# Patient Record
Sex: MALE
[De-identification: names, ages, dates, MRNs, and addresses within clinical notes are randomized per-mention and may not be internally consistent; named-entity substitution may affect disease eponyms.]

---

## 2023-01-15 ENCOUNTER — NURSE TRIAGE (OUTPATIENT)
Dept: OTHER | Facility: CLINIC | Age: 73
End: 2023-01-15

## 2023-01-15 NOTE — TELEPHONE ENCOUNTER
Location of patient: [place]    Subjective: Caller states \"he has had diarrhea for 12 hours\"     Current Symptoms: diarrhea (25 times) in 12 hours    Onset: 12 hours ago    Pain Severity: \"mild stomach pain\"    Temperature: denies     What has been tried: NA    Recommended disposition: Go to ED Now    Care advice provided, patient verbalizes understanding; denies any other questions or concerns; instructed to call back for any new or worsening symptoms. Patient/caller agrees to proceed to . Emergency Department    This triage is a result of a call to [address] [place] [address]. Please do not respond to the triage nurse through this encounter. Any subsequent communication should be directly with the patient.       Reason for Disposition   [1] Drinking very little AND [2] dehydration suspected (e.g., no urine > 12 hours, very dry mouth, very lightheaded)    Protocols used: Diarrhea-ADULT-

## 2023-08-20 PROBLEM — M25.552 LEFT HIP PAIN: Status: ACTIVE | Noted: 2023-08-20

## 2023-08-20 PROBLEM — M16.11 OSTEOARTHRITIS OF RIGHT HIP: Status: ACTIVE | Noted: 2023-08-20

## 2023-08-20 PROBLEM — M25.551 RIGHT HIP PAIN: Status: ACTIVE | Noted: 2023-08-20

## 2023-08-20 PROBLEM — G45.9 TRANSIENT ISCHEMIC ATTACK: Status: ACTIVE | Noted: 2023-08-20

## 2023-08-20 PROBLEM — I77.9 ARTERIOPATHY (CMS-HCC): Status: ACTIVE | Noted: 2023-08-20

## 2023-08-20 PROBLEM — I65.21 STENOSIS OF RIGHT CAROTID ARTERY: Status: ACTIVE | Noted: 2023-08-20

## 2023-08-20 PROBLEM — K40.90 RIGHT INGUINAL HERNIA: Status: ACTIVE | Noted: 2023-08-20

## 2023-08-20 PROBLEM — I25.10 CORONARY ARTERY DISEASE: Status: ACTIVE | Noted: 2023-08-20

## 2023-08-20 PROBLEM — I87.009 POST-PHLEBITIC SYNDROME: Status: ACTIVE | Noted: 2023-08-20

## 2023-08-20 PROBLEM — Z98.61 POSTSURGICAL PERCUTANEOUS TRANSLUMINAL CORONARY ANGIOPLASTY STATUS: Status: ACTIVE | Noted: 2023-08-20

## 2023-08-20 PROBLEM — R91.8 LUNG MASS: Status: ACTIVE | Noted: 2023-08-20

## 2023-08-20 PROBLEM — M16.12 OSTEOARTHRITIS OF LEFT HIP: Status: ACTIVE | Noted: 2023-08-20

## 2023-08-20 PROBLEM — I48.91 ATRIAL FIBRILLATION (MULTI): Status: ACTIVE | Noted: 2023-08-20

## 2023-08-20 PROBLEM — E78.5 HYPERLIPIDEMIA: Status: ACTIVE | Noted: 2023-08-20

## 2023-08-20 PROBLEM — Z95.1 POSTSURGICAL AORTOCORONARY BYPASS STATUS: Status: ACTIVE | Noted: 2023-08-20

## 2023-08-20 PROBLEM — Q83.3: Status: ACTIVE | Noted: 2023-08-20

## 2023-08-20 PROBLEM — H93.19 TINNITUS: Status: ACTIVE | Noted: 2023-08-20

## 2023-08-20 PROBLEM — M70.62 TROCHANTERIC BURSITIS OF LEFT HIP: Status: ACTIVE | Noted: 2023-08-20

## 2023-08-20 PROBLEM — I25.10 ASHD (ARTERIOSCLEROTIC HEART DISEASE): Status: ACTIVE | Noted: 2023-08-20

## 2023-08-20 PROBLEM — K80.20 CALCULUS OF GALLBLADDER WITHOUT CHOLECYSTITIS WITHOUT OBSTRUCTION: Status: ACTIVE | Noted: 2023-08-20

## 2023-08-20 PROBLEM — I83.813 VARICOSE VEINS OF BOTH LOWER EXTREMITIES WITH PAIN: Status: ACTIVE | Noted: 2023-08-20

## 2023-08-20 PROBLEM — N40.0 ENLARGED PROSTATE: Status: ACTIVE | Noted: 2023-08-20

## 2023-08-20 PROBLEM — I10 HYPERTENSION: Status: ACTIVE | Noted: 2023-08-20

## 2023-08-20 PROBLEM — M70.61 TROCHANTERIC BURSITIS OF RIGHT HIP: Status: ACTIVE | Noted: 2023-08-20

## 2023-08-20 PROBLEM — R22.41 MASS OF RIGHT LOWER LEG: Status: ACTIVE | Noted: 2023-08-20

## 2023-08-20 RX ORDER — LOPERAMIDE HCL 2 MG
TABLET ORAL
COMMUNITY
End: 2023-10-28 | Stop reason: HOSPADM

## 2023-08-20 RX ORDER — BROMPHENIRAMINE MALEATE, DEXTROMETHORPHAN HBR, PHENYLEPHRINE HCL, DIPHENHYDRAMINE HCL, PHENYLEPHRINE HCL 0.52G
KIT ORAL
COMMUNITY
End: 2023-10-24 | Stop reason: ALTCHOICE

## 2023-08-20 RX ORDER — LISINOPRIL 40 MG/1
TABLET ORAL
COMMUNITY
Start: 2022-07-25 | End: 2023-10-16

## 2023-08-20 RX ORDER — NITROGLYCERIN 0.4 MG/1
TABLET SUBLINGUAL
COMMUNITY
Start: 2018-05-29 | End: 2023-12-09 | Stop reason: HOSPADM

## 2023-08-20 RX ORDER — ATORVASTATIN CALCIUM 40 MG/1
TABLET, FILM COATED ORAL
COMMUNITY
End: 2023-10-16

## 2023-08-20 RX ORDER — AMLODIPINE BESYLATE 10 MG/1
TABLET ORAL
COMMUNITY
End: 2023-12-09 | Stop reason: HOSPADM

## 2023-08-20 RX ORDER — CARVEDILOL 12.5 MG/1
TABLET ORAL
COMMUNITY
End: 2024-01-28 | Stop reason: HOSPADM

## 2023-08-20 RX ORDER — FAMOTIDINE 20 MG/1
TABLET, FILM COATED ORAL
COMMUNITY
End: 2023-12-09 | Stop reason: HOSPADM

## 2023-08-20 RX ORDER — WARFARIN SODIUM 5 MG/1
TABLET ORAL
COMMUNITY
Start: 2014-10-23 | End: 2023-10-19

## 2023-08-20 RX ORDER — TURMERIC ROOT EXTRACT 500 MG
TABLET ORAL
COMMUNITY
End: 2023-10-24 | Stop reason: ALTCHOICE

## 2023-08-20 RX ORDER — ALLOPURINOL 100 MG/1
TABLET ORAL
COMMUNITY
End: 2023-11-06

## 2023-08-20 RX ORDER — EZETIMIBE 10 MG/1
1 TABLET ORAL DAILY
COMMUNITY

## 2023-08-20 RX ORDER — CHLORTHALIDONE 25 MG/1
TABLET ORAL
COMMUNITY
Start: 2022-07-25 | End: 2023-10-16

## 2023-10-03 ENCOUNTER — OFFICE VISIT (OUTPATIENT)
Dept: ORTHOPEDIC SURGERY | Facility: CLINIC | Age: 73
End: 2023-10-03
Payer: MEDICARE

## 2023-10-03 ENCOUNTER — HOSPITAL ENCOUNTER (OUTPATIENT)
Facility: HOSPITAL | Age: 73
Setting detail: SURGERY ADMIT
End: 2023-10-03
Attending: ORTHOPAEDIC SURGERY | Admitting: ORTHOPAEDIC SURGERY
Payer: MEDICARE

## 2023-10-03 VITALS — WEIGHT: 246 LBS | HEIGHT: 69 IN | BODY MASS INDEX: 36.43 KG/M2

## 2023-10-03 DIAGNOSIS — M16.11 PRIMARY OSTEOARTHRITIS OF RIGHT HIP: ICD-10-CM

## 2023-10-03 DIAGNOSIS — M25.551 RIGHT HIP PAIN: Primary | ICD-10-CM

## 2023-10-03 DIAGNOSIS — M25.351 HIP INSTABILITY, RIGHT: ICD-10-CM

## 2023-10-03 PROCEDURE — 99214 OFFICE O/P EST MOD 30 MIN: CPT | Performed by: ORTHOPAEDIC SURGERY

## 2023-10-03 PROCEDURE — 1036F TOBACCO NON-USER: CPT | Performed by: ORTHOPAEDIC SURGERY

## 2023-10-03 PROCEDURE — 1125F AMNT PAIN NOTED PAIN PRSNT: CPT | Performed by: ORTHOPAEDIC SURGERY

## 2023-10-03 PROCEDURE — 1160F RVW MEDS BY RX/DR IN RCRD: CPT | Performed by: ORTHOPAEDIC SURGERY

## 2023-10-03 PROCEDURE — 1159F MED LIST DOCD IN RCRD: CPT | Performed by: ORTHOPAEDIC SURGERY

## 2023-10-03 ASSESSMENT — PAIN - FUNCTIONAL ASSESSMENT: PAIN_FUNCTIONAL_ASSESSMENT: 0-10

## 2023-10-03 ASSESSMENT — PAIN DESCRIPTION - DESCRIPTORS: DESCRIPTORS: ACHING

## 2023-10-03 ASSESSMENT — PAIN SCALES - GENERAL: PAINLEVEL_OUTOF10: 6

## 2023-10-03 NOTE — PROGRESS NOTES
Subjective      Chief Complaint   Patient presents with    Right Hip - Pain        No surgery found     HPI  Presents today with right hip pain (8/10). The patient states that this right hip pain has been worsening and persistent for years. The patient denies trauma or injury to the right hip. The patient states that the right hip pain is worse with and aggravated by prolonged walking and standing. He feels persistent right hip instability and is concerned for further injury regarding this right hip instability. The patient states that this right hip pain impairs their ability to complete normal activities of daily living. The patient has tried tylenol and ibuprofen with no relief. He has tried cortisone injection to the right hip with no relief of the right hip pain. He has tried physical therapy with no relief of the right hip pain. He has tried intra-articular stem cell cell treatment to the right hip with no relief of the right hip pain 4 years ago. Patient is on warfarin for atrial fibrillation. The patient uses a cane while walking.     Review of Systems   All other systems reviewed and are negative.     Joint Replacement History   Primary joint affected: right hip  Duration: 10 years    Joint replacement related history:  Severe osteoarthritis of right hip    Failed nonsurgical treatments:  NSAIDS/COXIB: yes  Weight loss: yes  Physical therapy: yes  Intraarticular injection: Stem cell therapy 4 years ago    Radiology indications for replacement:   Subchondral cysts, subchondral sclerosis, joint space narrowing.    Walking support: cane    Pain history:   Pain at rest: moderate  Pain when bearing weight: severe  Pain with passive ROM: severe  Pain with ADL: severe  Abnormal findings on physical exam: severe  Patient cannot walk more than 1 block without stopping.   Patient has tried maximum medical therapy appropriate for patient.     Objective      There were no vitals taken for this visit.     Physical  Exam  Constitutional: Appears stated age. No apparent distress  Labored Breathing: No  Psychiatric: Normal mood and effect.   Neurological: alert and oriented x3  Skin: intact  HEENT: No bruising, otorrhea, rhinorrhea.  MUSCULOSKELETAL: Neck: No tenderness. No pain or limitation with range of motion. Back: No tenderness. Straight leg test negative bilaterally. right hip: There is no tenderness at the greater trochanter. There is pain with gentle ROM in flexion and extension at the hip. There is pain with external rotation and abduction. right lower extremity is in good position. Nontender at the calf. Neurovascular is intact. The patient is seen walking today antalgic gait.    AP and lateral x-rays of the right hip done on 8-4-2023 show severe osteoarthritis of the right hip with complete loss of joint surface cartilage, sclerosis and spurring.    Mukesh was seen today for pain.  Diagnoses and all orders for this visit:  Right hip pain (Primary)  Primary osteoarthritis of right hip  Hip instability, right  Options are discussed with the patient in the presence of his wife in detail. The patient is instructed regarding activity modification and fall precautions and risk for further injury with falling or trauma and use of a cane for balance and support, gentle range of motion and stretching exercises, and the appropriate use of Tylenol as needed for pain with its potential adverse reactions and side effects. The patient understands. The patient states that despite all treatment listed above including physical therapy, gentle strengthening and ROM exercises, and cortisone injections to the right hip that the right hip pain is disabling and impairs the patient´s ability to complete normal activities of daily living including the patient´s work at her job. The patient is concerned regarding risk for further injury from this persistent right hip instability, and walks with a Trendelenburg gait. On Physical examination  there is pain with and marked limitation of range of motion, especially internal rotation, abduction, and adduction. X- rays show severe osteoarthritis of the right hip with complete loss of joint surface cartilage, bone on bone, sclerosis and degenerative cysts. Right total hip replacement with indications, alternatives, potential risks including but not limited to risk of infection, blood clot, blood loss, dislocation, nerve or blood vessel damage, stroke, or death, benefits, unforeseen risks, the rehab involved and the fact that no guarantee can be made were all discussed with the patient in the presence of his wife and detail. The patient understands, accepts and because the right hip pain is disabling and impairs the ability to do the activities of daily living that are important to the patient and because of the persistent right hip instability the patient wishes to proceed with a right hip replacement.  His wife and I agree. We will be setting this up for a time that is convenient to the patient and as the schedule allows.  Please note that this report has been produced using speech recognition software.  It may contain errors related to grammar, punctuation or spelling.  Electronically signed, but not reviewed.    Steve Hull MD

## 2023-10-03 NOTE — LETTER
October 3, 2023     Lorenza Garcia MD  [id number] [facility]  [facility] [address]    Patient: Mukesh Garg   YOB: 1950   Date of Visit: 10/3/2023       Dear Dr. Lorenza Garcia MD:    Thank you for referring Mukesh Garg to me for evaluation. Below are my notes for this consultation.  If you have questions, please do not hesitate to call me. I look forward to following your patient along with you.       Sincerely,     Steve Hull MD      CC: No Recipients  ______________________________________________________________________________________    Subjective     Chief Complaint   Patient presents with   • Right Hip - Pain        No surgery found     HPI  Presents today with right hip pain (8/10). The patient states that this right hip pain has been worsening and persistent for years. The patient denies trauma or injury to the right hip. The patient states that the right hip pain is worse with and aggravated by prolonged walking and standing. He feels persistent right hip instability and is concerned for further injury regarding this right hip instability. The patient states that this right hip pain impairs their ability to complete normal activities of daily living. The patient has tried tylenol and ibuprofen with no relief. He has tried cortisone injection to the right hip with no relief of the right hip pain. He has tried physical therapy with no relief of the right hip pain. He has tried intra-articular stem cell cell treatment to the right hip with no relief of the right hip pain 4 years ago. Patient is on warfarin for atrial fibrillation. The patient uses a cane while walking.     Review of Systems   All other systems reviewed and are negative.     Joint Replacement History   Primary joint affected: right hip  Duration: 10 years    Joint replacement related history:  Severe osteoarthritis of right hip    Failed nonsurgical treatments:  NSAIDS/COXIB:  yes  Weight loss: yes  Physical therapy: yes  Intraarticular injection: Stem cell therapy 4 years ago    Radiology indications for replacement:   Subchondral cysts, subchondral sclerosis, joint space narrowing.    Walking support: cane    Pain history:   Pain at rest: moderate  Pain when bearing weight: severe  Pain with passive ROM: severe  Pain with ADL: severe  Abnormal findings on physical exam: severe  Patient cannot walk more than 1 block without stopping.   Patient has tried maximum medical therapy appropriate for patient.     Objective     There were no vitals taken for this visit.     Physical Exam  Constitutional: Appears stated age. No apparent distress  Labored Breathing: No  Psychiatric: Normal mood and effect.   Neurological: alert and oriented x3  Skin: intact  HEENT: No bruising, otorrhea, rhinorrhea.  MUSCULOSKELETAL: Neck: No tenderness. No pain or limitation with range of motion. Back: No tenderness. Straight leg test negative bilaterally. right hip: There is no tenderness at the greater trochanter. There is pain with gentle ROM in flexion and extension at the hip. There is pain with external rotation and abduction. right lower extremity is in good position. Nontender at the calf. Neurovascular is intact. The patient is seen walking today antalgic gait.    AP and lateral x-rays of the right hip done on 8-4-2023 show severe osteoarthritis of the right hip with complete loss of joint surface cartilage, sclerosis and spurring.    Mukesh was seen today for pain.  Diagnoses and all orders for this visit:  Right hip pain (Primary)  Primary osteoarthritis of right hip  Hip instability, right  Options are discussed with the patient in the presence of his wife in detail. The patient is instructed regarding activity modification and fall precautions and risk for further injury with falling or trauma and use of a cane for balance and support, gentle range of motion and stretching exercises, and the  appropriate use of Tylenol as needed for pain with its potential adverse reactions and side effects. The patient understands. The patient states that despite all treatment listed above including physical therapy, gentle strengthening and ROM exercises, and cortisone injections to the right hip that the right hip pain is disabling and impairs the patient´s ability to complete normal activities of daily living including the patient´s work at her job. The patient is concerned regarding risk for further injury from this persistent right hip instability, and walks with a Trendelenburg gait. On Physical examination there is pain with and marked limitation of range of motion, especially internal rotation, abduction, and adduction. X- rays show severe osteoarthritis of the right hip with complete loss of joint surface cartilage, bone on bone, sclerosis and degenerative cysts. Right total hip replacement with indications, alternatives, potential risks including but not limited to risk of infection, blood clot, blood loss, dislocation, nerve or blood vessel damage, stroke, or death, benefits, unforeseen risks, the rehab involved and the fact that no guarantee can be made were all discussed with the patient in the presence of his wife and detail. The patient understands, accepts and because the right hip pain is disabling and impairs the ability to do the activities of daily living that are important to the patient and because of the persistent right hip instability the patient wishes to proceed with a right hip replacement.  His wife and I agree. We will be setting this up for a time that is convenient to the patient and as the schedule allows.  Please note that this report has been produced using speech recognition software.  It may contain errors related to grammar, punctuation or spelling.  Electronically signed, but not reviewed.    Steve Hull MD

## 2023-10-05 ENCOUNTER — APPOINTMENT (OUTPATIENT)
Dept: ORTHOPEDIC SURGERY | Facility: CLINIC | Age: 73
End: 2023-10-05
Payer: MEDICARE

## 2023-10-15 DIAGNOSIS — I10 PRIMARY HYPERTENSION: Primary | ICD-10-CM

## 2023-10-16 RX ORDER — LISINOPRIL 40 MG/1
40 TABLET ORAL DAILY
Qty: 90 TABLET | Refills: 3 | Status: SHIPPED | OUTPATIENT
Start: 2023-10-16 | End: 2023-12-09 | Stop reason: HOSPADM

## 2023-10-19 DIAGNOSIS — Z79.01 ANTICOAGULATED: Primary | ICD-10-CM

## 2023-10-19 RX ORDER — WARFARIN SODIUM 5 MG/1
5 TABLET ORAL
Qty: 90 TABLET | Refills: 1 | Status: ON HOLD | OUTPATIENT
Start: 2023-10-19 | End: 2023-10-27 | Stop reason: ALTCHOICE

## 2023-10-23 NOTE — PROGRESS NOTES
"This is a 73 year old male for EVAL of 2 weeks Hx URI Sxs with CHEST CONGESTION and other URI sxs and TESTED NEG for COVID at HOME x 2; and presents ILL at our office with PROFOUND DIARRHEA and has COMPLEX PmHx including TIA, ASHD A FIBRILLATION ASHD HTN:    Started with URI issues about 3 weeks ago.  POS FOR SOB and TIGHTNESS and has Hx as above and PRESENTS with RAPID HEART and profound weakness    TACHY at above 140 and too weak to get onto table for EKG study  [facility] is called and due to ANTICOAGULATED for A FIB he is NOT PLACED ON ASA but is given OXYGEN at 2 L     Problem List:    Hepatitis C Screening  Zoster Vaccines (2 of 3)  PROBLEM LIST  Arteriopathy (CMS/HCC)  Atrial fibrillation (CMS/HCC)  Calculus of gallbladder without cholecystitis without obstruction  ASHD (arteriosclerotic heart disease)  Coronary artery disease  Enlarged prostate  Hyperlipidemia  Hypertension  Lung mass  Mass of right lower leg  Osteoarthritis of left hip  Osteoarthritis of right hip  Polythelia  Post-phlebitic syndrome  Postsurgical aortocoronary bypass status  Postsurgical percutaneous transluminal coronary angioplasty status  Left hip pain  Right hip pain  Right inguinal hernia  Stenosis of right carotid artery  Tinnitus  Transient ischemic attack  Trochanteric bursitis of left hip  Trochanteric bursitis of right hip  Varicose veins of both lower extremities with pain  Hip instability, right      ROS is NEG for HEADACHE, NAUSEA, VOMITING,and BLEEDING and as further REVIEWED BELOW.      Subjective   Mukesh Garg is a 73 y.o. male who presents for Flu Symptoms (X3 weeks).    HPI:        Review of systems is essentially negative for all systems except for any identified issues in HPI above.    Objective     /90   Pulse (!) 142   Temp 36.4 °C (97.6 °F)   Ht 1.753 m (5' 9\")   Wt 63.5 kg (140 lb)   SpO2 94%   BMI 20.67 kg/m²      Physical Examination:       GENERAL           General Appearance: APPEARS in MUCH DISTRESS and " WEAKENED and has Hx ASHD  CABG and A FIB and has RAPID HR  TOO WEAK to get onto EXAM TABLE       HEENT           NECK supple        EYES           Extraocular Movements: normal, bilateral eyes JESSENIA, no conjunctival injection.        HEART           Rate and Rhythm regular rate and rhythm. Heart sounds: normal S1S2, no S3 or S4. Murmurs: none.        CHEST           Breath sounds: Clear to IPPA, RR<16 no use of accessory muscles.        ABDOMEN           General: Neg for LKKS or masses, no scleral icterus or jaundice.        MUSCULOSKELETAL           Joints Demonstration: Neg for erythema, swelling or joint deformities. gross abnormalities no gross abnormalities.        EXTREMITIES           Lower Extremities: Neg for cyanosis, clubbing or edema.       Assessment/Plan   Problem List Items Addressed This Visit    None  Visit Diagnoses       Chest discomfort    -  Primary    Upper respiratory tract infection, unspecified type        Chest congestion        Health counseling        Immunization counseling        Tachycardia        Chest pain due to myocardial ischemia, unspecified ischemic chest pain type        SOB (shortness of breath)              Patient is stablized in our office and unable to get onto EXAM table for EKG and with Hx of ASHD, CABG in 2013 and CHEST PAIN, SOB and DEHYDRATION, DIARRHEA and TACHYCARDIA he is rapidly transferred by [facility] to [facility] ER where the staff there has been fully appraised of his concerning condition.      FOLLOW UP:  PRN and as specified above         Lorenza Hager M.D.

## 2023-10-24 ENCOUNTER — APPOINTMENT (OUTPATIENT)
Dept: RADIOLOGY | Facility: HOSPITAL | Age: 73
DRG: 178 | End: 2023-10-24
Payer: COMMERCIAL

## 2023-10-24 ENCOUNTER — OFFICE VISIT (OUTPATIENT)
Dept: PRIMARY CARE | Facility: CLINIC | Age: 73
End: 2023-10-24
Payer: MEDICARE

## 2023-10-24 ENCOUNTER — HOSPITAL ENCOUNTER (INPATIENT)
Facility: HOSPITAL | Age: 73
LOS: 4 days | Discharge: HOME | DRG: 178 | End: 2023-10-28
Attending: EMERGENCY MEDICINE | Admitting: STUDENT IN AN ORGANIZED HEALTH CARE EDUCATION/TRAINING PROGRAM
Payer: COMMERCIAL

## 2023-10-24 VITALS
DIASTOLIC BLOOD PRESSURE: 90 MMHG | BODY MASS INDEX: 20.73 KG/M2 | SYSTOLIC BLOOD PRESSURE: 140 MMHG | HEIGHT: 69 IN | TEMPERATURE: 97.6 F | HEART RATE: 142 BPM | WEIGHT: 140 LBS | OXYGEN SATURATION: 94 %

## 2023-10-24 DIAGNOSIS — I48.91 ATRIAL FIBRILLATION, UNSPECIFIED TYPE (MULTI): ICD-10-CM

## 2023-10-24 DIAGNOSIS — Z71.9 HEALTH COUNSELING: ICD-10-CM

## 2023-10-24 DIAGNOSIS — Z71.85 IMMUNIZATION COUNSELING: ICD-10-CM

## 2023-10-24 DIAGNOSIS — R07.9 CHEST PAIN, UNSPECIFIED TYPE: ICD-10-CM

## 2023-10-24 DIAGNOSIS — R07.89 CHEST DISCOMFORT: Primary | ICD-10-CM

## 2023-10-24 DIAGNOSIS — I82.401 ACUTE DEEP VEIN THROMBOSIS (DVT) OF RIGHT LOWER EXTREMITY, UNSPECIFIED VEIN (MULTI): ICD-10-CM

## 2023-10-24 DIAGNOSIS — R00.0 TACHYCARDIA: ICD-10-CM

## 2023-10-24 DIAGNOSIS — I25.10 CORONARY ARTERY DISEASE INVOLVING NATIVE CORONARY ARTERY OF NATIVE HEART WITHOUT ANGINA PECTORIS: ICD-10-CM

## 2023-10-24 DIAGNOSIS — R09.89 CHEST CONGESTION: ICD-10-CM

## 2023-10-24 DIAGNOSIS — I25.9 CHEST PAIN DUE TO MYOCARDIAL ISCHEMIA, UNSPECIFIED ISCHEMIC CHEST PAIN TYPE: ICD-10-CM

## 2023-10-24 DIAGNOSIS — U07.1 COVID: ICD-10-CM

## 2023-10-24 DIAGNOSIS — J06.9 UPPER RESPIRATORY TRACT INFECTION, UNSPECIFIED TYPE: ICD-10-CM

## 2023-10-24 DIAGNOSIS — I82.4Z1 ACUTE DEEP VEIN THROMBOSIS (DVT) OF DISTAL VEIN OF RIGHT LOWER EXTREMITY (MULTI): ICD-10-CM

## 2023-10-24 DIAGNOSIS — I48.91 ATRIAL FIBRILLATION WITH RVR (MULTI): Primary | ICD-10-CM

## 2023-10-24 DIAGNOSIS — R06.02 SOB (SHORTNESS OF BREATH): ICD-10-CM

## 2023-10-24 LAB
ALBUMIN SERPL-MCNC: 3.5 G/DL (ref 3.5–5)
ALP BLD-CCNC: 61 U/L (ref 35–125)
ALT SERPL-CCNC: 22 U/L (ref 5–40)
ANION GAP SERPL CALC-SCNC: 17 MMOL/L
APPEARANCE UR: CLEAR
AST SERPL-CCNC: 30 U/L (ref 5–40)
BASOPHILS # BLD AUTO: 0.05 X10*3/UL (ref 0–0.1)
BASOPHILS NFR BLD AUTO: 0.3 %
BILIRUB SERPL-MCNC: 2.2 MG/DL (ref 0.1–1.2)
BILIRUB UR STRIP.AUTO-MCNC: ABNORMAL MG/DL
BUN SERPL-MCNC: 23 MG/DL (ref 8–25)
CALCIUM SERPL-MCNC: 9.6 MG/DL (ref 8.5–10.4)
CHLORIDE SERPL-SCNC: 100 MMOL/L (ref 97–107)
CO2 SERPL-SCNC: 22 MMOL/L (ref 24–31)
COLOR UR: YELLOW
CREAT SERPL-MCNC: 1.5 MG/DL (ref 0.4–1.6)
EOSINOPHIL # BLD AUTO: 0 X10*3/UL (ref 0–0.4)
EOSINOPHIL NFR BLD AUTO: 0 %
ERYTHROCYTE [DISTWIDTH] IN BLOOD BY AUTOMATED COUNT: 13.8 % (ref 11.5–14.5)
ERYTHROCYTE [DISTWIDTH] IN BLOOD BY AUTOMATED COUNT: 13.9 % (ref 11.5–14.5)
GFR SERPL CREATININE-BSD FRML MDRD: 48 ML/MIN/1.73M*2
GLUCOSE SERPL-MCNC: 153 MG/DL (ref 65–99)
GLUCOSE UR STRIP.AUTO-MCNC: NORMAL MG/DL
HCT VFR BLD AUTO: 41.8 % (ref 41–52)
HCT VFR BLD AUTO: 49.1 % (ref 41–52)
HGB BLD-MCNC: 13.9 G/DL (ref 13.5–17.5)
HGB BLD-MCNC: 16.5 G/DL (ref 13.5–17.5)
HOLD SPECIMEN: NORMAL
HYALINE CASTS #/AREA URNS AUTO: ABNORMAL /LPF
IMM GRANULOCYTES # BLD AUTO: 0.08 X10*3/UL (ref 0–0.5)
IMM GRANULOCYTES NFR BLD AUTO: 0.5 % (ref 0–0.9)
INR PPP: 1.3 (ref 0.9–1.2)
KETONES UR STRIP.AUTO-MCNC: ABNORMAL MG/DL
LEUKOCYTE ESTERASE UR QL STRIP.AUTO: NEGATIVE
LYMPHOCYTES # BLD AUTO: 1.32 X10*3/UL (ref 0.8–3)
LYMPHOCYTES NFR BLD AUTO: 8.9 %
MAGNESIUM SERPL-MCNC: 1.8 MG/DL (ref 1.6–3.1)
MCH RBC QN AUTO: 30.8 PG (ref 26–34)
MCH RBC QN AUTO: 30.8 PG (ref 26–34)
MCHC RBC AUTO-ENTMCNC: 33.3 G/DL (ref 32–36)
MCHC RBC AUTO-ENTMCNC: 33.6 G/DL (ref 32–36)
MCV RBC AUTO: 92 FL (ref 80–100)
MCV RBC AUTO: 93 FL (ref 80–100)
MONOCYTES # BLD AUTO: 1.37 X10*3/UL (ref 0.05–0.8)
MONOCYTES NFR BLD AUTO: 9.2 %
MUCOUS THREADS #/AREA URNS AUTO: ABNORMAL /LPF
NEUTROPHILS # BLD AUTO: 12.09 X10*3/UL (ref 1.6–5.5)
NEUTROPHILS NFR BLD AUTO: 81.1 %
NITRITE UR QL STRIP.AUTO: NEGATIVE
NRBC BLD-RTO: 0 /100 WBCS (ref 0–0)
NRBC BLD-RTO: 0 /100 WBCS (ref 0–0)
PH UR STRIP.AUTO: 5 [PH]
PLATELET # BLD AUTO: 119 X10*3/UL (ref 150–450)
PLATELET # BLD AUTO: 140 X10*3/UL (ref 150–450)
PMV BLD AUTO: 10.2 FL (ref 7.5–11.5)
PMV BLD AUTO: 10.2 FL (ref 7.5–11.5)
POTASSIUM SERPL-SCNC: 3.9 MMOL/L (ref 3.4–5.1)
PROT SERPL-MCNC: 6.9 G/DL (ref 5.9–7.9)
PROT UR STRIP.AUTO-MCNC: ABNORMAL MG/DL
PROTHROMBIN TIME: 13.2 SECONDS (ref 9.3–12.7)
RBC # BLD AUTO: 4.52 X10*6/UL (ref 4.5–5.9)
RBC # BLD AUTO: 5.35 X10*6/UL (ref 4.5–5.9)
RBC # UR STRIP.AUTO: ABNORMAL /UL
RBC #/AREA URNS AUTO: ABNORMAL /HPF
SARS-COV-2 RNA RESP QL NAA+PROBE: DETECTED
SODIUM SERPL-SCNC: 139 MMOL/L (ref 133–145)
SP GR UR STRIP.AUTO: 1.02
TROPONIN T SERPL-MCNC: 104 NG/L
TROPONIN T SERPL-MCNC: 79 NG/L
TROPONIN T SERPL-MCNC: 89 NG/L
TSH SERPL DL<=0.05 MIU/L-ACNC: 0.99 MIU/L (ref 0.27–4.2)
UROBILINOGEN UR STRIP.AUTO-MCNC: NORMAL MG/DL
WBC # BLD AUTO: 14.9 X10*3/UL (ref 4.4–11.3)
WBC # BLD AUTO: 15.4 X10*3/UL (ref 4.4–11.3)
WBC #/AREA URNS AUTO: ABNORMAL /HPF

## 2023-10-24 PROCEDURE — 96374 THER/PROPH/DIAG INJ IV PUSH: CPT

## 2023-10-24 PROCEDURE — 84484 ASSAY OF TROPONIN QUANT: CPT | Performed by: PHYSICIAN ASSISTANT

## 2023-10-24 PROCEDURE — 3080F DIAST BP >= 90 MM HG: CPT | Performed by: FAMILY MEDICINE

## 2023-10-24 PROCEDURE — 2060000001 HC INTERMEDIATE ICU ROOM DAILY

## 2023-10-24 PROCEDURE — 2500000001 HC RX 250 WO HCPCS SELF ADMINISTERED DRUGS (ALT 637 FOR MEDICARE OP): Performed by: EMERGENCY MEDICINE

## 2023-10-24 PROCEDURE — 85027 COMPLETE CBC AUTOMATED: CPT | Mod: 59 | Performed by: PHYSICIAN ASSISTANT

## 2023-10-24 PROCEDURE — 2500000001 HC RX 250 WO HCPCS SELF ADMINISTERED DRUGS (ALT 637 FOR MEDICARE OP)

## 2023-10-24 PROCEDURE — 1036F TOBACCO NON-USER: CPT | Performed by: FAMILY MEDICINE

## 2023-10-24 PROCEDURE — 87635 SARS-COV-2 COVID-19 AMP PRB: CPT | Performed by: PHYSICIAN ASSISTANT

## 2023-10-24 PROCEDURE — 99285 EMERGENCY DEPT VISIT HI MDM: CPT | Mod: 25 | Performed by: EMERGENCY MEDICINE

## 2023-10-24 PROCEDURE — 1160F RVW MEDS BY RX/DR IN RCRD: CPT | Performed by: FAMILY MEDICINE

## 2023-10-24 PROCEDURE — 85730 THROMBOPLASTIN TIME PARTIAL: CPT | Performed by: PHYSICIAN ASSISTANT

## 2023-10-24 PROCEDURE — 2500000002 HC RX 250 W HCPCS SELF ADMINISTERED DRUGS (ALT 637 FOR MEDICARE OP, ALT 636 FOR OP/ED)

## 2023-10-24 PROCEDURE — 83735 ASSAY OF MAGNESIUM: CPT | Performed by: PHYSICIAN ASSISTANT

## 2023-10-24 PROCEDURE — 81001 URINALYSIS AUTO W/SCOPE: CPT | Performed by: PHYSICIAN ASSISTANT

## 2023-10-24 PROCEDURE — 3E0DX3Z INTRODUCTION OF ANTI-INFLAMMATORY INTO MOUTH AND PHARYNX, EXTERNAL APPROACH: ICD-10-PCS | Performed by: STUDENT IN AN ORGANIZED HEALTH CARE EDUCATION/TRAINING PROGRAM

## 2023-10-24 PROCEDURE — 93971 EXTREMITY STUDY: CPT

## 2023-10-24 PROCEDURE — 3077F SYST BP >= 140 MM HG: CPT | Performed by: FAMILY MEDICINE

## 2023-10-24 PROCEDURE — 99214 OFFICE O/P EST MOD 30 MIN: CPT | Performed by: FAMILY MEDICINE

## 2023-10-24 PROCEDURE — 1159F MED LIST DOCD IN RCRD: CPT | Performed by: FAMILY MEDICINE

## 2023-10-24 PROCEDURE — 36415 COLL VENOUS BLD VENIPUNCTURE: CPT | Performed by: PHYSICIAN ASSISTANT

## 2023-10-24 PROCEDURE — 80053 COMPREHEN METABOLIC PANEL: CPT | Performed by: PHYSICIAN ASSISTANT

## 2023-10-24 PROCEDURE — 2500000004 HC RX 250 GENERAL PHARMACY W/ HCPCS (ALT 636 FOR OP/ED): Performed by: INTERNAL MEDICINE

## 2023-10-24 PROCEDURE — 85025 COMPLETE CBC W/AUTO DIFF WBC: CPT | Performed by: PHYSICIAN ASSISTANT

## 2023-10-24 PROCEDURE — 2500000004 HC RX 250 GENERAL PHARMACY W/ HCPCS (ALT 636 FOR OP/ED): Performed by: PHYSICIAN ASSISTANT

## 2023-10-24 PROCEDURE — 71045 X-RAY EXAM CHEST 1 VIEW: CPT | Mod: FY

## 2023-10-24 PROCEDURE — 2500000005 HC RX 250 GENERAL PHARMACY W/O HCPCS: Performed by: PHYSICIAN ASSISTANT

## 2023-10-24 PROCEDURE — 85610 PROTHROMBIN TIME: CPT | Performed by: PHYSICIAN ASSISTANT

## 2023-10-24 PROCEDURE — 1126F AMNT PAIN NOTED NONE PRSNT: CPT | Performed by: FAMILY MEDICINE

## 2023-10-24 PROCEDURE — 84443 ASSAY THYROID STIM HORMONE: CPT

## 2023-10-24 PROCEDURE — 93010 ELECTROCARDIOGRAM REPORT: CPT | Performed by: INTERNAL MEDICINE

## 2023-10-24 RX ORDER — AMLODIPINE BESYLATE 10 MG/1
10 TABLET ORAL DAILY
Status: DISCONTINUED | OUTPATIENT
Start: 2023-10-24 | End: 2023-10-28 | Stop reason: HOSPADM

## 2023-10-24 RX ORDER — LISINOPRIL 40 MG/1
40 TABLET ORAL DAILY
Status: DISCONTINUED | OUTPATIENT
Start: 2023-10-24 | End: 2023-10-28 | Stop reason: HOSPADM

## 2023-10-24 RX ORDER — HEPARIN SODIUM 5000 [USP'U]/ML
5000 INJECTION, SOLUTION INTRAVENOUS; SUBCUTANEOUS ONCE
Status: DISCONTINUED | OUTPATIENT
Start: 2023-10-24 | End: 2023-10-24

## 2023-10-24 RX ORDER — HEPARIN SODIUM 10000 [USP'U]/100ML
0-4500 INJECTION, SOLUTION INTRAVENOUS CONTINUOUS
Status: DISPENSED | OUTPATIENT
Start: 2023-10-24 | End: 2023-10-25

## 2023-10-24 RX ORDER — ATORVASTATIN CALCIUM 40 MG/1
40 TABLET, FILM COATED ORAL DAILY
Status: DISCONTINUED | OUTPATIENT
Start: 2023-10-24 | End: 2023-10-28 | Stop reason: HOSPADM

## 2023-10-24 RX ORDER — DILTIAZEM HYDROCHLORIDE 5 MG/ML
20 INJECTION INTRAVENOUS ONCE
Status: COMPLETED | OUTPATIENT
Start: 2023-10-24 | End: 2023-10-24

## 2023-10-24 RX ORDER — DILTIAZEM HCL/D5W 125 MG/125
5-15 PLASTIC BAG, INJECTION (ML) INTRAVENOUS CONTINUOUS
Status: DISPENSED | OUTPATIENT
Start: 2023-10-24 | End: 2023-10-25

## 2023-10-24 RX ORDER — HEPARIN SODIUM 5000 [USP'U]/ML
3000-6000 INJECTION, SOLUTION INTRAVENOUS; SUBCUTANEOUS AS NEEDED
Status: DISCONTINUED | OUTPATIENT
Start: 2023-10-24 | End: 2023-10-27

## 2023-10-24 RX ORDER — HYDROCHLOROTHIAZIDE 25 MG/1
25 TABLET ORAL DAILY
Status: DISCONTINUED | OUTPATIENT
Start: 2023-10-24 | End: 2023-10-28 | Stop reason: HOSPADM

## 2023-10-24 RX ORDER — DICYCLOMINE HYDROCHLORIDE 20 MG/1
TABLET ORAL
COMMUNITY
End: 2023-10-28 | Stop reason: HOSPADM

## 2023-10-24 RX ORDER — ASPIRIN 325 MG
325 TABLET ORAL ONCE
Status: COMPLETED | OUTPATIENT
Start: 2023-10-24 | End: 2023-10-24

## 2023-10-24 RX ORDER — FAMOTIDINE 20 MG/1
20 TABLET, FILM COATED ORAL DAILY
Status: DISCONTINUED | OUTPATIENT
Start: 2023-10-24 | End: 2023-10-28 | Stop reason: HOSPADM

## 2023-10-24 RX ORDER — EZETIMIBE 10 MG/1
10 TABLET ORAL DAILY
Status: DISCONTINUED | OUTPATIENT
Start: 2023-10-24 | End: 2023-10-28 | Stop reason: HOSPADM

## 2023-10-24 RX ORDER — CARVEDILOL 12.5 MG/1
12.5 TABLET ORAL
Status: DISCONTINUED | OUTPATIENT
Start: 2023-10-24 | End: 2023-10-28 | Stop reason: HOSPADM

## 2023-10-24 RX ORDER — HYDROCHLOROTHIAZIDE 25 MG/1
25 TABLET ORAL DAILY
COMMUNITY
End: 2023-11-15 | Stop reason: ALTCHOICE

## 2023-10-24 RX ORDER — HEPARIN SODIUM 5000 [USP'U]/ML
80 INJECTION, SOLUTION INTRAVENOUS; SUBCUTANEOUS ONCE
Status: COMPLETED | OUTPATIENT
Start: 2023-10-24 | End: 2023-10-24

## 2023-10-24 RX ORDER — DEXAMETHASONE 6 MG/1
6 TABLET ORAL NIGHTLY
Status: DISCONTINUED | OUTPATIENT
Start: 2023-10-24 | End: 2023-10-28 | Stop reason: HOSPADM

## 2023-10-24 RX ORDER — PANTOPRAZOLE SODIUM 40 MG/1
40 TABLET, DELAYED RELEASE ORAL
Status: DISCONTINUED | OUTPATIENT
Start: 2023-10-25 | End: 2023-10-28 | Stop reason: HOSPADM

## 2023-10-24 RX ADMIN — CARVEDILOL 12.5 MG: 12.5 TABLET, FILM COATED ORAL at 16:40

## 2023-10-24 RX ADMIN — DILTIAZEM HYDROCHLORIDE 20 MG: 5 INJECTION, SOLUTION INTRAVENOUS at 12:28

## 2023-10-24 RX ADMIN — Medication 5 MG/HR: at 20:19

## 2023-10-24 RX ADMIN — SODIUM CHLORIDE 1000 ML: 900 INJECTION, SOLUTION INTRAVENOUS at 12:32

## 2023-10-24 RX ADMIN — DEXAMETHASONE 6 MG: 6 TABLET ORAL at 21:32

## 2023-10-24 RX ADMIN — HEPARIN SODIUM 2000 UNITS/HR: 10000 INJECTION, SOLUTION INTRAVENOUS at 16:14

## 2023-10-24 RX ADMIN — AMLODIPINE BESYLATE 10 MG: 10 TABLET ORAL at 16:40

## 2023-10-24 RX ADMIN — FAMOTIDINE 20 MG: 20 TABLET ORAL at 18:52

## 2023-10-24 RX ADMIN — Medication 5 MG/HR: at 12:56

## 2023-10-24 RX ADMIN — ASPIRIN 325 MG: 325 TABLET ORAL at 16:40

## 2023-10-24 RX ADMIN — HEPARIN SODIUM 8750 UNITS: 5000 INJECTION, SOLUTION INTRAVENOUS; SUBCUTANEOUS at 16:10

## 2023-10-24 RX ADMIN — ATORVASTATIN CALCIUM 40 MG: 40 TABLET, FILM COATED ORAL at 16:40

## 2023-10-24 RX ADMIN — EZETIMIBE 10 MG: 10 TABLET ORAL at 21:32

## 2023-10-24 RX ADMIN — LISINOPRIL 40 MG: 20 TABLET ORAL at 18:52

## 2023-10-24 RX ADMIN — HYDROCHLOROTHIAZIDE 25 MG: 25 TABLET ORAL at 18:52

## 2023-10-24 ASSESSMENT — COLUMBIA-SUICIDE SEVERITY RATING SCALE - C-SSRS
6. HAVE YOU EVER DONE ANYTHING, STARTED TO DO ANYTHING, OR PREPARED TO DO ANYTHING TO END YOUR LIFE?: NO
1. IN THE PAST MONTH, HAVE YOU WISHED YOU WERE DEAD OR WISHED YOU COULD GO TO SLEEP AND NOT WAKE UP?: NO
2. HAVE YOU ACTUALLY HAD ANY THOUGHTS OF KILLING YOURSELF?: NO

## 2023-10-24 ASSESSMENT — ENCOUNTER SYMPTOMS
SHORTNESS OF BREATH: 1
EYES NEGATIVE: 1
NEUROLOGICAL NEGATIVE: 1
MUSCULOSKELETAL NEGATIVE: 1
PSYCHIATRIC NEGATIVE: 1
GASTROINTESTINAL NEGATIVE: 1
ALLERGIC/IMMUNOLOGIC NEGATIVE: 1
FATIGUE: 1
HEMATOLOGIC/LYMPHATIC NEGATIVE: 1
SINUS PRESSURE: 1
ACTIVITY CHANGE: 1
ENDOCRINE NEGATIVE: 1

## 2023-10-24 ASSESSMENT — LIFESTYLE VARIABLES
HAVE YOU EVER FELT YOU SHOULD CUT DOWN ON YOUR DRINKING: NO
REASON UNABLE TO ASSESS: NO
EVER FELT BAD OR GUILTY ABOUT YOUR DRINKING: NO
EVER HAD A DRINK FIRST THING IN THE MORNING TO STEADY YOUR NERVES TO GET RID OF A HANGOVER: NO
HAVE PEOPLE ANNOYED YOU BY CRITICIZING YOUR DRINKING: NO

## 2023-10-24 ASSESSMENT — PAIN SCALES - GENERAL
PAINLEVEL_OUTOF10: 0 - NO PAIN
PAINLEVEL_OUTOF10: 0 - NO PAIN
PAINLEVEL_OUTOF10: 4
PAINLEVEL: 0-NO PAIN

## 2023-10-24 ASSESSMENT — PATIENT HEALTH QUESTIONNAIRE - PHQ9
SUM OF ALL RESPONSES TO PHQ9 QUESTIONS 1 AND 2: 2
2. FEELING DOWN, DEPRESSED OR HOPELESS: SEVERAL DAYS
10. IF YOU CHECKED OFF ANY PROBLEMS, HOW DIFFICULT HAVE THESE PROBLEMS MADE IT FOR YOU TO DO YOUR WORK, TAKE CARE OF THINGS AT HOME, OR GET ALONG WITH OTHER PEOPLE: SOMEWHAT DIFFICULT
1. LITTLE INTEREST OR PLEASURE IN DOING THINGS: SEVERAL DAYS

## 2023-10-24 ASSESSMENT — PAIN - FUNCTIONAL ASSESSMENT: PAIN_FUNCTIONAL_ASSESSMENT: 0-10

## 2023-10-24 ASSESSMENT — PAIN DESCRIPTION - PROGRESSION: CLINICAL_PROGRESSION: NOT CHANGED

## 2023-10-24 ASSESSMENT — PAIN DESCRIPTION - DESCRIPTORS: DESCRIPTORS: TIGHTNESS

## 2023-10-24 ASSESSMENT — PAIN DESCRIPTION - LOCATION: LOCATION: CHEST

## 2023-10-24 NOTE — CONSULTS
Inpatient consult to Infectious Diseases  Consult performed by: Koffi Ji MD  Consult ordered by: MISTY Soni-CNP          Primary MD: Lorenza Garcia MD    Reason For Consult  Coronavirus infection    History Of Present Illness  Mukesh Garg is a 73 y.o. male presenting with shortness of breath and generalized weakness.  He was in usual state of health until about 2 to 3 weeks ago when he had nonproductive cough and sinus pressure.  This occurred about a time when he received his influenza and coronavirus vaccine.  He subsequently developed fatigue and intermittent diarrhea.  He went to his primary care physician's office and was found to have increased heart rate and was sent to the hospital for further evaluation and management.  Work-up was remarkable for atrial fibrillation with ventricular response.  Lower extremity ultrasound was remarkable for right lower extremity DVT.  He is hypoxic and on 2 L of oxygen  His wife was present during his evaluation.  She is relatively asymptomatic.  He denies any fever or chills.  He has mild nonproductive cough     Past Medical History  He has a past medical history of Atrial fibrillation (CMS/HCC), CAD (coronary artery disease), Gout, Heart disease, Hyperlipidemia, Hypertension, MI (myocardial infarction) (CMS/HCC), and Sleep apnea.    Surgical History  He has a past surgical history that includes MR angio head wo IV contrast (03/21/2017); MR angio head wo IV contrast (10/30/2020); and Coronary artery bypass graft.     Social History     Occupational History    Not on file   Tobacco Use    Smoking status: Never     Passive exposure: Never    Smokeless tobacco: Never   Vaping Use    Vaping Use: Never used   Substance and Sexual Activity    Alcohol use: Not Currently     Alcohol/week: 2.0 standard drinks of alcohol     Types: 2 Cans of beer per week    Drug use: Never    Sexual activity: Defer     Travel History   Travel since 09/24/23    No  documented travel since 09/24/23            Family History  Family History   Problem Relation Name Age of Onset    Hypertension Mother      Stroke Mother      Heart attack Father          Cause of death    Heart disease Father      Diabetes Other Grandmother      Allergies  Pentazocine lactate     Immunization History   Administered Date(s) Administered    DT (pediatric) 06/20/2006    Flu vaccine, quadrivalent, high-dose, preservative free, age 65y+ (FLUZONE) 09/29/2020, 10/01/2022    Influenza, High Dose Seasonal, Preservative Free 10/30/2015, 10/04/2017, 12/10/2018    Influenza, Seasonal, Quadrivalent, Adjuvanted 09/15/2021    Influenza, seasonal, injectable 11/03/2012, 10/04/2013, 12/11/2014    Influenza, trivalent, adjuvanted 11/06/2019    Moderna SARS-CoV-2 Vaccination 02/02/2021, 03/02/2021, 11/12/2021, 04/04/2022    Pfizer COVID-19 vaccine, bivalent, age 12 years and older (30 mcg/0.3 mL) 10/01/2022    Pneumococcal conjugate vaccine, 13-valent (PREVNAR 13) 02/11/2016    Pneumococcal polysaccharide vaccine, 23-valent, age 2 years and older (PNEUMOVAX 23) 01/23/2013, 12/11/2014, 02/11/2016    Tdap vaccine, age 7 year and older (BOOSTRIX) 01/18/2018    Zoster, live 05/27/2009     Medications  Home medications:  (Not in a hospital admission)    Current medications:  Scheduled medications  amLODIPine, 10 mg, oral, Daily  atorvastatin, 40 mg, oral, Daily  carvedilol, 12.5 mg, oral, BID with meals  ezetimibe, 10 mg, oral, Daily  famotidine, 20 mg, oral, Daily  hydroCHLOROthiazide, 25 mg, oral, Daily  lisinopril, 40 mg, oral, Daily  [START ON 10/25/2023] pantoprazole, 40 mg, oral, Daily before breakfast      Continuous medications  dilTIAZem, 5-15 mg/hr, Last Rate: 15 mg/hr (10/24/23 9887)  heparin, 0-4,500 Units/hr, Last Rate: 2,000 Units/hr (10/24/23 1614)      PRN medications  PRN medications: heparin (porcine)    Review of Systems   All other systems reviewed and are negative.       Objective  Range of Vitals  "(last 24 hours)  Heart Rate:  [103-168]   Temp:  [36.4 °C (97.6 °F)-37.3 °C (99.1 °F)]   Resp:  [18-24]   BP: (140-159)/()   Height:  [175.3 cm (5' 9\")]   Weight:  [63.5 kg (140 lb)-110 kg (243 lb 6.2 oz)]   SpO2:  [92 %-99 %]   Daily Weight  10/24/23 : 110 kg (243 lb 6.2 oz)    Body mass index is 35.94 kg/m².     Physical Exam  Constitutional:       Appearance: Normal appearance.   HENT:      Head: Normocephalic and atraumatic.      Nose: Nose normal.   Eyes:      Extraocular Movements: Extraocular movements intact.      Conjunctiva/sclera: Conjunctivae normal.   Cardiovascular:      Rate and Rhythm: Rhythm irregular.      Heart sounds: Normal heart sounds.   Pulmonary:      Effort: Pulmonary effort is normal.      Breath sounds: Examination of the right-upper field reveals decreased breath sounds. Examination of the left-upper field reveals decreased breath sounds. Examination of the right-middle field reveals decreased breath sounds. Examination of the left-middle field reveals decreased breath sounds. Examination of the right-lower field reveals decreased breath sounds. Examination of the left-lower field reveals decreased breath sounds. Decreased breath sounds present.   Abdominal:      General: Bowel sounds are normal.      Palpations: Abdomen is soft.   Musculoskeletal:         General: Normal range of motion.      Cervical back: Normal range of motion and neck supple.   Skin:     General: Skin is dry.      Findings: No erythema.   Neurological:      Mental Status: He is alert and oriented to person, place, and time. Mental status is at baseline.   Psychiatric:         Mood and Affect: Mood normal.         Behavior: Behavior normal.          Relevant Results  Outside Hospital Results    Labs  Results from last 72 hours   Lab Units 10/24/23  1227   WBC AUTO x10*3/uL 14.9*   HEMOGLOBIN g/dL 16.5   HEMATOCRIT % 49.1   PLATELETS AUTO x10*3/uL 140*   NEUTROS PCT AUTO % 81.1   LYMPHS PCT AUTO % 8.9   MONOS " "PCT AUTO % 9.2   EOS PCT AUTO % 0.0     Results from last 72 hours   Lab Units 10/24/23  1227   SODIUM mmol/L 139   POTASSIUM mmol/L 3.9   CHLORIDE mmol/L 100   CO2 mmol/L 22*   BUN mg/dL 23   CREATININE mg/dL 1.50   GLUCOSE mg/dL 153*   CALCIUM mg/dL 9.6   ANION GAP mmol/L 17   EGFR mL/min/1.73m*2 48*     Results from last 72 hours   Lab Units 10/24/23  1227   ALK PHOS U/L 61   BILIRUBIN TOTAL mg/dL 2.2*   PROTEIN TOTAL g/dL 6.9   ALT U/L 22   AST U/L 30   ALBUMIN g/dL 3.5     Estimated Creatinine Clearance: 52 mL/min (by C-G formula based on SCr of 1.5 mg/dL).  No results found for: \"CRP\", \"SEDRATE\"  No results found for: \"HIV1X2\", \"HIVCONF\", \"CYTRPG8VN\"  No results found for: \"HEPCABINIT\", \"HEPCAB\", \"HCVPCRQUANT\"  Microbiology  Reviewed   Imaging  Lower extremity venous duplex right    Result Date: 10/24/2023  Interpreted By:  Roland Dubon, STUDY: [facility] US LOWER EXTREMITY VENOUS DUPLEX RIGHT  10/24/2023 2:13 pm   INDICATION: 74 y/o   M with  Signs/Symptoms:leg swelling. LMP:  Unknown.   COMPARISON: None.   ACCESSION NUMBER(S): [id number]   ORDERING CLINICIAN: EMILIE HADDAD   TECHNIQUE: Routine ultrasound of the  right lower extremity was performed with duplex Doppler (color and spectral) evaluation.   Static images were obtained for remote interpretation.   FINDINGS: THIGH VEINS: The right femoral and right popliteal veins demonstrate heterogeneously hypoechoic intraluminal filling defects. Both veins fill to compress. Color Doppler  demonstrates interruption of blood flow. The remaining lower extremity deep veins appear normal.         DVTs involving the right femoral and popliteal veins as above.   MACRO: Case discussed with KYRIE Collins by Dr. Dubon at 2:40 p.m. on 10/24/2022.   Signed by: Roland Dubon 10/24/2023 2:41 PM Dictation workstation:   [id number]    XR chest 1 view    Result Date: 10/24/2023  Interpreted By:  Roland Dubon, STUDY: XR CHEST 1 VIEW;  10/24/2023 12:47 pm   INDICATION: Signs/Symptoms:afib. "   COMPARISON: None.   ACCESSION NUMBER(S): [id number]   ORDERING CLINICIAN: EMILIE HADDAD   FINDINGS:         CARDIOMEDIASTINAL SILHOUETTE: Heart size is enlarged. Sternotomy wires. Postsurgical changes along the left mediastinum.   LUNGS: No focal airspace consolidations or effusions.   ABDOMEN: No remarkable upper abdominal findings.   BONES: No acute osseous changes.       No focal airspace consolidations or effusions. Cardiomegaly.     MACRO: None   Signed by: Roland Dubon 10/24/2023 12:52 PM Dictation workstation:   [id number]     Assessment/Plan   Acute hypoxic respiratory failure   Coronavirus infection-etiology of above  Atrial fibrillation with rapid ventricular response  Right lower extremity DVT    Dexamethasone  No remdesivir-symptoms have been about 3 weeks  Supplemental oxygen as needed  Symptomatic treatment for cough  Stool work-up if diarrhea persists  Droplet plus precautions  Supportive care  Monitor temperature and WBC        Koffi Ji MD

## 2023-10-24 NOTE — ED TRIAGE NOTES
Patient comes via EMS for chest tightness from Dr. Perea office. HR noted to be between . Patient has a history of A. Fib last occurrence with A.Fib was 2013. Patient also reports that he has a cough that has been persistent for about 3 weeks. Patient reports that he has not felt well since Saturday and has not had any medications since then.

## 2023-10-24 NOTE — ED PROVIDER NOTES
HPI   Chief Complaint   Patient presents with    Chest Pain       HPI  Patient 73-year-old male here for dyspnea, and generalized weakness, worsening over the last couple days, patient says that he feels some palpitations, has some right lower leg swelling says that he has had a before but in particular its been a little more noticeable lately.  On Coumadin.  History of A-fib in the past.  Has not routinely had issues with rate control                  Iva Coma Scale Score: 15                  Patient History   Past Medical History:   Diagnosis Date    MI (myocardial infarction) (CMS/[facility])      Past Surgical History:   Procedure Laterality Date    CORONARY ARTERY BYPASS GRAFT      MR HEAD ANGIO WO IV CONTRAST  03/21/2017    MR HEAD ANGIO WO IV CONTRAST [facility]    MR HEAD ANGIO WO IV CONTRAST  10/30/2020    MR HEAD ANGIO WO IV CONTRAST [facility]     Family History   Problem Relation Name Age of Onset    Hypertension Mother      Stroke Mother      Heart attack Father          Cause of death    Heart disease Father      Diabetes Other Grandmother      Social History     Tobacco Use    Smoking status: Never     Passive exposure: Never    Smokeless tobacco: Never   Vaping Use    Vaping Use: Never used   Substance Use Topics    Alcohol use: Yes     Alcohol/week: 2.0 standard drinks of alcohol     Types: 2 Cans of beer per week    Drug use: Never       Physical Exam   ED Triage Vitals   Temp Pulse Resp BP   -- -- -- --      SpO2 Temp src Heart Rate Source Patient Position   -- -- -- --      BP Location FiO2 (%)     -- --       Physical Exam  PHYSICAL EXAMINATION    GENERAL APPEARANCE: Awake and alert.     VITAL SIGNS: As per the nurses' triage record.     HEENT: Normocephalic, atraumatic. Extraocular muscles are intact. Pupils equal round and reactive to light. Conjunctiva are pink. Negative scleral icterus. Mucous membranes are moist. Tongue in the midline. Pharynx was without erythema or  exudates, uvula midline    NECK: Soft Nontender and supple, full gross ROM, no meningeal signs.    CHEST: Nontender to palpation. Clear to auscultation bilaterally. No rales, rhonchi, or wheezing.     HEART: S1, S2. Regular rate and rhythm. No murmurs, gallops or rubs.  Strong and equal pulses in the extremities.     ABDOMEN: Soft, nontender, nondistended, positive bowel sounds, no palpable masses.    MUSCULOSKELETAL:      NEUROLOGICAL: Awake, alert and oriented x 3. Power intact in the upper and lower extremities. Sensation is intact to light touch in the upper and lower extremities.     IMMUNOLOGICAL: No lymphatic streaking noted     DERM: No petechiae, rashes, or ecchymoses.  Right lower extremity mildly swollen compared to the contralateral side.  Distal sensation motor function and pulses intact  ED Course & MDM   ED Course as of 10/24/23 1447   Tue Oct 24, 2023   1432 Spoke to hospitalist Dr. Frederick graciously accepted the patient under her service stepdown unit [AP]   1447 Due to some IT challenges the pharmacist assisted with ordering heparin high-dose protocol.  I have placed a second call to the hospitalist to discuss DVT [AP]      ED Course User Index  [AP] Daljit Nelson PA-C         Diagnoses as of 10/24/23 1447   Atrial fibrillation with RVR (CMS/HCC)   Chest pain, unspecified type   COVID   Acute deep vein thrombosis (DVT) of right lower extremity, unspecified vein (CMS/HCC)       Medical Decision Making  Parts of this chart have been completed using voice recognition software. Please excuse any errors of transcription.  My thought process and reason for plan has been formulated from the time that I saw the patient until the time of disposition and is not specific to one specific moment during their visit and furthermore my MDM encompasses this entire chart and not only this text box.      HPI: Detailed above.    Exam: A medically appropriate exam performed, outlined above, given the known  history and presentation.    History obtained from: The patient    EKG: Obtained, read by attending physician reviewed by me, per my interpretation rapid A-fib with RVR    Social Determinants of Health considered during this visit: Lives at home    Medications given during visit:  Medications   dilTIAZem (Cardizem) 125 mg in dextrose 5% 125 mL (1 mg/mL) infusion (premix) (15 mg/hr intravenous Rate/Dose Change 10/24/23 1427)   sodium chloride 0.9 % bolus 1,000 mL (1,000 mL intravenous New Bag 10/24/23 1232)   dilTIAZem (Cardizem) injection 20 mg (20 mg intravenous Given 10/24/23 1228)        Diagnostic/tests  Labs Reviewed   CBC WITH AUTO DIFFERENTIAL - Abnormal       Result Value    WBC 14.9 (*)     nRBC 0.0      RBC 5.35      Hemoglobin 16.5      Hematocrit 49.1      MCV 92      MCH 30.8      MCHC 33.6      RDW 13.8      Platelets 140 (*)     MPV 10.2      Neutrophils % 81.1      Immature Granulocytes %, Automated 0.5      Lymphocytes % 8.9      Monocytes % 9.2      Eosinophils % 0.0      Basophils % 0.3      Neutrophils Absolute 12.09 (*)     Immature Granulocytes Absolute, Automated 0.08      Lymphocytes Absolute 1.32      Monocytes Absolute 1.37 (*)     Eosinophils Absolute 0.00      Basophils Absolute 0.05     COMPREHENSIVE METABOLIC PANEL - Abnormal    Glucose 153 (*)     Sodium 139      Potassium 3.9      Chloride 100      Bicarbonate 22 (*)     Urea Nitrogen 23      Creatinine 1.50      eGFR 48 (*)     Calcium 9.6      Albumin 3.5      Alkaline Phosphatase 61      Total Protein 6.9      AST 30      Bilirubin, Total 2.2 (*)     ALT 22      Anion Gap 17     SERIAL TROPONIN, INITIAL ([facility]) - Abnormal    Troponin T, High Sensitivity 89 (*)    SARS-COV-2 PCR, SYMPTOMATIC - Abnormal    Coronavirus 2019, PCR Detected (*)     Narrative:     This assay has received FDA Emergency Use Authorization (EUA) and is only authorized for the duration of time that circumstances exist to justify the authorization of the  emergency use of in vitro diagnostic tests for the detection of SARS-CoV-2 virus and/or diagnosis of COVID-19 infection under section 564(b)(1) of the Act, 21 U.S.C. 360bbb-3(b)(1). This assay is an in vitro diagnostic nucleic acid amplification test for the qualitative detection of SARS-CoV-2 from nasopharyngeal specimens and has been validated for use at [facility]. Negative results do not preclude COVID-19 infections and should not be used as the sole basis for diagnosis, treatment, or other management decisions.     PROTIME-INR - Abnormal    Protime 13.2 (*)     INR 1.3 (*)     Narrative:     INR Therapeutic Range: 2.0-3.5   MAGNESIUM - Normal    Magnesium 1.80     TROPONIN T SERIES, HIGH SENSITIVITY (0, 2 HR, 6 HR)    Narrative:     The following orders were created for panel order Troponin T Series, High Sensitivity (0, 2HR, 6HR).  Procedure                               Abnormality         Status                     ---------                               -----------         ------                     Serial Troponin, Initial...[[id number]]  Abnormal            Final result               Serial Troponin, 2 Hour ...[[id number]]                                                   Please view results for these tests on the individual orders.   URINALYSIS WITH REFLEX MICROSCOPIC AND CULTURE    Narrative:     The following orders were created for panel order Urinalysis with Reflex Microscopic and Culture.  Procedure                               Abnormality         Status                     ---------                               -----------         ------                     Urinalysis with Reflex M...[[id number]]                                                 Extra Urine Gray Tube[[id number]]                                                         Please view results for these tests on the individual orders.   URINALYSIS WITH REFLEX MICROSCOPIC AND CULTURE   EXTRA URINE GRAY TUBE   SERIAL  TROPONIN,  2 HOUR ([facility])      Lower extremity venous duplex right         XR chest 1 view   Final Result   No focal airspace consolidations or effusions. Cardiomegaly.             MACRO:   None        Signed by: Roland Dubon 10/24/2023 12:52 PM   Dictation workstation:   [id number]           Considerations/further MDM:  I spoke with Dr. Rawls. We thoroughly discussed the history, physical exam, laboratory and imaging studies, as well as, emergency department course. Based upon that discussion, we've decided to admit for further observation and evaluation of their symptoms.  As I have deemed necessary from their history, physical, and studies, I have considered and evaluated for the following diagnoses: Acute coronary syndrome including MI, intracranial hemorrhage, malignant dysrhythmia, hypertension, considered pericardial tamponade, pneumothorax, hemothorax, blunt force injury or trauma, considered pulmonary embolism sepsis I also considered stroke pneumonia or respiratory distress or respiratory compromise, pericardial effusion and dissection     Upon my evaluation, this patient had a high probability of imminent or life-threatening deterioration which required my direct attention, intervention, and personal management  I personally saw the patient and independently provided 34 minutes of non-concurrent critical care time were provided which excludes all other billable procedures.  This was for management of critical vital sign abnormalities with diagnostic abnormalities and an EKG showing atrial fibrillation with rapid ventricular response requiring continuous telemetry monitoring, multiple bedside evaluations, initiation and management of bolus and drip of Cardizem, consultation to the hospitalist, arrangements for hospitalization to the stepdown unit for management of abnormal vital signs with rapid A-fib all in the setting of active COVID infection.  Time also includes initiation of high-dose heparin protocol for  multiple DVTs in the right lower extremity.        Procedure  Procedures     Daljit Nelson PA-C  10/24/23 1436       Daljit Nelson PA-C  10/24/23 1440

## 2023-10-24 NOTE — H&P
History Of Present Illness  Mukesh Garg is a 73 y.o. male.  He is being evaluated at [facility] for chief complaint of A-fib with RVR, DVT right lower extremity, COVID-19 positive. Patient is accompanied to the emergency room with his wife.    Patient states that over the last 3 weeks, he has been experiencing nonproductive cough and sinus pressure following receiving his flu and COVID shot.  He states that during this time, he also has been experiencing diarrhea, fatigue and generalized weakness.  Additionally, he reports that he has lost 12 to 14 pounds during this time.    This morning, he presented to his PCP office for further evaluation.  In the office, he was found to have a rapid heart rate and was too weak to submit to an EKG.  EMS was called, and the patient was transported to [facility] for further evaluation and treatment.    Ultrasound bilateral lower extremities demonstrated right lower extremity DVT in right popliteal and right femoral veins.    Elevated troponins, second and third set pending.  Chest x-ray demonstrates cardiomegaly.  Initial EKG in the emergency room demonstrates A-fib with rapid heart rate.    Presently, patient denies chest pain or pain otherwise.  He denies shortness of breath.  No acute distress.     Past Medical History  Past Medical History:   Diagnosis Date    Atrial fibrillation (CMS/Formerly [facility])     CAD (coronary artery disease)     Gout     Heart disease     Hyperlipidemia     Hypertension     MI (myocardial infarction) (CMS/Formerly [facility])     Sleep apnea        Surgical History  Past Surgical History:   Procedure Laterality Date    CORONARY ARTERY BYPASS GRAFT      MR HEAD ANGIO WO IV CONTRAST  03/21/2017    MR HEAD ANGIO WO IV CONTRAST [facility]    MR HEAD ANGIO WO IV CONTRAST  10/30/2020    MR HEAD ANGIO WO IV CONTRAST [facility]        Social History  He reports that he has never smoked. He has never been exposed to tobacco smoke.  He has never used smokeless tobacco. He reports that he does not currently use alcohol after a past usage of about 2.0 standard drinks of alcohol per week. He reports that he does not use drugs.    Family History  Family History   Problem Relation Name Age of Onset    Hypertension Mother      Stroke Mother      Heart attack Father          Cause of death    Heart disease Father      Diabetes Other Grandmother         Allergies  Pentazocine lactate    Review of Systems   Constitutional:  Positive for activity change and fatigue.   HENT:  Positive for sinus pressure.    Eyes: Negative.    Respiratory:  Positive for shortness of breath.    Cardiovascular:  Positive for leg swelling.   Gastrointestinal: Negative.    Endocrine: Negative.    Genitourinary: Negative.    Musculoskeletal: Negative.    Skin: Negative.    Allergic/Immunologic: Negative.    Neurological: Negative.    Hematological: Negative.    Psychiatric/Behavioral: Negative.     All other systems reviewed and are negative.       Physical Exam  Vitals reviewed.   Constitutional:       Appearance: Normal appearance. He is normal weight.   HENT:      Head: Normocephalic and atraumatic.      Nose: Nose normal.      Mouth/Throat:      Mouth: Mucous membranes are moist.   Eyes:      Extraocular Movements: Extraocular movements intact.      Pupils: Pupils are equal, round, and reactive to light.   Cardiovascular:      Rate and Rhythm: Tachycardia present. Rhythm irregular.      Pulses: Normal pulses.      Heart sounds: Normal heart sounds.   Pulmonary:      Effort: Pulmonary effort is normal.      Breath sounds: Normal breath sounds.   Abdominal:      General: Abdomen is flat. Bowel sounds are normal.      Palpations: Abdomen is soft.   Musculoskeletal:         General: Normal range of motion.      Cervical back: Normal range of motion.      Right lower leg: Edema present.   Skin:     General: Skin is warm and dry.      Capillary Refill: Capillary refill takes less  "than 2 seconds.   Neurological:      General: No focal deficit present.      Mental Status: He is alert and oriented to person, place, and time. Mental status is at baseline.   Psychiatric:         Mood and Affect: Mood normal.         Thought Content: Thought content normal.          Last Recorded Vitals  Blood pressure (!) 149/98, pulse (!) 124, temperature 37.3 °C (99.1 °F), resp. rate 24, height 1.753 m (5' 9\"), weight 110 kg (243 lb 6.2 oz), SpO2 95 %.    Relevant Results        Results for orders placed or performed during the hospital encounter of 10/24/23 (from the past 24 hour(s))   CBC and Auto Differential   Result Value Ref Range    WBC 14.9 (H) 4.4 - 11.3 x10*3/uL    nRBC 0.0 0.0 - 0.0 /100 WBCs    RBC 5.35 4.50 - 5.90 x10*6/uL    Hemoglobin 16.5 13.5 - 17.5 g/dL    Hematocrit 49.1 41.0 - 52.0 %    MCV 92 80 - 100 fL    MCH 30.8 26.0 - 34.0 pg    MCHC 33.6 32.0 - 36.0 g/dL    RDW 13.8 11.5 - 14.5 %    Platelets 140 (L) 150 - 450 x10*3/uL    MPV 10.2 7.5 - 11.5 fL    Neutrophils % 81.1 40.0 - 80.0 %    Immature Granulocytes %, Automated 0.5 0.0 - 0.9 %    Lymphocytes % 8.9 13.0 - 44.0 %    Monocytes % 9.2 2.0 - 10.0 %    Eosinophils % 0.0 0.0 - 6.0 %    Basophils % 0.3 0.0 - 2.0 %    Neutrophils Absolute 12.09 (H) 1.60 - 5.50 x10*3/uL    Immature Granulocytes Absolute, Automated 0.08 0.00 - 0.50 x10*3/uL    Lymphocytes Absolute 1.32 0.80 - 3.00 x10*3/uL    Monocytes Absolute 1.37 (H) 0.05 - 0.80 x10*3/uL    Eosinophils Absolute 0.00 0.00 - 0.40 x10*3/uL    Basophils Absolute 0.05 0.00 - 0.10 x10*3/uL   Comprehensive Metabolic Panel   Result Value Ref Range    Glucose 153 (H) 65 - 99 mg/dL    Sodium 139 133 - 145 mmol/L    Potassium 3.9 3.4 - 5.1 mmol/L    Chloride 100 97 - 107 mmol/L    Bicarbonate 22 (L) 24 - 31 mmol/L    Urea Nitrogen 23 8 - 25 mg/dL    Creatinine 1.50 0.40 - 1.60 mg/dL    eGFR 48 (L) >60 mL/min/1.73m*2    Calcium 9.6 8.5 - 10.4 mg/dL    Albumin 3.5 3.5 - 5.0 g/dL    Alkaline " Phosphatase 61 35 - 125 U/L    Total Protein 6.9 5.9 - 7.9 g/dL    AST 30 5 - 40 U/L    Bilirubin, Total 2.2 (H) 0.1 - 1.2 mg/dL    ALT 22 5 - 40 U/L    Anion Gap 17 <=19 mmol/L   Magnesium   Result Value Ref Range    Magnesium 1.80 1.60 - 3.10 mg/dL   Serial Troponin, Initial ([facility])   Result Value Ref Range    Troponin T, High Sensitivity 89 (H) <=15 ng/L   Protime-INR   Result Value Ref Range    Protime 13.2 (H) 9.3 - 12.7 seconds    INR 1.3 (H) 0.9 - 1.2   SARS-CoV-2 RT PCR   Result Value Ref Range    Coronavirus 2019, PCR Detected (A) Not Detected   Serial Troponin, 2 Hour ([facility])   Result Value Ref Range    Troponin T, High Sensitivity 79 (H) <=15 ng/L   Urinalysis with Reflex Microscopic and Culture   Result Value Ref Range    Color, Urine Yellow Light-Yellow, Yellow, Dark-Yellow    Appearance, Urine Clear Clear    Specific Gravity, Urine 1.025 1.005 - 1.035    pH, Urine 5.0 5.0, 5.5, 6.0, 6.5, 7.0, 7.5, 8.0    Protein, Urine 50 (1+) (A) NEGATIVE, 10 (TRACE), 20 (TRACE) mg/dL    Glucose, Urine Normal Normal mg/dL    Blood, Urine 0.1 (1+) (A) NEGATIVE    Ketones, Urine 40 (2+) (A) NEGATIVE mg/dL    Bilirubin, Urine 0.5 (1+) (A) NEGATIVE    Urobilinogen, Urine Normal Normal mg/dL    Nitrite, Urine NEGATIVE NEGATIVE    Leukocyte Esterase, Urine NEGATIVE NEGATIVE   Urinalysis Microscopic   Result Value Ref Range    WBC, Urine 1-5 1-5, NONE /HPF    RBC, Urine 3-5 NONE, 1-2, 3-5 /HPF    Mucus, Urine 4+ Reference range not established. /LPF    Hyaline Casts, Urine OCCASIONAL (A) NONE /LPF    Lower extremity venous duplex right    Result Date: 10/24/2023  Interpreted By:  Roland Dubon, STUDY: [facility] LOWER EXTREMITY VENOUS DUPLEX RIGHT  10/24/2023 2:13 pm   INDICATION: 74 y/o   M with  Signs/Symptoms:leg swelling. LMP:  Unknown.   COMPARISON: None.   ACCESSION NUMBER(S): [id number]   ORDERING CLINICIAN: EMILIE HADDAD   TECHNIQUE: Routine ultrasound of the  right lower extremity was performed with duplex Doppler  (color and spectral) evaluation.   Static images were obtained for remote interpretation.   FINDINGS: THIGH VEINS: The right femoral and right popliteal veins demonstrate heterogeneously hypoechoic intraluminal filling defects. Both veins fill to compress. Color Doppler  demonstrates interruption of blood flow. The remaining lower extremity deep veins appear normal.         DVTs involving the right femoral and popliteal veins as above.   MACRO: Case discussed with KYRIE Collins by Dr. Dubon at 2:40 p.m. on 10/24/2022.   Signed by: Roland Dubon 10/24/2023 2:41 PM Dictation workstation:   [id number]    XR chest 1 view    Result Date: 10/24/2023  Interpreted By:  Roland Dubon, STUDY: XR CHEST 1 VIEW;  10/24/2023 12:47 pm   INDICATION: Signs/Symptoms:afib.   COMPARISON: None.   ACCESSION NUMBER(S): [id number]   ORDERING CLINICIAN: EMILIE HADDAD   FINDINGS:         CARDIOMEDIASTINAL SILHOUETTE: Heart size is enlarged. Sternotomy wires. Postsurgical changes along the left mediastinum.   LUNGS: No focal airspace consolidations or effusions.   ABDOMEN: No remarkable upper abdominal findings.   BONES: No acute osseous changes.       No focal airspace consolidations or effusions. Cardiomegaly.     MACRO: None   Signed by: Roland Dubon 10/24/2023 12:52 PM Dictation workstation:   [id number]       Assessment/Plan   Principal Problem:    Atrial fibrillation with RVR (CMS/HCC)  Active Problems:    Deep vein thrombosis (DVT) of distal vein of right lower extremity (CMS/HCC)    COVID      Atrial fibrillation with RVR  Patient states that during a period of him feeling ill, he had skipped his home Coumadin dose patient has been started on Cardizem drip with improvement of heart rate  Admit patient to stepdown unit.  Telemetry  Consult cardiology.  Patient sees Dr. Wayne outpatient  Monitor vitals  Continue home beta-blocker  Obtain TSH    DVT  Involving right popliteal, femoral vein  Heparin drip initiated    COVID-19  positive  Denies shortness of breath, is on 2 L of supplemental oxygen currently wean oxygen as tolerated  We will consult infectious diseases for further input  Isolation precautions    Hypertension  Continue home antihypertensives    CAD/Hyperlipidemia  Continue home Lipitor, etia, aspirin    Gout  Hold home allopurinol for now    Weakness  PT, OT, fall precautions.    DVT/GI prophylaxis  On heparin drip, PPI           I spent 70 minutes in the professional and overall care of this patient.      Monika Bernard, APRN-CNP

## 2023-10-24 NOTE — PATIENT INSTRUCTIONS
Patient is stablized in our office and unable to get onto EXAM table for EKG and with Hx of ASHD, CABG in 2013 and CHEST PAIN, SOB and DEHYDRATION, DIARRHEA and TACHYCARDIA he is rapidly transferred by [facility] to [facility] ER where the staff there has been fully appraised of his concerning condition.

## 2023-10-25 ENCOUNTER — APPOINTMENT (OUTPATIENT)
Dept: CARDIOLOGY | Facility: HOSPITAL | Age: 73
DRG: 178 | End: 2023-10-25
Payer: COMMERCIAL

## 2023-10-25 LAB
ANION GAP SERPL CALC-SCNC: 10 MMOL/L
APTT PPP: 73.1 SECONDS (ref 22–32.5)
APTT PPP: 91 SECONDS (ref 22–32.5)
ATRIAL RATE: 102 BPM
BUN SERPL-MCNC: 20 MG/DL (ref 8–25)
CALCIUM SERPL-MCNC: 8.5 MG/DL (ref 8.5–10.4)
CHLORIDE SERPL-SCNC: 102 MMOL/L (ref 97–107)
CO2 SERPL-SCNC: 25 MMOL/L (ref 24–31)
CREAT SERPL-MCNC: 1.2 MG/DL (ref 0.4–1.6)
ERYTHROCYTE [DISTWIDTH] IN BLOOD BY AUTOMATED COUNT: 13.6 % (ref 11.5–14.5)
GFR SERPL CREATININE-BSD FRML MDRD: 63 ML/MIN/1.73M*2
GLUCOSE SERPL-MCNC: 205 MG/DL (ref 65–99)
HCT VFR BLD AUTO: 44.3 % (ref 41–52)
HGB BLD-MCNC: 14.6 G/DL (ref 13.5–17.5)
INR PPP: 1.3 (ref 0.9–1.2)
MCH RBC QN AUTO: 30.2 PG (ref 26–34)
MCHC RBC AUTO-ENTMCNC: 33 G/DL (ref 32–36)
MCV RBC AUTO: 92 FL (ref 80–100)
NRBC BLD-RTO: 0 /100 WBCS (ref 0–0)
PLATELET # BLD AUTO: 143 X10*3/UL (ref 150–450)
PMV BLD AUTO: 10.8 FL (ref 7.5–11.5)
POTASSIUM SERPL-SCNC: 3.7 MMOL/L (ref 3.4–5.1)
PROTHROMBIN TIME: 13.8 SECONDS (ref 9.3–12.7)
Q ONSET: 224 MS
QRS COUNT: 26 BEATS
QRS DURATION: 74 MS
QT INTERVAL: 292 MS
QTC CALCULATION(BAZETT): 473 MS
QTC FREDERICIA: 403 MS
R AXIS: 80 DEGREES
RBC # BLD AUTO: 4.83 X10*6/UL (ref 4.5–5.9)
SODIUM SERPL-SCNC: 137 MMOL/L (ref 133–145)
T AXIS: 224 DEGREES
T OFFSET: 370 MS
VENTRICULAR RATE: 158 BPM
WBC # BLD AUTO: 15.8 X10*3/UL (ref 4.4–11.3)

## 2023-10-25 PROCEDURE — 2500000004 HC RX 250 GENERAL PHARMACY W/ HCPCS (ALT 636 FOR OP/ED): Performed by: PHYSICIAN ASSISTANT

## 2023-10-25 PROCEDURE — 99222 1ST HOSP IP/OBS MODERATE 55: CPT | Performed by: NURSE PRACTITIONER

## 2023-10-25 PROCEDURE — 2500000004 HC RX 250 GENERAL PHARMACY W/ HCPCS (ALT 636 FOR OP/ED): Performed by: NURSE PRACTITIONER

## 2023-10-25 PROCEDURE — 2500000001 HC RX 250 WO HCPCS SELF ADMINISTERED DRUGS (ALT 637 FOR MEDICARE OP): Performed by: INTERNAL MEDICINE

## 2023-10-25 PROCEDURE — 2500000004 HC RX 250 GENERAL PHARMACY W/ HCPCS (ALT 636 FOR OP/ED): Performed by: INTERNAL MEDICINE

## 2023-10-25 PROCEDURE — 80048 BASIC METABOLIC PNL TOTAL CA: CPT

## 2023-10-25 PROCEDURE — 2500000004 HC RX 250 GENERAL PHARMACY W/ HCPCS (ALT 636 FOR OP/ED)

## 2023-10-25 PROCEDURE — 36415 COLL VENOUS BLD VENIPUNCTURE: CPT

## 2023-10-25 PROCEDURE — 2550000001 HC RX 255 CONTRASTS: Mod: JZ | Performed by: NURSE PRACTITIONER

## 2023-10-25 PROCEDURE — 93005 ELECTROCARDIOGRAM TRACING: CPT

## 2023-10-25 PROCEDURE — 85027 COMPLETE CBC AUTOMATED: CPT

## 2023-10-25 PROCEDURE — 2500000001 HC RX 250 WO HCPCS SELF ADMINISTERED DRUGS (ALT 637 FOR MEDICARE OP)

## 2023-10-25 PROCEDURE — 2060000001 HC INTERMEDIATE ICU ROOM DAILY

## 2023-10-25 PROCEDURE — 85610 PROTHROMBIN TIME: CPT

## 2023-10-25 PROCEDURE — 85730 THROMBOPLASTIN TIME PARTIAL: CPT | Performed by: EMERGENCY MEDICINE

## 2023-10-25 PROCEDURE — 93306 TTE W/DOPPLER COMPLETE: CPT | Performed by: INTERNAL MEDICINE

## 2023-10-25 PROCEDURE — 93306 TTE W/DOPPLER COMPLETE: CPT

## 2023-10-25 RX ORDER — WARFARIN SODIUM 5 MG/1
5 TABLET ORAL DAILY
Status: DISCONTINUED | OUTPATIENT
Start: 2023-10-25 | End: 2023-10-27

## 2023-10-25 RX ORDER — HYDROCODONE BITARTRATE AND ACETAMINOPHEN 5; 325 MG/1; MG/1
1 TABLET ORAL EVERY 6 HOURS PRN
Status: DISPENSED | OUTPATIENT
Start: 2023-10-25 | End: 2023-10-27

## 2023-10-25 RX ORDER — DOCUSATE SODIUM 100 MG/1
100 CAPSULE, LIQUID FILLED ORAL DAILY
Status: DISCONTINUED | OUTPATIENT
Start: 2023-10-26 | End: 2023-10-28 | Stop reason: HOSPADM

## 2023-10-25 RX ORDER — HEPARIN SODIUM 10000 [USP'U]/100ML
0-4500 INJECTION, SOLUTION INTRAVENOUS CONTINUOUS
Status: DISCONTINUED | OUTPATIENT
Start: 2023-10-25 | End: 2023-10-26

## 2023-10-25 RX ADMIN — FAMOTIDINE 20 MG: 20 TABLET ORAL at 08:31

## 2023-10-25 RX ADMIN — CARVEDILOL 12.5 MG: 12.5 TABLET, FILM COATED ORAL at 21:44

## 2023-10-25 RX ADMIN — PANTOPRAZOLE SODIUM 40 MG: 40 TABLET, DELAYED RELEASE ORAL at 06:15

## 2023-10-25 RX ADMIN — WARFARIN SODIUM 5 MG: 5 TABLET ORAL at 21:44

## 2023-10-25 RX ADMIN — HYDROCHLOROTHIAZIDE 25 MG: 25 TABLET ORAL at 08:30

## 2023-10-25 RX ADMIN — LISINOPRIL 40 MG: 20 TABLET ORAL at 08:31

## 2023-10-25 RX ADMIN — HEPARIN SODIUM 1800 UNITS/HR: 10000 INJECTION, SOLUTION INTRAVENOUS at 06:13

## 2023-10-25 RX ADMIN — HEPARIN SODIUM 1600 UNITS/HR: 10000 INJECTION, SOLUTION INTRAVENOUS at 21:45

## 2023-10-25 RX ADMIN — SULFUR HEXAFLUORIDE 2 ML: 60.7; .19; .19 INJECTION, POWDER, LYOPHILIZED, FOR SUSPENSION INTRAVENOUS; INTRAVESICAL at 10:00

## 2023-10-25 RX ADMIN — AMLODIPINE BESYLATE 10 MG: 10 TABLET ORAL at 08:30

## 2023-10-25 RX ADMIN — CARVEDILOL 12.5 MG: 12.5 TABLET, FILM COATED ORAL at 08:31

## 2023-10-25 RX ADMIN — ATORVASTATIN CALCIUM 40 MG: 40 TABLET, FILM COATED ORAL at 08:30

## 2023-10-25 RX ADMIN — DEXAMETHASONE 6 MG: 6 TABLET ORAL at 21:44

## 2023-10-25 RX ADMIN — HYDROCODONE BITARTRATE AND ACETAMINOPHEN 1 TABLET: 5; 325 TABLET ORAL at 21:59

## 2023-10-25 SDOH — SOCIAL STABILITY: SOCIAL INSECURITY: DOES ANYONE TRY TO KEEP YOU FROM HAVING/CONTACTING OTHER FRIENDS OR DOING THINGS OUTSIDE YOUR HOME?: NO

## 2023-10-25 SDOH — SOCIAL STABILITY: SOCIAL INSECURITY: ABUSE: ADULT

## 2023-10-25 SDOH — SOCIAL STABILITY: SOCIAL INSECURITY: ARE THERE ANY APPARENT SIGNS OF INJURIES/BEHAVIORS THAT COULD BE RELATED TO ABUSE/NEGLECT?: NO

## 2023-10-25 SDOH — SOCIAL STABILITY: SOCIAL INSECURITY: HAS ANYONE EVER THREATENED TO HURT YOUR FAMILY OR YOUR PETS?: NO

## 2023-10-25 SDOH — SOCIAL STABILITY: SOCIAL INSECURITY: DO YOU FEEL ANYONE HAS EXPLOITED OR TAKEN ADVANTAGE OF YOU FINANCIALLY OR OF YOUR PERSONAL PROPERTY?: NO

## 2023-10-25 SDOH — SOCIAL STABILITY: SOCIAL INSECURITY: ARE YOU OR HAVE YOU BEEN THREATENED OR ABUSED PHYSICALLY, EMOTIONALLY, OR SEXUALLY BY ANYONE?: NO

## 2023-10-25 SDOH — SOCIAL STABILITY: SOCIAL INSECURITY: DO YOU FEEL UNSAFE GOING BACK TO THE PLACE WHERE YOU ARE LIVING?: NO

## 2023-10-25 SDOH — SOCIAL STABILITY: SOCIAL INSECURITY: HAVE YOU HAD THOUGHTS OF HARMING ANYONE ELSE?: NO

## 2023-10-25 ASSESSMENT — LIFESTYLE VARIABLES
HOW OFTEN DURING THE LAST YEAR HAVE YOU BEEN UNABLE TO REMEMBER WHAT HAPPENED THE NIGHT BEFORE BECAUSE YOU HAD BEEN DRINKING: NEVER
HOW OFTEN DURING THE LAST YEAR HAVE YOU FAILED TO DO WHAT WAS NORMALLY EXPECTED FROM YOU BECAUSE OF DRINKING: NEVER
HOW OFTEN DO YOU HAVE 6 OR MORE DRINKS ON ONE OCCASION: DAILY OR ALMOST DAILY
HOW OFTEN DO YOU HAVE A DRINK CONTAINING ALCOHOL: 2-3 TIMES A WEEK
HAVE YOU OR SOMEONE ELSE BEEN INJURED AS A RESULT OF YOUR DRINKING: NO
HOW OFTEN DURING THE LAST YEAR HAVE YOU FOUND THAT YOU WERE NOT ABLE TO STOP DRINKING ONCE YOU HAD STARTED: NEVER
HAS A RELATIVE, FRIEND, DOCTOR, OR ANOTHER HEALTH PROFESSIONAL EXPRESSED CONCERN ABOUT YOUR DRINKING OR SUGGESTED YOU CUT DOWN: NO
HOW MANY STANDARD DRINKS CONTAINING ALCOHOL DO YOU HAVE ON A TYPICAL DAY: 1 OR 2
AUDIT-C TOTAL SCORE: 7
HOW OFTEN DURING THE LAST YEAR HAVE YOU NEEDED AN ALCOHOLIC DRINK FIRST THING IN THE MORNING TO GET YOURSELF GOING AFTER A NIGHT OF HEAVY DRINKING: NEVER
HOW OFTEN DURING THE LAST YEAR HAVE YOU HAD A FEELING OF GUILT OR REMORSE AFTER DRINKING: NEVER
AUDIT TOTAL SCORE: 7
AUDIT-C TOTAL SCORE: 7
AUDIT TOTAL SCORE: 0
SKIP TO QUESTIONS 9-10: 0

## 2023-10-25 ASSESSMENT — ACTIVITIES OF DAILY LIVING (ADL)
DRESSING YOURSELF: INDEPENDENT
ADEQUATE_TO_COMPLETE_ADL: YES
LACK_OF_TRANSPORTATION: NO
BATHING: INDEPENDENT
JUDGMENT_ADEQUATE_SAFELY_COMPLETE_DAILY_ACTIVITIES: YES
WALKS IN HOME: INDEPENDENT
GROOMING: INDEPENDENT
FEEDING YOURSELF: INDEPENDENT
PATIENT'S MEMORY ADEQUATE TO SAFELY COMPLETE DAILY ACTIVITIES?: YES
HEARING - RIGHT EAR: FUNCTIONAL
TOILETING: INDEPENDENT
HEARING - LEFT EAR: FUNCTIONAL

## 2023-10-25 ASSESSMENT — COGNITIVE AND FUNCTIONAL STATUS - GENERAL
WALKING IN HOSPITAL ROOM: A LITTLE
MOBILITY SCORE: 22
CLIMB 3 TO 5 STEPS WITH RAILING: A LITTLE
PATIENT BASELINE BEDBOUND: NO
DAILY ACTIVITIY SCORE: 24

## 2023-10-25 ASSESSMENT — PATIENT HEALTH QUESTIONNAIRE - PHQ9
2. FEELING DOWN, DEPRESSED OR HOPELESS: NOT AT ALL
1. LITTLE INTEREST OR PLEASURE IN DOING THINGS: NOT AT ALL
SUM OF ALL RESPONSES TO PHQ9 QUESTIONS 1 & 2: 0

## 2023-10-25 ASSESSMENT — PAIN - FUNCTIONAL ASSESSMENT
PAIN_FUNCTIONAL_ASSESSMENT: 0-10
PAIN_FUNCTIONAL_ASSESSMENT: 0-10

## 2023-10-25 ASSESSMENT — PAIN SCALES - GENERAL
PAINLEVEL_OUTOF10: 0 - NO PAIN
PAINLEVEL_OUTOF10: 0 - NO PAIN
PAINLEVEL_OUTOF10: 8
PAINLEVEL_OUTOF10: 3
PAINLEVEL_OUTOF10: 0 - NO PAIN
PAINLEVEL_OUTOF10: 0 - NO PAIN

## 2023-10-25 ASSESSMENT — PAIN DESCRIPTION - DESCRIPTORS: DESCRIPTORS: ACHING

## 2023-10-25 NOTE — PROGRESS NOTES
Physical Therapy                 Therapy Communication Note    Patient Name: Mukesh Garg  MRN: [id number]  Today's Date: 10/25/2023     Discipline: Physical Therapy    Missed Visit Reason: Missed Visit Reason: Patient refused (pt eating lunch at this time, pt is requesting therapy returns tomorrow as he is very unsteady on his feet.)    Missed Time: Attempt    Comment:

## 2023-10-25 NOTE — PROGRESS NOTES
Mukesh Garg is a 73 y.o. male on day 1 of admission presenting with Atrial fibrillation with RVR (CMS/HCC).      Subjective   Patient seen and examined.  Wife in room.  Patient states he feels better today.  Denies chest pain, shortness of breath.  Denies pain otherwise.  No acute distress.       Objective     Last Recorded Vitals  /84 (BP Location: Left arm, Patient Position: Lying)   Pulse 78   Temp 36.7 °C (98 °F)   Resp 24   Wt 110 kg (243 lb 6.2 oz)   SpO2 95%   Intake/Output last 3 Shifts:    Intake/Output Summary (Last 24 hours) at 10/25/2023 1316  Last data filed at 10/25/2023 0700  Gross per 24 hour   Intake --   Output 400 ml   Net -400 ml       Admission Weight  Weight: 110 kg (243 lb 6.2 oz) (10/24/23 1238)    Daily Weight  10/24/23 : 110 kg (243 lb 6.2 oz)    Image Results  Lower extremity venous duplex right  Narrative: Interpreted By:  Roland Dubon,   STUDY:  [facility] US LOWER EXTREMITY VENOUS DUPLEX RIGHT  10/24/2023 2:13 pm      INDICATION:  74 y/o   M with  Signs/Symptoms:leg swelling. LMP:  Unknown.      COMPARISON:  None.      ACCESSION NUMBER(S):  [id number]      ORDERING CLINICIAN:  EMILIE HADDAD      TECHNIQUE:  Routine ultrasound of the  right lower extremity was performed with  duplex Doppler (color and spectral) evaluation.   Static images were  obtained for remote interpretation.      FINDINGS:  THIGH VEINS: The right femoral and right popliteal veins demonstrate  heterogeneously hypoechoic intraluminal filling defects. Both veins  fill to compress. Color Doppler  demonstrates interruption of blood  flow. The remaining lower extremity deep veins appear normal.          Impression: DVTs involving the right femoral and popliteal veins as above.      MACRO:  Case discussed with KYRIE Collins by Dr. Dubon at 2:40 p.m. on  10/24/2022.      Signed by: Roland Dubon 10/24/2023 2:41 PM  Dictation workstation:   [id number]  XR chest 1 view  Narrative: Interpreted By:  Roland Dubon,    STUDY:  XR CHEST 1 VIEW;  10/24/2023 12:47 pm      INDICATION:  Signs/Symptoms:afib.      COMPARISON:  None.      ACCESSION NUMBER(S):  [id number]      ORDERING CLINICIAN:  EMILIE HADDAD      FINDINGS:                  CARDIOMEDIASTINAL SILHOUETTE:  Heart size is enlarged. Sternotomy wires. Postsurgical changes along  the left mediastinum.      LUNGS:  No focal airspace consolidations or effusions.      ABDOMEN:  No remarkable upper abdominal findings.      BONES:  No acute osseous changes.      Impression: No focal airspace consolidations or effusions. Cardiomegaly.          MACRO:  None      Signed by: Roland Dubon 10/24/2023 12:52 PM  Dictation workstation:   [id number]      Physical Exam  Vitals reviewed.   Constitutional:       Appearance: Normal appearance. He is obese.   HENT:      Head: Normocephalic and atraumatic.      Nose: Nose normal.      Mouth/Throat:      Mouth: Mucous membranes are moist.   Eyes:      Extraocular Movements: Extraocular movements intact.      Pupils: Pupils are equal, round, and reactive to light.   Cardiovascular:      Rate and Rhythm: Normal rate.      Pulses: Normal pulses.   Pulmonary:      Effort: Pulmonary effort is normal.      Comments: Is on 2 L of oxygen via nasal cannula, for comfort.  Abdominal:      General: Abdomen is flat.      Palpations: Abdomen is soft.   Musculoskeletal:         General: Normal range of motion.      Cervical back: Normal range of motion.   Skin:     General: Skin is warm and dry.      Capillary Refill: Capillary refill takes less than 2 seconds.   Neurological:      General: No focal deficit present.      Mental Status: He is alert and oriented to person, place, and time. Mental status is at baseline.   Psychiatric:         Mood and Affect: Mood normal.         Behavior: Behavior normal.         Thought Content: Thought content normal.         Relevant Results               Assessment/Plan   This patient currently has cardiac telemetry  ordered; if you would like to modify or discontinue the telemetry order, click here to go to the orders activity to modify/discontinue the order.              Principal Problem:    Atrial fibrillation with RVR (CMS/HCC)  Active Problems:    Deep vein thrombosis (DVT) of distal vein of right lower extremity (CMS/HCC)    COVID    Atrial fibrillation with RVR  Patient states that during a period of him feeling ill, he had skipped his home Coumadin dose patient has been started on Cardizem drip with improvement of heart rate  Admit patient to stepdown unit.  Telemetry  Consult cardiology.  Patient sees Dr. Wayne outpatient  Monitor vitals  Continue home beta-blocker  Obtain TSH     DVT  Involving right popliteal, femoral vein  Heparin drip initiated     COVID-19 positive  Denies shortness of breath, is on 2 L of supplemental oxygen currently wean oxygen as tolerated  We will consult infectious diseases for further input  Isolation precautions     Hypertension  Continue home antihypertensives     CAD/Hyperlipidemia  Continue home Lipitor, etia, aspirin     Gout  Hold home allopurinol for now     Weakness  PT, OT, fall precautions.     DVT/GI prophylaxis  On heparin drip, PPI                 Monika Bernard, APRN-CNP

## 2023-10-25 NOTE — PROGRESS NOTES
Mukesh Garg is a 73 y.o. male on day 1 of admission presenting with Atrial fibrillation with RVR (CMS/HCC).    Subjective   Remains afebrile  Reports occasional cough. Reports inability to take a deep breath but states his breathing has greatly improved since yesterday.  Denies nausea, vomiting, abdominal pain  No diarrhea reported yet this morning  Wife at the bedside    Objective   Range of Vitals (last 24 hours)  Heart Rate:  []   Temp:  [36.6 °C (97.9 °F)-36.7 °C (98 °F)]   Resp:  [16-24]   BP: ()/(62-99)   SpO2:  [92 %-99 %]   Daily Weight  10/24/23 : 110 kg (243 lb 6.2 oz)    Body mass index is 35.94 kg/m².    Physical Exam  General: No acute distress, cooperative   Psych: Awake, alert & oriented x3  Head: Anicteric with no conjunctival injection  ENT: Midline nasal septum with no mucosal lesions  Neck: Supple with no obvious masses  Chest: Diminished but clear to auscultation bilaterally with no rales or rhonchi  Cardiovascular: Regular rate and irregular rhythm with no rubs murmurs or gallops  Abdomen: Soft, nontender, bowel sounds normoactive  Extremities:  No cyanosis or clubbing. Right leg edema  Skin:  No rashes, nodules  Musculoskeletal: No joint swelling or tenderness      Antibiotics  sodium chloride 0.9 % bolus 1,000 mL  dilTIAZem (Cardizem) injection 20 mg  dilTIAZem (Cardizem) 125 mg in dextrose 5% 125 mL (1 mg/mL) infusion (premix)  heparin (porcine) injection 5,000 Units  heparin (porcine) injection 8,750 Units  heparin 25,000 Units in dextrose 5% 250 mL (100 Units/mL) infusion (premix)  heparin (porcine) injection 3,000-6,000 Units  aspirin tablet 325 mg  amLODIPine (Norvasc) tablet 10 mg  atorvastatin (Lipitor) tablet 40 mg  carvedilol (Coreg) tablet 12.5 mg  ezetimibe (Zetia) tablet 10 mg  famotidine (Pepcid) tablet 20 mg  hydroCHLOROthiazide (HYDRODiuril) tablet 25 mg  lisinopril tablet 40 mg  pantoprazole (ProtoNix) EC tablet 40 mg  dexAMETHasone (Decadron) tablet 6 mg  warfarin  "(Coumadin) tablet 5 mg  sulfur hexafluoride microsphr (Lumason) injection 24.28 mg      Relevant Results  Labs  Results from last 72 hours   Lab Units 10/25/23  0616 10/24/23  2338 10/24/23  1227   WBC AUTO x10*3/uL 15.8* 15.4* 14.9*   HEMOGLOBIN g/dL 14.6 13.9 16.5   HEMATOCRIT % 44.3 41.8 49.1   PLATELETS AUTO x10*3/uL 143* 119* 140*   NEUTROS PCT AUTO %  --   --  81.1   LYMPHS PCT AUTO %  --   --  8.9   MONOS PCT AUTO %  --   --  9.2   EOS PCT AUTO %  --   --  0.0     Results from last 72 hours   Lab Units 10/25/23  0616 10/24/23  1227   SODIUM mmol/L 137 139   POTASSIUM mmol/L 3.7 3.9   CHLORIDE mmol/L 102 100   CO2 mmol/L 25 22*   BUN mg/dL 20 23   CREATININE mg/dL 1.20 1.50   GLUCOSE mg/dL 205* 153*   CALCIUM mg/dL 8.5 9.6   ANION GAP mmol/L 10 17   EGFR mL/min/1.73m*2 63 48*     Results from last 72 hours   Lab Units 10/24/23  1227   ALK PHOS U/L 61   BILIRUBIN TOTAL mg/dL 2.2*   PROTEIN TOTAL g/dL 6.9   ALT U/L 22   AST U/L 30   ALBUMIN g/dL 3.5     Estimated Creatinine Clearance: 65 mL/min (by C-G formula based on SCr of 1.2 mg/dL).  No results found for: \"CRP\"  Microbiology  No results found for the last 14 days.    Imaging  Lower extremity venous duplex right    Result Date: 10/24/2023  Interpreted By:  Roland Dubon, STUDY: [facility] US LOWER EXTREMITY VENOUS DUPLEX RIGHT  10/24/2023 2:13 pm   INDICATION: 74 y/o   M with  Signs/Symptoms:leg swelling. LMP:  Unknown.   COMPARISON: None.   ACCESSION NUMBER(S): [id number]   ORDERING CLINICIAN: EMILIE HADDAD   TECHNIQUE: Routine ultrasound of the  right lower extremity was performed with duplex Doppler (color and spectral) evaluation.   Static images were obtained for remote interpretation.   FINDINGS: THIGH VEINS: The right femoral and right popliteal veins demonstrate heterogeneously hypoechoic intraluminal filling defects. Both veins fill to compress. Color Doppler  demonstrates interruption of blood flow. The remaining lower extremity deep veins appear " normal.         DVTs involving the right femoral and popliteal veins as above.   MACRO: Case discussed with KYRIE Collins by Dr. Dubon at 2:40 p.m. on 10/24/2022.   Signed by: Roland Dubon 10/24/2023 2:41 PM Dictation workstation:   [id number]    XR chest 1 view    Result Date: 10/24/2023  Interpreted By:  Roland Dubon, STUDY: XR CHEST 1 VIEW;  10/24/2023 12:47 pm   INDICATION: Signs/Symptoms:afib.   COMPARISON: None.   ACCESSION NUMBER(S): [id number]   ORDERING CLINICIAN: EMILIE HADDAD   FINDINGS:         CARDIOMEDIASTINAL SILHOUETTE: Heart size is enlarged. Sternotomy wires. Postsurgical changes along the left mediastinum.   LUNGS: No focal airspace consolidations or effusions.   ABDOMEN: No remarkable upper abdominal findings.   BONES: No acute osseous changes.       No focal airspace consolidations or effusions. Cardiomegaly.     MACRO: None   Signed by: Roland Dubon 10/24/2023 12:52 PM Dictation workstation:   [id number]     Assessment/Plan   Acute hypoxic respiratory failure - on low flow oxygen  Coronavirus infection-etiology of above  Atrial fibrillation with rapid ventricular response  Right lower extremity DVT     Dexamethasone-total of 10 days  No remdesivir-symptoms have been about 3 weeks  Supplemental oxygen as needed  Symptomatic treatment for cough  Stool work-up if diarrhea persists  Droplet plus precautions  Supportive care  Monitor temperature and WBC      Adrienne Hensley, APRN-CNP

## 2023-10-25 NOTE — ED NOTES
Bed assignment received but unable to send patient up due to staffing issue.     Aliza Santos, RN  10/25/23 0600

## 2023-10-25 NOTE — ED NOTES
Telephone call back from Dr. Riggs; keep platelets above 100.      Aliza Santos RN  10/25/23 0042

## 2023-10-25 NOTE — ED NOTES
Page to hospitalist re: Patient platelets dropped from 140 to 119.     Aliza Santos RN  10/25/23 0034

## 2023-10-25 NOTE — PROGRESS NOTES
Occupational Therapy                 Therapy Communication Note    Patient Name: Mukesh Garg  MRN: [id number]  Today's Date: 10/25/2023     Discipline: Occupational Therapy    Missed Visit Reason: Missed Visit Reason: Patient refused (pt politely declined therapy this date reporting he would like to eat his lunch, requesting therapy return tomorrow)    Missed Time: Attempt

## 2023-10-25 NOTE — CONSULTS
Inpatient consult to Cardiology  Consult performed by: BEN Garcia  Consult ordered by: BEN Soni  Reason for consult: persistent atrial fibrillation. DVT        History Of Present Illness:    Mukesh Garg is a 73 y.o. male presenting with shortness of breath and flulike symptoms.  Current with Dr. Wayne.  Past medical history of coronary disease with coronary bypass grafting as well as Maze procedure in 2013, since then has been in a persistent atrial fibrillation.  Reports approximately 3 to 4 weeks ago having flulike symptoms with nasal congestion and some diarrhea.  He states overall has been improving, however he did have a significant episode of diarrhea prior to admission and some shortness of breath which was accompanied by some chest discomfort prompting his hospitalization.  In the emergency department EKG a rapid atrial fibrillation without acute ST elevation or T wave inversion with PVCs.  Creatinine 1.2 potassium 3.9.  Sodium 139.  Glucose 205.  Hemoglobin 15.8.  High-sensitivity troponin values of 89, 79, and 104.  INR noted at 1.3.  Patient states he has not been taking his Coumadin over the past 2 days relative to his illness.  Noted to be positive for DVT in the right lower extremity.  No significant findings on chest x-ray.  Patient was started on a heparin drip and admitted on telemetry for further testing and treatment.     Last Recorded Vitals:  Vitals:    10/24/23 2015 10/24/23 2200 10/25/23 0300 10/25/23 0624   BP: 101/67 101/69 99/71 128/81   BP Location:  Left arm Left arm Left arm   Patient Position: Lying Lying Lying Lying   Pulse: 69 74 74 73   Resp: 16 16 16 18   Temp:    36.6 °C (97.9 °F)   SpO2: 92% 97% 94% 97%   Weight:       Height:           Last Labs:  CBC - 10/25/2023:  6:16 AM  15.8 14.6 143    44.3      CMP - 10/24/2023: 12:27 PM  9.6 6.9 30 --- 2.2   _ 3.5 22 61      PTT - 10/25/2023:  6:16 AM  1.3   13.8 73.1     Hemoglobin A1C   Date/Time  Value Ref Range Status   07/15/2023 11:54 AM 6.7 (H) 4.0 - 6.0 % Final     Comment:     Hemoglobin A1C levels are related to mean blood glucose during the   preceding 2-3 months. The relationship table below may be used as a   general guide. Each 1% increase in HGB A1C is a reflection of an   increase in mean glucose of approximately 30 mg/dl.   Reference: Diabetes Care, volume 29, supplement 1 Jan. 2006                        HGB A1C ................. Approx. Mean Glucose   _______________________________________________   6%   ...............................  120 mg/dl   7%   ...............................  150 mg/dl   8%   ...............................  180 mg/dl   9%   ...............................  210 mg/dl   10%  ...............................  240 mg/dl  Performed at [address] [address]     10/31/2020 05:14 AM 5.6 4.0 - 6.0 % Final     Comment:     Hemoglobin A1C levels are related to mean blood glucose during the   preceding 2-3 months. The relationship table below may be used as a   general guide. Each 1% increase in HGB A1C is a reflection of an   increase in mean glucose of approximately 30 mg/dl.   Reference: Diabetes Care, volume 29, supplement 1 Jan. 2006                        HGB A1C ................. Approx. Mean Glucose   _______________________________________________   6%   ...............................  120 mg/dl   7%   ...............................  150 mg/dl   8%   ...............................  180 mg/dl   9%   ...............................  210 mg/dl   10%  ...............................  240 mg/dl  Performed at [address] [address]       LDL Calculated   Date/Time Value Ref Range Status   07/15/2023 11:54 AM 62 (L) 65 - 130 MG/DL Final   12/12/2022 12:08 PM 65 65 - 130 MG/DL Final   04/29/2022 03:35 PM 99 65 - 130 MG/DL Final     Results for orders placed or performed during the hospital encounter of 10/24/23 (from the  past 24 hour(s))   CBC and Auto Differential   Result Value Ref Range    WBC 14.9 (H) 4.4 - 11.3 x10*3/uL    nRBC 0.0 0.0 - 0.0 /100 WBCs    RBC 5.35 4.50 - 5.90 x10*6/uL    Hemoglobin 16.5 13.5 - 17.5 g/dL    Hematocrit 49.1 41.0 - 52.0 %    MCV 92 80 - 100 fL    MCH 30.8 26.0 - 34.0 pg    MCHC 33.6 32.0 - 36.0 g/dL    RDW 13.8 11.5 - 14.5 %    Platelets 140 (L) 150 - 450 x10*3/uL    MPV 10.2 7.5 - 11.5 fL    Neutrophils % 81.1 40.0 - 80.0 %    Immature Granulocytes %, Automated 0.5 0.0 - 0.9 %    Lymphocytes % 8.9 13.0 - 44.0 %    Monocytes % 9.2 2.0 - 10.0 %    Eosinophils % 0.0 0.0 - 6.0 %    Basophils % 0.3 0.0 - 2.0 %    Neutrophils Absolute 12.09 (H) 1.60 - 5.50 x10*3/uL    Immature Granulocytes Absolute, Automated 0.08 0.00 - 0.50 x10*3/uL    Lymphocytes Absolute 1.32 0.80 - 3.00 x10*3/uL    Monocytes Absolute 1.37 (H) 0.05 - 0.80 x10*3/uL    Eosinophils Absolute 0.00 0.00 - 0.40 x10*3/uL    Basophils Absolute 0.05 0.00 - 0.10 x10*3/uL   Comprehensive Metabolic Panel   Result Value Ref Range    Glucose 153 (H) 65 - 99 mg/dL    Sodium 139 133 - 145 mmol/L    Potassium 3.9 3.4 - 5.1 mmol/L    Chloride 100 97 - 107 mmol/L    Bicarbonate 22 (L) 24 - 31 mmol/L    Urea Nitrogen 23 8 - 25 mg/dL    Creatinine 1.50 0.40 - 1.60 mg/dL    eGFR 48 (L) >60 mL/min/1.73m*2    Calcium 9.6 8.5 - 10.4 mg/dL    Albumin 3.5 3.5 - 5.0 g/dL    Alkaline Phosphatase 61 35 - 125 U/L    Total Protein 6.9 5.9 - 7.9 g/dL    AST 30 5 - 40 U/L    Bilirubin, Total 2.2 (H) 0.1 - 1.2 mg/dL    ALT 22 5 - 40 U/L    Anion Gap 17 <=19 mmol/L   Magnesium   Result Value Ref Range    Magnesium 1.80 1.60 - 3.10 mg/dL   Serial Troponin, Initial ([facility])   Result Value Ref Range    Troponin T, High Sensitivity 89 (H) <=15 ng/L   TSH   Result Value Ref Range    Thyroid Stimulating Hormone 0.99 0.27 - 4.20 mIU/L   Protime-INR   Result Value Ref Range    Protime 13.2 (H) 9.3 - 12.7 seconds    INR 1.3 (H) 0.9 - 1.2   SARS-CoV-2 RT PCR   Result Value Ref  Range    Coronavirus 2019, PCR Detected (A) Not Detected   Serial Troponin, 2 Hour ([facility])   Result Value Ref Range    Troponin T, High Sensitivity 79 (H) <=15 ng/L   Urinalysis with Reflex Microscopic and Culture   Result Value Ref Range    Color, Urine Yellow Light-Yellow, Yellow, Dark-Yellow    Appearance, Urine Clear Clear    Specific Gravity, Urine 1.025 1.005 - 1.035    pH, Urine 5.0 5.0, 5.5, 6.0, 6.5, 7.0, 7.5, 8.0    Protein, Urine 50 (1+) (A) NEGATIVE, 10 (TRACE), 20 (TRACE) mg/dL    Glucose, Urine Normal Normal mg/dL    Blood, Urine 0.1 (1+) (A) NEGATIVE    Ketones, Urine 40 (2+) (A) NEGATIVE mg/dL    Bilirubin, Urine 0.5 (1+) (A) NEGATIVE    Urobilinogen, Urine Normal Normal mg/dL    Nitrite, Urine NEGATIVE NEGATIVE    Leukocyte Esterase, Urine NEGATIVE NEGATIVE   Extra Urine Gray Tube   Result Value Ref Range    Extra Tube Hold for add-ons.    Urinalysis Microscopic   Result Value Ref Range    WBC, Urine 1-5 1-5, NONE /HPF    RBC, Urine 3-5 NONE, 1-2, 3-5 /HPF    Mucus, Urine 4+ Reference range not established. /LPF    Hyaline Casts, Urine OCCASIONAL (A) NONE /LPF   Serial Troponin, 6 Hour ([facility])   Result Value Ref Range    Troponin T, High Sensitivity 104 (H) <=15 ng/L   aPTT   Result Value Ref Range    aPTT 91.0 (H) 22.0 - 32.5 seconds   CBC   Result Value Ref Range    WBC 15.4 (H) 4.4 - 11.3 x10*3/uL    nRBC 0.0 0.0 - 0.0 /100 WBCs    RBC 4.52 4.50 - 5.90 x10*6/uL    Hemoglobin 13.9 13.5 - 17.5 g/dL    Hematocrit 41.8 41.0 - 52.0 %    MCV 93 80 - 100 fL    MCH 30.8 26.0 - 34.0 pg    MCHC 33.3 32.0 - 36.0 g/dL    RDW 13.9 11.5 - 14.5 %    Platelets 119 (L) 150 - 450 x10*3/uL    MPV 10.2 7.5 - 11.5 fL   CBC   Result Value Ref Range    WBC 15.8 (H) 4.4 - 11.3 x10*3/uL    nRBC 0.0 0.0 - 0.0 /100 WBCs    RBC 4.83 4.50 - 5.90 x10*6/uL    Hemoglobin 14.6 13.5 - 17.5 g/dL    Hematocrit 44.3 41.0 - 52.0 %    MCV 92 80 - 100 fL    MCH 30.2 26.0 - 34.0 pg    MCHC 33.0 32.0 - 36.0 g/dL    RDW 13.6 11.5 - 14.5 %     Platelets 143 (L) 150 - 450 x10*3/uL    MPV 10.8 7.5 - 11.5 fL   Basic metabolic panel   Result Value Ref Range    Glucose 205 (H) 65 - 99 mg/dL    Sodium 137 133 - 145 mmol/L    Potassium 3.7 3.4 - 5.1 mmol/L    Chloride 102 97 - 107 mmol/L    Bicarbonate 25 24 - 31 mmol/L    Urea Nitrogen 20 8 - 25 mg/dL    Creatinine 1.20 0.40 - 1.60 mg/dL    eGFR 63 >60 mL/min/1.73m*2    Calcium 8.5 8.5 - 10.4 mg/dL    Anion Gap 10 <=19 mmol/L   Protime-INR   Result Value Ref Range    Protime 13.8 (H) 9.3 - 12.7 seconds    INR 1.3 (H) 0.9 - 1.2   APTT   Result Value Ref Range    aPTT 73.1 (H) 22.0 - 32.5 seconds       Last I/O:  No intake/output data recorded.    Past Cardiology Tests (Last 3 Years):  None noted    Past Medical History:  He has a past medical history of Atrial fibrillation (CMS/[facility]), CAD (coronary artery disease), Gout, Heart disease, Hyperlipidemia, Hypertension, MI (myocardial infarction) (CMS/[facility]), and Sleep apnea. Longstanding persistent atrial fibrillation, sleep apnea, TIAs, chronic bilateral hip pain         Past Surgical History:  He has a past surgical history that includes MR angio head wo IV contrast (03/21/2017); MR angio head wo IV contrast (10/30/2020); and Coronary artery bypass graft.      Social History:  He reports that he has never smoked. He has never been exposed to tobacco smoke. He has never used smokeless tobacco. He reports that he does not currently use alcohol after a past usage of about 2.0 standard drinks of alcohol per week. He reports that he does not use drugs.    Family History:  Family History   Problem Relation Name Age of Onset    Hypertension Mother      Stroke Mother      Heart attack Father          Cause of death    Heart disease Father      Diabetes Other Grandmother         Allergies:  Pentazocine lactate    Inpatient Medications:  Scheduled medications   Medication Dose Route Frequency    amLODIPine  10 mg oral Daily    atorvastatin  40 mg oral Daily    carvedilol   12.5 mg oral BID with meals    dexAMETHasone  6 mg oral Nightly    ezetimibe  10 mg oral Daily    famotidine  20 mg oral Daily    hydroCHLOROthiazide  25 mg oral Daily    lisinopril  40 mg oral Daily    pantoprazole  40 mg oral Daily before breakfast     PRN medications   Medication    heparin (porcine)     Continuous Medications   Medication Dose Last Rate    dilTIAZem  5-15 mg/hr 5 mg/hr (10/24/23 2019)    heparin  0-4,500 Units/hr 1,800 Units/hr (10/25/23 0613)     Outpatient Medications:  Current Outpatient Medications   Medication Instructions    allopurinol (Zyloprim) 100 mg tablet 1 tablet Orally Once a day for 90 days    amLODIPine (Norvasc) 10 mg tablet TAKE 1 TABLET BY MOUTH EVERY DAY for 90    atorvastatin (LIPITOR) 40 mg, oral, Daily    carvedilol (Coreg) 12.5 mg tablet TAKE 1 TABLET BY MOUTH TWICE A DAY WITH FOOD for 90    chlorthalidone (Hygroton) 25 mg tablet TAKE 1 TABLET IN THE MORNING WITH FOOD BY MOUTH ONCE A DAY 90 DAY(S)    dicyclomine (Bentyl) 20 mg tablet oral, 4 times daily before meals and nightly    ezetimibe (Zetia) 10 mg tablet 1 tablet, oral, Daily    famotidine (Pepcid) 20 mg tablet 1 tablet at bedtime as needed Orally Once a day    hydroCHLOROthiazide (HYDRODIURIL) 25 mg, oral, Daily    lisinopril 40 mg, oral, Daily    loperamide (Imodium A-D) 2 mg tablet 1 tablet as needed Orally Once a day    nitroglycerin (Nitrostat) 0.4 mg SL tablet as directed Sublingual as directed    warfarin (COUMADIN) 5 mg, oral       Physical Exam:  General: alert, x3, very pleasant  HEENT: normal cephalic, atraumatic, no scleral icterus,   Neck: No JVD, bruit or thrill, masses or tenderness   Heart: S1/S2, Rate 80, Rhythm irregular, no s3 or s4, no murmur, thrill, or heaves at PMI.   Lungs: Significant diminished in bases, equal expansion and excursion, no wheezes, crackles, rhales or rhonci. Oxygen via nasal cannula.  No conversational dyspnea appreciated.  No tachypnea.  No pain with deep  aspiration  Genitourinary: deferred   Extremities: No significant upper or lower extremity edema appreciated.  Patient does state tenderness to right lower extremity       Assessment/Plan     Viral syndrome  Shortness of breath/chest discomfort  Rapid atrial fibrillation/longstanding persistent atrial fibrillation  Subtherapeutic INR  Right lower extremity DVT  Coronary artery disease  History of coronary bypass grafting  History of Maze procedure  Obese    Overall impression:    10/25: As described above, multiple weeks of viral-like syndrome with diarrhea, culminating with worsening shortness of breath and chest discomfort.  Has a known coronary history with CABG remotely in 2013.  Has longstanding persistent atrial fibrillation.  Tested positive for coronavirus infection.  EKG nonischemic but in a rapid atrial fibrillation.  After initial treatment heart rate has improved to a rate controlled atrial fibrillation in the 80s.  INR subtherapeutic at 1.3 secondary to patient not taking his medications over the past few days.  He is noted to have a DVT in the right lower extremity.  Do not believe this is a failure therapy at this time.  Has been initiated on heparin drip.  Agree with this.  Continue heparin drip and have restarted warfare and.  We will discontinue heparin drip when warfare and achieves a INR of 2.0.  Does have elevated troponin which I believe is relative to his acute infection as well as his rapid heart rates, and coronary history.  At this point I do not suspect an acute coronary syndrome.  We will check echocardiogram.  He is euvolemic on examination.  Breathing comfortably now on nasal cannula oxygen.  We will follow with you       Code Status:  Full code    I spent 60 minutes in the professional and overall care of this patient.        Vipul Solis, APRN-CNP

## 2023-10-26 LAB
ANION GAP SERPL CALC-SCNC: 11 MMOL/L
APTT PPP: 42.4 SECONDS (ref 22–32.5)
APTT PPP: 92.1 SECONDS (ref 22–32.5)
BUN SERPL-MCNC: 33 MG/DL (ref 8–25)
CALCIUM SERPL-MCNC: 9.1 MG/DL (ref 8.5–10.4)
CHLORIDE SERPL-SCNC: 101 MMOL/L (ref 97–107)
CO2 SERPL-SCNC: 24 MMOL/L (ref 24–31)
CREAT SERPL-MCNC: 1.5 MG/DL (ref 0.4–1.6)
ERYTHROCYTE [DISTWIDTH] IN BLOOD BY AUTOMATED COUNT: 13.4 % (ref 11.5–14.5)
ERYTHROCYTE [DISTWIDTH] IN BLOOD BY AUTOMATED COUNT: 13.6 % (ref 11.5–14.5)
GFR SERPL CREATININE-BSD FRML MDRD: 48 ML/MIN/1.73M*2
GLUCOSE SERPL-MCNC: 223 MG/DL (ref 65–99)
HCT VFR BLD AUTO: 43.2 % (ref 41–52)
HCT VFR BLD AUTO: 44.1 % (ref 41–52)
HGB BLD-MCNC: 14.4 G/DL (ref 13.5–17.5)
HGB BLD-MCNC: 15.1 G/DL (ref 13.5–17.5)
INR PPP: 1.2 (ref 0.9–1.2)
MCH RBC QN AUTO: 30 PG (ref 26–34)
MCH RBC QN AUTO: 30.6 PG (ref 26–34)
MCHC RBC AUTO-ENTMCNC: 33.3 G/DL (ref 32–36)
MCHC RBC AUTO-ENTMCNC: 34.2 G/DL (ref 32–36)
MCV RBC AUTO: 89 FL (ref 80–100)
MCV RBC AUTO: 90 FL (ref 80–100)
NRBC BLD-RTO: 0 /100 WBCS (ref 0–0)
NRBC BLD-RTO: 0 /100 WBCS (ref 0–0)
PLATELET # BLD AUTO: 163 X10*3/UL (ref 150–450)
PLATELET # BLD AUTO: 191 X10*3/UL (ref 150–450)
PMV BLD AUTO: 10.7 FL (ref 7.5–11.5)
PMV BLD AUTO: 11 FL (ref 7.5–11.5)
POTASSIUM SERPL-SCNC: 4 MMOL/L (ref 3.4–5.1)
PROTHROMBIN TIME: 13 SECONDS (ref 9.3–12.7)
RBC # BLD AUTO: 4.8 X10*6/UL (ref 4.5–5.9)
RBC # BLD AUTO: 4.94 X10*6/UL (ref 4.5–5.9)
SODIUM SERPL-SCNC: 136 MMOL/L (ref 133–145)
WBC # BLD AUTO: 16.7 X10*3/UL (ref 4.4–11.3)
WBC # BLD AUTO: 18.8 X10*3/UL (ref 4.4–11.3)

## 2023-10-26 PROCEDURE — 36415 COLL VENOUS BLD VENIPUNCTURE: CPT

## 2023-10-26 PROCEDURE — 97165 OT EVAL LOW COMPLEX 30 MIN: CPT | Mod: GO

## 2023-10-26 PROCEDURE — 2500000001 HC RX 250 WO HCPCS SELF ADMINISTERED DRUGS (ALT 637 FOR MEDICARE OP)

## 2023-10-26 PROCEDURE — 2500000004 HC RX 250 GENERAL PHARMACY W/ HCPCS (ALT 636 FOR OP/ED): Performed by: INTERNAL MEDICINE

## 2023-10-26 PROCEDURE — 2500000001 HC RX 250 WO HCPCS SELF ADMINISTERED DRUGS (ALT 637 FOR MEDICARE OP): Performed by: INTERNAL MEDICINE

## 2023-10-26 PROCEDURE — 97530 THERAPEUTIC ACTIVITIES: CPT | Mod: GO

## 2023-10-26 PROCEDURE — 85027 COMPLETE CBC AUTOMATED: CPT

## 2023-10-26 PROCEDURE — 36415 COLL VENOUS BLD VENIPUNCTURE: CPT | Performed by: PHYSICIAN ASSISTANT

## 2023-10-26 PROCEDURE — 85610 PROTHROMBIN TIME: CPT

## 2023-10-26 PROCEDURE — 2060000001 HC INTERMEDIATE ICU ROOM DAILY

## 2023-10-26 PROCEDURE — 80048 BASIC METABOLIC PNL TOTAL CA: CPT

## 2023-10-26 PROCEDURE — 85730 THROMBOPLASTIN TIME PARTIAL: CPT | Performed by: STUDENT IN AN ORGANIZED HEALTH CARE EDUCATION/TRAINING PROGRAM

## 2023-10-26 PROCEDURE — 99231 SBSQ HOSP IP/OBS SF/LOW 25: CPT | Performed by: INTERNAL MEDICINE

## 2023-10-26 PROCEDURE — 85027 COMPLETE CBC AUTOMATED: CPT | Performed by: PHYSICIAN ASSISTANT

## 2023-10-26 PROCEDURE — 97110 THERAPEUTIC EXERCISES: CPT | Mod: GP

## 2023-10-26 PROCEDURE — 97161 PT EVAL LOW COMPLEX 20 MIN: CPT | Mod: GP

## 2023-10-26 PROCEDURE — 2500000004 HC RX 250 GENERAL PHARMACY W/ HCPCS (ALT 636 FOR OP/ED): Performed by: NURSE PRACTITIONER

## 2023-10-26 PROCEDURE — 2500000002 HC RX 250 W HCPCS SELF ADMINISTERED DRUGS (ALT 637 FOR MEDICARE OP, ALT 636 FOR OP/ED)

## 2023-10-26 RX ORDER — HEPARIN SODIUM 10000 [USP'U]/100ML
0-4500 INJECTION, SOLUTION INTRAVENOUS CONTINUOUS
Status: DISCONTINUED | OUTPATIENT
Start: 2023-10-26 | End: 2023-10-27

## 2023-10-26 RX ORDER — METHOCARBAMOL 500 MG/1
500 TABLET, FILM COATED ORAL EVERY 8 HOURS PRN
Status: DISCONTINUED | OUTPATIENT
Start: 2023-10-26 | End: 2023-10-27

## 2023-10-26 RX ADMIN — HEPARIN SODIUM 1800 UNITS/HR: 10000 INJECTION, SOLUTION INTRAVENOUS at 23:15

## 2023-10-26 RX ADMIN — CARVEDILOL 12.5 MG: 12.5 TABLET, FILM COATED ORAL at 08:57

## 2023-10-26 RX ADMIN — METHOCARBAMOL 500 MG: 500 TABLET ORAL at 21:46

## 2023-10-26 RX ADMIN — ATORVASTATIN CALCIUM 40 MG: 40 TABLET, FILM COATED ORAL at 08:57

## 2023-10-26 RX ADMIN — CARVEDILOL 12.5 MG: 12.5 TABLET, FILM COATED ORAL at 16:59

## 2023-10-26 RX ADMIN — DEXAMETHASONE 6 MG: 6 TABLET ORAL at 21:34

## 2023-10-26 RX ADMIN — HYDROCODONE BITARTRATE AND ACETAMINOPHEN 1 TABLET: 5; 325 TABLET ORAL at 02:01

## 2023-10-26 RX ADMIN — WARFARIN SODIUM 5 MG: 5 TABLET ORAL at 17:00

## 2023-10-26 RX ADMIN — FAMOTIDINE 20 MG: 20 TABLET ORAL at 08:57

## 2023-10-26 RX ADMIN — EZETIMIBE 10 MG: 10 TABLET ORAL at 08:57

## 2023-10-26 RX ADMIN — METHOCARBAMOL 500 MG: 500 TABLET ORAL at 15:23

## 2023-10-26 RX ADMIN — HYDROCODONE BITARTRATE AND ACETAMINOPHEN 1 TABLET: 5; 325 TABLET ORAL at 08:55

## 2023-10-26 SDOH — SOCIAL STABILITY: SOCIAL INSECURITY: WITHIN THE LAST YEAR, HAVE YOU BEEN AFRAID OF YOUR PARTNER OR EX-PARTNER?: NO

## 2023-10-26 SDOH — ECONOMIC STABILITY: FOOD INSECURITY: WITHIN THE PAST 12 MONTHS, YOU WORRIED THAT YOUR FOOD WOULD RUN OUT BEFORE YOU GOT MONEY TO BUY MORE.: NEVER TRUE

## 2023-10-26 SDOH — HEALTH STABILITY: MENTAL HEALTH
STRESS IS WHEN SOMEONE FEELS TENSE, NERVOUS, ANXIOUS, OR CAN'T SLEEP AT NIGHT BECAUSE THEIR MIND IS TROUBLED. HOW STRESSED ARE YOU?: NOT AT ALL

## 2023-10-26 SDOH — SOCIAL STABILITY: SOCIAL INSECURITY
WITHIN THE LAST YEAR, HAVE TO BEEN RAPED OR FORCED TO HAVE ANY KIND OF SEXUAL ACTIVITY BY YOUR PARTNER OR EX-PARTNER?: NO

## 2023-10-26 SDOH — HEALTH STABILITY: MENTAL HEALTH: HOW MANY STANDARD DRINKS CONTAINING ALCOHOL DO YOU HAVE ON A TYPICAL DAY?: 1 OR 2

## 2023-10-26 SDOH — HEALTH STABILITY: MENTAL HEALTH: HOW OFTEN DO YOU HAVE A DRINK CONTAINING ALCOHOL?: MONTHLY OR LESS

## 2023-10-26 SDOH — SOCIAL STABILITY: SOCIAL NETWORK: HOW OFTEN DO YOU GET TOGETHER WITH FRIENDS OR RELATIVES?: MORE THAN THREE TIMES A WEEK

## 2023-10-26 SDOH — SOCIAL STABILITY: SOCIAL NETWORK: ARE YOU MARRIED, WIDOWED, DIVORCED, SEPARATED, NEVER MARRIED, OR LIVING WITH A PARTNER?: MARRIED

## 2023-10-26 SDOH — ECONOMIC STABILITY: FOOD INSECURITY: WITHIN THE PAST 12 MONTHS, THE FOOD YOU BOUGHT JUST DIDN'T LAST AND YOU DIDN'T HAVE MONEY TO GET MORE.: NEVER TRUE

## 2023-10-26 SDOH — HEALTH STABILITY: MENTAL HEALTH: HOW OFTEN DO YOU HAVE 6 OR MORE DRINKS ON ONE OCCASION?: LESS THAN MONTHLY

## 2023-10-26 SDOH — SOCIAL STABILITY: SOCIAL INSECURITY
WITHIN THE LAST YEAR, HAVE YOU BEEN KICKED, HIT, SLAPPED, OR OTHERWISE PHYSICALLY HURT BY YOUR PARTNER OR EX-PARTNER?: NO

## 2023-10-26 SDOH — SOCIAL STABILITY: SOCIAL INSECURITY: WITHIN THE LAST YEAR, HAVE YOU BEEN HUMILIATED OR EMOTIONALLY ABUSED IN OTHER WAYS BY YOUR PARTNER OR EX-PARTNER?: NO

## 2023-10-26 SDOH — ECONOMIC STABILITY: INCOME INSECURITY: IN THE PAST 12 MONTHS, HAS THE ELECTRIC, GAS, OIL, OR WATER COMPANY THREATENED TO SHUT OFF SERVICE IN YOUR HOME?: NO

## 2023-10-26 SDOH — SOCIAL STABILITY: SOCIAL NETWORK
IN A TYPICAL WEEK, HOW MANY TIMES DO YOU TALK ON THE PHONE WITH FAMILY, FRIENDS, OR NEIGHBORS?: MORE THAN THREE TIMES A WEEK

## 2023-10-26 ASSESSMENT — PAIN - FUNCTIONAL ASSESSMENT
PAIN_FUNCTIONAL_ASSESSMENT: 0-10

## 2023-10-26 ASSESSMENT — LIFESTYLE VARIABLES
AUDIT-C TOTAL SCORE: 2
SKIP TO QUESTIONS 9-10: 0

## 2023-10-26 ASSESSMENT — PAIN SCALES - GENERAL
PAINLEVEL_OUTOF10: 0 - NO PAIN
PAINLEVEL_OUTOF10: 2
PAINLEVEL_OUTOF10: 0 - NO PAIN
PAINLEVEL_OUTOF10: 8

## 2023-10-26 ASSESSMENT — ACTIVITIES OF DAILY LIVING (ADL)
BATHING_ASSISTANCE: MINIMAL
ADL_ASSISTANCE: INDEPENDENT

## 2023-10-26 ASSESSMENT — COGNITIVE AND FUNCTIONAL STATUS - GENERAL
WALKING IN HOSPITAL ROOM: A LITTLE
STANDING UP FROM CHAIR USING ARMS: A LITTLE
MOVING TO AND FROM BED TO CHAIR: A LITTLE
DAILY ACTIVITIY SCORE: 23
DRESSING REGULAR LOWER BODY CLOTHING: A LITTLE
WALKING IN HOSPITAL ROOM: A LITTLE
DAILY ACTIVITIY SCORE: 19
DRESSING REGULAR LOWER BODY CLOTHING: A LITTLE
CLIMB 3 TO 5 STEPS WITH RAILING: A LITTLE
HELP NEEDED FOR BATHING: A LITTLE
TOILETING: A LITTLE
MOBILITY SCORE: 20
DRESSING REGULAR UPPER BODY CLOTHING: A LITTLE
STANDING UP FROM CHAIR USING ARMS: A LITTLE
CLIMB 3 TO 5 STEPS WITH RAILING: A LITTLE
MOVING TO AND FROM BED TO CHAIR: A LITTLE
PERSONAL GROOMING: A LITTLE
MOBILITY SCORE: 20

## 2023-10-26 ASSESSMENT — PAIN DESCRIPTION - DESCRIPTORS: DESCRIPTORS: ACHING

## 2023-10-26 NOTE — PROGRESS NOTES
"Mukesh Garg is a 73 y.o. male on day 2 of admission presenting with Atrial fibrillation with RVR (CMS/HCC).    Subjective        Objective     Physical Exam   Gen: NAD   Neck: no JVD, carotid upstroke is brisk and without delay   Heart: rrr, s1s2+ no mrg   Lungs: CTA   Ext: warm no edema    Last Recorded Vitals  Blood pressure 120/84, pulse 88, temperature 35.7 °C (96.3 °F), temperature source Oral, resp. rate 20, height 1.753 m (5' 9\"), weight 110 kg (243 lb 6.2 oz), SpO2 98 %.  Intake/Output last 3 Shifts:  I/O last 3 completed shifts:  In: - (0 mL/kg)   Out: 550 (5 mL/kg) [Urine:550 (0.1 mL/kg/hr)]  Weight: 110.4 kg     Relevant Results           This patient currently has cardiac telemetry ordered; if you would like to modify or discontinue the telemetry order, click here to go to the orders activity to modify/discontinue the order.                 Assessment/Plan   Principal Problem:    Atrial fibrillation with RVR (CMS/HCC)  Active Problems:    Deep vein thrombosis (DVT) of distal vein of right lower extremity (CMS/HCC)    COVID    His echocardiogram shows normal left ventricular size and function and trivial valvular disease.  He remains in atrial fibrillation but is rate controlled.  He requires anticoagulation for his atrial fibrillation/stroke risk reduction as well as his acute DVT.  Would prefer that he achieve therapeutic levels prior to discharge.  Discussed novel anticoagulants however he prefers Coumadin.  I suppose an option would be an outpatient Lovenox to Coumadin bridge if the desire is to discharge him today.  Cardiac status is rather stable.  We will sign off.  He should follow-up with Dr. Wayne in 2-3 weeks.       I spent 25 minutes in the professional and overall care of this patient.      Juan Hernandez, DO      "

## 2023-10-26 NOTE — PROGRESS NOTES
Physical Therapy    Physical Therapy Evaluation & Treatment    Patient Name: Mukesh Garg  MRN: [id number]  Today's Date: 10/26/2023   Time Calculation  Start Time: 0900  Stop Time: 0930  Time Calculation (min): 30 min    Assessment/Plan   PT Assessment  PT Assessment Results: Decreased strength, Decreased endurance, Impaired balance, Decreased mobility, Decreased coordination, Impaired hearing, Impaired vision  Rehab Prognosis: Good  Evaluation/Treatment Tolerance: Patient tolerated treatment well  Medical Staff Made Aware: Yes  Strengths: Ability to acquire knowledge, Attitude of self, Coping skills, Living arrangement secure, Support of extended family/friends  End of Session Communication: Bedside nurse  Assessment Comment:  (pt demonstrated need for min assist of 1 to distant supervision of 1 for the above mobility; pt is functioning below baseline however pt should progress well enough for safe d/c home with intermittant assist from spouse and low intensity rehab.)  End of Session Patient Position: Up in chair  IP OR SWING BED PT PLAN  Inpatient or Swing Bed: Inpatient  PT Plan  Treatment/Interventions: Bed mobility, Transfer training, Gait training, Stair training, Balance training, Strengthening, Endurance training, Therapeutic exercise, Therapeutic activity, Home exercise program  PT Plan: Skilled PT  PT Frequency: 4 times per week  PT Discharge Recommendations: Low intensity level of continued care  PT Recommended Transfer Status: Assist x1      Subjective     General Visit Information:  General  Reason for Referral: iimpaired mobility  Family/Caregiver Present: No  Prior to Session Communication: Bedside nurse  Patient Position Received: Bed, 3 rail up  General Comment:  (pt long sitting in bed on 2 liters o2; pt agreeable to therapy;)  Home Living:  Home Living  Type of Home: House  Lives With: Spouse  Home Adaptive Equipment: Crutches, Walker rolling or standard, Other (Comment) ([facility] [place])  Home Layout: Two  level  Home Access: Stairs to enter with rails  Entrance Stairs-Rails: Right  Entrance Stairs-Number of Steps:  (2)  Bathroom Shower/Tub: Tub/shower unit, Walk-in shower  Bathroom Toilet: Standard  Home Living Comments:  (bedroom, tub shower  up 9 steps with 1 railing; walk-in shower down 4 steps with 1 railing)  Prior Level of Function:  Prior Function Per Pt/Caregiver Report  Level of [place]: Independent with ADLs and functional transfers  Vocational: Part time employment (teacher)  Prior Function Comments:  (+ drives; shares chores with spouse; Independent with own meds)  Precautions:  Precautions  Hearing/Visual Limitations:  (reading glasses; + mild [place])  Medical Precautions: Fall precautions, Other (comment) (+ COVID; + r/o C-Diff)  Precautions Comment:  (+ acute right LE DVT---on heparin drip with PTT 91.0)  Vital Signs:  Vital Signs  Heart Rate: 88 (HR up to 101 bpm during session)  SpO2: 98 % (O2 sat 94% on room air after amb trial)  Patient Position: Sitting    Objective   Pain:  Pain Assessment  Pain Assessment: 0-10  Pain Score: 0 - No pain  Cognition:  Cognition  Overall Cognitive Status: Within Functional Limits    General Assessments:                Activity Tolerance  Endurance: Decreased tolerance for upright activites    Sensation  Light Touch: No apparent deficits    Coordination  Movements are Fluid and Coordinated:  (decreased bilateral LE's due to weakness)    Postural Control  Posture Comment:  (mild forward head, protracted shldrs)    Static Sitting Balance  Static Sitting-Balance Support: Feet supported  Static Sitting-Level of Assistance: Distant supervision  Static Sitting-Comment/Number of Minutes:  (good balance)  Dynamic Sitting Balance  Dynamic Sitting-Balance Support: Feet supported  Dynamic Sitting-Balance: Reaching for objects  Dynamic Sitting-Comments:  (distant supervision)    Static Standing Balance  Static Standing-Balance Support: No upper extremity supported  Static  Standing-Level of Assistance: Minimum assistance (to steady)  Static Standing-Comment/Number of Minutes:  (3-4 min)  Dynamic Standing Balance  Dynamic Standing-Balance Support: No upper extremity supported  Dynamic Standing-Comments:  (fair  to fair + balance)  Functional Assessments:  Bed Mobility  Bed Mobility: Yes  Bed Mobility 1  Bed Mobility 1: Supine to sitting  Level of Assistance 1: Distant supervision  Bed Mobility Comments 1:  (head of bed elevated 35 degrees during supine to sit as above)    Transfers  Transfer: Yes  Transfer 1  Transfer From 1: Sit to  Transfer to 1: Stand  Technique 1: Sit to stand  Transfer Level of Assistance 1: Close supervision  Trials/Comments 1:  (verbal cues for proper bilateral hand placement)  Transfers 2  Transfer From 2: Stand to  Transfer to 2: Sit  Technique 2: Stand to sit  Transfer Level of Assistance 2: Close supervision  Trials/Comments 2:  (verbal cues for proper bilateral hand placement)    Ambulation/Gait Training  Ambulation/Gait Training Performed: Yes  Ambulation/Gait Training 1  Surface 1: Level tile  Device 1: No device  Assistance 1: Minimum assistance (to steady)  Comments/Distance (ft) 1:  (pt amb without device, min assist of 1 to steady, 15 ft x 4 with + turns)  Extremity/Trunk Assessments:        Treatments:  Therapeutic Exercise  Therapeutic Exercise Performed: Yes (pt performed seated bilateral LE AP, heel raises, LAQ's, marching, hip abd/add x 10 reps each)    Therapeutic Activity  Therapeutic Activity Performed: Yes (see transfers, amb without device)    Bed Mobility  Bed Mobility: Yes  Bed Mobility 1  Bed Mobility 1: Supine to sitting  Level of Assistance 1: Distant supervision  Bed Mobility Comments 1:  (head of bed elevated 35 degrees during supine to sit as above)    Ambulation/Gait Training  Ambulation/Gait Training Performed: Yes  Ambulation/Gait Training 1  Surface 1: Level tile  Device 1: No device  Assistance 1: Minimum assistance (to  steady)  Comments/Distance (ft) 1:  (pt amb without device, min assist of 1 to steady, 15 ft x 4 with + turns)  Transfers  Transfer: Yes  Transfer 1  Transfer From 1: Sit to  Transfer to 1: Stand  Technique 1: Sit to stand  Transfer Level of Assistance 1: Close supervision  Trials/Comments 1:  (verbal cues for proper bilateral hand placement)  Transfers 2  Transfer From 2: Stand to  Transfer to 2: Sit  Technique 2: Stand to sit  Transfer Level of Assistance 2: Close supervision  Trials/Comments 2:  (verbal cues for proper bilateral hand placement)  Outcome Measures:  [facility] Basic Mobility  Turning from your back to your side while in a flat bed without using bedrails: None  Moving from lying on your back to sitting on the side of a flat bed without using bedrails: None  Moving to and from bed to chair (including a wheelchair): A little  Standing up from a chair using your arms (e.g. wheelchair or bedside chair): A little  To walk in hospital room: A little  Climbing 3-5 steps with railing: A little  Basic Mobility - Total Score: 20    Encounter Problems       Encounter Problems (Active)       Balance       STG - Maintains dynamic standing balance without upper extremity support (Progressing)       Start:  10/26/23    Expected End:  11/09/23       INTERVENTIONS:  1. Practice standing with minimal support.  2. Educate patient about standing tolerance.  3. Educate patient about independence with gait, transfers, and ADL's.  4. Educate patient about use of assistive device.  5. Educate patient about self-directed care.            Mobility       STG - Patient will ambulate 150 independently on level surfaces with + turns (Progressing)       Start:  10/26/23    Expected End:  11/09/23            pt will ascend/descend 9 steps reciprocally with 1 handrail mod independent (Progressing)       Start:  10/26/23    Expected End:  11/09/23               Transfers       STG - Patient to transfer to and from sit to supine  independently (Progressing)       Start:  10/26/23    Expected End:  11/09/23            STG - Patient will transfer sit to and from stand independent (Progressing)       Start:  10/26/23    Expected End:  11/09/23                   Education Documentation  Mobility Training, taught by Theresa Swartz PT at 10/26/2023  9:52 AM.  Learner: Patient  Readiness: Acceptance  Method: Explanation  Response: Verbalizes Understanding    Education Comments  No comments found.

## 2023-10-26 NOTE — PROGRESS NOTES
Estephania Garg is a 73 y.o. male on day 2 of admission presenting with Atrial fibrillation with RVR (CMS/HCC).      Subjective   Patient seen and examined.  Awake alert and oriented.  Is observed sitting up in chair eating breakfast.  Denies acute events overnight.  Patient states he feels better today.  Complains of cramping in his lower extremities.  Denies chest pain, denies shortness of breath.  Denies pain otherwise.  No acute distress.       Objective     Last Recorded Vitals  /84   Pulse 88 Comment: HR up to 101 bpm during session  Temp 35.7 °C (96.3 °F) (Oral)   Resp 20   Wt 110 kg (243 lb 6.2 oz)   SpO2 98% Comment: O2 sat 94% on room air after amb trial  Intake/Output last 3 Shifts:    Intake/Output Summary (Last 24 hours) at 10/26/2023 1128  Last data filed at 10/26/2023 0047  Gross per 24 hour   Intake --   Output 150 ml   Net -150 ml         Admission Weight  Weight: 110 kg (243 lb 6.2 oz) (10/24/23 1238)    Daily Weight  10/24/23 : 110 kg (243 lb 6.2 oz)    Image Results  ECG 12 lead  Atrial fibrillation with rapid ventricular response with premature ventricular or aberrantly conducted complexes  Low voltage QRS  Septal infarct , age undetermined  ST & T wave abnormality, consider inferolateral ischemia  Abnormal ECG  No previous ECGs available  Confirmed by Juan Hernandez ([id number]) on 10/25/2023 2:33:24 PM  Transthoracic Echo (TTE) Complete             [place] [id number]             Phone [phone]    TRANSTHORACIC ECHOCARDIOGRAM REPORT       Patient Name:      ESTEPHANIA GARG         Reading Physician:    [id number] Huey Cote MD  Study Date:        10/25/2023           Ordering Provider:    [id number] REBECCA JEROME  MRN/PID:           [id number]             Fellow:  Accession#:        [id number]         Nurse:  Date of  Birth/Age: 1950 / 73 years Sonographer:          Betsy Goncalves RDCS  Gender:            M                    Additional Staff:  Height:            175.26 cm            Admit Date:  Weight:            110.22 kg            Admission Status:     Inpatient -                                                                Routine  BSA:               2.24 m2              Department Location:  [facility]  Blood Pressure: 120 /66 mmHg    Study Type:    TRANSTHORACIC ECHO (TTE) COMPLETE  Diagnosis/ICD: Atherosclerotic heart disease of native coronary artery without                 angina pectoris-I25.10  Indication:    cad    Patient History:  CABG:              CABG x 3.  BMI:               Obese >30  Pertinent History: A-Fib, CAD, HTN and TIA. covid iso, lung mass, oa hips/pain.    Study Detail: The following Echo studies were performed: 2D, M-Mode, Doppler and                color flow. Technically challenging study due to body habitus and                prominent lung artifact. Lumason used as a contrast agent for                endocardial border definition. Total contrast used for this                procedure was 4 mL via IV push.       PHYSICIAN INTERPRETATION:  Left Ventricle: Left ventricular systolic function is normal, with an estimated ejection fraction of 65-70%. There are no regional wall motion abnormalities. The left ventricular cavity size is normal. Spectral Doppler shows a normal pattern of left ventricular diastolic filling.  Left Atrium: The left atrium is mild to moderately dilated.  Right Ventricle: The right ventricle is normal in size. There is normal right ventricular global systolic function.  Right Atrium: The right atrium is normal in size.  Aortic Valve: The aortic valve appears structurally normal. There is trivial aortic valve regurgitation. The peak instantaneous gradient of the aortic valve is 4.8 mmHg.  Mitral Valve: The mitral valve is normal in structure. There is trace to mild  mitral valve regurgitation.  Tricuspid Valve: The tricuspid valve is structurally normal. There is trace to mild tricuspid regurgitation.  Pulmonic Valve: The pulmonic valve is structurally normal. There is no indication of pulmonic valve regurgitation.  Pericardium: There is no pericardial effusion noted.  Aorta: The aortic root is normal.       CONCLUSIONS:   1. Left ventricular systolic function is normal with a 65-70% estimated ejection fraction.   2. The left atrium is mild to moderately dilated.   3. Trace to mild mitral valve regurgitation.   4. Trace to mild tricuspid regurgitation.    QUANTITATIVE DATA SUMMARY:  2D MEASUREMENTS:                           Normal Ranges:  LAs:           4.50 cm   (2.7-4.0cm)  IVSd:          0.99 cm   (0.6-1.1cm)  LVPWd:         0.93 cm   (0.6-1.1cm)  LVIDd:         4.60 cm   (3.9-5.9cm)  LV Mass Index: 67.0 g/m2    LA VOLUME:                              Normal Ranges:  LA Volume Index: 43.1 ml/m2    RA VOLUME BY A/L METHOD:                        Normal Ranges:  RA Area A4C: 18.4 cm2    LV SYSTOLIC FUNCTION BY 2D PLANIMETRY (MOD):                      Normal Ranges:  EF-A2C View: 53.5 %    LV DIASTOLIC FUNCTION:                      Normal Ranges:  MV Peak E: 0.56 m/s (0.7-1.2 m/s)  MV Peak A: 0.41 m/s (0.42-0.7 m/s)  E/A Ratio: 1.37     (1.0-2.2)    MITRAL VALVE:                  Normal Ranges:  MV DT: 213 msec (150-240msec)    AORTIC VALVE:                          Normal Ranges:  AoV Vmax:      1.10 m/s (<=1.7m/s)  AoV Peak P.8 mmHg (<20mmHg)  LVOT Max Mark:  0.51 m/s (<=1.1m/s)  LVOT Diameter: 2.00 cm  (1.8-2.4cm)  AoV Area,Vmax: 1.47 cm2 (2.5-4.5cm2)       RIGHT VENTRICLE:  RV Basal 2.76 cm  RV Mid   2.56 cm  RV Major 6.8 cm    TRICUSPID VALVE/RVSP:                              Normal Ranges:  Peak TR Velocity: 2.59 m/s  RV Syst Pressure: 29.8 mmHg (< 30mmHg)       [id number] Huey Cote MD  Electronically signed on 10/25/2023 at 1:20:00 PM       ** Final  **      Physical Exam  Vitals reviewed.   Constitutional:       Appearance: Normal appearance. He is obese.   HENT:      Head: Normocephalic and atraumatic.      Nose: Nose normal.      Mouth/Throat:      Mouth: Mucous membranes are moist.   Eyes:      Extraocular Movements: Extraocular movements intact.      Pupils: Pupils are equal, round, and reactive to light.   Cardiovascular:      Rate and Rhythm: Normal rate.      Pulses: Normal pulses.   Pulmonary:      Effort: Pulmonary effort is normal.      Breath sounds: Normal breath sounds.   Abdominal:      General: Abdomen is flat.      Palpations: Abdomen is soft.   Musculoskeletal:         General: Normal range of motion.      Cervical back: Normal range of motion.   Skin:     General: Skin is warm and dry.      Capillary Refill: Capillary refill takes less than 2 seconds.   Neurological:      General: No focal deficit present.      Mental Status: He is alert and oriented to person, place, and time. Mental status is at baseline.   Psychiatric:         Mood and Affect: Mood normal.         Behavior: Behavior normal.         Thought Content: Thought content normal.         Relevant Results               Assessment/Plan   This patient currently has cardiac telemetry ordered; if you would like to modify or discontinue the telemetry order, click here to go to the orders activity to modify/discontinue the order.              Principal Problem:    Atrial fibrillation with RVR (CMS/HCC)  Active Problems:    Deep vein thrombosis (DVT) of distal vein of right lower extremity (CMS/HCC)    COVID    Atrial fibrillation with RVR-improving  Patient states that during a period of him feeling ill, he had skipped his home Coumadin dose Cardizem drip discontinued per cardiology  Telemetry  Consult cardiology.  Patient sees Dr. Wayne outpatient  Monitor vitals  Continue home beta-blocker     DVT  Involving right popliteal, femoral vein  Heparin drip     COVID-19 positive  Denies  shortness of breath, currently breathing comfortably on room air  Appreciate recommendations from infectious diseases  Isolation precautions    Elevated creatinine  From 1.2 yesterday to 1.5 today   Hold home hydrochlorothiazide, hold home lisinopril   Renally dose meds, avoid nephrotoxic medications  Monitor renal function    Hypertension  With some soft BPs, hold home hydrochlorothiazide and lisinopril     CAD/Hyperlipidemia  Continue home Lipitor, etia, aspirin     Gout  Hold home allopurinol for now     Weakness  PT, OT, fall precautions.     DVT/GI prophylaxis  On heparin drip, PPI                 Monika Bernard, APRN-CNP

## 2023-10-26 NOTE — PROGRESS NOTES
Mukesh Garg is a 73 y.o. male on day 2 of admission presenting with Atrial fibrillation with RVR (CMS/HCC).    Subjective   Interval History:   Afebrile, no chills  No cough  No chest pain  No nausea vomiting or diarrhea    Review of Systems   All other systems reviewed and are negative.      Objective   Range of Vitals (last 24 hours)  Heart Rate:  [72-94]   Temp:  [35.7 °C (96.3 °F)-36.6 °C (97.9 °F)]   Resp:  [18-24]   BP: ()/(40-84)   SpO2:  [95 %-98 %]   Daily Weight  10/24/23 : 110 kg (243 lb 6.2 oz)    Body mass index is 35.94 kg/m².    Physical Exam  General: No acute distress, cooperative   Psych: Awake, alert & oriented x3  Head: Anicteric with no conjunctival injection  ENT: Midline nasal septum with no mucosal lesions  Neck: Supple with no obvious masses  Chest: Diminished but clear to auscultation bilaterally with no rales or rhonchi  Cardiovascular: Regular rate and irregular rhythm with no rubs murmurs or gallops  Abdomen: Soft, nontender, bowel sounds normoactive  Extremities:  No cyanosis or clubbing. Right leg edema  Skin:  No rashes, nodules  Musculoskeletal: No joint swelling or tenderness    Antibiotics  sodium chloride 0.9 % bolus 1,000 mL  dilTIAZem (Cardizem) injection 20 mg  dilTIAZem (Cardizem) 125 mg in dextrose 5% 125 mL (1 mg/mL) infusion (premix)  heparin (porcine) injection 5,000 Units  heparin (porcine) injection 8,750 Units  heparin 25,000 Units in dextrose 5% 250 mL (100 Units/mL) infusion (premix)  heparin (porcine) injection 3,000-6,000 Units  aspirin tablet 325 mg  amLODIPine (Norvasc) tablet 10 mg  atorvastatin (Lipitor) tablet 40 mg  carvedilol (Coreg) tablet 12.5 mg  ezetimibe (Zetia) tablet 10 mg  famotidine (Pepcid) tablet 20 mg  hydroCHLOROthiazide (HYDRODiuril) tablet 25 mg  lisinopril tablet 40 mg  pantoprazole (ProtoNix) EC tablet 40 mg  dexAMETHasone (Decadron) tablet 6 mg  warfarin (Coumadin) tablet 5 mg  sulfur hexafluoride microsphr (Lumason) injection 24.28  "mg  heparin 25,000 Units in dextrose 5% 250 mL (100 Units/mL) infusion (premix)  HYDROcodone-acetaminophen (Norco) 5-325 mg per tablet 1 tablet  docusate sodium (Colace) capsule 100 mg      Relevant Results  Labs  Results from last 72 hours   Lab Units 10/26/23  0647 10/25/23  0616 10/24/23  2338 10/24/23  1227   WBC AUTO x10*3/uL 16.7* 15.8* 15.4* 14.9*   HEMOGLOBIN g/dL 14.4 14.6 13.9 16.5   HEMATOCRIT % 43.2 44.3 41.8 49.1   PLATELETS AUTO x10*3/uL 163 143* 119* 140*   NEUTROS PCT AUTO %  --   --   --  81.1   LYMPHS PCT AUTO %  --   --   --  8.9   MONOS PCT AUTO %  --   --   --  9.2   EOS PCT AUTO %  --   --   --  0.0     Results from last 72 hours   Lab Units 10/26/23  0647 10/25/23  0616 10/24/23  1227   SODIUM mmol/L 136 137 139   POTASSIUM mmol/L 4.0 3.7 3.9   CHLORIDE mmol/L 101 102 100   CO2 mmol/L 24 25 22*   BUN mg/dL 33* 20 23   CREATININE mg/dL 1.50 1.20 1.50   GLUCOSE mg/dL 223* 205* 153*   CALCIUM mg/dL 9.1 8.5 9.6   ANION GAP mmol/L 11 10 17   EGFR mL/min/1.73m*2 48* 63 48*     Results from last 72 hours   Lab Units 10/24/23  1227   ALK PHOS U/L 61   BILIRUBIN TOTAL mg/dL 2.2*   PROTEIN TOTAL g/dL 6.9   ALT U/L 22   AST U/L 30   ALBUMIN g/dL 3.5     Estimated Creatinine Clearance: 52 mL/min (by C-G formula based on SCr of 1.5 mg/dL).  No results found for: \"CRP\"  Microbiology  Reviewed  Imaging  Reviewed  Assessment/Plan     Acute hypoxic respiratory failure -resolved  coronavirus infection-etiology of above  Atrial fibrillation with rapid ventricular response-possibly related to coronavirus infection  Right lower extremity DVT-possibly related to coronavirus infection     Dexamethasone-total of 10 days  No remdesivir-symptoms have been about 3 weeks  Anticoagulation  Supplemental oxygen as needed  Symptomatic treatment for cough  Stool work-up if diarrhea persists  Droplet plus precautions  Supportive care  Monitor temperature and WBC  Discharge planning    Koffi iJ MD  "

## 2023-10-26 NOTE — CARE PLAN
Problem: Balance  Goal: STG - Maintains dynamic standing balance without upper extremity support  Description: INTERVENTIONS:  1. Practice standing with minimal support.  2. Educate patient about standing tolerance.  3. Educate patient about independence with gait, transfers, and ADL's.  4. Educate patient about use of assistive device.  5. Educate patient about self-directed care.  Outcome: Progressing     Problem: Mobility  Goal: STG - Patient will ambulate 150 independently on level surfaces with + turns  Outcome: Progressing  Goal: pt will ascend/descend 9 steps reciprocally with 1 handrail mod independent  Outcome: Progressing     Problem: Transfers  Goal: STG - Patient to transfer to and from sit to supine independently  Outcome: Progressing  Goal: STG - Patient will transfer sit to and from stand independent  Outcome: Progressing

## 2023-10-26 NOTE — PROGRESS NOTES
Occupational Therapy    Evaluation/Treatment    Patient Name: Mukesh Garg  MRN: [id number]  : 1950  Today's Date: 10/26/23  Time Calculation  Start Time: 815  Stop Time: 835  Time Calculation (min): 20 min       Assessment:  OT Assessment: Pt is +COVID and presents with generalized weakness, impaired activity tolerance, and intermittent muscle cramps in his BLE's affecting functional mobility.  Prognosis: Good  Evaluation/Treatment Tolerance: Patient tolerated treatment well  End of Session Communication: Bedside nurse  End of Session Patient Position: Up in chair  OT Assessment Results: Decreased ADL status, Decreased endurance, Decreased functional mobility, Decreased IADLs  Prognosis: Good  Evaluation/Treatment Tolerance: Patient tolerated treatment well    Plan:  Treatment Interventions: ADL retraining, Endurance training, Functional transfer training, Patient/family training, Neuromuscular reeducation  OT Frequency: 3 times per week  OT Discharge Recommendations: Low intensity level of continued care  OT - OK to Discharge: Yes  Treatment Interventions: ADL retraining, Endurance training, Functional transfer training, Patient/family training, Neuromuscular reeducation    Subjective   Current Problem:  1. Atrial fibrillation with RVR (CMS/HCC)        2. Chest pain, unspecified type        3. COVID        4. Acute deep vein thrombosis (DVT) of right lower extremity, unspecified vein (CMS/HCC)        5. Coronary artery disease involving native coronary artery of native heart without angina pectoris  Transthoracic Echo (TTE) Complete    Transthoracic Echo (TTE) Complete        General:   OT Received On: 10/26/23  General  Reason for Referral: A-fib with RVR, weakness, impaired ADL's  Referred By: Monika Bernard, APRN-CNP  Past Medical History Relevant to Rehab: a-fib, OA bilat hips, CAD, HTN, lung mass, R inguinal hernia  Family/Caregiver Present: No  Prior to Session Communication: Bedside nurse  Patient  Position Received: Bed, 3 rail up  General Comment: Cleared by nurse prior to session and pt agreeable to OT mariam. Pt reporting he was looking forward to therapy.    Precautions:  Medical Precautions: Fall precautions, Infection precautions, Other (comment) (+COVID, r/o c-diff)       Pain:  Pain Assessment  Pain Assessment: 0-10  Pain Score: 0 - No pain    Objective     Cognition:  Overall Cognitive Status: Within Functional Limits  Orientation Level: Oriented X4    Home Living:  Type of Home: House  Lives With: Spouse  Home Adaptive Equipment: Crutches, Walker rolling or standard, Other (Comment) (BSC)  Home Layout: Two level  Home Access: Stairs to enter with rails  Entrance Stairs-Rails: Right  Entrance Stairs-Number of Steps: 2  Bathroom Shower/Tub: Tub/shower unit, Walk-in shower  Bathroom Toilet: Standard  Home Living Comments: bedroom, tub shower  up 9 steps with 1 railing; walk-in shower down 4 steps with 1 railing    Prior Function:  Level of [place]: Independent with ADLs and functional transfers, Independent with homemaking with ambulation  ADL Assistance: Independent  Homemaking Assistance: Independent  Ambulatory Assistance: Independent  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[employer])  Hand Dominance: Right  Prior Function Comments: + drives; shares chores with spouse; Independent with own meds       ADL:  Eating Assistance: Independent  Grooming Assistance: Stand by  Grooming Deficit: Setup, Supervision/safety  Bathing Assistance: Minimal  UE Dressing Assistance: Stand by  UE Dressing Deficit: Setup  LE Dressing Assistance: Minimal  Toileting Assistance with Device: Minimal    Activity Tolerance:  Activity Tolerance Comments: impaired due to generalized weakness and inactivity       Bed Mobility/Transfers: Bed Mobility  Bed Mobility: Yes  Bed Mobility 1  Bed Mobility 1: Supine to sitting  Level of Assistance 1: Close supervision  Bed Mobility Comments 1: Supervision for  safety.    Transfers  Transfer: Yes  Transfer 1  Transfer From 1: Bed to  Transfer to 1: Stand  Technique 1: Sit to stand  Transfer Level of Assistance 1: Close supervision  Trials/Comments 1: verbal cues as needed for hand placement, etc.  Transfers 2  Transfer From 2: Stand to  Transfer to 2: Chair with arms  Technique 2: Stand to sit  Transfer Level of Assistance 2: Close supervision  Trials/Comments 2: same as above      Therapy/Activity: Therapeutic Activity  Therapeutic Activity Performed: Yes  Therapeutic Activity 1: Pt tolerated functional mobility for a short household distance in the room using no device with CGA to close S for safety with balance.    Sensation:  Sensation Comment: BUE's WFL    Strength:  Strength Comments: BUE's AROM WFL, MMT 4/5    Coordination:  Coordination Comment: BUE's WFL     Hand Function:  Hand Function  Gross Grasp: Functional  Coordination: Functional      Outcome Measures: [facility] Daily Activity  Putting on and taking off regular lower body clothing: A little  Bathing (including washing, rinsing, drying): A little  Putting on and taking off regular upper body clothing: A little  Toileting, which includes using toilet, bedpan or urinal: A little  Taking care of personal grooming such as brushing teeth: A little  Eating Meals: None  Daily Activity - Total Score: 19        Education Documentation  ADL Training, taught by Kapil Jules Jr., OT at 10/26/2023 11:34 AM.  Learner: Patient  Readiness: Eager  Method: Explanation, Demonstration  Response: Verbalizes Understanding, Demonstrated Understanding    Education Comments  No comments found.         Goals:  Problem: OT Goals  Goal: Pt will complete all functional transfers and mobility with independence.  Outcome: Progressing  Goal: Pt will complete all toileting tasks with independence.  Outcome: Progressing  Goal: Pt will complete all LB dressing with independence.  Outcome: Progressing  Goal: Pt will complete all grooming tasks  with independence in standing.  Outcome: Progressing  Goal: Pt will tolerate functional mobility for a household distance using no device independently.  Outcome: Progressing

## 2023-10-27 ENCOUNTER — PHARMACY VISIT (OUTPATIENT)
Dept: PHARMACY | Facility: CLINIC | Age: 73
End: 2023-10-27
Payer: COMMERCIAL

## 2023-10-27 LAB
ANION GAP SERPL CALC-SCNC: 13 MMOL/L
APTT PPP: 107.3 SECONDS (ref 22–32.5)
APTT PPP: 95.8 SECONDS (ref 22–32.5)
BUN SERPL-MCNC: 33 MG/DL (ref 8–25)
CALCIUM SERPL-MCNC: 9.2 MG/DL (ref 8.5–10.4)
CHLORIDE SERPL-SCNC: 101 MMOL/L (ref 97–107)
CO2 SERPL-SCNC: 27 MMOL/L (ref 24–31)
CREAT SERPL-MCNC: 1.2 MG/DL (ref 0.4–1.6)
ERYTHROCYTE [DISTWIDTH] IN BLOOD BY AUTOMATED COUNT: 13.5 % (ref 11.5–14.5)
GFR SERPL CREATININE-BSD FRML MDRD: 63 ML/MIN/1.73M*2
GLUCOSE SERPL-MCNC: 181 MG/DL (ref 65–99)
HCT VFR BLD AUTO: 43 % (ref 41–52)
HGB BLD-MCNC: 13.8 G/DL (ref 13.5–17.5)
INR PPP: 1.6 (ref 0.9–1.2)
MCH RBC QN AUTO: 29.9 PG (ref 26–34)
MCHC RBC AUTO-ENTMCNC: 32.1 G/DL (ref 32–36)
MCV RBC AUTO: 93 FL (ref 80–100)
NRBC BLD-RTO: 0 /100 WBCS (ref 0–0)
PLATELET # BLD AUTO: 187 X10*3/UL (ref 150–450)
PMV BLD AUTO: 10.9 FL (ref 7.5–11.5)
POTASSIUM SERPL-SCNC: 4.2 MMOL/L (ref 3.4–5.1)
PROTHROMBIN TIME: 16.2 SECONDS (ref 9.3–12.7)
RBC # BLD AUTO: 4.62 X10*6/UL (ref 4.5–5.9)
SODIUM SERPL-SCNC: 141 MMOL/L (ref 133–145)
WBC # BLD AUTO: 16 X10*3/UL (ref 4.4–11.3)

## 2023-10-27 PROCEDURE — 85730 THROMBOPLASTIN TIME PARTIAL: CPT | Performed by: STUDENT IN AN ORGANIZED HEALTH CARE EDUCATION/TRAINING PROGRAM

## 2023-10-27 PROCEDURE — 36415 COLL VENOUS BLD VENIPUNCTURE: CPT | Performed by: STUDENT IN AN ORGANIZED HEALTH CARE EDUCATION/TRAINING PROGRAM

## 2023-10-27 PROCEDURE — 2060000001 HC INTERMEDIATE ICU ROOM DAILY

## 2023-10-27 PROCEDURE — 2500000004 HC RX 250 GENERAL PHARMACY W/ HCPCS (ALT 636 FOR OP/ED)

## 2023-10-27 PROCEDURE — 80048 BASIC METABOLIC PNL TOTAL CA: CPT

## 2023-10-27 PROCEDURE — 85027 COMPLETE CBC AUTOMATED: CPT

## 2023-10-27 PROCEDURE — 2500000004 HC RX 250 GENERAL PHARMACY W/ HCPCS (ALT 636 FOR OP/ED): Performed by: INTERNAL MEDICINE

## 2023-10-27 PROCEDURE — 2500000002 HC RX 250 W HCPCS SELF ADMINISTERED DRUGS (ALT 637 FOR MEDICARE OP, ALT 636 FOR OP/ED)

## 2023-10-27 PROCEDURE — 2500000001 HC RX 250 WO HCPCS SELF ADMINISTERED DRUGS (ALT 637 FOR MEDICARE OP)

## 2023-10-27 PROCEDURE — RXMED WILLOW AMBULATORY MEDICATION CHARGE

## 2023-10-27 PROCEDURE — 85610 PROTHROMBIN TIME: CPT

## 2023-10-27 RX ORDER — DEXAMETHASONE 6 MG/1
6 TABLET ORAL NIGHTLY
Qty: 6 TABLET | Refills: 0 | Status: SHIPPED | OUTPATIENT
Start: 2023-10-27 | End: 2023-11-27 | Stop reason: HOSPADM

## 2023-10-27 RX ADMIN — EZETIMIBE 10 MG: 10 TABLET ORAL at 09:59

## 2023-10-27 RX ADMIN — APIXABAN 5 MG: 5 TABLET, FILM COATED ORAL at 20:42

## 2023-10-27 RX ADMIN — CARVEDILOL 12.5 MG: 12.5 TABLET, FILM COATED ORAL at 09:59

## 2023-10-27 RX ADMIN — AMLODIPINE BESYLATE 10 MG: 10 TABLET ORAL at 10:00

## 2023-10-27 RX ADMIN — PANTOPRAZOLE SODIUM 40 MG: 40 TABLET, DELAYED RELEASE ORAL at 07:00

## 2023-10-27 RX ADMIN — CARVEDILOL 12.5 MG: 12.5 TABLET, FILM COATED ORAL at 18:08

## 2023-10-27 RX ADMIN — DEXAMETHASONE 6 MG: 6 TABLET ORAL at 20:41

## 2023-10-27 RX ADMIN — FAMOTIDINE 20 MG: 20 TABLET ORAL at 10:00

## 2023-10-27 RX ADMIN — ATORVASTATIN CALCIUM 40 MG: 40 TABLET, FILM COATED ORAL at 10:00

## 2023-10-27 ASSESSMENT — COGNITIVE AND FUNCTIONAL STATUS - GENERAL
MOBILITY SCORE: 23
DRESSING REGULAR LOWER BODY CLOTHING: A LITTLE
DAILY ACTIVITIY SCORE: 23
DRESSING REGULAR LOWER BODY CLOTHING: A LITTLE
MOVING TO AND FROM BED TO CHAIR: A LITTLE
DAILY ACTIVITIY SCORE: 23
MOBILITY SCORE: 20
CLIMB 3 TO 5 STEPS WITH RAILING: A LITTLE
WALKING IN HOSPITAL ROOM: A LITTLE
STANDING UP FROM CHAIR USING ARMS: A LITTLE
CLIMB 3 TO 5 STEPS WITH RAILING: A LITTLE

## 2023-10-27 ASSESSMENT — PAIN DESCRIPTION - DESCRIPTORS: DESCRIPTORS: CRAMPING

## 2023-10-27 ASSESSMENT — PAIN SCALES - GENERAL: PAINLEVEL_OUTOF10: 3

## 2023-10-27 NOTE — PROGRESS NOTES
Mukesh Garg is a 73 y.o. male on day 3 of admission presenting with Atrial fibrillation with RVR (CMS/HCC).    Subjective   Interval History:   Afebrile, no chills  Denies SOB or chest pain  Reports a slight cough-overall improved  Denies nausea, vomiting, diarrhea        Objective   Range of Vitals (last 24 hours)  Heart Rate:  [80-88]   Temp:  [35.6 °C (96.1 °F)-36.6 °C (97.9 °F)]   Resp:  [16-23]   BP: ()/(70-94)   SpO2:  [92 %-95 %]   Daily Weight  10/24/23 : 110 kg (243 lb 6.2 oz)    Body mass index is 35.94 kg/m².    Physical Exam  General: No acute distress, cooperative   Psych: Awake, alert & oriented x3  Head: Anicteric with no conjunctival injection  ENT: Midline nasal septum with no mucosal lesions  Neck: Supple with no obvious masses  Chest: Diminished but clear to auscultation bilaterally with no rales or rhonchi  Cardiovascular: Regular rate and irregular rhythm with no rubs murmurs or gallops  Abdomen: Soft, nontender, bowel sounds normoactive  Extremities:  No cyanosis or clubbing. Right leg edema  Skin:  No rashes, nodules  Musculoskeletal: No joint swelling or tenderness    Antibiotics  sodium chloride 0.9 % bolus 1,000 mL  dilTIAZem (Cardizem) injection 20 mg  dilTIAZem (Cardizem) 125 mg in dextrose 5% 125 mL (1 mg/mL) infusion (premix)  heparin (porcine) injection 5,000 Units  heparin (porcine) injection 8,750 Units  heparin 25,000 Units in dextrose 5% 250 mL (100 Units/mL) infusion (premix)  heparin (porcine) injection 3,000-6,000 Units  aspirin tablet 325 mg  amLODIPine (Norvasc) tablet 10 mg  atorvastatin (Lipitor) tablet 40 mg  carvedilol (Coreg) tablet 12.5 mg  ezetimibe (Zetia) tablet 10 mg  famotidine (Pepcid) tablet 20 mg  hydroCHLOROthiazide (HYDRODiuril) tablet 25 mg  lisinopril tablet 40 mg  pantoprazole (ProtoNix) EC tablet 40 mg  dexAMETHasone (Decadron) tablet 6 mg  warfarin (Coumadin) tablet 5 mg  sulfur hexafluoride microsphr (Lumason) injection 24.28 mg  heparin 25,000  "Units in dextrose 5% 250 mL (100 Units/mL) infusion (premix)  HYDROcodone-acetaminophen (Norco) 5-325 mg per tablet 1 tablet  docusate sodium (Colace) capsule 100 mg      Relevant Results  Labs  Results from last 72 hours   Lab Units 10/27/23  0435 10/26/23  1453 10/26/23  0647 10/24/23  2338 10/24/23  1227   WBC AUTO x10*3/uL 16.0* 18.8* 16.7*   < > 14.9*   HEMOGLOBIN g/dL 13.8 15.1 14.4   < > 16.5   HEMATOCRIT % 43.0 44.1 43.2   < > 49.1   PLATELETS AUTO x10*3/uL 187 191 163   < > 140*   NEUTROS PCT AUTO %  --   --   --   --  81.1   LYMPHS PCT AUTO %  --   --   --   --  8.9   MONOS PCT AUTO %  --   --   --   --  9.2   EOS PCT AUTO %  --   --   --   --  0.0    < > = values in this interval not displayed.       Results from last 72 hours   Lab Units 10/27/23  0435 10/26/23  0647 10/25/23  0616   SODIUM mmol/L 141 136 137   POTASSIUM mmol/L 4.2 4.0 3.7   CHLORIDE mmol/L 101 101 102   CO2 mmol/L 27 24 25   BUN mg/dL 33* 33* 20   CREATININE mg/dL 1.20 1.50 1.20   GLUCOSE mg/dL 181* 223* 205*   CALCIUM mg/dL 9.2 9.1 8.5   ANION GAP mmol/L 13 11 10   EGFR mL/min/1.73m*2 63 48* 63       Results from last 72 hours   Lab Units 10/24/23  1227   ALK PHOS U/L 61   BILIRUBIN TOTAL mg/dL 2.2*   PROTEIN TOTAL g/dL 6.9   ALT U/L 22   AST U/L 30   ALBUMIN g/dL 3.5       Estimated Creatinine Clearance: 65 mL/min (by C-G formula based on SCr of 1.2 mg/dL).  No results found for: \"CRP\"  Microbiology  Reviewed  Imaging  Reviewed  Assessment/Plan     Acute hypoxic respiratory failure -resolved  coronavirus infection-etiology of above  Atrial fibrillation with rapid ventricular response-possibly related to coronavirus infection  Right lower extremity DVT-possibly related to coronavirus infection     Dexamethasone-total of 10 days  No remdesivir-symptoms have been about 3 weeks  Anticoagulation  Supplemental oxygen as needed  Symptomatic treatment for cough  Droplet plus precautions  Supportive care  Monitor temperature and WBC  Discharge " planning-ok to discharge from ID standpoint, plan is dexamethasone PO for a total of 10 days    Adrienne HYATT Staff, APRN-CNP

## 2023-10-27 NOTE — CARE PLAN
The patient's goals for the shift include      The clinical goals for the shift include safety    Over the shift, the patient did not make progress toward the following goals. Barriers to progression include none. Recommendations to address these barriers include n/a.

## 2023-10-27 NOTE — NURSING NOTE
Assumed care of patient from dayshift nurse. Patient lying in bed at this time with no complaints of pain and or discomfort. Heprain infusing through right peripheral AC; left peripheral AC flushed--no blood return. Patient's wife at bedside. Patient stated that from his right ankle down has felt numb/asleep since he was given a muscle relaxer earlier in the day. Pulses were palpated in RLE, +3; +5 strength in RLE extremity. I told the patient I would pass this finding along to the dayshift nurse so it can be discussed with the day team tomorrow. Bed in lowest position and locked and call bell and personal belongings within reach. No further interventions at this time.

## 2023-10-27 NOTE — PROGRESS NOTES
Pt continues on heparin this morning. Anticipated to be ready for dc on 10/28, will return home.

## 2023-10-27 NOTE — NURSING NOTE
Patient asleep at this time, no signs of pain and or discomfort observed. No need for interventions at this time.

## 2023-10-28 VITALS
OXYGEN SATURATION: 95 % | HEIGHT: 69 IN | TEMPERATURE: 96.1 F | HEART RATE: 83 BPM | SYSTOLIC BLOOD PRESSURE: 139 MMHG | BODY MASS INDEX: 36.83 KG/M2 | RESPIRATION RATE: 19 BRPM | WEIGHT: 248.68 LBS | DIASTOLIC BLOOD PRESSURE: 102 MMHG

## 2023-10-28 PROBLEM — I82.4Z1: Status: RESOLVED | Noted: 2023-10-24 | Resolved: 2023-10-28

## 2023-10-28 LAB
ANION GAP SERPL CALC-SCNC: 14 MMOL/L
BUN SERPL-MCNC: 33 MG/DL (ref 8–25)
CALCIUM SERPL-MCNC: 9.1 MG/DL (ref 8.5–10.4)
CHLORIDE SERPL-SCNC: 101 MMOL/L (ref 97–107)
CO2 SERPL-SCNC: 24 MMOL/L (ref 24–31)
CREAT SERPL-MCNC: 1.3 MG/DL (ref 0.4–1.6)
ERYTHROCYTE [DISTWIDTH] IN BLOOD BY AUTOMATED COUNT: 13.4 % (ref 11.5–14.5)
GFR SERPL CREATININE-BSD FRML MDRD: 57 ML/MIN/1.73M*2
GLUCOSE SERPL-MCNC: 155 MG/DL (ref 65–99)
HCT VFR BLD AUTO: 44.8 % (ref 41–52)
HGB BLD-MCNC: 14.8 G/DL (ref 13.5–17.5)
INR PPP: 1.8 (ref 0.9–1.2)
MCH RBC QN AUTO: 30.3 PG (ref 26–34)
MCHC RBC AUTO-ENTMCNC: 33 G/DL (ref 32–36)
MCV RBC AUTO: 92 FL (ref 80–100)
NRBC BLD-RTO: 0 /100 WBCS (ref 0–0)
PLATELET # BLD AUTO: 199 X10*3/UL (ref 150–450)
PMV BLD AUTO: 11 FL (ref 7.5–11.5)
POTASSIUM SERPL-SCNC: 3.7 MMOL/L (ref 3.4–5.1)
PROTHROMBIN TIME: 18 SECONDS (ref 9.3–12.7)
RBC # BLD AUTO: 4.88 X10*6/UL (ref 4.5–5.9)
SODIUM SERPL-SCNC: 139 MMOL/L (ref 133–145)
WBC # BLD AUTO: 14.5 X10*3/UL (ref 4.4–11.3)

## 2023-10-28 PROCEDURE — 2500000001 HC RX 250 WO HCPCS SELF ADMINISTERED DRUGS (ALT 637 FOR MEDICARE OP)

## 2023-10-28 PROCEDURE — 85610 PROTHROMBIN TIME: CPT

## 2023-10-28 PROCEDURE — 36415 COLL VENOUS BLD VENIPUNCTURE: CPT

## 2023-10-28 PROCEDURE — 85027 COMPLETE CBC AUTOMATED: CPT

## 2023-10-28 PROCEDURE — 2500000002 HC RX 250 W HCPCS SELF ADMINISTERED DRUGS (ALT 637 FOR MEDICARE OP, ALT 636 FOR OP/ED)

## 2023-10-28 PROCEDURE — 80048 BASIC METABOLIC PNL TOTAL CA: CPT

## 2023-10-28 PROCEDURE — 2500000004 HC RX 250 GENERAL PHARMACY W/ HCPCS (ALT 636 FOR OP/ED)

## 2023-10-28 PROCEDURE — 2500000001 HC RX 250 WO HCPCS SELF ADMINISTERED DRUGS (ALT 637 FOR MEDICARE OP): Performed by: INTERNAL MEDICINE

## 2023-10-28 RX ADMIN — ATORVASTATIN CALCIUM 40 MG: 40 TABLET, FILM COATED ORAL at 08:07

## 2023-10-28 RX ADMIN — PANTOPRAZOLE SODIUM 40 MG: 40 TABLET, DELAYED RELEASE ORAL at 06:31

## 2023-10-28 RX ADMIN — EZETIMIBE 10 MG: 10 TABLET ORAL at 08:08

## 2023-10-28 RX ADMIN — APIXABAN 5 MG: 5 TABLET, FILM COATED ORAL at 08:07

## 2023-10-28 RX ADMIN — DOCUSATE SODIUM 100 MG: 100 CAPSULE, LIQUID FILLED ORAL at 08:07

## 2023-10-28 RX ADMIN — FAMOTIDINE 20 MG: 20 TABLET ORAL at 08:08

## 2023-10-28 RX ADMIN — AMLODIPINE BESYLATE 10 MG: 10 TABLET ORAL at 08:07

## 2023-10-28 RX ADMIN — CARVEDILOL 12.5 MG: 12.5 TABLET, FILM COATED ORAL at 08:07

## 2023-10-28 ASSESSMENT — PAIN - FUNCTIONAL ASSESSMENT: PAIN_FUNCTIONAL_ASSESSMENT: 0-10

## 2023-10-28 ASSESSMENT — PAIN SCALES - GENERAL: PAINLEVEL_OUTOF10: 0 - NO PAIN

## 2023-10-28 NOTE — PROGRESS NOTES
Patient medically cleared for discharge, per previous care coordinator plan is to return home no needs.

## 2023-10-28 NOTE — CARE PLAN
The patient's goals for the shift include      The clinical goals for the shift include safety    Over the shift, the patient did not make progress toward the following goals. Barriers to progression include numbness to RLE. Recommendations to address these barriers include assist with ambulation when needed; provide education on DVT prophylaxis for when patient leaves the hospital.

## 2023-10-28 NOTE — DISCHARGE SUMMARY
Discharge Diagnosis  Atrial fibrillation with RVR (CMS/[facility])    Issues Requiring Follow-Up  COVID-19, DVT right lower extremity, A-fib    Discharge Meds     Your medication list        START taking these medications        Instructions Last Dose Given Next Dose Due   dexAMETHasone 6 mg tablet  Commonly known as: Decadron  Notes to patient: Discontinue medication on 11/2/2023      Take 1 tablet (6 mg) by mouth once daily at bedtime for 6 days.       Eliquis 5 mg tablet  Generic drug: apixaban      Take 1 tablet (5 mg) by mouth 2 times a day.              CONTINUE taking these medications        Instructions Last Dose Given Next Dose Due   allopurinol 100 mg tablet  Commonly known as: Zyloprim           amLODIPine 10 mg tablet  Commonly known as: Norvasc           atorvastatin 40 mg tablet  Commonly known as: Lipitor      TAKE 1 TABLET BY MOUTH EVERY DAY FOR 90 DAYS       carvedilol 12.5 mg tablet  Commonly known as: Coreg           ezetimibe 10 mg tablet  Commonly known as: Zetia           famotidine 20 mg tablet  Commonly known as: Pepcid           hydroCHLOROthiazide 25 mg tablet  Commonly known as: HYDRODiuril           lisinopril 40 mg tablet  Notes to patient: Should be stopped around January 28, 2024      TAKE 1 TABLET BY MOUTH EVERY DAY FOR 90 DAYS       Nitrostat 0.4 mg SL tablet  Generic drug: nitroglycerin                  STOP taking these medications      chlorthalidone 25 mg tablet  Commonly known as: Hygroton        dicyclomine 20 mg tablet  Commonly known as: Bentyl        Imodium A-D 2 mg tablet  Generic drug: loperamide                  Where to Get Your Medications        These medications were sent to [facility]  [id number] [address] [id number]      Hours: 9 AM to 6 PM Mon-Fri, 9 AM to 1 PM Sat Phone: [phone]   dexAMETHasone 6 mg tablet  Eliquis 5 mg tablet         Test Results Pending At Discharge  Pending Labs       No current pending labs.            [facility]  Course  73-year-old male, admitted 10/24/2023 for atrial fibrillation with RVR.  Discovered to have DVT of right lower extremity involving popliteal, femoral vein.  Also COVID positive on admission.  Consults with cardiology, infectious diseases.  Placed on Cardizem drip which was discontinued.  Is now atrial fibrillation with controlled rate.  Patient placed on heparin drip upon admission for DVT.  Has not been discontinued.  Patient will go home on Eliquis instead of Coumadin.  Per infectious diseases, patient will go home on oral dexamethasone.  Patient advised to follow-up with PCP, cardiology within 1 week.  Also advised to follow-up with vascular specialist for chronic venous stasis skin changes bilateral lower extremities.    Pertinent Physical Exam At Time of Discharge  Physical Exam  Vitals reviewed.   Constitutional:       Appearance: Normal appearance.   HENT:      Head: Normocephalic and atraumatic.      Nose: Nose normal.      Mouth/Throat:      Mouth: Mucous membranes are moist.   Eyes:      Extraocular Movements: Extraocular movements intact.      Pupils: Pupils are equal, round, and reactive to light.   Cardiovascular:      Rate and Rhythm: Normal rate.      Pulses: Normal pulses.      Heart sounds: Normal heart sounds.   Pulmonary:      Effort: Pulmonary effort is normal.      Breath sounds: Normal breath sounds.   Abdominal:      General: Bowel sounds are normal.      Palpations: Abdomen is soft.   Musculoskeletal:         General: Normal range of motion.      Cervical back: Normal range of motion and neck supple.   Skin:     General: Skin is warm and dry.      Capillary Refill: Capillary refill takes less than 2 seconds.      Comments: Chronic venous stasis changes bilateral lower extremities.  Right greater than left.   Neurological:      General: No focal deficit present.      Mental Status: He is alert and oriented to person, place, and time.   Psychiatric:         Mood and Affect: Mood normal.          Behavior: Behavior normal.         Thought Content: Thought content normal.         Outpatient Follow-Up  Future Appointments   Date Time Provider Department Center   1/15/2024  2:30 PM Steven Wyane DO [id number]vin-CNP

## 2023-10-28 NOTE — NURSING NOTE
Assumed care of patient from dayshift nurse. Patient resting comfortably in bed at this time with no complaints of pain and or discomfort. Heparin drip discontinued earlier in the day; patient was switched from coumadin to eliquis. Patient stated that his right ankle does not feel as asleep as it did yesterday. Educated patient on DVT prophylaxis after discharge from hospital. Patient receptive of education. Bed in lowest position and locked and call bell and personal belongings within reach. No further interventions at this time.

## 2023-10-29 ENCOUNTER — TELEPHONE (OUTPATIENT)
Dept: PRIMARY CARE | Facility: CLINIC | Age: 73
End: 2023-10-29
Payer: COMMERCIAL

## 2023-11-02 NOTE — PROGRESS NOTES
This is a 73 year old male TRANSFERRED to [facility] ER from my office recently and ADMITTED with COVID and A FIB for HOSP FU visit and DVT RIGHT LEG:    ON ELIQUIS   DOING WELL    ATTENDS Dr Wayne and already seen.    CURRENTLY NO CP or PRESSURE and no issues.    LEFT FOOT and CALF neuropathy POST COVID and clots in RIGHT LOWER EXTREMITY.            Hospital Course  73-year-old male, admitted 10/24/2023 for atrial fibrillation with RVR.  Discovered to have DVT of right lower extremity involving popliteal, femoral vein.  Also COVID positive on admission.  Consults with cardiology, infectious diseases.  Placed on Cardizem drip which was discontinued.  Is now atrial fibrillation with controlled rate.  Patient placed on heparin drip upon admission for DVT.  Has not been discontinued.  Patient will go home on Eliquis instead of Coumadin.  Per infectious diseases, patient will go home on oral dexamethasone.  Patient advised to follow-up with PCP, cardiology within 1 week.  Also advised to follow-up with vascular specialist for chronic venous stasis skin changes bilateral lower extremities.       ROS is NEG for HEADACHE, NAUSEA, VOMITING, DIARRHEA, CHEST PAIN, SOB, and BLEEDING and as further REVIEWED BELOW.      Subjective   Mukesh Garg is a 73 y.o. male who presents for No chief complaint on file..    HPI:        Review of systems is essentially negative for all systems except for any identified issues in HPI above.    Objective     There were no vitals taken for this visit.     Physical Examination:       GENERAL           General Appearance: well-appearing, well-developed, well-hydrated, well-nourished, no acute distress.        HEENT           NECK supple, no masses or thyromegaly, no carotid bruit.        EYES           Extraocular Movements: normal, bilateral eyes JESSENIA, no conjunctival injection.        HEART           Rate and Rhythm regular rate and rhythm. Heart sounds: normal S1S2, no S3 or S4. Murmurs: none.         CHEST           Breath sounds: Clear to IPPA, RR<16 no use of accessory muscles.        ABDOMEN           General: Neg for LKKS or masses, no scleral icterus or jaundice.        MUSCULOSKELETAL           Joints Demonstration: Neg for erythema, swelling or joint deformities. gross abnormalities no gross abnormalities.        EXTREMITIES           Lower Extremities: Neg for cyanosis, clubbing or edema.       Assessment/Plan   Problem List Items Addressed This Visit    None      FOLLOW UP:  PRN and as specified above         Lorenza Hager M.D.

## 2023-11-04 DIAGNOSIS — Z76.0 MEDICATION REFILL: ICD-10-CM

## 2023-11-06 ENCOUNTER — TELEPHONE (OUTPATIENT)
Dept: CARDIOLOGY | Facility: CLINIC | Age: 73
End: 2023-11-06
Payer: COMMERCIAL

## 2023-11-06 RX ORDER — ALLOPURINOL 100 MG/1
100 TABLET ORAL DAILY
Qty: 90 TABLET | Refills: 0 | Status: SHIPPED | OUTPATIENT
Start: 2023-11-06 | End: 2024-02-08 | Stop reason: WASHOUT

## 2023-11-10 ENCOUNTER — OFFICE VISIT (OUTPATIENT)
Dept: PRIMARY CARE | Facility: CLINIC | Age: 73
End: 2023-11-10
Payer: MEDICARE

## 2023-11-10 ENCOUNTER — OFFICE VISIT (OUTPATIENT)
Dept: CARDIOLOGY | Facility: CLINIC | Age: 73
End: 2023-11-10
Payer: MEDICARE

## 2023-11-10 VITALS
WEIGHT: 252 LBS | TEMPERATURE: 96.8 F | OXYGEN SATURATION: 98 % | SYSTOLIC BLOOD PRESSURE: 120 MMHG | HEIGHT: 69 IN | HEART RATE: 95 BPM | DIASTOLIC BLOOD PRESSURE: 70 MMHG | BODY MASS INDEX: 37.33 KG/M2

## 2023-11-10 VITALS
BODY MASS INDEX: 36.92 KG/M2 | OXYGEN SATURATION: 98 % | DIASTOLIC BLOOD PRESSURE: 80 MMHG | SYSTOLIC BLOOD PRESSURE: 120 MMHG | WEIGHT: 250 LBS | HEART RATE: 92 BPM

## 2023-11-10 DIAGNOSIS — G58.8 OTHER MONONEUROPATHY: ICD-10-CM

## 2023-11-10 DIAGNOSIS — I48.11 LONGSTANDING PERSISTENT ATRIAL FIBRILLATION (MULTI): ICD-10-CM

## 2023-11-10 DIAGNOSIS — I48.91 ATRIAL FIBRILLATION, UNSPECIFIED TYPE (MULTI): ICD-10-CM

## 2023-11-10 DIAGNOSIS — U07.1 COVID: Primary | ICD-10-CM

## 2023-11-10 DIAGNOSIS — E78.2 MIXED HYPERLIPIDEMIA: ICD-10-CM

## 2023-11-10 DIAGNOSIS — I82.419 DEEP VENOUS THROMBOSIS OF PROFUNDA FEMORIS VEIN, UNSPECIFIED LATERALITY (MULTI): ICD-10-CM

## 2023-11-10 DIAGNOSIS — I25.10 ASHD (ARTERIOSCLEROTIC HEART DISEASE): Primary | ICD-10-CM

## 2023-11-10 DIAGNOSIS — I10 PRIMARY HYPERTENSION: ICD-10-CM

## 2023-11-10 DIAGNOSIS — Z09 HOSPITAL DISCHARGE FOLLOW-UP: ICD-10-CM

## 2023-11-10 DIAGNOSIS — I87.8 CHRONIC VENOUS STASIS: ICD-10-CM

## 2023-11-10 PROCEDURE — 1111F DSCHRG MED/CURRENT MED MERGE: CPT | Performed by: FAMILY MEDICINE

## 2023-11-10 PROCEDURE — 1111F DSCHRG MED/CURRENT MED MERGE: CPT | Performed by: INTERNAL MEDICINE

## 2023-11-10 PROCEDURE — 99214 OFFICE O/P EST MOD 30 MIN: CPT | Performed by: FAMILY MEDICINE

## 2023-11-10 PROCEDURE — 1125F AMNT PAIN NOTED PAIN PRSNT: CPT | Performed by: INTERNAL MEDICINE

## 2023-11-10 PROCEDURE — 3074F SYST BP LT 130 MM HG: CPT | Performed by: FAMILY MEDICINE

## 2023-11-10 PROCEDURE — 1125F AMNT PAIN NOTED PAIN PRSNT: CPT | Performed by: FAMILY MEDICINE

## 2023-11-10 PROCEDURE — 1159F MED LIST DOCD IN RCRD: CPT | Performed by: FAMILY MEDICINE

## 2023-11-10 PROCEDURE — 1160F RVW MEDS BY RX/DR IN RCRD: CPT | Performed by: FAMILY MEDICINE

## 2023-11-10 PROCEDURE — 1160F RVW MEDS BY RX/DR IN RCRD: CPT | Performed by: INTERNAL MEDICINE

## 2023-11-10 PROCEDURE — 3074F SYST BP LT 130 MM HG: CPT | Performed by: INTERNAL MEDICINE

## 2023-11-10 PROCEDURE — 99214 OFFICE O/P EST MOD 30 MIN: CPT | Performed by: INTERNAL MEDICINE

## 2023-11-10 PROCEDURE — 3078F DIAST BP <80 MM HG: CPT | Performed by: FAMILY MEDICINE

## 2023-11-10 PROCEDURE — 1036F TOBACCO NON-USER: CPT | Performed by: INTERNAL MEDICINE

## 2023-11-10 PROCEDURE — 1036F TOBACCO NON-USER: CPT | Performed by: FAMILY MEDICINE

## 2023-11-10 PROCEDURE — 1159F MED LIST DOCD IN RCRD: CPT | Performed by: INTERNAL MEDICINE

## 2023-11-10 PROCEDURE — 3079F DIAST BP 80-89 MM HG: CPT | Performed by: INTERNAL MEDICINE

## 2023-11-10 ASSESSMENT — ENCOUNTER SYMPTOMS
CARDIOVASCULAR NEGATIVE: 1
OCCASIONAL FEELINGS OF UNSTEADINESS: 1
LOSS OF SENSATION IN FEET: 0
EYES NEGATIVE: 1
RESPIRATORY NEGATIVE: 1
CONSTITUTIONAL NEGATIVE: 1
GASTROINTESTINAL NEGATIVE: 1
MUSCULOSKELETAL NEGATIVE: 1
DEPRESSION: 0
PARESTHESIAS: 1

## 2023-11-10 ASSESSMENT — PAIN SCALES - GENERAL
PAINLEVEL: 3
PAINLEVEL: 5

## 2023-11-10 ASSESSMENT — PATIENT HEALTH QUESTIONNAIRE - PHQ9
2. FEELING DOWN, DEPRESSED OR HOPELESS: NOT AT ALL
1. LITTLE INTEREST OR PLEASURE IN DOING THINGS: NOT AT ALL
1. LITTLE INTEREST OR PLEASURE IN DOING THINGS: NOT AT ALL
SUM OF ALL RESPONSES TO PHQ9 QUESTIONS 1 & 2: 0
SUM OF ALL RESPONSES TO PHQ9 QUESTIONS 1 AND 2: 0
2. FEELING DOWN, DEPRESSED OR HOPELESS: NOT AT ALL

## 2023-11-10 NOTE — PROGRESS NOTES
Subjective   Mukesh Garg is a 73 y.o. male.    Chief Complaint:  Hospital Follow-up    HPI  Patient states about 3 weeks ago he had cough and some mild shortness of breath.  He was seen by Dr. Hager and sent to the emergency room where he was diagnosed with atrial fibrillation as well as blood clots in his leg and he was also COVID-19 positive.  Respiratory symptoms have improved significantly but he is developed neuropathic pain in the left foot.  Review of Systems   Constitutional: Negative.   Eyes: Negative.    Cardiovascular: Negative.    Respiratory: Negative.     Musculoskeletal: Negative.    Gastrointestinal: Negative.    Genitourinary: Negative.    Neurological:  Positive for paresthesias.       Objective   Constitutional:       Appearance: Not in distress.   Eyes:      Conjunctiva/sclera: Conjunctivae normal.   Neck:      Vascular: JVD normal.   Pulmonary:      Breath sounds: Normal breath sounds. No wheezing. No rhonchi. No rales.   Cardiovascular:      Normal rate. Regular rhythm.      Murmurs: There is no murmur.      No gallop.  No click. No rub.   Abdominal:      Palpations: Abdomen is soft.   Neurological:      General: No focal deficit present.      Mental Status: Alert.       Lab Review:       Assessment/Plan   The primary encounter diagnosis was ASHD (arteriosclerotic heart disease). Diagnoses of Longstanding persistent atrial fibrillation (CMS/HCC), Primary hypertension, and Mixed hyperlipidemia were also pertinent to this visit.    Hyperlipidemia  Last LDL 62, very good, recheck lipid panel on return    Hypertension  Blood pressure well controlled on the combination of lisinopril hydrochlorothiazide and amlodipine.    ASHD (arteriosclerotic heart disease)  Clinically stable with no chest pain or anginal type symptoms.  Remains physically active although somewhat impaired with neuropathy in left foot.    Atrial fibrillation (CMS/HCC)  Heart rate well controlled with the carvedilol therapy.   Patient now on Eliquis for stroke prevention.

## 2023-11-10 NOTE — ASSESSMENT & PLAN NOTE
Heart rate well controlled with the carvedilol therapy.  Patient now on Eliquis for stroke prevention.

## 2023-11-10 NOTE — ASSESSMENT & PLAN NOTE
Blood pressure well controlled on the combination of lisinopril hydrochlorothiazide and amlodipine.

## 2023-11-10 NOTE — ASSESSMENT & PLAN NOTE
Clinically stable with no chest pain or anginal type symptoms.  Remains physically active although somewhat impaired with neuropathy in left foot.

## 2023-11-13 ENCOUNTER — HOSPITAL ENCOUNTER (OUTPATIENT)
Dept: CARDIOLOGY | Facility: HOSPITAL | Age: 73
Discharge: HOME | End: 2023-11-13
Payer: MEDICARE

## 2023-11-13 ENCOUNTER — TELEPHONE (OUTPATIENT)
Dept: PRIMARY CARE | Facility: CLINIC | Age: 73
End: 2023-11-13
Payer: COMMERCIAL

## 2023-11-13 LAB
ATRIAL RATE: 102 BPM
Q ONSET: 224 MS
QRS COUNT: 26 BEATS
QRS DURATION: 74 MS
QT INTERVAL: 292 MS
QTC CALCULATION(BAZETT): 473 MS
QTC FREDERICIA: 403 MS
R AXIS: 80 DEGREES
T AXIS: 224 DEGREES
T OFFSET: 370 MS
VENTRICULAR RATE: 158 BPM

## 2023-11-13 PROCEDURE — 93005 ELECTROCARDIOGRAM TRACING: CPT

## 2023-11-13 NOTE — TELEPHONE ENCOUNTER
"Patient wife left message that the patient \"is not doing good\" she states he has been seen by the foot and ankle specialist that was referred to by dr harp, but they sent a cream in for him to help with the nerve pain but it has not come in yet. States it had to be compounded so it is taking longer than expected. Pt wife states that the pt can not walk or put weight on his foot to get around. She is requesting that something be sent into the pharmacy for him- oral medication- if possible to assist.  "

## 2023-11-14 ENCOUNTER — APPOINTMENT (OUTPATIENT)
Dept: RADIOLOGY | Facility: HOSPITAL | Age: 73
End: 2023-11-14
Payer: MEDICARE

## 2023-11-14 ENCOUNTER — OFFICE VISIT (OUTPATIENT)
Dept: PRIMARY CARE | Facility: CLINIC | Age: 73
End: 2023-11-14
Payer: MEDICARE

## 2023-11-14 ENCOUNTER — HOSPITAL ENCOUNTER (EMERGENCY)
Facility: HOSPITAL | Age: 73
Discharge: OTHER INSTITUTION | End: 2023-11-15
Attending: EMERGENCY MEDICINE
Payer: MEDICARE

## 2023-11-14 VITALS
OXYGEN SATURATION: 99 % | WEIGHT: 240 LBS | RESPIRATION RATE: 20 BRPM | SYSTOLIC BLOOD PRESSURE: 124 MMHG | TEMPERATURE: 97.2 F | BODY MASS INDEX: 34.36 KG/M2 | DIASTOLIC BLOOD PRESSURE: 76 MMHG | HEART RATE: 88 BPM | HEIGHT: 70 IN

## 2023-11-14 VITALS
TEMPERATURE: 97.7 F | SYSTOLIC BLOOD PRESSURE: 120 MMHG | WEIGHT: 252 LBS | DIASTOLIC BLOOD PRESSURE: 70 MMHG | HEART RATE: 79 BPM | HEIGHT: 69 IN | BODY MASS INDEX: 37.33 KG/M2 | OXYGEN SATURATION: 97 %

## 2023-11-14 DIAGNOSIS — I70.90 ARTERIAL OCCLUSION: Primary | ICD-10-CM

## 2023-11-14 DIAGNOSIS — L03.116 CELLULITIS OF LEFT LOWER EXTREMITY: ICD-10-CM

## 2023-11-14 DIAGNOSIS — Z86.16 HISTORY OF 2019 NOVEL CORONAVIRUS DISEASE (COVID-19): ICD-10-CM

## 2023-11-14 DIAGNOSIS — G58.8 OTHER MONONEUROPATHY: ICD-10-CM

## 2023-11-14 DIAGNOSIS — I87.8 CHRONIC VENOUS STASIS: ICD-10-CM

## 2023-11-14 DIAGNOSIS — M79.2 NEUROPATHIC PAIN: ICD-10-CM

## 2023-11-14 DIAGNOSIS — M79.672 LEFT FOOT PAIN: Primary | ICD-10-CM

## 2023-11-14 LAB
ALBUMIN SERPL-MCNC: 3.5 G/DL (ref 3.5–5)
ALP BLD-CCNC: 57 U/L (ref 35–125)
ALT SERPL-CCNC: 30 U/L (ref 5–40)
ANION GAP SERPL CALC-SCNC: 11 MMOL/L
AST SERPL-CCNC: 16 U/L (ref 5–40)
BILIRUB DIRECT SERPL-MCNC: 0.4 MG/DL (ref 0–0.2)
BILIRUB SERPL-MCNC: 1.7 MG/DL (ref 0.1–1.2)
BUN SERPL-MCNC: 24 MG/DL (ref 8–25)
CALCIUM SERPL-MCNC: 9.1 MG/DL (ref 8.5–10.4)
CHLORIDE SERPL-SCNC: 103 MMOL/L (ref 97–107)
CO2 SERPL-SCNC: 27 MMOL/L (ref 24–31)
CREAT SERPL-MCNC: 1.3 MG/DL (ref 0.4–1.6)
ERYTHROCYTE [DISTWIDTH] IN BLOOD BY AUTOMATED COUNT: 14.7 % (ref 11.5–14.5)
ERYTHROCYTE [DISTWIDTH] IN BLOOD BY AUTOMATED COUNT: 14.8 % (ref 11.5–14.5)
GFR SERPL CREATININE-BSD FRML MDRD: 57 ML/MIN/1.73M*2
GLUCOSE SERPL-MCNC: 175 MG/DL (ref 65–99)
HCT VFR BLD AUTO: 43.6 % (ref 41–52)
HCT VFR BLD AUTO: 46.9 % (ref 41–52)
HGB BLD-MCNC: 14 G/DL (ref 13.5–17.5)
HGB BLD-MCNC: 14.9 G/DL (ref 13.5–17.5)
INR PPP: 1.1 (ref 0.9–1.2)
LACTATE BLDV-SCNC: 2 MMOL/L (ref 0.4–2)
MCH RBC QN AUTO: 30.2 PG (ref 26–34)
MCH RBC QN AUTO: 30.4 PG (ref 26–34)
MCHC RBC AUTO-ENTMCNC: 31.8 G/DL (ref 32–36)
MCHC RBC AUTO-ENTMCNC: 32.1 G/DL (ref 32–36)
MCV RBC AUTO: 95 FL (ref 80–100)
MCV RBC AUTO: 95 FL (ref 80–100)
NRBC BLD-RTO: 0 /100 WBCS (ref 0–0)
NRBC BLD-RTO: 0 /100 WBCS (ref 0–0)
PLATELET # BLD AUTO: 104 X10*3/UL (ref 150–450)
PLATELET # BLD AUTO: 111 X10*3/UL (ref 150–450)
POTASSIUM SERPL-SCNC: 4.3 MMOL/L (ref 3.4–5.1)
PROT SERPL-MCNC: 6.4 G/DL (ref 5.9–7.9)
PROTHROMBIN TIME: 11.3 SECONDS (ref 9.3–12.7)
RBC # BLD AUTO: 4.61 X10*6/UL (ref 4.5–5.9)
RBC # BLD AUTO: 4.93 X10*6/UL (ref 4.5–5.9)
SODIUM SERPL-SCNC: 141 MMOL/L (ref 133–145)
WBC # BLD AUTO: 8.6 X10*3/UL (ref 4.4–11.3)
WBC # BLD AUTO: 8.9 X10*3/UL (ref 4.4–11.3)

## 2023-11-14 PROCEDURE — 83605 ASSAY OF LACTIC ACID: CPT | Performed by: EMERGENCY MEDICINE

## 2023-11-14 PROCEDURE — 85027 COMPLETE CBC AUTOMATED: CPT | Performed by: EMERGENCY MEDICINE

## 2023-11-14 PROCEDURE — 1159F MED LIST DOCD IN RCRD: CPT | Performed by: FAMILY MEDICINE

## 2023-11-14 PROCEDURE — 1160F RVW MEDS BY RX/DR IN RCRD: CPT | Performed by: FAMILY MEDICINE

## 2023-11-14 PROCEDURE — 85730 THROMBOPLASTIN TIME PARTIAL: CPT | Performed by: NURSE PRACTITIONER

## 2023-11-14 PROCEDURE — 99214 OFFICE O/P EST MOD 30 MIN: CPT | Performed by: FAMILY MEDICINE

## 2023-11-14 PROCEDURE — 2550000001 HC RX 255 CONTRASTS: Performed by: NURSE PRACTITIONER

## 2023-11-14 PROCEDURE — 73706 CT ANGIO LWR EXTR W/O&W/DYE: CPT | Mod: LT

## 2023-11-14 PROCEDURE — 85610 PROTHROMBIN TIME: CPT | Performed by: EMERGENCY MEDICINE

## 2023-11-14 PROCEDURE — 82248 BILIRUBIN DIRECT: CPT | Performed by: EMERGENCY MEDICINE

## 2023-11-14 PROCEDURE — 36415 COLL VENOUS BLD VENIPUNCTURE: CPT | Performed by: EMERGENCY MEDICINE

## 2023-11-14 PROCEDURE — 36415 COLL VENOUS BLD VENIPUNCTURE: CPT | Performed by: NURSE PRACTITIONER

## 2023-11-14 PROCEDURE — 3074F SYST BP LT 130 MM HG: CPT | Performed by: FAMILY MEDICINE

## 2023-11-14 PROCEDURE — 3078F DIAST BP <80 MM HG: CPT | Performed by: FAMILY MEDICINE

## 2023-11-14 PROCEDURE — 96376 TX/PRO/DX INJ SAME DRUG ADON: CPT

## 2023-11-14 PROCEDURE — 2500000004 HC RX 250 GENERAL PHARMACY W/ HCPCS (ALT 636 FOR OP/ED): Performed by: EMERGENCY MEDICINE

## 2023-11-14 PROCEDURE — 93971 EXTREMITY STUDY: CPT

## 2023-11-14 PROCEDURE — 96374 THER/PROPH/DIAG INJ IV PUSH: CPT

## 2023-11-14 PROCEDURE — 80053 COMPREHEN METABOLIC PANEL: CPT | Performed by: EMERGENCY MEDICINE

## 2023-11-14 PROCEDURE — 2500000004 HC RX 250 GENERAL PHARMACY W/ HCPCS (ALT 636 FOR OP/ED): Performed by: NURSE PRACTITIONER

## 2023-11-14 PROCEDURE — 1036F TOBACCO NON-USER: CPT | Performed by: FAMILY MEDICINE

## 2023-11-14 PROCEDURE — 73630 X-RAY EXAM OF FOOT: CPT | Mod: LT

## 2023-11-14 PROCEDURE — 1111F DSCHRG MED/CURRENT MED MERGE: CPT | Performed by: FAMILY MEDICINE

## 2023-11-14 PROCEDURE — 96375 TX/PRO/DX INJ NEW DRUG ADDON: CPT

## 2023-11-14 PROCEDURE — 99285 EMERGENCY DEPT VISIT HI MDM: CPT | Mod: 25 | Performed by: EMERGENCY MEDICINE

## 2023-11-14 PROCEDURE — 1125F AMNT PAIN NOTED PAIN PRSNT: CPT | Performed by: FAMILY MEDICINE

## 2023-11-14 PROCEDURE — 85027 COMPLETE CBC AUTOMATED: CPT | Mod: 91 | Performed by: NURSE PRACTITIONER

## 2023-11-14 RX ORDER — HEPARIN SODIUM 5000 [USP'U]/ML
3000-6000 INJECTION, SOLUTION INTRAVENOUS; SUBCUTANEOUS AS NEEDED
Status: DISCONTINUED | OUTPATIENT
Start: 2023-11-14 | End: 2023-11-15 | Stop reason: HOSPADM

## 2023-11-14 RX ORDER — HEPARIN SODIUM 5000 [USP'U]/ML
80 INJECTION, SOLUTION INTRAVENOUS; SUBCUTANEOUS ONCE
Status: COMPLETED | OUTPATIENT
Start: 2023-11-14 | End: 2023-11-14

## 2023-11-14 RX ORDER — HEPARIN SODIUM 10000 [USP'U]/100ML
0-4500 INJECTION, SOLUTION INTRAVENOUS CONTINUOUS
Status: DISCONTINUED | OUTPATIENT
Start: 2023-11-14 | End: 2023-11-15 | Stop reason: HOSPADM

## 2023-11-14 RX ORDER — ONDANSETRON HYDROCHLORIDE 2 MG/ML
4 INJECTION, SOLUTION INTRAVENOUS EVERY 6 HOURS PRN
Status: DISCONTINUED | OUTPATIENT
Start: 2023-11-14 | End: 2023-11-15 | Stop reason: HOSPADM

## 2023-11-14 RX ADMIN — HEPARIN SODIUM 8750 UNITS: 5000 INJECTION, SOLUTION INTRAVENOUS; SUBCUTANEOUS at 22:43

## 2023-11-14 RX ADMIN — IOHEXOL 75 ML: 350 INJECTION, SOLUTION INTRAVENOUS at 18:35

## 2023-11-14 RX ADMIN — HYDROMORPHONE HYDROCHLORIDE 0.5 MG: 1 INJECTION, SOLUTION INTRAMUSCULAR; INTRAVENOUS; SUBCUTANEOUS at 22:38

## 2023-11-14 RX ADMIN — HEPARIN SODIUM 2000 UNITS/HR: 10000 INJECTION, SOLUTION INTRAVENOUS at 23:30

## 2023-11-14 RX ADMIN — ONDANSETRON 4 MG: 2 INJECTION INTRAMUSCULAR; INTRAVENOUS at 16:13

## 2023-11-14 RX ADMIN — ONDANSETRON 4 MG: 2 INJECTION INTRAMUSCULAR; INTRAVENOUS at 22:38

## 2023-11-14 RX ADMIN — HYDROMORPHONE HYDROCHLORIDE 0.5 MG: 1 INJECTION, SOLUTION INTRAMUSCULAR; INTRAVENOUS; SUBCUTANEOUS at 16:14

## 2023-11-14 ASSESSMENT — PATIENT HEALTH QUESTIONNAIRE - PHQ9
2. FEELING DOWN, DEPRESSED OR HOPELESS: NOT AT ALL
SUM OF ALL RESPONSES TO PHQ9 QUESTIONS 1 AND 2: 0
1. LITTLE INTEREST OR PLEASURE IN DOING THINGS: NOT AT ALL

## 2023-11-14 ASSESSMENT — PAIN DESCRIPTION - PAIN TYPE: TYPE: CHRONIC PAIN

## 2023-11-14 ASSESSMENT — PAIN SCALES - GENERAL
PAINLEVEL: 5
PAINLEVEL_OUTOF10: 9
PAINLEVEL_OUTOF10: 10 - WORST POSSIBLE PAIN
PAINLEVEL_OUTOF10: 10 - WORST POSSIBLE PAIN

## 2023-11-14 ASSESSMENT — LIFESTYLE VARIABLES
REASON UNABLE TO ASSESS: NO
EVER HAD A DRINK FIRST THING IN THE MORNING TO STEADY YOUR NERVES TO GET RID OF A HANGOVER: NO
HAVE PEOPLE ANNOYED YOU BY CRITICIZING YOUR DRINKING: NO
EVER FELT BAD OR GUILTY ABOUT YOUR DRINKING: NO
HAVE YOU EVER FELT YOU SHOULD CUT DOWN ON YOUR DRINKING: NO

## 2023-11-14 ASSESSMENT — PAIN DESCRIPTION - ORIENTATION
ORIENTATION: RIGHT;LEFT
ORIENTATION: LEFT

## 2023-11-14 ASSESSMENT — PAIN - FUNCTIONAL ASSESSMENT
PAIN_FUNCTIONAL_ASSESSMENT: 0-10
PAIN_FUNCTIONAL_ASSESSMENT: 0-10

## 2023-11-14 ASSESSMENT — PAIN DESCRIPTION - FREQUENCY: FREQUENCY: CONSTANT/CONTINUOUS

## 2023-11-14 ASSESSMENT — PAIN DESCRIPTION - LOCATION
LOCATION: FOOT
LOCATION: LEG

## 2023-11-14 ASSESSMENT — COLUMBIA-SUICIDE SEVERITY RATING SCALE - C-SSRS
1. IN THE PAST MONTH, HAVE YOU WISHED YOU WERE DEAD OR WISHED YOU COULD GO TO SLEEP AND NOT WAKE UP?: NO
2. HAVE YOU ACTUALLY HAD ANY THOUGHTS OF KILLING YOURSELF?: NO
6. HAVE YOU EVER DONE ANYTHING, STARTED TO DO ANYTHING, OR PREPARED TO DO ANYTHING TO END YOUR LIFE?: NO

## 2023-11-14 ASSESSMENT — PAIN DESCRIPTION - DESCRIPTORS: DESCRIPTORS: BURNING;SHARP

## 2023-11-14 NOTE — PATIENT INSTRUCTIONS
Due to RECENT COVID ILLNESS and ATRIAL FIBRILLATION and TWO DVTs in RLE and NEW ONSET PAIN in LEFT FOOT and absence of pulses in left foot; we are sending you back to [facility] ER for full evaluation and work up with concern for NEW or worsening ARTERIAL CLOTTING at this time as well as CELLULITIS/INFECTION in the soft tissue of the foot and LEFT LEG.    I have spoken to both a CHARGE NURSE at [facility] and communicated my sincere concern for your limb safety and also directly to Dr. Mateo Vega a VASCULAR SURGEON who agrees to either see you himself or send an associate.  I am also concerned for possible infection and sincerely hope you get admitted and fully evaluated for vascular and infectious issues affecting the contralateral limb from the recent venous clots. The COVID INFECTION in late October does contribute to VASCULAR ISSUES and I am very concerned about your health.    Please proceed directly to [facility] ER by family vehicle.

## 2023-11-14 NOTE — PROGRESS NOTES
"This very pleasant 73 year old male had DVT x 2 LOWER EXTREMITY and RECENT ADMISSION to [facility] sent by squad from my office for COVID and A FIB and was admitted to HOSP and had FU but is having ONGOING pain in limb. But CONTRALATERAL SIDE to the KNOWN TWO CLOTS in RIGHT LEG pain is now in LEFT LEG/ FOOT SPECIFICALLY above ankle     Does have a Hx GOUT but feels confident this is not gout.  States this is NEUROPATHY, however I am concerned ari after CLINICAL EVAL below; for ARTERIAL OCCLUSION.              ROS is NEG for HEADACHE, NAUSEA, VOMITING, DIARRHEA, CHEST PAIN, SOB, and BLEEDING and as further REVIEWED BELOW.      Subjective   Mukesh Garg is a 73 y.o. male who presents for Pain (L foot pain x 3weeks).    HPI:        Review of systems is essentially negative for all systems except for any identified issues in HPI above.    Objective     /70   Pulse 79   Temp 36.5 °C (97.7 °F)   Ht 1.753 m (5' 9\")   Wt 114 kg (252 lb)   SpO2 97%   BMI 37.21 kg/m²      Physical Examination:       GENERAL           General Appearance: well-appearing, well-developed, well-hydrated, well-nourished, no acute distress.        HEENT           NECK supple, no masses or thyromegaly, no carotid bruit.        EYES           Extraocular Movements: normal, bilateral eyes JESSENIA, no conjunctival injection.        HEART           Rate and Rhythm regular rate and rhythm. Heart sounds: normal S1S2, no S3 or S4. Murmurs: none.        CHEST           Breath sounds: Clear to IPPA, RR<16 no use of accessory muscles.        ABDOMEN           General: Neg for LKKS or masses, no scleral icterus or jaundice.        MUSCULOSKELETAL           Joints Demonstration: Neg for erythema, swelling or joint deformities. gross abnormalities no gross abnormalities.        EXTREMITIES           Lower Extremities:  POS FOR POOR PULSES on LEFT and FOOT COLD concern for ARTERIAL OCCLUSION and also POSSIBLE INFECTION (bacterial and fungal) due to ERYTHEMA  on " LEFT side (known CLOT in venous system but NEW CONCERN for ARTERIAL OCCULSION)  RIGHT FOOT is warm and perfused but has FUNGAL appearance.  k  Assessment/Plan   Problem List Items Addressed This Visit    None  Visit Diagnoses       Arterial occlusion    -  Primary    Other mononeuropathy        Chronic venous stasis        History of 2019 novel coronavirus disease (COVID-19)        Neuropathic pain        Cellulitis of left lower extremity                FOLLOW UP:  PRN and as specified above         Lorenza Hager M.D.

## 2023-11-14 NOTE — ED PROVIDER NOTES
Briefly this 73-year-old male patient with a past medical history of cardiac stenting, CAD, prior A-fib on warfarin, recently diagnosed with 2 DVTs in the right lower extremity and switch to Eliquis, neuropathy in the past, gout, neuropathic pain, sent in from primary care office after patient reports the last 2 weeks has had increased pain now in the left foot, new compared to the pain in the right foot in the past and describes it as a burning and high sensitivity sensation exquisitely tender to palpation, making it difficult to walk    Did not take any pain medication for relief aside from a arthritic cream that he only started using today does not recall the name.    On exam was able to palpate DP PT pulses although patient is exquisitely tender in the medial malleolus as well as the foot which shows erythema but no warmth, cool to touch, with capillary refill less than 2 seconds    Vital signs otherwise stable    Decision made to obtain a repeat DVT study on left lower extremity as patient failed warfarin in the past, along with a CT of the lower extremity angio to check for any sign of occlusion, and x-ray of the foot to check for gangrene as well as lactic acid CBC chemistry INR hepatic function panel with pain medication ordered    Rest of care per ED team Dr. Barragan    ED Course as of 11/15/23 1452   Tue Nov 14, 2023   2304 Lab work reviewed.  Patient has an INR of 1.1 and platelets of 111 with a GFR of 57.  He has emboli to the lower extremity as well as PEs with heart strain.  As he has had breakthrough thrombus while on Eliquis, anticoagulation will be changed to heparin.  Although the platelets are low, the risks of clotting outweigh the risks of bleeding.  CT scan initial report is done by V rad and is on the chart. [EG]      ED Course User Index  [EG] Frances Hammonds MD         Diagnoses as of 11/15/23 1452   Left foot pain          Darryl Posey MD  11/14/23 5534       Darryl Posey  MD  11/15/23 1459

## 2023-11-14 NOTE — ED PROVIDER NOTES
HPI   Chief Complaint   Patient presents with    Lower Extremity Issue       HPI  See my MDM                  Iva Coma Scale Score: 15                  Patient History   Past Medical History:   Diagnosis Date    Atrial fibrillation (CMS/HCC)     CAD (coronary artery disease)     Gout     Heart disease     Hyperlipidemia     Hypertension     MI (myocardial infarction) (CMS/HCC)     Sleep apnea      Past Surgical History:   Procedure Laterality Date    CORONARY ARTERY BYPASS GRAFT      MR HEAD ANGIO WO IV CONTRAST  03/21/2017    MR HEAD ANGIO WO IV CONTRAST [facility]    MR HEAD ANGIO WO IV CONTRAST  10/30/2020    MR HEAD ANGIO WO IV CONTRAST [facility]     Family History   Problem Relation Name Age of Onset    Hypertension Mother      Stroke Mother      Heart attack Father          Cause of death    Heart disease Father      Diabetes Other Grandmother      Social History     Tobacco Use    Smoking status: Never     Passive exposure: Never    Smokeless tobacco: Never   Vaping Use    Vaping Use: Never used   Substance Use Topics    Alcohol use: Not Currently     Alcohol/week: 2.0 standard drinks of alcohol     Types: 2 Cans of beer per week    Drug use: Never       Physical Exam   ED Triage Vitals [11/14/23 1535]   Temp Heart Rate Resp BP   36.2 °C (97.2 °F) 88 20 124/76      SpO2 Temp src Heart Rate Source Patient Position   99 % -- Monitor Sitting      BP Location FiO2 (%)     Left arm --       Physical Exam  CONSTITUTIONAL: Vital signs reviewed as charted, well-developed and in no distress  Eyes: Extraocular muscles are intact. Pupils equal round and reactive to light. Conjunctiva are pink.    ENT: Mucous membranes are moist. Tongue in the midline. Pharynx was without erythema or exudates, uvula midline  LUNGS: Breath sounds equal and clear to auscultation. Good air exchange, no wheezes rales or retractions, pulse oximetry is charted.  HEART: Regular rate and rhythm without murmur thrill or  rub, strong tones, auscultation is normal.  ABDOMEN: Soft and nontender without guarding rebound rigidity or mass. Bowel sounds are present and normal in all quadrants. There is no palpable masses or aneurysms identified. No hepatosplenomegaly, normal abdominal exam.  Neuro: The patient is awake, alert and oriented ×3. Moving all 4 extremities and answering questions appropriately.   MUSCULOSKELETAL: Examination of the lower extremities does show a palpable dorsal pedis posterior tibial pulses bilaterally, patient does have extreme tenderness of the right foot there is some mild erythema.  There is no warmth to the touch.  Cap refill is less than 2 seconds in all 10 digits.  PSYCH: Awake alert oriented, normal mood and affect.  Skin:  Dry, normal color, warm to the touch, no rash present.      ED Course & MDM   ED Course as of 11/14/23 2337   Tue Nov 14, 2023   2304 Lab work reviewed.  Patient has an INR of 1.1 and platelets of 111 with a GFR of 57.  He has emboli to the lower extremity as well as PEs with heart strain.  As he has had breakthrough thrombus while on Eliquis, anticoagulation will be changed to heparin.  Although the platelets are low, the risks of clotting outweigh the risks of bleeding.  CT scan initial report is done by V rad and is on the chart. [EG]      ED Course User Index  [EG] Frances Hammonds MD         Diagnoses as of 11/14/23 2337   Left foot pain       Medical Decision Making  History obtained from: patient    Vital signs, nursing notes, current medications, past medical history, Surgical history, allergies, social history, family History were reviewed.         HPI:  Patient is a 73-year-old gentleman presenting to the ED today for evaluation of right and left lower extremity pain.   He has known history of CAD, A-fib on warfarin.  He was recently diagnosed with 2 DVTs in the right lower extremity and switched over to Eliquis.  He does have known history of neuropathy, gout.  He  was sent here by his PCP as he is having worsening pain over the last 2 weeks in the left foot.  States it is a burning and is highly sensitive.  States it hurts to even have a sock placed on him.  States that is painful and has a hard time ambulating.  No fever chills or night sweats.  No nausea vomiting diarrhea.  Denies dizziness, chest pain, shortness of breath, abdominal pain or extremity edema.       10 point ROS was reviewed and negative except Noted above in HPI.  DDX: as listed above    CT angio of the left lower extremity with and without IV contrast interpreted by the radiologist shows:  Impression:    Atherosclerotic disease of the left common iliac, femoral and  popliteal arteries. There is an intraluminal filling defect involving  the popliteal artery, which eventually causes popliteal artery  occlusion. There are no significant collateral vessels identified.  There is however reconstitution of flow to the tibialis posterior,  anterior and peroneal arteries, which demonstrate enhancement. There  is a however very tenuous flow to the tibialis anterior and peroneal  arteries at the level of the ankle, extending distally.      Visualized soft tissues of the chest, abdomen and pelvis demonstrate  a left renal cyst.      There is soft tissue thickening around the left lower extremity,  beginning at the level of the distal tibial metaphysis, with  extension along the dorsum of the foot.      MACRO:  Occlusion of the left popliteal artery. There is reconstitution of  flow to the left tibialis anterior, tibialis posterior and peroneal  vessels. There is however very tenuous flow to the left tibialis  anterior and peroneal arteries at the level of the left ankle,  extending distally.              X-ray of the left foot interpreted by the radiologist shows:  Impression:    Diffuse soft tissue swelling, especially involving the dorsum of the  forefoot and degenerative changes probably related to early  Charcot's  arthropathy. No radiographic evidence of acute osteomyelitis;  correlate clinically and follow-up as needed.              Ultrasound of the left lower extremity interpreted by the radiologist shows:  Impression:    Negative study.  No deep venous thrombosis of the left lower  extremity.              Medications administered during this visit (name and route): ###  EKG interpretted by my attending physician    MDM Summary/considerations:  Given the patient's CT scan findings we did speak with vascular surgery of recommended ER to ER transfer, recommended getting the PERC team involved.  My attending physician did speak with vascular surgery, ED physician at [facility] for transfer and the PERT team.  Patient was transferred for further evaluation and treatment.  Was started on high level heparin protocol.        I saw this patient in conjunction with Dr. Christian, please see her supervision note.    After reviewing patient's comorbidities, severity of history of presenting illness, labs and imaging if obtained in conjunction with physical exam and course in emergency department, deemed to have potential for deterioration/progression of symptoms that could lead to multiple morbidities or mortality, decision made that patient requires further observation/evaluation/treatment and patient admitted to appropriate service, patient/family understand and agree with plan.    Critical Care:  CRITICAL CARE NOTE  Due to the patient's symptoms on presentation. Need for frequent reassessment. Potential for deterioration. All results/imaging obtained reviewed and interpreted, results trended/compared with previous levels if available to evaluate for abnormality contributing to today's presentation of lower extremity arterial occlusion.  Critical care time total at least 44 minutes of non concurrent critical care time provided by myself. This did not include any separate billable procedures.    Prescriptions provided  include: None    This chart was completed using voice recognition transcription software. Please excuse any errors of transcription including grammatical, punctuation, syntax and spelling errors.  Please contact me with any questions regarding this chart.    Procedure  Procedures     MISTY Contreras-CNP  11/14/23 3919

## 2023-11-14 NOTE — ED TRIAGE NOTES
3 weeks ago in ER for covid, recurring afib, clotting in calf of right leg - left foot  pain today per family medicine - possible infection and clot.  On warfarin previously, switched to eliquis.  Extreme pain, warm and swelling noted on left foot. AOx4

## 2023-11-15 ENCOUNTER — ANESTHESIA EVENT (OUTPATIENT)
Dept: OPERATING ROOM | Facility: HOSPITAL | Age: 73
DRG: 252 | End: 2023-11-15
Payer: COMMERCIAL

## 2023-11-15 ENCOUNTER — ANESTHESIA (OUTPATIENT)
Dept: OPERATING ROOM | Facility: HOSPITAL | Age: 73
DRG: 252 | End: 2023-11-15
Payer: COMMERCIAL

## 2023-11-15 ENCOUNTER — APPOINTMENT (OUTPATIENT)
Dept: CARDIOLOGY | Facility: HOSPITAL | Age: 73
DRG: 252 | End: 2023-11-15
Payer: COMMERCIAL

## 2023-11-15 ENCOUNTER — HOSPITAL ENCOUNTER (INPATIENT)
Facility: HOSPITAL | Age: 73
LOS: 12 days | Discharge: NURSING, REHAB OR LONG-TERM CARE | DRG: 252 | End: 2023-11-27
Attending: STUDENT IN AN ORGANIZED HEALTH CARE EDUCATION/TRAINING PROGRAM | Admitting: STUDENT IN AN ORGANIZED HEALTH CARE EDUCATION/TRAINING PROGRAM
Payer: COMMERCIAL

## 2023-11-15 ENCOUNTER — TELEPHONE (OUTPATIENT)
Dept: PRIMARY CARE | Facility: CLINIC | Age: 73
End: 2023-11-15
Payer: COMMERCIAL

## 2023-11-15 ENCOUNTER — APPOINTMENT (OUTPATIENT)
Dept: RADIOLOGY | Facility: HOSPITAL | Age: 73
DRG: 252 | End: 2023-11-15
Payer: COMMERCIAL

## 2023-11-15 DIAGNOSIS — Q21.9: ICD-10-CM

## 2023-11-15 DIAGNOSIS — I99.8 ACUTE LOWER LIMB ISCHEMIA: ICD-10-CM

## 2023-11-15 DIAGNOSIS — I65.21 STENOSIS OF RIGHT CAROTID ARTERY: ICD-10-CM

## 2023-11-15 DIAGNOSIS — I73.9 PERIPHERAL VASCULAR DISEASE, UNSPECIFIED (CMS-HCC): ICD-10-CM

## 2023-11-15 DIAGNOSIS — E78.2 MIXED HYPERLIPIDEMIA: ICD-10-CM

## 2023-11-15 DIAGNOSIS — I70.90 ARTERIAL OCCLUSION: Primary | ICD-10-CM

## 2023-11-15 DIAGNOSIS — M10.9 ACUTE GOUT INVOLVING TOE OF RIGHT FOOT, UNSPECIFIED CAUSE: ICD-10-CM

## 2023-11-15 DIAGNOSIS — Z01.818 ENCOUNTER FOR OTHER PREPROCEDURAL EXAMINATION: ICD-10-CM

## 2023-11-15 DIAGNOSIS — I26.94 MULTIPLE SUBSEGMENTAL PULMONARY EMBOLI WITHOUT ACUTE COR PULMONALE (MULTI): Primary | ICD-10-CM

## 2023-11-15 DIAGNOSIS — I26.99 OTHER PULMONARY EMBOLISM WITHOUT ACUTE COR PULMONALE (MULTI): ICD-10-CM

## 2023-11-15 DIAGNOSIS — I48.91 ATRIAL FIBRILLATION WITH RVR (MULTI): ICD-10-CM

## 2023-11-15 DIAGNOSIS — R09.89 OTHER SPECIFIED SYMPTOMS AND SIGNS INVOLVING THE CIRCULATORY AND RESPIRATORY SYSTEMS: ICD-10-CM

## 2023-11-15 LAB
AORTIC VALVE PEAK VELOCITY: 1.36
APTT PPP: >139 SECONDS (ref 22–32.5)
AV PEAK GRADIENT: 7.4
AVA (PEAK VEL): 1.74
BNP SERPL-MCNC: 88 PG/ML (ref 0–99)
CARDIAC TROPONIN I PNL SERPL HS: 21 NG/L (ref 0–53)
EJECTION FRACTION APICAL 4 CHAMBER: 69.6
EJECTION FRACTION: 67
GLUCOSE BLD MANUAL STRIP-MCNC: 106 MG/DL (ref 74–99)
HOLD SPECIMEN: NORMAL
LEFT ATRIUM VOLUME AREA LENGTH INDEX BSA: 33.8
LEFT VENTRICLE INTERNAL DIMENSION DIASTOLE: 4.9 (ref 3.5–6)
LEFT VENTRICULAR OUTFLOW TRACT DIAMETER: 1.9
RIGHT VENTRICLE FREE WALL PEAK S': 6.45
RIGHT VENTRICLE PEAK SYSTOLIC PRESSURE: 36.1
TRICUSPID ANNULAR PLANE SYSTOLIC EXCURSION: 0.6
UFH PPP CHRO-ACNC: 0.6 IU/ML
UFH PPP CHRO-ACNC: 0.6 IU/ML
UFH PPP CHRO-ACNC: 0.8 IU/ML
UFH PPP CHRO-ACNC: 0.9 IU/ML
UFH PPP CHRO-ACNC: 1.5 IU/ML

## 2023-11-15 PROCEDURE — 85520 HEPARIN ASSAY: CPT | Mod: MUE | Performed by: STUDENT IN AN ORGANIZED HEALTH CARE EDUCATION/TRAINING PROGRAM

## 2023-11-15 PROCEDURE — 2550000001 HC RX 255 CONTRASTS: Performed by: STUDENT IN AN ORGANIZED HEALTH CARE EDUCATION/TRAINING PROGRAM

## 2023-11-15 PROCEDURE — 87522 HEPATITIS C REVRS TRNSCRPJ: CPT

## 2023-11-15 PROCEDURE — 84484 ASSAY OF TROPONIN QUANT: CPT | Performed by: STUDENT IN AN ORGANIZED HEALTH CARE EDUCATION/TRAINING PROGRAM

## 2023-11-15 PROCEDURE — 96374 THER/PROPH/DIAG INJ IV PUSH: CPT | Mod: 59 | Performed by: STUDENT IN AN ORGANIZED HEALTH CARE EDUCATION/TRAINING PROGRAM

## 2023-11-15 PROCEDURE — 36415 COLL VENOUS BLD VENIPUNCTURE: CPT | Performed by: STUDENT IN AN ORGANIZED HEALTH CARE EDUCATION/TRAINING PROGRAM

## 2023-11-15 PROCEDURE — 71275 CT ANGIOGRAPHY CHEST: CPT | Performed by: RADIOLOGY

## 2023-11-15 PROCEDURE — 2500000004 HC RX 250 GENERAL PHARMACY W/ HCPCS (ALT 636 FOR OP/ED)

## 2023-11-15 PROCEDURE — 93308 TTE F-UP OR LMTD: CPT

## 2023-11-15 PROCEDURE — 86147 CARDIOLIPIN ANTIBODY EA IG: CPT

## 2023-11-15 PROCEDURE — 87517 HEPATITIS B DNA QUANT: CPT

## 2023-11-15 PROCEDURE — 96376 TX/PRO/DX INJ SAME DRUG ADON: CPT | Mod: 59 | Performed by: STUDENT IN AN ORGANIZED HEALTH CARE EDUCATION/TRAINING PROGRAM

## 2023-11-15 PROCEDURE — 99223 1ST HOSP IP/OBS HIGH 75: CPT

## 2023-11-15 PROCEDURE — 99285 EMERGENCY DEPT VISIT HI MDM: CPT | Performed by: STUDENT IN AN ORGANIZED HEALTH CARE EDUCATION/TRAINING PROGRAM

## 2023-11-15 PROCEDURE — 99222 1ST HOSP IP/OBS MODERATE 55: CPT | Performed by: INTERNAL MEDICINE

## 2023-11-15 PROCEDURE — 85520 HEPARIN ASSAY: CPT | Performed by: STUDENT IN AN ORGANIZED HEALTH CARE EDUCATION/TRAINING PROGRAM

## 2023-11-15 PROCEDURE — 86146 BETA-2 GLYCOPROTEIN ANTIBODY: CPT

## 2023-11-15 PROCEDURE — 2500000004 HC RX 250 GENERAL PHARMACY W/ HCPCS (ALT 636 FOR OP/ED): Performed by: STUDENT IN AN ORGANIZED HEALTH CARE EDUCATION/TRAINING PROGRAM

## 2023-11-15 PROCEDURE — 93306 TTE W/DOPPLER COMPLETE: CPT | Performed by: INTERNAL MEDICINE

## 2023-11-15 PROCEDURE — 93308 TTE F-UP OR LMTD: CPT | Performed by: STUDENT IN AN ORGANIZED HEALTH CARE EDUCATION/TRAINING PROGRAM

## 2023-11-15 PROCEDURE — 71275 CT ANGIOGRAPHY CHEST: CPT

## 2023-11-15 PROCEDURE — 99285 EMERGENCY DEPT VISIT HI MDM: CPT | Mod: 25 | Performed by: STUDENT IN AN ORGANIZED HEALTH CARE EDUCATION/TRAINING PROGRAM

## 2023-11-15 PROCEDURE — 83880 ASSAY OF NATRIURETIC PEPTIDE: CPT | Performed by: STUDENT IN AN ORGANIZED HEALTH CARE EDUCATION/TRAINING PROGRAM

## 2023-11-15 PROCEDURE — 2500000001 HC RX 250 WO HCPCS SELF ADMINISTERED DRUGS (ALT 637 FOR MEDICARE OP)

## 2023-11-15 PROCEDURE — 1100000001 HC PRIVATE ROOM DAILY

## 2023-11-15 PROCEDURE — 82947 ASSAY GLUCOSE BLOOD QUANT: CPT

## 2023-11-15 PROCEDURE — 2500000002 HC RX 250 W HCPCS SELF ADMINISTERED DRUGS (ALT 637 FOR MEDICARE OP, ALT 636 FOR OP/ED)

## 2023-11-15 PROCEDURE — 93306 TTE W/DOPPLER COMPLETE: CPT

## 2023-11-15 RX ORDER — HEPARIN SODIUM 10000 [USP'U]/100ML
0-4500 INJECTION, SOLUTION INTRAVENOUS CONTINUOUS
Status: DISCONTINUED | OUTPATIENT
Start: 2023-11-15 | End: 2023-11-15

## 2023-11-15 RX ORDER — HEPARIN SODIUM 5000 [USP'U]/ML
80 INJECTION, SOLUTION INTRAVENOUS; SUBCUTANEOUS ONCE
Status: DISCONTINUED | OUTPATIENT
Start: 2023-11-15 | End: 2023-11-19

## 2023-11-15 RX ORDER — ATORVASTATIN CALCIUM 40 MG/1
40 TABLET, FILM COATED ORAL NIGHTLY
Status: DISCONTINUED | OUTPATIENT
Start: 2023-11-15 | End: 2023-11-27 | Stop reason: HOSPADM

## 2023-11-15 RX ORDER — ALLOPURINOL 100 MG/1
100 TABLET ORAL DAILY
Status: DISCONTINUED | OUTPATIENT
Start: 2023-11-15 | End: 2023-11-27 | Stop reason: HOSPADM

## 2023-11-15 RX ORDER — HYDROMORPHONE HYDROCHLORIDE 1 MG/ML
0.2 INJECTION, SOLUTION INTRAMUSCULAR; INTRAVENOUS; SUBCUTANEOUS EVERY 4 HOURS PRN
Status: DISCONTINUED | OUTPATIENT
Start: 2023-11-15 | End: 2023-11-19

## 2023-11-15 RX ORDER — DICYCLOMINE HYDROCHLORIDE 20 MG/1
1 TABLET ORAL DAILY
COMMUNITY

## 2023-11-15 RX ORDER — ONDANSETRON HYDROCHLORIDE 2 MG/ML
4 INJECTION, SOLUTION INTRAVENOUS ONCE
Status: COMPLETED | OUTPATIENT
Start: 2023-11-15 | End: 2023-11-15

## 2023-11-15 RX ORDER — HEPARIN SODIUM 5000 [USP'U]/ML
3000-6000 INJECTION, SOLUTION INTRAVENOUS; SUBCUTANEOUS EVERY 4 HOURS PRN
Status: DISCONTINUED | OUTPATIENT
Start: 2023-11-15 | End: 2023-11-15

## 2023-11-15 RX ORDER — EZETIMIBE 10 MG/1
10 TABLET ORAL DAILY
Status: DISCONTINUED | OUTPATIENT
Start: 2023-11-15 | End: 2023-11-27 | Stop reason: HOSPADM

## 2023-11-15 RX ORDER — HEPARIN SODIUM 5000 [USP'U]/ML
2000-4000 INJECTION, SOLUTION INTRAVENOUS; SUBCUTANEOUS EVERY 4 HOURS PRN
Status: DISCONTINUED | OUTPATIENT
Start: 2023-11-15 | End: 2023-11-15

## 2023-11-15 RX ORDER — ACETAMINOPHEN 325 MG/1
650 TABLET ORAL EVERY 6 HOURS PRN
Status: DISCONTINUED | OUTPATIENT
Start: 2023-11-15 | End: 2023-11-27 | Stop reason: HOSPADM

## 2023-11-15 RX ORDER — DICYCLOMINE HYDROCHLORIDE 10 MG/1
20 CAPSULE ORAL DAILY
Status: DISCONTINUED | OUTPATIENT
Start: 2023-11-15 | End: 2023-11-27 | Stop reason: HOSPADM

## 2023-11-15 RX ORDER — HYDROMORPHONE HYDROCHLORIDE 1 MG/ML
0.5 INJECTION, SOLUTION INTRAMUSCULAR; INTRAVENOUS; SUBCUTANEOUS ONCE
Status: COMPLETED | OUTPATIENT
Start: 2023-11-15 | End: 2023-11-15

## 2023-11-15 RX ORDER — DOCUSATE SODIUM 100 MG/1
100 CAPSULE, LIQUID FILLED ORAL 2 TIMES DAILY
Status: DISCONTINUED | OUTPATIENT
Start: 2023-11-15 | End: 2023-11-20

## 2023-11-15 RX ORDER — AMLODIPINE BESYLATE 10 MG/1
10 TABLET ORAL DAILY
Status: DISCONTINUED | OUTPATIENT
Start: 2023-11-15 | End: 2023-11-27 | Stop reason: HOSPADM

## 2023-11-15 RX ORDER — HEPARIN SODIUM 10000 [USP'U]/100ML
0-4500 INJECTION, SOLUTION INTRAVENOUS CONTINUOUS
Status: DISCONTINUED | OUTPATIENT
Start: 2023-11-15 | End: 2023-11-22

## 2023-11-15 RX ORDER — ONDANSETRON HYDROCHLORIDE 2 MG/ML
INJECTION, SOLUTION INTRAVENOUS
Status: COMPLETED
Start: 2023-11-15 | End: 2023-11-15

## 2023-11-15 RX ORDER — CHLORTHALIDONE 25 MG/1
25 TABLET ORAL DAILY
COMMUNITY
End: 2023-12-09 | Stop reason: HOSPADM

## 2023-11-15 RX ORDER — HEPARIN SODIUM 5000 [USP'U]/ML
3000-6000 INJECTION, SOLUTION INTRAVENOUS; SUBCUTANEOUS EVERY 4 HOURS PRN
Status: DISCONTINUED | OUTPATIENT
Start: 2023-11-15 | End: 2023-11-23

## 2023-11-15 RX ORDER — CARVEDILOL 12.5 MG/1
12.5 TABLET ORAL
Status: DISCONTINUED | OUTPATIENT
Start: 2023-11-15 | End: 2023-11-27 | Stop reason: HOSPADM

## 2023-11-15 RX ORDER — HEPARIN SODIUM 5000 [USP'U]/ML
80 INJECTION, SOLUTION INTRAVENOUS; SUBCUTANEOUS ONCE
Status: DISCONTINUED | OUTPATIENT
Start: 2023-11-15 | End: 2023-11-15

## 2023-11-15 RX ORDER — HEPARIN SODIUM 5000 [USP'U]/ML
5000-10000 INJECTION, SOLUTION INTRAVENOUS; SUBCUTANEOUS EVERY 4 HOURS PRN
Status: DISCONTINUED | OUTPATIENT
Start: 2023-11-15 | End: 2023-11-15

## 2023-11-15 RX ORDER — FAMOTIDINE 20 MG/1
20 TABLET, FILM COATED ORAL NIGHTLY
Status: DISCONTINUED | OUTPATIENT
Start: 2023-11-15 | End: 2023-11-23

## 2023-11-15 RX ADMIN — DOCUSATE SODIUM 100 MG: 100 CAPSULE, LIQUID FILLED ORAL at 16:10

## 2023-11-15 RX ADMIN — PERFLUTREN 4 ML OF DILUTION: 6.52 INJECTION, SUSPENSION INTRAVENOUS at 14:20

## 2023-11-15 RX ADMIN — HYDROMORPHONE HYDROCHLORIDE 0.5 MG: 1 INJECTION, SOLUTION INTRAMUSCULAR; INTRAVENOUS; SUBCUTANEOUS at 08:09

## 2023-11-15 RX ADMIN — ALLOPURINOL 100 MG: 100 TABLET ORAL at 16:08

## 2023-11-15 RX ADMIN — ONDANSETRON 4 MG: 2 INJECTION INTRAMUSCULAR; INTRAVENOUS at 08:09

## 2023-11-15 RX ADMIN — ONDANSETRON HYDROCHLORIDE 4 MG: 2 INJECTION, SOLUTION INTRAVENOUS at 08:09

## 2023-11-15 RX ADMIN — HEPARIN SODIUM 2000 UNITS/HR: 10000 INJECTION, SOLUTION INTRAVENOUS at 07:10

## 2023-11-15 RX ADMIN — HEPARIN SODIUM 1700 UNITS/HR: 10000 INJECTION, SOLUTION INTRAVENOUS at 11:30

## 2023-11-15 RX ADMIN — IOHEXOL 90 ML: 350 INJECTION, SOLUTION INTRAVENOUS at 02:34

## 2023-11-15 RX ADMIN — HEPARIN SODIUM 2000 UNITS/HR: 10000 INJECTION, SOLUTION INTRAVENOUS at 01:45

## 2023-11-15 RX ADMIN — EZETIMIBE 10 MG: 10 TABLET ORAL at 16:09

## 2023-11-15 RX ADMIN — CARVEDILOL 12.5 MG: 12.5 TABLET, FILM COATED ORAL at 16:10

## 2023-11-15 RX ADMIN — DICYCLOMINE HYDROCHLORIDE 20 MG: 10 CAPSULE ORAL at 16:08

## 2023-11-15 RX ADMIN — AMLODIPINE BESYLATE 10 MG: 10 TABLET ORAL at 16:08

## 2023-11-15 SDOH — SOCIAL STABILITY: SOCIAL INSECURITY: DOES ANYONE TRY TO KEEP YOU FROM HAVING/CONTACTING OTHER FRIENDS OR DOING THINGS OUTSIDE YOUR HOME?: NO

## 2023-11-15 SDOH — SOCIAL STABILITY: SOCIAL INSECURITY: ARE YOU OR HAVE YOU BEEN THREATENED OR ABUSED PHYSICALLY, EMOTIONALLY, OR SEXUALLY BY ANYONE?: NO

## 2023-11-15 SDOH — SOCIAL STABILITY: SOCIAL INSECURITY: HAS ANYONE EVER THREATENED TO HURT YOUR FAMILY OR YOUR PETS?: NO

## 2023-11-15 SDOH — SOCIAL STABILITY: SOCIAL INSECURITY: DO YOU FEEL UNSAFE GOING BACK TO THE PLACE WHERE YOU ARE LIVING?: NO

## 2023-11-15 SDOH — SOCIAL STABILITY: SOCIAL INSECURITY: HAVE YOU HAD THOUGHTS OF HARMING ANYONE ELSE?: NO

## 2023-11-15 SDOH — SOCIAL STABILITY: SOCIAL INSECURITY: ARE THERE ANY APPARENT SIGNS OF INJURIES/BEHAVIORS THAT COULD BE RELATED TO ABUSE/NEGLECT?: NO

## 2023-11-15 SDOH — SOCIAL STABILITY: SOCIAL INSECURITY: DO YOU FEEL ANYONE HAS EXPLOITED OR TAKEN ADVANTAGE OF YOU FINANCIALLY OR OF YOUR PERSONAL PROPERTY?: NO

## 2023-11-15 SDOH — SOCIAL STABILITY: SOCIAL INSECURITY: WERE YOU ABLE TO COMPLETE ALL THE BEHAVIORAL HEALTH SCREENINGS?: YES

## 2023-11-15 SDOH — SOCIAL STABILITY: SOCIAL INSECURITY: ABUSE: ADULT

## 2023-11-15 ASSESSMENT — PATIENT HEALTH QUESTIONNAIRE - PHQ9
SUM OF ALL RESPONSES TO PHQ9 QUESTIONS 1 & 2: 0
1. LITTLE INTEREST OR PLEASURE IN DOING THINGS: NOT AT ALL
2. FEELING DOWN, DEPRESSED OR HOPELESS: NOT AT ALL

## 2023-11-15 ASSESSMENT — ENCOUNTER SYMPTOMS
COUGH: 0
RESPIRATORY NEGATIVE: 1
COLOR CHANGE: 1
LIGHT-HEADEDNESS: 0
FREQUENCY: 0
CONSTIPATION: 0
PSYCHIATRIC NEGATIVE: 1
BLOOD IN STOOL: 0
ALLERGIC/IMMUNOLOGIC NEGATIVE: 1
MYALGIAS: 1
JOINT SWELLING: 1
DIZZINESS: 0
ABDOMINAL PAIN: 0
WEAKNESS: 0
HEMATOLOGIC/LYMPHATIC NEGATIVE: 1
GASTROINTESTINAL NEGATIVE: 1
ENDOCRINE NEGATIVE: 1
HEADACHES: 0
NAUSEA: 0
CHILLS: 0
PALPITATIONS: 0
MYALGIAS: 0
VOMITING: 0
CHEST TIGHTNESS: 0
TROUBLE SWALLOWING: 0
WHEEZING: 0
ABDOMINAL DISTENTION: 0
DYSURIA: 0
NUMBNESS: 1
EYES NEGATIVE: 1
ACTIVITY CHANGE: 1
HEMATURIA: 0
FEVER: 0
SORE THROAT: 0
DIARRHEA: 0
SHORTNESS OF BREATH: 0
APPETITE CHANGE: 0
FATIGUE: 0
JOINT SWELLING: 0

## 2023-11-15 ASSESSMENT — ACTIVITIES OF DAILY LIVING (ADL)
HEARING - LEFT EAR: FUNCTIONAL
HEARING - RIGHT EAR: FUNCTIONAL
FEEDING YOURSELF: INDEPENDENT
GROOMING: NEEDS ASSISTANCE
WALKS IN HOME: NEEDS ASSISTANCE
TOILETING: NEEDS ASSISTANCE
LACK_OF_TRANSPORTATION: NO
ASSISTIVE_DEVICE: WALKER
JUDGMENT_ADEQUATE_SAFELY_COMPLETE_DAILY_ACTIVITIES: YES
DRESSING YOURSELF: NEEDS ASSISTANCE
ADEQUATE_TO_COMPLETE_ADL: YES
BATHING: NEEDS ASSISTANCE
PATIENT'S MEMORY ADEQUATE TO SAFELY COMPLETE DAILY ACTIVITIES?: YES

## 2023-11-15 ASSESSMENT — COGNITIVE AND FUNCTIONAL STATUS - GENERAL
WALKING IN HOSPITAL ROOM: A LITTLE
DRESSING REGULAR UPPER BODY CLOTHING: A LITTLE
TOILETING: A LITTLE
HELP NEEDED FOR BATHING: A LITTLE
DAILY ACTIVITIY SCORE: 20
MOVING TO AND FROM BED TO CHAIR: A LITTLE
CLIMB 3 TO 5 STEPS WITH RAILING: A LITTLE
MOVING FROM LYING ON BACK TO SITTING ON SIDE OF FLAT BED WITH BEDRAILS: A LITTLE
TURNING FROM BACK TO SIDE WHILE IN FLAT BAD: A LITTLE
MOBILITY SCORE: 18
STANDING UP FROM CHAIR USING ARMS: A LITTLE
DRESSING REGULAR LOWER BODY CLOTHING: A LITTLE

## 2023-11-15 ASSESSMENT — LIFESTYLE VARIABLES
AUDIT-C TOTAL SCORE: 1
PRESCIPTION_ABUSE_PAST_12_MONTHS: NO
SUBSTANCE_ABUSE_PAST_12_MONTHS: NO
HOW MANY STANDARD DRINKS CONTAINING ALCOHOL DO YOU HAVE ON A TYPICAL DAY: 1 OR 2
REASON UNABLE TO ASSESS: NO
EVER FELT BAD OR GUILTY ABOUT YOUR DRINKING: NO
HOW OFTEN DO YOU HAVE A DRINK CONTAINING ALCOHOL: MONTHLY OR LESS
SKIP TO QUESTIONS 9-10: 1
AUDIT-C TOTAL SCORE: 1
HAVE PEOPLE ANNOYED YOU BY CRITICIZING YOUR DRINKING: NO
HOW OFTEN DO YOU HAVE 6 OR MORE DRINKS ON ONE OCCASION: NEVER
HAVE YOU EVER FELT YOU SHOULD CUT DOWN ON YOUR DRINKING: NO
EVER HAD A DRINK FIRST THING IN THE MORNING TO STEADY YOUR NERVES TO GET RID OF A HANGOVER: NO

## 2023-11-15 ASSESSMENT — PAIN SCALES - GENERAL
PAINLEVEL_OUTOF10: 0 - NO PAIN
PAINLEVEL_OUTOF10: 8
PAINLEVEL_OUTOF10: 7

## 2023-11-15 ASSESSMENT — PAIN - FUNCTIONAL ASSESSMENT
PAIN_FUNCTIONAL_ASSESSMENT: 0-10
PAIN_FUNCTIONAL_ASSESSMENT: 0-10

## 2023-11-15 ASSESSMENT — PAIN DESCRIPTION - PROGRESSION: CLINICAL_PROGRESSION: NOT CHANGED

## 2023-11-15 NOTE — ED NOTES
Report called to accepting [facility] nurse     Kyra Cintron, TRACIN  11/14/23 8194

## 2023-11-15 NOTE — CONSULTS
Inpatient consult to Vascular Medicine  Consult performed by: Melissa Foster MD  Consult ordered by: Patricia Borges MD  Reason for consult: recent DVT, newly dx PE, acute limb ischemia        History of Present Illness:    Mukesh Garg is a 72 y/o man with remote DVT and more recent admission with atrial fibrillation and right leg DVT in the setting of COVID infection who is readmitted with progressive left-sided leg symptoms with acute/subacute limb ischemia as well as new diagnosis of PE. Interviewed at bedside with son and daughter present; they help provide some supporting info.    He has a history of atrial fibrillation years ago and had maze procedure when he had a CABG. He was maintained long-term on warfarin. Admitted 10/24/2023 at [facility] with shortness of breath and weakness, found to have COVID infection and atrial fibrillation with rapid rate. INR at admission was subtherapeutic at 1.3. He was started on heparin and then at hospital discharge transitioned to apixaban, which he reports taking faithfully.    Since hospital discharge he's had progressive left leg symptoms but no new chest symptoms--no chest pain, no progressive dyspnea. Also denies claudication prior to his October hospitalization.    He had a right leg DVT about two decades ago and was treated for a period of some months (he doesn't recall total duration). There's no family history of VTE.    Past Medical History:   Diagnosis Date    Atrial fibrillation (CMS/[facility])     CAD (coronary artery disease)     Gout     Heart disease     Hyperlipidemia     Hypertension     MI (myocardial infarction) (CMS/[facility])     Sleep apnea      Past Surgical History:   Procedure Laterality Date    CORONARY ARTERY BYPASS GRAFT      CT LOWER EXTREMITY LEFT ANGIOGRAM W AND/OR WO IV CONTRAST Left 11/14/2023    CT LOWER EXTREMITY LEFT ANGIOGRAM W AND/OR WO IV CONTRAST 11/14/2023 [facility] CT    MR HEAD ANGIO WO IV CONTRAST  03/21/2017    MR HEAD ANGIO WO IV CONTRAST  [facility]    MR HEAD ANGIO WO IV CONTRAST  10/30/2020    MR HEAD ANGIO WO IV CONTRAST [facility]     Social History:  He reports that he has never smoked. He has never been exposed to tobacco smoke. He has never used smokeless tobacco. He reports that he does not currently use alcohol after a past usage of about 2.0 standard drinks of alcohol per week. He reports that he does not use drugs.    Family History:  Family History   Problem Relation Name Age of Onset    Hypertension Mother      Stroke Mother      Heart attack Father          Cause of death    Heart disease Father      Diabetes Other Grandmother         Allergies:  Talwin compound and Pentazocine lactate    Inpatient Medications:  Scheduled medications   Medication Dose Route Frequency    allopurinol  100 mg oral Daily    amLODIPine  10 mg oral Daily    atorvastatin  40 mg oral Nightly    carvedilol  12.5 mg oral BID with meals    dicyclomine  20 mg oral Daily    docusate sodium  100 mg oral BID    ezetimibe  10 mg oral Daily    famotidine  20 mg oral Nightly    heparin  80 Units/kg intravenous Once    heparin  80 Units/kg intravenous Once    heparin  80 Units/kg intravenous Once    perflutren protein A microsphere  0.5 mL intravenous Once in imaging    sulfur hexafluoride microsphr  2 mL intravenous Once in imaging     PRN medications   Medication    heparin     Continuous Medications   Medication Dose Last Rate    heparin  0-4,500 Units/hr 1,700 Units/hr (11/15/23 1130)        Last Recorded Vitals:  Vitals:    11/15/23 1230 11/15/23 1300 11/15/23 1400 11/15/23 1500   BP: 92/64 115/76 115/64    Pulse: 74 80 86 83   Resp: 15 17 12 (!) 0   Temp:       TempSrc:       SpO2: 97% 97% 97% 96%   Physical Exam:  General: well-developed, well-nourished, no distress  Head: normocephalic, atraumatic  Eyes: PERRL, anicteric sclerae, no conjunctival injection  ENT: normal oropharynx  Neck: supple, no carotid bruits, no JVD  Lungs: normal  respiratory effort, clear to auscultation bilaterally  Heart: regular, normal S1 and S2, no murmurs, rubs or gallops  Abdomen: normal active bowel sounds, soft, non-distended, non-tender  Extremities: no cyanosis or clubbing  Vascular: bilateral leg swelling; erythema of feet with scale/xerosis; erythema right gaiter; unable to palpate left foot pulses; left foot is tender  Musculoskeletal: no deformities  Neurological: alert and oriented, no gross neurological deficits  Psychological: normal mood and affect       Last Labs:  CBC - 11/14/2023: 11:18 PM  8.6 14.0 104    43.6      CMP - 11/14/2023:  3:46 PM  9.1 6.4 16 --- 1.7   _ 3.5 30 57      PTT - 11/14/2023: 11:18 PM  1.1   11.3 >139.0     Troponin I, High Sensitivity   Date/Time Value Ref Range Status   11/15/2023 02:30 AM 21 0 - 53 ng/L Final     BNP   Date/Time Value Ref Range Status   11/15/2023 02:30 AM 88 0 - 99 pg/mL Final     Hemoglobin A1C   Date/Time Value Ref Range Status   07/15/2023 11:54 AM 6.7 (H) 4.0 - 6.0 % Final     LDL Calculated   Date/Time Value Ref Range Status   07/15/2023 11:54 AM 62 (L) 65 - 130 MG/DL Final   12/12/2022 12:08 PM 65 65 - 130 MG/DL Final   04/29/2022 03:35 PM 99 65 - 130 MG/DL Final        CTA chest 11/15/2023  IMPRESSION:  1. Bilateral pulmonary emboli with moderate clot burden in the distal  right main pulmonary artery extending into the right middle and right  lower lobes. Small clot burden in the left lower lobe pulmonary  arteries.  2. Findings suggestive of right heart strain, correlate with  echocardiogram.  3. Additional findings and discussion as above.    CTA lower extremity 11/14/2023  Occlusion of the left popliteal artery. There is reconstitution of  flow to the left tibialis anterior, tibialis posterior and peroneal  vessels. There is however very tenuous flow to the left tibialis  anterior and peroneal arteries at the level of the left ankle,  extending distally.        Assessment/Plan   72 y/o man with prior  remote DVT, recent DVT in the setting of COVID and subtherapeutic INR, atrial fibrillation now admitted with acute/subacute limb ischemia as well as new diagnosis of PE.     He has had no new symptoms that give us a sense for the timing of development of PE. He did not have contrast-enhanced chest CT at earlier hospitalization. It's quite possible the PE is not new.    But he has developed limb ischemia while on anticoagulation, concerning for possible hypercoagulable state--but also he has CAD so underling PAD with plaque rupture is also not out of the realm of possibility.    It's possible that all of his thrombosis issues are related to recent COVID infection.     Agree with checking antiphospholipid antibodies and echo with bubble (done but not reported yet). Would continue heparin until surgery/endovascular procedure. Vascular medicine can assist with further anticoagulation decision-making post-op.    Melissa Foster MD

## 2023-11-15 NOTE — SIGNIFICANT EVENT
PULMONARY EMBOLISM RESPONSE TEAM (PERT) NOTE    This note represents a summary of a virtual evaluation by the pulmonary embolism response team requested by Dr. Sahu.  Suggestions and impression are summarized from the initial virtual encounter and discussion with the referring medical team. These suggestions supplement but should not be a substitute for bedside evaluation and management by the attending of record.     PERT Members on the Call:  Critical Care Attending: Dr. Jeanne BRUNER Interventional Cardiology Attending: Dr. Massey  Vascular Medicine Attending: Dr. Foster  Critical Care Fellow: Dr. Alcantara    Brief Clinical Summary:  Mukesh Garg is a 73 y.o. male presenting with PE.    Vital Signs  /64   Pulse 79   Temp 36.7 °C (98.1 °F) (Oral)   Resp 16   SpO2 94%      Laboratory  Lactate - 2.0      PESI Score Maikel BARBOSA Et al. Arch Intern Med. 2010;[id number].           PESI Score:  85, consistent with JLPESIRISK: Class II, or Low risk (1.7-3.5% 30-day mortality risk) PE.    CT Scan reviewed:  Clot burden moderate, present in distal R main PA  RV/LV ratio ~1 by CT scan    Contraindications to anticoagulation: none  Contraindications to thrombolytics: none    Initial Impressions:  ERS/ESC Pulmonary Embolism Risk Category: JLPECLASS: Intermediate-low risk.    Initial Suggestions/Plans:  Admit to telemetry unit at [facility].  Initial therapy suggested: heparin.  Additional testing recommended: TTE  Consults suggested: vascular medicine.    To re conference the PERT team please call the  Transfer Center at [phone].

## 2023-11-15 NOTE — PROGRESS NOTES
74yo M on Eliquis with L popliteal artery occlusion. Also with recurrent PEs. HDS. On Heparin. Accepted by Vascular.

## 2023-11-15 NOTE — H&P (VIEW-ONLY)
Reason For Consult  L popliteal artery occlusion     History Of Present Illness  Mukesh Garg is a 73 y.o. 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     Patient was previously hospitalized 10/24/23 for afib with RVR, found to have RLE DVT in popliteal and femoral vein, and COVID positive; sent home on eliquis (previously on warfarin). Two weeks ago, the pain started in his left foot and has progressively gotten worse. He originally thought it was from a foot injury years ago. The pain is worst in the morning and has affected his walking. Yesterday, he went to his PCP to evaluate the pain for which he was sent to the ED for further evaluation. At [facility], [place], operated by [facility], he underwent a CTA of the LLE which showed occlusion of the left popliteal artery with reconstitution of flow to the left tibialis anterior, tibialis posterior and peroneal vessels. Through telerads, it was made it known to our team that there was also a PE causing right heart strain.     At bedside, patient does endorse foot pain and swelling. No SOB, fevers/chills.        Past Medical History  He has a past medical history of Atrial fibrillation (CMS/[facility]), CAD (coronary artery disease), Gout, Heart disease, Hyperlipidemia, Hypertension, MI (myocardial infarction) (CMS/HCC), and Sleep apnea.    Surgical History  He has a past surgical history that includes MR angio head wo IV contrast (03/21/2017); MR angio head wo IV contrast (10/30/2020); Coronary artery bypass graft; and CT angio lower extremity left w and or wo IV contrast (Left, 11/14/2023).     Social History  He reports that he has never smoked. He has never been exposed to tobacco smoke. He has never used smokeless tobacco. He reports that he does not currently use alcohol after a past usage of about 2.0 standard drinks of alcohol per week. He reports that he does not use drugs.    Family History  Family History   Problem  Relation Name Age of Onset    Hypertension Mother      Stroke Mother      Heart attack Father          Cause of death    Heart disease Father      Diabetes Other Grandmother         Allergies  Talwin compound and Pentazocine lactate    Review of Systems  Review of Systems   Constitutional:  Negative for appetite change, chills, fatigue and fever.   HENT:  Negative for congestion, hearing loss, sore throat and trouble swallowing.    Respiratory:  Negative for cough, chest tightness, shortness of breath and wheezing.    Cardiovascular:  Negative for chest pain and palpitations.   Gastrointestinal:  Negative for abdominal distention, abdominal pain, blood in stool, constipation, diarrhea, nausea and vomiting.   Genitourinary:  Negative for dysuria, frequency, hematuria and urgency.   Musculoskeletal:  Negative for joint swelling and myalgias.        Left foot swelling and pain   Neurological:  Negative for dizziness, syncope, weakness, light-headedness and headaches.         Physical Exam  Physical Exam  Constitutional:       General: He is not in acute distress.     Appearance: Normal appearance. He is not ill-appearing, toxic-appearing or diaphoretic.   HENT:      Head: Normocephalic and atraumatic.   Eyes:      General: No scleral icterus.     Pupils: Pupils are equal, round, and reactive to light.   Cardiovascular:      Rate and Rhythm: Normal rate and regular rhythm.   Pulmonary:      Effort: Pulmonary effort is normal. No respiratory distress.      Breath sounds: Normal breath sounds. No wheezing.   Abdominal:      General: There is no distension.      Palpations: Abdomen is soft.      Tenderness: There is no abdominal tenderness. There is no guarding.   Musculoskeletal:         General: Swelling and tenderness present. Normal range of motion.      Comments: Right calf erythema (thought to be from a fungal infection for which patient is using cream)  Bilateral rash on foot with overgrown toenails  Exquisite  tenderness of left foot up to ankle with swelling  Palpable right DP/PT  Multiphasic left DP/PT   Skin:     General: Skin is warm and dry.      Coloration: Skin is not jaundiced.      Findings: Rash present.   Neurological:      Mental Status: He is alert and oriented to person, place, and time. Mental status is at baseline.   Psychiatric:         Mood and Affect: Mood normal.         Behavior: Behavior normal.         Judgment: Judgment normal.        Last Recorded Vitals  Blood pressure 120/66, pulse 81, temperature 36.7 °C (98.1 °F), temperature source Oral, resp. rate 20, SpO2 98 %.    Relevant Results  Results for orders placed or performed during the hospital encounter of 11/14/23 (from the past 96 hour(s))   Basic Metabolic Panel   Result Value Ref Range    Glucose 175 (H) 65 - 99 mg/dL    Sodium 141 133 - 145 mmol/L    Potassium 4.3 3.4 - 5.1 mmol/L    Chloride 103 97 - 107 mmol/L    Bicarbonate 27 24 - 31 mmol/L    Urea Nitrogen 24 8 - 25 mg/dL    Creatinine 1.30 0.40 - 1.60 mg/dL    eGFR 57 (L) >60 mL/min/1.73m*2    Calcium 9.1 8.5 - 10.4 mg/dL    Anion Gap 11 <=19 mmol/L   CBC   Result Value Ref Range    WBC 8.9 4.4 - 11.3 x10*3/uL    nRBC 0.0 0.0 - 0.0 /100 WBCs    RBC 4.93 4.50 - 5.90 x10*6/uL    Hemoglobin 14.9 13.5 - 17.5 g/dL    Hematocrit 46.9 41.0 - 52.0 %    MCV 95 80 - 100 fL    MCH 30.2 26.0 - 34.0 pg    MCHC 31.8 (L) 32.0 - 36.0 g/dL    RDW 14.8 (H) 11.5 - 14.5 %    Platelets 111 (L) 150 - 450 x10*3/uL   Blood Gas Lactic Acid, Venous   Result Value Ref Range    POCT Lactate, Venous 2.0 0.4 - 2.0 mmol/L   Hepatic Function Panel   Result Value Ref Range    AST 16 5 - 40 U/L    ALT 30 5 - 40 U/L    Alkaline Phosphatase 57 35 - 125 U/L    Bilirubin, Total 1.7 (H) 0.1 - 1.2 mg/dL    Bilirubin, Direct 0.4 (H) 0.0 - 0.2 mg/dL    Total Protein 6.4 5.9 - 7.9 g/dL    Albumin 3.5 3.5 - 5.0 g/dL   Protime-INR   Result Value Ref Range    Protime 11.3 9.3 - 12.7 seconds    INR 1.1 0.9 - 1.2   aPTT   Result  Value Ref Range    aPTT >139.0 () 22.0 - 32.5 seconds   CBC   Result Value Ref Range    WBC 8.6 4.4 - 11.3 x10*3/uL    nRBC 0.0 0.0 - 0.0 /100 WBCs    RBC 4.61 4.50 - 5.90 x10*6/uL    Hemoglobin 14.0 13.5 - 17.5 g/dL    Hematocrit 43.6 41.0 - 52.0 %    MCV 95 80 - 100 fL    MCH 30.4 26.0 - 34.0 pg    MCHC 32.1 32.0 - 36.0 g/dL    RDW 14.7 (H) 11.5 - 14.5 %    Platelets 104 (L) 150 - 450 x10*3/uL      CTA LLE: Occlusion of the left popliteal artery. There is reconstitution of  flow to the left tibialis anterior, tibialis posterior and peroneal  vessels. There is however very tenuous flow to the left tibialis  anterior and peroneal arteries at the level of the left ankle,  extending distally.     Assessment/Plan   Mukesh Garg is a 73 y.o. 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 CTA showed occlusion of the left popliteal artery with reconstitution of flow to the left tibialis anterior, tibialis posterior and peroneal vessels. PE causing right heart strain through telerads.    - continue high intensity heparin gtt  - obtain STAT CT PE to further characterize PE; CT PE showing bilateral PE causing right heart strain. Recommend engaging PERT    Patient discussed with fellow Dr. Tra Loyd MD

## 2023-11-15 NOTE — ED PROVIDER NOTES
HPI   No chief complaint on file.      Patient is 73-year-old male with past medical history of recurrent DVTs (on eliquis at home), HTN, CABG x3 with maze process in 2013, peripheral neuropathy, gout, neuropathic pain presenting as a transfer from [facility] for concern for left popliteal artery occlusion.  Patient initially reported with pain and swelling in the left leg for the last 2 weeks. Patient hospitalized 3 weeks ago for COVID and DVT of the right lower extremity and has been on apixaban since then.  Patient previously on warfarin for A-fib anticoagulation but was transitioned due to breakthrough DVT.  Patient does not endorse obvious provocation of symptoms and endorses gradually worsening pain over the last 2 weeks.  Imaging at [facility] concerning for left popliteal artery occlusion with distal reconstitution.  Report additionally for concern for PEs but no imaging available.                          Iva Coma Scale Score: 15                  Patient History   Past Medical History:   Diagnosis Date    Atrial fibrillation (CMS/[facility])     CAD (coronary artery disease)     Gout     Heart disease     Hyperlipidemia     Hypertension     MI (myocardial infarction) (CMS/[facility])     Sleep apnea      Past Surgical History:   Procedure Laterality Date    CORONARY ARTERY BYPASS GRAFT      CT LOWER EXTREMITY LEFT ANGIOGRAM W AND/OR WO IV CONTRAST Left 11/14/2023    CT LOWER EXTREMITY LEFT ANGIOGRAM W AND/OR WO IV CONTRAST 11/14/2023 [facility]    MR HEAD ANGIO WO IV CONTRAST  03/21/2017    MR HEAD ANGIO WO IV CONTRAST [facility]    MR HEAD ANGIO WO IV CONTRAST  10/30/2020    MR HEAD ANGIO WO IV CONTRAST [facility]     Family History   Problem Relation Name Age of Onset    Hypertension Mother      Stroke Mother      Heart attack Father          Cause of death    Heart disease Father      Diabetes Other Grandmother      Social History     Tobacco Use    Smoking status: Never     Passive exposure: Never     Smokeless tobacco: Never   Vaping Use    Vaping Use: Never used   Substance Use Topics    Alcohol use: Not Currently     Alcohol/week: 2.0 standard drinks of alcohol     Types: 2 Cans of beer per week    Drug use: Never       Physical Exam   ED Triage Vitals [11/15/23 0136]   Temp Heart Rate Resp BP   36.7 °C (98.1 °F) 81 20 120/66      SpO2 Temp Source Heart Rate Source Patient Position   98 % Oral -- --      BP Location FiO2 (%)     -- --       Physical Exam  Vitals and nursing note reviewed.   Constitutional:       General: He is not in acute distress.     Appearance: He is well-developed.   HENT:      Head: Normocephalic and atraumatic.   Eyes:      Conjunctiva/sclera: Conjunctivae normal.   Cardiovascular:      Heart sounds: No murmur heard.     Comments: Irregular rhythm, rate ~80bpms. 2+ palpable PT and DP pulses bilaterally.  2+ femoral and radial pulses bilaterally.  Pulmonary:      Effort: Pulmonary effort is normal. No respiratory distress.      Breath sounds: Normal breath sounds.   Abdominal:      Palpations: Abdomen is soft.      Tenderness: There is no abdominal tenderness.   Musculoskeletal:         General: No swelling.      Cervical back: Neck supple.   Skin:     Comments: Area of erythema over right anterior shin spreading from just proximal to ankle to just distal to the knee.  Pitting edema to bilateral lower extremities.  Rash over bilateral feet.  Onychomycosis   Neurological:      Mental Status: He is alert.   Psychiatric:         Mood and Affect: Mood normal.         ED Course & MDM   Diagnoses as of 11/15/23 0727   Multiple subsegmental pulmonary emboli without acute cor pulmonale (CMS/HCC)       Medical Decision Making  Patient is 73-year-old male with past medical history of recurrent DVTs (on eliquis at home), HTN, CABG x3 with maze process in 2013, peripheral neuropathy, gout, neuropathic pain presenting as a transfer from [facility] for concern for left popliteal artery occlusion and  pulmonary emboli.  Images or reports of pulmonary emboli not available and CT PE scan performed here which showed moderate clot burden on the right and small clot burden in the left pulmonary vasculature.  Patient otherwise satting well on room air, not tachycardic, without chest pain and with stable blood pressure.  Some reported concern for right heart strain on prior CT scan but patient with only with mild RV dilation,, no D sign on POC US here.  Patient did have moderately reduced TAPSE measured at 9 mm concerning for some element of right heart strain.  Vascular surgery team strongly recommended calling PERT although no intervention necessary other than heparin in impression of ED provider.  PERT team was called for recommendations and recommended continuing heparin but no other acute interventions.  Vascular surgery recommended admission to medicine for further management of pulmonary emboli prior to discussion of management of popliteal arterial occlusion.  Case was discussed with admitting team and patient admitted to medicine.    Patient seen and discussed with Dr. Shante Sahu MD, PhD  Emergency Medicine PGY2          Procedure    Performed by: Leonid Sahu MD  Authorized by: Francisco Frey MD  Cardiac Indications: Assessment of right heart strain in patient with PE                Procedure: Cardiac Ultrasound    Findings:   Views: parasternal long, parasternal short, apical four and subxiphoid  The pericardial space was visualized and was NEGATIVE for a significant pericardial effusion.  Activity: Ventricular contractions were visualized.  LV: LV systolic function was NORMAL.  RV: RV size was NORMAL.    Impression:  Cardiac: The focused cardiac ultrasound exam had ABNORMAL findings as specified.    Comments: No D sign.  RV mildly dilated but still smaller in diameter than LV.  Some ventricular septal bounce in parasternal short and apical four-chamber.  TAPSE measured at 9 mm.       Leonid CHING  MD [place]  Resident  11/15/23 [id number]

## 2023-11-15 NOTE — H&P
History Of Present Illness    Mukesh Garg is a 73 y.o. male with past medical history of atrial fibrillation, HTN, JENNY (on CPAP at home),  gout, CABG x3 (2013), recurrent DVTs (previously on warfarin, currently on Apixaban since 10/24/2023 and compliant) and peripheral neuropathy. He was transferred from [facility] for further management because of LLE popliteal artery occlusion and bilateral PE.     Since October 19th the patient started to feel cramps on his left leg that worsening especially in the morning when his foot touch the ground and worsened with walking, and relieved with rest and when applying voltaren cream. He describe the pain as burning sensation extending from his left foot to the middle of his left leg. He rate his pain as 10/10 when it exacerbated and 5-7/10 when relived. He Denies change in vision/hearing, focal neuro deficit, chest pain, SOB, or loss of sensation in LLE.     Of note, Mr. Garg was hospitalized on 10/24/23 for Afib with RVR and at that time, he was found to have a new RLE DVT and was COVID-19 positive. He was previously on warfarin but switched to Eliquis upon discharge.     Brief ED Course 11/15/2023:  Vitals: 120/66 HR 81 Temp 36.7 RR 20 Spo2 98% on RA   CBC: 8.6>14/43.6<104  CTPE: Bilateral pulmonary emboli with moderate clot burden in the distal   right main pulmonary artery extending into the right middle and right   lower lobes. Small clot burden in the left lower lobe pulmonary arteries.   CTA of the LLE:  showed occlusion of the left popliteal artery with trace flow to the left tibialis. In addition, CTA for PE showed bilateral pulmonary emboli with right heart strain.    Interventions: Heparin gtt (HI)        Vascular surgery inial plan to take him to the OR  on Friday for thrombectomy or possible bypasses for the LLE popliteal artery occlusion.     Home Medications:  Apixaban 5mg BID  Allopurinol 100mg tab  Amlodipine 10mg daily  Atorvastatin 40 mg daily  Carvedilol  12.5 mg BID  Chlorthalidone 25mg daily  Dicyclomine 20mg daily  Zetia 10mg daily  Famotidine 20mg daily  Lisinopril 40mg daily  Nitroglycerin 0.4mg SL PRN      Past Medical History  Past Medical History:   Diagnosis Date    Atrial fibrillation (CMS/HCC)     CAD (coronary artery disease)     Gout     Heart disease     Hyperlipidemia     Hypertension     MI (myocardial infarction) (CMS/HCC)     Sleep apnea        Surgical History  Past Surgical History:   Procedure Laterality Date    CORONARY ARTERY BYPASS GRAFT      CT LOWER EXTREMITY LEFT ANGIOGRAM W AND/OR WO IV CONTRAST Left 11/14/2023    CT LOWER EXTREMITY LEFT ANGIOGRAM W AND/OR WO IV CONTRAST 11/14/2023 [facility]    MR HEAD ANGIO WO IV CONTRAST  03/21/2017    MR HEAD ANGIO WO IV CONTRAST [facility]    MR HEAD ANGIO WO IV CONTRAST  10/30/2020    MR HEAD ANGIO WO IV CONTRAST [facility]        Social History  He reports that he has never smoked. He has never been exposed to tobacco smoke. He has never used smokeless tobacco. He reports that he does not currently use alcohol after a past usage of about 2.0 standard drinks of alcohol per week. He reports that he does not use drugs.    Family History  Family History   Problem Relation Name Age of Onset    Hypertension Mother      Stroke Mother      Heart attack Father          Cause of death    Heart disease Father      Diabetes Other Grandmother         Allergies  Talwin compound and Pentazocine lactate    Review of Systems   Constitutional:  Positive for activity change.   HENT: Negative.     Eyes: Negative.    Respiratory: Negative.     Cardiovascular:  Positive for leg swelling.   Gastrointestinal: Negative.    Endocrine: Negative.    Musculoskeletal:  Positive for joint swelling and myalgias.   Skin:  Positive for color change, pallor and rash.   Allergic/Immunologic: Negative.    Neurological:  Positive for numbness.   Hematological: Negative.    Psychiatric/Behavioral: Negative.           Physical Exam  Constitutional:       Appearance: Normal appearance. He is obese.   HENT:      Head: Normocephalic.      Mouth/Throat:      Mouth: Mucous membranes are moist.   Eyes:      Extraocular Movements: Extraocular movements intact.      Pupils: Pupils are equal, round, and reactive to light.   Cardiovascular:      Rate and Rhythm: Normal rate. Rhythm irregular.   Pulmonary:      Effort: Pulmonary effort is normal.      Breath sounds: Normal breath sounds.   Abdominal:      Palpations: Abdomen is soft.   Musculoskeletal:         General: Swelling and tenderness present.      Right lower leg: Edema present.      Left lower leg: Edema present.   Skin:     General: Skin is warm and dry.      Capillary Refill: Capillary refill takes less than 2 seconds.   Neurological:      General: No focal deficit present.      Mental Status: He is alert and oriented to person, place, and time.   Psychiatric:         Mood and Affect: Mood normal.         Behavior: Behavior normal.          Last Recorded Vitals  Blood pressure 103/61, pulse 80, temperature 36.8 °C (98.2 °F), temperature source Temporal, resp. rate 19, SpO2 96 %.    Relevant Results    CT angio chest for pulmonary embolism    Result Date: 11/15/2023  Interpreted By:  Wesley Avilez, STUDY: CT ANGIO CHEST FOR PULMONARY EMBOLISM;  11/15/2023 2:45 am   INDICATION: Signs/Symptoms:PE with RH strain from OSH.   COMPARISON: CT chest 05/25/2023   ACCESSION NUMBER(S): [id number]   ORDERING CLINICIAN: EMILI AMATO   TECHNIQUE: Contiguous axial images of the chest were obtained after the intravenous administration of 90 mL Omnipaque 350 contrast using angiographic PE protocol. Coronal and sagittal reformatted images were reconstructed from the axial data. MIP images were created on an independent workstation and reviewed.   FINDINGS: PULMONARY ARTERIES: Adequate opacification to the level of the subsegmental arteries. Filling defect within the distal main pulmonary  artery extending into the right middle lobar artery interlobar artery and lower lobar arteries. Additional nonocclusive filling defects are noted within the segmental and subsegmental arteries of the left lower lobe. The main pulmonary artery is enlarged at 3.2 cm. Left ventricular right ventricular ratio of approximately 1 and straightening of the interventricular septum. Reflux of contrast into the hepatic venous circuit. These findings suggest right heart strain.   HEART: Mild 4 chamber cardiomegaly. Moderate triple-vessel coronary vascular calcifications. Postsurgical changes suggestive of coronary artery bypass grafting. Please note this examination is not optimized for the assessment of the coronary arteries or bypass grafts. No significant pericardial effusion.   VESSELS: Normal caliber aorta without dissection. Mild calcifications of the aortic arch and proximal great vessels.   MEDIASTINUM AND LYMPH NODES: Visualized thyroid is within normal limits. No enlarged intrathoracic or axillary lymph nodes by imaging criteria. No pneumomediastinum. The esophagus is within normal limits. Small hiatal hernia.   LUNG, AIRWAYS, AND PLEURA: Trachea and proximal mainstem bronchi are patent. Subsegmental atelectatic changes in the bilateral lung bases. No pleural effusion or pneumothorax.   OSSEOUS STRUCTURES: No acute osseous abnormality. Multilevel degenerative changes of the thoracic spine. Median sternotomy changes.   CHEST WALL SOFT TISSUES: No discernible abnormality.   UPPER ABDOMEN/OTHER: Cholelithiasis. Left upper pole renal cyst.       1. Bilateral pulmonary emboli with moderate clot burden in the distal right main pulmonary artery extending into the right middle and right lower lobes. Small clot burden in the left lower lobe pulmonary arteries. 2. Findings suggestive of right heart strain, correlate with echocardiogram. 3. Additional findings and discussion as above.   MACRO: Wesley Avilez discussed the  significance and urgency of this critical finding by telephone with Dr. Leonid Sahu  on 11/15/2023 at 2:58 am. (**-RCF-**) Findings:  See findings.   Signed by: Wesley Raghav 11/15/2023 3:02 AM Dictation workstation:   [id number]    Point of Care Ultrasound    Result Date: 11/15/2023  Leonid Sahu MD     11/15/2023  7:30 AM Performed by: Leonid Sahu MD Authorized by: Francisco Frey MD  Cardiac Indications: Assessment of right heart strain in patient with PE Procedure: Cardiac Ultrasound Findings:  Views: parasternal long, parasternal short, apical four and subxiphoid The pericardial space was visualized and was NEGATIVE for a significant pericardial effusion. Activity: Ventricular contractions were visualized. LV: LV systolic function was NORMAL. RV: RV size was NORMAL. Impression: Cardiac: The focused cardiac ultrasound exam had ABNORMAL findings as specified. Comments: No D sign.  RV mildly dilated but still smaller in diameter than LV.  Some ventricular septal bounce in parasternal short and apical four-chamber.  TAPSE measured at 9 mm.    CT angio lower extremity left w and or wo IV contrast    Result Date: 11/14/2023  Interpreted By:  Roland Dubon, STUDY: CT ANGIO LOWER EXTREMITY LEFT W AND OR WO IV CONTRAST;  11/14/2023 6:35 pm   INDICATION: Signs/Symptoms:pain, concern for arterial occlusion.   COMPARISON: None.   ACCESSION NUMBER(S): [id number]   ORDERING CLINICIAN: GENNA ARCE   TECHNIQUE: Multiple contiguous axial images from the left lower chest through the left lower extremity were obtained after the administration of contrast material in the arterial phase. Coronal and sagittal reformats were submitted for review.       Atherosclerotic disease of the left common iliac, femoral and popliteal arteries. There is an intraluminal filling defect involving the popliteal artery, which eventually causes popliteal artery occlusion. There are no significant collateral vessels identified. There is however  reconstitution of flow to the tibialis posterior, anterior and peroneal arteries, which demonstrate enhancement. There is a however very tenuous flow to the tibialis anterior and peroneal arteries at the level of the ankle, extending distally.   Visualized soft tissues of the chest, abdomen and pelvis demonstrate a left renal cyst.   There is soft tissue thickening around the left lower extremity, beginning at the level of the distal tibial metaphysis, with extension along the dorsum of the foot.   MACRO: Occlusion of the left popliteal artery. There is reconstitution of flow to the left tibialis anterior, tibialis posterior and peroneal vessels. There is however very tenuous flow to the left tibialis anterior and peroneal arteries at the level of the left ankle, extending distally.   Signed by: Roland Dubon 11/14/2023 10:56 PM Dictation workstation:   [id number]    XR foot left 3+ views    Result Date: 11/14/2023  Interpreted By:  Ghassan Campbell, STUDY: XR FOOT LEFT 3+ VIEWS; ;  11/14/2023 4:30 pm   INDICATION: Signs/Symptoms:pain.   COMPARISON: June 2014   ACCESSION NUMBER(S): [id number]   ORDERING CLINICIAN: GENNA AREC   FINDINGS:   There is no fracture or subluxation. Degenerative changes tarsal and tarsometatarsal joints are present, with joint space narrowing, mild sclerotic changes and small osteophytes. The pattern is suggestive of for possible neuropathic arthropathy. No focal lytic lesion or abnormal periosteal reaction is seen to suggest acute osteomyelitis. The alignment is anatomic. Diffuse soft tissue swelling is noted, especially involving the dorsum of the forefoot and ankle. Vascular calcifications are also noted.       Diffuse soft tissue swelling, especially involving the dorsum of the forefoot and degenerative changes probably related to early Charcot's arthropathy. No radiographic evidence of acute osteomyelitis; correlate clinically and follow-up as needed.   Signed by: Ghassan Campbell  11/14/2023 5:05 PM Dictation workstation:   [id number]    Vascular US lower extremity venous duplex left    Result Date: 11/14/2023  Interpreted By:  Roland Dubon, STUDY: [facility] US LOWER EXTREMITY VENOUS DUPLEX LEFT  11/14/2023 4:19 pm   INDICATION: 74 y/o   M with  Signs/Symptoms:pain. LMP:  Unknown.   COMPARISON: None.   ACCESSION NUMBER(S): [id number]   ORDERING CLINICIAN: GENNA ARCE   TECHNIQUE: Routine ultrasound of the  left lower extremity was performed with duplex Doppler (color and spectral) evaluation.   Static images were obtained for remote interpretation.   FINDINGS: THIGH VEINS:  The common femoral, femoral, popliteal, proximal medial saphenous, and deep femoral veins are patent and free of thrombus. The veins are normally compressible.  They demonstrate normal phasic flow and augmentation response.   CALF VEINS:  The paired peroneal and posterior tibial calf veins are patent.       Negative study.  No deep venous thrombosis of the left lower extremity.   MACRO: None   Signed by: Roland Dubon 11/14/2023 4:24 PM Dictation workstation:   [id number]     TTE 11/15/2023    CONCLUSIONS:   1. Left ventricular systolic function is normal with a 65-70% estimated ejection fraction.   2. The patient is in atrial fibrillation which may influence the estimate of left ventricular function and transvalvular flows.   3. The RV appears upper limits of normal size vs mildly dilated with moderate to severely reduced fx with a McConnels sign consistent iwth pulmonary embolus.   4. Color Doppler flow in the region of the atrial septum suggestive of possible small left to right shunt. Agitated saline contrast study was technically difficult but may have demonstrated some bubbles within the LV ( unclear) and exact timing of their appearance is unclear.   5. Mildly elevated RVSP.   6. Mild aortic valve regurgitation.   7. Findings discussed with the ER team at the time of reporting.   8. Compared with the images from the prior  TTE done 10/25/2023 there was already RV systolic dysfunction with possible McConnels sign at that time.    Results for orders placed or performed during the hospital encounter of 11/15/23 (from the past 24 hour(s))   Troponin I, High Sensitivity   Result Value Ref Range    Troponin I, High Sensitivity 21 0 - 53 ng/L   B-type natriuretic peptide   Result Value Ref Range    BNP 88 0 - 99 pg/mL   Heparin Assay, UFH   Result Value Ref Range    Heparin Unfractionated 1.5 (HH) See Comment Below for Therapeutic Ranges IU/mL   POCT GLUCOSE   Result Value Ref Range    POCT Glucose 106 (H) 74 - 99 mg/dL   Heparin Assay, UFH   Result Value Ref Range    Heparin Unfractionated 0.9 See Comment Below for Therapeutic Ranges IU/mL   Heparin Assay, UFH   Result Value Ref Range    Heparin Unfractionated 0.6 See Comment Below for Therapeutic Ranges IU/mL          Assessment/Plan   Principal Problem:    Acute lower limb ischemia  Active Problems:    Multiple subsegmental pulmonary emboli without acute cor pulmonale (CMS/HCC)    Mukesh Garg is a 73 y.o. male with past medical history of HTN, gout, CABG x3, recurrent DVTs (on Apixaban at home and compliant) and peripheral neuropathy. He was transferred from [facility] for further managment because of LLE popliteal artery occlusion and bilateral PE. For the past three weeks he has been complaining of LLE pain that is worsening especially in the morning when his foot touch the ground and worsened with walking, and relieved with rest and when applying voltaren cream. He describe the pain as burning sensation extending from his left foot to the middle of his left leg. He rate his pain as 10/10 when it exacerbated and 5-7/10 when relived. His symptoms are consistent with severe PAD as evidenced by his claudication symptoms. Additionally, the patient was found to have b/l pulmonary emboli despite being compliant with Apixaban, therefore this is apixaban failure. Furthermore, CTA for LLE on  11/14  demonstrates intraluminal filling defect involving the popliteal artery, which eventually causes popliteal artery Occlusion.  Vascular surgery consulted, will take patient to the OR for possible thrombectomy or bypass surgery on Friday.       ## PE   ##Hx of DVT  -Hx of DVT on 10/24/2023 and also 20 years ago in the right leg after long flight  - Bilateral pulmonary emboli with moderate clot burden in the distal   right main pulmonary artery extending into the right middle and right   lower lobes. Small clot burden in the left lower lobe pulmonary with a suggestive right ventricular stain.   -Has been taking Apixaban since 10/24/2023  -Vitally stable on admission without increased O2 requirement however he desated today while sleeping only to 87% and was placed on 2 L NS (patient is known case of JENNY and on CPAP at night)  -Currently no clear etiology for hypercoaguable status    -RESULT OF TTE:   CONCLUSIONS:   1. Left ventricular systolic function is normal with a 65-70% estimated ejection fraction.   2. The patient is in atrial fibrillation which may influence the estimate of left ventricular function and transvalvular flows.   3. The RV appears upper limits of normal size vs mildly dilated with moderate to severely reduced fx with a McConnels sign consistent iwth pulmonary embolus.   4. Color Doppler flow in the region of the atrial septum suggestive of possible small left to right shunt. Agitated saline contrast study was technically difficult but may have demonstrated some bubbles within the LV ( unclear) and exact timing of their appearance is unclear.   5. Mildly elevated RVSP.   6. Mild aortic valve regurgitation.   7. Findings discussed with the ER team at the time of reporting.   8. Compared with the images from the prior TTE done 10/25/2023 there was already RV systolic dysfunction with possible McConnels sign at that time.    Plan:  - High intensity Heparin gtt   - Patient to STOP Eliquis 2/2  medication failure  -Vascular Medicine consulted, appreciate recs  -Vascular surgery consulted, and recommended to continue the heparin gtt and cardiology consult giving his right sided heart strain  -F/u B2 glycoprotein ab and cardiolipin ab labs  -Consider possible malignancy workup        #Occlusion of L-popliteal artery  ##CAD  #PAD  -Hx of CABG in 2013 Coronary artery bypass grafting x 3 , partial left sided MAZE,     (LIMA to a large Diag, a free ALEX y'd off the LIMA to the LAD , SVG-PDA)  - Hx of PCI 2006 (emergent for STEMI): DESx1 to prox diag, DESx2 to mid and distal LAD   Plan:  -F/u additional vasc surg and vasc med recs  -Plan for OR on Friday as per vascular surgery  -C/w heparin gtt (HI)           #Atrial fibrillation  ##Hypertension  #DLD  -Continue home Carvedilol 12.5 BID   -Continue home Amlodipine 10 mg   -Continue home chlorthalidone 25 mg    -Hold home Lisinopril 40 mg   - continue home medication atorvastatin 40 mg   - continue home medication Ezetimibe 10 mg                  ##Loose teethe  ## Minimal bleeding   - OMFS consulted for probable teeth extractions as the patient has a loose teeth  - Do not stop heparin gtt if tooth extraction required (as per vasc medicine)           ##Gout  Continue home allopurinol 100 mg           F: Replete PRN  E: Replete PRN  N:  cardiac diet (NPO at midnight)   DVT Ppx: On Heparin drip  GI Ppx: None  Access/Lines: PIV  Abx: none           Code status: Full Code  Emergency contact: Sweetie Garg (Spouse) # [phone]            Varinder Boogie MD

## 2023-11-15 NOTE — PROGRESS NOTES
Pharmacy Medication History Review    Mukesh Garg is a 73 y.o. male admitted for Acute lower limb ischemia. Pharmacy reviewed the patient's piuli-ok-uchgxehde medications and allergies for accuracy.    The list below reflects the updated PTA list. Comments regarding how patient may be taking medications differently can be found in the Admit Orders Activity  Prior to Admission Medications   Prescriptions Last Dose Informant Patient Reported? Taking?   allopurinol (Zyloprim) 100 mg tablet 2023 Self No No   Sig: TAKE 1 TABLET BY MOUTH EVERY DAY   amLODIPine (Norvasc) 10 mg tablet 2023 Self Yes No   Sig: TAKE 1 TABLET BY MOUTH EVERY DAY for 90   apixaban (Eliquis) 5 mg tablet  Self No No   Sig: Take 1 tablet (5 mg) by mouth 2 times a day.   atorvastatin (Lipitor) 40 mg tablet 2023 Self No No   Sig: TAKE 1 TABLET BY MOUTH EVERY DAY FOR 90 DAYS   carvedilol (Coreg) 12.5 mg tablet 2023 Self Yes No   Sig: TAKE 1 TABLET BY MOUTH TWICE A DAY WITH FOOD for 90   chlorthalidone (Hygroton) 25 mg tablet 2023 Self Yes No   Sig: Take 1 tablet (25 mg) by mouth once daily.   dexAMETHasone (Decadron) 6 mg tablet   No No   Sig: Take 1 tablet (6 mg) by mouth once daily at bedtime for 6 days.   dicyclomine (Bentyl) 20 mg tablet 2023 Self Yes No   Sig: Take 1 tablet (20 mg) by mouth once daily.   ezetimibe (Zetia) 10 mg tablet 2023 Self Yes No   Sig: Take 1 tablet (10 mg) by mouth once daily.   famotidine (Pepcid) 20 mg tablet 2023 Self Yes No   Si tablet at bedtime as needed Orally Once a day   lisinopril 40 mg tablet 2023 Self No No   Sig: TAKE 1 TABLET BY MOUTH EVERY DAY FOR 90 DAYS   nitroglycerin (Nitrostat) 0.4 mg SL tablet  Self Yes No   Sig: as directed Sublingual as directed      Facility-Administered Medications: None        The list below reflects the updated allergy list. Please review each documented allergy for additional clarification and justification.  Allergies   Reviewed by Francisco Frey MD on 11/15/2023        Severity Reactions Comments    Talwin Compound Medium Dizziness     Pentazocine Lactate Not Specified Itching Burning             Patient accepts M2B at discharge. Pharmacy has been updated to [facility] pharmacy.    Sources used to complete the med history include out patient fill history, OARRS, and patient interview, patient was a good historian, was able to provide the most updated medication list.  Patient will no longer be taking Eliquis due to therapy failure, and currently having a blood clot. Patient used to be taking warfarin before being changed to Eliquis and was doing just fine.    Below are additional concerns with the patient's PTA list.  MICHAEL Burns, PharmD  Transitions of Care Pharmacist  [facility]  Please reach out via Secure Chat for questions, or if no response call [employer] or [facility]

## 2023-11-15 NOTE — CONSULTS
Reason For Consult  L popliteal artery occlusion     History Of Present Illness  Mukesh Garg is a 73 y.o. 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     Patient was previously hospitalized 10/24/23 for afib with RVR, found to have RLE DVT in popliteal and femoral vein, and COVID positive; sent home on eliquis (previously on warfarin). Two weeks ago, the pain started in his left foot and has progressively gotten worse. He originally thought it was from a foot injury years ago. The pain is worst in the morning and has affected his walking. Yesterday, he went to his PCP to evaluate the pain for which he was sent to the ED for further evaluation. At [facility], he underwent a CTA of the LLE which showed occlusion of the left popliteal artery with reconstitution of flow to the left tibialis anterior, tibialis posterior and peroneal vessels. Through telerads, it was made it known to our team that there was also a PE causing right heart strain.     At bedside, patient does endorse foot pain and swelling. No SOB, fevers/chills.        Past Medical History  He has a past medical history of Atrial fibrillation (CMS/[facility]), CAD (coronary artery disease), Gout, Heart disease, Hyperlipidemia, Hypertension, MI (myocardial infarction) (CMS/HCC), and Sleep apnea.    Surgical History  He has a past surgical history that includes MR angio head wo IV contrast (03/21/2017); MR angio head wo IV contrast (10/30/2020); Coronary artery bypass graft; and CT angio lower extremity left w and or wo IV contrast (Left, 11/14/2023).     Social History  He reports that he has never smoked. He has never been exposed to tobacco smoke. He has never used smokeless tobacco. He reports that he does not currently use alcohol after a past usage of about 2.0 standard drinks of alcohol per week. He reports that he does not use drugs.    Family History  Family History   Problem  Relation Name Age of Onset    Hypertension Mother      Stroke Mother      Heart attack Father          Cause of death    Heart disease Father      Diabetes Other Grandmother         Allergies  Talwin compound and Pentazocine lactate    Review of Systems  Review of Systems   Constitutional:  Negative for appetite change, chills, fatigue and fever.   HENT:  Negative for congestion, hearing loss, sore throat and trouble swallowing.    Respiratory:  Negative for cough, chest tightness, shortness of breath and wheezing.    Cardiovascular:  Negative for chest pain and palpitations.   Gastrointestinal:  Negative for abdominal distention, abdominal pain, blood in stool, constipation, diarrhea, nausea and vomiting.   Genitourinary:  Negative for dysuria, frequency, hematuria and urgency.   Musculoskeletal:  Negative for joint swelling and myalgias.        Left foot swelling and pain   Neurological:  Negative for dizziness, syncope, weakness, light-headedness and headaches.         Physical Exam  Physical Exam  Constitutional:       General: He is not in acute distress.     Appearance: Normal appearance. He is not ill-appearing, toxic-appearing or diaphoretic.   HENT:      Head: Normocephalic and atraumatic.   Eyes:      General: No scleral icterus.     Pupils: Pupils are equal, round, and reactive to light.   Cardiovascular:      Rate and Rhythm: Normal rate and regular rhythm.   Pulmonary:      Effort: Pulmonary effort is normal. No respiratory distress.      Breath sounds: Normal breath sounds. No wheezing.   Abdominal:      General: There is no distension.      Palpations: Abdomen is soft.      Tenderness: There is no abdominal tenderness. There is no guarding.   Musculoskeletal:         General: Swelling and tenderness present. Normal range of motion.      Comments: Right calf erythema (thought to be from a fungal infection for which patient is using cream)  Bilateral rash on foot with overgrown toenails  Exquisite  tenderness of left foot up to ankle with swelling  Palpable right DP/PT  Multiphasic left DP/PT   Skin:     General: Skin is warm and dry.      Coloration: Skin is not jaundiced.      Findings: Rash present.   Neurological:      Mental Status: He is alert and oriented to person, place, and time. Mental status is at baseline.   Psychiatric:         Mood and Affect: Mood normal.         Behavior: Behavior normal.         Judgment: Judgment normal.        Last Recorded Vitals  Blood pressure 120/66, pulse 81, temperature 36.7 °C (98.1 °F), temperature source Oral, resp. rate 20, SpO2 98 %.    Relevant Results  Results for orders placed or performed during the hospital encounter of 11/14/23 (from the past 96 hour(s))   Basic Metabolic Panel   Result Value Ref Range    Glucose 175 (H) 65 - 99 mg/dL    Sodium 141 133 - 145 mmol/L    Potassium 4.3 3.4 - 5.1 mmol/L    Chloride 103 97 - 107 mmol/L    Bicarbonate 27 24 - 31 mmol/L    Urea Nitrogen 24 8 - 25 mg/dL    Creatinine 1.30 0.40 - 1.60 mg/dL    eGFR 57 (L) >60 mL/min/1.73m*2    Calcium 9.1 8.5 - 10.4 mg/dL    Anion Gap 11 <=19 mmol/L   CBC   Result Value Ref Range    WBC 8.9 4.4 - 11.3 x10*3/uL    nRBC 0.0 0.0 - 0.0 /100 WBCs    RBC 4.93 4.50 - 5.90 x10*6/uL    Hemoglobin 14.9 13.5 - 17.5 g/dL    Hematocrit 46.9 41.0 - 52.0 %    MCV 95 80 - 100 fL    MCH 30.2 26.0 - 34.0 pg    MCHC 31.8 (L) 32.0 - 36.0 g/dL    RDW 14.8 (H) 11.5 - 14.5 %    Platelets 111 (L) 150 - 450 x10*3/uL   Blood Gas Lactic Acid, Venous   Result Value Ref Range    POCT Lactate, Venous 2.0 0.4 - 2.0 mmol/L   Hepatic Function Panel   Result Value Ref Range    AST 16 5 - 40 U/L    ALT 30 5 - 40 U/L    Alkaline Phosphatase 57 35 - 125 U/L    Bilirubin, Total 1.7 (H) 0.1 - 1.2 mg/dL    Bilirubin, Direct 0.4 (H) 0.0 - 0.2 mg/dL    Total Protein 6.4 5.9 - 7.9 g/dL    Albumin 3.5 3.5 - 5.0 g/dL   Protime-INR   Result Value Ref Range    Protime 11.3 9.3 - 12.7 seconds    INR 1.1 0.9 - 1.2   aPTT   Result  Value Ref Range    aPTT >139.0 () 22.0 - 32.5 seconds   CBC   Result Value Ref Range    WBC 8.6 4.4 - 11.3 x10*3/uL    nRBC 0.0 0.0 - 0.0 /100 WBCs    RBC 4.61 4.50 - 5.90 x10*6/uL    Hemoglobin 14.0 13.5 - 17.5 g/dL    Hematocrit 43.6 41.0 - 52.0 %    MCV 95 80 - 100 fL    MCH 30.4 26.0 - 34.0 pg    MCHC 32.1 32.0 - 36.0 g/dL    RDW 14.7 (H) 11.5 - 14.5 %    Platelets 104 (L) 150 - 450 x10*3/uL      CTA LLE: Occlusion of the left popliteal artery. There is reconstitution of  flow to the left tibialis anterior, tibialis posterior and peroneal  vessels. There is however very tenuous flow to the left tibialis  anterior and peroneal arteries at the level of the left ankle,  extending distally.     Assessment/Plan   Mukesh Garg is a 73 y.o. 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 CTA showed occlusion of the left popliteal artery with reconstitution of flow to the left tibialis anterior, tibialis posterior and peroneal vessels. PE causing right heart strain through telerads.    - continue high intensity heparin gtt  - obtain STAT CT PE to further characterize PE; CT PE showing bilateral PE causing right heart strain. Recommend engaging PERT    Patient discussed with fellow Dr. Tra Loyd MD

## 2023-11-15 NOTE — TELEPHONE ENCOUNTER
Courtesy phone call to my patient and spouse Sweetie as I have received the HOSP ADMISSION note showing he TESTED positive for POPLITEAL ARTERIAL clot and is now transferred downtown to [facility].    Family is very grateful we brought him in for evaluation, found his foot to be compromised and sent him promptly yesterday to the HOSP ER with VASCULAR CONSULT and he was admitted and going for VASCULAR SURGERY due to occlusive arterial disease LLE.    Please do set up a HOSP FU visit upon discharge from Hospital.   Detail Level: Detailed Quality 130: Documentation Of Current Medications In The Medical Record: Current Medications Documented

## 2023-11-16 ENCOUNTER — APPOINTMENT (OUTPATIENT)
Dept: VASCULAR MEDICINE | Facility: HOSPITAL | Age: 73
DRG: 252 | End: 2023-11-16
Payer: COMMERCIAL

## 2023-11-16 LAB
ALBUMIN SERPL BCP-MCNC: 3.2 G/DL (ref 3.4–5)
ALP SERPL-CCNC: 45 U/L (ref 33–136)
ALT SERPL W P-5'-P-CCNC: 19 U/L (ref 10–52)
ANION GAP SERPL CALC-SCNC: 12 MMOL/L (ref 10–20)
AST SERPL W P-5'-P-CCNC: 11 U/L (ref 9–39)
B2 GLYCOPROT1 IGA SER-ACNC: 1.9 U/ML
B2 GLYCOPROT1 IGG SER-ACNC: <1.4 U/ML
B2 GLYCOPROT1 IGM SER-ACNC: 4.5 U/ML
BILIRUB SERPL-MCNC: 1.8 MG/DL (ref 0–1.2)
BUN SERPL-MCNC: 21 MG/DL (ref 6–23)
CALCIUM SERPL-MCNC: 8.5 MG/DL (ref 8.6–10.6)
CARDIOLIPIN IGA SERPL-ACNC: 2.9 APL U/ML
CARDIOLIPIN IGG SER IA-ACNC: <1.6 GPL U/ML
CARDIOLIPIN IGM SER IA-ACNC: 4.7 MPL U/ML
CHLORIDE SERPL-SCNC: 103 MMOL/L (ref 98–107)
CO2 SERPL-SCNC: 28 MMOL/L (ref 21–32)
CREAT SERPL-MCNC: 1.2 MG/DL (ref 0.5–1.3)
ERYTHROCYTE [DISTWIDTH] IN BLOOD BY AUTOMATED COUNT: 14.4 % (ref 11.5–14.5)
GFR SERPL CREATININE-BSD FRML MDRD: 63 ML/MIN/1.73M*2
GLUCOSE SERPL-MCNC: 140 MG/DL (ref 74–99)
HBV DNA SERPL NAA+PROBE-ACNC: NOT DETECTED [IU]/ML
HBV DNA SERPL NAA+PROBE-LOG IU: NORMAL {LOG_IU}/ML
HCT VFR BLD AUTO: 43.2 % (ref 41–52)
HCV RNA SERPL NAA+PROBE-ACNC: NOT DETECTED K[IU]/ML
HCV RNA SERPL NAA+PROBE-LOG IU: NORMAL {LOG_IU}/ML
HGB BLD-MCNC: 13.5 G/DL (ref 13.5–17.5)
MAGNESIUM SERPL-MCNC: 1.95 MG/DL (ref 1.6–2.4)
MCH RBC QN AUTO: 29.7 PG (ref 26–34)
MCHC RBC AUTO-ENTMCNC: 31.3 G/DL (ref 32–36)
MCV RBC AUTO: 95 FL (ref 80–100)
NRBC BLD-RTO: 0 /100 WBCS (ref 0–0)
PLATELET # BLD AUTO: 107 X10*3/UL (ref 150–450)
POTASSIUM SERPL-SCNC: 4.1 MMOL/L (ref 3.5–5.3)
PROT SERPL-MCNC: 5.3 G/DL (ref 6.4–8.2)
RBC # BLD AUTO: 4.55 X10*6/UL (ref 4.5–5.9)
SODIUM SERPL-SCNC: 139 MMOL/L (ref 136–145)
UFH PPP CHRO-ACNC: 0.3 IU/ML
UFH PPP CHRO-ACNC: 0.5 IU/ML
WBC # BLD AUTO: 6.4 X10*3/UL (ref 4.4–11.3)

## 2023-11-16 PROCEDURE — 80053 COMPREHEN METABOLIC PANEL: CPT

## 2023-11-16 PROCEDURE — 85520 HEPARIN ASSAY: CPT | Performed by: STUDENT IN AN ORGANIZED HEALTH CARE EDUCATION/TRAINING PROGRAM

## 2023-11-16 PROCEDURE — 76937 US GUIDE VASCULAR ACCESS: CPT

## 2023-11-16 PROCEDURE — 2500000002 HC RX 250 W HCPCS SELF ADMINISTERED DRUGS (ALT 637 FOR MEDICARE OP, ALT 636 FOR OP/ED)

## 2023-11-16 PROCEDURE — 2500000004 HC RX 250 GENERAL PHARMACY W/ HCPCS (ALT 636 FOR OP/ED)

## 2023-11-16 PROCEDURE — 93970 EXTREMITY STUDY: CPT | Performed by: SURGERY

## 2023-11-16 PROCEDURE — 86901 BLOOD TYPING SEROLOGIC RH(D): CPT | Performed by: NURSE PRACTITIONER

## 2023-11-16 PROCEDURE — 93970 EXTREMITY STUDY: CPT

## 2023-11-16 PROCEDURE — 36415 COLL VENOUS BLD VENIPUNCTURE: CPT

## 2023-11-16 PROCEDURE — 2500000001 HC RX 250 WO HCPCS SELF ADMINISTERED DRUGS (ALT 637 FOR MEDICARE OP)

## 2023-11-16 PROCEDURE — 85027 COMPLETE CBC AUTOMATED: CPT

## 2023-11-16 PROCEDURE — 83735 ASSAY OF MAGNESIUM: CPT

## 2023-11-16 PROCEDURE — 99233 SBSQ HOSP IP/OBS HIGH 50: CPT

## 2023-11-16 PROCEDURE — 85520 HEPARIN ASSAY: CPT

## 2023-11-16 PROCEDURE — 1100000001 HC PRIVATE ROOM DAILY

## 2023-11-16 PROCEDURE — 36415 COLL VENOUS BLD VENIPUNCTURE: CPT | Performed by: STUDENT IN AN ORGANIZED HEALTH CARE EDUCATION/TRAINING PROGRAM

## 2023-11-16 RX ORDER — ONDANSETRON 4 MG/1
4 TABLET, FILM COATED ORAL EVERY 8 HOURS PRN
Status: DISPENSED | OUTPATIENT
Start: 2023-11-16 | End: 2023-11-19

## 2023-11-16 RX ADMIN — HYDROMORPHONE HYDROCHLORIDE 0.2 MG: 1 INJECTION, SOLUTION INTRAMUSCULAR; INTRAVENOUS; SUBCUTANEOUS at 23:41

## 2023-11-16 RX ADMIN — HYDROMORPHONE HYDROCHLORIDE 0.2 MG: 1 INJECTION, SOLUTION INTRAMUSCULAR; INTRAVENOUS; SUBCUTANEOUS at 07:49

## 2023-11-16 RX ADMIN — HYDROMORPHONE HYDROCHLORIDE 0.2 MG: 1 INJECTION, SOLUTION INTRAMUSCULAR; INTRAVENOUS; SUBCUTANEOUS at 00:18

## 2023-11-16 RX ADMIN — CARVEDILOL 12.5 MG: 12.5 TABLET, FILM COATED ORAL at 17:47

## 2023-11-16 RX ADMIN — DOCUSATE SODIUM 100 MG: 100 CAPSULE, LIQUID FILLED ORAL at 09:22

## 2023-11-16 RX ADMIN — FAMOTIDINE 20 MG: 20 TABLET, FILM COATED ORAL at 21:18

## 2023-11-16 RX ADMIN — AMLODIPINE BESYLATE 10 MG: 10 TABLET ORAL at 09:22

## 2023-11-16 RX ADMIN — DOCUSATE SODIUM 100 MG: 100 CAPSULE, LIQUID FILLED ORAL at 21:17

## 2023-11-16 RX ADMIN — EZETIMIBE 10 MG: 10 TABLET ORAL at 09:22

## 2023-11-16 RX ADMIN — ALLOPURINOL 100 MG: 100 TABLET ORAL at 09:22

## 2023-11-16 RX ADMIN — CARVEDILOL 12.5 MG: 12.5 TABLET, FILM COATED ORAL at 09:24

## 2023-11-16 RX ADMIN — HEPARIN SODIUM 1500 UNITS/HR: 10000 INJECTION, SOLUTION INTRAVENOUS at 11:47

## 2023-11-16 RX ADMIN — HYDROMORPHONE HYDROCHLORIDE 0.2 MG: 1 INJECTION, SOLUTION INTRAMUSCULAR; INTRAVENOUS; SUBCUTANEOUS at 14:28

## 2023-11-16 RX ADMIN — ATORVASTATIN CALCIUM 40 MG: 40 TABLET, FILM COATED ORAL at 21:18

## 2023-11-16 ASSESSMENT — COGNITIVE AND FUNCTIONAL STATUS - GENERAL
DRESSING REGULAR LOWER BODY CLOTHING: A LITTLE
STANDING UP FROM CHAIR USING ARMS: A LITTLE
MOVING TO AND FROM BED TO CHAIR: A LITTLE
TURNING FROM BACK TO SIDE WHILE IN FLAT BAD: A LITTLE
TOILETING: A LITTLE
MOBILITY SCORE: 17
MOVING FROM LYING ON BACK TO SITTING ON SIDE OF FLAT BED WITH BEDRAILS: A LITTLE
PATIENT BASELINE BEDBOUND: NO
HELP NEEDED FOR BATHING: A LITTLE
DAILY ACTIVITIY SCORE: 19
MOBILITY SCORE: 16
TURNING FROM BACK TO SIDE WHILE IN FLAT BAD: A LITTLE
PERSONAL GROOMING: A LITTLE
WALKING IN HOSPITAL ROOM: A LITTLE
MOVING TO AND FROM BED TO CHAIR: A LITTLE
DRESSING REGULAR UPPER BODY CLOTHING: A LITTLE
MOVING FROM LYING ON BACK TO SITTING ON SIDE OF FLAT BED WITH BEDRAILS: A LITTLE
STANDING UP FROM CHAIR USING ARMS: A LITTLE
DRESSING REGULAR LOWER BODY CLOTHING: A LITTLE
DAILY ACTIVITIY SCORE: 23
CLIMB 3 TO 5 STEPS WITH RAILING: A LOT
WALKING IN HOSPITAL ROOM: A LOT
CLIMB 3 TO 5 STEPS WITH RAILING: A LOT

## 2023-11-16 ASSESSMENT — PAIN SCALES - GENERAL
PAINLEVEL_OUTOF10: 5 - MODERATE PAIN
PAINLEVEL_OUTOF10: 0 - NO PAIN
PAINLEVEL_OUTOF10: 7

## 2023-11-16 ASSESSMENT — PAIN - FUNCTIONAL ASSESSMENT
PAIN_FUNCTIONAL_ASSESSMENT: 0-10

## 2023-11-16 ASSESSMENT — VISUAL ACUITY: OU: 1

## 2023-11-16 ASSESSMENT — PAIN DESCRIPTION - LOCATION: LOCATION: LEG

## 2023-11-16 ASSESSMENT — PAIN DESCRIPTION - DESCRIPTORS: DESCRIPTORS: ACHING

## 2023-11-16 ASSESSMENT — PAIN DESCRIPTION - ORIENTATION: ORIENTATION: LEFT

## 2023-11-16 NOTE — SIGNIFICANT EVENT
11/15/23 2201   Nutrition Screen   Stage 3 or 4 Pressure Injury or Multiple Non-Healing Wounds No   Home Tube Feeding or Total Parenteral Nutrition (TPN) No   Dietitian Consult Needed No   Malnutrition Screening Tool (MST)   Have you recently lost weight without trying? 0   Weight Loss Score 0   Have you been eating poorly because of a decreased appetite? 1   Malnutrition Score 1

## 2023-11-16 NOTE — SIGNIFICANT EVENT
11/15/23 2204   ADL Screening   Patient's Vision Adequate to Safely Complete Daily Activities Yes   Patient's Judgment Adequate to Safely Complete Daily Activities Yes   Patient's Memory Adequate to Safely Complete Daily Activities Yes   Patient Able to Express Needs/Desires Yes   Which is your dominant hand? Right   Dressing Needs assistance   Grooming Needs assistance   Feeding Independent   Bathing Needs assistance   Toileting Needs assistance   In/Out Bed Needs assistance   Walks in Home Needs assistance   Weakness of Legs Both   Weakness of Arms/Hands None   Hearing - Right Ear Functional   Hearing - Left Ear Functional   Assistive Devices   Assistive Devices Walker   Therapy Consults   PT Evaluation Needed 1   OT Evaluation Needed 1   SLP Evaluation Needed 2

## 2023-11-16 NOTE — CONSULTS
Reason For Consult  Mobile tooth    History Of Present Illness  Mukesh Garg is a 73 y.o. male presenting with worsening cramps on his left leg and PE. OMFS consulted for mobile tooth on upper left maxilla.     Past Medical History  He has a past medical history of Atrial fibrillation (CMS/HCC), CAD (coronary artery disease), Gout, Heart disease, Hyperlipidemia, Hypertension, MI (myocardial infarction) (CMS/HCC), and Sleep apnea.    Surgical History  He has a past surgical history that includes MR angio head wo IV contrast (03/21/2017); MR angio head wo IV contrast (10/30/2020); Coronary artery bypass graft; and CT angio lower extremity left w and or wo IV contrast (Left, 11/14/2023).     Social History  He reports that he has never smoked. He has never been exposed to tobacco smoke. He has never used smokeless tobacco. He reports that he does not currently use alcohol after a past usage of about 2.0 standard drinks of alcohol per week. He reports that he does not use drugs.    Family History  Family History   Problem Relation Name Age of Onset    Hypertension Mother      Stroke Mother      Heart attack Father          Cause of death    Heart disease Father      Diabetes Other Grandmother         Allergies  Talwin compound and Pentazocine lactate    Review of Systems  Constitutional:  Positive for activity change.   HENT: Negative.     Eyes: Negative.    Respiratory: Negative.     Cardiovascular:  Positive for leg swelling.   Gastrointestinal: Negative.    Endocrine: Negative.    Musculoskeletal:  Positive for joint swelling and myalgias.   Skin:  Positive for color change, pallor and rash.   Allergic/Immunologic: Negative.    Neurological:  Positive for numbness.   Hematological: Negative.    Psychiatric/Behavioral: Negative.        Physical Exam  Constitutional:       Appearance: Normal appearance. He is obese.   HENT:      Head: Normocephalic.      Mouth/Throat: Partial edentulism with grade I mobility of tooth  "#14 with minimal gingival erythema     Mouth: Mucous membranes are moist.   Eyes:      Extraocular Movements: Extraocular movements intact.      Pupils: Pupils are equal, round, and reactive to light.   Cardiovascular:      Rate and Rhythm: Normal rate. Rhythm irregular.   Pulmonary:      Effort: Pulmonary effort is normal.      Breath sounds: Normal breath sounds.   Abdominal:      Palpations: Abdomen is soft.   Musculoskeletal:         General: Swelling and tenderness present.      Right lower leg: Edema present.      Left lower leg: Edema present.   Skin:     General: Skin is warm and dry.      Capillary Refill: Capillary refill takes less than 2 seconds.   Neurological:      General: No focal deficit present.      Mental Status: He is alert and oriented to person, place, and time.   Psychiatric:         Mood and Affect: Mood normal.         Behavior: Behavior normal.      Last Recorded Vitals  Blood pressure 103/61, pulse 83, temperature 37.9 °C (100.2 °F), resp. rate 18, SpO2 95 %.       Assessment/Plan     Mukesh Garg is a 73 year old male who is admitted with LLE popliteal artery occlusion and bilateral PE. He reports he has a \"loose tooth\" that has been \"placed back\" by his dentist on multiple occasions. On assessment, tooth #14 has grade I mobility. Patient is currently maintained on Heparin gtt. Given current state, extraction could be performed in an outpatient setting and tooth is not an aspiration risk at this time.     OMFS Recs:   - F/U with general dentist for extraction of #14    Contact OMFS with any questions/concerns      Yolanda Honeycutt DMD  OMFS PGY-1  Epic chat/pager [phone]  "

## 2023-11-16 NOTE — SIGNIFICANT EVENT
11/15/23 8741   [place] Fall Risk   History of Falling, Immediate or Within 3 Months 0   Secondary Diagnosis 15   Ambulatory Aid 15   Intravenous Therapy/Heparin Lock 20   Gait/Transferring 10   Mental Status 0   [place] Fall Risk Score 60

## 2023-11-16 NOTE — PROGRESS NOTES
"Mukesh Garg is a pleasant 73 y.o. male on day 1 of admission presenting with Acute lower limb ischemia.    Subjective   No acute events since admission. Doing okay this morning. Pain remains stable.        Objective   Last Recorded Vitals  Blood pressure 109/70, pulse 82, temperature 36.9 °C (98.4 °F), resp. rate 17, height 1.753 m (5' 9\"), weight 111 kg (245 lb), SpO2 96 %.    Intake/Output last 3 Shifts:  I/O last 3 completed shifts:  In: 412.9 (3.7 mL/kg) [I.V.:412.9 (3.7 mL/kg)]  Out: 800 (7.2 mL/kg) [Urine:800 (0.2 mL/kg/hr)]  Weight: 111.1 kg     Physical Exam  Vitals and nursing note reviewed.   Constitutional:       General: He is awake. He is not in acute distress.     Appearance: Normal appearance. He is well-developed and normal weight.   HENT:      Head: Normocephalic.      Right Ear: Hearing normal.      Mouth/Throat:      Mouth: Mucous membranes are moist.   Eyes:      General: Vision grossly intact. No scleral icterus.  Neck:      Vascular: No JVD.      Trachea: No tracheal deviation.   Cardiovascular:      Rate and Rhythm: Normal rate and regular rhythm.      Pulses:           Radial pulses are 2+ on the right side and 2+ on the left side.        Dorsalis pedis pulses are detected w/ Doppler on the right side and detected w/ Doppler on the left side.        Posterior tibial pulses are detected w/ Doppler on the right side and detected w/ Doppler on the left side.   Pulmonary:      Effort: Pulmonary effort is normal.   Chest:      Chest wall: No deformity.   Abdominal:      General: Abdomen is protuberant. There is no distension.   Musculoskeletal:      Cervical back: Full passive range of motion without pain.      Right lower le+ Edema present.      Left lower le+ Pitting Edema present.   Skin:     General: Skin is warm.      Capillary Refill: Capillary refill takes less than 2 seconds.      Findings: Wound present.   Neurological:      General: No focal deficit present.      Mental Status: " He is alert and oriented to person, place, and time.      Cranial Nerves: Cranial nerves 2-12 are intact.      Sensory: Sensation is intact.      Motor: Motor function is intact.   Psychiatric:         Mood and Affect: Mood and affect normal.         Relevant Results  Medications:  Scheduled Meds:allopurinol, 100 mg, oral, Daily  amLODIPine, 10 mg, oral, Daily  atorvastatin, 40 mg, oral, Nightly  carvedilol, 12.5 mg, oral, BID with meals  dicyclomine, 20 mg, oral, Daily  docusate sodium, 100 mg, oral, BID  ezetimibe, 10 mg, oral, Daily  famotidine, 20 mg, oral, Nightly  heparin, 80 Units/kg, intravenous, Once  heparin, 80 Units/kg, intravenous, Once  heparin, 80 Units/kg, intravenous, Once  perflutren protein A microsphere, 0.5 mL, intravenous, Once in imaging  sulfur hexafluoride microsphr, 2 mL, intravenous, Once in imaging      Continuous Infusions:heparin, 0-4,500 Units/hr, Last Rate: 1,500 Units/hr (11/16/23 0505)      PRN Meds:.PRN medications: acetaminophen, heparin, HYDROmorphone    Labs:  Results from last 7 days   Lab Units 11/14/23  1546   SODIUM mmol/L 141   POTASSIUM mmol/L 4.3   CHLORIDE mmol/L 103   BUN mg/dL 24   CREATININE mg/dL 1.30     Results from last 7 days   Lab Units 11/14/23  2318 11/14/23  1546   WBC AUTO x10*3/uL 8.6 8.9   HEMOGLOBIN g/dL 14.0 14.9   HEMATOCRIT % 43.6 46.9   PLATELETS AUTO x10*3/uL 104* 111*       Imaging:  Imaging from the last 24 hours reviewed independently by the vascular surgery team.        Assessment/Plan   Mukesh Garg is a pleasant 73 y.o. male with left lower extremity acute limb ischemia due to popliteal embolism, also with a non-flow limiting CFA/profunda embolus. Medical history notable for MI with CABG x3, DVT, atrial fibrillation, and recent COVID 2 weeks ago. Likely hypercoagulability due to COVID.     Plan:  Will plan for OR tomorrow 11/17/2023 for left popliteal embolectomy, angiogram and possible femoral-tibial bypass.  Needs vein mapping done urgently  today in preparation for the OR.   NPO at midnight  Type and screen with 2 units PRBC on hold for the OR  Continue heparin drip. Will hold on call to the OR  Needs full documentation of risk stratification and optimization for surgery       Patient seen and discussed with the attending, Dr. Pruett.    Roberth Beth MD  Vascular Surgery Fellow  Team Pager [phone]  Personal Pager [phone]  Available on Secure Chat

## 2023-11-16 NOTE — SIGNIFICANT EVENT
11/15/23 1556   Discharge Planning   Living Arrangements Spouse/significant other   Support Systems Spouse/significant other   Assistance Needed walker   Type of Residence Private residence   Number of Stairs to Enter Residence 2   Number of Stairs Within Residence 9   Do you have animals or pets at home? No   Who is requesting discharge planning? Patient   Home or Post Acute Services In home services   Type of Home Care Services Home OT;Home PT   Patient expects to be discharged to: home   Does the patient need discharge transport arranged? No   RoundTrip coordination needed? No   Has discharge transport been arranged? Yes   Financial Resource Strain   How hard is it for you to pay for the very basics like food, housing, medical care, and heating? Not hard   Housing Stability   In the last 12 months, was there a time when you were not able to pay the mortgage or rent on time? N   In the last 12 months, how many places have you lived? 1   In the last 12 months, was there a time when you did not have a steady place to sleep or slept in a shelter (including now)? N   Transportation Needs   In the past 12 months, has lack of transportation kept you from medical appointments or from getting medications? no   In the past 12 months, has lack of transportation kept you from meetings, work, or from getting things needed for daily living? No   Patient Choice   Patient / Family choosing to utilize agency / facility established prior to hospitalization No

## 2023-11-16 NOTE — SIGNIFICANT EVENT
11/15/23 2203   [place] Suicide Severity Rating Scale (Screener/Recent Self-Report)   1. Wish to be Dead (Past 1 Month) N   2. Non-Specific Active Suicidal Thoughts (Past 1 Month) N   6. Suicidal Behavior (Lifetime) N

## 2023-11-16 NOTE — SIGNIFICANT EVENT
Cardiac Risk Estimation:     Surgery - left popliteal embolectomy, angiogram, and possible femoral-tibial bypass     Timing - time sensitive    On my evaluation, patient does not have any evidence of ACS, acute CHF, fatal arrhythmias, or severe valvular disease. On discussion with patient's primary cardiologist, Dr. Wayne found the patient to be stable from a cardiac perspective during an office visit on 11/10.    Most recent EKG reviewed and it shows atrial fibrillation with septal and inferolateral t-wave flattening/inversion, but not evidence of acute ischemia.  Most recent Echo report reviewed and it shows EF 65-70%, normal LV size and function, mildly dilated RV with mod-severely reduced function, and mild AR.    RCRI risk score is 1 (for CAD), placing him at 6% risk of perioperative MACE.  Based on [facility] activity Status Index, functional capacity is >4 METS.    Based on above info, patient is felt to be at elevated but acceptable risk to proceed for surgery.     1. No additional preoperative testing is necessary at this time.  2. Change in medication regimen before surgery: none, continue medication regimen including morning of surgery, with sip of water.  3. Perioperative beta-blocker:  continue home carvedilol .

## 2023-11-16 NOTE — PROGRESS NOTES
"Mukesh Garg is a 73 y.o. male on day 1 of admission presenting with Acute lower limb ischemia.    Subjective   No acute event overnight   I saw the patient today, he slept well last night however, he has been complaining of severe left foot pain 7/10 that was relieved with dilaudid but he started to become nauseous. He was giving Zofran and he will be npo at midnight.        Objective     Physical Exam  Constitutional:       Appearance: Normal appearance. He is obese.   HENT:      Head: Normocephalic and atraumatic.      Nose: Nose normal.   Eyes:      Extraocular Movements: Extraocular movements intact.      Conjunctiva/sclera: Conjunctivae normal.      Pupils: Pupils are equal, round, and reactive to light.   Cardiovascular:      Rate and Rhythm: Normal rate. Rhythm irregular.   Pulmonary:      Effort: Pulmonary effort is normal.      Breath sounds: Normal breath sounds.   Abdominal:      Palpations: Abdomen is soft.   Musculoskeletal:         General: Normal range of motion.      Cervical back: Normal range of motion and neck supple.   Skin:     General: Skin is warm and dry.      Capillary Refill: Capillary refill takes less than 2 seconds.   Neurological:      General: No focal deficit present.      Mental Status: He is alert and oriented to person, place, and time.   Psychiatric:         Mood and Affect: Mood normal.         Behavior: Behavior normal.             Last Recorded Vitals  Blood pressure 105/65, pulse 80, temperature 36.8 °C (98.2 °F), temperature source Temporal, resp. rate 18, height 1.753 m (5' 9\"), weight 111 kg (245 lb), SpO2 95 %.  Intake/Output last 3 Shifts:  I/O last 3 completed shifts:  In: 412.9 (3.7 mL/kg) [I.V.:412.9 (3.7 mL/kg)]  Out: 800 (7.2 mL/kg) [Urine:800 (0.2 mL/kg/hr)]  Weight: 111.1 kg     Relevant Results              Results for orders placed or performed during the hospital encounter of 11/15/23 (from the past 24 hour(s))   PST Top   Result Value Ref Range    Extra Tube " Hold for add-ons.    Heparin Assay, UFH   Result Value Ref Range    Heparin Unfractionated 0.6 See Comment Below for Therapeutic Ranges IU/mL   Heparin Assay, UFH   Result Value Ref Range    Heparin Unfractionated 0.3 See Comment Below for Therapeutic Ranges IU/mL   Heparin Assay, UFH   Result Value Ref Range    Heparin Unfractionated 0.5 See Comment Below for Therapeutic Ranges IU/mL   CBC   Result Value Ref Range    WBC 6.4 4.4 - 11.3 x10*3/uL    nRBC 0.0 0.0 - 0.0 /100 WBCs    RBC 4.55 4.50 - 5.90 x10*6/uL    Hemoglobin 13.5 13.5 - 17.5 g/dL    Hematocrit 43.2 41.0 - 52.0 %    MCV 95 80 - 100 fL    MCH 29.7 26.0 - 34.0 pg    MCHC 31.3 (L) 32.0 - 36.0 g/dL    RDW 14.4 11.5 - 14.5 %    Platelets 107 (L) 150 - 450 x10*3/uL   Comprehensive metabolic panel   Result Value Ref Range    Glucose 140 (H) 74 - 99 mg/dL    Sodium 139 136 - 145 mmol/L    Potassium 4.1 3.5 - 5.3 mmol/L    Chloride 103 98 - 107 mmol/L    Bicarbonate 28 21 - 32 mmol/L    Anion Gap 12 10 - 20 mmol/L    Urea Nitrogen 21 6 - 23 mg/dL    Creatinine 1.20 0.50 - 1.30 mg/dL    eGFR 63 >60 mL/min/1.73m*2    Calcium 8.5 (L) 8.6 - 10.6 mg/dL    Albumin 3.2 (L) 3.4 - 5.0 g/dL    Alkaline Phosphatase 45 33 - 136 U/L    Total Protein 5.3 (L) 6.4 - 8.2 g/dL    AST 11 9 - 39 U/L    Bilirubin, Total 1.8 (H) 0.0 - 1.2 mg/dL    ALT 19 10 - 52 U/L   Magnesium   Result Value Ref Range    Magnesium 1.95 1.60 - 2.40 mg/dL      Lower extremity vein mapping bilateral    Result Date: 11/16/2023            [place] [address]   Tel [phone] and Fax [phone]  Vascular Lab Report [facility] LOWER EXTREMITY VEIN MAPPING BILATERAL  Patient Name:      ESTEPHANIA Gracia Physician:  [id number] Scot Osman DO Study Date:        11/16/2023           Ordering Physician: [id number] MICAH LOPEZ MRN/PID:           [id number]             Technologist:       Leonila Joiner T Accession#:        [id number]         Technologist  2: Date of Birth/Age: 1950 / 73 years Encounter#:         [id number] Gender:            M Admission Status:  Inpatient            Location Performed: [facility]  Diagnosis/ICD: Peripheral vascular disease, unspecified-I73.9; Encounter for                other preprocedural examination-Z01.818 Indication:    Pre-operative exam CPT Codes:     91020 Vein mapping complete  **CRITICAL RESULT** Critical Result: Acute non -occlusive DVT in the right CFV. Notification called to Roberth Beth MD on 11/16/2023 at 9:09:19 AM by Leonila Joiner T.  CONCLUSIONS: Right Lower Venous: There is acute non-occlusive deep vein thrombosis visualized in the common femoral vein. Left Lower Venous: Left common femoral vein is negative for deep vein thrombus. Right Lower Vein Mapping: Right lower extremity vein: The right great saphenous vein is positive for acute SVT from the saphenofemoral junction thru the mid calf. The distal calf great saphenous vein is fibrotic. Left Lower Vein Mapping: The left distal thigh great saphenous, proximal calf great saphenous, mid calf great saphenous and distal calf great saphenous were not evaluated due to previously being harvested. Left lower extremity vein: The left great saphenous vein appears widely patent with no evidence of thrombosis or fibrosis from the proximal thru mid thigh.  Imaging & Doppler Findings:  Right          Compress Thrombus SFJ               No     Acute Prox Thigh GSV    No     Acute Mid Thigh GSV            Acute Knee GSV          No     Acute Prox Calf GSV     No     Acute Mid Calf GSV      No     Acute Dist Calf GSV    Yes    Fibrotic  Left           Compress Thrombus  Diam SFJ              Yes      None   10.0 mm Prox Thigh GSV   Yes      None   5.2 mm Mid Thigh GSV    Yes      None   4.0 mm  Right Compressible      Thrombus              Flow CFV     Partial    Acute non-occlusive Spontaneous/Phasic  Left Compress Thrombus        Flow CFV    Yes      None    Spontaneous/Phasic  [id number] Osman  Electronically signed by Lucas Osman  on 11/16/2023 at 11:20:36 AM  ** Final **     Upper extremity vein mapping bilateral    Result Date: 11/16/2023            [place] [address]   Tel [phone] and Fax [phone]  Vascular Lab Report [facility] US UPPER EXTREMITY VEIN MAPPING BILATERAL  Patient Name:      ESTEPHANIA ELIZABETH         Reading Physician:  [id number] Scotmanas Osman  Study Date:        11/16/2023           Ordering Physician: [id number] LOPEZ MRN/PID:           [id number]             Technologist:       Leonila Joiner T Accession#:        [id number]         Technologist 2: Date of Birth/Age: 1950 / 73 years Encounter#:         [id number] Gender:            M Admission Status:  Inpatient            Location Performed: [facility]  Diagnosis/ICD: Peripheral vascular disease, unspecified-I73.9 CPT Codes:     42956 Vein mapping complete  CONCLUSIONS: Left Upper Vein Mapping: Left upper extremity vein: The basilic vein in the upper arm begins at the mid upper arm. The basilic vein appears widely patent iwth no evidence of thrombosis or fibrosis from the mid upper arm to the distal forearm. The cephalic vein appears widely patent with no evidence of thrombosis or fibrosis from the proximal upper arm to the proximal forearm. The cephalic vein was not visualized from mid to distal forearm. Right Upper Vein Mapping: Right upper extremity vein: Unable to evaluate the cephalic vein in the forearm due to IV lines. The cephalic vein appears widely patent with no evidence of thrombosis or fibrosis in the upper arm. The basilic vein appears widely patent with no evidence of thrombosis or fibrosis from the proximal upper arm to the mid forearm.  Imaging & Doppler Findings:  Right                Compress Thrombus  Diam Cephalic Prox Arm      Yes      None   4.4 mm Cephalic Mid Arm       Yes      None   4.7 mm  Cephalic Distal Arm    Yes      None   4.6 mm Basilic Prox Arm       Yes      None   6.7 mm Basilic Mid Arm        Yes      None   4.3 mm Basilic Distal Arm     Yes      None   5.1 mm Basilic Prox Forearm   Yes      None   2.4 mm Basilic Mid Forearm    Yes      None   2.3 mm  Left                   Compress Thrombus  Diam Cephalic Prox Arm        Yes      None   2.2 mm Cephalic Mid Arm         Yes      None   2.8 mm Cephalic Distal Arm      Yes      None   2.2 mm Cephalic Prox Forearm    Yes      None   2.5 mm Basilic Mid Arm          Yes      None   8.1 mm Basilic Distal Arm       Yes      None   5.0 mm Basilic Prox Forearm     Yes      None   5.4 mm Basilic Mid Forearm      Yes      None   3.1 mm Basilic Distal Forearm   Yes      None   2.9 mm  [id number] Scot Osman DO Electronically signed by [id number] Osman DO on 11/16/2023 at 11:19:53 AM  ** Final **     Transthoracic Echo (TTE) Complete    Result Date: 11/15/2023   [facility], [address] [place] [address]                Tel [phone] and Fax [phone] TRANSTHORACIC ECHOCARDIOGRAM REPORT  Patient Name:      ESTEPHANIA Gracia Physician:   [id number] Monisha He MD Study Date:        11/15/2023          Ordering Provider:   [id number] MICAH LOPEZ MRN/PID:           [id number]            Fellow: Accession#:        [id number]        Nurse:               Kathy Eduardo RN Date of Birth/Age: 1950 / 73      Sonographer:         Gordon cordova RDCS Gender:            M                   Additional Staff: Height:            177.80 cm           Admit Date:          11/15/2023 Weight:            108.86 kg           Admission Status:    Inpatient - STAT BSA:               2.26 m2             Encounter#:          [id number]                                        Department Location: [facility] Blood Pressure: 92 /64 mmHg Study Type:    TRANSTHORACIC ECHO (TTE) COMPLETE  Diagnosis/ICD: Multiple subsegmental pulmonary emboli without acute cor                pulmonale-I26.94 Indication:    multiple subsegmental pulmonary emboli without acute cor                pulmonale CPT Code:      Echo Complete w Full Doppler-91210 Patient History: Pertinent History: DVTs, PE, HTN, CABG x3, Afib w/ RVR, HLD, MI. Study Detail: The following Echo studies were performed: 2D, M-Mode, Doppler and               color flow. Technically challenging study due to body habitus,               patient lying in supine position, poor acoustic windows and               prominent lung artifact. Definity used as a contrast agent for               endocardial border definition and agitated saline used as a               contrast agent for intraseptal flow evaluation. Total contrast               used for this procedure was 4.0 mL via IV push.  PHYSICIAN INTERPRETATION: Left Ventricle: The left ventricular systolic function is normal, with an estimated ejection fraction of 65-70%. The patient is in atrial fibrillation which may influence the estimate of left ventricular function and transvalvular flows. There are no regional wall motion abnormalities. The left ventricular cavity size is normal. There is paradoxical septal wall motion. Left ventricular diastolic filling was indeterminate. Left Atrium: The left atrium is upper limits of normal in size. Color Doppler flow in the region of the atrial septum suggestive of possible small left to right shunt. Agitated saline contrast study was technically difficult but may have demonstrated some bubbles within the LV ( unclear) and exact timing of their appearance is unclear. Right Ventricle: The right ventricle is mildly enlarged. There is moderate to severely reduced right ventricular systolic function. The RV appears upper limits of normal size vs mildly dilated with moderate to severely reduced fx with a McConnels sign consistent iwth pulmonary embolus. Right Atrium:  The right atrium is mildly dilated. Aortic Valve: The aortic valve is trileaflet. There is minimal aortic valve cusp calcification. There is mild aortic valve regurgitation. The peak instantaneous gradient of the aortic valve is 7.4 mmHg. Mitral Valve: The mitral valve is normal in structure. There is mild mitral valve regurgitation. Tricuspid Valve: The tricuspid valve is structurally normal. There is mild tricuspid regurgitation. The Doppler estimated RVSP is mildly elevated at 36.1 mmHg. Pulmonic Valve: The pulmonic valve is not well visualized. There is physiologic pulmonic valve regurgitation. Pericardium: There is a trivial pericardial effusion. Aorta: The aortic root is abnormal. There is mild dilatation of the ascending aorta. There is mild dilatation of the aortic root. Systemic Veins: The inferior vena cava appears dilated. There is IVC inspiratory collapse greater than 50%. In comparison to the previous echocardiogram(s): Compared with the images from the prior TTE done 10/25/2023 there was already RV systolic dysfunction with possible McConnels sign at that time.  CONCLUSIONS:  1. Left ventricular systolic function is normal with a 65-70% estimated ejection fraction.  2. The patient is in atrial fibrillation which may influence the estimate of left ventricular function and transvalvular flows.  3. The RV appears upper limits of normal size vs mildly dilated with moderate to severely reduced fx with a McConnels sign consistent iwth pulmonary embolus.  4. Color Doppler flow in the region of the atrial septum suggestive of possible small left to right shunt. Agitated saline contrast study was technically difficult but may have demonstrated some bubbles within the LV ( unclear) and exact timing of their appearance is unclear.  5. Mildly elevated RVSP.  6. Mild aortic valve regurgitation.  7. Findings discussed with the ER team at the time of reporting.  8. Compared with the images from the prior TTE done  10/25/2023 there was already RV systolic dysfunction with possible McConnels sign at that time. QUANTITATIVE DATA SUMMARY: 2D MEASUREMENTS:                          Normal Ranges: Ao Root d:     3.90 cm   (2.0-3.7cm) LAs:           4.80 cm   (2.7-4.0cm) IVSd:          1.10 cm   (0.6-1.1cm) LVPWd:         1.10 cm   (0.6-1.1cm) LVIDd:         4.90 cm   (3.9-5.9cm) LVIDs:         3.30 cm LV Mass Index: 88.9 g/m2 LV % FS        32.7 % LA VOLUME:                               Normal Ranges: LA Vol A4C:        71.6 ml    (22+/-6mL/m2) LA Vol A2C:        76.6 ml LA Vol BP:         76.3 ml LA Vol Index A4C:  31.7ml/m2 LA Vol Index A2C:  34.0 ml/m2 LA Vol Index BP:   33.8 ml/m2 LA Area A4C:       24.4 cm2 LA Area A2C:       26.0 cm2 LA Major Axis A4C: 7.1 cm LA Major Axis A2C: 7.5 cm LA Volume Index:   33.7 ml/m2 RA VOLUME BY A/L METHOD:                       Normal Ranges: RA Area A4C: 19.4 cm2 AORTA MEASUREMENTS:                      Normal Ranges: Ao Sinus, d: 3.80 cm (2.1-3.5cm) Asc Ao, d:   3.60 cm (2.1-3.4cm) LV SYSTOLIC FUNCTION BY 2D PLANIMETRY (MOD):                     Normal Ranges: EF-A4C View: 69.6 % (>=55%) EF-A2C View: 66.6 % EF-Biplane:  67.1 % LV DIASTOLIC FUNCTION:                     Normal Ranges: MV Peak E: 0.98 m/s (0.7-1.2 m/s) MV DT:     190 msec (150-240 msec) MITRAL VALVE:                 Normal Ranges: MV DT: 190 msec (150-240msec) AORTIC VALVE:                         Normal Ranges: AoV Vmax:      1.36 m/s (<=1.7m/s) AoV Peak P.4 mmHg (<20mmHg) LVOT Max Mark:  0.84 m/s (<=1.1m/s) LVOT VTI:      16.90 cm LVOT Diameter: 1.90 cm  (1.8-2.4cm) AoV Area,Vmax: 1.74 cm2 (2.5-4.5cm2)  RIGHT VENTRICLE: RV Basal 4.20 cm RV Mid   3.00 cm RV Major 7.2 cm TAPSE:   6.1 mm RV s'    0.06 m/s TRICUSPID VALVE/RVSP:                             Normal Ranges: Peak TR Velocity: 2.65 m/s RV Syst Pressure: 36.1 mmHg (< 30mmHg) IVC Diam:         2.30 cm PULMONIC VALVE:                         Normal Ranges: PV Accel  Time: 65 msec  (>120ms) PV Max Mark:    0.8 m/s  (0.6-0.9m/s) PV Max P.8 mmHg  [id number] Monisha He MD Electronically signed on 11/15/2023 at 3:36:33 PM  ** Final **     CT angio chest for pulmonary embolism    Result Date: 11/15/2023  Interpreted By:  Wesley Avilez, STUDY: CT ANGIO CHEST FOR PULMONARY EMBOLISM;  11/15/2023 2:45 am   INDICATION: Signs/Symptoms:PE with RH strain from OSH.   COMPARISON: CT chest 2023   ACCESSION NUMBER(S): [id number]   ORDERING CLINICIAN: EMILI AMATO   TECHNIQUE: Contiguous axial images of the chest were obtained after the intravenous administration of 90 mL Omnipaque 350 contrast using angiographic PE protocol. Coronal and sagittal reformatted images were reconstructed from the axial data. MIP images were created on an independent workstation and reviewed.   FINDINGS: PULMONARY ARTERIES: Adequate opacification to the level of the subsegmental arteries. Filling defect within the distal main pulmonary artery extending into the right middle lobar artery interlobar artery and lower lobar arteries. Additional nonocclusive filling defects are noted within the segmental and subsegmental arteries of the left lower lobe. The main pulmonary artery is enlarged at 3.2 cm. Left ventricular right ventricular ratio of approximately 1 and straightening of the interventricular septum. Reflux of contrast into the hepatic venous circuit. These findings suggest right heart strain.   HEART: Mild 4 chamber cardiomegaly. Moderate triple-vessel coronary vascular calcifications. Postsurgical changes suggestive of coronary artery bypass grafting. Please note this examination is not optimized for the assessment of the coronary arteries or bypass grafts. No significant pericardial effusion.   VESSELS: Normal caliber aorta without dissection. Mild calcifications of the aortic arch and proximal great vessels.   MEDIASTINUM AND LYMPH NODES: Visualized thyroid is within normal limits. No enlarged  intrathoracic or axillary lymph nodes by imaging criteria. No pneumomediastinum. The esophagus is within normal limits. Small hiatal hernia.   LUNG, AIRWAYS, AND PLEURA: Trachea and proximal mainstem bronchi are patent. Subsegmental atelectatic changes in the bilateral lung bases. No pleural effusion or pneumothorax.   OSSEOUS STRUCTURES: No acute osseous abnormality. Multilevel degenerative changes of the thoracic spine. Median sternotomy changes.   CHEST WALL SOFT TISSUES: No discernible abnormality.   UPPER ABDOMEN/OTHER: Cholelithiasis. Left upper pole renal cyst.       1. Bilateral pulmonary emboli with moderate clot burden in the distal right main pulmonary artery extending into the right middle and right lower lobes. Small clot burden in the left lower lobe pulmonary arteries. 2. Findings suggestive of right heart strain, correlate with echocardiogram. 3. Additional findings and discussion as above.   MACRO: Wesley Avilez discussed the significance and urgency of this critical finding by telephone with Dr. Leonid Sahu  on 11/15/2023 at 2:58 am. (**-RCF-**) Findings:  See findings.   Signed by: Wesley Avilez 11/15/2023 3:02 AM Dictation workstation:   [id number]    Point of Care Ultrasound    Result Date: 11/15/2023  Leonid Sahu MD     11/15/2023  7:30 AM Performed by: Leonid Sahu MD Authorized by: Francisco Frey MD  Cardiac Indications: Assessment of right heart strain in patient with PE Procedure: Cardiac Ultrasound Findings:  Views: parasternal long, parasternal short, apical four and subxiphoid The pericardial space was visualized and was NEGATIVE for a significant pericardial effusion. Activity: Ventricular contractions were visualized. LV: LV systolic function was NORMAL. RV: RV size was NORMAL. Impression: Cardiac: The focused cardiac ultrasound exam had ABNORMAL findings as specified. Comments: No D sign.  RV mildly dilated but still smaller in diameter than LV.  Some ventricular septal bounce  in parasternal short and apical four-chamber.  TAPSE measured at 9 mm.    CT angio lower extremity left w and or wo IV contrast    Result Date: 11/14/2023  Interpreted By:  Roland Dubon, STUDY: CT ANGIO LOWER EXTREMITY LEFT W AND OR WO IV CONTRAST;  11/14/2023 6:35 pm   INDICATION: Signs/Symptoms:pain, concern for arterial occlusion.   COMPARISON: None.   ACCESSION NUMBER(S): [id number]   ORDERING CLINICIAN: GENNA ARCE   TECHNIQUE: Multiple contiguous axial images from the left lower chest through the left lower extremity were obtained after the administration of contrast material in the arterial phase. Coronal and sagittal reformats were submitted for review.       Atherosclerotic disease of the left common iliac, femoral and popliteal arteries. There is an intraluminal filling defect involving the popliteal artery, which eventually causes popliteal artery occlusion. There are no significant collateral vessels identified. There is however reconstitution of flow to the tibialis posterior, anterior and peroneal arteries, which demonstrate enhancement. There is a however very tenuous flow to the tibialis anterior and peroneal arteries at the level of the ankle, extending distally.   Visualized soft tissues of the chest, abdomen and pelvis demonstrate a left renal cyst.   There is soft tissue thickening around the left lower extremity, beginning at the level of the distal tibial metaphysis, with extension along the dorsum of the foot.   MACRO: Occlusion of the left popliteal artery. There is reconstitution of flow to the left tibialis anterior, tibialis posterior and peroneal vessels. There is however very tenuous flow to the left tibialis anterior and peroneal arteries at the level of the left ankle, extending distally.   Signed by: Roland Dubon 11/14/2023 10:56 PM Dictation workstation:   [id number]                 Assessment/Plan   Principal Problem:    Acute lower limb ischemia  Active Problems:    Multiple  subsegmental pulmonary emboli without acute cor pulmonale (CMS/HCC)    Mukesh Garg is a 73 y.o. male with PMHx of Afib, HTN, JENNY (on CPAP at home), gout, CABG x3 vessels (2013), recurrent DVTs  (hold eliq) and peripheral neuropathy. Presented with Acute left LE limb ischemia 2/2 popliteal artery occlusion and when worked up found to have bilateral PE on CT. Currently is being managed for acute L LE ischemia and PE on heparin drip.          #Occlusion of L-popliteal artery  ##CAD  #PAD  - On physical exam, patient's left leg showed pallor, increasingly shiny, and was severely tender. This with the CT angio which showed extensive occlusion of the left common iliac, femoral and popliteal artery. Along with the LE blood flow was reconstituted to the tibialis posterior, anterior and peroneal arteries, are indicative of atherosclerotic build up and points to severe LE ischemia. For this, left popliteal embolectomy was recommended to reestablish blood flow.    -He has a Hx of CABG in 2013 Coronary artery bypass grafting x 3 , partial left sided MAZE,   (LIMA to a large Diag, a free ALEX y'd off the LIMA to the LAD , SVG-PDA)  - Hx of PCI 2006 (emergent for STEMI): DESx1 to prox diag, DESx2 to mid and distal LAD     Plan:  -Vascular surgery was consulted. Recommended:  - continue the heparin gtt and they will take him to the   - OR tomorrow for for left popliteal embolectomy, angiogram and possible femoral-tibial bypass.   - NPO at midnight   -Vascular Medicine consulted, appreciate recs  -C/w heparin gtt (HI)          ## PE   ##Hx of DVT  -Hx of provoked DVT on 10/24/2023 and also 20 years ago in the right leg after long flight  - He has bilateral pulmonary emboli with moderate clot burden in the distal right main pulmonary artery extending into the right middle and right lower lobes and small clot burden in the left lower lobe pulmonary with a suggestive right ventricular stain.   -Has been taking Apixaban since  10/24/2023 and been complaint but now stopped and is on heparin gtt  -Vitally stable on admission without increased O2 requirement however he desated yesterday (11/15) while sleeping to 87% and was placed on 2 L NS (patient is known case of JENNY and on CPAP at night), family brought his CPAP machine and currently sating well on RA.   -There is no clear etiology for hypercoaguable status but probably related to   Covid infection that he had recently on 10/24/2023    - patient had been on warfarin for many years for atrial fibrillation. INR was subtherapeutic during last admission (10/24/2023) as patient had not been taking his medications due to symptomatic COVID infection. He was initially being bridged back to warfarin, but decision was made to switch to apixaban on discharge. He has been fully adherent to apixaban since then. In addition, CTA chest had not been performed during last admission, so the chronicity of PE is not clear        Plan:  - Cont. High intensity Heparin gtt   - Hold Eliquis while on heparin gtt  - Vascular Medicine consulted, appreciate recs  - B2 glycoprotein ab and cardiolipin ab labs all were WNL  - patient had his colonoscopy 2 years ago and was normal will consider possible malignancy workup.             #Atrial fibrillation  ##Hypertension  #DLD  - Patient has extensive previous hx of afib, on admition he had irregular rhythm and the EKG showed no P waves  - patient denied any chest pain dyspnea and with negative trops, thus more inline with afib with rvr    Plan:   -Continue home Carvedilol 12.5 BID   -Continue home Amlodipine 10 mg   -Continue home chlorthalidone 25 mg    -Hold home Lisinopril 40 mg   - continue home medication atorvastatin 40 mg   - continue home medication Ezetimibe 10 mg        ##Loose teethe  ## Minimal bleeding   - OMFS consulted for tooth extractions as the patient has a probable loose tooth. They recommended to  extraction in the outpatient setting with a dentist  as they think it has no risk of aspiration.     OMFS Recs:   - F/U with general dentist for extraction of #14           ##Gout  Continue home allopurinol 100 mg           F: Replete PRN  E: Replete PRN  N:  cardiac diet (NPO at midnight)   DVT Ppx: On Heparin drip  GI Ppx: None  Access/Lines: PIV  Abx: none           Code status: Full Code  Emergency contact: Sweetie Garg (Spouse) # [phone]                  Varinder Boogie MD

## 2023-11-16 NOTE — SIGNIFICANT EVENT
11/15/23 7092   Advance Directives (For Healthcare)   Have you reviewed your Advance Directive and is it valid for this stay? Yes   Advance Directive Patient has advance directive, copy not in chart   Advance Directive not in Chart Copy requested from family   Information Provided on Healthcare Directives No   Pre-existing DNR/DNI Order No   Patient Requests Assistance Yes, will do independently

## 2023-11-16 NOTE — SIGNIFICANT EVENT
11/15/23 2158   Pneumococcal Vaccine Screen - Year Round   Have you ever had a pneumonia vaccination? Yes   Pneumovax Indications if Previously Immunized Received vaccine less than 1 year ago - pneumonia vaccine not indicated at this time   Influenza Vaccine Screen - September 1 Through April 1   Influenza Vaccine Contraindications/Refused Previously immunized this flu season

## 2023-11-16 NOTE — PROGRESS NOTES
11/16/23 1322 Transitional Care Coordinator Notes:    Met with patient and family to discuss discharge needs. Patient lives at home with spouse. Patient uses a cane or walker for ambulation assistance. Patient has lot of family support. Patient confirmed PCP is Dr. Lorenza Garcia.    Patient is scheduled for a left popliteal embolectomy, angiogram, and possible femoral tibial bypass by Vascular tomorrow. Patient is currently on a heparin gtt but will eventually transition to Warfarin. PT/OT will evaluate after surgery for discharge needs. Will continue to follow for discharge updates.                       Assessment/Plan   Principal Problem:    Acute lower limb ischemia  Active Problems:    Multiple subsegmental pulmonary emboli without acute cor pulmonale (CMS/HCC)    Discharge Plans: TASH Barrera RN

## 2023-11-16 NOTE — SIGNIFICANT EVENT
11/15/23 0552   Patient Belongings at Bedside   Belongings at Bedside Vision;Electronic devices   Vision - Corrective Lenses Glasses   Patient Electronics Cell phone;   Patient Belongings Sent Home   Belongings Sent Home Jewelry   Jewelry Ring;Bracelet   Patient Belongings Sent to Safe/With Protective Services   Belongings Sent to Safe/With Protective Services  No belongings   Patient Medications   Medications brought by patient? No

## 2023-11-16 NOTE — SIGNIFICANT EVENT
11/15/23 221   Patient Specific Goals   Patient-stated reason for hospitalization: Blood Clots   Patient Goal for Admission not to have blood clots   Clinical Goals for the Shift Patient will remain safe this shift

## 2023-11-16 NOTE — SIGNIFICANT EVENT
11/15/23 2207   Able to Complete Psychiatric Screening   Were you able to complete all the behavioral health screenings? Yes   Abuse Screen   Abuse Screen Adult   Are you or have you been threatened or abused physically, emotionally, or sexually by anyone? No   Has anyone ever threatened to hurt your family or your pets? No   Does anyone try to keep you from having/contacting other friends or doing things outside your home? No   Do you feel UNSAFE going back to the place where you are living? No   Do you feel anyone has exploited or taken advantage of you financially or of your personal property? No   Are there any apparent signs of injuries/behaviors that could be related to abuse/neglect? No   Trauma/Abuse Assessment   Physical Abuse Denies   Verbal Abuse Denies   Drug Screening   Have you used any substances (canabis, cocaine, heroin, hallucinogens, inhalants, etc.) in the past 12 months? No   Have you used any prescription drugs other than prescribed in the past 12 months? No   Is a toxicology screen needed? No   Audit Alcohol Screening   Q1: How often do you have a drink containing alcohol? Monthly or l   Q2: How many drinks containing alcohol do you have on a typical day when you are drinking? 1 or 2   Q3: How often do you have six or more drinks on one occasion? Never   Audit-C Score 1   Over the past 2 weeks, how often have you been bothered by any of the following problems?   Little interest or pleasure in doing things Not at all   Feeling down, depressed, or hopeless Not at all   Have you had thoughts of harming anyone else? No   Values/Beliefs   Cultural Requests During Hospitalization na   Spiritual Requests During Hospitalization na   Consults    Consult Needed No   Spiritual Care Consult Needed No

## 2023-11-17 ENCOUNTER — APPOINTMENT (OUTPATIENT)
Dept: RADIOLOGY | Facility: HOSPITAL | Age: 73
DRG: 252 | End: 2023-11-17
Payer: COMMERCIAL

## 2023-11-17 LAB
ABO GROUP (TYPE) IN BLOOD: NORMAL
ANTIBODY SCREEN: NORMAL
RH FACTOR (ANTIGEN D): NORMAL

## 2023-11-17 PROCEDURE — 2500000004 HC RX 250 GENERAL PHARMACY W/ HCPCS (ALT 636 FOR OP/ED): Performed by: SURGERY

## 2023-11-17 PROCEDURE — 2500000004 HC RX 250 GENERAL PHARMACY W/ HCPCS (ALT 636 FOR OP/ED): Performed by: STUDENT IN AN ORGANIZED HEALTH CARE EDUCATION/TRAINING PROGRAM

## 2023-11-17 PROCEDURE — 2500000004 HC RX 250 GENERAL PHARMACY W/ HCPCS (ALT 636 FOR OP/ED)

## 2023-11-17 PROCEDURE — 2500000001 HC RX 250 WO HCPCS SELF ADMINISTERED DRUGS (ALT 637 FOR MEDICARE OP)

## 2023-11-17 PROCEDURE — 75710 ARTERY X-RAYS ARM/LEG: CPT | Performed by: SURGERY

## 2023-11-17 PROCEDURE — C1887 CATHETER, GUIDING: HCPCS | Performed by: SURGERY

## 2023-11-17 PROCEDURE — 3600000004 HC OR TIME - INITIAL BASE CHARGE - PROCEDURE LEVEL FOUR: Performed by: SURGERY

## 2023-11-17 PROCEDURE — 88304 TISSUE EXAM BY PATHOLOGIST: CPT | Performed by: PATHOLOGY

## 2023-11-17 PROCEDURE — 36415 COLL VENOUS BLD VENIPUNCTURE: CPT | Performed by: STUDENT IN AN ORGANIZED HEALTH CARE EDUCATION/TRAINING PROGRAM

## 2023-11-17 PROCEDURE — 2060000001 HC INTERMEDIATE ICU ROOM DAILY

## 2023-11-17 PROCEDURE — 2720000007 HC OR 272 NO HCPCS: Performed by: SURGERY

## 2023-11-17 PROCEDURE — 04CN0ZZ EXTIRPATION OF MATTER FROM LEFT POPLITEAL ARTERY, OPEN APPROACH: ICD-10-PCS | Performed by: SURGERY

## 2023-11-17 PROCEDURE — 2500000005 HC RX 250 GENERAL PHARMACY W/O HCPCS: Performed by: SURGERY

## 2023-11-17 PROCEDURE — 85347 COAGULATION TIME ACTIVATED: CPT

## 2023-11-17 PROCEDURE — 2500000005 HC RX 250 GENERAL PHARMACY W/O HCPCS: Performed by: STUDENT IN AN ORGANIZED HEALTH CARE EDUCATION/TRAINING PROGRAM

## 2023-11-17 PROCEDURE — C1762 CONN TISS, HUMAN(INC FASCIA): HCPCS | Performed by: SURGERY

## 2023-11-17 PROCEDURE — 04CQ0ZZ EXTIRPATION OF MATTER FROM LEFT ANTERIOR TIBIAL ARTERY, OPEN APPROACH: ICD-10-PCS | Performed by: SURGERY

## 2023-11-17 PROCEDURE — 7100000001 HC RECOVERY ROOM TIME - INITIAL BASE CHARGE: Performed by: SURGERY

## 2023-11-17 PROCEDURE — 3600000009 HC OR TIME - EACH INCREMENTAL 1 MINUTE - PROCEDURE LEVEL FOUR: Performed by: SURGERY

## 2023-11-17 PROCEDURE — 2550000001 HC RX 255 CONTRASTS: Performed by: SURGERY

## 2023-11-17 PROCEDURE — 04CU0ZZ EXTIRPATION OF MATTER FROM LEFT PERONEAL ARTERY, OPEN APPROACH: ICD-10-PCS | Performed by: SURGERY

## 2023-11-17 PROCEDURE — C1757 CATH, THROMBECTOMY/EMBOLECT: HCPCS | Performed by: SURGERY

## 2023-11-17 PROCEDURE — 04CL0ZZ EXTIRPATION OF MATTER FROM LEFT FEMORAL ARTERY, OPEN APPROACH: ICD-10-PCS | Performed by: SURGERY

## 2023-11-17 PROCEDURE — 3700000002 HC GENERAL ANESTHESIA TIME - EACH INCREMENTAL 1 MINUTE: Performed by: SURGERY

## 2023-11-17 PROCEDURE — 36620 INSERTION CATHETER ARTERY: CPT | Performed by: STUDENT IN AN ORGANIZED HEALTH CARE EDUCATION/TRAINING PROGRAM

## 2023-11-17 PROCEDURE — 7100000002 HC RECOVERY ROOM TIME - EACH INCREMENTAL 1 MINUTE: Performed by: SURGERY

## 2023-11-17 PROCEDURE — 3700000001 HC GENERAL ANESTHESIA TIME - INITIAL BASE CHARGE: Performed by: SURGERY

## 2023-11-17 PROCEDURE — 2780000003 HC OR 278 NO HCPCS: Performed by: SURGERY

## 2023-11-17 PROCEDURE — 88304 TISSUE EXAM BY PATHOLOGIST: CPT | Mod: TC,SUR | Performed by: STUDENT IN AN ORGANIZED HEALTH CARE EDUCATION/TRAINING PROGRAM

## 2023-11-17 PROCEDURE — 85520 HEPARIN ASSAY: CPT | Performed by: STUDENT IN AN ORGANIZED HEALTH CARE EDUCATION/TRAINING PROGRAM

## 2023-11-17 PROCEDURE — 88304 TISSUE EXAM BY PATHOLOGIST: CPT | Mod: TC | Performed by: STUDENT IN AN ORGANIZED HEALTH CARE EDUCATION/TRAINING PROGRAM

## 2023-11-17 PROCEDURE — 2500000001 HC RX 250 WO HCPCS SELF ADMINISTERED DRUGS (ALT 637 FOR MEDICARE OP): Performed by: SURGERY

## 2023-11-17 PROCEDURE — 04UL0KZ SUPPLEMENT LEFT FEMORAL ARTERY WITH NONAUTOLOGOUS TISSUE SUBSTITUTE, OPEN APPROACH: ICD-10-PCS | Performed by: SURGERY

## 2023-11-17 PROCEDURE — C1769 GUIDE WIRE: HCPCS | Performed by: SURGERY

## 2023-11-17 DEVICE — XENOSURE BIOLOGIC PATCH, 0.8CM X 8CM, EIFU
Type: IMPLANTABLE DEVICE | Site: ARTERIAL | Status: FUNCTIONAL
Brand: XENOSURE BIOLOGIC PATCH

## 2023-11-17 RX ORDER — NALOXONE HYDROCHLORIDE 0.4 MG/ML
0.2 INJECTION, SOLUTION INTRAMUSCULAR; INTRAVENOUS; SUBCUTANEOUS EVERY 5 MIN PRN
Status: DISCONTINUED | OUTPATIENT
Start: 2023-11-17 | End: 2023-11-27 | Stop reason: HOSPADM

## 2023-11-17 RX ORDER — OXYCODONE HYDROCHLORIDE 5 MG/1
2.5 TABLET ORAL EVERY 4 HOURS PRN
Status: DISCONTINUED | OUTPATIENT
Start: 2023-11-17 | End: 2023-11-19

## 2023-11-17 RX ORDER — ACETAMINOPHEN 325 MG/1
650 TABLET ORAL EVERY 6 HOURS SCHEDULED
Status: DISCONTINUED | OUTPATIENT
Start: 2023-11-17 | End: 2023-11-17 | Stop reason: SDUPTHER

## 2023-11-17 RX ORDER — PHENYLEPHRINE HYDROCHLORIDE 10 MG/ML
INJECTION INTRAVENOUS AS NEEDED
Status: DISCONTINUED | OUTPATIENT
Start: 2023-11-17 | End: 2023-11-17

## 2023-11-17 RX ORDER — ONDANSETRON HYDROCHLORIDE 2 MG/ML
4 INJECTION, SOLUTION INTRAVENOUS ONCE AS NEEDED
Status: COMPLETED | OUTPATIENT
Start: 2023-11-17 | End: 2023-11-17

## 2023-11-17 RX ORDER — PHENYLEPHRINE HCL IN 0.9% NACL 0.4MG/10ML
SYRINGE (ML) INTRAVENOUS AS NEEDED
Status: DISCONTINUED | OUTPATIENT
Start: 2023-11-17 | End: 2023-11-17

## 2023-11-17 RX ORDER — OXYCODONE HYDROCHLORIDE 5 MG/1
5 TABLET ORAL EVERY 4 HOURS PRN
Status: DISCONTINUED | OUTPATIENT
Start: 2023-11-17 | End: 2023-11-17 | Stop reason: HOSPADM

## 2023-11-17 RX ORDER — LIDOCAINE HYDROCHLORIDE 10 MG/ML
INJECTION INFILTRATION; PERINEURAL AS NEEDED
Status: DISCONTINUED | OUTPATIENT
Start: 2023-11-17 | End: 2023-11-17

## 2023-11-17 RX ORDER — FENTANYL CITRATE 50 UG/ML
INJECTION, SOLUTION INTRAMUSCULAR; INTRAVENOUS AS NEEDED
Status: DISCONTINUED | OUTPATIENT
Start: 2023-11-17 | End: 2023-11-17

## 2023-11-17 RX ORDER — METOPROLOL TARTRATE 1 MG/ML
INJECTION, SOLUTION INTRAVENOUS AS NEEDED
Status: DISCONTINUED | OUTPATIENT
Start: 2023-11-17 | End: 2023-11-17

## 2023-11-17 RX ORDER — PROPOFOL 10 MG/ML
INJECTION, EMULSION INTRAVENOUS AS NEEDED
Status: DISCONTINUED | OUTPATIENT
Start: 2023-11-17 | End: 2023-11-17

## 2023-11-17 RX ORDER — OXYCODONE HYDROCHLORIDE 5 MG/1
10 TABLET ORAL EVERY 4 HOURS PRN
Status: DISCONTINUED | OUTPATIENT
Start: 2023-11-17 | End: 2023-11-17 | Stop reason: HOSPADM

## 2023-11-17 RX ORDER — CEFAZOLIN 1 G/1
INJECTION, POWDER, FOR SOLUTION INTRAVENOUS AS NEEDED
Status: DISCONTINUED | OUTPATIENT
Start: 2023-11-17 | End: 2023-11-17

## 2023-11-17 RX ORDER — ONDANSETRON HYDROCHLORIDE 2 MG/ML
INJECTION, SOLUTION INTRAVENOUS AS NEEDED
Status: DISCONTINUED | OUTPATIENT
Start: 2023-11-17 | End: 2023-11-17

## 2023-11-17 RX ORDER — OXYCODONE HYDROCHLORIDE 5 MG/1
5 TABLET ORAL EVERY 6 HOURS PRN
Status: DISCONTINUED | OUTPATIENT
Start: 2023-11-17 | End: 2023-11-19

## 2023-11-17 RX ORDER — HYDROMORPHONE HYDROCHLORIDE 1 MG/ML
0.5 INJECTION, SOLUTION INTRAMUSCULAR; INTRAVENOUS; SUBCUTANEOUS EVERY 5 MIN PRN
Status: DISCONTINUED | OUTPATIENT
Start: 2023-11-17 | End: 2023-11-17 | Stop reason: HOSPADM

## 2023-11-17 RX ORDER — IODIXANOL 320 MG/ML
INJECTION, SOLUTION INTRAVASCULAR AS NEEDED
Status: DISCONTINUED | OUTPATIENT
Start: 2023-11-17 | End: 2023-11-17 | Stop reason: HOSPADM

## 2023-11-17 RX ORDER — SODIUM CHLORIDE, SODIUM LACTATE, POTASSIUM CHLORIDE, CALCIUM CHLORIDE 600; 310; 30; 20 MG/100ML; MG/100ML; MG/100ML; MG/100ML
100 INJECTION, SOLUTION INTRAVENOUS CONTINUOUS
Status: DISCONTINUED | OUTPATIENT
Start: 2023-11-17 | End: 2023-11-17 | Stop reason: HOSPADM

## 2023-11-17 RX ORDER — DEXAMETHASONE SODIUM PHOSPHATE 100 MG/10ML
INJECTION INTRAMUSCULAR; INTRAVENOUS AS NEEDED
Status: DISCONTINUED | OUTPATIENT
Start: 2023-11-17 | End: 2023-11-17

## 2023-11-17 RX ORDER — NALOXONE HYDROCHLORIDE 0.4 MG/ML
0.2 INJECTION, SOLUTION INTRAMUSCULAR; INTRAVENOUS; SUBCUTANEOUS EVERY 5 MIN PRN
Status: DISCONTINUED | OUTPATIENT
Start: 2023-11-17 | End: 2023-11-17 | Stop reason: SDUPTHER

## 2023-11-17 RX ORDER — HYDROMORPHONE HYDROCHLORIDE 1 MG/ML
0.2 INJECTION, SOLUTION INTRAMUSCULAR; INTRAVENOUS; SUBCUTANEOUS EVERY 5 MIN PRN
Status: DISCONTINUED | OUTPATIENT
Start: 2023-11-17 | End: 2023-11-17 | Stop reason: HOSPADM

## 2023-11-17 RX ORDER — ROCURONIUM BROMIDE 10 MG/ML
INJECTION, SOLUTION INTRAVENOUS AS NEEDED
Status: DISCONTINUED | OUTPATIENT
Start: 2023-11-17 | End: 2023-11-17

## 2023-11-17 RX ORDER — HYDROMORPHONE HYDROCHLORIDE 1 MG/ML
0.2 INJECTION, SOLUTION INTRAMUSCULAR; INTRAVENOUS; SUBCUTANEOUS EVERY 4 HOURS PRN
Status: DISCONTINUED | OUTPATIENT
Start: 2023-11-17 | End: 2023-11-17 | Stop reason: SDUPTHER

## 2023-11-17 RX ORDER — HEPARIN SODIUM 1000 [USP'U]/ML
INJECTION, SOLUTION INTRAVENOUS; SUBCUTANEOUS AS NEEDED
Status: DISCONTINUED | OUTPATIENT
Start: 2023-11-17 | End: 2023-11-17

## 2023-11-17 RX ORDER — CEFAZOLIN SODIUM 2 G/100ML
2 INJECTION, SOLUTION INTRAVENOUS EVERY 8 HOURS
Status: COMPLETED | OUTPATIENT
Start: 2023-11-17 | End: 2023-11-18

## 2023-11-17 RX ORDER — SODIUM CHLORIDE, SODIUM LACTATE, POTASSIUM CHLORIDE, CALCIUM CHLORIDE 600; 310; 30; 20 MG/100ML; MG/100ML; MG/100ML; MG/100ML
INJECTION, SOLUTION INTRAVENOUS CONTINUOUS PRN
Status: DISCONTINUED | OUTPATIENT
Start: 2023-11-17 | End: 2023-11-17

## 2023-11-17 RX ORDER — LABETALOL HYDROCHLORIDE 5 MG/ML
5 INJECTION, SOLUTION INTRAVENOUS ONCE AS NEEDED
Status: DISCONTINUED | OUTPATIENT
Start: 2023-11-17 | End: 2023-11-17 | Stop reason: HOSPADM

## 2023-11-17 RX ADMIN — HEPARIN SODIUM 5000 UNITS: 1000 INJECTION, SOLUTION INTRAVENOUS; SUBCUTANEOUS at 10:29

## 2023-11-17 RX ADMIN — FAMOTIDINE 20 MG: 20 TABLET, FILM COATED ORAL at 20:46

## 2023-11-17 RX ADMIN — ATORVASTATIN CALCIUM 40 MG: 40 TABLET, FILM COATED ORAL at 20:46

## 2023-11-17 RX ADMIN — HYDROMORPHONE HYDROCHLORIDE 0.4 MG: 1 INJECTION, SOLUTION INTRAMUSCULAR; INTRAVENOUS; SUBCUTANEOUS at 13:45

## 2023-11-17 RX ADMIN — PROPOFOL 50 MG: 10 INJECTION, EMULSION INTRAVENOUS at 09:12

## 2023-11-17 RX ADMIN — HEPARIN SODIUM 10000 UNITS: 1000 INJECTION, SOLUTION INTRAVENOUS; SUBCUTANEOUS at 10:05

## 2023-11-17 RX ADMIN — HEPARIN SODIUM 4000 UNITS: 1000 INJECTION, SOLUTION INTRAVENOUS; SUBCUTANEOUS at 11:57

## 2023-11-17 RX ADMIN — DOCUSATE SODIUM 100 MG: 100 CAPSULE, LIQUID FILLED ORAL at 20:46

## 2023-11-17 RX ADMIN — METOPROLOL TARTRATE 2 MG: 1 INJECTION, SOLUTION INTRAVENOUS at 09:18

## 2023-11-17 RX ADMIN — CEFAZOLIN SODIUM 2 G: 2 INJECTION, SOLUTION INTRAVENOUS at 21:07

## 2023-11-17 RX ADMIN — ROCURONIUM BROMIDE 20 MG: 50 INJECTION, SOLUTION INTRAVENOUS at 10:13

## 2023-11-17 RX ADMIN — HEPARIN SODIUM 3000 UNITS: 1000 INJECTION, SOLUTION INTRAVENOUS; SUBCUTANEOUS at 11:29

## 2023-11-17 RX ADMIN — ROCURONIUM BROMIDE 20 MG: 50 INJECTION, SOLUTION INTRAVENOUS at 12:47

## 2023-11-17 RX ADMIN — METOPROLOL TARTRATE 3 MG: 1 INJECTION, SOLUTION INTRAVENOUS at 08:53

## 2023-11-17 RX ADMIN — HEPARIN SODIUM 5000 UNITS: 1000 INJECTION, SOLUTION INTRAVENOUS; SUBCUTANEOUS at 10:38

## 2023-11-17 RX ADMIN — ROCURONIUM BROMIDE 20 MG: 50 INJECTION, SOLUTION INTRAVENOUS at 10:49

## 2023-11-17 RX ADMIN — HYDROMORPHONE HYDROCHLORIDE 0.2 MG: 1 INJECTION, SOLUTION INTRAMUSCULAR; INTRAVENOUS; SUBCUTANEOUS at 20:45

## 2023-11-17 RX ADMIN — CEFAZOLIN 2 G: 1 INJECTION, POWDER, FOR SOLUTION INTRAMUSCULAR; INTRAVENOUS at 12:57

## 2023-11-17 RX ADMIN — SODIUM CHLORIDE 6.25 MG: 9 INJECTION, SOLUTION INTRAVENOUS at 16:45

## 2023-11-17 RX ADMIN — DEXAMETHASONE SODIUM PHOSPHATE 4 MG: 10 INJECTION INTRAMUSCULAR; INTRAVENOUS at 08:16

## 2023-11-17 RX ADMIN — LIDOCAINE HYDROCHLORIDE 100 MG: 10 INJECTION, SOLUTION INFILTRATION; PERINEURAL at 08:16

## 2023-11-17 RX ADMIN — ANTITHROMBIN III (HUMAN) 607 UNITS: KIT at 10:21

## 2023-11-17 RX ADMIN — ONDANSETRON 4 MG: 2 INJECTION INTRAMUSCULAR; INTRAVENOUS at 14:21

## 2023-11-17 RX ADMIN — HEPARIN SODIUM 3000 UNITS: 1000 INJECTION, SOLUTION INTRAVENOUS; SUBCUTANEOUS at 10:46

## 2023-11-17 RX ADMIN — HEPARIN SODIUM 1500 UNITS/HR: 10000 INJECTION, SOLUTION INTRAVENOUS at 18:59

## 2023-11-17 RX ADMIN — SODIUM CHLORIDE, POTASSIUM CHLORIDE, SODIUM LACTATE AND CALCIUM CHLORIDE: 600; 310; 30; 20 INJECTION, SOLUTION INTRAVENOUS at 07:58

## 2023-11-17 RX ADMIN — Medication 80 MCG: at 13:06

## 2023-11-17 RX ADMIN — ROCURONIUM BROMIDE 50 MG: 50 INJECTION, SOLUTION INTRAVENOUS at 08:16

## 2023-11-17 RX ADMIN — ROCURONIUM BROMIDE 20 MG: 50 INJECTION, SOLUTION INTRAVENOUS at 11:22

## 2023-11-17 RX ADMIN — HEPARIN SODIUM 1500 UNITS/HR: 10000 INJECTION, SOLUTION INTRAVENOUS at 03:51

## 2023-11-17 RX ADMIN — SUGAMMADEX 200 MG: 100 INJECTION, SOLUTION INTRAVENOUS at 14:21

## 2023-11-17 RX ADMIN — HEPARIN SODIUM 11000 UNITS: 1000 INJECTION, SOLUTION INTRAVENOUS; SUBCUTANEOUS at 09:47

## 2023-11-17 RX ADMIN — FENTANYL CITRATE 50 MCG: 50 INJECTION, SOLUTION INTRAMUSCULAR; INTRAVENOUS at 08:16

## 2023-11-17 RX ADMIN — Medication: at 20:45

## 2023-11-17 RX ADMIN — CARVEDILOL 12.5 MG: 12.5 TABLET, FILM COATED ORAL at 20:48

## 2023-11-17 RX ADMIN — Medication 80 MCG: at 12:47

## 2023-11-17 RX ADMIN — PHENYLEPHRINE HYDROCHLORIDE 40 MCG: 10 INJECTION INTRAVENOUS at 08:16

## 2023-11-17 RX ADMIN — ONDANSETRON 4 MG: 2 INJECTION INTRAMUSCULAR; INTRAVENOUS at 14:51

## 2023-11-17 RX ADMIN — PROPOFOL 150 MG: 10 INJECTION, EMULSION INTRAVENOUS at 08:16

## 2023-11-17 RX ADMIN — HYDROMORPHONE HYDROCHLORIDE 0.2 MG: 1 INJECTION, SOLUTION INTRAMUSCULAR; INTRAVENOUS; SUBCUTANEOUS at 14:30

## 2023-11-17 RX ADMIN — FENTANYL CITRATE 50 MCG: 50 INJECTION, SOLUTION INTRAMUSCULAR; INTRAVENOUS at 09:17

## 2023-11-17 RX ADMIN — ROCURONIUM BROMIDE 30 MG: 50 INJECTION, SOLUTION INTRAVENOUS at 09:12

## 2023-11-17 RX ADMIN — CEFAZOLIN 2 G: 1 INJECTION, POWDER, FOR SOLUTION INTRAMUSCULAR; INTRAVENOUS at 08:56

## 2023-11-17 RX ADMIN — HYDROMORPHONE HYDROCHLORIDE 0.4 MG: 1 INJECTION, SOLUTION INTRAMUSCULAR; INTRAVENOUS; SUBCUTANEOUS at 14:21

## 2023-11-17 ASSESSMENT — COGNITIVE AND FUNCTIONAL STATUS - GENERAL
DRESSING REGULAR LOWER BODY CLOTHING: A LITTLE
WALKING IN HOSPITAL ROOM: A LITTLE
HELP NEEDED FOR BATHING: A LITTLE
MOVING FROM LYING ON BACK TO SITTING ON SIDE OF FLAT BED WITH BEDRAILS: A LITTLE
STANDING UP FROM CHAIR USING ARMS: A LITTLE
MOVING TO AND FROM BED TO CHAIR: A LITTLE
CLIMB 3 TO 5 STEPS WITH RAILING: A LITTLE
MOBILITY SCORE: 18
TOILETING: A LITTLE
TURNING FROM BACK TO SIDE WHILE IN FLAT BAD: A LITTLE
DRESSING REGULAR UPPER BODY CLOTHING: A LITTLE
DAILY ACTIVITIY SCORE: 20

## 2023-11-17 ASSESSMENT — PAIN - FUNCTIONAL ASSESSMENT
PAIN_FUNCTIONAL_ASSESSMENT: 0-10

## 2023-11-17 ASSESSMENT — PAIN SCALES - GENERAL
PAINLEVEL_OUTOF10: 2
PAIN_LEVEL: 3
PAINLEVEL_OUTOF10: 5 - MODERATE PAIN
PAINLEVEL_OUTOF10: 2
PAINLEVEL_OUTOF10: 6
PAINLEVEL_OUTOF10: 5 - MODERATE PAIN
PAINLEVEL_OUTOF10: 7
PAINLEVEL_OUTOF10: 2

## 2023-11-17 ASSESSMENT — PAIN DESCRIPTION - DESCRIPTORS
DESCRIPTORS: ACHING
DESCRIPTORS: SORE
DESCRIPTORS: ACHING
DESCRIPTORS: ACHING

## 2023-11-17 NOTE — ANESTHESIA PROCEDURE NOTES
Arterial Line:    Date/Time: 11/17/2023 8:20 AM    Staffing  Performed: resident   Authorized by: Leah Burrows MD    Performed by: Carlos Dimas MD    An arterial line was placed. Procedure performed using surface landmarks.in the OR for the following indication(s): continuous blood pressure monitoring and blood sampling needed.    A 20 gauge (size), 1 and 3/4 inch (length), Angiocath (type) catheter was placed into the Left radial artery, secured by Tegaderm,   Seldinger technique used.  Events:  patient tolerated procedure well with no complications.

## 2023-11-17 NOTE — CARE PLAN
Problem: Pain - Adult  Goal: Verbalizes/displays adequate comfort level or baseline comfort level  Outcome: Progressing     Problem: Safety - Adult  Goal: Free from fall injury  Outcome: Progressing     Problem: Discharge Planning  Goal: Discharge to home or other facility with appropriate resources  Outcome: Progressing     Problem: Chronic Conditions and Co-morbidities  Goal: Patient's chronic conditions and co-morbidity symptoms are monitored and maintained or improved  Outcome: Progressing     Problem: Fall/Injury  Goal: Verbalize understanding of personal risk factors for fall in the hospital  Outcome: Progressing  Goal: Verbalize understanding of risk factor reduction measures to prevent injury from fall in the home  Outcome: Progressing  Goal: Use assistive devices by end of the shift  Outcome: Progressing  Goal: Pace activities to prevent fatigue by end of the shift  Outcome: Progressing     Problem: Cardiovascular - Adult  Goal: Maintains optimal cardiac output and hemodynamic stability  Outcome: Progressing  Goal: Absence of cardiac dysrhythmias or at baseline  Outcome: Progressing     Problem: Musculoskeletal - Adult  Goal: Return mobility to safest level of function  Outcome: Progressing  Goal: Maintain proper alignment of affected body part  Outcome: Progressing  Goal: Return ADL status to a safe level of function  Outcome: Progressing     Problem: Fall/Injury  Goal: Not fall by end of shift  Outcome: Met  Goal: Be free from injury by end of the shift  Outcome: Met     The patient's goals for the shift include patient will have good sleep by the end of the shift.    The clinical goals for the shift include patient will report less paiin 2/10 by the end of the shift    Over the shift, the patient did make progress toward the following goals. Patient was safe and stable throughout the shift.patient is in NPO for procedure called popliteal arterty embolectomy.patient's IV heparin was held before he went  to OR.

## 2023-11-17 NOTE — ANESTHESIA PREPROCEDURE EVALUATION
Patient: Mukesh Garg    Procedure Information       Date/Time: 11/17/23 0715    Procedures:       Popliteal artery embolectomy, leftlower extremity four compartment fasciotomy, possible angiogram, possible femoral-distal bypass (Left)      Aortogram    Location: [facility] OR  / [facility] OR    Surgeons: Monika Pruett MD            Relevant Problems   Cardiovascular   (+) ASHD (arteriosclerotic heart disease)   (+) Acute lower limb ischemia   (+) Atrial fibrillation (CMS/HCC)   (+) Atrial fibrillation with RVR (CMS/HCC)   (+) Coronary artery disease   (+) Hyperlipidemia   (+) Hypertension   (+) Multiple subsegmental pulmonary emboli without acute cor pulmonale (CMS/HCC)   (+) Stenosis of right carotid artery      Neuro/Psych   (+) Stenosis of right carotid artery      Pulmonary   (+) Multiple subsegmental pulmonary emboli without acute cor pulmonale (CMS/HCC)      GI/Hepatic   (+) Calculus of gallbladder without cholecystitis without obstruction      Musculoskeletal   (+) Osteoarthritis of left hip   (+) Osteoarthritis of right hip      Infectious Disease   (+) COVID       Clinical information reviewed:   Tobacco  Allergies  Meds   Med Hx  Surg Hx   Fam Hx  Soc Hx        NPO Detail:  NPO/Void Status  Date of Last Liquid: 11/17/23  Time of Last Liquid: 0000  Date of Last Solid: 11/17/23  Time of Last Solid: 0000  Last Intake Type: Clear fluids         Physical Exam    Airway  Mallampati: II  TM distance: >3 FB  Neck ROM: full     Cardiovascular   Rhythm: regular  Rate: normal     Dental    Pulmonary   Breath sounds clear to auscultation     Abdominal            Anesthesia Plan    ASA 3     general     Patient was not previously instructed to abstain from smoking on day of procedure.  Patient did not smoke on day of procedure.    intravenous induction   Postoperative administration of opioids is intended.  Use of blood products discussed with patient who.

## 2023-11-17 NOTE — ANESTHESIA POSTPROCEDURE EVALUATION
Patient: Mukesh Garg    Procedure Summary       Date: 11/17/23 Room / Location: [facility] OR 27 / [facility] OR    Anesthesia Start: 0808 Anesthesia Stop:     Procedures:       Popliteal artery embolectomy, leftlower extremity four compartment fasciotomy, possible angiogram, possible femoral-distal bypass (Left)      Aortogram Diagnosis:       Acute lower limb ischemia      (Acute lower limb ischemia [I99.8])    Surgeons: Monika Pruett MD Responsible Provider: Leah Burrows MD    Anesthesia Type: general ASA Status: 3            Anesthesia Type: general    Vitals Value Taken Time   /79 11/17/23 1434   Temp 36.1 11/17/23 1434   Pulse 77 11/17/23 1434   Resp 12 11/17/23 1434   SpO2 100 11/17/23 1434       Anesthesia Post Evaluation    Patient location during evaluation: PACU  Patient participation: complete - patient cannot participate  Level of consciousness: awake  Pain score: 3  Pain management: adequate  Airway patency: patent  Cardiovascular status: acceptable  Respiratory status: acceptable  Hydration status: acceptable  Postoperative Nausea and Vomiting: none        No notable events documented.

## 2023-11-17 NOTE — ANESTHESIA PROCEDURE NOTES
Airway  Date/Time: 11/17/2023 8:57 AM  Urgency: elective    Airway not difficult    Staffing  Performed: resident   Authorized by: Leah Burrows MD    Performed by: Carlos Dimas MD  Patient location during procedure: OR    Indications and Patient Condition  Indications for airway management: anesthesia  Spontaneous Ventilation: absent  Sedation level: deep  Preoxygenated: yes  Patient position: sniffing  Mask difficulty assessment: 1 - vent by mask  Planned trial extubation    Final Airway Details  Final airway type: endotracheal airway      Successful airway: ETT  Cuffed: yes   Successful intubation technique: video laryngoscopy  Facilitating devices/methods: intubating stylet  Endotracheal tube insertion site: oral  Blade: Jamison  Blade size: #4  ETT size (mm): 7.5  Cormack-Lehane Classification: grade I - full view of glottis  Placement verified by: chest auscultation and capnometry   Inital cuff pressure (cm H2O): 10  Measured from: lips  ETT to lips (cm): 23  Number of attempts at approach: 1    Additional Comments  April

## 2023-11-17 NOTE — BRIEF OP NOTE
Date: 2023  OR Location: [facility] OR    Name: Mukesh Garg, : 1950, Age: 73 y.o., MRN: [id number], Sex: male    Diagnosis  Pre-op Diagnosis     * Acute lower limb ischemia [I99.8] Post-op Diagnosis     * Acute lower limb ischemia [I99.8]     Procedures  Popliteal artery embolectomy, leftlower extremity four compartment fasciotomy, possible angiogram, possible femoral-distal bypass  [id number] - CO EMBLC/THRMBC [id number] ART LEG INC    Aortogram  [id number] - HC AORTOGRAM ABD W SER      Surgeons      * Monika Pruett - Primary    Resident/Fellow/Other Assistant:  Surgeon(s) and Role:     * Moises Burroughs MD - Assisting     * Roberth Beth MD - Fellow    Procedure Summary  Anesthesia: General  ASA: III  Anesthesia Staff: Anesthesiologist: Leah Burrows MD  CRNA: MISTY Patel-CRNA  Anesthesia Resident: Carlos Dimas MD  Estimated Blood Loss: 200 mL  Intra-op Medications: * Intraprocedure medication information is unavailable because the case start and end events have not been set *           Anesthesia Record               Intraprocedure I/O Totals          Intake    Propofol Drip 0.00 mL    The total shown is the total volume documented since Anesthesia Start was filed.    lactated Ringer's 1800.00 mL    Total Intake 1800 mL       Output    Urine 150 mL    Est. Blood Loss 200 mL    Total Output 350 mL       Net    Net Volume 1450 mL          Specimen:   ID Type Source Tests Collected by Time   1 : LEFT LEG CLOT Tissue CLOT/THROMBUS SURGICAL PATHOLOGY EXAM Monika Pruett MD 2023 1230        Staff:   Circulator: Nasim Costello RN; Pavan Cabrera RN  Relief Scrub: Monse Leon RN  Scrub Person: Pavan Cabrera RN          Findings: Femoral and popliteal embolus extending down to the tibials.     Complications:  None; patient tolerated the procedure well.     Disposition: PACU - hemodynamically stable.  Condition: stable  Specimens Collected:   ID Type Source Tests Collected  by Time   1 : LEFT LEG CLOT Tissue CLOT/THROMBUS SURGICAL PATHOLOGY EXAM Monika Pruett MD 11/17/2023 1230     Attending Attestation: I was present and scrubbed for the entire procedure.    Monika Pruett  Phone Number: [phone]

## 2023-11-17 NOTE — ANESTHESIA PROCEDURE NOTES
Peripheral IV  Date/Time: 11/17/2023 8:35 AM      Placement  Needle size: 16 G  Laterality: left  Location: hand  Local anesthetic: none  Site prep: chlorhexidine  Technique: anatomical landmarks  Attempts: 1

## 2023-11-17 NOTE — NURSING NOTE
Patient is scheduled for Popliteal artery embolectomy, leftlower extremity four compartment fasciotomy, possible angiogram, possible femoral-distal bypass .Patient pre OP checklist was documented. Chlorhexidine bath was given to patient . Vital signs of patient were stable.

## 2023-11-17 NOTE — PERIOPERATIVE NURSING NOTE
1620 report from Hiram RN  1630 pt c/o continued nausea, PACU resident contacted for orders. Phenergan order entered. Administered per order.  1630 begin q1hr pulse checks and q1hr vital signs  1645 family at bedside  1815 Arterial line pulled without complication, pressure dressing applied  1820 report called to T7  1830 MD at bedside  1835 patient sent to floor, family notified

## 2023-11-17 NOTE — ANESTHESIA PROCEDURE NOTES
Peripheral IV  Date/Time: 11/17/2023 8:45 AM      Placement  Needle size: 18 G  Laterality: left  Location: antecubital  Local anesthetic: none  Site prep: chlorhexidine  Technique: anatomical landmarks  Attempts: 1

## 2023-11-18 LAB
ACT BLD: 109 SEC (ref 89–169)
ACT BLD: 115 SEC (ref 89–169)
ACT BLD: 115 SEC (ref 89–169)
ACT BLD: 118 SEC (ref 89–169)
ACT BLD: 124 SEC (ref 89–169)
ACT BLD: 146 SEC (ref 89–169)
ACT BLD: 177 SEC (ref 89–169)
ACT BLD: 188 SEC (ref 89–169)
ACT BLD: 239 SEC (ref 89–169)
ACT BLD: 250 SEC (ref 89–169)
ACT BLD: 254 SEC (ref 89–169)
ACT BLD: 274 SEC (ref 89–169)
ACT BLD: 275 SEC (ref 89–169)
ACT BLD: 281 SEC (ref 89–169)
ACT BLD: 314 SEC (ref 89–169)
ALBUMIN SERPL BCP-MCNC: 2.8 G/DL (ref 3.4–5)
ANION GAP SERPL CALC-SCNC: 15 MMOL/L (ref 10–20)
BUN SERPL-MCNC: 21 MG/DL (ref 6–23)
CALCIUM SERPL-MCNC: 8.3 MG/DL (ref 8.6–10.6)
CHLORIDE SERPL-SCNC: 104 MMOL/L (ref 98–107)
CO2 SERPL-SCNC: 24 MMOL/L (ref 21–32)
CREAT SERPL-MCNC: 1.19 MG/DL (ref 0.5–1.3)
ERYTHROCYTE [DISTWIDTH] IN BLOOD BY AUTOMATED COUNT: 13.9 % (ref 11.5–14.5)
GFR SERPL CREATININE-BSD FRML MDRD: 64 ML/MIN/1.73M*2
GLUCOSE SERPL-MCNC: 145 MG/DL (ref 74–99)
HCT VFR BLD AUTO: 37.5 % (ref 41–52)
HGB BLD-MCNC: 11.9 G/DL (ref 13.5–17.5)
MCH RBC QN AUTO: 30.1 PG (ref 26–34)
MCHC RBC AUTO-ENTMCNC: 31.7 G/DL (ref 32–36)
MCV RBC AUTO: 95 FL (ref 80–100)
NRBC BLD-RTO: 0 /100 WBCS (ref 0–0)
PHOSPHATE SERPL-MCNC: 3.8 MG/DL (ref 2.5–4.9)
PLATELET # BLD AUTO: 149 X10*3/UL (ref 150–450)
POTASSIUM SERPL-SCNC: 4.6 MMOL/L (ref 3.5–5.3)
RBC # BLD AUTO: 3.96 X10*6/UL (ref 4.5–5.9)
SODIUM SERPL-SCNC: 138 MMOL/L (ref 136–145)
UFH PPP CHRO-ACNC: 0.5 IU/ML
UFH PPP CHRO-ACNC: 0.6 IU/ML
UFH PPP CHRO-ACNC: 0.8 IU/ML
UFH PPP CHRO-ACNC: 0.9 IU/ML
WBC # BLD AUTO: 8.9 X10*3/UL (ref 4.4–11.3)

## 2023-11-18 PROCEDURE — 2500000004 HC RX 250 GENERAL PHARMACY W/ HCPCS (ALT 636 FOR OP/ED): Performed by: STUDENT IN AN ORGANIZED HEALTH CARE EDUCATION/TRAINING PROGRAM

## 2023-11-18 PROCEDURE — 85520 HEPARIN ASSAY: CPT

## 2023-11-18 PROCEDURE — 36415 COLL VENOUS BLD VENIPUNCTURE: CPT

## 2023-11-18 PROCEDURE — 2060000001 HC INTERMEDIATE ICU ROOM DAILY

## 2023-11-18 PROCEDURE — 85520 HEPARIN ASSAY: CPT | Performed by: STUDENT IN AN ORGANIZED HEALTH CARE EDUCATION/TRAINING PROGRAM

## 2023-11-18 PROCEDURE — 99233 SBSQ HOSP IP/OBS HIGH 50: CPT | Performed by: STUDENT IN AN ORGANIZED HEALTH CARE EDUCATION/TRAINING PROGRAM

## 2023-11-18 PROCEDURE — 2500000004 HC RX 250 GENERAL PHARMACY W/ HCPCS (ALT 636 FOR OP/ED): Performed by: SURGERY

## 2023-11-18 PROCEDURE — 2500000001 HC RX 250 WO HCPCS SELF ADMINISTERED DRUGS (ALT 637 FOR MEDICARE OP): Performed by: STUDENT IN AN ORGANIZED HEALTH CARE EDUCATION/TRAINING PROGRAM

## 2023-11-18 PROCEDURE — 5A09357 ASSISTANCE WITH RESPIRATORY VENTILATION, LESS THAN 24 CONSECUTIVE HOURS, CONTINUOUS POSITIVE AIRWAY PRESSURE: ICD-10-PCS | Performed by: STUDENT IN AN ORGANIZED HEALTH CARE EDUCATION/TRAINING PROGRAM

## 2023-11-18 PROCEDURE — 2500000004 HC RX 250 GENERAL PHARMACY W/ HCPCS (ALT 636 FOR OP/ED)

## 2023-11-18 PROCEDURE — 2500000005 HC RX 250 GENERAL PHARMACY W/O HCPCS

## 2023-11-18 PROCEDURE — 85027 COMPLETE CBC AUTOMATED: CPT

## 2023-11-18 PROCEDURE — 2500000002 HC RX 250 W HCPCS SELF ADMINISTERED DRUGS (ALT 637 FOR MEDICARE OP, ALT 636 FOR OP/ED)

## 2023-11-18 PROCEDURE — 99223 1ST HOSP IP/OBS HIGH 75: CPT | Performed by: STUDENT IN AN ORGANIZED HEALTH CARE EDUCATION/TRAINING PROGRAM

## 2023-11-18 PROCEDURE — 2500000001 HC RX 250 WO HCPCS SELF ADMINISTERED DRUGS (ALT 637 FOR MEDICARE OP): Performed by: SURGERY

## 2023-11-18 PROCEDURE — 2500000001 HC RX 250 WO HCPCS SELF ADMINISTERED DRUGS (ALT 637 FOR MEDICARE OP)

## 2023-11-18 PROCEDURE — 36415 COLL VENOUS BLD VENIPUNCTURE: CPT | Performed by: STUDENT IN AN ORGANIZED HEALTH CARE EDUCATION/TRAINING PROGRAM

## 2023-11-18 PROCEDURE — 80069 RENAL FUNCTION PANEL: CPT

## 2023-11-18 PROCEDURE — 99233 SBSQ HOSP IP/OBS HIGH 50: CPT | Performed by: INTERNAL MEDICINE

## 2023-11-18 RX ORDER — KETOROLAC TROMETHAMINE 15 MG/ML
15 INJECTION, SOLUTION INTRAMUSCULAR; INTRAVENOUS EVERY 6 HOURS
Status: DISCONTINUED | OUTPATIENT
Start: 2023-11-18 | End: 2023-11-19

## 2023-11-18 RX ORDER — CLOPIDOGREL BISULFATE 75 MG/1
75 TABLET ORAL DAILY
Status: DISCONTINUED | OUTPATIENT
Start: 2023-11-18 | End: 2023-11-27 | Stop reason: HOSPADM

## 2023-11-18 RX ADMIN — CEFAZOLIN SODIUM 2 G: 2 INJECTION, SOLUTION INTRAVENOUS at 06:04

## 2023-11-18 RX ADMIN — AMLODIPINE BESYLATE 10 MG: 10 TABLET ORAL at 08:20

## 2023-11-18 RX ADMIN — HYDROMORPHONE HYDROCHLORIDE 0.2 MG: 1 INJECTION, SOLUTION INTRAMUSCULAR; INTRAVENOUS; SUBCUTANEOUS at 03:35

## 2023-11-18 RX ADMIN — KETOROLAC TROMETHAMINE 15 MG: 15 INJECTION, SOLUTION INTRAMUSCULAR; INTRAVENOUS at 23:43

## 2023-11-18 RX ADMIN — CLOPIDOGREL BISULFATE 75 MG: 75 TABLET ORAL at 11:26

## 2023-11-18 RX ADMIN — CARVEDILOL 12.5 MG: 12.5 TABLET, FILM COATED ORAL at 17:42

## 2023-11-18 RX ADMIN — FAMOTIDINE 20 MG: 20 TABLET, FILM COATED ORAL at 20:47

## 2023-11-18 RX ADMIN — CARVEDILOL 12.5 MG: 12.5 TABLET, FILM COATED ORAL at 08:20

## 2023-11-18 RX ADMIN — DOCUSATE SODIUM 100 MG: 100 CAPSULE, LIQUID FILLED ORAL at 08:21

## 2023-11-18 RX ADMIN — HYDROMORPHONE HYDROCHLORIDE 0.2 MG: 1 INJECTION, SOLUTION INTRAMUSCULAR; INTRAVENOUS; SUBCUTANEOUS at 08:51

## 2023-11-18 RX ADMIN — EZETIMIBE 10 MG: 10 TABLET ORAL at 08:20

## 2023-11-18 RX ADMIN — HEPARIN SODIUM 1100 UNITS/HR: 10000 INJECTION, SOLUTION INTRAVENOUS at 12:59

## 2023-11-18 RX ADMIN — OXYCODONE HYDROCHLORIDE 5 MG: 5 TABLET ORAL at 13:19

## 2023-11-18 RX ADMIN — ALLOPURINOL 100 MG: 100 TABLET ORAL at 08:21

## 2023-11-18 RX ADMIN — ATORVASTATIN CALCIUM 40 MG: 40 TABLET, FILM COATED ORAL at 20:46

## 2023-11-18 RX ADMIN — KETOROLAC TROMETHAMINE 15 MG: 15 INJECTION, SOLUTION INTRAMUSCULAR; INTRAVENOUS at 18:21

## 2023-11-18 RX ADMIN — DOCUSATE SODIUM 100 MG: 100 CAPSULE, LIQUID FILLED ORAL at 20:47

## 2023-11-18 RX ADMIN — ONDANSETRON HYDROCHLORIDE 4 MG: 4 TABLET, FILM COATED ORAL at 08:50

## 2023-11-18 ASSESSMENT — COGNITIVE AND FUNCTIONAL STATUS - GENERAL
TURNING FROM BACK TO SIDE WHILE IN FLAT BAD: A LITTLE
MOBILITY SCORE: 18
DRESSING REGULAR UPPER BODY CLOTHING: A LITTLE
DAILY ACTIVITIY SCORE: 20
MOBILITY SCORE: 18
WALKING IN HOSPITAL ROOM: A LITTLE
WALKING IN HOSPITAL ROOM: A LITTLE
CLIMB 3 TO 5 STEPS WITH RAILING: A LITTLE
STANDING UP FROM CHAIR USING ARMS: A LITTLE
TOILETING: A LITTLE
HELP NEEDED FOR BATHING: A LITTLE
MOVING TO AND FROM BED TO CHAIR: A LITTLE
TOILETING: A LITTLE
HELP NEEDED FOR BATHING: A LITTLE
MOVING FROM LYING ON BACK TO SITTING ON SIDE OF FLAT BED WITH BEDRAILS: A LITTLE
DRESSING REGULAR LOWER BODY CLOTHING: A LITTLE
DRESSING REGULAR UPPER BODY CLOTHING: A LITTLE
MOVING FROM LYING ON BACK TO SITTING ON SIDE OF FLAT BED WITH BEDRAILS: A LITTLE
CLIMB 3 TO 5 STEPS WITH RAILING: A LITTLE
DRESSING REGULAR LOWER BODY CLOTHING: A LITTLE
DAILY ACTIVITIY SCORE: 20
MOVING TO AND FROM BED TO CHAIR: A LITTLE
TURNING FROM BACK TO SIDE WHILE IN FLAT BAD: A LITTLE
STANDING UP FROM CHAIR USING ARMS: A LITTLE

## 2023-11-18 ASSESSMENT — PAIN - FUNCTIONAL ASSESSMENT
PAIN_FUNCTIONAL_ASSESSMENT: 0-10

## 2023-11-18 ASSESSMENT — PAIN SCALES - GENERAL
PAINLEVEL_OUTOF10: 6
PAINLEVEL_OUTOF10: 7
PAINLEVEL_OUTOF10: 3
PAINLEVEL_OUTOF10: 6
PAINLEVEL_OUTOF10: 5 - MODERATE PAIN
PAINLEVEL_OUTOF10: 8

## 2023-11-18 ASSESSMENT — VISUAL ACUITY: OU: 1

## 2023-11-18 ASSESSMENT — PAIN DESCRIPTION - DESCRIPTORS: DESCRIPTORS: ACHING

## 2023-11-18 NOTE — H&P (VIEW-ONLY)
Inpatient consult to Cardiology  Consult performed by: Matt Mendez MD  Consult ordered by: Patricia Borges MD                                                                            Cardiology Consult Note    Reason For Consult  Concern for L to R shunt    History Of Present Illness  Mukesh Garg is a 73 y.o. male with a PMH of CAD s/p PCIx3 and CABGx3 (LIMA-Diag, free ALEX y to LAD, SVG-PDA, 2013), afib s/p MAZE (2013), and recent admission for R DVT iso COVID infection (10/24/23) on apixaban who presented with severe LLE pain and was found to have a LLE popliteal artery occlusion and bilateral PE. Underwent thromboembolectomy on 11/17.    Cardiology consulted due to concern for L to R shunt.    At bedside, patient has no new complaints. Reported an active lifestyle. Does occasional teaching job. No chest pain, SOB, dizziness, presyncope or syncope.    Past Medical History  He has a past medical history of Atrial fibrillation (CMS/[facility]), CAD (coronary artery disease), Gout, Heart disease, Hyperlipidemia, Hypertension, MI (myocardial infarction) (CMS/[facility]), and Sleep apnea.    Surgical History  He has a past surgical history that includes MR angio head wo IV contrast (03/21/2017); MR angio head wo IV contrast (10/30/2020); Coronary artery bypass graft; and CT angio lower extremity left w and or wo IV contrast (Left, 11/14/2023).     Social History  Social History     Tobacco Use    Smoking status: Never     Passive exposure: Never    Smokeless tobacco: Never   Vaping Use    Vaping Use: Never used   Substance Use Topics    Alcohol use: Not Currently     Alcohol/week: 2.0 standard drinks of alcohol     Types: 2 Cans of beer per week    Drug use: Never       Family History  Family History   Problem Relation Name Age of Onset    Hypertension Mother      Stroke Mother      Heart attack Father          Cause of death    Heart disease Father      Diabetes Other Grandmother         Allergies  Talwin compound and  Pentazocine lactate    Medications    Current Facility-Administered Medications:     acetaminophen (Tylenol) tablet 650 mg, 650 mg, oral, q6h PRN, Steven Botello DO    allopurinol (Zyloprim) tablet 100 mg, 100 mg, oral, Daily, Gino Saha MD, 100 mg at 11/18/23 0821    amLODIPine (Norvasc) tablet 10 mg, 10 mg, oral, Daily, Gino Saha MD, 10 mg at 11/18/23 0820    atorvastatin (Lipitor) tablet 40 mg, 40 mg, oral, Nightly, Gino Saha MD, 40 mg at 11/17/23 2046    carvedilol (Coreg) tablet 12.5 mg, 12.5 mg, oral, BID with meals, Gino Saha MD, 12.5 mg at 11/18/23 1742    clopidogrel (Plavix) tablet 75 mg, 75 mg, oral, Daily, Mateo Blanton MD, 75 mg at 11/18/23 1126    dicyclomine (Bentyl) capsule 20 mg, 20 mg, oral, Daily, Gino Saha MD, 20 mg at 11/15/23 1608    docusate sodium (Colace) capsule 100 mg, 100 mg, oral, BID, Gino Saha MD, 100 mg at 11/18/23 0821    ezetimibe (Zetia) tablet 10 mg, 10 mg, oral, Daily, Gino Saha MD, 10 mg at 11/18/23 0820    famotidine (Pepcid) tablet 20 mg, 20 mg, oral, Nightly, Gino Saha MD, 20 mg at 11/17/23 2046    heparin (porcine) injection 3,000-6,000 Units, 3,000-6,000 Units, intravenous, q4h PRN, Gino Saha MD    heparin (porcine) injection 8,750 Units, 80 Units/kg, intravenous, Once, Lili Loyd MD    heparin (porcine) injection 8,750 Units, 80 Units/kg, intravenous, Once, Gino Saha MD    heparin (porcine) injection 8,750 Units, 80 Units/kg, intravenous, Once, Gino Saha MD    heparin 25,000 Units in dextrose 5% 250 mL (100 Units/mL) infusion (premix), 0-4,500 Units/hr, intravenous, Continuous, Lili Loyd MD, Last Rate: 11 mL/hr at 11/18/23 1259, 1,100 Units/hr at 11/18/23 1259    HYDROmorphone (Dilaudid) injection 0.2 mg, 0.2 mg, intravenous, q4h PRN, Steven Botello DO, 0.2 mg at 11/18/23 0851    ketorolac (Toradol) injection 15 mg, 15 mg, intravenous, q6h, Velia Riggs MD    naloxone (Narcan) injection 0.2  "mg, 0.2 mg, intravenous, q5 min PRN, Roberth Beth MD    naloxone (Narcan) injection 0.2 mg, 0.2 mg, intravenous, q5 min PRN, Roberth Beth MD    ondansetron (Zofran) tablet 4 mg, 4 mg, oral, q8h PRN, Varinder Boogie MD, 4 mg at 11/18/23 0850    oxyCODONE (Roxicodone) immediate release tablet 2.5 mg, 2.5 mg, oral, q4h PRN, Roberth Beth MD    oxyCODONE (Roxicodone) immediate release tablet 5 mg, 5 mg, oral, q6h PRN, Roberth Beth MD, 5 mg at 11/18/23 1319    oxygen (O2) therapy, , inhalation, Continuous PRN - O2/gases, Roberth Beth MD    perflutren protein A microsphere (Optison) injection 0.5 mL, 0.5 mL, intravenous, Once in imaging, Velia Riggs MD    sulfur hexafluoride microsphr (Lumason) injection 24.28 mg, 2 mL, intravenous, Once in imaging, Velia Riggs MD    Review of Systems  A 12 point review of system was performed and negative except as reported in the HPI     Physical Exam:  General: Not in acute distress  HEENT: Atraumatic/Normocephalic. PEERL. EOM intact.   Neck: Supple. No JVD  Lungs: Clear to auscultation bilaterally. No wheezes, rales or rhonchi  Heart: Irregular rate and rhythm with no murmurs, rubs or gallops  Extremities: 2+ pitting edema with stiches from the surgery in the left lower extremity    Last Recorded Vitals  Blood pressure 117/73, pulse 93, temperature 36.8 °C (98.2 °F), resp. rate 18, height 1.753 m (5' 9\"), weight 111 kg (245 lb 1.8 oz), SpO2 95 %.    Lab Review  Results for orders placed or performed during the hospital encounter of 11/15/23 (from the past 24 hour(s))   Heparin Assay, UFH   Result Value Ref Range    Heparin Unfractionated 0.6 See Comment Below for Therapeutic Ranges IU/mL   Heparin Assay, UFH   Result Value Ref Range    Heparin Unfractionated 0.8 See Comment Below for Therapeutic Ranges IU/mL   CBC   Result Value Ref Range    WBC 8.9 4.4 - 11.3 x10*3/uL    nRBC 0.0 0.0 - 0.0 /100 WBCs    RBC 3.96 (L) 4.50 - 5.90 x10*6/uL    Hemoglobin 11.9 (L) 13.5 - " 17.5 g/dL    Hematocrit 37.5 (L) 41.0 - 52.0 %    MCV 95 80 - 100 fL    MCH 30.1 26.0 - 34.0 pg    MCHC 31.7 (L) 32.0 - 36.0 g/dL    RDW 13.9 11.5 - 14.5 %    Platelets 149 (L) 150 - 450 x10*3/uL   Renal function panel   Result Value Ref Range    Glucose 145 (H) 74 - 99 mg/dL    Sodium 138 136 - 145 mmol/L    Potassium 4.6 3.5 - 5.3 mmol/L    Chloride 104 98 - 107 mmol/L    Bicarbonate 24 21 - 32 mmol/L    Anion Gap 15 10 - 20 mmol/L    Urea Nitrogen 21 6 - 23 mg/dL    Creatinine 1.19 0.50 - 1.30 mg/dL    eGFR 64 >60 mL/min/1.73m*2    Calcium 8.3 (L) 8.6 - 10.6 mg/dL    Phosphorus 3.8 2.5 - 4.9 mg/dL    Albumin 2.8 (L) 3.4 - 5.0 g/dL   Heparin Assay, UFH   Result Value Ref Range    Heparin Unfractionated 0.9 See Comment Below for Therapeutic Ranges IU/mL        Cardiographics  ECG: No results found for this or any previous visit.    Echocardiogram: 11/15  CONCLUSIONS:   1. Left ventricular systolic function is normal with a 65-70% estimated ejection fraction.   2. The patient is in atrial fibrillation which may influence the estimate of left ventricular function and transvalvular flows.   3. The RV appears upper limits of normal size vs mildly dilated with moderate to severely reduced fx with a McConnels sign consistent iwth pulmonary embolus.   4. Color Doppler flow in the region of the atrial septum suggestive of possible small left to right shunt. Agitated saline contrast study was technically difficult but may have demonstrated some bubbles within the LV ( unclear) and exact timing of their appearance is unclear.   5. Mildly elevated RVSP.   6. Mild aortic valve regurgitation.   7. Findings discussed with the ER team at the time of reporting.   8. Compared with the images from the prior TTE done 10/25/2023 there was already RV systolic dysfunction with possible McConnels sign at that time.    Imaging  CT  PE 11/15/2023  IMPRESSION:  1. Bilateral pulmonary emboli with moderate clot burden in the distal  right  main pulmonary artery extending into the right middle and right  lower lobes. Small clot burden in the left lower lobe pulmonary  arteries.  2. Findings suggestive of right heart strain, correlate with  echocardiogram.  3. Additional findings and discussion as above.      Assessment and Plan  # Left popliteal artery occlusion  # Acute limb ischemia  # Bilateral PE, distal right main pulmonary artery  # Right popliteal/femoral DVT  # Atrial fibrillation  #CAD s/p PCIx3 and CABGx3 (LIMA-Diag, free ALEX to LAD, SVG-PDA, 2013), afib s/p MAZE (2013)    Echo finding revealed a possible small L to R shunt on color doppler flow. Bubble study was technically difficult which may have demonstrated some bubble within the LV.    Given patient's presentation with acute limb ischemia and bilateral PE, would recommend a RHC to further assess for the presence of shunt.    Cont management per primary team    Matt Mendez MD  Cardiology, PGY4

## 2023-11-18 NOTE — NURSING NOTE
Patient belongings were packed on [address] and taken to the patient in his new room on [phone] including 2 bags of hospitals supplies, a charging cord, his CPAP machine, a black computer type bag, cell phone, glasses, flowers in a vase and balloon.

## 2023-11-18 NOTE — PROGRESS NOTES
"Mukesh Garg is a 73 y.o. male on day 3 of admission presenting with Acute lower limb ischemia.    Subjective   -No acute event overnight  -I saw the patient today, he slept well last night and he looked well and not in distress   - No BM yet after the surgery  - Patient has no chest pain or SOB  - Still endorse pain at the LLE improved with the analgesia     Objective     Physical Exam  Constitutional:       Appearance: Normal appearance. He is obese.   HENT:      Head: Normocephalic and atraumatic.      Nose: Nose normal.   Eyes:      Extraocular Movements: Extraocular movements intact.      Conjunctiva/sclera: Conjunctivae normal.      Pupils: Pupils are equal, round, and reactive to light.   Cardiovascular:      Rate and Rhythm: Normal rate. Rhythm irregular.   Pulmonary:      Effort: Pulmonary effort is normal.      Breath sounds: Normal breath sounds.   Abdominal:      Palpations: Abdomen is soft.   Musculoskeletal:         General: Normal range of motion.      Cervical back: Normal range of motion and neck supple.   Skin:     General: Skin is warm and dry.      Capillary Refill: Capillary refill takes less than 2 seconds.   Neurological:      General: No focal deficit present.      Mental Status: He is alert and oriented to person, place, and time.   Psychiatric:         Mood and Affect: Mood normal.         Behavior: Behavior normal.             Last Recorded Vitals  Blood pressure 105/61, pulse 97, temperature 36.9 °C (98.4 °F), resp. rate 18, height 1.753 m (5' 9\"), weight 111 kg (245 lb 1.8 oz), SpO2 95 %.  Intake/Output last 3 Shifts:  I/O last 3 completed shifts:  In: 2590 (23.3 mL/kg) [P.O.:390; I.V.:2000 (18 mL/kg); IV Piggyback:200]  Out: 3875 (34.9 mL/kg) [Urine:3675 (0.9 mL/kg/hr); Blood:200]  Weight: 111.1 kg     Relevant Results              Results for orders placed or performed during the hospital encounter of 11/15/23 (from the past 24 hour(s))   ACTIVATED CLOTTING TIME LOW   Result Value Ref " Range    POCT Activated Clotting Time Low Range 239 (H) 89 - 169 sec   ACTIVATED CLOTTING TIME LOW   Result Value Ref Range    POCT Activated Clotting Time Low Range 314 (H) 89 - 169 sec   ACTIVATED CLOTTING TIME LOW   Result Value Ref Range    POCT Activated Clotting Time Low Range 274 (H) 89 - 169 sec   ACTIVATED CLOTTING TIME LOW   Result Value Ref Range    POCT Activated Clotting Time Low Range 281 (H) 89 - 169 sec   Heparin Assay, UFH   Result Value Ref Range    Heparin Unfractionated 0.6 See Comment Below for Therapeutic Ranges IU/mL   Heparin Assay, UFH   Result Value Ref Range    Heparin Unfractionated 0.8 See Comment Below for Therapeutic Ranges IU/mL   CBC   Result Value Ref Range    WBC 8.9 4.4 - 11.3 x10*3/uL    nRBC 0.0 0.0 - 0.0 /100 WBCs    RBC 3.96 (L) 4.50 - 5.90 x10*6/uL    Hemoglobin 11.9 (L) 13.5 - 17.5 g/dL    Hematocrit 37.5 (L) 41.0 - 52.0 %    MCV 95 80 - 100 fL    MCH 30.1 26.0 - 34.0 pg    MCHC 31.7 (L) 32.0 - 36.0 g/dL    RDW 13.9 11.5 - 14.5 %    Platelets 149 (L) 150 - 450 x10*3/uL   Renal function panel   Result Value Ref Range    Glucose 145 (H) 74 - 99 mg/dL    Sodium 138 136 - 145 mmol/L    Potassium 4.6 3.5 - 5.3 mmol/L    Chloride 104 98 - 107 mmol/L    Bicarbonate 24 21 - 32 mmol/L    Anion Gap 15 10 - 20 mmol/L    Urea Nitrogen 21 6 - 23 mg/dL    Creatinine 1.19 0.50 - 1.30 mg/dL    eGFR 64 >60 mL/min/1.73m*2    Calcium 8.3 (L) 8.6 - 10.6 mg/dL    Phosphorus 3.8 2.5 - 4.9 mg/dL    Albumin 2.8 (L) 3.4 - 5.0 g/dL   Heparin Assay, UFH   Result Value Ref Range    Heparin Unfractionated 0.9 See Comment Below for Therapeutic Ranges IU/mL                 Assessment/Plan   Principal Problem:    Acute lower limb ischemia  Active Problems:    Multiple subsegmental pulmonary emboli without acute cor pulmonale (CMS/HCC)    Mukesh Garg is a 73 y.o. male with PMHx of Afib, HTN, JENNY (on CPAP at home), gout, CABG x3 vessels (2013), recurrent DVTs  (hold eliq) and peripheral neuropathy.  Presented with Acute left LE limb ischemia 2/2 popliteal artery occlusion and when worked up found to have bilateral PE on CT. Currently is being managed for acute L LE ischemia and PE on heparin drip.  S/P thrombectomy in the   LLE (11/17/23)         #Occlusion of L-popliteal artery  ##CAD  #PAD  - On physical exam, patient's left leg showed pallor, increasingly shiny, and was severely tender. This with the CT angio which showed extensive occlusion of the left common iliac, femoral and popliteal artery. Along with the LE blood flow was reconstituted to the tibialis posterior, anterior and peroneal arteries, are indicative of atherosclerotic build up and points to severe LE ischemia. For this, left popliteal embolectomy was recommended to reestablish blood flow. He was taken to the OR on   OR on 11/17 for L fem cutdown w/ ileofem thromboembolectomy, L pop cutdown w/ fempop  thromboembolectomy and L TP trunk cutdown w/ thromboembolectomy. Currently the patient is POD1    -He has a Hx of CABG in 2013 Coronary artery bypass grafting x 3 , partial left sided MAZE,   (LIMA to a large Diag, a free ALEX y'd off the LIMA to the LAD , SVG-PDA)  - Hx of PCI 2006 (emergent for STEMI): DESx1 to prox diag, DESx2 to mid and distal LAD     Plan:    - C/w heparin gtt (HI) for now  - continue home medication Ezetimibe 10 mg   - ALLISON scheduled on Monday  -Vascular surgery was consulted. Recommended:  - continue the heparin gtt and they took him to the     OR on 11/17 (POD1) for L fem cutdown w/ ileofem thromboembolectomy, L pop cutdown w/ fempop  thromboembolectomy and L TP trunk cutdown w/ thromboembolectomy      - Cardio consult, because of the positive bubble study, theory of L-R shunt causing DVT and PAD hx      -Vascular Medicine consulted for concern of hypercoagulability and anticoagulation of choice        upon discharge, appreciate recs    - Cardiology was consulted and they recommended:                       - Continuing  atorvastatin ,Ezetimibe ,plavix and heparin (assay goal between 0.3-0.7)                       - Carotid doppler arterial ultrasound                       - GUILLERMINA               ## PE   ##Hx of DVT  -Hx of provoked DVT on 10/24/2023 and also 20 years ago in the right leg after long flight  - He has bilateral pulmonary emboli with moderate clot burden in the distal right main pulmonary artery extending into the right middle and right lower lobes and small clot burden in the left lower lobe pulmonary with a suggestive right ventricular stain.   -Has been taking Apixaban since 10/24/2023 and been complaint but now stopped and is on heparin gtt  -Vitally stable on admission without increased O2 requirement however he desated on (11/15) while sleeping to 87% and was placed on 2 L NS (patient is known case of JENNY and on CPAP at night), family brought his CPAP machine and currently sating well on RA.   -There is no clear etiology for hypercoaguable status but probably related to   Covid infection that he had recently on 10/24/2023    - patient had been on warfarin for many years for atrial fibrillation. INR was subtherapeutic during last admission (10/24/2023) as patient had not been taking his medications due to symptomatic COVID infection. He was initially being bridged back to warfarin, but decision was made to switch to apixaban on discharge. He has been fully adherent to apixaban since then. In addition, CTA chest had not been performed during last admission, so the chronicity of PE is not clear        Plan:  - Cont. High intensity Heparin gtt   - Hold Eliquis while on heparin gtt  - Vascular Medicine consulted, appreciate recs  - B2 glycoprotein ab and cardiolipin ab labs all were WNL  - patient had his colonoscopy 2 years ago and was normal will consider possible malignancy workup.             #Atrial fibrillation  ##Hypertension  #DLD  - Patient has extensive previous hx of afib, on admition he had irregular rhythm  and the EKG showed no P waves  - patient denied any chest pain dyspnea and with negative trops, thus more inline with afib with rvr    Plan:   -Continue home Carvedilol 12.5 BID   -Continue home Amlodipine 10 mg   -Continue home chlorthalidone 25 mg    -Hold home Lisinopril 40 mg   - continue home medication atorvastatin 40 mg          ##Loose teethe  ## Minimal bleeding   - OMFS consulted for tooth extractions as the patient has a probable loose tooth. They recommended to  extraction in the outpatient setting with a dentist as they think it has no risk of aspiration.     OMFS Recs:   - F/U with general dentist for extraction of #14 in the outpatient settings            ##Gout  Continue home allopurinol 100 mg           F: Replete PRN  E: Replete PRN  N:  cardiac diet   DVT Ppx: On Heparin drip  GI Ppx: None  Access/Lines: PIV  Abx: none           Code status: Full Code  Emergency contact: Sweetie Garg (Spouse) # [phone]                  Vrainder Boogie MD

## 2023-11-18 NOTE — SIGNIFICANT EVENT
Post-Op Check    S:    POD 0 L iliofemoral, femoropopliteal, and tibioperoneal trunk thromboembolectomy, four compartment fasciotomy  -Patient was seen around 1845 by Dr. Moises Burroughs, instructed to check on pulses overnight once later in the night  -Patient resting comfortably, but endorsing some pain 8/10  -Patient has eaten diaz crackers and water since surgery, no pain    O:   Vital signs are stable, normotensive, afebrile, no new or worsening oxygen requirement, not tachycardic  Visit Vitals  /76 (Patient Position: Lying)   Pulse 81   Temp 37 °C (98.6 °F) (Temporal)   Resp 17      Physical Exam:  Constitutional:       General: He is awake. He is not in acute distress.     Appearance: Normal appearance. He is well-developed and normal weight.   HENT:      Head: Normocephalic.      Right Ear: Hearing normal.      Mouth/Throat:      Mouth: Mucous membranes are moist.   Eyes:      General: Vision grossly intact. No scleral icterus.  Neck:      Trachea: No tracheal deviation.   Cardiovascular:      Rate and Rhythm: Normal rate and regular rhythm.      Pulses:           LLE PT/DP pulses present on doppler, warm, motor and sensory intact; dryness present at foot with flaking, pain to palpation; fasciotomy dressings CDI with minimal strikethrough  Pulmonary:      Effort: Pulmonary effort is normal.   Chest:      Chest wall: No deformity.   Abdominal:      General: Abdomen is protuberant. There is no distension.   Musculoskeletal:      Cervical back: Full passive range of motion without pain.      Right lower le+ Edema present.      Left lower le+ Pitting Edema present.   Skin:     General: Skin is warm.      Capillary Refill: Capillary refill takes less than 2 seconds.      Findings: Wound present.   Neurological:      General: No focal deficit present.      Mental Status: He is alert and oriented to person, place, and time.   Psychiatric:         Mood and Affect: Mood and affect normal.      A/P:  Overall, patient is doing well postoperatively with no acute concerns.  Will continue to optimize pain control as needed.  Will continue to monitor clinical exam, vitals, I&O's, and labs when available.  Will follow up on the patient in the a.m. or sooner as needed.  -Patient on high intensity therapy (discussed with Dr. Riggs to check given patient's history of recurrent DVTs and current PE)  -Adult regular diet to start for breakfast    Carlos Grijalva MD  PGY1  General Surgery  Vascular Surgery [id number]

## 2023-11-18 NOTE — CONSULTS
Inpatient consult to Cardiology  Consult performed by: Matt Mendez MD  Consult ordered by: Patricia Borges MD                                                                            Cardiology Consult Note    Reason For Consult  Concern for L to R shunt    History Of Present Illness  Mukesh Garg is a 73 y.o. male with a PMH of CAD s/p PCIx3 and CABGx3 (LIMA-Diag, free ALEX y to LAD, SVG-PDA, 2013), afib s/p MAZE (2013), and recent admission for R DVT iso COVID infection (10/24/23) on apixaban who presented with severe LLE pain and was found to have a LLE popliteal artery occlusion and bilateral PE. Underwent thromboembolectomy on 11/17.    Cardiology consulted due to concern for L to R shunt.    At bedside, patient has no new complaints. Reported an active lifestyle. Does occasional teaching job. No chest pain, SOB, dizziness, presyncope or syncope.    Past Medical History  He has a past medical history of Atrial fibrillation (CMS/[facility]), CAD (coronary artery disease), Gout, Heart disease, Hyperlipidemia, Hypertension, MI (myocardial infarction) (CMS/[facility]), and Sleep apnea.    Surgical History  He has a past surgical history that includes MR angio head wo IV contrast (03/21/2017); MR angio head wo IV contrast (10/30/2020); Coronary artery bypass graft; and CT angio lower extremity left w and or wo IV contrast (Left, 11/14/2023).     Social History  Social History     Tobacco Use    Smoking status: Never     Passive exposure: Never    Smokeless tobacco: Never   Vaping Use    Vaping Use: Never used   Substance Use Topics    Alcohol use: Not Currently     Alcohol/week: 2.0 standard drinks of alcohol     Types: 2 Cans of beer per week    Drug use: Never       Family History  Family History   Problem Relation Name Age of Onset    Hypertension Mother      Stroke Mother      Heart attack Father          Cause of death    Heart disease Father      Diabetes Other Grandmother         Allergies  Talwin compound and  Pentazocine lactate    Medications    Current Facility-Administered Medications:     acetaminophen (Tylenol) tablet 650 mg, 650 mg, oral, q6h PRN, Steven Botello DO    allopurinol (Zyloprim) tablet 100 mg, 100 mg, oral, Daily, Gino Saha MD, 100 mg at 11/18/23 0821    amLODIPine (Norvasc) tablet 10 mg, 10 mg, oral, Daily, Gino Saha MD, 10 mg at 11/18/23 0820    atorvastatin (Lipitor) tablet 40 mg, 40 mg, oral, Nightly, Gino Saha MD, 40 mg at 11/17/23 2046    carvedilol (Coreg) tablet 12.5 mg, 12.5 mg, oral, BID with meals, Gino Saha MD, 12.5 mg at 11/18/23 1742    clopidogrel (Plavix) tablet 75 mg, 75 mg, oral, Daily, Mateo Blanton MD, 75 mg at 11/18/23 1126    dicyclomine (Bentyl) capsule 20 mg, 20 mg, oral, Daily, Gino Saha MD, 20 mg at 11/15/23 1608    docusate sodium (Colace) capsule 100 mg, 100 mg, oral, BID, Gino Saha MD, 100 mg at 11/18/23 0821    ezetimibe (Zetia) tablet 10 mg, 10 mg, oral, Daily, Gino Saha MD, 10 mg at 11/18/23 0820    famotidine (Pepcid) tablet 20 mg, 20 mg, oral, Nightly, Gino Saha MD, 20 mg at 11/17/23 2046    heparin (porcine) injection 3,000-6,000 Units, 3,000-6,000 Units, intravenous, q4h PRN, Gino Saha MD    heparin (porcine) injection 8,750 Units, 80 Units/kg, intravenous, Once, Lili Loyd MD    heparin (porcine) injection 8,750 Units, 80 Units/kg, intravenous, Once, Gino Saha MD    heparin (porcine) injection 8,750 Units, 80 Units/kg, intravenous, Once, Gino Saha MD    heparin 25,000 Units in dextrose 5% 250 mL (100 Units/mL) infusion (premix), 0-4,500 Units/hr, intravenous, Continuous, Lili Loyd MD, Last Rate: 11 mL/hr at 11/18/23 1259, 1,100 Units/hr at 11/18/23 1259    HYDROmorphone (Dilaudid) injection 0.2 mg, 0.2 mg, intravenous, q4h PRN, Steven Botello DO, 0.2 mg at 11/18/23 0851    ketorolac (Toradol) injection 15 mg, 15 mg, intravenous, q6h, Velia Riggs MD    naloxone (Narcan) injection 0.2  "mg, 0.2 mg, intravenous, q5 min PRN, Roberth Beth MD    naloxone (Narcan) injection 0.2 mg, 0.2 mg, intravenous, q5 min PRN, Roberth Beth MD    ondansetron (Zofran) tablet 4 mg, 4 mg, oral, q8h PRN, Varinder Boogie MD, 4 mg at 11/18/23 0850    oxyCODONE (Roxicodone) immediate release tablet 2.5 mg, 2.5 mg, oral, q4h PRN, Roberth Beth MD    oxyCODONE (Roxicodone) immediate release tablet 5 mg, 5 mg, oral, q6h PRN, Roberth Beth MD, 5 mg at 11/18/23 1319    oxygen (O2) therapy, , inhalation, Continuous PRN - O2/gases, Roberth Beth MD    perflutren protein A microsphere (Optison) injection 0.5 mL, 0.5 mL, intravenous, Once in imaging, Velia Riggs MD    sulfur hexafluoride microsphr (Lumason) injection 24.28 mg, 2 mL, intravenous, Once in imaging, Velia Riggs MD    Review of Systems  A 12 point review of system was performed and negative except as reported in the HPI     Physical Exam:  General: Not in acute distress  HEENT: Atraumatic/Normocephalic. PEERL. EOM intact.   Neck: Supple. No JVD  Lungs: Clear to auscultation bilaterally. No wheezes, rales or rhonchi  Heart: Irregular rate and rhythm with no murmurs, rubs or gallops  Extremities: 2+ pitting edema with stiches from the surgery in the left lower extremity    Last Recorded Vitals  Blood pressure 117/73, pulse 93, temperature 36.8 °C (98.2 °F), resp. rate 18, height 1.753 m (5' 9\"), weight 111 kg (245 lb 1.8 oz), SpO2 95 %.    Lab Review  Results for orders placed or performed during the hospital encounter of 11/15/23 (from the past 24 hour(s))   Heparin Assay, UFH   Result Value Ref Range    Heparin Unfractionated 0.6 See Comment Below for Therapeutic Ranges IU/mL   Heparin Assay, UFH   Result Value Ref Range    Heparin Unfractionated 0.8 See Comment Below for Therapeutic Ranges IU/mL   CBC   Result Value Ref Range    WBC 8.9 4.4 - 11.3 x10*3/uL    nRBC 0.0 0.0 - 0.0 /100 WBCs    RBC 3.96 (L) 4.50 - 5.90 x10*6/uL    Hemoglobin 11.9 (L) 13.5 - " 17.5 g/dL    Hematocrit 37.5 (L) 41.0 - 52.0 %    MCV 95 80 - 100 fL    MCH 30.1 26.0 - 34.0 pg    MCHC 31.7 (L) 32.0 - 36.0 g/dL    RDW 13.9 11.5 - 14.5 %    Platelets 149 (L) 150 - 450 x10*3/uL   Renal function panel   Result Value Ref Range    Glucose 145 (H) 74 - 99 mg/dL    Sodium 138 136 - 145 mmol/L    Potassium 4.6 3.5 - 5.3 mmol/L    Chloride 104 98 - 107 mmol/L    Bicarbonate 24 21 - 32 mmol/L    Anion Gap 15 10 - 20 mmol/L    Urea Nitrogen 21 6 - 23 mg/dL    Creatinine 1.19 0.50 - 1.30 mg/dL    eGFR 64 >60 mL/min/1.73m*2    Calcium 8.3 (L) 8.6 - 10.6 mg/dL    Phosphorus 3.8 2.5 - 4.9 mg/dL    Albumin 2.8 (L) 3.4 - 5.0 g/dL   Heparin Assay, UFH   Result Value Ref Range    Heparin Unfractionated 0.9 See Comment Below for Therapeutic Ranges IU/mL        Cardiographics  ECG: No results found for this or any previous visit.    Echocardiogram: 11/15  CONCLUSIONS:   1. Left ventricular systolic function is normal with a 65-70% estimated ejection fraction.   2. The patient is in atrial fibrillation which may influence the estimate of left ventricular function and transvalvular flows.   3. The RV appears upper limits of normal size vs mildly dilated with moderate to severely reduced fx with a McConnels sign consistent iwth pulmonary embolus.   4. Color Doppler flow in the region of the atrial septum suggestive of possible small left to right shunt. Agitated saline contrast study was technically difficult but may have demonstrated some bubbles within the LV ( unclear) and exact timing of their appearance is unclear.   5. Mildly elevated RVSP.   6. Mild aortic valve regurgitation.   7. Findings discussed with the ER team at the time of reporting.   8. Compared with the images from the prior TTE done 10/25/2023 there was already RV systolic dysfunction with possible McConnels sign at that time.    Imaging  CT  PE 11/15/2023  IMPRESSION:  1. Bilateral pulmonary emboli with moderate clot burden in the distal  right  main pulmonary artery extending into the right middle and right  lower lobes. Small clot burden in the left lower lobe pulmonary  arteries.  2. Findings suggestive of right heart strain, correlate with  echocardiogram.  3. Additional findings and discussion as above.      Assessment and Plan  # Left popliteal artery occlusion  # Acute limb ischemia  # Bilateral PE, distal right main pulmonary artery  # Right popliteal/femoral DVT  # Atrial fibrillation  #CAD s/p PCIx3 and CABGx3 (LIMA-Diag, free ALEX to LAD, SVG-PDA, 2013), afib s/p MAZE (2013)    Echo finding revealed a possible small L to R shunt on color doppler flow. Bubble study was technically difficult which may have demonstrated some bubble within the LV.    Given patient's presentation with acute limb ischemia and bilateral PE, would recommend a RHC to further assess for the presence of shunt.    Cont management per primary team    Matt Mendez MD  Cardiology, PGY4

## 2023-11-18 NOTE — PROGRESS NOTES
Subjective Data:  Mukesh Garg is a 72 y/o man with remote DVT and more recent admission with atrial fibrillation and right leg DVT in the setting of COVID infection who is readmitted with progressive left-sided leg symptoms with acute/subacute limb ischemia as well as new diagnosis of PE. Interviewed at bedside with son and daughter present; they help provide some supporting info.     He has a history of atrial fibrillation years ago and had maze procedure when he had a CABG. He was maintained long-term on warfarin. Admitted 10/24/2023 at [facility] with shortness of breath and weakness, found to have COVID infection and atrial fibrillation with rapid rate. INR at admission was subtherapeutic at 1.3. He was started on heparin and then at hospital discharge transitioned to apixaban, which he reports taking faithfully.     Since hospital discharge he's had progressive left leg symptoms but no new chest symptoms--no chest pain, no progressive dyspnea. Also denies claudication prior to his October hospitalization.     He had a right leg DVT about two decades ago and was treated for a period of some months (he doesn't recall total duration). There's no family history of VTE.            Overnight Events:       No significant overnight events  Objective Data:  Last Recorded Vitals:  Vitals:    11/18/23 0645 11/18/23 0802 11/18/23 0910 11/18/23 1000   BP: 118/71 127/89  105/61   Patient Position: Lying Lying  Lying   Pulse: 94 107  97   Resp:    18   Temp:  37 °C (98.6 °F)  36.9 °C (98.4 °F)   TempSrc:       SpO2: 96% 95%  95%   Weight:   111 kg (245 lb 1.8 oz)    Height:           Last Labs:  CBC - 11/18/2023:  6:16 AM  8.9 11.9 149    37.5      CMP - 11/18/2023:  6:16 AM  8.3 5.3 11 --- 1.8   3.8 2.8 19 45      PTT - 11/14/2023: 11:18 PM  1.1   11.3 >139.0     TROPHS   Date/Time Value Ref Range Status   11/15/2023 02:30 AM 21 0 - 53 ng/L Final     BNP   Date/Time Value Ref Range Status   11/15/2023 02:30 AM 88 0 - 99  pg/mL Final     HGBA1C   Date/Time Value Ref Range Status   07/15/2023 11:54 AM 6.7 4.0 - 6.0 % Final     Comment:     Hemoglobin A1C levels are related to mean blood glucose during the   preceding 2-3 months. The relationship table below may be used as a   general guide. Each 1% increase in HGB A1C is a reflection of an   increase in mean glucose of approximately 30 mg/dl.   Reference: Diabetes Care, volume 29, supplement 1 Jan. 2006                        HGB A1C ................. Approx. Mean Glucose   _______________________________________________   6%   ...............................  120 mg/dl   7%   ...............................  150 mg/dl   8%   ...............................  180 mg/dl   9%   ...............................  210 mg/dl   10%  ...............................  240 mg/dl  Performed at [address] [address]     10/31/2020 05:14 AM 5.6 4.0 - 6.0 % Final     Comment:     Hemoglobin A1C levels are related to mean blood glucose during the   preceding 2-3 months. The relationship table below may be used as a   general guide. Each 1% increase in HGB A1C is a reflection of an   increase in mean glucose of approximately 30 mg/dl.   Reference: Diabetes Care, volume 29, supplement 1 Jan. 2006                        HGB A1C ................. Approx. Mean Glucose   _______________________________________________   6%   ...............................  120 mg/dl   7%   ...............................  150 mg/dl   8%   ...............................  180 mg/dl   9%   ...............................  210 mg/dl   10%  ...............................  240 mg/dl  Performed at [place] [address] [address] [facility] [address]       LDLCALC   Date/Time Value Ref Range Status   07/15/2023 11:54 AM 62 65 - 130 MG/DL Final   12/12/2022 12:08 PM 65 65 - 130 MG/DL Final   04/29/2022 03:35 PM 99 65 - 130 MG/DL Final      Last I/O:  I/O last 3 completed shifts:  In: 2590 (23.3 mL/kg) [P.O.:390;  I.V.:2000 (18 mL/kg); IV Piggyback:200]  Out: 3875 (34.9 mL/kg) [Urine:3675 (0.9 mL/kg/hr); Blood:200]  Weight: 111.1 kg     Past Cardiology Tests (Last 3 Years):  EKG:  ECG 12 Lead 11/13/2023      ECG 12 lead 10/26/2023    Echo:  Transthoracic Echo (TTE) Complete 11/15/2023      Transthoracic Echo (TTE) Complete 10/25/2023    Ejection Fractions:  EF   Date/Time Value Ref Range Status   11/15/2023 02:17 PM 67       Cath:  No results found for this or any previous visit from the past 1095 days.    Stress Test:  No results found for this or any previous visit from the past 1095 days.    Cardiac Imaging:  No results found for this or any previous visit from the past 1095 days.      Inpatient Medications:  Scheduled medications   Medication Dose Route Frequency    allopurinol  100 mg oral Daily    amLODIPine  10 mg oral Daily    atorvastatin  40 mg oral Nightly    carvedilol  12.5 mg oral BID with meals    clopidogrel  75 mg oral Daily    dicyclomine  20 mg oral Daily    docusate sodium  100 mg oral BID    ezetimibe  10 mg oral Daily    famotidine  20 mg oral Nightly    heparin  80 Units/kg intravenous Once    heparin  80 Units/kg intravenous Once    heparin  80 Units/kg intravenous Once    perflutren protein A microsphere  0.5 mL intravenous Once in imaging    sulfur hexafluoride microsphr  2 mL intravenous Once in imaging     PRN medications   Medication    acetaminophen    heparin    HYDROmorphone    naloxone    naloxone    ondansetron    oxyCODONE    oxyCODONE    oxygen     Continuous Medications   Medication Dose Last Rate    heparin  0-4,500 Units/hr 1,300 Units/hr (11/18/23 0715)       Physical Exam:  Warm lower extremities, intact pulsations, especially left lower extremity, mild swelling, no skin color or temperature changes     Assessment/Plan   Mukesh Garg is a pleasant 73 y.o. male with left lower extremity acute limb ischemia due to popliteal embolism, also with a non-flow limiting CFA/profunda embolus.  Medical history notable for MI with CABG x3, DVT, atrial fibrillation, and recent COVID 2 weeks ago. Likely hypercoagulability due to COVID.      OR 11/17: fasciotomies, L fem cutdown w/ ileofem thromboembolectomy, L pop cutdown w/ fempop thromboembolectomy, L TP trunk cutdown w/ thromboembolectomy, selective angiography LLE      Recommendation  1-continue with heparin gtt. continue to follow heparin assay goal is between 0.3 and 0.7  2-continue with Plavix  3-carotid Doppler arterial ultra sound  4-awaiting GUILLERMINA results, yet I think acute limb ischemia was caused by atherosclerotic disease of the popliteal artery that possibly led to plaque rupture ending an acute limb ischemia patient has strong family history of coronary artery disease, patient has a history of coronary artery disease, patient has been complaining of leg pain especially left leg over.  Of over 10 years and patient is not consistent with his medications specially the statins and aspirin, patient mentioned that he is on Zetia and atorvastatin 40 mg daily we will continue with both for now  5-we will continue with the heparin as I mentioned above and will continue to monitor his essays making sure it is therapeutic then patient can be switched to anticoagulation starting Monday if no complications for at least the coming 3 months in addition to antiplatelet treatment.  6-agree with antiphospholipid antibodies checking yet as I mentioned I think the combination of COVID in addition to possibly diffuse atherosclerotic disease/plaque rupture caused this admission  We will continue to follow  Peripheral IV 11/16/23 20 G Left;Proximal;Anterior Forearm (Active)   Site Assessment Dry;Intact 11/18/23 0800   Dressing Status Old drainage;Dry 11/18/23 0800   Number of days: 2       Peripheral IV 11/17/23 16 G Left Hand (Active)   Site Assessment Clean;Dry;Intact 11/18/23 0800   Dressing Status Clean;Dry 11/18/23 0800   Number of days: 1       Peripheral IV  11/17/23 18 G Left Antecubital (Active)   Site Assessment Dry;Intact 11/18/23 0800   Dressing Status Old drainage;Dry 11/18/23 0800   Number of days: 1       Code Status:  Full Code    I spent 45 minutes in the professional and overall care of this patient.        Loc Ervin MD

## 2023-11-18 NOTE — PROGRESS NOTES
"Mukesh Garg is a pleasant 73 y.o. male on day 3 of admission presenting with Acute lower limb ischemia.    Subjective   Moderate post-operative pain, worse over foot and left lower leg.      Objective   Last Recorded Vitals  Blood pressure 127/89, pulse 107, temperature 37 °C (98.6 °F), resp. rate 17, height 1.753 m (5' 9\"), weight 111 kg (245 lb), SpO2 95 %.    Intake/Output last 3 Shifts:  I/O last 3 completed shifts:  In: 2590 (23.3 mL/kg) [P.O.:390; I.V.:2000 (18 mL/kg); IV Piggyback:200]  Out: 3875 (34.9 mL/kg) [Urine:3675 (0.9 mL/kg/hr); Blood:200]  Weight: 111.1 kg     Physical Exam  Vitals and nursing note reviewed.   Constitutional:       General: He is awake. He is not in acute distress.     Appearance: Normal appearance. He is well-developed and normal weight.   HENT:      Head: Normocephalic.      Right Ear: Hearing normal.      Nose:      Comments: CPAP in place     Mouth/Throat:      Mouth: Mucous membranes are moist.   Eyes:      General: Vision grossly intact. No scleral icterus.  Neck:      Vascular: No JVD.      Trachea: No tracheal deviation.   Cardiovascular:      Rate and Rhythm: Normal rate.      Pulses:           Radial pulses are 2+ on the right side and 2+ on the left side.        Dorsalis pedis pulses are detected w/ Doppler on the right side and detected w/ Doppler on the left side.        Posterior tibial pulses are detected w/ Doppler on the right side and detected w/ Doppler on the left side.   Pulmonary:      Effort: Pulmonary effort is normal.   Chest:      Chest wall: No deformity.   Abdominal:      General: Abdomen is protuberant. There is no distension.   Musculoskeletal:      Cervical back: Full passive range of motion without pain.      Right lower le+ Edema present.      Left lower le+ Pitting Edema present.   Skin:     General: Skin is warm.      Capillary Refill: Capillary refill takes less than 2 seconds.      Findings: Wound present.   Neurological:      General: No " focal deficit present.      Mental Status: He is alert and oriented to person, place, and time.      Cranial Nerves: Cranial nerves 2-12 are intact.      Sensory: Sensation is intact.      Motor: Motor function is intact.   Psychiatric:         Mood and Affect: Mood and affect normal.         Relevant Results  Medications:  Scheduled Meds:allopurinol, 100 mg, oral, Daily  amLODIPine, 10 mg, oral, Daily  atorvastatin, 40 mg, oral, Nightly  carvedilol, 12.5 mg, oral, BID with meals  dicyclomine, 20 mg, oral, Daily  docusate sodium, 100 mg, oral, BID  ezetimibe, 10 mg, oral, Daily  famotidine, 20 mg, oral, Nightly  heparin, 80 Units/kg, intravenous, Once  heparin, 80 Units/kg, intravenous, Once  heparin, 80 Units/kg, intravenous, Once  perflutren protein A microsphere, 0.5 mL, intravenous, Once in imaging  sulfur hexafluoride microsphr, 2 mL, intravenous, Once in imaging      Continuous Infusions:heparin, 0-4,500 Units/hr, Last Rate: 1,300 Units/hr (11/18/23 0715)      PRN Meds:.PRN medications: acetaminophen, heparin, HYDROmorphone, naloxone, naloxone, ondansetron, oxyCODONE, oxyCODONE, oxygen    Labs:  Results from last 7 days   Lab Units 11/18/23  0616 11/16/23  0957 11/14/23  1546   SODIUM mmol/L 138 139 141   POTASSIUM mmol/L 4.6 4.1 4.3   CHLORIDE mmol/L 104 103 103   BUN mg/dL 21 21 24   CREATININE mg/dL 1.19 1.20 1.30       Results from last 7 days   Lab Units 11/18/23  0616 11/16/23  0957 11/14/23  2318   WBC AUTO x10*3/uL 8.9 6.4 8.6   HEMOGLOBIN g/dL 11.9* 13.5 14.0   HEMATOCRIT % 37.5* 43.2 43.6   PLATELETS AUTO x10*3/uL 149* 107* 104*         Imaging:  Imaging from the last 24 hours reviewed independently by the vascular surgery team.        Assessment/Plan   Mukesh Garg is a pleasant 73 y.o. male with left lower extremity acute limb ischemia due to popliteal embolism, also with a non-flow limiting CFA/profunda embolus. Medical history notable for MI with CABG x3, DVT, atrial fibrillation, and recent  COVID 2 weeks ago. Likely hypercoagulability due to COVID.     OR 11/17: fasciotomies, L fem cutdown w/ ileofem thromboembolectomy, L pop cutdown w/ fempop thromboembolectomy, L TP trunk cutdown w/ thromboembolectomy, selective angiography LLE    Multiphasic L DP/PT this AM. Significant pain over left lower leg anterior incision and foot. But    Plan:  Regular diet  Cardiology consult; regarding symptomatic L-R shunt DVT, +bubble study, lower ext. arterial thrombus  Plavix, no asa, cont high int. Hep gtt  ALLISON on Monday  Vascular medicine consult regarding hypercoagulability; required greater than typical heparin dose and ATIII in OR  Ok for toradol pending, otherwise continue prn oxy/dilaudid, scheduled tylenol  Home bp meds    Mateo Blanton MD  General Surgery, pgy3  [facility] [id number]     Discussed with attending.

## 2023-11-19 LAB
ALBUMIN SERPL BCP-MCNC: 2.6 G/DL (ref 3.4–5)
ANION GAP SERPL CALC-SCNC: 12 MMOL/L (ref 10–20)
BUN SERPL-MCNC: 25 MG/DL (ref 6–23)
CALCIUM SERPL-MCNC: 7.9 MG/DL (ref 8.6–10.6)
CHLORIDE SERPL-SCNC: 106 MMOL/L (ref 98–107)
CO2 SERPL-SCNC: 26 MMOL/L (ref 21–32)
CREAT SERPL-MCNC: 1.25 MG/DL (ref 0.5–1.3)
ERYTHROCYTE [DISTWIDTH] IN BLOOD BY AUTOMATED COUNT: 14.4 % (ref 11.5–14.5)
GFR SERPL CREATININE-BSD FRML MDRD: 60 ML/MIN/1.73M*2
GLUCOSE BLD MANUAL STRIP-MCNC: 147 MG/DL (ref 74–99)
GLUCOSE SERPL-MCNC: 158 MG/DL (ref 74–99)
HCT VFR BLD AUTO: 30.9 % (ref 41–52)
HGB BLD-MCNC: 10.2 G/DL (ref 13.5–17.5)
MCH RBC QN AUTO: 31.1 PG (ref 26–34)
MCHC RBC AUTO-ENTMCNC: 33 G/DL (ref 32–36)
MCV RBC AUTO: 94 FL (ref 80–100)
NRBC BLD-RTO: 0 /100 WBCS (ref 0–0)
PHOSPHATE SERPL-MCNC: 2.8 MG/DL (ref 2.5–4.9)
PLATELET # BLD AUTO: 140 X10*3/UL (ref 150–450)
POTASSIUM SERPL-SCNC: 4 MMOL/L (ref 3.5–5.3)
RBC # BLD AUTO: 3.28 X10*6/UL (ref 4.5–5.9)
SODIUM SERPL-SCNC: 140 MMOL/L (ref 136–145)
UFH PPP CHRO-ACNC: 0.2 IU/ML
UFH PPP CHRO-ACNC: 0.4 IU/ML
UFH PPP CHRO-ACNC: 0.4 IU/ML
WBC # BLD AUTO: 6.7 X10*3/UL (ref 4.4–11.3)

## 2023-11-19 PROCEDURE — 82947 ASSAY GLUCOSE BLOOD QUANT: CPT | Mod: 59

## 2023-11-19 PROCEDURE — 2500000004 HC RX 250 GENERAL PHARMACY W/ HCPCS (ALT 636 FOR OP/ED)

## 2023-11-19 PROCEDURE — 36415 COLL VENOUS BLD VENIPUNCTURE: CPT | Performed by: STUDENT IN AN ORGANIZED HEALTH CARE EDUCATION/TRAINING PROGRAM

## 2023-11-19 PROCEDURE — 85520 HEPARIN ASSAY: CPT | Performed by: STUDENT IN AN ORGANIZED HEALTH CARE EDUCATION/TRAINING PROGRAM

## 2023-11-19 PROCEDURE — 2500000004 HC RX 250 GENERAL PHARMACY W/ HCPCS (ALT 636 FOR OP/ED): Performed by: STUDENT IN AN ORGANIZED HEALTH CARE EDUCATION/TRAINING PROGRAM

## 2023-11-19 PROCEDURE — 2500000001 HC RX 250 WO HCPCS SELF ADMINISTERED DRUGS (ALT 637 FOR MEDICARE OP): Performed by: STUDENT IN AN ORGANIZED HEALTH CARE EDUCATION/TRAINING PROGRAM

## 2023-11-19 PROCEDURE — 1100000001 HC PRIVATE ROOM DAILY

## 2023-11-19 PROCEDURE — 99232 SBSQ HOSP IP/OBS MODERATE 35: CPT | Performed by: STUDENT IN AN ORGANIZED HEALTH CARE EDUCATION/TRAINING PROGRAM

## 2023-11-19 PROCEDURE — 2500000001 HC RX 250 WO HCPCS SELF ADMINISTERED DRUGS (ALT 637 FOR MEDICARE OP)

## 2023-11-19 PROCEDURE — 80069 RENAL FUNCTION PANEL: CPT

## 2023-11-19 PROCEDURE — 2500000004 HC RX 250 GENERAL PHARMACY W/ HCPCS (ALT 636 FOR OP/ED): Performed by: SURGERY

## 2023-11-19 PROCEDURE — 2500000002 HC RX 250 W HCPCS SELF ADMINISTERED DRUGS (ALT 637 FOR MEDICARE OP, ALT 636 FOR OP/ED)

## 2023-11-19 PROCEDURE — 36415 COLL VENOUS BLD VENIPUNCTURE: CPT

## 2023-11-19 PROCEDURE — 85027 COMPLETE CBC AUTOMATED: CPT

## 2023-11-19 PROCEDURE — 85520 HEPARIN ASSAY: CPT | Performed by: SURGERY

## 2023-11-19 RX ORDER — OXYCODONE HYDROCHLORIDE 5 MG/1
5 TABLET ORAL EVERY 4 HOURS PRN
Status: DISCONTINUED | OUTPATIENT
Start: 2023-11-19 | End: 2023-11-27 | Stop reason: HOSPADM

## 2023-11-19 RX ORDER — BISACODYL 10 MG/1
10 SUPPOSITORY RECTAL ONCE
Status: COMPLETED | OUTPATIENT
Start: 2023-11-19 | End: 2023-11-20

## 2023-11-19 RX ORDER — METHOCARBAMOL 100 MG/ML
500 INJECTION, SOLUTION INTRAMUSCULAR; INTRAVENOUS ONCE
Status: COMPLETED | OUTPATIENT
Start: 2023-11-19 | End: 2023-11-20

## 2023-11-19 RX ORDER — OXYCODONE HYDROCHLORIDE 5 MG/1
5 TABLET ORAL EVERY 4 HOURS PRN
Status: DISCONTINUED | OUTPATIENT
Start: 2023-11-19 | End: 2023-11-19

## 2023-11-19 RX ORDER — HYDROMORPHONE HYDROCHLORIDE 1 MG/ML
0.2 INJECTION, SOLUTION INTRAMUSCULAR; INTRAVENOUS; SUBCUTANEOUS EVERY 4 HOURS PRN
Status: DISCONTINUED | OUTPATIENT
Start: 2023-11-19 | End: 2023-11-27 | Stop reason: HOSPADM

## 2023-11-19 RX ORDER — POLYETHYLENE GLYCOL 3350 17 G/17G
17 POWDER, FOR SOLUTION ORAL DAILY PRN
Status: DISCONTINUED | OUTPATIENT
Start: 2023-11-19 | End: 2023-11-20

## 2023-11-19 RX ADMIN — HEPARIN SODIUM 3000 UNITS: 5000 INJECTION, SOLUTION INTRAVENOUS; SUBCUTANEOUS at 23:30

## 2023-11-19 RX ADMIN — AMLODIPINE BESYLATE 10 MG: 10 TABLET ORAL at 10:00

## 2023-11-19 RX ADMIN — ATORVASTATIN CALCIUM 40 MG: 40 TABLET, FILM COATED ORAL at 20:44

## 2023-11-19 RX ADMIN — POLYETHYLENE GLYCOL 3350 17 G: 17 POWDER, FOR SOLUTION ORAL at 15:39

## 2023-11-19 RX ADMIN — FAMOTIDINE 20 MG: 20 TABLET, FILM COATED ORAL at 20:44

## 2023-11-19 RX ADMIN — KETOROLAC TROMETHAMINE 15 MG: 15 INJECTION, SOLUTION INTRAMUSCULAR; INTRAVENOUS at 05:57

## 2023-11-19 RX ADMIN — CLOPIDOGREL BISULFATE 75 MG: 75 TABLET ORAL at 10:01

## 2023-11-19 RX ADMIN — ALLOPURINOL 100 MG: 100 TABLET ORAL at 10:00

## 2023-11-19 RX ADMIN — HEPARIN SODIUM 1300 UNITS/HR: 10000 INJECTION, SOLUTION INTRAVENOUS at 13:46

## 2023-11-19 RX ADMIN — CARVEDILOL 12.5 MG: 12.5 TABLET, FILM COATED ORAL at 10:00

## 2023-11-19 RX ADMIN — ACETAMINOPHEN 650 MG: 325 TABLET ORAL at 23:37

## 2023-11-19 RX ADMIN — DOCUSATE SODIUM 100 MG: 100 CAPSULE, LIQUID FILLED ORAL at 20:44

## 2023-11-19 RX ADMIN — DOCUSATE SODIUM 100 MG: 100 CAPSULE, LIQUID FILLED ORAL at 10:00

## 2023-11-19 RX ADMIN — Medication: at 10:05

## 2023-11-19 RX ADMIN — EZETIMIBE 10 MG: 10 TABLET ORAL at 10:01

## 2023-11-19 RX ADMIN — KETOROLAC TROMETHAMINE 15 MG: 15 INJECTION, SOLUTION INTRAMUSCULAR; INTRAVENOUS at 12:18

## 2023-11-19 ASSESSMENT — COGNITIVE AND FUNCTIONAL STATUS - GENERAL
DAILY ACTIVITIY SCORE: 20
DRESSING REGULAR UPPER BODY CLOTHING: A LITTLE
MOVING TO AND FROM BED TO CHAIR: A LITTLE
STANDING UP FROM CHAIR USING ARMS: A LITTLE
DAILY ACTIVITIY SCORE: 20
WALKING IN HOSPITAL ROOM: A LITTLE
MOVING FROM LYING ON BACK TO SITTING ON SIDE OF FLAT BED WITH BEDRAILS: A LITTLE
MOBILITY SCORE: 18
TURNING FROM BACK TO SIDE WHILE IN FLAT BAD: A LITTLE
DRESSING REGULAR LOWER BODY CLOTHING: A LITTLE
HELP NEEDED FOR BATHING: A LITTLE
CLIMB 3 TO 5 STEPS WITH RAILING: A LITTLE
DRESSING REGULAR UPPER BODY CLOTHING: A LITTLE
TOILETING: A LITTLE
MOVING TO AND FROM BED TO CHAIR: A LITTLE
WALKING IN HOSPITAL ROOM: A LITTLE
CLIMB 3 TO 5 STEPS WITH RAILING: A LITTLE
DRESSING REGULAR LOWER BODY CLOTHING: A LITTLE
MOVING FROM LYING ON BACK TO SITTING ON SIDE OF FLAT BED WITH BEDRAILS: A LITTLE
TOILETING: A LITTLE
STANDING UP FROM CHAIR USING ARMS: A LITTLE
MOBILITY SCORE: 18
TURNING FROM BACK TO SIDE WHILE IN FLAT BAD: A LITTLE
HELP NEEDED FOR BATHING: A LITTLE

## 2023-11-19 ASSESSMENT — PAIN - FUNCTIONAL ASSESSMENT: PAIN_FUNCTIONAL_ASSESSMENT: 0-10

## 2023-11-19 ASSESSMENT — PAIN SCALES - GENERAL: PAINLEVEL_OUTOF10: 3

## 2023-11-19 ASSESSMENT — PAIN DESCRIPTION - LOCATION: LOCATION: ABDOMEN

## 2023-11-19 NOTE — TREATMENT PLAN
Patient heparin assay for today is pending at the time of dictation of this note, last night at 11 PM heparin assay was 0.5 which puts him in the therapeutic range, there was a concern during angio performed couple of days ago that the patient might be having heparin resistance, heparin resistance can be seen with COVID 19 infection and post-COVID if heparin resistance is still a concern which I think is not anymore given the fact that the patient is on regular heparin doses with therapeutic assay, if this is still the case then we can check for antifactor 10 A, and Antithrombin levels which both will be lowered and elevated factor VIII and fibrinogen levels in case of heparin resistance.    Treatment and cases of heparin resistance can be achieved by direct thrombin inhibitors such as argatroban and bivalirudin  We will continue to monitor patient's response to heparin and will manage accordingly    Loc Ervin MD  Vascular Medicine

## 2023-11-19 NOTE — CONSULTS
"Reason For Consult  Dental was consulted due to implant or tooth that fell out and would like to address that.     History Of Present Illness  Mukesh Garg is a 73 y.o. male presenting with history of CAD s/p CABG x3, recurrent DVT (most recent 10/24/2023), Afib (on Eliquis), recent COVID (10/2023) who presented with LLE subacute on chronic limb ischemia and PE..     Past Medical History  He has a past medical history of Atrial fibrillation (CMS/[facility]), CAD (coronary artery disease), Gout, Heart disease, Hyperlipidemia, Hypertension, MI (myocardial infarction) (CMS/[facility]), and Sleep apnea.    Surgical History  He has a past surgical history that includes MR angio head wo IV contrast (03/21/2017); MR angio head wo IV contrast (10/30/2020); Coronary artery bypass graft; and CT angio lower extremity left w and or wo IV contrast (Left, 11/14/2023).     Social History  He reports that he has never smoked. He has never been exposed to tobacco smoke. He has never used smokeless tobacco. He reports that he does not currently use alcohol after a past usage of about 2.0 standard drinks of alcohol per week. He reports that he does not use drugs.    Family History  Family History   Problem Relation Name Age of Onset    Hypertension Mother      Stroke Mother      Heart attack Father          Cause of death    Heart disease Father      Diabetes Other Grandmother         Allergies  Talwin compound and Pentazocine lactate         Physical Exam  Exam showed that post, core, crown #29 had come out with additional; mobility on crowns #13 and #30. Decay noted underneath the crown on #29.     Last Recorded Vitals  Blood pressure 96/74, pulse 77, temperature 37 °C (98.6 °F), resp. rate 16, height 1.753 m (5' 9\"), weight 111 kg (245 lb 1.8 oz), SpO2 94 %.    Relevant Results  Patient with several loose crowns #13, 30 and post crown on #29 that patient states fell out while eating yesterday 11-18.  Assessment/Plan   Cleaned out debris from #29 " tooth and crown, isolated and cemented with temp bond. Pt MUST follow up with their general dentist in outpatient setting to redo root canal treatment on #29 and for comprehensive exam to address loose crowns.         Toni Chaudhry DDS

## 2023-11-19 NOTE — PROGRESS NOTES
VASCULAR SURGERY PROGRESS NOTE  Subjective   No acute overnight events. Reports improved pain of LLE. No left foot pain. Just reports incisional pain. Sensory and motor intact    Objective   Vitals:  Heart Rate:  [76-93]   Temp:  [36.7 °C (98.1 °F)-37.2 °C (99 °F)]   Resp:  [16-22]   BP: ()/(58-76)   SpO2:  [93 %-96 %]     Exam:  Constitutional: No acute distress, resting comfortably  Neuro:  AOx3, grossly intact  ENMT: moist mucous membranes  CV: no tachycardia  Pulm: non-labored on room air  GI: soft, non-tender, non-distended  Skin: left leg fasciotomy incisions with staples in place, mild SS drainage from lateral incision, no erythema, left groin incision covered with dressing, no hematoma  Musculoskeletal: moving all extremities  Extremities: left AT palpable, multiphasic left PT and DP doppler signal    Labs:  Results from last 7 days   Lab Units 11/19/23  0605 11/18/23  0616 11/16/23  0957   WBC AUTO x10*3/uL 6.7 8.9 6.4   HEMOGLOBIN g/dL 10.2* 11.9* 13.5   PLATELETS AUTO x10*3/uL 140* 149* 107*      Results from last 7 days   Lab Units 11/19/23  0605 11/18/23  0616 11/16/23  0957 11/16/23  0957   SODIUM mmol/L 140 138  --  139   POTASSIUM mmol/L 4.0 4.6  --  4.1   CHLORIDE mmol/L 106 104  --  103   CO2 mmol/L 26 24  --  28   BUN mg/dL 25* 21  --  21   CREATININE mg/dL 1.25 1.19  --  1.20   GLUCOSE mg/dL 158* 145*  --  140*   MAGNESIUM mg/dL  --   --   --  1.95   PHOSPHORUS mg/dL 2.8 3.8   < >  --     < > = values in this interval not displayed.      Results from last 7 days   Lab Units 11/14/23  2318 11/14/23  1546   INR   --  1.1   PROTIME seconds  --  11.3   APTT seconds >139.0*  --      Assessment/Plan   Mukesh Garg is 73 y.o. male with history of CAD s/p CABG x3, recurrent DVT (most recent 10/24/2023), Afib (on Eliquis), recent COVID (10/2023) who presented with LLE subacute on chronic limb ischemia and PE.     Echo demonstrated concerns for PFO, possibly symptomatic given recent DVT resulting in  PE and then ALI    Plan:  -pain control with Tylenol, PRN oxycodone, toradol  -appreciate cardiology recommendations, RHC tomorrow to evaluate for PFO  -appreciate vascular medicine recommendation  -continue plavix and heparin gtt  -ALLISON with TBI tomorrow  -PT/OT  -continue care per primary team    D/w attending, Dr. Flynn Riggs MD  Vascular Surgery PGY-4  Team pager: [phone]

## 2023-11-19 NOTE — PROGRESS NOTES
"Mukesh Garg is a 73 y.o. male on day 4 of admission presenting with Acute lower limb ischemia.    Subjective   -No acute event overnight  -I saw the patient today, he was sleeping, he looked well and not in distress   - No BM yet after the surgery  - Patient has no chest pain or SOB  - Still endorse pain at the LLE  but improved comparing to inial presentation  with the analgesia     Objective     Physical Exam  Constitutional:       Appearance: Normal appearance. He is obese.   HENT:      Head: Normocephalic and atraumatic.      Nose: Nose normal.   Eyes:      Extraocular Movements: Extraocular movements intact.      Conjunctiva/sclera: Conjunctivae normal.      Pupils: Pupils are equal, round, and reactive to light.   Cardiovascular:      Rate and Rhythm: Normal rate. Rhythm irregular.   Pulmonary:      Effort: Pulmonary effort is normal.      Breath sounds: Normal breath sounds.   Abdominal:      Palpations: Abdomen is soft.   Musculoskeletal:         General: Normal range of motion.      Cervical back: Normal range of motion and neck supple.   Skin:     General: Skin is warm and dry.      Capillary Refill: Capillary refill takes less than 2 seconds.   Neurological:      General: No focal deficit present.      Mental Status: He is alert and oriented to person, place, and time.   Psychiatric:         Mood and Affect: Mood normal.         Behavior: Behavior normal.             Last Recorded Vitals  Blood pressure 96/74, pulse 77, temperature 37 °C (98.6 °F), resp. rate 16, height 1.753 m (5' 9\"), weight 111 kg (245 lb 1.8 oz), SpO2 94 %.  Intake/Output last 3 Shifts:  I/O last 3 completed shifts:  In: 200 (1.8 mL/kg) [IV Piggyback:200]  Out: 2750 (24.7 mL/kg) [Urine:2750 (0.7 mL/kg/hr)]  Weight: 111.2 kg     Relevant Results              Results for orders placed or performed during the hospital encounter of 11/15/23 (from the past 24 hour(s))   Heparin Assay, UFH   Result Value Ref Range    Heparin Unfractionated " 0.5 See Comment Below for Therapeutic Ranges IU/mL   Heparin Assay, UFH   Result Value Ref Range    Heparin Unfractionated 0.4 See Comment Below for Therapeutic Ranges IU/mL   CBC   Result Value Ref Range    WBC 6.7 4.4 - 11.3 x10*3/uL    nRBC 0.0 0.0 - 0.0 /100 WBCs    RBC 3.28 (L) 4.50 - 5.90 x10*6/uL    Hemoglobin 10.2 (L) 13.5 - 17.5 g/dL    Hematocrit 30.9 (L) 41.0 - 52.0 %    MCV 94 80 - 100 fL    MCH 31.1 26.0 - 34.0 pg    MCHC 33.0 32.0 - 36.0 g/dL    RDW 14.4 11.5 - 14.5 %    Platelets 140 (L) 150 - 450 x10*3/uL   Renal function panel   Result Value Ref Range    Glucose 158 (H) 74 - 99 mg/dL    Sodium 140 136 - 145 mmol/L    Potassium 4.0 3.5 - 5.3 mmol/L    Chloride 106 98 - 107 mmol/L    Bicarbonate 26 21 - 32 mmol/L    Anion Gap 12 10 - 20 mmol/L    Urea Nitrogen 25 (H) 6 - 23 mg/dL    Creatinine 1.25 0.50 - 1.30 mg/dL    eGFR 60 (L) >60 mL/min/1.73m*2    Calcium 7.9 (L) 8.6 - 10.6 mg/dL    Phosphorus 2.8 2.5 - 4.9 mg/dL    Albumin 2.6 (L) 3.4 - 5.0 g/dL   Heparin Assay, UFH   Result Value Ref Range    Heparin Unfractionated 0.2 See Comment Below for Therapeutic Ranges IU/mL   Heparin Assay, UFH   Result Value Ref Range    Heparin Unfractionated 0.2 See Comment Below for Therapeutic Ranges IU/mL      TRANSTHORACIC ECHOCARDIOGRAM REPORT 11/15/2023        1. Left ventricular systolic function is normal with a 65-70% EF.   2. The patient is in atrial fibrillation which may influence the estimate of left ventricular function and transvalvular flows.   3. The RV appears upper limits of normal size vs mildly dilated with moderate to severely reduced fx with a McConnels sign consistent with pulmonary embolus.   4. Color Doppler flow in the region of the atrial septum suggestive of possible small left to right shunt.    5 .Mild AR   6. Compared with the images from the prior TTE done 10/25/2023 there was already RV systolic dysfunction with possible McConnels sign at that time         Assessment/Plan   Principal  Problem:    Acute lower limb ischemia  Active Problems:    Multiple subsegmental pulmonary emboli without acute cor pulmonale (CMS/HCC)    Mukesh Garg is a 73 y.o. male with PMHx of Afib, HTN, JENNY (on CPAP at home), gout, CABG x3 vessels (2013), recurrent DVTs  (held eliq) and peripheral neuropathy. Presented with Acute left LE limb ischemia 2/2 popliteal artery occlusion and when worked up found to have bilateral PE on CT. Currently is being managed for acute L LE ischemia and PE on heparin drip.  S/P thrombectomy in the  LLE (11/17/23)         #Occlusion of L-popliteal artery  #PAD  ##CAD  ## L to R shunt  - On physical exam, patient's left leg showed pallor, increasingly shiny, and was severely tender, however now his pain decreased from presentation. This with the CT angio which showed extensive occlusion of the left common iliac, femoral and popliteal artery. Along with the LE blood flow was reconstituted to the tibialis posterior, anterior and peroneal arteries, are indicative of atherosclerotic build up and points to severe LE ischemia. For this, left popliteal embolectomy was recommended to reestablish blood flow. He was taken to the OR on   OR on 11/17 for L fem cutdown w/ ileofem thromboembolectomy, L pop cutdown w/ fempop  thromboembolectomy and L TP trunk cutdown w/ thromboembolectomy.     -He has a Hx of CABG in 2013 Coronary artery bypass grafting x 3 , partial left sided MAZE,   (LIMA to a large Diag, a free ALEX y'd off the LIMA to the LAD , SVG-PDA)  - Hx of PCI 2006 (emergent for STEMI): DESx1 to prox diag, DESx2 to mid and distal LAD     - On TTE (11/17/23) Color Doppler flow in the region of the atrial septum suggestive of possible small left to right shunt. Because of that the patient will undergo for RHC tomorrow         Plan:    - Polyethylene glycol and Docusate sodium for constipation   - C/w heparin gtt (HI) for now  (heparin assay is within the therapeutic range)   - continue home  medication Ezetimibe 10 mg   - ALLISON scheduled tomorrow  -Vascular surgery was consulted. Recommended:  - continue the heparin gtt and they took him to the     OR on 11/17 (POD1) for L fem cutdown w/ ileofem thromboembolectomy, L pop cutdown w/ fempop  thromboembolectomy and L TP trunk cutdown w/ thromboembolectomy      - Cardio consult, because of the positive bubble study, theory of L-R shunt causing DVT and PAD hx. Because of that the patient will undergo for RHC tomorrow      -Vascular Medicine consulted for concern of hypercoagulability and anticoagulation of choice upon discharge, They recommended:                             1-continue with heparin gtt. continue to follow heparin assay goal is between        0                                             0.3  and 0.7                             2- continue with Plavix                             3- carotid Doppler arterial ultra sound                             4- Antiphospholipid antibody workup     - Cardiology was consulted and they recommended:                       - Continuing atorvastatin ,Ezetimibe ,plavix and heparin (assay goal between 0.3-0.7)                       - Carotid doppler arterial ultrasound (ordered)                       - GUILLERMINA (ordered)                        - will take him for RHC tomorrow              ## PE   ##Hx of DVT  -Hx of provoked DVT on 10/24/2023 and also 20 years ago in the right leg after long flight  - He has bilateral pulmonary emboli with moderate clot burden in the distal right main pulmonary artery extending into the right middle and right lower lobes and small clot burden in the left lower lobe pulmonary with a suggestive right ventricular stain.   -Has been taking Apixaban since 10/24/2023 and been complaint but now stopped and is on heparin gtt  -Vitally stable on admission without increased O2 requirement however he desated on (11/15) while sleeping to 87% and was placed on 2 L NS (patient is known case of JENNY and  on CPAP at night), family brought his CPAP machine and currently sating well on RA.   -There is no clear etiology for hypercoaguable status but probably related to   Covid infection that he had recently on 10/24/2023    - patient had been on warfarin for many years for atrial fibrillation. INR was subtherapeutic during last admission (10/24/2023) as patient had not been taking his medications due to symptomatic COVID infection. He was initially being bridged back to warfarin, but decision was made to switch to apixaban on discharge. He has been fully adherent to apixaban since then. In addition, CTA chest had not been performed during last admission, so the chronicity of PE is not clear        Plan:  - Cont. High intensity Heparin gtt   - Hold Eliquis while on heparin gtt  - B2 glycoprotein ab and cardiolipin ab labs all were WNL  - Lupus antibody ordered today  - patient had his colonoscopy 2 years ago and was normal will consider possible malignancy workup.          #Atrial fibrillation  ##Hypertension  #DLD  - Patient has extensive previous hx of afib, on admition he had irregular rhythm and the EKG showed no P waves  - patient denied any chest pain dyspnea and with negative trops, thus more inline with afib with rvr    Plan:   -Continue home Carvedilol 12.5 BID   -Continue home Amlodipine 10 mg   -Continue home chlorthalidone 25 mg    -Hold home Lisinopril 40 mg   - continue home medication atorvastatin 40 mg          ##Loose teethe  ## Minimal bleeding   - OMFS consulted for tooth extractions as the patient has a probable loose tooth. They recommended to  extraction in the outpatient setting with a dentist as they think it has no risk of aspiration.     OMFS Recs:   - F/U with general dentist for extraction of #14 in the outpatient settings     - On 11/18 morning the patient was having his breakfast and has a tooth crown that fell off, He was seen by Dentist today and his crown was fixed in #29 tooth and was  recommended to follow up in the outpatient clinic.      ##Gout  Continue home allopurinol 100 mg           F: Replete PRN  E: Replete PRN  N:  cardiac diet   DVT Ppx: On Heparin drip  GI Ppx: None  Access/Lines: PIV  Abx: none           Code status: Full Code  Emergency contact: Sweetie Garg (Spouse) # [phone]                  Varinder Boogie MD

## 2023-11-20 ENCOUNTER — APPOINTMENT (OUTPATIENT)
Dept: VASCULAR MEDICINE | Facility: HOSPITAL | Age: 73
DRG: 252 | End: 2023-11-20
Payer: COMMERCIAL

## 2023-11-20 ENCOUNTER — APPOINTMENT (OUTPATIENT)
Dept: RADIOLOGY | Facility: HOSPITAL | Age: 73
DRG: 252 | End: 2023-11-20
Payer: COMMERCIAL

## 2023-11-20 PROBLEM — Q21.9: Status: ACTIVE | Noted: 2023-11-14

## 2023-11-20 LAB
ALBUMIN SERPL BCP-MCNC: 2.8 G/DL (ref 3.4–5)
ANION GAP SERPL CALC-SCNC: 13 MMOL/L (ref 10–20)
APTT PPP: 38 SECONDS (ref 27–38)
BUN SERPL-MCNC: 22 MG/DL (ref 6–23)
CALCIUM SERPL-MCNC: 8.3 MG/DL (ref 8.6–10.6)
CHLORIDE SERPL-SCNC: 101 MMOL/L (ref 98–107)
CO2 SERPL-SCNC: 26 MMOL/L (ref 21–32)
CREAT SERPL-MCNC: 1.23 MG/DL (ref 0.5–1.3)
DRVVT SCREEN TO CONFIRM RATIO: 3.73 RATIO
DRVVT/DRVVT CFM NRMLZD PPP-RTO: 2.76 RATIO
DRVVT/DRVVT CFM P DOAC NEUT NORM PPP-RTO: 1.35 RATIO
ERYTHROCYTE [DISTWIDTH] IN BLOOD BY AUTOMATED COUNT: 14.5 % (ref 11.5–14.5)
GFR SERPL CREATININE-BSD FRML MDRD: 61 ML/MIN/1.73M*2
GLUCOSE SERPL-MCNC: 228 MG/DL (ref 74–99)
HCT VFR BLD AUTO: 31 % (ref 41–52)
HGB BLD-MCNC: 9.9 G/DL (ref 13.5–17.5)
LA 2 SCREEN W REFLEX-IMP: ABNORMAL
MCH RBC QN AUTO: 29.8 PG (ref 26–34)
MCHC RBC AUTO-ENTMCNC: 31.9 G/DL (ref 32–36)
MCV RBC AUTO: 93 FL (ref 80–100)
NORMALIZED SCT PPP-RTO: 0.54 RATIO
NRBC BLD-RTO: 0 /100 WBCS (ref 0–0)
PHOSPHATE SERPL-MCNC: 3.4 MG/DL (ref 2.5–4.9)
PLATELET # BLD AUTO: 186 X10*3/UL (ref 150–450)
POTASSIUM SERPL-SCNC: 4.2 MMOL/L (ref 3.5–5.3)
RBC # BLD AUTO: 3.32 X10*6/UL (ref 4.5–5.9)
SILICA CLOTTING TIME CONFIRMATION: 1.23 RATIO
SILICA CLOTTING TIME SCREEN: 0.66 RATIO
SODIUM SERPL-SCNC: 136 MMOL/L (ref 136–145)
UFH PPP CHRO-ACNC: 0.2 IU/ML
UFH PPP CHRO-ACNC: 0.3 IU/ML
UFH PPP CHRO-ACNC: 0.3 IU/ML
WBC # BLD AUTO: 9.8 X10*3/UL (ref 4.4–11.3)

## 2023-11-20 PROCEDURE — 85027 COMPLETE CBC AUTOMATED: CPT

## 2023-11-20 PROCEDURE — 2500000001 HC RX 250 WO HCPCS SELF ADMINISTERED DRUGS (ALT 637 FOR MEDICARE OP): Performed by: SURGERY

## 2023-11-20 PROCEDURE — 2500000004 HC RX 250 GENERAL PHARMACY W/ HCPCS (ALT 636 FOR OP/ED)

## 2023-11-20 PROCEDURE — 74018 RADEX ABDOMEN 1 VIEW: CPT

## 2023-11-20 PROCEDURE — 93922 UPR/L XTREMITY ART 2 LEVELS: CPT | Performed by: INTERNAL MEDICINE

## 2023-11-20 PROCEDURE — 85613 RUSSELL VIPER VENOM DILUTED: CPT

## 2023-11-20 PROCEDURE — 1100000001 HC PRIVATE ROOM DAILY

## 2023-11-20 PROCEDURE — 99232 SBSQ HOSP IP/OBS MODERATE 35: CPT | Performed by: STUDENT IN AN ORGANIZED HEALTH CARE EDUCATION/TRAINING PROGRAM

## 2023-11-20 PROCEDURE — 93880 EXTRACRANIAL BILAT STUDY: CPT | Performed by: INTERNAL MEDICINE

## 2023-11-20 PROCEDURE — 2500000004 HC RX 250 GENERAL PHARMACY W/ HCPCS (ALT 636 FOR OP/ED): Performed by: SURGERY

## 2023-11-20 PROCEDURE — 99232 SBSQ HOSP IP/OBS MODERATE 35: CPT | Performed by: INTERNAL MEDICINE

## 2023-11-20 PROCEDURE — 97530 THERAPEUTIC ACTIVITIES: CPT | Mod: GP | Performed by: PHYSICAL THERAPIST

## 2023-11-20 PROCEDURE — 36415 COLL VENOUS BLD VENIPUNCTURE: CPT | Performed by: SURGERY

## 2023-11-20 PROCEDURE — 74018 RADEX ABDOMEN 1 VIEW: CPT | Performed by: RADIOLOGY

## 2023-11-20 PROCEDURE — 2500000002 HC RX 250 W HCPCS SELF ADMINISTERED DRUGS (ALT 637 FOR MEDICARE OP, ALT 636 FOR OP/ED): Performed by: SURGERY

## 2023-11-20 PROCEDURE — 51701 INSERT BLADDER CATHETER: CPT

## 2023-11-20 PROCEDURE — 85520 HEPARIN ASSAY: CPT | Performed by: SURGERY

## 2023-11-20 PROCEDURE — 99233 SBSQ HOSP IP/OBS HIGH 50: CPT | Performed by: NURSE PRACTITIONER

## 2023-11-20 PROCEDURE — 97162 PT EVAL MOD COMPLEX 30 MIN: CPT | Mod: GP | Performed by: PHYSICAL THERAPIST

## 2023-11-20 PROCEDURE — 2500000001 HC RX 250 WO HCPCS SELF ADMINISTERED DRUGS (ALT 637 FOR MEDICARE OP): Performed by: STUDENT IN AN ORGANIZED HEALTH CARE EDUCATION/TRAINING PROGRAM

## 2023-11-20 PROCEDURE — 2500000001 HC RX 250 WO HCPCS SELF ADMINISTERED DRUGS (ALT 637 FOR MEDICARE OP)

## 2023-11-20 PROCEDURE — 93922 UPR/L XTREMITY ART 2 LEVELS: CPT

## 2023-11-20 PROCEDURE — 80069 RENAL FUNCTION PANEL: CPT | Performed by: SURGERY

## 2023-11-20 PROCEDURE — 93880 EXTRACRANIAL BILAT STUDY: CPT

## 2023-11-20 PROCEDURE — 85730 THROMBOPLASTIN TIME PARTIAL: CPT | Performed by: SURGERY

## 2023-11-20 RX ORDER — POLYETHYLENE GLYCOL 3350 17 G/17G
17 POWDER, FOR SOLUTION ORAL DAILY
Status: DISCONTINUED | OUTPATIENT
Start: 2023-11-20 | End: 2023-11-22

## 2023-11-20 RX ORDER — AMOXICILLIN 250 MG
2 CAPSULE ORAL 2 TIMES DAILY
Status: DISCONTINUED | OUTPATIENT
Start: 2023-11-20 | End: 2023-11-22

## 2023-11-20 RX ORDER — BISACODYL 10 MG/1
10 SUPPOSITORY RECTAL DAILY PRN
Status: DISCONTINUED | OUTPATIENT
Start: 2023-11-20 | End: 2023-11-27 | Stop reason: HOSPADM

## 2023-11-20 RX ORDER — AMOXICILLIN 250 MG
2 CAPSULE ORAL 2 TIMES DAILY PRN
Status: DISCONTINUED | OUTPATIENT
Start: 2023-11-20 | End: 2023-11-20

## 2023-11-20 RX ORDER — SENNOSIDES 8.6 MG/1
2 TABLET ORAL NIGHTLY
Status: DISCONTINUED | OUTPATIENT
Start: 2023-11-20 | End: 2023-11-20 | Stop reason: SDUPTHER

## 2023-11-20 RX ADMIN — BISACODYL 10 MG: 10 SUPPOSITORY RECTAL at 00:32

## 2023-11-20 RX ADMIN — SENNOSIDES AND DOCUSATE SODIUM 2 TABLET: 8.6; 5 TABLET ORAL at 21:13

## 2023-11-20 RX ADMIN — METHOCARBAMOL 500 MG: 1000 INJECTION, SOLUTION INTRAMUSCULAR; INTRAVENOUS at 02:17

## 2023-11-20 RX ADMIN — ALLOPURINOL 100 MG: 100 TABLET ORAL at 08:34

## 2023-11-20 RX ADMIN — AMLODIPINE BESYLATE 10 MG: 10 TABLET ORAL at 08:33

## 2023-11-20 RX ADMIN — HEPARIN SODIUM 1700 UNITS/HR: 10000 INJECTION, SOLUTION INTRAVENOUS at 08:36

## 2023-11-20 RX ADMIN — HEPARIN SODIUM 3000 UNITS: 5000 INJECTION, SOLUTION INTRAVENOUS; SUBCUTANEOUS at 05:02

## 2023-11-20 RX ADMIN — ATORVASTATIN CALCIUM 40 MG: 40 TABLET, FILM COATED ORAL at 21:13

## 2023-11-20 RX ADMIN — EZETIMIBE 10 MG: 10 TABLET ORAL at 08:34

## 2023-11-20 RX ADMIN — CARVEDILOL 12.5 MG: 12.5 TABLET, FILM COATED ORAL at 08:34

## 2023-11-20 RX ADMIN — POLYETHYLENE GLYCOL 3350 17 G: 17 POWDER, FOR SOLUTION ORAL at 15:13

## 2023-11-20 RX ADMIN — DOCUSATE SODIUM 100 MG: 100 CAPSULE, LIQUID FILLED ORAL at 08:34

## 2023-11-20 RX ADMIN — CARVEDILOL 12.5 MG: 12.5 TABLET, FILM COATED ORAL at 17:40

## 2023-11-20 RX ADMIN — FAMOTIDINE 20 MG: 20 TABLET, FILM COATED ORAL at 21:13

## 2023-11-20 RX ADMIN — CLOPIDOGREL BISULFATE 75 MG: 75 TABLET ORAL at 08:34

## 2023-11-20 RX ADMIN — Medication: at 00:32

## 2023-11-20 RX ADMIN — HEPARIN SODIUM 1700 UNITS/HR: 10000 INJECTION, SOLUTION INTRAVENOUS at 23:54

## 2023-11-20 RX ADMIN — DICYCLOMINE HYDROCHLORIDE 20 MG: 10 CAPSULE ORAL at 08:34

## 2023-11-20 ASSESSMENT — COGNITIVE AND FUNCTIONAL STATUS - GENERAL
TOILETING: A LITTLE
TURNING FROM BACK TO SIDE WHILE IN FLAT BAD: A LITTLE
MOBILITY SCORE: 18
DRESSING REGULAR LOWER BODY CLOTHING: A LITTLE
MOBILITY SCORE: 12
CLIMB 3 TO 5 STEPS WITH RAILING: TOTAL
CLIMB 3 TO 5 STEPS WITH RAILING: TOTAL
WALKING IN HOSPITAL ROOM: TOTAL
STANDING UP FROM CHAIR USING ARMS: TOTAL
MOVING TO AND FROM BED TO CHAIR: TOTAL
WALKING IN HOSPITAL ROOM: A LITTLE
CLIMB 3 TO 5 STEPS WITH RAILING: A LITTLE
DAILY ACTIVITIY SCORE: 20
STANDING UP FROM CHAIR USING ARMS: TOTAL
TOILETING: A LITTLE
DRESSING REGULAR UPPER BODY CLOTHING: A LITTLE
HELP NEEDED FOR BATHING: A LITTLE
DRESSING REGULAR LOWER BODY CLOTHING: A LITTLE
WALKING IN HOSPITAL ROOM: TOTAL
DRESSING REGULAR UPPER BODY CLOTHING: A LITTLE
DAILY ACTIVITIY SCORE: 20
STANDING UP FROM CHAIR USING ARMS: A LITTLE
MOBILITY SCORE: 9
MOVING FROM LYING ON BACK TO SITTING ON SIDE OF FLAT BED WITH BEDRAILS: A LITTLE
HELP NEEDED FOR BATHING: A LITTLE
MOVING TO AND FROM BED TO CHAIR: TOTAL
TURNING FROM BACK TO SIDE WHILE IN FLAT BAD: A LOT
MOVING FROM LYING ON BACK TO SITTING ON SIDE OF FLAT BED WITH BEDRAILS: A LITTLE
MOVING TO AND FROM BED TO CHAIR: A LITTLE

## 2023-11-20 ASSESSMENT — PAIN - FUNCTIONAL ASSESSMENT: PAIN_FUNCTIONAL_ASSESSMENT: 0-10

## 2023-11-20 ASSESSMENT — ACTIVITIES OF DAILY LIVING (ADL): ADL_ASSISTANCE: INDEPENDENT

## 2023-11-20 NOTE — NURSING NOTE
Patient c/o abdominal pain due to constipation. Patient does not want to take opoid and tylenol. Dr. Bates notify. After Dr. Bates sees patient. Patient agree to take tylenol and suppository ordered.

## 2023-11-20 NOTE — PROGRESS NOTES
VASCULAR SURGERY PROGRESS NOTE  Subjective   Patient had continued abdominal pain overnight. Was administered suppository. Has not yet had a bowel movement, is passing gas from below. Reports improved pain of LLE. No left foot pain. Just reports incisional pain. Sensory and motor intact    Objective   Vitals:  Heart Rate:  [77-92]   Temp:  [36.1 °C (97 °F)-37.9 °C (100.2 °F)]   Resp:  [16-18]   BP: ()/(60-79)   SpO2:  [93 %-97 %]     Exam:  Constitutional: No acute distress, resting comfortably  Neuro:  AOx3, grossly intact  ENMT: moist mucous membranes  CV: no tachycardia  Pulm: non-labored on room air  GI: soft, non-tender, non-distended  Skin: left leg fasciotomy incisions with staples in place, mild SS drainage from lateral incision, no erythema, left groin dressing removed at bedside this AM, groin incision c/d/i no hematoma  Musculoskeletal: moving all extremities  Extremities: left AT palpable, multiphasic left PT and DP doppler signal    Labs:  Results from last 7 days   Lab Units 11/20/23  0509 11/19/23  0605 11/18/23  0616   WBC AUTO x10*3/uL 9.8 6.7 8.9   HEMOGLOBIN g/dL 9.9* 10.2* 11.9*   PLATELETS AUTO x10*3/uL 186 140* 149*        Results from last 7 days   Lab Units 11/20/23  0513 11/19/23  0605 11/18/23  0616 11/16/23  0957 11/16/23  0957   SODIUM mmol/L 136 140 138  --  139   POTASSIUM mmol/L 4.2 4.0 4.6  --  4.1   CHLORIDE mmol/L 101 106 104  --  103   CO2 mmol/L 26 26 24  --  28   BUN mg/dL 22 25* 21  --  21   CREATININE mg/dL 1.23 1.25 1.19  --  1.20   GLUCOSE mg/dL 228* 158* 145*  --  140*   MAGNESIUM mg/dL  --   --   --   --  1.95   PHOSPHORUS mg/dL 3.4 2.8 3.8   < >  --     < > = values in this interval not displayed.        Results from last 7 days   Lab Units 11/14/23  2318 11/14/23  1546   INR   --  1.1   PROTIME seconds  --  11.3   APTT seconds >139.0*  --        Assessment/Plan   Mukesh Garg is 73 y.o. male with history of CAD s/p CABG x3, recurrent DVT (most recent 10/24/2023),  Afib (on Eliquis), recent COVID (10/2023) who presented with LLE subacute on chronic limb ischemia and PE.     Echo demonstrated concerns for PFO, possibly symptomatic given recent DVT resulting in PE and then ALI    KUB obtained this morning due to uncontrolled abdominal pain, demonstrated dilated loops of bowel. PRN miralax changed to scheduled. Senna ordered in addition to colace. Administered suppository overnight.   Patient will need post-op ALLISON with TBI today. Will need to undergo RHC today to evaluate for PFO.     Plan:  -pain control with Tylenol, PRN oxycodone, toradol  -appreciate cardiology recommendations, RHC today to evaluate for PFO  - continue bowel regimen   -appreciate vascular medicine recommendation  -continue plavix and heparin gtt  -ALLISON with TBI today  -PT/OT  -continue care per primary team    D/w attending, Dr. Flynn Burroughs  General Surgery PGY1  Team pager: [phone]

## 2023-11-20 NOTE — PROGRESS NOTES
"Mukesh Garg is a 73 y.o. male on day 5 of admission presenting with Acute lower limb ischemia.    Subjective   -No acute event overnight    -I saw the patient today, he was sleeping, he looked well and not in distress   - No BM yet after the surgery  - Patient has no chest pain or SOB  - Still endorse pain at the LLE  but improved comparing to inial presentation  with the analgesia     Objective     Physical Exam  Constitutional:       Appearance: Normal appearance. He is obese.   HENT:      Head: Normocephalic and atraumatic.      Nose: Nose normal.   Eyes:      Extraocular Movements: Extraocular movements intact.      Conjunctiva/sclera: Conjunctivae normal.      Pupils: Pupils are equal, round, and reactive to light.   Cardiovascular:      Rate and Rhythm: Normal rate. Rhythm irregular.   Pulmonary:      Effort: Pulmonary effort is normal.      Breath sounds: Normal breath sounds.   Abdominal:      Palpations: Abdomen is soft.   Musculoskeletal:         General: Normal range of motion.      Cervical back: Normal range of motion and neck supple.   Skin:     General: Skin is warm and dry.      Capillary Refill: Capillary refill takes less than 2 seconds.   Neurological:      General: No focal deficit present.      Mental Status: He is alert and oriented to person, place, and time.   Psychiatric:         Mood and Affect: Mood normal.         Behavior: Behavior normal.             Last Recorded Vitals  Blood pressure 96/71, pulse 87, temperature 37.1 °C (98.8 °F), resp. rate 18, height 1.753 m (5' 9\"), weight 111 kg (245 lb 1.8 oz), SpO2 95 %.  Intake/Output last 3 Shifts:  I/O last 3 completed shifts:  In: - (0 mL/kg)   Out: 900 (8.1 mL/kg) [Urine:900 (0.2 mL/kg/hr)]  Weight: 111.2 kg     Relevant Results  {If you would like to pull in Medications, type .meds     If you would like to pull in Lab results for the last 24 hours, type .[id number]    If you would like to pull in Imaging results, type .imgrslt " :99}    {Link to Stroke Scoring tools - Link :99}        Results for orders placed or performed during the hospital encounter of 11/15/23 (from the past 24 hour(s))   POCT GLUCOSE   Result Value Ref Range    POCT Glucose 147 (H) 74 - 99 mg/dL   Heparin Assay, UFH   Result Value Ref Range    Heparin Unfractionated 0.4 See Comment Below for Therapeutic Ranges IU/mL   Heparin Assay, UFH   Result Value Ref Range    Heparin Unfractionated 0.2 See Comment Below for Therapeutic Ranges IU/mL   Heparin Assay, UFH   Result Value Ref Range    Heparin Unfractionated 0.2 See Comment Below for Therapeutic Ranges IU/mL   CBC   Result Value Ref Range    WBC 9.8 4.4 - 11.3 x10*3/uL    nRBC 0.0 0.0 - 0.0 /100 WBCs    RBC 3.32 (L) 4.50 - 5.90 x10*6/uL    Hemoglobin 9.9 (L) 13.5 - 17.5 g/dL    Hematocrit 31.0 (L) 41.0 - 52.0 %    MCV 93 80 - 100 fL    MCH 29.8 26.0 - 34.0 pg    MCHC 31.9 (L) 32.0 - 36.0 g/dL    RDW 14.5 11.5 - 14.5 %    Platelets 186 150 - 450 x10*3/uL   Lupus Anticoagulant With Interpretation [BOWENS]   Result Value Ref Range    DRVVT Screen 3.73 RATIO    DRVVT Confirmation 2.76 RATIO    DRVVT Test Ratio 1.35 (H) <=1.20 RATIO    SCT Screen 0.66 RATIO    SCT Confirmation 1.23 RATIO    SCT Test Ratio 0.54 <=1.16 RATIO    Lupus Anticoagulant Interpretation       There is evidence supportive of the presence of a lupus anticoagulant in one or both of these assays. Assay interferences may occur in the presence of factor deficiency/inhibitor and/or anticoagulants. For patients on anti-Vitamin K therapy, repeating DRVVT testing might be indicated when the patient is off anti-vitamin K therapy. The DRVVT assay contains a heparin neutralizer up to 1.0 U/mL. Higher concentrations of heparin may cause interferences. SCT results are not affected by UF heparin up to 0.5 U/mL and LMW Heparin up to 1.0 U/mL. Higher concentrations of heparin may cause interferences. To evaluate the possibility that the current results represent a  transient lupus anticoagulant consider repeat testing in greater than 12 weeks if clinically indicated and not previously performed. Correlation with clinical findings and clinical history is necessary to assess significance of results in an individual patient.   Renal function panel   Result Value Ref Range    Glucose 228 (H) 74 - 99 mg/dL    Sodium 136 136 - 145 mmol/L    Potassium 4.2 3.5 - 5.3 mmol/L    Chloride 101 98 - 107 mmol/L    Bicarbonate 26 21 - 32 mmol/L    Anion Gap 13 10 - 20 mmol/L    Urea Nitrogen 22 6 - 23 mg/dL    Creatinine 1.23 0.50 - 1.30 mg/dL    eGFR 61 >60 mL/min/1.73m*2    Calcium 8.3 (L) 8.6 - 10.6 mg/dL    Phosphorus 3.4 2.5 - 4.9 mg/dL    Albumin 2.8 (L) 3.4 - 5.0 g/dL   Heparin Assay, UFH   Result Value Ref Range    Heparin Unfractionated 0.3 See Comment Below for Therapeutic Ranges IU/mL   aPTT - baseline   Result Value Ref Range    aPTT 38 27 - 38 seconds   Heparin Assay, UFH   Result Value Ref Range    Heparin Unfractionated 0.3 See Comment Below for Therapeutic Ranges IU/mL      TRANSTHORACIC ECHOCARDIOGRAM REPORT 11/15/2023        1. Left ventricular systolic function is normal with a 65-70% EF.   2. The patient is in atrial fibrillation which may influence the estimate of left ventricular function and transvalvular flows.   3. The RV appears upper limits of normal size vs mildly dilated with moderate to severely reduced fx with a McConnels sign consistent with pulmonary embolus.   4. Color Doppler flow in the region of the atrial septum suggestive of possible small left to right shunt.    5 .Mild AR   6. Compared with the images from the prior TTE done 10/25/2023 there was already RV systolic dysfunction with possible McConnels sign at that time         Assessment/Plan   Principal Problem:    Acute lower limb ischemia  Active Problems:    Multiple subsegmental pulmonary emboli without acute cor pulmonale (CMS/HCC)    Septal defect, heart    Mukesh Garg is a 73 y.o. male with  PMHx of Afib, HTN, JENNY (on CPAP at home), gout, CABG x3 vessels (2013), recurrent DVTs  (held eliq) and peripheral neuropathy. Presented with Acute left LE limb ischemia 2/2 popliteal artery occlusion and when worked up found to have bilateral PE on CT. Currently is being managed for acute L LE ischemia and PE on heparin drip.  S/P thrombectomy in the  LLE (11/17/23)         #Occlusion of L-popliteal artery  #PAD  ##CAD  ## L to R shunt  - On physical exam, patient's left leg showed pallor, increasingly shiny, and was severely tender, however now his pain decreased from presentation. This with the CT angio which showed extensive occlusion of the left common iliac, femoral and popliteal artery. Along with the LE blood flow was reconstituted to the tibialis posterior, anterior and peroneal arteries, are indicative of atherosclerotic build up and points to severe LE ischemia. For this, left popliteal embolectomy was recommended to reestablish blood flow. He was taken to the OR on   OR on 11/17 for L fem cutdown w/ ileofem thromboembolectomy, L pop cutdown w/ fempop  thromboembolectomy and L TP trunk cutdown w/ thromboembolectomy.     -He has a Hx of CABG in 2013 Coronary artery bypass grafting x 3 , partial left sided MAZE,   (LIMA to a large Diag, a free ALEX y'd off the LIMA to the LAD , SVG-PDA)  - Hx of PCI 2006 (emergent for STEMI): DESx1 to prox diag, DESx2 to mid and distal LAD     - On TTE (11/17/23) Color Doppler flow in the region of the atrial septum suggestive of possible small left to right shunt. Because of that the patient will undergo for RHC tomorrow         Plan:    - Polyethylene glycol and Docusate sodium for constipation   - C/w heparin gtt (HI) for now  (heparin assay is within the therapeutic range)   - continue home medication Ezetimibe 10 mg   - ALLISON scheduled tomorrow  -Vascular surgery was consulted. Recommended:  - continue the heparin gtt and they took him to the     OR on 11/17 (POD1) for L  fem cutdown w/ ileofem thromboembolectomy, L pop cutdown w/ fempop  thromboembolectomy and L TP trunk cutdown w/ thromboembolectomy      - Cardio consult, because of the positive bubble study, theory of L-R shunt causing DVT and PAD hx. Because of that the patient will undergo for RHC tomorrow      -Vascular Medicine consulted for concern of hypercoagulability and anticoagulation of choice upon discharge, They recommended:                             1-continue with heparin gtt. continue to follow heparin assay goal is between        0                                             0.3  and 0.7                             2- continue with Plavix                             3- carotid Doppler arterial ultra sound                             4- Antiphospholipid antibody workup     - Cardiology was consulted and they recommended:                       - Continuing atorvastatin ,Ezetimibe ,plavix and heparin (assay goal between 0.3-0.7)                       - Carotid doppler arterial ultrasound (ordered)                       - GUILLERMINA (ordered)                        - will take him for RHC tomorrow              ## PE   ##Hx of DVT  -Hx of provoked DVT on 10/24/2023 and also 20 years ago in the right leg after long flight  - He has bilateral pulmonary emboli with moderate clot burden in the distal right main pulmonary artery extending into the right middle and right lower lobes and small clot burden in the left lower lobe pulmonary with a suggestive right ventricular stain.   -Has been taking Apixaban since 10/24/2023 and been complaint but now stopped and is on heparin gtt  -Vitally stable on admission without increased O2 requirement however he desated on (11/15) while sleeping to 87% and was placed on 2 L NS (patient is known case of JENNY and on CPAP at night), family brought his CPAP machine and currently sating well on RA.   -There is no clear etiology for hypercoaguable status but probably related to   Covid infection  that he had recently on 10/24/2023    - patient had been on warfarin for many years for atrial fibrillation. INR was subtherapeutic during last admission (10/24/2023) as patient had not been taking his medications due to symptomatic COVID infection. He was initially being bridged back to warfarin, but decision was made to switch to apixaban on discharge. He has been fully adherent to apixaban since then. In addition, CTA chest had not been performed during last admission, so the chronicity of PE is not clear        Plan:  - Cont. High intensity Heparin gtt   - Hold Eliquis while on heparin gtt  - B2 glycoprotein ab and cardiolipin ab labs all were WNL  - Lupus antibody ordered today  - patient had his colonoscopy 2 years ago and was normal will consider possible malignancy workup.          #Atrial fibrillation  ##Hypertension  #DLD  - Patient has extensive previous hx of afib, on admition he had irregular rhythm and the EKG showed no P waves  - patient denied any chest pain dyspnea and with negative trops, thus more inline with afib with rvr    Plan:   -Continue home Carvedilol 12.5 BID   -Continue home Amlodipine 10 mg   -Continue home chlorthalidone 25 mg    -Hold home Lisinopril 40 mg   - continue home medication atorvastatin 40 mg          ##Loose teethe  ## Minimal bleeding   - OMFS consulted for tooth extractions as the patient has a probable loose tooth. They recommended to  extraction in the outpatient setting with a dentist as they think it has no risk of aspiration.     OMFS Recs:   - F/U with general dentist for extraction of #14 in the outpatient settings     - On 11/18 morning the patient was having his breakfast and has a tooth crown that fell off, He was seen by Dentist today and his crown was fixed in #29 tooth and was recommended to follow up in the outpatient clinic.      ##Gout  Continue home allopurinol 100 mg           F: Replete PRN  E: Replete PRN  N:  cardiac diet   DVT Ppx: On Heparin  drip  GI Ppx: None  Access/Lines: PIV  Abx: none           Code status: Full Code  Emergency contact: Sweetie Garg (Spouse) # [phone]                  Varinder Boogie MD

## 2023-11-20 NOTE — NURSING NOTE
Patient's IV on the left upper arm is leaking, patient refuse to remove this iv and another one below it a bit leaking, but function very well. Patient refuse to clean and change a new dressing. Educating patient the risk of infection. Patient express understanding.

## 2023-11-20 NOTE — PROGRESS NOTES
Subjective Data:  Mukesh Garg is 73 y.o. male with history of CAD s/p CABG x3, recurrent DVT (most recent 10/24/2023), Afib (on Eliquis), recent COVID (10/2023) who presented with LLE subacute on chronic limb ischemia and PE.     Overnight Events:    None as per nurse but general surgery reported abdominal pain that responded well to suppository  Left lower EXTR pain is much better, reviewing his heparin assay at 5:30 PM last night he was therapeutic then his assay dropped to 0.2, when asked about needing more heparin to achieve therapeutic assay the answer was no just the regular heparin dose according to nomogram no evidence of heparin resistance     Objective Data:  Last Recorded Vitals:  Vitals:    11/19/23 2033 11/20/23 0039 11/20/23 0409 11/20/23 0814   BP: 104/71 113/67 108/62 125/79   BP Location:       Pulse: 91 87 91 92   Resp: 18 18 18 18   Temp: 37.1 °C (98.8 °F) 37.9 °C (100.2 °F) 36.1 °C (97 °F) 37.5 °C (99.5 °F)   TempSrc:       SpO2: 96% 94% 94% 97%   Weight:       Height:           Last Labs:  CBC - 11/20/2023:  5:09 AM  9.8 9.9 186    31.0      CMP - 11/20/2023:  5:13 AM  8.3 5.3 11 --- 1.8   3.4 2.8 19 45      PTT - 11/20/2023:  9:02 AM  1.1   11.3 38     TROPHS   Date/Time Value Ref Range Status   11/15/2023 02:30 AM 21 0 - 53 ng/L Final     BNP   Date/Time Value Ref Range Status   11/15/2023 02:30 AM 88 0 - 99 pg/mL Final     HGBA1C   Date/Time Value Ref Range Status   07/15/2023 11:54 AM 6.7 4.0 - 6.0 % Final     Comment:     Hemoglobin A1C levels are related to mean blood glucose during the   preceding 2-3 months. The relationship table below may be used as a   general guide. Each 1% increase in HGB A1C is a reflection of an   increase in mean glucose of approximately 30 mg/dl.   Reference: Diabetes [employer], volume 29, supplement 1 Jan. 2006                        HGB A1C ................. Approx. Mean Glucose   _______________________________________________   6%    ...............................  120 mg/dl   7%   ...............................  150 mg/dl   8%   ...............................  180 mg/dl   9%   ...............................  210 mg/dl   10%  ...............................  240 mg/dl  Performed at [address] [address]     10/31/2020 05:14 AM 5.6 4.0 - 6.0 % Final     Comment:     Hemoglobin A1C levels are related to mean blood glucose during the   preceding 2-3 months. The relationship table below may be used as a   general guide. Each 1% increase in HGB A1C is a reflection of an   increase in mean glucose of approximately 30 mg/dl.   Reference: Diabetes Care, volume 29, supplement 1 Jan. 2006                        HGB A1C ................. Approx. Mean Glucose   _______________________________________________   6%   ...............................  120 mg/dl   7%   ...............................  150 mg/dl   8%   ...............................  180 mg/dl   9%   ...............................  210 mg/dl   10%  ...............................  240 mg/dl  Performed at [address] [address]       LDLCALC   Date/Time Value Ref Range Status   07/15/2023 11:54 AM 62 65 - 130 MG/DL Final   12/12/2022 12:08 PM 65 65 - 130 MG/DL Final   04/29/2022 03:35 PM 99 65 - 130 MG/DL Final      Last I/O:  I/O last 3 completed shifts:  In: - (0 mL/kg)   Out: 900 (8.1 mL/kg) [Urine:900 (0.2 mL/kg/hr)]  Weight: 111.2 kg     Past Cardiology Tests (Last 3 Years):  EKG:  ECG 12 Lead 11/13/2023      ECG 12 lead 10/26/2023    Echo:  Transthoracic Echo (TTE) Complete 11/15/2023      Transthoracic Echo (TTE) Complete 10/25/2023    Ejection Fractions:  EF   Date/Time Value Ref Range Status   11/15/2023 02:17 PM 67       Cath:  No results found for this or any previous visit from the past 1095 days.    Stress Test:  No results found for this or any previous visit from the past 1095 days.    Cardiac Imaging:  No results found for this  or any previous visit from the past 1095 days.      Inpatient Medications:  Scheduled medications   Medication Dose Route Frequency    allopurinol  100 mg oral Daily    amLODIPine  10 mg oral Daily    atorvastatin  40 mg oral Nightly    carvedilol  12.5 mg oral BID with meals    clopidogrel  75 mg oral Daily    dicyclomine  20 mg oral Daily    docusate sodium  100 mg oral BID    ezetimibe  10 mg oral Daily    famotidine  20 mg oral Nightly    perflutren protein A microsphere  0.5 mL intravenous Once in imaging    polyethylene glycol  17 g oral Daily    sennosides  2 tablet oral Nightly    sodium phosphates  1 enema rectal Once    sulfur hexafluoride microsphr  2 mL intravenous Once in imaging     PRN medications   Medication    acetaminophen    heparin    HYDROmorphone    naloxone    naloxone    oxyCODONE    oxygen     Continuous Medications   Medication Dose Last Rate    heparin  0-4,500 Units/hr 1,700 Units/hr (11/20/23 1013)       Physical Exam:  Constitutional: No acute distress, resting comfortably  Neuro:  AOx3, grossly intact  ENMT: moist mucous membranes  CV: no tachycardia  Pulm: non-labored on room air  GI: soft, non-tender, non-distended  Skin: left leg fasciotomy incisions with staples in place, mild SS drainage from lateral incision, no erythema, left groin dressing removed at bedside this AM, groin incision c/d/i no hematoma  Musculoskeletal: moving all extremities  Extremities: left AT palpable, multiphasic left PT and DP doppler signal     Assessment/Plan   garcia Garg is 73 y.o. male with history of CAD s/p CABG x3, recurrent DVT (most recent 10/24/2023), Afib (on Eliquis), recent COVID (10/2023) who presented with LLE subacute on chronic limb ischemia and PE.    1-continue with heparin GTT for now in addition to Plavix  2-patient is for ALLISON with TBI today in addition to or RHC today  Peripheral IV 11/16/23 20 G Left;Proximal;Anterior Forearm (Active)   Site Assessment Clean;Intact;Dry 11/20/23 0000    Dressing Status Clean;Dry 11/20/23 0000   Number of days: 4       Peripheral IV 11/17/23 16 G Left Hand (Active)   Site Assessment Clean;Dry;Intact 11/20/23 0000   Dressing Status Clean;Dry 11/20/23 0000   Number of days: 3       Peripheral IV 11/17/23 18 G Left Antecubital (Active)   Site Assessment Dry;Clean;Intact 11/19/23 2100   Dressing Type Transparent 11/19/23 2100   Line Status Flushed 11/19/23 2100   Number of days: 3       Code Status:  Full Code    I spent 25 minutes in the professional and overall care of this patient.        Loc Ervin MD

## 2023-11-20 NOTE — PROGRESS NOTES
Attending Physician Attestation  I saw and evaluated the patient.  I personally obtained the key and critical portions of the history and physical exam or was physically present for key and  critical portions performed by the resident/student. I reviewed the resident/student's documentation and discussed the patient with the resident/student. I agree with the documentation as detailed in the note unless stated otherwise in this attestation. Physical exam findings as documented in attestation.      Initial plan for RHC today, now scheduled for tomorrow   ON, pt with increased abdominal pain and constipation   He had urinary retention this morning requiring straight cath   T 37.9 ON noted.   Overall, he is feeling better. Denies abdominal pain, CP, SOB, cough. He is feeling well.      Constitutional: resting comfortably in bed, no acute distress   Eyes:  making eye contact   Respiratory/Thorax:  no acute respiratory distress  CV: distal extremities warm to touch   Musculoskeletal: mild edema LLE - improving   Neurological: alert, answering questions appropriately, speech is clear and fluent   Psychological: Appropriate mood and behavior  Skin: Warm and dry     A/P  Left popliteal artery occlusion s/p embolectomy on 11/17  ALI LLE   Based on onset of symptoms, appears LLE popliteal artery occlusion has been developing since 10/24/2023 admission, during which INR was subtherapeutic.   - Anticardiolipin Ab neg, anti B2 glycoprotein neg. Lupus anticoagulant pend.  - Vascular surgery completed embolectomy on 11/17  - Continue heparin gtt     Poss PFO   Bubble study on TTE not definitive. Cardiology consulted and planning RHC 11/21, NPO after midnight      Bilateral PE, distal right main pulmonary artery   Right popliteal/femoral DVT  DVT had developed while patient had a subtherapeutic INR and chronicity of PE unclear. Unclear if this is apixaban failure due to timeline of events - vascular medicine consulted for  assistance. Continue heparin gtt for now.      Poor dentition   Pt reports tooth fell out 11/18, dental consulted and placed with temporary cement. Appreciate assistance.       Constipation   Continue bowel regimen     Chronic diarrhea   Not currently an issue in the hospital. Pt reports significantly impaired quality of life 2/2 intermittent symptoms. Will attempt to review old records, however on quick chart review prior colonoscopy records unavailable. Will avoid initiation of medications that may worsen his chronic symptoms.      Atrial fibrillation  CAD     Discussed with resident team   Discussed with pt and his brother at bedside  Discussed with RN     Time spent in coordination of care 45 min      Viviana Victor, DO

## 2023-11-20 NOTE — CARE PLAN
The patient's goals for the shift include      The clinical goals for the shift include no sign of bleeding      Problem: Pain - Adult  Goal: Verbalizes/displays adequate comfort level or baseline comfort level  Outcome: Progressing     Problem: Safety - Adult  Goal: Free from fall injury  Outcome: Progressing     Problem: Discharge Planning  Goal: Discharge to home or other facility with appropriate resources  Outcome: Progressing     Problem: Chronic Conditions and Co-morbidities  Goal: Patient's chronic conditions and co-morbidity symptoms are monitored and maintained or improved  Outcome: Progressing     Problem: Fall/Injury  Goal: Verbalize understanding of personal risk factors for fall in the hospital  Outcome: Progressing  Goal: Verbalize understanding of risk factor reduction measures to prevent injury from fall in the home  Outcome: Progressing  Goal: Use assistive devices by end of the shift  Outcome: Progressing  Goal: Pace activities to prevent fatigue by end of the shift  Outcome: Progressing     Problem: Cardiovascular - Adult  Goal: Maintains optimal cardiac output and hemodynamic stability  Outcome: Progressing  Goal: Absence of cardiac dysrhythmias or at baseline  Outcome: Progressing     Problem: Musculoskeletal - Adult  Goal: Return mobility to safest level of function  Outcome: Progressing  Goal: Maintain proper alignment of affected body part  Outcome: Progressing  Goal: Return ADL status to a safe level of function  Outcome: Progressing

## 2023-11-20 NOTE — PROGRESS NOTES
"Mukesh Garg is a 73 y.o. male on day 5 of admission presenting with Acute lower limb ischemia.    Subjective   -No acute event overnight  -I saw the patient today in the [facility], and he was sleeping on his nasal CPAP and complained of abdominal pain, and mentioned to me that he hasn't urinated since yesterday morning.   - No BM yet since surgery  - Patient has no chest pain or SOB  - His pain at the LLE  but improved significantly compared to inial presentation      Objective     Physical Exam  Constitutional:       Appearance: Normal appearance. He is obese.   HENT:      Head: Normocephalic and atraumatic.      Nose: Nose normal.   Eyes:      Extraocular Movements: Extraocular movements intact.      Conjunctiva/sclera: Conjunctivae normal.      Pupils: Pupils are equal, round, and reactive to light.   Cardiovascular:      Rate and Rhythm: Normal rate. Rhythm irregular.   Pulmonary:      Effort: Pulmonary effort is normal.      Breath sounds: Normal breath sounds.   Abdominal:      Palpations: Abdomen is soft.      Tenderness: There is abdominal tenderness.   Musculoskeletal:         General: Normal range of motion.      Cervical back: Normal range of motion and neck supple.   Skin:     General: Skin is warm and dry.      Capillary Refill: Capillary refill takes less than 2 seconds.   Neurological:      General: No focal deficit present.      Mental Status: He is alert and oriented to person, place, and time.   Psychiatric:         Mood and Affect: Mood normal.         Behavior: Behavior normal.             Last Recorded Vitals  Blood pressure 96/63, pulse 88, temperature 36.9 °C (98.4 °F), resp. rate 19, height 1.753 m (5' 9\"), weight 111 kg (245 lb 1.8 oz), SpO2 94 %.  Intake/Output last 3 Shifts:  I/O last 3 completed shifts:  In: - (0 mL/kg)   Out: 900 (8.1 mL/kg) [Urine:900 (0.2 mL/kg/hr)]  Weight: 111.2 kg     Relevant Results              Results for orders placed or performed during the hospital encounter of " 11/15/23 (from the past 24 hour(s))   Heparin Assay, UFH   Result Value Ref Range    Heparin Unfractionated 0.2 See Comment Below for Therapeutic Ranges IU/mL   Heparin Assay, UFH   Result Value Ref Range    Heparin Unfractionated 0.2 See Comment Below for Therapeutic Ranges IU/mL   CBC   Result Value Ref Range    WBC 9.8 4.4 - 11.3 x10*3/uL    nRBC 0.0 0.0 - 0.0 /100 WBCs    RBC 3.32 (L) 4.50 - 5.90 x10*6/uL    Hemoglobin 9.9 (L) 13.5 - 17.5 g/dL    Hematocrit 31.0 (L) 41.0 - 52.0 %    MCV 93 80 - 100 fL    MCH 29.8 26.0 - 34.0 pg    MCHC 31.9 (L) 32.0 - 36.0 g/dL    RDW 14.5 11.5 - 14.5 %    Platelets 186 150 - 450 x10*3/uL   Lupus Anticoagulant With Interpretation [BOWENS]   Result Value Ref Range    DRVVT Screen 3.73 RATIO    DRVVT Confirmation 2.76 RATIO    DRVVT Test Ratio 1.35 (H) <=1.20 RATIO    SCT Screen 0.66 RATIO    SCT Confirmation 1.23 RATIO    SCT Test Ratio 0.54 <=1.16 RATIO    Lupus Anticoagulant Interpretation       There is evidence supportive of the presence of a lupus anticoagulant in one or both of these assays. Assay interferences may occur in the presence of factor deficiency/inhibitor and/or anticoagulants. For patients on anti-Vitamin K therapy, repeating DRVVT testing might be indicated when the patient is off anti-vitamin K therapy. The DRVVT assay contains a heparin neutralizer up to 1.0 U/mL. Higher concentrations of heparin may cause interferences. SCT results are not affected by UF heparin up to 0.5 U/mL and LMW Heparin up to 1.0 U/mL. Higher concentrations of heparin may cause interferences. To evaluate the possibility that the current results represent a transient lupus anticoagulant consider repeat testing in greater than 12 weeks if clinically indicated and not previously performed. Correlation with clinical findings and clinical history is necessary to assess significance of results in an individual patient.   Renal function panel   Result Value Ref Range    Glucose 228 (H) 74 - 99  mg/dL    Sodium 136 136 - 145 mmol/L    Potassium 4.2 3.5 - 5.3 mmol/L    Chloride 101 98 - 107 mmol/L    Bicarbonate 26 21 - 32 mmol/L    Anion Gap 13 10 - 20 mmol/L    Urea Nitrogen 22 6 - 23 mg/dL    Creatinine 1.23 0.50 - 1.30 mg/dL    eGFR 61 >60 mL/min/1.73m*2    Calcium 8.3 (L) 8.6 - 10.6 mg/dL    Phosphorus 3.4 2.5 - 4.9 mg/dL    Albumin 2.8 (L) 3.4 - 5.0 g/dL   Heparin Assay, UFH   Result Value Ref Range    Heparin Unfractionated 0.3 See Comment Below for Therapeutic Ranges IU/mL   aPTT - baseline   Result Value Ref Range    aPTT 38 27 - 38 seconds   Heparin Assay, UFH   Result Value Ref Range    Heparin Unfractionated 0.3 See Comment Below for Therapeutic Ranges IU/mL      TRANSTHORACIC ECHOCARDIOGRAM REPORT 11/15/2023        1. Left ventricular systolic function is normal with a 65-70% EF.   2. The patient is in atrial fibrillation which may influence the estimate of left ventricular function and transvalvular flows.   3. The RV appears upper limits of normal size vs mildly dilated with moderate to severely reduced fx with a McConnels sign consistent with pulmonary embolus.   4. Color Doppler flow in the region of the atrial septum suggestive of possible small left to right shunt.    5 .Mild AR   6. Compared with the images from the prior TTE done 10/25/2023 there was already RV systolic dysfunction with possible McConnels sign at that time         Assessment/Plan   Principal Problem:    Acute lower limb ischemia  Active Problems:    Multiple subsegmental pulmonary emboli without acute cor pulmonale (CMS/HCC)    Septal defect, heart    Mukesh Garg is a 73 y.o. male with PMHx of Afib, HTN, JENNY (on CPAP at home), gout, CABG x3 vessels (2013), recurrent DVTs  (held eliq) and peripheral neuropathy. Presented with Acute left LE limb ischemia 2/2 popliteal artery occlusion and when worked up found to have bilateral PE on CT. Currently is being managed for acute L LE ischemia and PE on heparin drip.  S/P  thrombectomy in the LLE (11/17/23)         #Occlusion of L-popliteal artery  #PAD  ##CAD  ## L to R shunt  - On inial physical exam, patient's left leg showed pallor, increasingly shiny, and was severely tender, however now his pain decreased from presentation. This with the CT angio which showed extensive occlusion of the left common iliac, femoral and popliteal artery. Along with the LE blood flow was reconstituted to the tibialis posterior, anterior and peroneal arteries, are indicative of atherosclerotic build up and points to severe LE ischemia. For this, left popliteal embolectomy was recommended to reestablish blood flow. He was taken to the OR on   OR on 11/17 for L fem cutdown w/ ileofem thromboembolectomy, L pop cutdown w/ fempop  thromboembolectomy and L TP trunk cutdown w/ thromboembolectomy.     -He has a Hx of CABG in 2013 Coronary artery bypass grafting x 3 , partial left sided MAZE,   (LIMA to a large Diag, a free ALEX y'd off the LIMA to the LAD , SVG-PDA)  - Hx of PCI 2006 (emergent for STEMI): DESx1 to prox diag, DESx2 to mid and distal LAD     - On TTE (11/17/23) Color Doppler flow in the region of the atrial septum suggestive of possible small left to right shunt. Because of that the patient will undergo for RHC tomorrow  - Currently the patient LLE pain improved significantly compared to inial presentation    - carotid Doppler arterial ultra sound, done on 11/19 and was unremarkable  - Lupus antibody came back positive, might explain his hypercoagulable status  - Had ALLISON today      Plan:    - Cont. Polyethylene glycol PRN and start sennosides-docusate sodium for constipation   - C/w heparin gtt (HI) for now   - continue home medication Ezetimibe 10 mg   - ALLISON done, will follow up result  -Vascular surgery was consulted. Recommended:  - continue the heparin gtt and they took him to the     OR on 11/17 (POD1) for L fem cutdown w/ ileofem thromboembolectomy, L pop cutdown w/ fempop   thromboembolectomy and L TP trunk cutdown w/ thromboembolectomy      - Cardio consult, because of the positive bubble study, theory of L-R shunt causing DVT and PAD hx. Because of that the patient will undergo for RHC tomorrow      -Vascular Medicine consulted for concern of hypercoagulability and anticoagulation of choice upon discharge, They recommended:                             1-continue with heparin gtt. continue to follow heparin assay goal is between        0                                             0.3  and 0.7                             2- continue with Plavix                             3- carotid Doppler arterial ultra sound, done and was unremarkable                             4- Antiphospholipid antibody workup     - Cardiology was consulted and they recommended:                       - Continuing atorvastatin ,Ezetimibe ,plavix and heparin (assay goal between 0.3-0.7)                       - GUILLERMINA (ordered)                       - NPO @ [facility]                        - RHC tomorrow 11/21             ## PE   ##Hx of DVT  -Hx of provoked DVT on 10/24/2023 and also 20 years ago in the right leg after long flight  - He has bilateral pulmonary emboli with moderate clot burden in the distal right main pulmonary artery extending into the right middle and right lower lobes and small clot burden in the left lower lobe pulmonary with a suggestive right ventricular stain.   -Has been taking Apixaban since 10/24/2023 and been complaint but now stopped and is on heparin gtt  -Vitally stable on admission without increased O2 requirement however he desated on (11/15) while sleeping to 87% and was placed on 2 L NS (patient is known case of JENNY and on CPAP at night), family brought his CPAP machine and currently sating well on RA.   -There is no clear etiology for hypercoaguable status but probably related to   Covid infection that he had recently on 10/24/2023    - patient had been on warfarin for many years for  atrial fibrillation. INR was subtherapeutic during last admission (10/24/2023) as patient had not been taking his medications due to symptomatic COVID infection. He was initially being bridged back to warfarin, but decision was made to switch to apixaban on discharge. He has been fully adherent to apixaban since then. In addition, CTA chest had not been performed during last admission, so the chronicity of PE is not clear   - B2 glycoprotein ab and cardiolipin ab labs all were WNL  - Lupus antibody came back positive, might explain his hypercoagulable status       Plan:  - Cont. High intensity Heparin gtt   - Hold Eliquis while on heparin gtt  - Follow up vasc meds recs  - patient had his colonoscopy 2 years ago and was normal will consider possible malignancy workup.  - Consider consult hematology giving his positive Lupus antibody          #Atrial fibrillation  ##Hypertension  #DLD  - Patient has extensive previous hx of afib, on admition he had irregular rhythm and the EKG showed no P waves  - patient denied any chest pain dyspnea and with negative trops, thus more inline with afib with rvr    Plan:   -Continue home Carvedilol 12.5 BID   -Continue home Amlodipine 10 mg   -Continue home chlorthalidone 25 mg    -Hold home Lisinopril 40 mg   - continue home medication atorvastatin 40 mg          ##Loose teethe  ## Minimal bleeding   - OMFS consulted for tooth extractions as the patient has a probable loose tooth. They recommended to  extraction in the outpatient setting with a dentist as they think it has no risk of aspiration.     OMFS Recs:   - F/U with general dentist for extraction of #14 in the outpatient settings     - On 11/18 morning the patient was having his breakfast and has a tooth crown that fell off, He was seen by Dentist today and his crown was fixed in #29 tooth and was recommended to follow up in the outpatient clinic.      ##Gout  Continue home allopurinol 100 mg     ## Urine retention:   -I saw  the patient today in the moring, and he was sleeping on his nasal CPAP and complained of abdominal pain, and mentioned to me that he hasn't urinated since yesterday morning.   - on PE his lower abdomin was tender and has urinary bladder was distended and straight cath was done and withdrawn 675 ml around 8 am , and then later on today he urinated 75ml and around 4 pm bladder scan showed 547 cc, followed by straight cath which withdrawn 525cc.  - Probably his urine retention related to opoid that he has been receiving for pain control   As I don't suspect it could be related to BPH as he had no previous hx.     Plan:     - decrease the opoid dose as his been been improving and consider lidocaine patches   - redo bladder scan tmw             F: Replete PRN  E: Replete PRN  N:  cardiac diet   DVT Ppx: On Heparin drip  GI Ppx: None  Access/Lines: PIV  Abx: none           Code status: Full Code  Emergency contact: Sweetie Garg (Spouse) # [phone]                  Varinder Boogie MD

## 2023-11-20 NOTE — PROGRESS NOTES
Physical Therapy    Physical Therapy Evaluation & Treatment    Patient Name: Mueksh Garg  MRN: [id number]  Today's Date: 11/20/2023   Time Calculation  Start Time: 1150  Stop Time: 1222  Time Calculation (min): 32 min    Assessment/Plan   PT Assessment  PT Assessment Results: Decreased strength, Decreased range of motion, Decreased endurance, Impaired balance, Decreased mobility, Decreased safety awareness  Rehab Prognosis: Excellent  End of Session Communication: Bedside nurse  End of Session Patient Position: Bed, 4 rail up, Alarm off, not on st start of session  IP OR SWING BED PT PLAN  Inpatient or Swing Bed: Inpatient  PT Plan  Treatment/Interventions: Bed mobility, Transfer training, Gait training, Stair training, Balance training, Strengthening, Endurance training, Therapeutic exercise, Therapeutic activity, Home exercise program  PT Plan: Skilled PT  PT Frequency: 5 times per week  PT Discharge Recommendations: High intensity level of continued care  PT Recommended Transfer Status: Assist x2, Assistive device  PT - OK to Discharge: Yes      Subjective     General Visit Information:  General  Reason for Referral: Presented with LLE subacute on chronic limb ischemia and PE.s/p fasciotomies, L fem cutdown w/ ileofem thromboembolectomy, L pop cutdown w/ fempop thromboembolectomy, L TP trunk cutdown w/ thromboembolectomy, selective angiography LLE, Concern for L to R shunt  Past Medical History Relevant to Rehab: CAD s/p PCIx3 and CABGx3 (LIMA-Diag, free ALEX y to LAD, SVG-PDA, 2013), afib s/p MAZE (2013), and recent admission for R DVT iso COVID infection (10/24/23) on apixaban  Prior to Session Communication: Bedside nurse  Patient Position Received: Bed, 4 rail up, Alarm off, not on st start of session  Preferred Learning Style: verbal, visual  General Comment: Pt supine in bed upon arrival, pleasant and willing to partiicpate in PT session. RN cleared pt to participate in PT session. Limited by low BP.  Motivated.  Home Living:  Home Living  Type of Home: House  Lives With: Spouse  Home Adaptive Equipment: Walker rolling or standard, Cane  Home Layout: Multi-level (split level)  Alternate Level Stairs-Rails: Right (4 steps to R rail ascending to living room, plans to stay at living rooom upon d/c and 9 steps to bedroom with L HR ascending)  Alternate Level Stairs-Number of Steps: 4  Home Access: Stairs to enter without rails  Entrance Stairs-Rails: None (steps wide enough to use walker)  Entrance Stairs-Number of Steps: 2  Bathroom Shower/Tub: Walk-in shower  Prior Level of Function:  Prior Function Per Pt/Caregiver Report  Level of [place]: Independent with ADLs and functional transfers, Independent with homemaking with ambulation  ADL Assistance: Independent  Ambulatory Assistance: Independent (recenly (~1 wk) started using walker))  Vocational: Retired  Hand Dominance: Right  Prior Function Comments: +drives  Precautions:  Precautions  LE Weight Bearing Status: Weight Bearing as Tolerated (LLE)  Medical Precautions: Fall precautions, Cardiac precautions (denies falls)  Vital Signs:  Vital Signs  Heart Rate: 87  Heart Rate Source: Monitor  SpO2: 95 %  BP:  (Supine:93/63, seated: 79/62, supine:105/56)    Objective   Pain:  Pain Assessment  Pain Assessment: 0-10  Cognition:  Cognition  Overall Cognitive Status: Within Functional Limits  Orientation Level: Oriented X4    General Assessments:                 Sensation  Light Touch: No apparent deficits    Strength  Strength Comments: RLE 3+/5 and LLE 3-/5 to 3/5, based on mobility                 Static Sitting Balance  Static Sitting-Level of Assistance: Close supervision  Dynamic Sitting Balance  Dynamic Sitting-Balance:  (SBA--Angela)       Functional Assessments:  Bed Mobility  Bed Mobility: Yes  Bed Mobility 1  Bed Mobility 1: Supine to sitting  Level of Assistance 1: Moderate assistance  Bed Mobility Comments 1: HOB elevated, bed rail (Increased time to  complete, cues for sequencing and use of bed rail)  Bed Mobility 2  Bed Mobility  2: Sitting to supine  Level of Assistance 2: Moderate assistance    Transfers  Transfer: No (unable to assess 2/2 safety concerns of low BP)  Extremity/Trunk Assessments:  RLE   RLE : Within Functional Limits  LLE   LLE : Within Functional Limits  Treatments:  Therapeutic Exercise  Therapeutic Exercise Performed: Yes  Therapeutic Exercise Activity 1: Supine AP, GS and QS 1x10 reps. Cued for techniques.    Bed Mobility  Bed Mobility: Yes  Bed Mobility 1  Bed Mobility 1: Supine to sitting  Level of Assistance 1: Moderate assistance  Bed Mobility Comments 1: HOB elevated, bed rail (Increased time to complete, cues for sequencing and us eof bed rail)  Bed Mobility 2  Bed Mobility  2: Sitting to supine  Level of Assistance 2: Moderate assistance  Outcome Measures:  [facility] Basic Mobility  Turning from your back to your side while in a flat bed without using bedrails: A little  Moving from lying on your back to sitting on the side of a flat bed without using bedrails: A lot  Moving to and from bed to chair (including a wheelchair): Total  Standing up from a chair using your arms (e.g. wheelchair or bedside chair): Total  To walk in hospital room: Total  Climbing 3-5 steps with railing: Total  Basic Mobility - Total Score: 9    Encounter Problems       Encounter Problems (Active)       Mobility       Patient will [place] with ambulation 50 ft with RW (Progressing)       Start:  11/20/23    Expected End:  12/04/23            Patient will be SBA for 4 stairs with 1 HR and LRAD and 2 steps with LRAD (Progressing)       Start:  11/20/23    Expected End:  12/04/23               Pain - Adult          Transfers       Patient will be [place] with sit to stand and bed to chair transfers with LRAD (Progressing)       Start:  11/20/23    Expected End:  12/04/23            Patient will be [place] with bed mobility (Progressing)       Start:  11/20/23    Expected  End:  12/04/23                   Education Documentation  Precautions, taught by Pamela Aguero PT at 11/20/2023  1:20 PM.  Learner: Patient  Readiness: Acceptance  Method: Explanation  Response: Verbalizes Understanding, Needs Reinforcement    Mobility Training, taught by Pamela Aguero PT at 11/20/2023  1:20 PM.  Learner: Patient  Readiness: Acceptance  Method: Explanation  Response: Verbalizes Understanding, Needs Reinforcement    Education Comments  No comments found.

## 2023-11-20 NOTE — PROGRESS NOTES
"Mukesh Garg is a 73 y.o. male on day 5 of admission presenting with Acute lower limb ischemia.    Subjective   AF 90s with isolated PVCs  Denies CP/ SOB/dizziness, palpitations        Objective     Physical Exam  General: Resting in bed, room air. No acute distress.   Skin:  warm and dry. LLE kerlix drsg intact  HEENT: EOMI. MMM  Cardiovascular: irreg tachy No JVD at 45 degrees   Respiratory: CTAB  Gastrointestinal: round, soft, non-distended  Genitourinary: mateus urine via straight cath  Extremities: pitting edema BLE  Neurological: no gross focal deficits  Psychiatric: appropriate mood and affect     Last Recorded Vitals  Blood pressure 96/71, pulse 87, temperature 37.1 °C (98.8 °F), resp. rate 18, height 1.753 m (5' 9\"), weight 111 kg (245 lb 1.8 oz), SpO2 95 %.  Intake/Output last 3 Shifts:  I/O last 3 completed shifts:  In: - (0 mL/kg)   Out: 900 (8.1 mL/kg) [Urine:900 (0.2 mL/kg/hr)]  Weight: 111.2 kg       Assessment/Plan   Mukesh Garg is a 73 y.o. male with a PMH of CAD s/p PCIx3 and CABGx3 (LIMA-Diag, free ALEX y to LAD, SVG-PDA, 2013), afib s/p MAZE (2013), and recent admission for R DVT iso COVID infection (10/24/23) on apixaban who presented with severe LLE pain and was found to have a LLE popliteal artery occlusion and bilateral PE. Underwent thromboembolectomy on 11/17.     # Left popliteal artery occlusion  # Acute limb ischemia  # Bilateral PE, distal right main pulmonary artery  # Right popliteal/femoral DVT  # Atrial fibrillation  #CAD s/p PCIx3 and CABGx3 (LIMA-Diag, free ALEX to LAD, SVG-PDA, 2013), afib s/p MAZE (2013)     Echo finding revealed a possible small L to R shunt on color doppler flow. Bubble study was technically difficult which may have demonstrated some bubble within the LV.     Recommendations:  Given patient's presentation with acute limb ischemia and bilateral PE, would recommend a RHC to further assess for the presence of shunt.      Cardiology will follow  Case discussed " with Dr. Pete Asencio, APRN-CNP  Cardiology Consults    Please call with any questions  Pager [phone] M-F 8a-5p; Saturday 8a-2p  Pager [phone] all other times

## 2023-11-21 LAB
ALBUMIN SERPL BCP-MCNC: 2.6 G/DL (ref 3.4–5)
ANION GAP SERPL CALC-SCNC: 15 MMOL/L (ref 10–20)
BUN SERPL-MCNC: 25 MG/DL (ref 6–23)
CALCIUM SERPL-MCNC: 8.2 MG/DL (ref 8.6–10.6)
CHLORIDE SERPL-SCNC: 104 MMOL/L (ref 98–107)
CO2 SERPL-SCNC: 25 MMOL/L (ref 21–32)
CREAT SERPL-MCNC: 1.4 MG/DL (ref 0.5–1.3)
ERYTHROCYTE [DISTWIDTH] IN BLOOD BY AUTOMATED COUNT: 14.5 % (ref 11.5–14.5)
EST. AVERAGE GLUCOSE BLD GHB EST-MCNC: 131 MG/DL
GFR SERPL CREATININE-BSD FRML MDRD: 52 ML/MIN/1.73M*2
GLUCOSE BLD MANUAL STRIP-MCNC: 168 MG/DL (ref 74–99)
GLUCOSE SERPL-MCNC: 186 MG/DL (ref 74–99)
HBA1C MFR BLD: 6.2 %
HCT VFR BLD AUTO: 28.9 % (ref 41–52)
HGB BLD-MCNC: 9.4 G/DL (ref 13.5–17.5)
MCH RBC QN AUTO: 30.5 PG (ref 26–34)
MCHC RBC AUTO-ENTMCNC: 32.5 G/DL (ref 32–36)
MCV RBC AUTO: 94 FL (ref 80–100)
NRBC BLD-RTO: 0 /100 WBCS (ref 0–0)
PHOSPHATE SERPL-MCNC: 3.7 MG/DL (ref 2.5–4.9)
PLATELET # BLD AUTO: 191 X10*3/UL (ref 150–450)
POTASSIUM SERPL-SCNC: 4.5 MMOL/L (ref 3.5–5.3)
RBC # BLD AUTO: 3.08 X10*6/UL (ref 4.5–5.9)
SODIUM SERPL-SCNC: 139 MMOL/L (ref 136–145)
UFH PPP CHRO-ACNC: 0.2 IU/ML
UFH PPP CHRO-ACNC: 0.3 IU/ML
UFH PPP CHRO-ACNC: 0.3 IU/ML
UFH PPP CHRO-ACNC: 1.7 IU/ML
WBC # BLD AUTO: 7 X10*3/UL (ref 4.4–11.3)

## 2023-11-21 PROCEDURE — C1769 GUIDE WIRE: HCPCS | Performed by: INTERNAL MEDICINE

## 2023-11-21 PROCEDURE — 85027 COMPLETE CBC AUTOMATED: CPT

## 2023-11-21 PROCEDURE — 2500000004 HC RX 250 GENERAL PHARMACY W/ HCPCS (ALT 636 FOR OP/ED): Performed by: INTERNAL MEDICINE

## 2023-11-21 PROCEDURE — 2500000004 HC RX 250 GENERAL PHARMACY W/ HCPCS (ALT 636 FOR OP/ED): Performed by: SURGERY

## 2023-11-21 PROCEDURE — 2550000001 HC RX 255 CONTRASTS: Performed by: INTERNAL MEDICINE

## 2023-11-21 PROCEDURE — 2500000002 HC RX 250 W HCPCS SELF ADMINISTERED DRUGS (ALT 637 FOR MEDICARE OP, ALT 636 FOR OP/ED): Performed by: SURGERY

## 2023-11-21 PROCEDURE — 99153 MOD SED SAME PHYS/QHP EA: CPT | Performed by: INTERNAL MEDICINE

## 2023-11-21 PROCEDURE — 99152 MOD SED SAME PHYS/QHP 5/>YRS: CPT | Performed by: INTERNAL MEDICINE

## 2023-11-21 PROCEDURE — 2500000004 HC RX 250 GENERAL PHARMACY W/ HCPCS (ALT 636 FOR OP/ED)

## 2023-11-21 PROCEDURE — 93451 RIGHT HEART CATH: CPT | Performed by: INTERNAL MEDICINE

## 2023-11-21 PROCEDURE — 2720000007 HC OR 272 NO HCPCS: Performed by: INTERNAL MEDICINE

## 2023-11-21 PROCEDURE — 2500000001 HC RX 250 WO HCPCS SELF ADMINISTERED DRUGS (ALT 637 FOR MEDICARE OP): Performed by: SURGERY

## 2023-11-21 PROCEDURE — 2500000001 HC RX 250 WO HCPCS SELF ADMINISTERED DRUGS (ALT 637 FOR MEDICARE OP)

## 2023-11-21 PROCEDURE — 99231 SBSQ HOSP IP/OBS SF/LOW 25: CPT | Performed by: STUDENT IN AN ORGANIZED HEALTH CARE EDUCATION/TRAINING PROGRAM

## 2023-11-21 PROCEDURE — 1100000001 HC PRIVATE ROOM DAILY

## 2023-11-21 PROCEDURE — 2500000001 HC RX 250 WO HCPCS SELF ADMINISTERED DRUGS (ALT 637 FOR MEDICARE OP): Performed by: STUDENT IN AN ORGANIZED HEALTH CARE EDUCATION/TRAINING PROGRAM

## 2023-11-21 PROCEDURE — 82947 ASSAY GLUCOSE BLOOD QUANT: CPT | Mod: 59

## 2023-11-21 PROCEDURE — 99233 SBSQ HOSP IP/OBS HIGH 50: CPT | Performed by: NURSE PRACTITIONER

## 2023-11-21 PROCEDURE — C1894 INTRO/SHEATH, NON-LASER: HCPCS | Performed by: INTERNAL MEDICINE

## 2023-11-21 PROCEDURE — 4A023N6 MEASUREMENT OF CARDIAC SAMPLING AND PRESSURE, RIGHT HEART, PERCUTANEOUS APPROACH: ICD-10-PCS | Performed by: INTERNAL MEDICINE

## 2023-11-21 PROCEDURE — 85520 HEPARIN ASSAY: CPT | Performed by: SURGERY

## 2023-11-21 PROCEDURE — 36415 COLL VENOUS BLD VENIPUNCTURE: CPT | Performed by: SURGERY

## 2023-11-21 PROCEDURE — 2500000005 HC RX 250 GENERAL PHARMACY W/O HCPCS: Performed by: INTERNAL MEDICINE

## 2023-11-21 PROCEDURE — 80069 RENAL FUNCTION PANEL: CPT | Performed by: SURGERY

## 2023-11-21 PROCEDURE — 83036 HEMOGLOBIN GLYCOSYLATED A1C: CPT

## 2023-11-21 RX ORDER — DEXTROSE 50 % IN WATER (D50W) INTRAVENOUS SYRINGE
25
Status: DISCONTINUED | OUTPATIENT
Start: 2023-11-21 | End: 2023-11-27 | Stop reason: HOSPADM

## 2023-11-21 RX ORDER — FENTANYL CITRATE 50 UG/ML
INJECTION, SOLUTION INTRAMUSCULAR; INTRAVENOUS AS NEEDED
Status: DISCONTINUED | OUTPATIENT
Start: 2023-11-21 | End: 2023-11-21 | Stop reason: HOSPADM

## 2023-11-21 RX ORDER — LIDOCAINE HYDROCHLORIDE 10 MG/ML
INJECTION, SOLUTION EPIDURAL; INFILTRATION; INTRACAUDAL; PERINEURAL AS NEEDED
Status: DISCONTINUED | OUTPATIENT
Start: 2023-11-21 | End: 2023-11-21 | Stop reason: HOSPADM

## 2023-11-21 RX ORDER — DEXTROSE MONOHYDRATE 100 MG/ML
0.3 INJECTION, SOLUTION INTRAVENOUS ONCE AS NEEDED
Status: DISCONTINUED | OUTPATIENT
Start: 2023-11-21 | End: 2023-11-27 | Stop reason: HOSPADM

## 2023-11-21 RX ORDER — WARFARIN SODIUM 5 MG/1
5 TABLET ORAL DAILY
Status: DISCONTINUED | OUTPATIENT
Start: 2023-11-21 | End: 2023-11-23

## 2023-11-21 RX ORDER — INSULIN LISPRO 100 [IU]/ML
0-5 INJECTION, SOLUTION INTRAVENOUS; SUBCUTANEOUS
Status: DISCONTINUED | OUTPATIENT
Start: 2023-11-21 | End: 2023-11-27 | Stop reason: HOSPADM

## 2023-11-21 RX ORDER — MIDAZOLAM HYDROCHLORIDE 1 MG/ML
INJECTION INTRAMUSCULAR; INTRAVENOUS AS NEEDED
Status: DISCONTINUED | OUTPATIENT
Start: 2023-11-21 | End: 2023-11-21 | Stop reason: HOSPADM

## 2023-11-21 RX ADMIN — HEPARIN SODIUM 1700 UNITS/HR: 10000 INJECTION, SOLUTION INTRAVENOUS at 21:22

## 2023-11-21 RX ADMIN — HEPARIN SODIUM 1700 UNITS/HR: 10000 INJECTION, SOLUTION INTRAVENOUS at 11:49

## 2023-11-21 RX ADMIN — EZETIMIBE 10 MG: 10 TABLET ORAL at 10:40

## 2023-11-21 RX ADMIN — CLOPIDOGREL BISULFATE 75 MG: 75 TABLET ORAL at 11:48

## 2023-11-21 RX ADMIN — CARVEDILOL 12.5 MG: 12.5 TABLET, FILM COATED ORAL at 17:30

## 2023-11-21 RX ADMIN — OXYCODONE HYDROCHLORIDE 5 MG: 5 TABLET ORAL at 23:31

## 2023-11-21 RX ADMIN — CARVEDILOL 12.5 MG: 12.5 TABLET, FILM COATED ORAL at 10:42

## 2023-11-21 RX ADMIN — WARFARIN SODIUM 5 MG: 5 TABLET ORAL at 17:31

## 2023-11-21 RX ADMIN — FAMOTIDINE 20 MG: 20 TABLET, FILM COATED ORAL at 20:21

## 2023-11-21 RX ADMIN — SENNOSIDES AND DOCUSATE SODIUM 2 TABLET: 8.6; 5 TABLET ORAL at 11:48

## 2023-11-21 RX ADMIN — ALLOPURINOL 100 MG: 100 TABLET ORAL at 10:40

## 2023-11-21 RX ADMIN — Medication: at 20:21

## 2023-11-21 RX ADMIN — POLYETHYLENE GLYCOL 3350 17 G: 17 POWDER, FOR SOLUTION ORAL at 11:48

## 2023-11-21 RX ADMIN — AMLODIPINE BESYLATE 10 MG: 10 TABLET ORAL at 10:40

## 2023-11-21 RX ADMIN — ATORVASTATIN CALCIUM 40 MG: 40 TABLET, FILM COATED ORAL at 20:21

## 2023-11-21 RX ADMIN — ACETAMINOPHEN 650 MG: 325 TABLET ORAL at 17:30

## 2023-11-21 RX ADMIN — DICYCLOMINE HYDROCHLORIDE 20 MG: 10 CAPSULE ORAL at 11:48

## 2023-11-21 ASSESSMENT — COGNITIVE AND FUNCTIONAL STATUS - GENERAL
MOVING TO AND FROM BED TO CHAIR: A LOT
STANDING UP FROM CHAIR USING ARMS: A LOT
STANDING UP FROM CHAIR USING ARMS: A LOT
PERSONAL GROOMING: A LOT
CLIMB 3 TO 5 STEPS WITH RAILING: A LOT
CLIMB 3 TO 5 STEPS WITH RAILING: A LOT
WALKING IN HOSPITAL ROOM: A LOT
MOVING TO AND FROM BED TO CHAIR: A LITTLE
DRESSING REGULAR UPPER BODY CLOTHING: A LOT
WALKING IN HOSPITAL ROOM: A LOT
MOBILITY SCORE: 14
PERSONAL GROOMING: A LOT
DRESSING REGULAR LOWER BODY CLOTHING: A LITTLE
DRESSING REGULAR LOWER BODY CLOTHING: A LITTLE
DRESSING REGULAR UPPER BODY CLOTHING: A LITTLE
TURNING FROM BACK TO SIDE WHILE IN FLAT BAD: A LITTLE
DAILY ACTIVITIY SCORE: 17
DAILY ACTIVITIY SCORE: 16
MOVING FROM LYING ON BACK TO SITTING ON SIDE OF FLAT BED WITH BEDRAILS: A LITTLE
HELP NEEDED FOR BATHING: A LOT
TURNING FROM BACK TO SIDE WHILE IN FLAT BAD: A LITTLE
MOBILITY SCORE: 16
TOILETING: A LOT
HELP NEEDED FOR BATHING: A LITTLE
TOILETING: A LITTLE

## 2023-11-21 ASSESSMENT — PAIN SCALES - GENERAL
PAINLEVEL_OUTOF10: 4
PAINLEVEL_OUTOF10: 4
PAINLEVEL_OUTOF10: 0 - NO PAIN
PAINLEVEL_OUTOF10: 0 - NO PAIN

## 2023-11-21 ASSESSMENT — PAIN - FUNCTIONAL ASSESSMENT
PAIN_FUNCTIONAL_ASSESSMENT: 0-10
PAIN_FUNCTIONAL_ASSESSMENT: 0-10

## 2023-11-21 ASSESSMENT — PAIN DESCRIPTION - ORIENTATION: ORIENTATION: LEFT

## 2023-11-21 ASSESSMENT — PAIN DESCRIPTION - LOCATION: LOCATION: LEG

## 2023-11-21 NOTE — PROGRESS NOTES
"Mukesh Garg is a 73 y.o. male on day 6 of admission presenting with Acute lower limb ischemia.    Subjective   -No acute event overnight  -I saw the patient today in the [facility], and he was sleeping on his nasal CPAP and had no complaint  - Had 2 BM yet yesterday   - Patient has no chest pain or SOB  - LLE pain continue to improve  - Had no urine retention      Objective     Physical Exam  Constitutional:       Appearance: Normal appearance. He is obese.   HENT:      Head: Normocephalic and atraumatic.      Nose: Nose normal.   Eyes:      Extraocular Movements: Extraocular movements intact.      Conjunctiva/sclera: Conjunctivae normal.      Pupils: Pupils are equal, round, and reactive to light.   Cardiovascular:      Rate and Rhythm: Normal rate. Rhythm irregular.   Pulmonary:      Effort: Pulmonary effort is normal.      Breath sounds: Normal breath sounds.   Abdominal:      Palpations: Abdomen is soft.   Musculoskeletal:         General: Normal range of motion.      Cervical back: Normal range of motion and neck supple.   Skin:     General: Skin is warm and dry.      Capillary Refill: Capillary refill takes less than 2 seconds.   Neurological:      General: No focal deficit present.      Mental Status: He is alert and oriented to person, place, and time.   Psychiatric:         Mood and Affect: Mood normal.         Behavior: Behavior normal.             Last Recorded Vitals  Blood pressure 93/62, pulse 92, temperature 36.4 °C (97.5 °F), resp. rate 14, height 1.753 m (5' 9\"), weight 111 kg (245 lb 1.8 oz), SpO2 97 %.  Intake/Output last 3 Shifts:  I/O last 3 completed shifts:  In: 240 (2.2 mL/kg) [P.O.:240]  Out: 3402 (30.6 mL/kg) [Urine:3400 (0.8 mL/kg/hr); Stool:2]  Weight: 111.2 kg     Relevant Results              Results for orders placed or performed during the hospital encounter of 11/15/23 (from the past 24 hour(s))   Heparin Assay, UFH   Result Value Ref Range    Heparin Unfractionated 0.3 See Comment " Below for Therapeutic Ranges IU/mL   Heparin Assay, UFH   Result Value Ref Range    Heparin Unfractionated 1.7 (HH) See Comment Below for Therapeutic Ranges IU/mL   CBC   Result Value Ref Range    WBC 7.0 4.4 - 11.3 x10*3/uL    nRBC 0.0 0.0 - 0.0 /100 WBCs    RBC 3.08 (L) 4.50 - 5.90 x10*6/uL    Hemoglobin 9.4 (L) 13.5 - 17.5 g/dL    Hematocrit 28.9 (L) 41.0 - 52.0 %    MCV 94 80 - 100 fL    MCH 30.5 26.0 - 34.0 pg    MCHC 32.5 32.0 - 36.0 g/dL    RDW 14.5 11.5 - 14.5 %    Platelets 191 150 - 450 x10*3/uL   Renal function panel   Result Value Ref Range    Glucose 186 (H) 74 - 99 mg/dL    Sodium 139 136 - 145 mmol/L    Potassium 4.5 3.5 - 5.3 mmol/L    Chloride 104 98 - 107 mmol/L    Bicarbonate 25 21 - 32 mmol/L    Anion Gap 15 10 - 20 mmol/L    Urea Nitrogen 25 (H) 6 - 23 mg/dL    Creatinine 1.40 (H) 0.50 - 1.30 mg/dL    eGFR 52 (L) >60 mL/min/1.73m*2    Calcium 8.2 (L) 8.6 - 10.6 mg/dL    Phosphorus 3.7 2.5 - 4.9 mg/dL    Albumin 2.6 (L) 3.4 - 5.0 g/dL   Hemoglobin A1c   Result Value Ref Range    Hemoglobin A1C 6.2 (H) see below %    Estimated Average Glucose 131 Not Established mg/dL   Heparin Assay, UFH   Result Value Ref Range    Heparin Unfractionated 0.2 See Comment Below for Therapeutic Ranges IU/mL      TRANSTHORACIC ECHOCARDIOGRAM REPORT 11/15/2023        1. Left ventricular systolic function is normal with a 65-70% EF.   2. The patient is in atrial fibrillation which may influence the estimate of left ventricular function and transvalvular flows.   3. The RV appears upper limits of normal size vs mildly dilated with moderate to severely reduced fx with a McConnels sign consistent with pulmonary embolus.   4. Color Doppler flow in the region of the atrial septum suggestive of possible small left to right shunt.    5 .Mild AR   6. Compared with the images from the prior TTE done 10/25/2023 there was already RV systolic dysfunction with possible McConnels sign at that time         Assessment/Plan   Principal  Problem:    Acute lower limb ischemia  Active Problems:    Multiple subsegmental pulmonary emboli without acute cor pulmonale (CMS/HCC)    Septal defect, heart    Mukesh Garg is a 73 y.o. male with PMHx of Afib, HTN, JENNY (on CPAP at home), gout, CABG x3 vessels (2013), recurrent DVTs  (held eliq) and peripheral neuropathy. Presented with Acute left LE limb ischemia 2/2 popliteal artery occlusion and when worked up found to have bilateral PE on CT. Currently is being managed for acute L LE ischemia and PE on heparin drip.  S/P thrombectomy in the LLE (11/17/23). S/P RHC 11/21 because of PFO that could be contributing to PE ari. being afib.          #Occlusion of L-popliteal artery  #PAD  ##CAD  ## L to R shunt  - On inial physical exam, patient's left leg showed pallor, increasingly shiny, and was severely tender, however now his pain decreased from presentation. This with the CT angio which showed extensive occlusion of the left common iliac, femoral and popliteal artery. Along with the LE blood flow was reconstituted to the tibialis posterior, anterior and peroneal arteries, are indicative of atherosclerotic build up and points to severe LE ischemia. For this, left popliteal embolectomy was recommended to reestablish blood flow. He was taken to the OR on   OR on 11/17 for L fem cutdown w/ ileofem thromboembolectomy, L pop cutdown w/ fempop  thromboembolectomy and L TP trunk cutdown w/ thromboembolectomy.     -He has a Hx of CABG in 2013 Coronary artery bypass grafting x 3 , partial left sided MAZE,   (LIMA to a large Diag, a free ALEX y'd off the LIMA to the LAD , SVG-PDA)  - Hx of PCI 2006 (emergent for STEMI): DESx1 to prox diag, DESx2 to mid and distal LAD     - On TTE (11/17/23) Color Doppler flow in the region of the atrial septum suggestive of possible small left to right shunt. Because of that the patient will undergo for RHC tomorrow  - Currently the patient LLE pain improved significantly compared to  inial presentation    - carotid Doppler arterial ultra sound, done on 11/19 and was unremarkable  - Lupus antibody came back positive, might explain his hypercoagulable status  - S/P RHC 11/21 because of PFO that could be contributing to PE ari. being afib.      Plan:    - follow up RHC procedure report   - Cont. Polyethylene glycol PRN and start sennosides-docusate sodium for constipation   - C/w heparin gtt (HI) for now   - continue home medication Ezetimibe 10 mg   -Vascular surgery was consulted. Recommended:  - continue the heparin gtt and they took him to the     OR on 11/17 (POD1) for L fem cutdown w/ ileofem thromboembolectomy, L pop cutdown w/ fempop  thromboembolectomy and L TP trunk cutdown w/ thromboembolectomy      - Cardio consult, because of the positive bubble study, theory of L-R shunt causing    DVT/PE... Because of that the patient underwent for RHC on 11/21      -Vascular Medicine consulted for concern of hypercoagulability and anticoagulation of choice upon discharge, They recommended:                             1-continue with heparin gtt. continue to follow heparin assay goal is between        0                                             0.3  and 0.7                             2- continue with Plavix                             3- carotid Doppler arterial ultra sound, done and was unremarkable                             4- Plan to discharge on warfarin                              5- Follow up in 3 months in clinic    - Cardiology was consulted and they recommended:                       - Continuing atorvastatin ,Ezetimibe ,plavix and heparin (assay goal between 0.3-0.7)                                                ## PE   ##Hx of DVT  -Hx of provoked DVT on 10/24/2023 and also 20 years ago in the right leg after long flight  - He has bilateral pulmonary emboli with moderate clot burden in the distal right main pulmonary artery extending into the right middle and right lower lobes and  small clot burden in the left lower lobe pulmonary with a suggestive right ventricular stain.   -Has been taking Apixaban since 10/24/2023 and been complaint but now stopped and is on heparin gtt  -Vitally stable on admission without increased O2 requirement.  -There is no clear etiology for hypercoaguable status but probably related to   Covid infection that he had recently on 10/24/2023, Lab also showed + lupus     - patient had been on warfarin for many years for atrial fibrillation. INR was subtherapeutic during last admission (10/24/2023) as patient had not been taking his medications due to symptomatic COVID infection. He was initially being bridged back to warfarin, but decision was made to switch to apixaban on discharge. He has been fully adherent to apixaban since then. In addition, CTA chest had not been performed during last admission, so the chronicity of PE is not clear   - B2 glycoprotein ab and cardiolipin ab labs all were WNL  - Lupus antibody came back positive, might explain his hypercoagulable status       Plan:  -  follow up C procedure report   - Cont. High intensity Heparin gtt   - Hold Eliquis while on heparin gtt  - patient had his colonoscopy 2 years ago and was normal will consider possible malignancy workup.  - contacted  [facility] for AC of choice upon discharge, and they prefer to place him on Warfarin inially till his next clinic follow up in 3 month   - contacted [facility] about positive Lupus antibody and they want to see the patient after 3 months to repeat the labs       #Atrial fibrillation  ##Hypertension  #DLD  - Patient has extensive previous hx of afib, on admition he had irregular rhythm and the EKG showed no P waves  - patient denied any chest pain dyspnea and with negative trops, thus more inline with afib with rvr    Plan:   -Continue home Carvedilol 12.5 BID   -Continue home Amlodipine 10 mg   -Continue home chlorthalidone 25 mg    -Hold home Lisinopril 40 mg   -  continue home medication atorvastatin 40 mg          ##Loose teethe  ## Minimal bleeding   - OMFS consulted for tooth extractions as the patient has a probable loose tooth. They recommended to  extraction in the outpatient setting with a dentist as they think it has no risk of aspiration.     OMFS Recs:   - F/U with general dentist for extraction of #14 in the outpatient settings     - On 11/18 morning the patient was having his breakfast and has a tooth crown that fell off, He was seen by Dentist today and his crown was fixed in #29 tooth and was recommended to follow up in the outpatient clinic.      ##Gout  Continue home allopurinol 100 mg     ## Urine retention:   -I saw the patient on 11/ 20 in the [facility], and he was sleeping on his nasal CPAP and complained of abdominal pain, and mentioned to me that he hasn't urinated since yesterday morning.   - on PE his lower abdomin was tender and has urinary bladder was distended and straight cath was done and withdrawn 675 ml around 8 am , and then later on he urinated 75ml and around 4 pm bladder scan showed 547 cc, followed by straight cath which withdrawn 525cc.  - Probably his urine retention related to opoid that he has been receiving for pain control   As I don't suspect it could be related to BPH as he had no previous hx.   - On 11/21 patient symptoms of urine retention improved and now he is able to urinate     Plan:     - Will follow up the urine output  - decrease the opoid dose as his pain been improving and consider lidocaine patches     ##DM-2   ## High fasting Glucose   -HgbA1c was 6.7 4 months ago  - Patient has been constantly having butch reading in the morning  - HgbA1c on 11/21 is 6.2% indicating prediabetes , however his FPG  has been >126    Plan:  - start SSI   - follow up POCT values and plan accordingly       # Onychomycosis   Plan:  - referral to podiatrist in the outpatient settings           F: Replete PRN  E: Replete PRN  N:  cardiac diet    DVT Ppx: On Heparin drip  GI Ppx: None  Access/Lines: PIV  Abx: none           Code status: Full Code  Emergency contact: Sweetie Garg (Spouse) # [phone]                  Varinder Boogie MD    Anesthesiology PGY 1  [place] team pager# [phone]

## 2023-11-21 NOTE — CARE PLAN
The patient's goals for the shift include      The clinical goals for the shift include No sign of bleeding    Problem: Pain - Adult  Goal: Verbalizes/displays adequate comfort level or baseline comfort level  Outcome: Progressing     Problem: Safety - Adult  Goal: Free from fall injury  Outcome: Progressing     Problem: Discharge Planning  Goal: Discharge to home or other facility with appropriate resources  Outcome: Progressing     Problem: Chronic Conditions and Co-morbidities  Goal: Patient's chronic conditions and co-morbidity symptoms are monitored and maintained or improved  Outcome: Progressing     Problem: Fall/Injury  Goal: Verbalize understanding of personal risk factors for fall in the hospital  Outcome: Progressing  Goal: Verbalize understanding of risk factor reduction measures to prevent injury from fall in the home  Outcome: Progressing  Goal: Use assistive devices by end of the shift  Outcome: Progressing  Goal: Pace activities to prevent fatigue by end of the shift  Outcome: Progressing     Problem: Fall/Injury  Goal: Verbalize understanding of risk factor reduction measures to prevent injury from fall in the home  Outcome: Progressing     Problem: Cardiovascular - Adult  Goal: Maintains optimal cardiac output and hemodynamic stability  Outcome: Progressing  Goal: Absence of cardiac dysrhythmias or at baseline  Outcome: Progressing     Problem: Musculoskeletal - Adult  Goal: Return mobility to safest level of function  Outcome: Progressing  Goal: Maintain proper alignment of affected body part  Outcome: Progressing  Goal: Return ADL status to a safe level of function  Outcome: Progressing

## 2023-11-21 NOTE — PROGRESS NOTES
Physical Therapy                 Therapy Communication Note    Patient Name: Mukesh Garg  MRN: [id number]  Today's Date: 11/21/2023     Discipline: Physical Therapy    Missed Visit Reason: Missed Visit Reason:  (Pt eating lunch, pt reported he needs to get cleaned up. RN notified. Will re-attempt as schedule permits.)    Missed Time: Attempt

## 2023-11-21 NOTE — POST-PROCEDURE NOTE
Physician Transition of Care Summary  Invasive Cardiovascular Lab    Procedure Date: 11/21/2023  Attending:    Paige Gonsales - Primary  Resident/Fellow/Other Assistant: Surgeon(s) and Role:     * Harvey Dupree MD MPH - Fellow     * Loc Montanez MD - Fellow    Indications:   Pre-op Diagnosis     * Septal defect, heart [Q21.9]    Post-procedure diagnosis:   Post-op Diagnosis     * Septal defect, heart [Q21.9]    Procedure(s):     * Right Heart Cath      Procedure Findings:   Normal filling pressures with preserved CO/CI  No evidence of intracardiac shunting     SVC sat 61%, IVC 72%, RA 62%, RV 65%, PA 64%. Qp/Qs 1.06  CO 6.6, CI 2.93    Description of the Procedure:   Access: Right Internal Jugular vein, 5 Fr  Closure: Manual pressure       Complications:   None    Stents/Implants:   N/A    Anticoagulation/Antiplatelet Plan:   Per primary team. Patient on home AC for DVT/PE    Estimated Blood Loss:   5 mL    Anesthesia: Moderate Sedation Anesthesia Staff: No anesthesia staff entered.    Any Specimen(s) Removed:   No specimens collected during this procedure.    Disposition:   [place] [id number]       Electronically signed by: Loc Montanez MD, 11/21/2023 10:16 AM

## 2023-11-21 NOTE — PROGRESS NOTES
VASCULAR SURGERY PROGRESS NOTE  Subjective   No acute events overnight. Patient reports that his leg pain has improved. Is now passing gas and having bowel movements.     Objective   Vitals:  Heart Rate:  [72-97]   Temp:  [36.4 °C (97.5 °F)-37.4 °C (99.3 °F)]   Resp:  [18-26]   BP: ()/(62-80)   SpO2:  [94 %-97 %]     Exam:  Constitutional: No acute distress, resting comfortably  Neuro:  AOx3, grossly intact  ENMT: moist mucous membranes  CV: no tachycardia  Pulm: non-labored on room air  GI: soft, non-tender, non-distended  Skin: left leg fasciotomy incisions with staples in place, mild SS drainage from lateral incision, no erythema, groin incision c/d/i no hematoma  Musculoskeletal: moving all extremities  Extremities: multiphasic left PT and DP doppler signal    Labs:  Results from last 7 days   Lab Units 11/21/23  0523 11/20/23  0509 11/19/23  0605   WBC AUTO x10*3/uL 7.0 9.8 6.7   HEMOGLOBIN g/dL 9.4* 9.9* 10.2*   PLATELETS AUTO x10*3/uL 191 186 140*        Results from last 7 days   Lab Units 11/21/23  0523 11/20/23  0513 11/19/23  0605 11/18/23  0616 11/16/23  0957   SODIUM mmol/L 139 136 140   < > 139   POTASSIUM mmol/L 4.5 4.2 4.0   < > 4.1   CHLORIDE mmol/L 104 101 106   < > 103   CO2 mmol/L 25 26 26   < > 28   BUN mg/dL 25* 22 25*   < > 21   CREATININE mg/dL 1.40* 1.23 1.25   < > 1.20   GLUCOSE mg/dL 186* 228* 158*   < > 140*   MAGNESIUM mg/dL  --   --   --   --  1.95   PHOSPHORUS mg/dL 3.7 3.4 2.8   < >  --     < > = values in this interval not displayed.        Results from last 7 days   Lab Units 11/20/23  0902 11/14/23  2318 11/14/23  1546   INR   --   --  1.1   PROTIME seconds  --   --  11.3   APTT seconds 38   < >  --     < > = values in this interval not displayed.       Assessment/Plan   Mukehs Garg is 73 y.o. male with history of CAD s/p CABG x3, recurrent DVT (most recent 10/24/2023), Afib (on Eliquis), recent COVID (10/2023) who presented with LLE subacute on chronic limb ischemia and PE.      Patient underwent right heart cath today. Exam was significant for normal filling pressures w/o evidence of intracardiac shunting.  Plan:  -pain control with Tylenol, PRN oxycodone, toradol  - appreciate vasc med recs   - continue bowel regimen   -continue plavix and heparin gtt  -ALLISON with TBI today  -PT/OT  -continue care per primary team    D/w attending, Dr. Flynn Burroughs  General Surgery PGY1  Team pager: [phone]

## 2023-11-21 NOTE — PROGRESS NOTES
"Mukesh Garg is a 73 y.o. male on day 6 of admission presenting with Acute lower limb ischemia.    Subjective   AF 80s with isolated PVCs  Denies CP/ SOB/dizziness, palpitations     S/p RHC this AM  Normal filling pressures with preserved CO/CI  No evidence of intracardiac shunting      SVC sat 61%, IVC 72%, RA 62%, RV 65%, PA 64%. Qp/Qs 1.06  CO 6.6, CI 2.93      Objective     Physical Exam  General: Resting in bed, room air. No acute distress.   Skin:  warm and dry. LLE kerlix drsg intact  HEENT: EOMI. MMM. Right neck drsg intact   Cardiovascular: irreg irreg. No JVD at 45 degrees   Respiratory: CTAB  Gastrointestinal: round, soft, non-distended  Extremities: pitting edema BLE  Neurological: no gross focal deficits  Psychiatric: appropriate mood and affect     Last Recorded Vitals  Blood pressure 93/62, pulse 92, temperature 36.4 °C (97.5 °F), resp. rate 14, height 1.753 m (5' 9\"), weight 111 kg (245 lb 1.8 oz), SpO2 97 %.  Intake/Output last 3 Shifts:  I/O last 3 completed shifts:  In: 240 (2.2 mL/kg) [P.O.:240]  Out: 3402 (30.6 mL/kg) [Urine:3400 (0.8 mL/kg/hr); Stool:2]  Weight: 111.2 kg       Assessment/Plan   Mukesh Garg is a 73 y.o. male with a PMH of CAD s/p PCIx3 and CABGx3 (LIMA-Diag, free ALEX y to LAD, SVG-PDA, 2013), afib s/p MAZE (2013), and recent admission for R DVT iso COVID infection (10/24/23) on apixaban who presented with severe LLE pain and was found to have a LLE popliteal artery occlusion and bilateral PE. Underwent thromboembolectomy on 11/17.     # Left popliteal artery occlusion  # Acute limb ischemia  # Bilateral PE, distal right main pulmonary artery  # Right popliteal/femoral DVT  # Atrial fibrillation  #CAD s/p PCIx3 and CABGx3 (LIMA-Diag, free ALEX to LAD, SVG-PDA, 2013), afib s/p MAZE (2013)     RHC without evidence of L to R shunt    Continue f/up with cardiologist Dr. Steven Wayne    Cardiology will sign off   Case discussed with Dr. Pete Asencio, APRN-CNP  Cardiology " Consults    Please call with any questions  Pager [phone] M-F 8a-5p; Saturday 8a-2p  Pager [phone] all other times

## 2023-11-22 LAB
ALBUMIN SERPL BCP-MCNC: 2.7 G/DL (ref 3.4–5)
ANION GAP SERPL CALC-SCNC: 13 MMOL/L (ref 10–20)
APTT PPP: 43 SECONDS (ref 27–38)
BUN SERPL-MCNC: 25 MG/DL (ref 6–23)
CALCIUM SERPL-MCNC: 8.4 MG/DL (ref 8.6–10.6)
CHLORIDE SERPL-SCNC: 106 MMOL/L (ref 98–107)
CO2 SERPL-SCNC: 26 MMOL/L (ref 21–32)
CREAT SERPL-MCNC: 1.22 MG/DL (ref 0.5–1.3)
ERYTHROCYTE [DISTWIDTH] IN BLOOD BY AUTOMATED COUNT: 14.3 % (ref 11.5–14.5)
GFR SERPL CREATININE-BSD FRML MDRD: 62 ML/MIN/1.73M*2
GLUCOSE BLD MANUAL STRIP-MCNC: 122 MG/DL (ref 74–99)
GLUCOSE BLD MANUAL STRIP-MCNC: 127 MG/DL (ref 74–99)
GLUCOSE BLD MANUAL STRIP-MCNC: 154 MG/DL (ref 74–99)
GLUCOSE BLD MANUAL STRIP-MCNC: 154 MG/DL (ref 74–99)
GLUCOSE SERPL-MCNC: 133 MG/DL (ref 74–99)
HCT VFR BLD AUTO: 30 % (ref 41–52)
HGB BLD-MCNC: 9.8 G/DL (ref 13.5–17.5)
INR PPP: 1.2 (ref 0.9–1.1)
MCH RBC QN AUTO: 30.2 PG (ref 26–34)
MCHC RBC AUTO-ENTMCNC: 32.7 G/DL (ref 32–36)
MCV RBC AUTO: 92 FL (ref 80–100)
NRBC BLD-RTO: 0 /100 WBCS (ref 0–0)
PHOSPHATE SERPL-MCNC: 3.7 MG/DL (ref 2.5–4.9)
PLATELET # BLD AUTO: 277 X10*3/UL (ref 150–450)
POTASSIUM SERPL-SCNC: 3.9 MMOL/L (ref 3.5–5.3)
PROTHROMBIN TIME: 13.4 SECONDS (ref 9.8–12.8)
RBC # BLD AUTO: 3.25 X10*6/UL (ref 4.5–5.9)
SODIUM SERPL-SCNC: 141 MMOL/L (ref 136–145)
UFH PPP CHRO-ACNC: 0.4 IU/ML
UFH PPP CHRO-ACNC: 0.4 IU/ML
WBC # BLD AUTO: 8.1 X10*3/UL (ref 4.4–11.3)

## 2023-11-22 PROCEDURE — 2500000004 HC RX 250 GENERAL PHARMACY W/ HCPCS (ALT 636 FOR OP/ED)

## 2023-11-22 PROCEDURE — 36415 COLL VENOUS BLD VENIPUNCTURE: CPT | Performed by: SURGERY

## 2023-11-22 PROCEDURE — 96372 THER/PROPH/DIAG INJ SC/IM: CPT

## 2023-11-22 PROCEDURE — 82947 ASSAY GLUCOSE BLOOD QUANT: CPT | Mod: 59

## 2023-11-22 PROCEDURE — 2500000001 HC RX 250 WO HCPCS SELF ADMINISTERED DRUGS (ALT 637 FOR MEDICARE OP): Performed by: STUDENT IN AN ORGANIZED HEALTH CARE EDUCATION/TRAINING PROGRAM

## 2023-11-22 PROCEDURE — 85520 HEPARIN ASSAY: CPT | Performed by: SURGERY

## 2023-11-22 PROCEDURE — 97110 THERAPEUTIC EXERCISES: CPT | Mod: GP | Performed by: PHYSICAL THERAPIST

## 2023-11-22 PROCEDURE — 1100000001 HC PRIVATE ROOM DAILY

## 2023-11-22 PROCEDURE — 85610 PROTHROMBIN TIME: CPT

## 2023-11-22 PROCEDURE — 80069 RENAL FUNCTION PANEL: CPT | Performed by: SURGERY

## 2023-11-22 PROCEDURE — 85027 COMPLETE CBC AUTOMATED: CPT

## 2023-11-22 PROCEDURE — 97530 THERAPEUTIC ACTIVITIES: CPT | Mod: GP | Performed by: PHYSICAL THERAPIST

## 2023-11-22 PROCEDURE — 99232 SBSQ HOSP IP/OBS MODERATE 35: CPT | Performed by: STUDENT IN AN ORGANIZED HEALTH CARE EDUCATION/TRAINING PROGRAM

## 2023-11-22 PROCEDURE — 2500000001 HC RX 250 WO HCPCS SELF ADMINISTERED DRUGS (ALT 637 FOR MEDICARE OP): Performed by: SURGERY

## 2023-11-22 PROCEDURE — 2500000002 HC RX 250 W HCPCS SELF ADMINISTERED DRUGS (ALT 637 FOR MEDICARE OP, ALT 636 FOR OP/ED): Performed by: SURGERY

## 2023-11-22 PROCEDURE — 2500000002 HC RX 250 W HCPCS SELF ADMINISTERED DRUGS (ALT 637 FOR MEDICARE OP, ALT 636 FOR OP/ED)

## 2023-11-22 RX ORDER — HEPARIN SODIUM 10000 [USP'U]/100ML
0-4500 INJECTION, SOLUTION INTRAVENOUS CONTINUOUS
Status: DISPENSED | OUTPATIENT
Start: 2023-11-22 | End: 2023-11-22

## 2023-11-22 RX ORDER — POLYETHYLENE GLYCOL 3350 17 G/17G
17 POWDER, FOR SOLUTION ORAL DAILY PRN
Status: DISCONTINUED | OUTPATIENT
Start: 2023-11-22 | End: 2023-11-27 | Stop reason: HOSPADM

## 2023-11-22 RX ORDER — ENOXAPARIN SODIUM 150 MG/ML
1 INJECTION SUBCUTANEOUS EVERY 12 HOURS
Status: DISCONTINUED | OUTPATIENT
Start: 2023-11-22 | End: 2023-11-27 | Stop reason: HOSPADM

## 2023-11-22 RX ORDER — DOCUSATE SODIUM 100 MG/1
100 CAPSULE, LIQUID FILLED ORAL 2 TIMES DAILY PRN
Status: DISCONTINUED | OUTPATIENT
Start: 2023-11-22 | End: 2023-11-27 | Stop reason: HOSPADM

## 2023-11-22 RX ADMIN — FAMOTIDINE 20 MG: 20 TABLET, FILM COATED ORAL at 20:31

## 2023-11-22 RX ADMIN — INSULIN LISPRO 1 UNITS: 100 INJECTION, SOLUTION INTRAVENOUS; SUBCUTANEOUS at 17:00

## 2023-11-22 RX ADMIN — ACETAMINOPHEN 650 MG: 325 TABLET ORAL at 20:31

## 2023-11-22 RX ADMIN — CARVEDILOL 12.5 MG: 12.5 TABLET, FILM COATED ORAL at 17:35

## 2023-11-22 RX ADMIN — ATORVASTATIN CALCIUM 40 MG: 40 TABLET, FILM COATED ORAL at 20:31

## 2023-11-22 RX ADMIN — ALLOPURINOL 100 MG: 100 TABLET ORAL at 09:23

## 2023-11-22 RX ADMIN — ACETAMINOPHEN 650 MG: 325 TABLET ORAL at 09:26

## 2023-11-22 RX ADMIN — EZETIMIBE 10 MG: 10 TABLET ORAL at 09:21

## 2023-11-22 RX ADMIN — AMLODIPINE BESYLATE 10 MG: 10 TABLET ORAL at 09:22

## 2023-11-22 RX ADMIN — WARFARIN SODIUM 5 MG: 5 TABLET ORAL at 17:48

## 2023-11-22 RX ADMIN — ENOXAPARIN SODIUM 105 MG: 150 INJECTION SUBCUTANEOUS at 17:48

## 2023-11-22 RX ADMIN — OXYCODONE HYDROCHLORIDE 5 MG: 5 TABLET ORAL at 23:20

## 2023-11-22 RX ADMIN — CARVEDILOL 12.5 MG: 12.5 TABLET, FILM COATED ORAL at 09:22

## 2023-11-22 RX ADMIN — CLOPIDOGREL BISULFATE 75 MG: 75 TABLET ORAL at 09:22

## 2023-11-22 ASSESSMENT — COGNITIVE AND FUNCTIONAL STATUS - GENERAL
MOVING FROM LYING ON BACK TO SITTING ON SIDE OF FLAT BED WITH BEDRAILS: A LOT
STANDING UP FROM CHAIR USING ARMS: TOTAL
MOBILITY SCORE: 8
DAILY ACTIVITIY SCORE: 17
MOVING TO AND FROM BED TO CHAIR: TOTAL
MOBILITY SCORE: 8
CLIMB 3 TO 5 STEPS WITH RAILING: TOTAL
MOVING FROM LYING ON BACK TO SITTING ON SIDE OF FLAT BED WITH BEDRAILS: A LOT
CLIMB 3 TO 5 STEPS WITH RAILING: TOTAL
PERSONAL GROOMING: A LOT
WALKING IN HOSPITAL ROOM: TOTAL
WALKING IN HOSPITAL ROOM: TOTAL
TURNING FROM BACK TO SIDE WHILE IN FLAT BAD: A LOT
MOVING TO AND FROM BED TO CHAIR: TOTAL
DRESSING REGULAR UPPER BODY CLOTHING: A LITTLE
TURNING FROM BACK TO SIDE WHILE IN FLAT BAD: A LOT
HELP NEEDED FOR BATHING: A LOT
TOILETING: A LITTLE
DRESSING REGULAR LOWER BODY CLOTHING: A LITTLE
STANDING UP FROM CHAIR USING ARMS: TOTAL

## 2023-11-22 ASSESSMENT — PAIN SCALES - GENERAL
PAINLEVEL_OUTOF10: 6
PAINLEVEL_OUTOF10: 6
PAINLEVEL_OUTOF10: 4

## 2023-11-22 ASSESSMENT — PAIN - FUNCTIONAL ASSESSMENT
PAIN_FUNCTIONAL_ASSESSMENT: 0-10

## 2023-11-22 ASSESSMENT — PAIN DESCRIPTION - ORIENTATION
ORIENTATION: LEFT
ORIENTATION: LEFT

## 2023-11-22 ASSESSMENT — PAIN DESCRIPTION - LOCATION
LOCATION: FOOT
LOCATION: FOOT

## 2023-11-22 NOTE — PROGRESS NOTES
"Mukesh Garg is a 73 y.o. male on day 7 of admission presenting with Acute lower limb ischemia.    Subjective   -No acute event overnight  -I saw the patient today in the [facility], and he was sleeping on his nasal CPAP and had no complaint  - Had 5 BM yesterday   - Patient has no chest pain or SOB  - LLE pain continue to improve  - Had no urine retention      Objective     Physical Exam  Constitutional:       Appearance: Normal appearance. He is obese.   HENT:      Head: Normocephalic and atraumatic.      Nose: Nose normal.   Eyes:      Extraocular Movements: Extraocular movements intact.      Conjunctiva/sclera: Conjunctivae normal.      Pupils: Pupils are equal, round, and reactive to light.   Cardiovascular:      Rate and Rhythm: Normal rate. Rhythm irregular.   Pulmonary:      Effort: Pulmonary effort is normal.      Breath sounds: Normal breath sounds.   Abdominal:      Palpations: Abdomen is soft.   Musculoskeletal:         General: Normal range of motion.      Cervical back: Normal range of motion and neck supple.   Skin:     General: Skin is warm and dry.      Capillary Refill: Capillary refill takes less than 2 seconds.   Neurological:      General: No focal deficit present.      Mental Status: He is alert and oriented to person, place, and time.   Psychiatric:         Mood and Affect: Mood normal.         Behavior: Behavior normal.             Last Recorded Vitals  Blood pressure 103/66, pulse 86, temperature 36.7 °C (98.1 °F), resp. rate 17, height 1.753 m (5' 9\"), weight 111 kg (245 lb 1.8 oz), SpO2 96 %.  Intake/Output last 3 Shifts:  I/O last 3 completed shifts:  In: 240 (2.2 mL/kg) [P.O.:240]  Out: 4752 (42.7 mL/kg) [Urine:4745 (1.2 mL/kg/hr); Stool:2; Blood:5]  Weight: 111.2 kg     Relevant Results              Results for orders placed or performed during the hospital encounter of 11/15/23 (from the past 24 hour(s))   Heparin Assay, UFH   Result Value Ref Range    Heparin Unfractionated 0.3 See " Comment Below for Therapeutic Ranges IU/mL   POCT GLUCOSE   Result Value Ref Range    POCT Glucose 168 (H) 74 - 99 mg/dL   Heparin Assay, UFH   Result Value Ref Range    Heparin Unfractionated 0.3 See Comment Below for Therapeutic Ranges IU/mL   Heparin Assay, UFH   Result Value Ref Range    Heparin Unfractionated 0.4 See Comment Below for Therapeutic Ranges IU/mL   Heparin Assay, UFH   Result Value Ref Range    Heparin Unfractionated 0.4 See Comment Below for Therapeutic Ranges IU/mL   CBC   Result Value Ref Range    WBC 8.1 4.4 - 11.3 x10*3/uL    nRBC 0.0 0.0 - 0.0 /100 WBCs    RBC 3.25 (L) 4.50 - 5.90 x10*6/uL    Hemoglobin 9.8 (L) 13.5 - 17.5 g/dL    Hematocrit 30.0 (L) 41.0 - 52.0 %    MCV 92 80 - 100 fL    MCH 30.2 26.0 - 34.0 pg    MCHC 32.7 32.0 - 36.0 g/dL    RDW 14.3 11.5 - 14.5 %    Platelets 277 150 - 450 x10*3/uL   Renal function panel   Result Value Ref Range    Glucose 133 (H) 74 - 99 mg/dL    Sodium 141 136 - 145 mmol/L    Potassium 3.9 3.5 - 5.3 mmol/L    Chloride 106 98 - 107 mmol/L    Bicarbonate 26 21 - 32 mmol/L    Anion Gap 13 10 - 20 mmol/L    Urea Nitrogen 25 (H) 6 - 23 mg/dL    Creatinine 1.22 0.50 - 1.30 mg/dL    eGFR 62 >60 mL/min/1.73m*2    Calcium 8.4 (L) 8.6 - 10.6 mg/dL    Phosphorus 3.7 2.5 - 4.9 mg/dL    Albumin 2.7 (L) 3.4 - 5.0 g/dL   Coagulation Screen   Result Value Ref Range    Protime 13.4 (H) 9.8 - 12.8 seconds    INR 1.2 (H) 0.9 - 1.1    aPTT 43 (H) 27 - 38 seconds   POCT GLUCOSE   Result Value Ref Range    POCT Glucose 127 (H) 74 - 99 mg/dL   POCT GLUCOSE   Result Value Ref Range    POCT Glucose 122 (H) 74 - 99 mg/dL      TRANSTHORACIC ECHOCARDIOGRAM REPORT 11/15/2023        1. Left ventricular systolic function is normal with a 65-70% EF.   2. The patient is in atrial fibrillation which may influence the estimate of left ventricular function and transvalvular flows.   3. The RV appears upper limits of normal size vs mildly dilated with moderate to severely reduced fx with a  McConnels sign consistent with pulmonary embolus.   4. Color Doppler flow in the region of the atrial septum suggestive of possible small left to right shunt.    5 .Mild AR   6. Compared with the images from the prior TTE done 10/25/2023 there was already RV systolic dysfunction with possible McConnels sign at that time         Assessment/Plan   Principal Problem:    Acute lower limb ischemia  Active Problems:    Multiple subsegmental pulmonary emboli without acute cor pulmonale (CMS/HCC)    Septal defect, heart    Mukesh Garg is a 73 y.o. male with PMHx of Afib, HTN, JENNY (on CPAP at home), gout, CABG x3 vessels (2013), recurrent DVTs  (held eliq) and peripheral neuropathy. Presented with Acute left LE limb ischemia 2/2 popliteal artery occlusion and when worked up found to have bilateral PE on CT. Currently is being managed for acute L LE ischemia and PE on heparin drip.  S/P thrombectomy in the LLE (11/17/23). S/P RHC on 11/21 because of PFO that could be contributing to PE ari. being afib.          #Occlusion of L-popliteal artery  #PAD  ##CAD  ## L to R shunt  - On inial physical exam, patient's left leg showed pallor, increasingly shiny, and was severely tender, however now his pain decreased from presentation. This with the CT angio which showed extensive occlusion of the left common iliac, femoral and popliteal artery. Along with the LE blood flow was reconstituted to the tibialis posterior, anterior and peroneal arteries, are indicative of atherosclerotic build up and points to severe LE ischemia. For this, left popliteal embolectomy was recommended to reestablish blood flow. He was taken to the OR on   OR on 11/17 for L fem cutdown w/ ileofem thromboembolectomy, L pop cutdown w/ fempop  thromboembolectomy and L TP trunk cutdown w/ thromboembolectomy.     -He has a Hx of CABG in 2013 Coronary artery bypass grafting x 3 , partial left sided MAZE,   (LIMA to a large Diag, a free ALEX y'd off the LIMA to the  LAD , SVG-PDA)  - Hx of PCI 2006 (emergent for STEMI): DESx1 to prox diag, DESx2 to mid and distal LAD     - On TTE (11/17/23) Color Doppler flow in the region of the atrial septum suggestive of possible small left to right shunt. Because of that the patient had RHC on11/21 with no evidence of intracardiac shunting   - Currently the patient LLE pain improved significantly compared to inial presentation    - carotid Doppler arterial ultra sound, done on 11/19 and was unremarkable  - Lupus antibody came back positive, might explain his hypercoagulable status        Plan:    - follow up RHC procedure report   - Cont. Polyethylene glycol PRN and start sennosides-docusate sodium for constipation   - C/w heparin gtt (HI) for now   - continue home medication Ezetimibe 10 mg   -Vascular surgery was consulted. Recommended:  - continue the heparin gtt and they took him to the     OR on 11/17 (POD1) for L fem cutdown w/ ileofem thromboembolectomy, L pop cutdown w/ fempop  thromboembolectomy and L TP trunk cutdown w/ thromboembolectomy      - Cardio consult, because of the positive bubble study, theory of L-R shunt causing    DVT/PE... Because of that the patient underwent for RHC on 11/21      -Vascular Medicine consulted for concern of hypercoagulability and anticoagulation of choice upon discharge, They recommended:                             1-continue with heparin gtt. continue to follow heparin assay goal is between        0                                             0.3  and 0.7                             2- continue with Plavix                             3- carotid Doppler arterial ultra sound, done and was unremarkable                             4- Plan to discharge on warfarin                              5- Follow up in 3 months in clinic    - Cardiology was consulted and they recommended:                       - Continuing atorvastatin ,Ezetimibe ,plavix and heparin (assay goal between 0.3-0.7)                                                 ## PE   ##Hx of DVT  -Hx of provoked DVT on 10/24/2023 and also 20 years ago in the right leg after long flight  - He has bilateral pulmonary emboli with moderate clot burden in the distal right main pulmonary artery extending into the right middle and right lower lobes and small clot burden in the left lower lobe pulmonary with a suggestive right ventricular stain.   -Has been taking Apixaban since 10/24/2023 and been complaint but now stopped and is on heparin gtt  -Vitally stable on admission without increased O2 requirement.  -There is no clear etiology for hypercoaguable status but might be related to   Covid infection that he had recently on 10/24/2023, Lab also showed + lupus     - patient had been on warfarin for many years for atrial fibrillation. INR was subtherapeutic during last admission (10/24/2023) as patient had not been taking his medications due to symptomatic COVID infection. He was initially being bridged back to warfarin, but decision was made to switch to apixaban on discharge. He has been fully adherent to apixaban since then. In addition, CTA chest had not been performed during last admission, so the chronicity of PE is not clear   - B2 glycoprotein ab and cardiolipin ab labs all were WNL  - Lupus antibody came back positive, might explain his hypercoagulable status       Plan:     - Started on warfarin 5 mg on 11/22 while bridging on Lovenox until we get the therapeutic INR (2-3)  - patient had his colonoscopy 2 years ago and was normal will consider possible malignancy workup.  - contacted  [facility] for AC of choice upon discharge, and they prefer to place him on Warfarin inially till his next clinic follow up in 3 month   - contacted [facility] about positive Lupus antibody and they want to see the patient after 3 months to repeat the labs       #Atrial fibrillation  ##Hypertension  #DLD  - Patient has extensive previous hx of afib, on admition he had  irregular rhythm and the EKG showed no P waves  - patient denied any chest pain dyspnea and with negative trops, thus more inline with afib with rvr    Plan:     - Started on warfarin 5 mg on 11/22 while bridging on Lovenox until we get the therapeutic INR (2-3)  -Continue home Carvedilol 12.5 BID   -Continue home Amlodipine 10 mg   -Continue home chlorthalidone 25 mg    -Hold home Lisinopril 40 mg   - continue home medication atorvastatin 40 mg          ##Loose teethe  ## Minimal bleeding   - OMFS consulted for tooth extractions as the patient has a probable loose tooth. They recommended to  extraction in the outpatient setting with a dentist as they think it has no risk of aspiration.     OMFS Recs:   - F/U with general dentist for extraction of #14 in the outpatient settings     - On 11/18 morning the patient was having his breakfast and has a tooth crown that fell off, He was seen by Dentist today and his crown was fixed in #29 tooth and was recommended to follow up in the outpatient clinic.      ##Gout  Continue home allopurinol 100 mg     ## Urine retention:   -I saw the patient on 11/ 20 in the [facility], and he was sleeping on his nasal CPAP and complained of abdominal pain, and mentioned to me that he hasn't urinated since yesterday morning.   - on PE his lower abdomin was tender and has urinary bladder was distended and straight cath was done and withdrawn 675 ml around 8 am , and then later on he urinated 75ml and around 4 pm bladder scan showed 547 cc, followed by straight cath which withdrawn 525cc.  - Probably his urine retention related to opoid that he has been receiving for pain control   As I don't suspect it could be related to BPH as he had no previous hx.   - On 11/21 patient symptoms of urine retention improved and now he is able to urinate     Plan:     - Will follow up the urine output  - decrease the opoid dose as his pain been improving and consider lidocaine patches     ##DM-2   ## High  fasting Glucose   -HgbA1c was 6.7 4 months ago  - Patient has been constantly having butch reading in the morning  - HgbA1c on 11/21 is 6.2% indicating prediabetes , however his FPG  has been >126 but also he has been under stress which could be a contributing factor.     Plan:  - started SSI   - follow up POCT values and plan accordingly       # Onychomycosis   Plan:  - referral to podiatrist in the outpatient settings           F: Replete PRN  E: Replete PRN  N:  cardiac diet   DVT Ppx: On Heparin sc  GI Ppx: None  Access/Lines: PIV  Abx: none           Code status: Full Code  Emergency contact: Sweetie Garg (Spouse) # [phone]                  Varinder Boogie MD    Anesthesiology PGY 1  [place] team pager# [phone]

## 2023-11-22 NOTE — PROGRESS NOTES
VASCULAR SURGERY PROGRESS NOTE  Subjective   No acute overnight event.     Objective   Vitals:  Heart Rate:  []   Temp:  [36.4 °C (97.5 °F)-37.4 °C (99.3 °F)]   Resp:  [14-20]   BP: ()/(62-80)   SpO2:  [96 %-98 %]     Exam:  Constitutional: No acute distress, resting comfortably  Neuro:  AOx3, grossly intact  ENMT: moist mucous membranes  CV: no tachycardia  Pulm: non-labored on room air  GI: soft, non-tender, non-distended  Skin: warm and dry  Musculoskeletal: moving all extremities  Extremities: multiphasic left PT and DP doppler signals    Labs:  Results from last 7 days   Lab Units 11/22/23  0604 11/21/23  0523 11/20/23  0509   WBC AUTO x10*3/uL 8.1 7.0 9.8   HEMOGLOBIN g/dL 9.8* 9.4* 9.9*   PLATELETS AUTO x10*3/uL 277 191 186      Results from last 7 days   Lab Units 11/22/23  0604 11/21/23  0523 11/20/23  0513 11/18/23  0616 11/16/23  0957   SODIUM mmol/L 141 139 136   < > 139   POTASSIUM mmol/L 3.9 4.5 4.2   < > 4.1   CHLORIDE mmol/L 106 104 101   < > 103   CO2 mmol/L 26 25 26   < > 28   BUN mg/dL 25* 25* 22   < > 21   CREATININE mg/dL 1.22 1.40* 1.23   < > 1.20   GLUCOSE mg/dL 133* 186* 228*   < > 140*   MAGNESIUM mg/dL  --   --   --   --  1.95   PHOSPHORUS mg/dL 3.7 3.7 3.4   < >  --     < > = values in this interval not displayed.      Results from last 7 days   Lab Units 11/22/23  0604   INR  1.2*   PROTIME seconds 13.4*   APTT seconds 43*     Assessment/Plan   Mukesh Garg is 73 y.o. male with history of with history of CAD s/p CABG x3, recurrent DVT (most recent 10/24/2023), Afib (on Eliquis), recent COVID (10/2023) who presented with LLE subacute on chronic limb ischemia and PE s/p left iliofemoral, femoropopliteal and tibioperoneal thrombectomy (11/14). RHC without evidence of PFO    Plan:  -continue plavix 75mg daily  -anticoagulation recs per vascular medicine  -pain control per primary team  - patient is okay for discharge from vascular surgery standpoint, please discharge with  homegoing recs from vascular medicine  - follow up with vascular surgery after outpatient [facility] December 21st     D/w attending, Dr. Arya Burroughs MD  Vascular Surgery PGY-1  Team pager: [phone]

## 2023-11-22 NOTE — PROGRESS NOTES
Physical Therapy    Physical Therapy Treatment    Patient Name: Mukesh Garg  MRN: [id number]  Today's Date: 11/22/2023  Time Calculation  Start Time: 1410  Stop Time: 1453  Time Calculation (min): 43 min       Assessment/Plan   PT Assessment  PT Assessment Results: Decreased strength, Decreased endurance, Impaired balance, Decreased mobility, Decreased safety awareness  Rehab Prognosis: Good  End of Session Communication: Bedside nurse  End of Session Patient Position: Bed, 3 rail up, Alarm off, not on at start of session  PT Plan  Inpatient/Swing Bed or Outpatient: Inpatient  PT Plan  Treatment/Interventions: Bed mobility, Transfer training, Gait training, Stair training, Balance training, Strengthening, Endurance training, Therapeutic exercise, Therapeutic activity, Positioning  PT Plan: Skilled PT  PT Frequency: 5 times per week  PT Discharge Recommendations: High intensity level of continued care  PT Recommended Transfer Status: Assist x2, Assistive device  PT - OK to Discharge: Yes      General Visit Information:   PT  Visit  PT Received On: 11/22/23  General  Reason for Referral: Presented with LLE subacute on chronic limb ischemia and PE.s/p fasciotomies, L fem cutdown w/ ileofem thromboembolectomy, L pop cutdown w/ fempop thromboembolectomy, L TP trunk cutdown w/ thromboembolectomy, selective angiography LLE, Concern for L to R shunt  Past Medical History Relevant to Rehab: CAD s/p PCIx3 and CABGx3 (LIMA-Diag, free ALEX y to LAD, SVG-PDA, 2013), afib s/p MAZE (2013), and recent admission for R DVT iso COVID infection (10/24/23) on apixaban  Family/Caregiver Present: Yes (spouse)  Prior to Session Communication: Bedside nurse  Patient Position Received: Bed, 3 rail up, Alarm off, not on at start of session  Preferred Learning Style: verbal  General Comment: Pt pleasant and receptive to participate in PT session. Motivated.    Subjective   Precautions:  Precautions  LE Weight Bearing Status: Weight Bearing as  Tolerated (LLE)  Medical Precautions: Fall precautions  Vital Signs:  Vital Signs  Heart Rate: 99  Heart Rate Source: Monitor  SpO2: 97 %  BP: 103/66    Objective   Pain:  Pain Assessment  Pain Assessment: 0-10  Pain Score: 4 (no pain post session)  Pain Type: Surgical pain  Pain Location: Leg  Pain Orientation: Left  Cognition:  Cognition  Overall Cognitive Status: Within Functional Limits  Orientation Level: Oriented X4    Treatments:  Therapeutic Exercise  Therapeutic Exercise Performed: Yes  Therapeutic Exercise Activity 1: Supine AP, QS, QS 1x10 reps and seated AP, LAQ 1x10 reps. Cued for techniques.    Bed Mobility  Bed Mobility: Yes  Bed Mobility 1  Bed Mobility 1: Sitting to supine  Level of Assistance 1: Moderate assistance  Bed Mobility Comments 1: HOB elevated, bed rail (Increased time to complete, cues for sequencing and use of bed rail)  Bed Mobility 2  Bed Mobility  2: Sitting to supine  Level of Assistance 2: Moderate assistance    Ambulation/Gait Training  Ambulation/Gait Training Performed: Yes  Ambulation/Gait Training 1  Device 1: Rolling walker  Assistance 1:  (ModAx2)  Comments/Distance (ft) 1: 3 small lateral sidesteps at EOB (cues for sequencing and walker maangement)  Transfers  Transfer: Yes  Transfer 1  Transfer From 1: Sit to  Transfer to 1: Stand  Technique 1: Sit to stand  Transfer Device 1: Walker  Transfer Level of Assistance 1:  (MaxAx2, unable first 2 trials with MaxAx1)  Trials/Comments 1: cues for safe hand placement and sequencing  Transfers 2  Transfer From 2: Stand to  Transfer to 2: Sit  Technique 2: Stand to sit  Transfer Device 2: Walker  Transfer Level of Assistance 2:  (ModAx2)  Trials/Comments 2: cues for safe hand placement and controlled descend    Outcome Measures:  [facility] Basic Mobility  Turning from your back to your side while in a flat bed without using bedrails: A lot  Moving from lying on your back to sitting on the side of a flat bed without using bedrails: A  lot  Moving to and from bed to chair (including a wheelchair): Total  Standing up from a chair using your arms (e.g. wheelchair or bedside chair): Total  To walk in hospital room: Total  Climbing 3-5 steps with railing: Total  Basic Mobility - Total Score: 8    Education Documentation  Precautions, taught by Pamela Aguero PT at 11/22/2023  4:08 PM.  Learner: Patient  Readiness: Acceptance  Method: Explanation  Response: Verbalizes Understanding, Needs Reinforcement    Mobility Training, taught by Pamela Aguero PT at 11/22/2023  4:08 PM.  Learner: Patient  Readiness: Acceptance  Method: Explanation  Response: Verbalizes Understanding, Needs Reinforcement    Education Comments  No comments found.        OP EDUCATION:       Encounter Problems       Encounter Problems (Active)       Mobility       Patient will [place] with ambulation 50 ft with RW (Progressing)       Start:  11/20/23    Expected End:  12/04/23            Patient will be SBA for 4 stairs with 1 HR and LRAD and 2 steps with LRAD (Progressing)       Start:  11/20/23    Expected End:  12/04/23               Pain - Adult          Transfers       Patient will be [place] with sit to stand and bed to chair transfers with LRAD (Progressing)       Start:  11/20/23    Expected End:  12/04/23            Patient will be [place] with bed mobility (Progressing)       Start:  11/20/23    Expected End:  12/04/23

## 2023-11-22 NOTE — CARE PLAN
Problem: Pain - Adult  Goal: Verbalizes/displays adequate comfort level or baseline comfort level  Outcome: Progressing     Problem: Safety - Adult  Goal: Free from fall injury  Outcome: Progressing     Problem: Discharge Planning  Goal: Discharge to home or other facility with appropriate resources  Outcome: Progressing     Problem: Chronic Conditions and Co-morbidities  Goal: Patient's chronic conditions and co-morbidity symptoms are monitored and maintained or improved  Outcome: Progressing     Problem: Fall/Injury  Goal: Verbalize understanding of personal risk factors for fall in the hospital  Outcome: Progressing  Goal: Verbalize understanding of risk factor reduction measures to prevent injury from fall in the home  Outcome: Progressing  Goal: Use assistive devices by end of the shift  Outcome: Progressing  Goal: Pace activities to prevent fatigue by end of the shift  Outcome: Progressing     Problem: Cardiovascular - Adult  Goal: Maintains optimal cardiac output and hemodynamic stability  Outcome: Progressing  Goal: Absence of cardiac dysrhythmias or at baseline  Outcome: Progressing     Problem: Musculoskeletal - Adult  Goal: Return mobility to safest level of function  Outcome: Progressing  Goal: Maintain proper alignment of affected body part  Outcome: Progressing  Goal: Return ADL status to a safe level of function  Outcome: Progressing

## 2023-11-22 NOTE — PROGRESS NOTES
11/22/2023 Care coordination   Mukesh Garg is a 73 y.o. male on day 7 of admission presenting with Acute lower limb ischemia..  Discussed dispo with pt and spouse .  PT recs for [facility], still need OT eval.   Patient would prefer  [facility]  first choice and [facility] second choice.  PCN  will initiate referrals.

## 2023-11-22 NOTE — DOCUMENTATION CLARIFICATION NOTE
"    PATIENT:               ESTEPHANIA ELIZABETH  ACCT #:                  [id number]  MRN:                       [id number]  :                       1950  ADMIT DATE:       10/24/2023 12:15 PM  DISCH DATE:        10/28/2023 11:22 AM  RESPONDING PROVIDER #:        [id number]          PROVIDER RESPONSE TEXT:    Acute Respiratory Failure ruled out after further workup    CDI QUERY TEXT:    [facility]_CV Respiratory      Instruction    Based on your assessment of the patient and the clinical information, please provide the requested documentation by clicking on the appropriate radio button and enter any additional information if prompted.    Question: Please clarify diagnosis of respiratory failure as    When answering this query, please exercise your independent professional judgment. The fact that a question is being asked, does not imply that any particular answer is desired or expected.    The patient's clinical indicators include:  Clinical Information: 73-year-old male here for dyspnea, and generalized weakness, COVID positive.    Documented Diagnosis: Acute hypoxic respiratory failure: documented by ID Consult 10/24/2023.    Clinical Indicators and Documentation:  -Vital Signs:  10/24/23 37.3, 168, 18, 159/114, 92 percent placed on 2L n/c  10/25/23 36.7, 87, 17, 130/76, 96 percent on 2L n/c  10/26/23 35.7, 94, 20, 168/40, 97 percent on 2L n/c  weaned off.    -ABG results: not performed    -Physical Exam Findings:  10/24  ED note \"Well-nourished well-developed male in no acute distress.  He has no evidence of airway compromise or respiratory distress.\"    10/25 ID progress note \"Reports occasional cough. Reports inability to take a deep breath but states his breathing has greatly improved since yesterday.\"    -Breath sounds:  10/24 ED provider: Clear to auscultation bilaterally. No rales, rhonchi, or wheezing.  10/24 H / P: Pulmonary: Breath sounds: Normal breath sounds.    -Distress: 10/24 H / P: Pulmonary: Effort: Pulmonary " effort is normal.    -Cyanosis: not documented    -Oxygen Therapy: 2L n/c    -Pulmonary Consult: not performed    Treatment: 2L low flow O2 10/24 through 10/26    Risk Factors: viral illness, COVID, sleep apnea  Options provided:  -- Acute Respiratory Failure ruled out after further workup  -- Acute Respiratory Failure as evidenced by, Please specify additional information below  -- Other - I will add my own diagnosis  -- Refer to Clinical Documentation Reviewer    Query created by: Daly Crabtree on 10/31/2023 7:17 AM      Electronically signed by:  BONNIE CONTRERAS 11/22/2023 7:16 AM

## 2023-11-23 ENCOUNTER — APPOINTMENT (OUTPATIENT)
Dept: RADIOLOGY | Facility: HOSPITAL | Age: 73
DRG: 252 | End: 2023-11-23
Payer: COMMERCIAL

## 2023-11-23 LAB
ALBUMIN SERPL BCP-MCNC: 2.8 G/DL (ref 3.4–5)
ANION GAP SERPL CALC-SCNC: 11 MMOL/L (ref 10–20)
APPEARANCE UR: CLEAR
APTT PPP: 30 SECONDS (ref 27–38)
BILIRUB UR STRIP.AUTO-MCNC: NEGATIVE MG/DL
BUN SERPL-MCNC: 22 MG/DL (ref 6–23)
CALCIUM SERPL-MCNC: 8.8 MG/DL (ref 8.6–10.6)
CHLORIDE SERPL-SCNC: 104 MMOL/L (ref 98–107)
CO2 SERPL-SCNC: 28 MMOL/L (ref 21–32)
COLOR UR: YELLOW
CREAT SERPL-MCNC: 1.13 MG/DL (ref 0.5–1.3)
ERYTHROCYTE [DISTWIDTH] IN BLOOD BY AUTOMATED COUNT: 14.1 % (ref 11.5–14.5)
GFR SERPL CREATININE-BSD FRML MDRD: 68 ML/MIN/1.73M*2
GLUCOSE BLD MANUAL STRIP-MCNC: 118 MG/DL (ref 74–99)
GLUCOSE BLD MANUAL STRIP-MCNC: 137 MG/DL (ref 74–99)
GLUCOSE BLD MANUAL STRIP-MCNC: 164 MG/DL (ref 74–99)
GLUCOSE BLD MANUAL STRIP-MCNC: 174 MG/DL (ref 74–99)
GLUCOSE SERPL-MCNC: 114 MG/DL (ref 74–99)
GLUCOSE UR STRIP.AUTO-MCNC: NEGATIVE MG/DL
HCT VFR BLD AUTO: 31.1 % (ref 41–52)
HGB BLD-MCNC: 10.1 G/DL (ref 13.5–17.5)
HOLD SPECIMEN: NORMAL
INR PPP: 1.5 (ref 0.9–1.1)
KETONES UR STRIP.AUTO-MCNC: NEGATIVE MG/DL
LEUKOCYTE ESTERASE UR QL STRIP.AUTO: NEGATIVE
MCH RBC QN AUTO: 30.8 PG (ref 26–34)
MCHC RBC AUTO-ENTMCNC: 32.5 G/DL (ref 32–36)
MCV RBC AUTO: 95 FL (ref 80–100)
NITRITE UR QL STRIP.AUTO: NEGATIVE
NRBC BLD-RTO: 0.3 /100 WBCS (ref 0–0)
PH UR STRIP.AUTO: 7 [PH]
PHOSPHATE SERPL-MCNC: 3.3 MG/DL (ref 2.5–4.9)
PLATELET # BLD AUTO: 335 X10*3/UL (ref 150–450)
POTASSIUM SERPL-SCNC: 3.8 MMOL/L (ref 3.5–5.3)
PROT UR STRIP.AUTO-MCNC: ABNORMAL MG/DL
PROTHROMBIN TIME: 16.4 SECONDS (ref 9.8–12.8)
RBC # BLD AUTO: 3.28 X10*6/UL (ref 4.5–5.9)
RBC # UR STRIP.AUTO: NEGATIVE /UL
RBC #/AREA URNS AUTO: NORMAL /HPF
SODIUM SERPL-SCNC: 139 MMOL/L (ref 136–145)
SP GR UR STRIP.AUTO: 1.02
UFH PPP CHRO-ACNC: 0.4 IU/ML
UROBILINOGEN UR STRIP.AUTO-MCNC: 2 MG/DL
WBC # BLD AUTO: 7.9 X10*3/UL (ref 4.4–11.3)
WBC #/AREA URNS AUTO: NORMAL /HPF

## 2023-11-23 PROCEDURE — 2500000002 HC RX 250 W HCPCS SELF ADMINISTERED DRUGS (ALT 637 FOR MEDICARE OP, ALT 636 FOR OP/ED): Performed by: SURGERY

## 2023-11-23 PROCEDURE — 99232 SBSQ HOSP IP/OBS MODERATE 35: CPT | Performed by: STUDENT IN AN ORGANIZED HEALTH CARE EDUCATION/TRAINING PROGRAM

## 2023-11-23 PROCEDURE — 97110 THERAPEUTIC EXERCISES: CPT | Mod: GP

## 2023-11-23 PROCEDURE — 1100000001 HC PRIVATE ROOM DAILY

## 2023-11-23 PROCEDURE — 82947 ASSAY GLUCOSE BLOOD QUANT: CPT | Mod: 59

## 2023-11-23 PROCEDURE — 2500000001 HC RX 250 WO HCPCS SELF ADMINISTERED DRUGS (ALT 637 FOR MEDICARE OP): Performed by: STUDENT IN AN ORGANIZED HEALTH CARE EDUCATION/TRAINING PROGRAM

## 2023-11-23 PROCEDURE — 97116 GAIT TRAINING THERAPY: CPT | Mod: GP

## 2023-11-23 PROCEDURE — 85027 COMPLETE CBC AUTOMATED: CPT

## 2023-11-23 PROCEDURE — 80069 RENAL FUNCTION PANEL: CPT | Performed by: SURGERY

## 2023-11-23 PROCEDURE — 2500000004 HC RX 250 GENERAL PHARMACY W/ HCPCS (ALT 636 FOR OP/ED)

## 2023-11-23 PROCEDURE — 85610 PROTHROMBIN TIME: CPT

## 2023-11-23 PROCEDURE — 71045 X-RAY EXAM CHEST 1 VIEW: CPT | Performed by: STUDENT IN AN ORGANIZED HEALTH CARE EDUCATION/TRAINING PROGRAM

## 2023-11-23 PROCEDURE — 87040 BLOOD CULTURE FOR BACTERIA: CPT

## 2023-11-23 PROCEDURE — 96372 THER/PROPH/DIAG INJ SC/IM: CPT

## 2023-11-23 PROCEDURE — 81001 URINALYSIS AUTO W/SCOPE: CPT

## 2023-11-23 PROCEDURE — 36415 COLL VENOUS BLD VENIPUNCTURE: CPT

## 2023-11-23 PROCEDURE — 85520 HEPARIN ASSAY: CPT

## 2023-11-23 PROCEDURE — 71045 X-RAY EXAM CHEST 1 VIEW: CPT | Mod: FY

## 2023-11-23 PROCEDURE — 97530 THERAPEUTIC ACTIVITIES: CPT | Mod: GP

## 2023-11-23 PROCEDURE — 2500000001 HC RX 250 WO HCPCS SELF ADMINISTERED DRUGS (ALT 637 FOR MEDICARE OP): Performed by: SURGERY

## 2023-11-23 RX ORDER — PANTOPRAZOLE SODIUM 40 MG/1
40 TABLET, DELAYED RELEASE ORAL
Status: DISCONTINUED | OUTPATIENT
Start: 2023-11-23 | End: 2023-11-27 | Stop reason: HOSPADM

## 2023-11-23 RX ORDER — WARFARIN 3 MG/1
3 TABLET ORAL DAILY
Status: DISCONTINUED | OUTPATIENT
Start: 2023-11-23 | End: 2023-11-25

## 2023-11-23 RX ADMIN — PANTOPRAZOLE SODIUM 40 MG: 40 TABLET, DELAYED RELEASE ORAL at 15:59

## 2023-11-23 RX ADMIN — CARVEDILOL 12.5 MG: 12.5 TABLET, FILM COATED ORAL at 09:13

## 2023-11-23 RX ADMIN — EZETIMIBE 10 MG: 10 TABLET ORAL at 09:13

## 2023-11-23 RX ADMIN — ENOXAPARIN SODIUM 105 MG: 150 INJECTION SUBCUTANEOUS at 17:14

## 2023-11-23 RX ADMIN — ATORVASTATIN CALCIUM 40 MG: 40 TABLET, FILM COATED ORAL at 20:03

## 2023-11-23 RX ADMIN — CLOPIDOGREL BISULFATE 75 MG: 75 TABLET ORAL at 09:13

## 2023-11-23 RX ADMIN — AMLODIPINE BESYLATE 10 MG: 10 TABLET ORAL at 09:13

## 2023-11-23 RX ADMIN — ALLOPURINOL 100 MG: 100 TABLET ORAL at 09:13

## 2023-11-23 RX ADMIN — WARFARIN SODIUM 3 MG: 3 TABLET ORAL at 17:15

## 2023-11-23 RX ADMIN — CARVEDILOL 12.5 MG: 12.5 TABLET, FILM COATED ORAL at 16:00

## 2023-11-23 RX ADMIN — ACETAMINOPHEN 650 MG: 325 TABLET ORAL at 20:03

## 2023-11-23 RX ADMIN — ENOXAPARIN SODIUM 105 MG: 150 INJECTION SUBCUTANEOUS at 05:48

## 2023-11-23 ASSESSMENT — COGNITIVE AND FUNCTIONAL STATUS - GENERAL
HELP NEEDED FOR BATHING: A LITTLE
MOBILITY SCORE: 13
MOVING TO AND FROM BED TO CHAIR: A LOT
MOVING FROM LYING ON BACK TO SITTING ON SIDE OF FLAT BED WITH BEDRAILS: A LITTLE
TURNING FROM BACK TO SIDE WHILE IN FLAT BAD: A LITTLE
TURNING FROM BACK TO SIDE WHILE IN FLAT BAD: A LITTLE
DRESSING REGULAR UPPER BODY CLOTHING: A LITTLE
CLIMB 3 TO 5 STEPS WITH RAILING: TOTAL
STANDING UP FROM CHAIR USING ARMS: A LOT
TOILETING: A LITTLE
WALKING IN HOSPITAL ROOM: A LOT
MOVING FROM LYING ON BACK TO SITTING ON SIDE OF FLAT BED WITH BEDRAILS: A LITTLE
MOBILITY SCORE: 13
DRESSING REGULAR LOWER BODY CLOTHING: A LITTLE
TURNING FROM BACK TO SIDE WHILE IN FLAT BAD: A LOT
STANDING UP FROM CHAIR USING ARMS: A LOT
CLIMB 3 TO 5 STEPS WITH RAILING: A LOT
DAILY ACTIVITIY SCORE: 19
PERSONAL GROOMING: A LITTLE
MOVING TO AND FROM BED TO CHAIR: A LOT
DRESSING REGULAR UPPER BODY CLOTHING: A LITTLE
CLIMB 3 TO 5 STEPS WITH RAILING: TOTAL
PERSONAL GROOMING: A LITTLE
WALKING IN HOSPITAL ROOM: A LOT
MOVING FROM LYING ON BACK TO SITTING ON SIDE OF FLAT BED WITH BEDRAILS: A LITTLE
DAILY ACTIVITIY SCORE: 19
HELP NEEDED FOR BATHING: A LITTLE
WALKING IN HOSPITAL ROOM: A LOT
MOVING TO AND FROM BED TO CHAIR: A LOT
TOILETING: A LITTLE
DRESSING REGULAR LOWER BODY CLOTHING: A LITTLE
MOBILITY SCORE: 13
STANDING UP FROM CHAIR USING ARMS: A LOT

## 2023-11-23 ASSESSMENT — PAIN SCALES - GENERAL
PAINLEVEL_OUTOF10: 2
PAINLEVEL_OUTOF10: 2
PAINLEVEL_OUTOF10: 5 - MODERATE PAIN
PAINLEVEL_OUTOF10: 3

## 2023-11-23 ASSESSMENT — PAIN - FUNCTIONAL ASSESSMENT
PAIN_FUNCTIONAL_ASSESSMENT: 0-10

## 2023-11-23 NOTE — PROGRESS NOTES
"Mukesh Garg is a 73 y.o. male on day 8 of admission presenting with Acute lower limb ischemia.    Subjective   -No acute event overnight  -I saw the patient today in the [facility], and he was sleeping on his nasal CPAP and had no complaint  - Had 1 BM last night  - Patient has no chest pain or SOB  - LLE pain continue to improve  - Had some urine retention and was complaining that he can't fully empty his bladder    Objective     Physical Exam  Constitutional:       Appearance: Normal appearance. He is obese.   HENT:      Head: Normocephalic and atraumatic.      Nose: Nose normal.   Eyes:      Extraocular Movements: Extraocular movements intact.      Conjunctiva/sclera: Conjunctivae normal.      Pupils: Pupils are equal, round, and reactive to light.   Cardiovascular:      Rate and Rhythm: Normal rate. Rhythm irregular.   Pulmonary:      Effort: Pulmonary effort is normal.      Breath sounds: Normal breath sounds.   Abdominal:      Palpations: Abdomen is soft.   Musculoskeletal:         General: Normal range of motion.      Cervical back: Normal range of motion and neck supple.   Skin:     General: Skin is warm and dry.      Capillary Refill: Capillary refill takes less than 2 seconds.   Neurological:      General: No focal deficit present.      Mental Status: He is alert and oriented to person, place, and time.   Psychiatric:         Mood and Affect: Mood normal.         Behavior: Behavior normal.             Last Recorded Vitals  Blood pressure 105/69, pulse 91, temperature 36.9 °C (98.4 °F), resp. rate 18, height 1.753 m (5' 9\"), weight 113 kg (248 lb 10.9 oz), SpO2 96 %.  Intake/Output last 3 Shifts:  I/O last 3 completed shifts:  In: 1870.2 (16.6 mL/kg) [I.V.:1870.2 (16.6 mL/kg)]  Out: 2401 (21.3 mL/kg) [Urine:2400 (0.6 mL/kg/hr); Stool:1]  Weight: 112.8 kg     Relevant Results              Results for orders placed or performed during the hospital encounter of 11/15/23 (from the past 24 hour(s))   POCT GLUCOSE "   Result Value Ref Range    POCT Glucose 122 (H) 74 - 99 mg/dL   POCT GLUCOSE   Result Value Ref Range    POCT Glucose 154 (H) 74 - 99 mg/dL   POCT GLUCOSE   Result Value Ref Range    POCT Glucose 154 (H) 74 - 99 mg/dL   Heparin Assay, UFH   Result Value Ref Range    Heparin Unfractionated 0.4 See Comment Below for Therapeutic Ranges IU/mL   CBC   Result Value Ref Range    WBC 7.9 4.4 - 11.3 x10*3/uL    nRBC 0.3 (H) 0.0 - 0.0 /100 WBCs    RBC 3.28 (L) 4.50 - 5.90 x10*6/uL    Hemoglobin 10.1 (L) 13.5 - 17.5 g/dL    Hematocrit 31.1 (L) 41.0 - 52.0 %    MCV 95 80 - 100 fL    MCH 30.8 26.0 - 34.0 pg    MCHC 32.5 32.0 - 36.0 g/dL    RDW 14.1 11.5 - 14.5 %    Platelets 335 150 - 450 x10*3/uL   Renal function panel   Result Value Ref Range    Glucose 114 (H) 74 - 99 mg/dL    Sodium 139 136 - 145 mmol/L    Potassium 3.8 3.5 - 5.3 mmol/L    Chloride 104 98 - 107 mmol/L    Bicarbonate 28 21 - 32 mmol/L    Anion Gap 11 10 - 20 mmol/L    Urea Nitrogen 22 6 - 23 mg/dL    Creatinine 1.13 0.50 - 1.30 mg/dL    eGFR 68 >60 mL/min/1.73m*2    Calcium 8.8 8.6 - 10.6 mg/dL    Phosphorus 3.3 2.5 - 4.9 mg/dL    Albumin 2.8 (L) 3.4 - 5.0 g/dL   Coagulation Screen   Result Value Ref Range    Protime 16.4 (H) 9.8 - 12.8 seconds    INR 1.5 (H) 0.9 - 1.1    aPTT 30 27 - 38 seconds   POCT GLUCOSE   Result Value Ref Range    POCT Glucose 118 (H) 74 - 99 mg/dL      TRANSTHORACIC ECHOCARDIOGRAM REPORT 11/15/2023        1. Left ventricular systolic function is normal with a 65-70% EF.   2. The patient is in atrial fibrillation which may influence the estimate of left ventricular function and transvalvular flows.   3. The RV appears upper limits of normal size vs mildly dilated with moderate to severely reduced fx with a McConnels sign consistent with pulmonary embolus.   4. Color Doppler flow in the region of the atrial septum suggestive of possible small left to right shunt.    5 .Mild AR   6. Compared with the images from the prior TTE done  10/25/2023 there was already RV systolic dysfunction with possible McConnels sign at that time         Assessment/Plan   Principal Problem:    Acute lower limb ischemia  Active Problems:    Multiple subsegmental pulmonary emboli without acute cor pulmonale (CMS/HCC)    Septal defect, heart    Mukesh Garg is a 73 y.o. male with PMHx of Afib, HTN, JENNY (on CPAP at home), gout, CABG x3 vessels (2013), recurrent DVTs  (held eliq) and peripheral neuropathy. Presented with Acute left LE limb ischemia 2/2 popliteal artery occlusion and when worked up found to have bilateral PE on CT. Currently is being managed for acute L LE ischemia and PE on heparin drip.  S/P thrombectomy in the LLE (11/17/23). S/P RHC on 11/21 because of PFO that could be contributing to PE rai. being afib which was completely unremarkable.          #Occlusion of L-popliteal artery  #PAD  ##CAD  ## L to R shunt  - On inial physical exam, patient's left leg showed pallor, increasingly shiny, and was severely tender, however now his pain decreased from presentation. This with the CT angio which showed extensive occlusion of the left common iliac, femoral and popliteal artery. Along with the LE blood flow was reconstituted to the tibialis posterior, anterior and peroneal arteries, are indicative of atherosclerotic build up and points to severe LE ischemia. For this, left popliteal embolectomy was recommended to reestablish blood flow. He was taken to the OR on   OR on 11/17 for L fem cutdown w/ ileofem thromboembolectomy, L pop cutdown w/ fempop  thromboembolectomy and L TP trunk cutdown w/ thromboembolectomy.     -He has a Hx of CABG in 2013 Coronary artery bypass grafting x 3 , partial left sided MAZE,   (LIMA to a large Diag, a free ALEX y'd off the LIMA to the LAD , SVG-PDA)  - Hx of PCI 2006 (emergent for STEMI): DESx1 to prox diag, DESx2 to mid and distal LAD     - On TTE (11/17/23) Color Doppler flow in the region of the atrial septum suggestive of  possible small left to right shunt. Because of that the patient had RHC on11/21 with no evidence of intracardiac shunting   - Currently the patient LLE pain improved significantly compared to inial presentation    - carotid Doppler arterial ultra sound, done on 11/19 and was unremarkable  - Lupus antibody came back positive, might explain his hypercoagulable status, however he was scheduled for follow up with [facility] clinic in 3 months   - RHC showed no evidence of intracardiac shunting and normal filling pressures with preserved CO/CI          Plan:    - Started on warfarin 5 mg on 11/22 while bridging on Lovenox until we get the therapeutic INR (2-3), on 11/23 will decrease the warfarin dose to 3 mg.  - He will be discharged to rehab once his INR become in therapeutic level   - Cont. Polyethylene glycol PRN and start sennosides-docusate sodium for constipation   - Stop heparin gtt (HI)   - continue home medication Ezetimibe 10 mg   - continue Plavix  - Follow up in 3 months in clinic with [facility]                                             ## PE   ##Hx of DVT    -Hx of provoked DVT on 10/24/2023 and also 20 years ago in the right leg after long flight  - He has bilateral pulmonary emboli with moderate clot burden in the distal right main pulmonary artery extending into the right middle and right lower lobes and small clot burden in the left lower lobe pulmonary with a suggestive right ventricular stain.   -Has been taking Apixaban since 10/24/2023 and been complaint but now stopped and is on heparin gtt  -Vitally stable on admission without increased O2 requirement.  -There is no clear etiology for hypercoaguable status but might be related to   Covid infection that he had recently on 10/24/2023, Lab also showed + lupus     - patient had been on warfarin for many years for atrial fibrillation. INR was subtherapeutic during last admission (10/24/2023) as patient had not been taking his medications due to  symptomatic COVID infection. He was initially being bridged back to warfarin, but decision was made to switch to apixaban on discharge. He was fully adherent to apixaban since then. In addition, CTA chest had not been performed during last admission, so the chronicity of PE is not clear   - B2 glycoprotein ab and cardiolipin ab labs all were WNL  - Lupus antibody came back positive, might explain his hypercoagulable status  - RHC showed no evidence of intracardiac shunting and normal filling pressures with preserved CO/CI    - patient is okay to be discharged from [facility]-surg team  - INR today is 1.5      Plan:     - Started on warfarin 5 mg on 11/22 while bridging on Lovenox until we get the therapeutic INR (2-3), on 11/23 will decrease the warfarin dose to 3 mg.  - He will be discharged to rehab once his INR become in therapeutic level (2-3)  - contacted [facility] meds about positive Lupus antibody and they want to see the patient after 3 months to repeat the labs       #Atrial fibrillation  ##Hypertension  #DLD  - Patient has extensive previous hx of afib, on admition he had irregular rhythm and the EKG showed no P waves  - patient denied any chest pain dyspnea and with negative trops, thus more inline with afib with rvr    Plan:     - Started on warfarin 5 mg on 11/22 then decreased to 3mg on 11/23 while bridging on Lovenox until we get the therapeutic INR (2-3)  -Continue home Carvedilol 12.5 BID   -Continue home Amlodipine 10 mg   -Continue home chlorthalidone 25 mg    -Hold home Lisinopril 40 mg   - continue home medication atorvastatin 40 mg          ##Loose teethe  ## Minimal bleeding   - OMFS consulted for tooth extractions as the patient has a probable loose tooth. They recommended to  extraction in the outpatient setting with a dentist as they think it has no risk of aspiration.     OMFS Recs:   - F/U with general dentist for extraction of #14 in the outpatient settings     - On 11/18 morning the patient was  having his breakfast and has a tooth crown that fell off, He was seen by Dentist today and his crown was fixed in #29 tooth and was recommended to follow up in the outpatient clinic.      ##Gout  Continue home allopurinol 100 mg     ##UTI  ## Urine retention:   -I saw the patient on 11/ 20 in the [facility], and he was sleeping on his nasal CPAP and complained of abdominal pain, and mentioned to me that he hasn't urinated since yesterday morning.   - on PE his lower abdomin was tender and has urinary bladder was distended and straight cath was done and withdrawn 675 ml around 8 am , and then later on he urinated 75ml and around 4 pm bladder scan showed 547 cc, followed by straight cath which withdrawn 525cc.  - Probably his urine retention related to opoid that he has been receiving for pain control   As I don't suspect it could be related to BPH as he had no previous hx.   - On 11/21 patient symptoms of urine retention improved and now he is able to urinate   - On 11/23 the patient started to have some difficulty urinating and bladder scan inially showed 280 ml , however he was also having tachycardia (114 bpm) and his temp was 38.3 (was attributed to the head cover/eye cover that he usually sleep with as his temp was repeated on hour later and was 37.1...... but his symptoms in conjunction with tachycardia promoted us for UTI workup     Plan:     - UA/Urine culture , Blood culture , Chest x-ray   - Will follow up the urine output  - discontinue famotidine and started on pantoprazole 40 mg po od    ##DM-2   ## High fasting Glucose   -HgbA1c was 6.7 4 months ago  - Patient has been constantly having butch reading in the morning  - HgbA1c on 11/21 is 6.2% indicating prediabetes , however his FPG  has been >126 but also he has been under stress which could be a contributing factor.     Plan:  - started SSI   - follow up POCT values and plan accordingly       # Onychomycosis   Plan:  - referral to podiatrist in the  outpatient settings           F: Replete PRN  E: Replete PRN  N:  cardiac diet   DVT Ppx: On Heparin sc  GI Ppx: pantoprazole po  Access/Lines: PIV  Abx: none           Code status: Full Code  Emergency contact: Sweetie Garg (Spouse) # [phone]                  Varinder Boogie MD    Anesthesiology PGY 1  [facility] team pager# [phone]

## 2023-11-23 NOTE — PROGRESS NOTES
VASCULAR SURGERY PROGRESS NOTE  Subjective   No acute overnight event.     Objective   Vitals:  Heart Rate:  []   Temp:  [36.4 °C (97.5 °F)-37.7 °C (99.9 °F)]   Resp:  [17-19]   BP: ()/(52-74)   Weight:  [113 kg (248 lb 10.9 oz)]   SpO2:  [94 %-97 %]     Exam:  Constitutional: No acute distress, resting comfortably  Neuro:  AOx3, grossly intact  ENMT: moist mucous membranes  CV: no tachycardia  Pulm: non-labored on room air  GI: soft, non-tender, non-distended  Skin: warm and dry  Musculoskeletal: moving all extremities  Extremities: multiphasic left PT and DP doppler signals    Labs:  Results from last 7 days   Lab Units 11/22/23  0604 11/21/23  0523 11/20/23  0509   WBC AUTO x10*3/uL 8.1 7.0 9.8   HEMOGLOBIN g/dL 9.8* 9.4* 9.9*   PLATELETS AUTO x10*3/uL 277 191 186        Results from last 7 days   Lab Units 11/22/23  0604 11/21/23  0523 11/20/23  0513 11/18/23  0616 11/16/23  0957   SODIUM mmol/L 141 139 136   < > 139   POTASSIUM mmol/L 3.9 4.5 4.2   < > 4.1   CHLORIDE mmol/L 106 104 101   < > 103   CO2 mmol/L 26 25 26   < > 28   BUN mg/dL 25* 25* 22   < > 21   CREATININE mg/dL 1.22 1.40* 1.23   < > 1.20   GLUCOSE mg/dL 133* 186* 228*   < > 140*   MAGNESIUM mg/dL  --   --   --   --  1.95   PHOSPHORUS mg/dL 3.7 3.7 3.4   < >  --     < > = values in this interval not displayed.        Results from last 7 days   Lab Units 11/22/23  0604   INR  1.2*   PROTIME seconds 13.4*   APTT seconds 43*       Assessment/Plan   Mukesh Garg is 73 y.o. male with history of with history of CAD s/p CABG x3, recurrent DVT (most recent 10/24/2023), Afib (on Eliquis), recent COVID (10/2023) who presented with LLE subacute on chronic limb ischemia and PE s/p left iliofemoral, femoropopliteal and tibioperoneal thrombectomy (11/14). RHC without evidence of PFO    Plan:  -continue plavix 75mg daily  -anticoagulation recs per vascular medicine  -pain control per primary team  - patient is okay for discharge from vascular surgery  standpoint, please discharge with homegoing recs from vascular medicine  - follow up with vascular surgery after outpatient [facility] December 21st     D/w attending, Dr. Arya Burroughs MD  Vascular Surgery PGY-1  Team pager: [phone]

## 2023-11-23 NOTE — PROGRESS NOTES
Physical Therapy    Physical Therapy Treatment    Patient Name: Mukesh Garg  MRN: [id number]  Today's Date: 11/23/2023  Time Calculation  Start Time: 1150  Stop Time: 1247  Time Calculation (min): 57 min       Assessment/Plan   PT Assessment  PT Assessment Results: Decreased strength, Decreased range of motion, Decreased endurance, Impaired balance, Decreased mobility, Pain  Rehab Prognosis: Good  Evaluation/Treatment Tolerance: Patient limited by fatigue, Patient limited by pain  Medical Staff Made Aware: Yes  Strengths: Support of extended family/friends, Attitude of self  Barriers to Participation:  (none)  End of Session Communication: Bedside nurse  Assessment Comment: The pt required decreased assistance with bed mobility and increased assistance with transfers and amb using the wheeled walker.  End of Session Patient Position: Bed, 3 rail up, Alarm off, caregiver present  PT Plan  Inpatient/Swing Bed or Outpatient: Inpatient  PT Plan  Treatment/Interventions: Bed mobility, Transfer training, Gait training, Balance training, Strengthening, Endurance training, Range of motion, Therapeutic exercise, Therapeutic activity, Home exercise program  PT Plan: Skilled PT  PT Frequency: 5 times per week  PT Discharge Recommendations: High intensity level of continued care  Equipment Recommended upon Discharge:  (none)  PT Recommended Transfer Status: Assist x2  PT - OK to Discharge: Yes      General Visit Information:   PT  Visit  PT Received On: 11/23/23  Response to Previous Treatment: Patient reporting fatigue but able to participate.  General  Family/Caregiver Present: Yes  Caregiver Feedback: family present for support  Prior to Session Communication: Bedside nurse  Patient Position Received: Bed, 3 rail up, Alarm off, caregiver present  Preferred Learning Style: verbal, visual, written  General Comment: The pt was pleasant, cooperative and willing to participate in therapy.    Subjective    Precautions:  Precautions  LE Weight Bearing Status: Weight Bearing as Tolerated (L LE)  Medical Precautions: Fall precautions  Precautions Comment: Pt able to tolerate weight on L LE in standing using a wheeled walker.  Vital Signs:  Vital Signs  Heart Rate: 90  Resp: 23  SpO2: 97 % (on RA)    Objective   Pain:  Pain Assessment  Pain Assessment: 0-10  Pain Score: 2  Pain Type: Acute pain, Surgical pain  Pain Location: Leg  Pain Orientation: Left, Distal  Cognition:  Cognition  Overall Cognitive Status: Within Functional Limits  Orientation Level: Oriented X4  Postural Control:  Postural Control  Postural Control: Impaired  Extremity/Trunk Assessments:        RUE   RUE : Within Functional Limits  LUE   LUE: Within Functional Limits  RLE   RLE : Within Functional Limits  LLE   LLE : Exceptions to WFL  AROM LLE (degrees)  LLE AROM Comment: decreased hip and knee flexion  Strength LLE  L Hip Flexion: 4-/5  L Knee Flexion: 4/5  L Knee Extension: 4/5  L Ankle Dorsiflexion: 4/5  L Ankle Plantar Flexion: 4/5  Activity Tolerance:  Activity Tolerance  Endurance: Decreased tolerance for upright activites  Treatments:  Therapeutic Exercise  Therapeutic Exercise Performed: Yes  Therapeutic Exercise Activity 1: ankle pumps x 15  Therapeutic Exercise Activity 2: heel slides x 15  Therapeutic Exercise Activity 3: SAQ x 15  Therapeutic Exercise Activity 4: SLR x 15  Therapeutic Exercise Activity 5: Hip ABD x 15    Balance/Neuromuscular Re-Education  Balance/Neuromuscular Re-Education Activity Performed: Yes  Balance/Neuromuscular Re-Education Activity 1: SBA static sitting balance using Saúl UE and LE support.  Balance/Neuromuscular Re-Education Activity 2: SBA dynamic sitting balance using Saúl UE and LE support.  Balance/Neuromuscular Re-Education Activity 3: minimal assist static standing balance using a wheeled walker  Balance/Neuromuscular Re-Education Activity 4: minimal assist dynamic standing balance using a wheeled  walker    Bed Mobility  Bed Mobility: Yes  Bed Mobility 1  Bed Mobility 1: Supine to sitting  Level of Assistance 1: Minimum assistance  Bed Mobility Comments 1: HOB elevated  Bed Mobility 2  Bed Mobility  2: Sitting to supine  Level of Assistance 2: Minimum assistance  Bed Mobility Comments 2: log roll technique    Ambulation/Gait Training  Ambulation/Gait Training Performed: Yes  Ambulation/Gait Training 1  Surface 1: Level tile  Device 1: Rolling walker  Assistance 1: Minimum assistance  Quality of Gait 1: Antalgic, Forward flexed posture, Inconsistent stride length, Foot sweep (unsteady, decreased may, decreased step length, decreased endurance)  Comments/Distance (ft) 1: 2ft lateral left  Transfers  Transfer: Yes  Transfer 1  Transfer From 1: Sit to  Transfer to 1: Stand  Transfer Device 1: Walker  Transfer Level of Assistance 1: Maximum assistance  Transfers 2  Transfer From 2: Stand to  Transfer to 2: Sit  Transfer Device 2: Walker  Transfer Level of Assistance 2: Moderate assistance    Outcome Measures:  [facility] Basic Mobility  Turning from your back to your side while in a flat bed without using bedrails: A little  Moving from lying on your back to sitting on the side of a flat bed without using bedrails: A little  Moving to and from bed to chair (including a wheelchair): A lot  Standing up from a chair using your arms (e.g. wheelchair or bedside chair): A lot  To walk in hospital room: A lot  Climbing 3-5 steps with railing: Total  Basic Mobility - Total Score: 13    Education Documentation  Precautions, taught by Daniel Reza PT at 11/23/2023  1:07 PM.  Learner: Significant Other, Family, Patient  Readiness: Acceptance  Method: Explanation, Demonstration  Response: Verbalizes Understanding, Demonstrated Understanding    Mobility Training, taught by Daniel Reza PT at 11/23/2023  1:07 PM.  Learner: Significant Other, Family, Patient  Readiness: Acceptance  Method: Explanation,  Demonstration  Response: Verbalizes Understanding, Demonstrated Understanding    Education Comments  No comments found.        OP EDUCATION:  Outpatient Education  Individual(s) Educated: Patient, Spouse, Child, Other  Education Provided: Body Mechanics, Fall Risk, Home Exercise Program  Patient Response to Education: Patient/Caregiver Verbalized Understanding of Information    Encounter Problems       Encounter Problems (Active)       Mobility       Patient will [place] with ambulation 50 ft with RW (Progressing)       Start:  11/20/23    Expected End:  12/04/23            Patient will be SBA for 4 stairs with 1 HR and LRAD and 2 steps with LRAD (Progressing)       Start:  11/20/23    Expected End:  12/04/23               Pain - Adult          Transfers       Patient will be [place] with sit to stand and bed to chair transfers with LRAD (Progressing)       Start:  11/20/23    Expected End:  12/04/23            Patient will be [place] with bed mobility (Progressing)       Start:  11/20/23    Expected End:  12/04/23

## 2023-11-23 NOTE — CARE PLAN
The clinical goals for the shift include pt will be able to sleep for atleast 4hrs throughout the night    Over the shift, the patient did make progress toward the following goals. Recommendations to address these barriers include purposeful rounding.

## 2023-11-24 ENCOUNTER — PHARMACY VISIT (OUTPATIENT)
Dept: PHARMACY | Facility: CLINIC | Age: 73
End: 2023-11-24
Payer: COMMERCIAL

## 2023-11-24 LAB
ALBUMIN SERPL BCP-MCNC: 2.6 G/DL (ref 3.4–5)
ANION GAP SERPL CALC-SCNC: 13 MMOL/L (ref 10–20)
APTT PPP: 34 SECONDS (ref 27–38)
BASOPHILS # BLD MANUAL: 0.14 X10*3/UL (ref 0–0.1)
BASOPHILS NFR BLD MANUAL: 1.7 %
BUN SERPL-MCNC: 21 MG/DL (ref 6–23)
BURR CELLS BLD QL SMEAR: ABNORMAL
CALCIUM SERPL-MCNC: 8.1 MG/DL (ref 8.6–10.6)
CHLORIDE SERPL-SCNC: 105 MMOL/L (ref 98–107)
CO2 SERPL-SCNC: 25 MMOL/L (ref 21–32)
CREAT SERPL-MCNC: 1.16 MG/DL (ref 0.5–1.3)
EOSINOPHIL # BLD MANUAL: 0.14 X10*3/UL (ref 0–0.4)
EOSINOPHIL NFR BLD MANUAL: 1.7 %
ERYTHROCYTE [DISTWIDTH] IN BLOOD BY AUTOMATED COUNT: 14.1 % (ref 11.5–14.5)
GFR SERPL CREATININE-BSD FRML MDRD: 66 ML/MIN/1.73M*2
GLUCOSE BLD MANUAL STRIP-MCNC: 112 MG/DL (ref 74–99)
GLUCOSE BLD MANUAL STRIP-MCNC: 113 MG/DL (ref 74–99)
GLUCOSE BLD MANUAL STRIP-MCNC: 149 MG/DL (ref 74–99)
GLUCOSE BLD MANUAL STRIP-MCNC: 156 MG/DL (ref 74–99)
GLUCOSE SERPL-MCNC: 115 MG/DL (ref 74–99)
HCT VFR BLD AUTO: 30.8 % (ref 41–52)
HGB BLD-MCNC: 10 G/DL (ref 13.5–17.5)
IMM GRANULOCYTES # BLD AUTO: 0.57 X10*3/UL (ref 0–0.5)
IMM GRANULOCYTES NFR BLD AUTO: 6.8 % (ref 0–0.9)
INR PPP: 1.9 (ref 0.9–1.1)
LYMPHOCYTES # BLD MANUAL: 1.22 X10*3/UL (ref 0.8–3)
LYMPHOCYTES NFR BLD MANUAL: 14.7 %
MAGNESIUM SERPL-MCNC: 1.87 MG/DL (ref 1.6–2.4)
MCH RBC QN AUTO: 30.3 PG (ref 26–34)
MCHC RBC AUTO-ENTMCNC: 32.5 G/DL (ref 32–36)
MCV RBC AUTO: 93 FL (ref 80–100)
METAMYELOCYTES # BLD MANUAL: 0.28 X10*3/UL
METAMYELOCYTES NFR BLD MANUAL: 3.4 %
MONOCYTES # BLD MANUAL: 1.07 X10*3/UL (ref 0.05–0.8)
MONOCYTES NFR BLD MANUAL: 12.9 %
NEUTS SEG # BLD MANUAL: 5.3 X10*3/UL (ref 1.6–5)
NEUTS SEG NFR BLD MANUAL: 63.8 %
NRBC BLD-RTO: 0.2 /100 WBCS (ref 0–0)
PHOSPHATE SERPL-MCNC: 3.4 MG/DL (ref 2.5–4.9)
PLATELET # BLD AUTO: 362 X10*3/UL (ref 150–450)
POTASSIUM SERPL-SCNC: 3.9 MMOL/L (ref 3.5–5.3)
PROMYELOCYTES # BLD MANUAL: 0.07 X10*3/UL
PROMYELOCYTES NFR BLD MANUAL: 0.9 %
PROTHROMBIN TIME: 21.5 SECONDS (ref 9.8–12.8)
RBC # BLD AUTO: 3.3 X10*6/UL (ref 4.5–5.9)
RBC MORPH BLD: ABNORMAL
SODIUM SERPL-SCNC: 139 MMOL/L (ref 136–145)
TOTAL CELLS COUNTED BLD: 116
VARIANT LYMPHS # BLD MANUAL: 0.07 X10*3/UL (ref 0–0.3)
VARIANT LYMPHS NFR BLD: 0.9 %
WBC # BLD AUTO: 8.3 X10*3/UL (ref 4.4–11.3)

## 2023-11-24 PROCEDURE — 2500000001 HC RX 250 WO HCPCS SELF ADMINISTERED DRUGS (ALT 637 FOR MEDICARE OP): Performed by: STUDENT IN AN ORGANIZED HEALTH CARE EDUCATION/TRAINING PROGRAM

## 2023-11-24 PROCEDURE — 97165 OT EVAL LOW COMPLEX 30 MIN: CPT | Mod: GO

## 2023-11-24 PROCEDURE — 84153 ASSAY OF PSA TOTAL: CPT

## 2023-11-24 PROCEDURE — 1100000001 HC PRIVATE ROOM DAILY

## 2023-11-24 PROCEDURE — 97530 THERAPEUTIC ACTIVITIES: CPT | Mod: GP | Performed by: PHYSICAL THERAPIST

## 2023-11-24 PROCEDURE — 85007 BL SMEAR W/DIFF WBC COUNT: CPT

## 2023-11-24 PROCEDURE — 80069 RENAL FUNCTION PANEL: CPT

## 2023-11-24 PROCEDURE — 85610 PROTHROMBIN TIME: CPT

## 2023-11-24 PROCEDURE — 36415 COLL VENOUS BLD VENIPUNCTURE: CPT

## 2023-11-24 PROCEDURE — 2500000004 HC RX 250 GENERAL PHARMACY W/ HCPCS (ALT 636 FOR OP/ED)

## 2023-11-24 PROCEDURE — 99232 SBSQ HOSP IP/OBS MODERATE 35: CPT

## 2023-11-24 PROCEDURE — 82947 ASSAY GLUCOSE BLOOD QUANT: CPT | Mod: 59

## 2023-11-24 PROCEDURE — 96372 THER/PROPH/DIAG INJ SC/IM: CPT

## 2023-11-24 PROCEDURE — 2500000001 HC RX 250 WO HCPCS SELF ADMINISTERED DRUGS (ALT 637 FOR MEDICARE OP): Performed by: SURGERY

## 2023-11-24 PROCEDURE — 85027 COMPLETE CBC AUTOMATED: CPT

## 2023-11-24 PROCEDURE — 2500000002 HC RX 250 W HCPCS SELF ADMINISTERED DRUGS (ALT 637 FOR MEDICARE OP, ALT 636 FOR OP/ED): Performed by: SURGERY

## 2023-11-24 PROCEDURE — 97530 THERAPEUTIC ACTIVITIES: CPT | Mod: GO

## 2023-11-24 PROCEDURE — 97535 SELF CARE MNGMENT TRAINING: CPT | Mod: GO

## 2023-11-24 PROCEDURE — 82947 ASSAY GLUCOSE BLOOD QUANT: CPT

## 2023-11-24 PROCEDURE — 83735 ASSAY OF MAGNESIUM: CPT

## 2023-11-24 RX ORDER — WARFARIN 3 MG/1
TABLET ORAL
Qty: 30 TABLET | Refills: 0 | Status: SHIPPED | OUTPATIENT
Start: 2023-11-24 | End: 2023-11-27 | Stop reason: HOSPADM

## 2023-11-24 RX ORDER — ACETAMINOPHEN 325 MG/1
650 TABLET ORAL EVERY 6 HOURS PRN
Qty: 100 TABLET | Refills: 0 | Status: SHIPPED | OUTPATIENT
Start: 2023-11-24 | End: 2023-12-24

## 2023-11-24 RX ORDER — POLYETHYLENE GLYCOL 3350 17 G/17G
17 POWDER, FOR SOLUTION ORAL DAILY PRN
Qty: 510 G | Refills: 0 | Status: SHIPPED | OUTPATIENT
Start: 2023-11-24 | End: 2023-12-09 | Stop reason: HOSPADM

## 2023-11-24 RX ORDER — CLOPIDOGREL BISULFATE 75 MG/1
75 TABLET ORAL DAILY
Qty: 30 TABLET | Refills: 0 | Status: SHIPPED | OUTPATIENT
Start: 2023-11-25 | End: 2023-12-11 | Stop reason: SDUPTHER

## 2023-11-24 RX ORDER — BISACODYL 10 MG/1
10 SUPPOSITORY RECTAL DAILY PRN
Qty: 12 SUPPOSITORY | Refills: 0 | Status: SHIPPED | OUTPATIENT
Start: 2023-11-24 | End: 2023-12-09 | Stop reason: HOSPADM

## 2023-11-24 RX ORDER — ENOXAPARIN SODIUM 150 MG/ML
1 INJECTION SUBCUTANEOUS EVERY 12 HOURS
Qty: 14 EACH | Refills: 0 | Status: SHIPPED | OUTPATIENT
Start: 2023-11-24 | End: 2023-11-27 | Stop reason: SDUPTHER

## 2023-11-24 RX ORDER — DOCUSATE SODIUM 100 MG/1
100 CAPSULE, LIQUID FILLED ORAL 2 TIMES DAILY PRN
Qty: 60 CAPSULE | Refills: 0 | Status: SHIPPED | OUTPATIENT
Start: 2023-11-24 | End: 2023-12-09 | Stop reason: HOSPADM

## 2023-11-24 RX ADMIN — ALLOPURINOL 100 MG: 100 TABLET ORAL at 09:29

## 2023-11-24 RX ADMIN — PANTOPRAZOLE SODIUM 40 MG: 40 TABLET, DELAYED RELEASE ORAL at 06:03

## 2023-11-24 RX ADMIN — ATORVASTATIN CALCIUM 40 MG: 40 TABLET, FILM COATED ORAL at 20:16

## 2023-11-24 RX ADMIN — CLOPIDOGREL BISULFATE 75 MG: 75 TABLET ORAL at 09:29

## 2023-11-24 RX ADMIN — ENOXAPARIN SODIUM 105 MG: 150 INJECTION SUBCUTANEOUS at 17:35

## 2023-11-24 RX ADMIN — EZETIMIBE 10 MG: 10 TABLET ORAL at 09:29

## 2023-11-24 RX ADMIN — OXYCODONE HYDROCHLORIDE 5 MG: 5 TABLET ORAL at 00:04

## 2023-11-24 RX ADMIN — ENOXAPARIN SODIUM 105 MG: 150 INJECTION SUBCUTANEOUS at 06:03

## 2023-11-24 RX ADMIN — CARVEDILOL 12.5 MG: 12.5 TABLET, FILM COATED ORAL at 17:33

## 2023-11-24 RX ADMIN — AMLODIPINE BESYLATE 10 MG: 10 TABLET ORAL at 09:29

## 2023-11-24 RX ADMIN — WARFARIN SODIUM 3 MG: 3 TABLET ORAL at 17:33

## 2023-11-24 RX ADMIN — OXYCODONE HYDROCHLORIDE 5 MG: 5 TABLET ORAL at 23:32

## 2023-11-24 RX ADMIN — INSULIN LISPRO 1 UNITS: 100 INJECTION, SOLUTION INTRAVENOUS; SUBCUTANEOUS at 17:33

## 2023-11-24 RX ADMIN — CARVEDILOL 12.5 MG: 12.5 TABLET, FILM COATED ORAL at 09:29

## 2023-11-24 ASSESSMENT — COGNITIVE AND FUNCTIONAL STATUS - GENERAL
MOBILITY SCORE: 9
WALKING IN HOSPITAL ROOM: TOTAL
STANDING UP FROM CHAIR USING ARMS: TOTAL
MOVING TO AND FROM BED TO CHAIR: A LOT
MOVING FROM LYING ON BACK TO SITTING ON SIDE OF FLAT BED WITH BEDRAILS: A LITTLE
HELP NEEDED FOR BATHING: A LOT
TURNING FROM BACK TO SIDE WHILE IN FLAT BAD: A LOT
MOBILITY SCORE: 9
HELP NEEDED FOR BATHING: A LOT
DAILY ACTIVITIY SCORE: 16
PERSONAL GROOMING: A LITTLE
CLIMB 3 TO 5 STEPS WITH RAILING: TOTAL
DRESSING REGULAR LOWER BODY CLOTHING: A LOT
TOILETING: A LOT
DRESSING REGULAR UPPER BODY CLOTHING: A LITTLE
DAILY ACTIVITIY SCORE: 16
MOBILITY SCORE: 13
WALKING IN HOSPITAL ROOM: A LOT
PERSONAL GROOMING: A LITTLE
TOILETING: A LOT
MOVING FROM LYING ON BACK TO SITTING ON SIDE OF FLAT BED WITH BEDRAILS: A LITTLE
DRESSING REGULAR LOWER BODY CLOTHING: A LOT
DAILY ACTIVITIY SCORE: 19
CLIMB 3 TO 5 STEPS WITH RAILING: TOTAL
STANDING UP FROM CHAIR USING ARMS: TOTAL
MOVING TO AND FROM BED TO CHAIR: TOTAL
HELP NEEDED FOR BATHING: A LITTLE
STANDING UP FROM CHAIR USING ARMS: A LOT
TOILETING: A LITTLE
MOVING TO AND FROM BED TO CHAIR: TOTAL
WALKING IN HOSPITAL ROOM: TOTAL
CLIMB 3 TO 5 STEPS WITH RAILING: TOTAL
PERSONAL GROOMING: A LITTLE
TURNING FROM BACK TO SIDE WHILE IN FLAT BAD: A LITTLE
DRESSING REGULAR LOWER BODY CLOTHING: A LITTLE
DRESSING REGULAR UPPER BODY CLOTHING: A LITTLE
DRESSING REGULAR UPPER BODY CLOTHING: A LITTLE
TURNING FROM BACK TO SIDE WHILE IN FLAT BAD: A LOT
MOVING FROM LYING ON BACK TO SITTING ON SIDE OF FLAT BED WITH BEDRAILS: A LITTLE

## 2023-11-24 ASSESSMENT — PAIN DESCRIPTION - ORIENTATION: ORIENTATION: LEFT

## 2023-11-24 ASSESSMENT — PAIN SCALES - GENERAL
PAINLEVEL_OUTOF10: 0 - NO PAIN
PAINLEVEL_OUTOF10: 0 - NO PAIN
PAINLEVEL_OUTOF10: 6
PAINLEVEL_OUTOF10: 5 - MODERATE PAIN
PAINLEVEL_OUTOF10: 0 - NO PAIN

## 2023-11-24 ASSESSMENT — ACTIVITIES OF DAILY LIVING (ADL)
HOME_MANAGEMENT_TIME_ENTRY: 10
ADL_ASSISTANCE: INDEPENDENT

## 2023-11-24 ASSESSMENT — PAIN - FUNCTIONAL ASSESSMENT
PAIN_FUNCTIONAL_ASSESSMENT: 0-10

## 2023-11-24 NOTE — PROGRESS NOTES
Occupational Therapy    Evaluation    Patient Name: Mukesh Garg  MRN: [id number]  Today's Date: 11/24/2023  Time Calculation  Start Time: 1022  Stop Time: 1102  Time Calculation (min): 40 min    Assessment  IP OT Assessment  OT Assessment: Mukesh is a 72yo male presenting with decreased ADLs, transfers, functional actibvity tolerance, IADLs. Pt would benefit from skilled OT itnervention to return to PLOF  Prognosis: Good  Barriers to Discharge: None  Evaluation/Treatment Tolerance: Patient limited by fatigue, Patient limited by pain  Medical Staff Made Aware: Yes  End of Session Communication: Bedside nurse  End of Session Patient Position: Bed, 3 rail up, Alarm off, caregiver present  Plan:  Treatment Interventions: ADL retraining, Functional transfer training, UE strengthening/ROM, Endurance training, Patient/family training, Compensatory technique education  OT Frequency: 4 times per week  OT Discharge Recommendations: High intensity level of continued care  OT Recommended Transfer Status: Assist of 2  OT - OK to Discharge: Yes    Subjective   Current Problem:  1. Multiple subsegmental pulmonary emboli without acute cor pulmonale (CMS/HCC)  Point of Care Ultrasound    Point of Care Ultrasound    Transthoracic Echo (TTE) Complete    Transthoracic Echo (TTE) Complete    Upper extremity vein mapping bilateral    Lower extremity vein mapping bilateral    Upper extremity vein mapping bilateral    Lower extremity vein mapping bilateral    CANCELED: Transesophageal Echo (GUILLERMINA)    CANCELED: Transesophageal Echo (GUILLERMINA)      2. Acute lower limb ischemia  Case Request Operating Room: Popliteal artery embolectomy, leftlower extremity four compartment fasciotomy, possible angiogram, possible femoral-distal bypass    Case Request Operating Room: Popliteal artery embolectomy, leftlower extremity four compartment fasciotomy, possible angiogram, possible femoral-distal bypass    Invasive vascular procedure    Invasive vascular  procedure    Surgical Pathology Exam    Surgical Pathology Exam    Vascular US Ankle Brachial Index (ALLISON) Without Exercise    Vascular US Ankle Brachial Index (ALLISON) Without Exercise    Vascular US ankle brachial index (ALLISON) without exercise    CANCELED: Vascular US Lower Extremity Arterial Duplex Bilateral With ALLISON    CANCELED: Vascular US Lower Extremity Arterial Duplex Bilateral With ALLISON      3. Peripheral vascular disease, unspecified (CMS/HCC)  Upper extremity vein mapping bilateral    Lower extremity vein mapping bilateral    Vascular US Ankle Brachial Index (ALLISON) Without Exercise    Vascular US ankle brachial index (ALLISON) without exercise    clopidogrel (Plavix) 75 mg tablet    docusate sodium (Colace) 100 mg capsule    polyethylene glycol (Glycolax, Miralax) 17 gram/dose powder    acetaminophen (Tylenol) 325 mg tablet    bisacodyl (Dulcolax) 10 mg suppository      4. Encounter for other preprocedural examination  Lower extremity vein mapping bilateral      5. Stenosis of right carotid artery  Vascular US carotid artery duplex bilateral    Vascular US carotid artery duplex bilateral      6. Septal defect, heart  Case Request Cath Lab: Right Heart Cath    Case Request Cath Lab: Right Heart Cath    Cardiac catheterization - non-coronary    Cardiac catheterization - non-coronary      7. Other specified symptoms and signs involving the circulatory and respiratory systems  Vascular US carotid artery duplex bilateral      8. Atrial fibrillation with RVR (CMS/HCC)  enoxaparin (Lovenox) 120 mg/0.8 mL syringe    warfarin (Coumadin) 3 mg tablet        General:  Reason for Referral: Presented with LLE subacute on chronic limb ischemia and PE.s/p fasciotomies, L fem cutdown w/ ileofem thromboembolectomy, L pop cutdown w/ fempop thromboembolectomy, L TP trunk cutdown w/ thromboembolectomy, selective angiography LLE, Concern for L to R shunt  Past Medical History Relevant to Rehab: CAD s/p PCIx3 and CABGx3 (LIMA-Diag, free  ALEX y to LAD, SVG-PDA, 2013), afib s/p MAZE (2013), and recent admission for R DVT iso COVID infection (10/24/23) on apixaban  Co-Treatment: PT  Co-Treatment Reason: pt maximize therapeutic benefit and gain 2/2 need for x2 sets of skilled hands  Prior to Session Communication: Bedside nurse  Patient Position Received: Bed, 3 rail up, Alarm off, caregiver present  Family/Caregiver Present: No  General Comment: Pt pleasant upon entry to room. Pt willing to participate in therapy. x2 BMs during session   Precautions:  LE Weight Bearing Status: Weight Bearing as Tolerated  Medical Precautions: Fall precautions  Vital Signs:  Heart Rate: 97  Heart Rate Source: Monitor  SpO2: 98 %  BP: 106/78 (in supine 114/78 at end of session)  Pain:  Pain Assessment  Pain Assessment: 0-10  Pain Score: 0 - No pain (no pain at rest, reports 5/10 pain with wbing and AAROM of LLE)  Lines/Tubes/Drains:         Objective   Cognition:  Overall Cognitive Status: Within Functional Limits           Home Living:  Type of Home: House  Lives With: Spouse  Home Adaptive Equipment: Walker rolling or standard, Cane  Home Layout: Multi-level  Alternate Level Stairs-Rails: Right  Alternate Level Stairs-Number of Steps: 4 (4 steps to sunken living room, same floor as full bath, 9 stairs from main floor to bedroom.)  Home Access: Stairs to enter without rails  Entrance Stairs-Rails: None  Entrance Stairs-Number of Steps: 2  Bathroom Shower/Tub: Walk-in shower  Bathroom Toilet: Standard  Bathroom Equipment: None   Prior Function:  Level of Bureau: Independent with ADLs and functional transfers, Independent with homemaking with ambulation  ADL Assistance: Independent  Homemaking Assistance: Independent  Ambulatory Assistance: Independent  Vocational: Retired, Part time employment (retired , still teaches additional courses)  Leisure: teaching  Hand Dominance: Right  Prior Function Comments: +drives  IADL History:     ADL:  UE  Dressing Assistance: Stand by  UE Dressing Deficit: Setup  Toileting Assistance with Device: Maximal  Toileting Deficit: Incontinent  ADL Comments: requires Max A x2 to maintain standing while cleaned up with RN. x2 BM. Pt doffs/dons gown with setup and assist with lines. pt requires TA for donning lotion on back  Activity Tolerance:  Endurance: Tolerates less than 10 min exercise, no significant change in vital signs, Decreased tolerance for upright activites  Bed Mobility/Transfers: Bed Mobility  Bed Mobility: Yes  Bed Mobility 1  Bed Mobility 1: Supine to sitting  Level of Assistance 1: Moderate assistance  Bed Mobility 2  Bed Mobility  2: Sitting to supine  Level of Assistance 2: Maximum assistance   and Transfers  Transfer: Yes  Transfer 1  Transfer From 1: Sit to  Transfer to 1: Stand  Technique 1: Sit to stand  Transfer Device 1: Walker  Transfer Level of Assistance 1: Maximum assistance, +2 (bed slightly raised)  Transfers 2  Transfer From 2: Stand to  Transfer to 2: Sit  Technique 2: Stand to sit  Transfer Device 2: Walker  Transfer Level of Assistance 2: Maximum assistance, +2  Modalities:     IADL's:      Vision: Vision - Basic Assessment  Current Vision: No visual deficits   and    Sensation:  Light Touch: No apparent deficits  Strength:     Perception:     Coordination:  Movements are Fluid and Coordinated: Yes   Hand Function:     Extremities: RUE   RUE : Within Functional Limits, LUE   LUE: Within Functional Limits,  , and        Therapy/Activity:     Therapeutic Activity  Therapeutic Activity Performed: Yes  Therapeutic Activity 1: Pt demos static/dynamic EOB x~20 minutes completing LLE AAROM with PT. Pt requires CGA with dynamic activity          Outcome Measures: [facility] Daily Activity  Putting on and taking off regular lower body clothing: A lot  Bathing (including washing, rinsing, drying): A lot  Putting on and taking off regular upper body clothing: A little  Toileting, which includes using  toilet, bedpan or urinal: A lot  Taking care of personal grooming such as brushing teeth: A little  Eating Meals: None  Daily Activity - Total Score: 16         ,     OT Adult Other Outcome Measures  4AT: 0,-    Education Documentation  Body Mechanics, taught by Lisa Irby OT at 11/24/2023 11:43 AM.  Learner: Patient  Readiness: Acceptance  Method: Explanation  Response: Verbalizes Understanding    ADL Training, taught by Lisa Irby OT at 11/24/2023 11:43 AM.  Learner: Patient  Readiness: Acceptance  Method: Explanation  Response: Verbalizes Understanding    Education Comments  No comments found.        Goals:   Encounter Problems       Encounter Problems (Active)       ADLs       Patient with complete upper body dressing with independent level of assistance donning and doffing all UE clothes with no adaptive equipment while edge of bed        Start:  11/24/23    Expected End:  12/15/23            Patient with complete lower body dressing with minimal assist  level of assistance donning and doffing all LE clothes  with PRN adaptive equipment while edge of bed        Start:  11/24/23    Expected End:  12/15/23            Patient will complete daily grooming tasks brushing teeth with set-up level of assistance and PRN adaptive equipment while standing.       Start:  11/24/23    Expected End:  12/15/23            Patient will complete toileting including hygiene clothing management/hygiene with minimal assist  level of assistance and grab bars.       Start:  11/24/23    Expected End:  12/15/23               EXERCISE/STRENGTHENING       Pt will demonstrate I with energy conservation techniques to increase functional activity tolerance to x15+ min without rest breaks/while maintaining O2 sats.        Start:  11/24/23    Expected End:  12/15/23                 TRANSFERS       Patient will complete functional transfer to toilet with front wheeled walker with minimal assist  level of assistance.       Start:   11/24/23    Expected End:  12/15/23                       11/24/23 at 11:48 AM   Lisa Irby, OT   Rehab Office: [phone]

## 2023-11-24 NOTE — PROGRESS NOTES
"Mukesh Garg is a 73 y.o. male on day 9 of admission presenting with Acute lower limb ischemia.    Subjective   -No acute event overnight  -I saw the patient today in the [facility], and he was sleeping on his nasal CPAP and had no complaint  - Had BM 2 days ago  - Patient has no chest pain or SOB  - LLE pain continue to improve and today his pain score was 0/10  - Had some urine retention and was complaining that he can't fully empty his bladder    Objective     Physical Exam  Constitutional:       Appearance: Normal appearance. He is obese.   HENT:      Head: Normocephalic and atraumatic.      Nose: Nose normal.   Eyes:      Extraocular Movements: Extraocular movements intact.      Conjunctiva/sclera: Conjunctivae normal.      Pupils: Pupils are equal, round, and reactive to light.   Cardiovascular:      Rate and Rhythm: Normal rate. Rhythm irregular.   Pulmonary:      Effort: Pulmonary effort is normal.      Breath sounds: Normal breath sounds.   Abdominal:      Palpations: Abdomen is soft.   Musculoskeletal:         General: Normal range of motion.      Cervical back: Normal range of motion and neck supple.   Skin:     General: Skin is warm and dry.      Capillary Refill: Capillary refill takes less than 2 seconds.   Neurological:      General: No focal deficit present.      Mental Status: He is alert and oriented to person, place, and time.   Psychiatric:         Mood and Affect: Mood normal.         Behavior: Behavior normal.             Last Recorded Vitals  Blood pressure 114/78, pulse 87, temperature 37.2 °C (99 °F), resp. rate 18, height 1.753 m (5' 9\"), weight 112 kg (245 lb 13 oz), SpO2 97 %.  Intake/Output last 3 Shifts:  I/O last 3 completed shifts:  In: 20 (0.2 mL/kg) [I.V.:20 (0.2 mL/kg)]  Out: 3216 (28.8 mL/kg) [Urine:3215 (0.8 mL/kg/hr); Stool:1]  Weight: 111.5 kg     Relevant Results              Results for orders placed or performed during the hospital encounter of 11/15/23 (from the past 24 " hour(s))   POCT GLUCOSE   Result Value Ref Range    POCT Glucose 164 (H) 74 - 99 mg/dL   Urinalysis with Reflex Microscopic and Culture   Result Value Ref Range    Color, Urine Yellow Straw, Yellow    Appearance, Urine Clear Clear    Specific Gravity, Urine 1.016 1.005 - 1.035    pH, Urine 7.0 5.0, 5.5, 6.0, 6.5, 7.0, 7.5, 8.0    Protein, Urine 30 (1+) (N) NEGATIVE mg/dL    Glucose, Urine NEGATIVE NEGATIVE mg/dL    Blood, Urine NEGATIVE NEGATIVE    Ketones, Urine NEGATIVE NEGATIVE mg/dL    Bilirubin, Urine NEGATIVE NEGATIVE    Urobilinogen, Urine 2.0 (N) <2.0 mg/dL    Nitrite, Urine NEGATIVE NEGATIVE    Leukocyte Esterase, Urine NEGATIVE NEGATIVE   Extra Urine Gray Tube   Result Value Ref Range    Extra Tube Hold for add-ons.    Urinalysis Microscopic   Result Value Ref Range    WBC, Urine NONE 1-5, NONE /HPF    RBC, Urine 1-2 NONE, 1-2, 3-5 /HPF   POCT GLUCOSE   Result Value Ref Range    POCT Glucose 137 (H) 74 - 99 mg/dL   POCT GLUCOSE   Result Value Ref Range    POCT Glucose 174 (H) 74 - 99 mg/dL   POCT GLUCOSE   Result Value Ref Range    POCT Glucose 112 (H) 74 - 99 mg/dL   Coagulation Screen   Result Value Ref Range    Protime 21.5 (H) 9.8 - 12.8 seconds    INR 1.9 (H) 0.9 - 1.1    aPTT 34 27 - 38 seconds   CBC and Auto Differential   Result Value Ref Range    WBC 8.3 4.4 - 11.3 x10*3/uL    nRBC 0.2 (H) 0.0 - 0.0 /100 WBCs    RBC 3.30 (L) 4.50 - 5.90 x10*6/uL    Hemoglobin 10.0 (L) 13.5 - 17.5 g/dL    Hematocrit 30.8 (L) 41.0 - 52.0 %    MCV 93 80 - 100 fL    MCH 30.3 26.0 - 34.0 pg    MCHC 32.5 32.0 - 36.0 g/dL    RDW 14.1 11.5 - 14.5 %    Platelets 362 150 - 450 x10*3/uL    Immature Granulocytes %, Automated 6.8 (H) 0.0 - 0.9 %    Immature Granulocytes Absolute, Automated 0.57 (H) 0.00 - 0.50 x10*3/uL   Renal function panel   Result Value Ref Range    Glucose 115 (H) 74 - 99 mg/dL    Sodium 139 136 - 145 mmol/L    Potassium 3.9 3.5 - 5.3 mmol/L    Chloride 105 98 - 107 mmol/L    Bicarbonate 25 21 - 32 mmol/L     Anion Gap 13 10 - 20 mmol/L    Urea Nitrogen 21 6 - 23 mg/dL    Creatinine 1.16 0.50 - 1.30 mg/dL    eGFR 66 >60 mL/min/1.73m*2    Calcium 8.1 (L) 8.6 - 10.6 mg/dL    Phosphorus 3.4 2.5 - 4.9 mg/dL    Albumin 2.6 (L) 3.4 - 5.0 g/dL   Magnesium   Result Value Ref Range    Magnesium 1.87 1.60 - 2.40 mg/dL   Manual Differential   Result Value Ref Range    Neutrophils %, Manual 63.8 40.0 - 80.0 %    Lymphocytes %, Manual 14.7 13.0 - 44.0 %    Monocytes %, Manual 12.9 2.0 - 10.0 %    Eosinophils %, Manual 1.7 0.0 - 6.0 %    Basophils %, Manual 1.7 0.0 - 2.0 %    Atypical Lymphocytes %, Manual 0.9 0.0 - 2.0 %    Metamyelocytes %, Manual 3.4 0.0 - 0.0 %    Promyelocytes %, Manual 0.9 0.0 - 0.0 %    Seg Neutrophils Absolute, Manual 5.30 (H) 1.60 - 5.00 x10*3/uL    Lymphocytes Absolute, Manual 1.22 0.80 - 3.00 x10*3/uL    Monocytes Absolute, Manual 1.07 (H) 0.05 - 0.80 x10*3/uL    Eosinophils Absolute, Manual 0.14 0.00 - 0.40 x10*3/uL    Basophils Absolute, Manual 0.14 (H) 0.00 - 0.10 x10*3/uL    Atypical Lymphs Absolute, Manual 0.07 0.00 - 0.30 x10*3/uL    Metamyelocytes Absolute, Manual 0.28 0.00 - 0.00 x10*3/uL    Promyelocytes Absolute, Manual 0.07 0.00 - 0.00 x10*3/uL    Total Cells Counted 116     RBC Morphology See Below     [place] Cells Few    POCT GLUCOSE   Result Value Ref Range    POCT Glucose 149 (H) 74 - 99 mg/dL      TRANSTHORACIC ECHOCARDIOGRAM REPORT 11/15/2023        1. Left ventricular systolic function is normal with a 65-70% EF.   2. The patient is in atrial fibrillation which may influence the estimate of left ventricular function and transvalvular flows.   3. The RV appears upper limits of normal size vs mildly dilated with moderate to severely reduced fx with a McConnels sign consistent with pulmonary embolus.   4. Color Doppler flow in the region of the atrial septum suggestive of possible small left to right shunt.    5 .Mild AR   6. Compared with the images from the prior TTE done 10/25/2023 there  was already RV systolic dysfunction with possible McConnels sign at that time         Assessment/Plan   Principal Problem:    Acute lower limb ischemia  Active Problems:    Multiple subsegmental pulmonary emboli without acute cor pulmonale (CMS/HCC)    Septal defect, heart    Mukesh Garg is a 73 y.o. male with PMHx of Afib, HTN, JENNY (on CPAP at home), gout, CABG x3 vessels (2013), recurrent DVTs  (held eliq) and peripheral neuropathy. Presented with Acute left LE limb ischemia 2/2 popliteal artery occlusion and when worked up found to have bilateral PE on CT. Currently is being managed for acute L LE ischemia and PE on heparin drip.  S/P thrombectomy in the LLE (11/17/23). S/P RHC on 11/21 because of PFO that could be contributing to PE ari. being afib which was completely unremarkable.          #Occlusion of L-popliteal artery  #PAD  ##CAD  ## L to R shunt  - On inial physical exam, patient's left leg showed pallor, increasingly shiny, and was severely tender, however now his pain decreased from presentation. This with the CT angio which showed extensive occlusion of the left common iliac, femoral and popliteal artery. Along with the LE blood flow was reconstituted to the tibialis posterior, anterior and peroneal arteries, are indicative of atherosclerotic build up and points to severe LE ischemia. For this, left popliteal embolectomy was recommended to reestablish blood flow. He was taken to the OR on   OR on 11/17 for L fem cutdown w/ ileofem thromboembolectomy, L pop cutdown w/ fempop  thromboembolectomy and L TP trunk cutdown w/ thromboembolectomy.     -He has a Hx of CABG in 2013 Coronary artery bypass grafting x 3 , partial left sided MAZE,   (LIMA to a large Diag, a free ALEX y'd off the LIMA to the LAD , SVG-PDA)  - Hx of PCI 2006 (emergent for STEMI): DESx1 to prox diag, DESx2 to mid and distal LAD     - On TTE (11/17/23) Color Doppler flow in the region of the atrial septum suggestive of possible small  left to right shunt. Because of that the patient had RHC on11/21 with no evidence of intracardiac shunting   - Currently the patient LLE pain improved significantly compared to inial presentation    - carotid Doppler arterial ultra sound, done on 11/19 and was unremarkable  - Lupus antibody came back positive, might explain his hypercoagulable status, however he was scheduled for follow up with [facility] clinic in 3 months   - RHC showed no evidence of intracardiac shunting and normal filling pressures with preserved CO/CI          Plan:    - Started on warfarin 5 mg on 11/22 while bridging on Lovenox until we get the therapeutic INR (2-3), on 11/23 will decrease the warfarin dose to 3 mg.  - He will be discharged to rehab once his INR become in therapeutic level   - Cont. Polyethylene glycol PRN and start sennosides-docusate sodium for constipation   - Stop heparin gtt (HI)   - continue home medication Ezetimibe 10 mg   - continue Plavix  - Follow up in 3 months in clinic with [facility]                                             ## PE   ##Hx of DVT    -Hx of provoked DVT on 10/24/2023 and also 20 years ago in the right leg after long flight  - He has bilateral pulmonary emboli with moderate clot burden in the distal right main pulmonary artery extending into the right middle and right lower lobes and small clot burden in the left lower lobe pulmonary with a suggestive right ventricular stain.   -Has been taking Apixaban since 10/24/2023 and been complaint but now stopped and is on heparin gtt  -Vitally stable on admission without increased O2 requirement.  -There is no clear etiology for hypercoaguable status but might be related to   Covid infection that he had recently on 10/24/2023, Lab also showed + lupus     - patient had been on warfarin for many years for atrial fibrillation. INR was subtherapeutic during last admission (10/24/2023) as patient had not been taking his medications due to symptomatic COVID  infection. He was initially being bridged back to warfarin, but decision was made to switch to apixaban on discharge. He was fully adherent to apixaban since then. In addition, CTA chest had not been performed during last admission, so the chronicity of PE is not clear   - B2 glycoprotein ab and cardiolipin ab labs all were WNL  - Lupus antibody came back positive, might explain his hypercoagulable status  - RHC showed no evidence of intracardiac shunting and normal filling pressures with preserved CO/CI    - patient is okay to be discharged from [facility]-surg team  - INR today is 1.9      Plan:     - Started on warfarin 5 mg on 11/22 while bridging on Lovenox until we get the therapeutic INR (2-3), on 11/23 will decrease the warfarin dose to 3 mg.  - He will be discharged to acute rehab once his INR become in therapeutic level (2-3)  - contacted [facility] meds about positive Lupus antibody and they want to see the patient after 3 months to repeat the labs       #Atrial fibrillation  ##Hypertension  #DLD  - Patient has extensive previous hx of afib, on admition he had irregular rhythm and the EKG showed no P waves  - patient denied any chest pain dyspnea and with negative trops, thus more inline with afib with rvr    Plan:     - Started on warfarin 5 mg on 11/22 then decreased to 3mg on 11/23 while bridging on Lovenox until we get the therapeutic INR (2-3)  -Continue home Carvedilol 12.5 BID   -Continue home Amlodipine 10 mg   -Continue home chlorthalidone 25 mg    -Hold home Lisinopril 40 mg   - continue home medication atorvastatin 40 mg          ##Loose teethe  ## Minimal bleeding   - OMFS consulted for tooth extractions as the patient has a probable loose tooth. They recommended to  extraction in the outpatient setting with a dentist as they think it has no risk of aspiration.     OMFS Recs:   - F/U with general dentist for extraction of #14 in the outpatient settings     - On 11/18 morning the patient was having his  breakfast and has a tooth crown that fell off, He was seen by Dentist today and his crown was fixed in #29 tooth and was recommended to follow up in the outpatient clinic.      ##Gout  Continue home allopurinol 100 mg       ##UTI  ## Urine retention:   -I saw the patient on 11/ 20 in the [facility], and he was sleeping on his nasal CPAP and complained of abdominal pain, and mentioned to me that he hasn't urinated since yesterday morning.   - on PE  11/20 his lower abdomin was tender and has urinary bladder was distended and straight cath was done and withdrawn 675 ml around 8 am , and then later on he urinated 75ml and around 4 pm bladder scan showed 547 cc, followed by straight cath which withdrawn 525cc.    - Probably his urine retention related to opoid that he has been receiving for pain control   As I don't suspect it could be related to BPH as he had no previous hx.   - On 11/21 patient symptoms of urine retention improved and now he is able to urinate   - On 11/23 the patient started to have some difficulty urinating and bladder scan inially showed 280 ml , however he was also having tachycardia (114 bpm) and his temp was 38.3 (was attributed to the head cover/eye cover that he usually sleep with as his temp was repeated on hour later and was 37.1...... but his symptoms in conjunction with tachycardia promoted us for UTI workup, his UA  came back and was unremarkable and blood culture didn't grow anything so far (preliminary result)     - Today bladder scan was done and was 180ml urine, will consider repeating if his symptoms worsened or wasn't able to urinate.      Plan:     - Will follow up the urine output  - discontinue famotidine and started on pantoprazole 40 mg po od        ##DM-2   ## High fasting Glucose   -HgbA1c was 6.7 4 months ago  - Patient has been constantly having butch reading in the morning  - HgbA1c on 11/21 is 6.2% indicating prediabetes , however his FPG  has been >126 but also he has been  under stress which could be a contributing factor.     Plan:  - cont. SSI   - cont. To follow up POCT values and plan accordingly       # Onychomycosis   Plan:  - referral to podiatrist in the outpatient settings           F: Replete PRN  E: Replete PRN  N:  cardiac diet   DVT Ppx: On Heparin sc  GI Ppx: pantoprazole po  Access/Lines: PIV  Abx: none           Code status: Full Code  Emergency contact: Sweetie Garg (Spouse) # [phone]                  Varinder Boogie MD    Anesthesiology PGY 1  [place] team pager# [phone]

## 2023-11-24 NOTE — PROGRESS NOTES
VASCULAR SURGERY PROGRESS NOTE  Subjective   No acute overnight event.     Objective   Vitals:  Heart Rate:  []   Temp:  [36.1 °C (97 °F)-37.2 °C (99 °F)]   Resp:  [18-23]   BP: (100-129)/(67-75)   Weight:  [112 kg (245 lb 13 oz)]   SpO2:  [95 %-97 %]     Exam:  Constitutional: No acute distress, resting comfortably in bed  Neuro:  AOx3, grossly intact  ENMT: moist mucous membranes  CV: no tachycardia  Pulm: non-labored on room air  GI: soft, non-tender, non-distended  Skin: warm and dry; moderate ecchymosis to LLE, LLE incisions with staples in place, CDI  Musculoskeletal: moving all extremities  Extremities: multiphasic left PT and DP doppler signals, compartments soft    Labs:  Results from last 7 days   Lab Units 11/23/23  0718 11/22/23  0604 11/21/23  0523   WBC AUTO x10*3/uL 7.9 8.1 7.0   HEMOGLOBIN g/dL 10.1* 9.8* 9.4*   PLATELETS AUTO x10*3/uL 335 277 191        Results from last 7 days   Lab Units 11/23/23  0718 11/22/23  0604 11/21/23  0523   SODIUM mmol/L 139 141 139   POTASSIUM mmol/L 3.8 3.9 4.5   CHLORIDE mmol/L 104 106 104   CO2 mmol/L 28 26 25   BUN mg/dL 22 25* 25*   CREATININE mg/dL 1.13 1.22 1.40*   GLUCOSE mg/dL 114* 133* 186*   PHOSPHORUS mg/dL 3.3 3.7 3.7        Results from last 7 days   Lab Units 11/23/23  0718 11/22/23  0604   INR  1.5* 1.2*   PROTIME seconds 16.4* 13.4*   APTT seconds 30 43*       Assessment/Plan   Mukesh Garg is 73 y.o. male with history of with history of CAD s/p CABG x3, recurrent DVT (most recent 10/24/2023), Afib (on Eliquis), recent COVID (10/2023) who presented with LLE subacute on chronic limb ischemia and PE s/p left iliofemoral, femoropopliteal and tibioperoneal thrombectomy (11/14). RHC without evidence of PFO    Plan:  -continue plavix 75mg daily  -anticoagulation recs per vascular medicine  -pain control per primary team  - patient is okay for discharge from vascular surgery standpoint, please discharge with homegoing recs from vascular medicine  -  follow up with vascular surgery after outpatient [facility] December 21st     Ana Bates, APRN-CNP

## 2023-11-24 NOTE — PROGRESS NOTES
Physical Therapy    Physical Therapy Treatment    Patient Name: Mukesh Garg  MRN: [id number]  Today's Date: 11/24/2023  Time Calculation  Start Time: 1021  Stop Time: 1102  Time Calculation (min): 41 min       Assessment/Plan   PT Assessment  PT Assessment Results: Decreased strength, Decreased range of motion, Decreased endurance, Impaired balance, Decreased mobility, Decreased safety awareness, Pain  Rehab Prognosis: Good  End of Session Communication: Bedside nurse  End of Session Patient Position: Bed, 3 rail up, Alarm off, not on at start of session  PT Plan  Inpatient/Swing Bed or Outpatient: Inpatient  PT Plan  Treatment/Interventions: Bed mobility, Transfer training, Gait training, Balance training, Strengthening, Range of motion, Therapeutic exercise, Therapeutic activity, Home exercise program, Positioning  PT Plan: Skilled PT  PT Frequency: 5 times per week  PT Discharge Recommendations: High intensity level of continued care  PT Recommended Transfer Status: Assist x2, Assistive device  PT - OK to Discharge: Yes      General Visit Information:   PT  Visit  PT Received On: 11/24/23  General  Reason for Referral: Presented with LLE subacute on chronic limb ischemia and PE.s/p fasciotomies, L fem cutdown w/ ileofem thromboembolectomy, L pop cutdown w/ fempop thromboembolectomy, L TP trunk cutdown w/ thromboembolectomy, selective angiography LLE, Concern for L to R shunt  Past Medical History Relevant to Rehab: CAD s/p PCIx3 and CABGx3 (LIMA-Diag, free ALEX y to LAD, SVG-PDA, 2013), afib s/p MAZE (2013), and recent admission for R DVT iso COVID infection (10/24/23) on apixaban  Co-Treatment: OT  Co-Treatment Reason: Cotx with OT for safety with mobility  Patient Position Received: Bed, 3 rail up, Alarm off, not on at start of session  Preferred Learning Style: visual, verbal  General Comment: Pt supine in bed upon arrival, pleasant and receptive to tretament session. 2 BM's during session.    Subjective    Precautions:  Precautions  LE Weight Bearing Status: Weight Bearing as Tolerated  Medical Precautions: Fall precautions  Vital Signs:  Vital Signs  Heart Rate: 97  SpO2: 98 %  BP: 106/78 (Supine: 114/78 at end of session)    Objective   Pain:  Pain Assessment  Pain Assessment: 0-10  Pain Score: 0 - No pain (5/10 LLE with WB and AAROM)  Cognition:  Cognition  Overall Cognitive Status: Within Functional Limits    Treatments:  Therapeutic Exercise  Therapeutic Exercise Performed: Yes  Therapeutic Exercise Activity 1: Supine AP, QS, QS 1x10 reps and seated AP, LAQ 1x10 reps. Cued for techniques.    Therapeutic Activity  Therapeutic Activity Performed: Yes  Therapeutic Activity 1: Pt tolerated seated EOB~20 minutes with CGA for balance. Pt completed LLE AAROM knee flexion 2x10 reps.    Bed Mobility  Bed Mobility: Yes  Bed Mobility 1  Bed Mobility 1: Supine to sitting  Level of Assistance 1: Moderate assistance  Bed Mobility Comments 1: HOB elevated  Bed Mobility 2  Bed Mobility  2: Sitting to supine  Level of Assistance 2: Maximum assistance    Transfers  Transfer: Yes  Transfer 1  Transfer From 1: Sit to  Transfer to 1: Stand  Technique 1: Sit to stand  Transfer Device 1: Walker  Transfer Level of Assistance 1: Maximum assistance, +2  Trials/Comments 1: HOB lsightlty elevated, cues for sequencing and hand placement  Transfers 2  Transfer From 2: Stand to  Transfer to 2: Sit  Technique 2: Stand to sit  Transfer Device 2: Walker  Transfer Level of Assistance 2: Maximum assistance, +2  Trials/Comments 2: cues for safe hand placement and controlled descend    Outcome Measures:  [facility] Basic Mobility  Turning from your back to your side while in a flat bed without using bedrails: A little  Moving from lying on your back to sitting on the side of a flat bed without using bedrails: A lot  Moving to and from bed to chair (including a wheelchair): Total  Standing up from a chair using your arms (e.g. wheelchair or bedside  chair): Total  To walk in hospital room: Total  Climbing 3-5 steps with railing: Total  Basic Mobility - Total Score: 9    Education Documentation  Precautions, taught by Pamela Aguero PT at 11/24/2023  1:12 PM.  Learner: Patient  Readiness: Acceptance  Method: Explanation, Demonstration  Response: Verbalizes Understanding, Needs Reinforcement    Mobility Training, taught by Pamela Aguero PT at 11/24/2023  1:12 PM.  Learner: Patient  Readiness: Acceptance  Method: Explanation, Demonstration  Response: Verbalizes Understanding, Needs Reinforcement    Education Comments  No comments found.        OP EDUCATION:       Encounter Problems       Encounter Problems (Active)       Mobility       Patient will [place] with ambulation 50 ft with RW (Progressing)       Start:  11/20/23    Expected End:  11/26/23            Patient will be SBA for 4 stairs with 1 HR and LRAD and 2 steps with LRAD (Progressing)       Start:  11/20/23    Expected End:  11/26/23               Pain - Adult          Transfers       Patient will be [place] with sit to stand and bed to chair transfers with LRAD (Progressing)       Start:  11/20/23    Expected End:  11/26/23            Patient will be [place] with bed mobility (Progressing)       Start:  11/20/23    Expected End:  11/26/23

## 2023-11-24 NOTE — CARE PLAN
The patient's goals for the shift include  Pt will rate pain less than 3 throughout shift     The clinical goals for the shift include Pt will remain HDS throughout shift      Problem: Pain - Adult  Goal: Verbalizes/displays adequate comfort level or baseline comfort level  Outcome: Progressing     Problem: Safety - Adult  Goal: Free from fall injury  Outcome: Progressing     Problem: Discharge Planning  Goal: Discharge to home or other facility with appropriate resources  Outcome: Progressing     Problem: Chronic Conditions and Co-morbidities  Goal: Patient's chronic conditions and co-morbidity symptoms are monitored and maintained or improved  Outcome: Progressing     Problem: Fall/Injury  Goal: Verbalize understanding of personal risk factors for fall in the hospital  Outcome: Progressing  Goal: Verbalize understanding of risk factor reduction measures to prevent injury from fall in the home  Outcome: Progressing  Goal: Use assistive devices by end of the shift  Outcome: Progressing  Goal: Pace activities to prevent fatigue by end of the shift  Outcome: Progressing     Problem: Cardiovascular - Adult  Goal: Maintains optimal cardiac output and hemodynamic stability  Outcome: Progressing  Goal: Absence of cardiac dysrhythmias or at baseline  Outcome: Progressing     Problem: Musculoskeletal - Adult  Goal: Return mobility to safest level of function  Outcome: Progressing  Goal: Maintain proper alignment of affected body part  Outcome: Progressing  Goal: Return ADL status to a safe level of function  Outcome: Progressing     Problem: Skin  Goal: Decreased wound size/increased tissue granulation at next dressing change  Outcome: Progressing  Goal: Participates in plan/prevention/treatment measures  Outcome: Progressing  Goal: Prevent/manage excess moisture  Outcome: Progressing  Goal: Prevent/minimize sheer/friction injuries  Outcome: Progressing  Goal: Promote/optimize nutrition  Outcome: Progressing  Goal: Promote  skin healing  Outcome: Progressing

## 2023-11-25 LAB
ALBUMIN SERPL BCP-MCNC: 2.4 G/DL (ref 3.4–5)
ALP SERPL-CCNC: 90 U/L (ref 33–136)
ALT SERPL W P-5'-P-CCNC: 41 U/L (ref 10–52)
ANION GAP SERPL CALC-SCNC: 14 MMOL/L (ref 10–20)
APTT PPP: 39 SECONDS (ref 27–38)
AST SERPL W P-5'-P-CCNC: 34 U/L (ref 9–39)
BILIRUB SERPL-MCNC: 1.2 MG/DL (ref 0–1.2)
BUN SERPL-MCNC: 25 MG/DL (ref 6–23)
CALCIUM SERPL-MCNC: 8.1 MG/DL (ref 8.6–10.6)
CHLORIDE SERPL-SCNC: 106 MMOL/L (ref 98–107)
CO2 SERPL-SCNC: 24 MMOL/L (ref 21–32)
CREAT SERPL-MCNC: 1.13 MG/DL (ref 0.5–1.3)
ERYTHROCYTE [DISTWIDTH] IN BLOOD BY AUTOMATED COUNT: 14.3 % (ref 11.5–14.5)
GFR SERPL CREATININE-BSD FRML MDRD: 68 ML/MIN/1.73M*2
GLUCOSE BLD MANUAL STRIP-MCNC: 114 MG/DL (ref 74–99)
GLUCOSE BLD MANUAL STRIP-MCNC: 120 MG/DL (ref 74–99)
GLUCOSE BLD MANUAL STRIP-MCNC: 142 MG/DL (ref 74–99)
GLUCOSE BLD MANUAL STRIP-MCNC: 191 MG/DL (ref 74–99)
GLUCOSE SERPL-MCNC: 104 MG/DL (ref 74–99)
HCT VFR BLD AUTO: 31.3 % (ref 41–52)
HGB BLD-MCNC: 10.2 G/DL (ref 13.5–17.5)
INR PPP: 1.8 (ref 0.9–1.1)
MAGNESIUM SERPL-MCNC: 1.99 MG/DL (ref 1.6–2.4)
MCH RBC QN AUTO: 31.1 PG (ref 26–34)
MCHC RBC AUTO-ENTMCNC: 32.6 G/DL (ref 32–36)
MCV RBC AUTO: 95 FL (ref 80–100)
NRBC BLD-RTO: 0.3 /100 WBCS (ref 0–0)
PLATELET # BLD AUTO: 374 X10*3/UL (ref 150–450)
POTASSIUM SERPL-SCNC: 3.7 MMOL/L (ref 3.5–5.3)
PROT SERPL-MCNC: 4.7 G/DL (ref 6.4–8.2)
PROTHROMBIN TIME: 20.7 SECONDS (ref 9.8–12.8)
RBC # BLD AUTO: 3.28 X10*6/UL (ref 4.5–5.9)
SODIUM SERPL-SCNC: 140 MMOL/L (ref 136–145)
WBC # BLD AUTO: 8.9 X10*3/UL (ref 4.4–11.3)

## 2023-11-25 PROCEDURE — 99232 SBSQ HOSP IP/OBS MODERATE 35: CPT | Performed by: INTERNAL MEDICINE

## 2023-11-25 PROCEDURE — 1100000001 HC PRIVATE ROOM DAILY

## 2023-11-25 PROCEDURE — 2500000004 HC RX 250 GENERAL PHARMACY W/ HCPCS (ALT 636 FOR OP/ED)

## 2023-11-25 PROCEDURE — 82947 ASSAY GLUCOSE BLOOD QUANT: CPT | Mod: 59

## 2023-11-25 PROCEDURE — 96372 THER/PROPH/DIAG INJ SC/IM: CPT

## 2023-11-25 PROCEDURE — 2500000001 HC RX 250 WO HCPCS SELF ADMINISTERED DRUGS (ALT 637 FOR MEDICARE OP): Performed by: SURGERY

## 2023-11-25 PROCEDURE — 36415 COLL VENOUS BLD VENIPUNCTURE: CPT

## 2023-11-25 PROCEDURE — 85610 PROTHROMBIN TIME: CPT

## 2023-11-25 PROCEDURE — 83735 ASSAY OF MAGNESIUM: CPT

## 2023-11-25 PROCEDURE — 85027 COMPLETE CBC AUTOMATED: CPT

## 2023-11-25 PROCEDURE — 80053 COMPREHEN METABOLIC PANEL: CPT

## 2023-11-25 PROCEDURE — 2500000001 HC RX 250 WO HCPCS SELF ADMINISTERED DRUGS (ALT 637 FOR MEDICARE OP): Performed by: STUDENT IN AN ORGANIZED HEALTH CARE EDUCATION/TRAINING PROGRAM

## 2023-11-25 PROCEDURE — 2500000002 HC RX 250 W HCPCS SELF ADMINISTERED DRUGS (ALT 637 FOR MEDICARE OP, ALT 636 FOR OP/ED): Performed by: SURGERY

## 2023-11-25 RX ORDER — WARFARIN SODIUM 5 MG/1
5 TABLET ORAL DAILY
Status: DISCONTINUED | OUTPATIENT
Start: 2023-11-25 | End: 2023-11-27 | Stop reason: HOSPADM

## 2023-11-25 RX ADMIN — AMLODIPINE BESYLATE 10 MG: 10 TABLET ORAL at 08:47

## 2023-11-25 RX ADMIN — ALLOPURINOL 100 MG: 100 TABLET ORAL at 08:48

## 2023-11-25 RX ADMIN — WARFARIN SODIUM 5 MG: 5 TABLET ORAL at 17:39

## 2023-11-25 RX ADMIN — CLOPIDOGREL BISULFATE 75 MG: 75 TABLET ORAL at 08:47

## 2023-11-25 RX ADMIN — EZETIMIBE 10 MG: 10 TABLET ORAL at 08:47

## 2023-11-25 RX ADMIN — ENOXAPARIN SODIUM 105 MG: 150 INJECTION SUBCUTANEOUS at 17:27

## 2023-11-25 RX ADMIN — ACETAMINOPHEN 650 MG: 325 TABLET ORAL at 01:37

## 2023-11-25 RX ADMIN — INSULIN LISPRO 1 UNITS: 100 INJECTION, SOLUTION INTRAVENOUS; SUBCUTANEOUS at 18:33

## 2023-11-25 RX ADMIN — ENOXAPARIN SODIUM 105 MG: 150 INJECTION SUBCUTANEOUS at 06:05

## 2023-11-25 RX ADMIN — CARVEDILOL 12.5 MG: 12.5 TABLET, FILM COATED ORAL at 17:27

## 2023-11-25 RX ADMIN — CARVEDILOL 12.5 MG: 12.5 TABLET, FILM COATED ORAL at 08:47

## 2023-11-25 RX ADMIN — PANTOPRAZOLE SODIUM 40 MG: 40 TABLET, DELAYED RELEASE ORAL at 06:05

## 2023-11-25 RX ADMIN — ATORVASTATIN CALCIUM 40 MG: 40 TABLET, FILM COATED ORAL at 20:44

## 2023-11-25 ASSESSMENT — PAIN - FUNCTIONAL ASSESSMENT: PAIN_FUNCTIONAL_ASSESSMENT: 0-10

## 2023-11-25 ASSESSMENT — PAIN SCALES - GENERAL
PAINLEVEL_OUTOF10: 0 - NO PAIN
PAINLEVEL_OUTOF10: 0 - NO PAIN

## 2023-11-25 NOTE — CARE PLAN
The patient's goals for the shift include      The clinical goals for the shift include Pt will remain HDS throughout shift    Problem: Pain - Adult  Goal: Verbalizes/displays adequate comfort level or baseline comfort level  Outcome: Progressing     Problem: Safety - Adult  Goal: Free from fall injury  Outcome: Progressing     Problem: Discharge Planning  Goal: Discharge to home or other facility with appropriate resources  Outcome: Progressing     Problem: Chronic Conditions and Co-morbidities  Goal: Patient's chronic conditions and co-morbidity symptoms are monitored and maintained or improved  Outcome: Progressing     Problem: Fall/Injury  Goal: Verbalize understanding of personal risk factors for fall in the hospital  Outcome: Progressing  Goal: Verbalize understanding of risk factor reduction measures to prevent injury from fall in the home  Outcome: Progressing  Goal: Use assistive devices by end of the shift  Outcome: Progressing  Goal: Pace activities to prevent fatigue by end of the shift  Outcome: Progressing     Problem: Cardiovascular - Adult  Goal: Maintains optimal cardiac output and hemodynamic stability  Outcome: Progressing  Goal: Absence of cardiac dysrhythmias or at baseline  Outcome: Progressing     Problem: Musculoskeletal - Adult  Goal: Return mobility to safest level of function  Outcome: Progressing  Goal: Maintain proper alignment of affected body part  Outcome: Progressing  Goal: Return ADL status to a safe level of function  Outcome: Progressing     Problem: Skin  Goal: Decreased wound size/increased tissue granulation at next dressing change  Outcome: Progressing  Goal: Participates in plan/prevention/treatment measures  Outcome: Progressing  Goal: Prevent/manage excess moisture  Outcome: Progressing  Goal: Prevent/minimize sheer/friction injuries  Outcome: Progressing  Goal: Promote/optimize nutrition  Outcome: Progressing  Goal: Promote skin healing  Outcome: Progressing

## 2023-11-25 NOTE — PROGRESS NOTES
VASCULAR SURGERY PROGRESS NOTE  Subjective   No acute overnight event. Patient was up and out of bed, and worked with physical therapy yesterday.      Objective   Vitals:  Heart Rate:  []   Temp:  [36.6 °C (97.9 °F)-37.1 °C (98.8 °F)]   Resp:  [17-19]   BP: (104-120)/(65-78)   Weight:  [111 kg (244 lb 14.9 oz)]   SpO2:  [96 %-98 %]     Exam:  Constitutional: No acute distress, resting comfortably in bed  Neuro:  AOx3, grossly intact  ENMT: moist mucous membranes  CV: no tachycardia  Pulm: non-labored on room air  GI: soft, non-tender, non-distended  Skin: warm and dry; moderate ecchymosis to LLE, LLE incisions with staples in place, CDI  Musculoskeletal: moving all extremities  Extremities: multiphasic left PT and DP doppler signals, compartments soft    Labs:  Results from last 7 days   Lab Units 11/24/23  0949 11/23/23  0718 11/22/23  0604   WBC AUTO x10*3/uL 8.3 7.9 8.1   HEMOGLOBIN g/dL 10.0* 10.1* 9.8*   PLATELETS AUTO x10*3/uL 362 335 277        Results from last 7 days   Lab Units 11/24/23  0949 11/23/23  0718 11/22/23  0604   SODIUM mmol/L 139 139 141   POTASSIUM mmol/L 3.9 3.8 3.9   CHLORIDE mmol/L 105 104 106   CO2 mmol/L 25 28 26   BUN mg/dL 21 22 25*   CREATININE mg/dL 1.16 1.13 1.22   GLUCOSE mg/dL 115* 114* 133*   MAGNESIUM mg/dL 1.87  --   --    PHOSPHORUS mg/dL 3.4 3.3 3.7        Results from last 7 days   Lab Units 11/24/23  0949 11/23/23  0718 11/22/23  0604   INR  1.9* 1.5* 1.2*   PROTIME seconds 21.5* 16.4* 13.4*   APTT seconds 34 30 43*       Assessment/Plan   Mukesh Garg is 73 y.o. male with history of with history of CAD s/p CABG x3, recurrent DVT (most recent 10/24/2023), Afib (on Eliquis), recent COVID (10/2023) who presented with LLE subacute on chronic limb ischemia and PE s/p left iliofemoral, femoropopliteal and tibioperoneal thrombectomy (11/14). RHC without evidence of PFO    Plan:  -continue plavix 75mg daily  -anticoagulation recs per vascular medicine  -pain control per  primary team  - patient is okay for discharge from vascular surgery standpoint, please discharge with homegoing recs from vascular medicine  - follow up with vascular surgery after outpatient ALLISON December 21st     Moises Burroughs PGY1  General Surgery  [phone]

## 2023-11-25 NOTE — PROGRESS NOTES
"Mukesh Garg is a 73 y.o. male on day 10 of admission presenting with Acute lower limb ischemia.    Subjective   -No acute event overnight  -I saw the patient today in the [facility], and he was sleeping on his nasal CPAP and had no complaint  - Had BM last night  - Patient has no chest pain or SOB  - LLE pain and swelling continue to improve and today his pain score was 1/10  - Had no urine retention today    Objective     Physical Exam  Constitutional:       Appearance: Normal appearance. He is obese.   HENT:      Head: Normocephalic and atraumatic.      Nose: Nose normal.   Eyes:      Extraocular Movements: Extraocular movements intact.      Conjunctiva/sclera: Conjunctivae normal.      Pupils: Pupils are equal, round, and reactive to light.   Cardiovascular:      Rate and Rhythm: Normal rate. Rhythm irregular.   Pulmonary:      Effort: Pulmonary effort is normal.      Breath sounds: Normal breath sounds.   Abdominal:      Palpations: Abdomen is soft.   Musculoskeletal:         General: Normal range of motion.      Cervical back: Normal range of motion and neck supple.   Skin:     General: Skin is warm and dry.      Capillary Refill: Capillary refill takes less than 2 seconds.   Neurological:      General: No focal deficit present.      Mental Status: He is alert and oriented to person, place, and time.   Psychiatric:         Mood and Affect: Mood normal.         Behavior: Behavior normal.             Last Recorded Vitals  Blood pressure 104/69, pulse 85, temperature 36.6 °C (97.9 °F), resp. rate 18, height 1.753 m (5' 9\"), weight 111 kg (244 lb 14.9 oz), SpO2 96 %.  Intake/Output last 3 Shifts:  I/O last 3 completed shifts:  In: 20 (0.2 mL/kg) [I.V.:20 (0.2 mL/kg)]  Out: 1825 (16.4 mL/kg) [Urine:1825 (0.5 mL/kg/hr)]  Weight: 111.1 kg     Relevant Results              Results for orders placed or performed during the hospital encounter of 11/15/23 (from the past 24 hour(s))   POCT GLUCOSE   Result Value Ref Range    " POCT Glucose 149 (H) 74 - 99 mg/dL   POCT GLUCOSE   Result Value Ref Range    POCT Glucose 156 (H) 74 - 99 mg/dL   POCT GLUCOSE   Result Value Ref Range    POCT Glucose 113 (H) 74 - 99 mg/dL   POCT GLUCOSE   Result Value Ref Range    POCT Glucose 120 (H) 74 - 99 mg/dL   CBC   Result Value Ref Range    WBC 8.9 4.4 - 11.3 x10*3/uL    nRBC 0.3 (H) 0.0 - 0.0 /100 WBCs    RBC 3.28 (L) 4.50 - 5.90 x10*6/uL    Hemoglobin 10.2 (L) 13.5 - 17.5 g/dL    Hematocrit 31.3 (L) 41.0 - 52.0 %    MCV 95 80 - 100 fL    MCH 31.1 26.0 - 34.0 pg    MCHC 32.6 32.0 - 36.0 g/dL    RDW 14.3 11.5 - 14.5 %    Platelets 374 150 - 450 x10*3/uL   Comprehensive metabolic panel   Result Value Ref Range    Glucose 104 (H) 74 - 99 mg/dL    Sodium 140 136 - 145 mmol/L    Potassium 3.7 3.5 - 5.3 mmol/L    Chloride 106 98 - 107 mmol/L    Bicarbonate 24 21 - 32 mmol/L    Anion Gap 14 10 - 20 mmol/L    Urea Nitrogen 25 (H) 6 - 23 mg/dL    Creatinine 1.13 0.50 - 1.30 mg/dL    eGFR 68 >60 mL/min/1.73m*2    Calcium 8.1 (L) 8.6 - 10.6 mg/dL    Albumin 2.4 (L) 3.4 - 5.0 g/dL    Alkaline Phosphatase 90 33 - 136 U/L    Total Protein 4.7 (L) 6.4 - 8.2 g/dL    AST 34 9 - 39 U/L    Bilirubin, Total 1.2 0.0 - 1.2 mg/dL    ALT 41 10 - 52 U/L   Magnesium   Result Value Ref Range    Magnesium 1.99 1.60 - 2.40 mg/dL      TRANSTHORACIC ECHOCARDIOGRAM REPORT 11/15/2023        1. Left ventricular systolic function is normal with a 65-70% EF.   2. The patient is in atrial fibrillation which may influence the estimate of left ventricular function and transvalvular flows.   3. The RV appears upper limits of normal size vs mildly dilated with moderate to severely reduced fx with a McConnels sign consistent with pulmonary embolus.   4. Color Doppler flow in the region of the atrial septum suggestive of possible small left to right shunt.    5 .Mild AR   6. Compared with the images from the prior TTE done 10/25/2023 there was already RV systolic dysfunction with possible  McConnels sign at that time         Assessment/Plan   Principal Problem:    Acute lower limb ischemia  Active Problems:    Multiple subsegmental pulmonary emboli without acute cor pulmonale (CMS/HCC)    Septal defect, heart    Mukesh Garg is a 73 y.o. male with PMHx of Afib, HTN, JENNY (on CPAP at home), gout, CABG x3 vessels (2013), recurrent DVTs  (held eliq) and peripheral neuropathy. Presented with Acute left LE limb ischemia 2/2 popliteal artery occlusion and when worked up found to have bilateral PE on CT. Currently is being managed for acute L LE ischemia and PE on heparin drip.  S/P thrombectomy in the LLE (11/17/23). S/P RHC on 11/21 because of PFO that could be contributing to PE ari. being afib which was completely unremarkable.          #Occlusion of L-popliteal artery  #PAD  ##CAD  ## L to R shunt  - On inial physical exam, patient's left leg showed pallor, increasingly shiny, and was severely tender, however now his pain decreased from presentation. This with the CT angio which showed extensive occlusion of the left common iliac, femoral and popliteal artery. Along with the LE blood flow was reconstituted to the tibialis posterior, anterior and peroneal arteries, are indicative of atherosclerotic build up and points to severe LE ischemia. For this, left popliteal embolectomy was recommended to reestablish blood flow. He was taken to the OR on   OR on 11/17 for L fem cutdown w/ ileofem thromboembolectomy, L pop cutdown w/ fempop  thromboembolectomy and L TP trunk cutdown w/ thromboembolectomy.     -He has a Hx of CABG in 2013 Coronary artery bypass grafting x 3 , partial left sided MAZE,   (LIMA to a large Diag, a free ALEX y'd off the LIMA to the LAD , SVG-PDA)  - Hx of PCI 2006 (emergent for STEMI): DESx1 to prox diag, DESx2 to mid and distal LAD     - On TTE (11/17/23) Color Doppler flow in the region of the atrial septum suggestive of possible small left to right shunt. Because of that the patient  had RHC on11/21 with no evidence of intracardiac shunting   - Currently the patient LLE pain and swelling improved significantly compared to inial presentation    - carotid Doppler arterial ultra sound, done on 11/19 and was unremarkable  - Lupus antibody came back positive, might explain his hypercoagulable status, however he was scheduled for follow up with [facility] clinic in 3 months   - RHC showed no evidence of intracardiac shunting and normal filling pressures with preserved CO/CI    - Today INR result didn't came back yet      Plan:    - Started on warfarin 5 mg on 11/22 while bridging on Lovenox until we get the therapeutic INR (2-3), on 11/23  warfarin dose decreased to 3 mg while bridging on Lovenox   - He will be discharged to rehab once his INR become in therapeutic level   - Cont. Polyethylene glycol and sennosides-docusate sodium PRN and for constipation   - Stop heparin gtt (HI)   - continue home medication Ezetimibe 10 mg   - continue Plavix  - Follow up in 3 months in clinic with [facility]  - follow up with vascular surgery after outpatient ALLISON December 21st                                               ## PE   ##Hx of DVT    -Hx of provoked DVT on 10/24/2023 and also 20 years ago in the right leg after long flight  - He has bilateral pulmonary emboli with moderate clot burden in the distal right main pulmonary artery extending into the right middle and right lower lobes and small clot burden in the left lower lobe pulmonary with a suggestive right ventricular stain.   -Has been taking Apixaban since 10/24/2023 and been complaint but now stopped and is on heparin gtt  -Vitally stable on admission without increased O2 requirement.  -There is no clear etiology for hypercoaguable status but might be related to   Covid infection that he had recently on 10/24/2023, Lab also showed + lupus     - patient had been on warfarin for many years for atrial fibrillation. INR was subtherapeutic during last  admission (10/24/2023) as patient had not been taking his medications due to symptomatic COVID infection. He was initially being bridged back to warfarin, but decision was made to switch to apixaban on discharge. He was fully adherent to apixaban since then. In addition, CTA chest had not been performed during last admission, so the chronicity of PE is not clear   - B2 glycoprotein ab and cardiolipin ab labs all were WNL  - Lupus antibody came back positive, might explain his hypercoagulable status  - RHC showed no evidence of intracardiac shunting and normal filling pressures with preserved CO/CI    - patient is okay to be discharged from [facility]-surg team  - INR today is 1.9      Plan:     - Started on warfarin 5 mg on 11/22 while bridging on Lovenox until we get the therapeutic INR (2-3), on 11/23  warfarin dose decreased to 3 mg while bridging on Lovenox   - He will be discharged to acute rehab once his INR become in therapeutic level (2-3)  - contacted [facility] meds about positive Lupus antibody and they want to see the patient after 3 months to repeat the labs       #Atrial fibrillation  ##Hypertension  #DLD  - Patient has extensive previous hx of afib, on admition he had irregular rhythm and the EKG showed no P waves  - patient denied any chest pain dyspnea and with negative trops, thus more inline with afib with rvr    Plan:     - Started on warfarin 5 mg on 11/22 while bridging on Lovenox until we get the therapeutic INR (2-3), on 11/23  warfarin dose decreased to 3 mg while bridging on Lovenox   -Continue home Carvedilol 12.5 BID   -Continue home Amlodipine 10 mg   -Continue home chlorthalidone 25 mg    -Hold home Lisinopril 40 mg   - continue home medication atorvastatin 40 mg          ##Loose teethe  ## Minimal bleeding   - OMFS consulted for tooth extractions as the patient has a probable loose tooth. They recommended to  extraction in the outpatient setting with a dentist as they think it has no risk of  aspiration.     [facility]   - F/U with general dentist for extraction of #14 in the outpatient settings     - On 11/18 morning the patient was having his breakfast and has a tooth crown that fell off, He was seen by Dentist today and his crown was fixed in #29 tooth and was recommended to follow up in the outpatient clinic.      ##Gout  Continue home allopurinol 100 mg       ##UTI  ## Urine retention:   -I saw the patient on 11/ 20 in the [facility], and he was sleeping on his nasal CPAP and complained of abdominal pain, and mentioned to me that he hasn't urinated since yesterday morning.   - on PE  11/20 his lower abdomin was tender and has urinary bladder was distended and straight cath was done and withdrawn 675 ml around 8 am , and then later on he urinated 75ml and around 4 pm bladder scan showed 547 cc, followed by straight cath which withdrawn 525cc.    - Probably his urine retention related to opoid that he has been receiving for pain control.   - On 11/21 patient symptoms of urine retention improved and now he is able to urinate   - On 11/23 the patient started to have some difficulty urinating and bladder scan inially showed 280 ml , however he was also having tachycardia (114 bpm) and his temp was 38.3 (was attributed to the head cover/eye cover that he usually sleep with as his temp was repeated on hour later and was 37.1...... but his symptoms in conjunction with tachycardia promoted us for UTI workup, his UA  came back and was unremarkable and blood culture didn't grow anything so far (preliminary result)     - on 11/24 bladder scan was done and was 180ml urine, will consider repeating if his symptoms worsened or wasn't able to urinate.      - Today he had no difficulty urinating and was urinating while I was at the bedside.     Plan:     - Will follow up the urine output daily and closely monitor his symptom   - Do bladder scan if he started to have some degree of retention and if ut above 300 ml will do  straight cath  - PSA ordered today            ##DM-2   ## High fasting Glucose   -HgbA1c was 6.7 4 months ago  - Patient has been constantly having butch reading in the morning  - HgbA1c on 11/21 is 6.2% indicating prediabetes , however his FPG  has been >126 but also he has been under stress which could be a contributing factor.     Plan:  - cont. SSI   - cont. To follow up POCT values and plan accordingly       # Onychomycosis   Plan:  - referral to podiatrist in the outpatient settings           F: Replete PRN  E: Replete PRN  N:  cardiac diet   DVT Ppx: On Heparin sc  GI Ppx: pantoprazole po  Access/Lines: PIV  Abx: none           Code status: Full Code  Emergency contact: Sweetie Garg (Spouse) # [phone]                  Varinder Boogie MD    Anesthesiology PGY 1  [facility] team pager# [phone]

## 2023-11-26 LAB
ALBUMIN SERPL BCP-MCNC: 2.9 G/DL (ref 3.4–5)
ALP SERPL-CCNC: 111 U/L (ref 33–136)
ALT SERPL W P-5'-P-CCNC: 52 U/L (ref 10–52)
ANION GAP SERPL CALC-SCNC: 17 MMOL/L (ref 10–20)
APTT PPP: 40 SECONDS (ref 27–38)
AST SERPL W P-5'-P-CCNC: 37 U/L (ref 9–39)
BILIRUB SERPL-MCNC: 1.4 MG/DL (ref 0–1.2)
BUN SERPL-MCNC: 23 MG/DL (ref 6–23)
CALCIUM SERPL-MCNC: 8.4 MG/DL (ref 8.6–10.6)
CHLORIDE SERPL-SCNC: 105 MMOL/L (ref 98–107)
CO2 SERPL-SCNC: 22 MMOL/L (ref 21–32)
CREAT SERPL-MCNC: 1.17 MG/DL (ref 0.5–1.3)
ERYTHROCYTE [DISTWIDTH] IN BLOOD BY AUTOMATED COUNT: 14.2 % (ref 11.5–14.5)
GFR SERPL CREATININE-BSD FRML MDRD: 65 ML/MIN/1.73M*2
GLUCOSE BLD MANUAL STRIP-MCNC: 122 MG/DL (ref 74–99)
GLUCOSE BLD MANUAL STRIP-MCNC: 123 MG/DL (ref 74–99)
GLUCOSE BLD MANUAL STRIP-MCNC: 135 MG/DL (ref 74–99)
GLUCOSE BLD MANUAL STRIP-MCNC: 135 MG/DL (ref 74–99)
GLUCOSE BLD MANUAL STRIP-MCNC: 207 MG/DL (ref 74–99)
GLUCOSE SERPL-MCNC: 136 MG/DL (ref 74–99)
HCT VFR BLD AUTO: 36.1 % (ref 41–52)
HGB BLD-MCNC: 11.4 G/DL (ref 13.5–17.5)
INR PPP: 2.3 (ref 0.9–1.1)
MAGNESIUM SERPL-MCNC: 2.02 MG/DL (ref 1.6–2.4)
MCH RBC QN AUTO: 29.8 PG (ref 26–34)
MCHC RBC AUTO-ENTMCNC: 31.6 G/DL (ref 32–36)
MCV RBC AUTO: 94 FL (ref 80–100)
NRBC BLD-RTO: 0.5 /100 WBCS (ref 0–0)
PLATELET # BLD AUTO: 457 X10*3/UL (ref 150–450)
POTASSIUM SERPL-SCNC: 3.7 MMOL/L (ref 3.5–5.3)
PROT SERPL-MCNC: 5.6 G/DL (ref 6.4–8.2)
PROTHROMBIN TIME: 26 SECONDS (ref 9.8–12.8)
RBC # BLD AUTO: 3.83 X10*6/UL (ref 4.5–5.9)
SODIUM SERPL-SCNC: 140 MMOL/L (ref 136–145)
WBC # BLD AUTO: 9.4 X10*3/UL (ref 4.4–11.3)

## 2023-11-26 PROCEDURE — 85610 PROTHROMBIN TIME: CPT

## 2023-11-26 PROCEDURE — 99232 SBSQ HOSP IP/OBS MODERATE 35: CPT

## 2023-11-26 PROCEDURE — 36415 COLL VENOUS BLD VENIPUNCTURE: CPT

## 2023-11-26 PROCEDURE — 2500000001 HC RX 250 WO HCPCS SELF ADMINISTERED DRUGS (ALT 637 FOR MEDICARE OP): Performed by: SURGERY

## 2023-11-26 PROCEDURE — 82947 ASSAY GLUCOSE BLOOD QUANT: CPT | Mod: 91

## 2023-11-26 PROCEDURE — 2500000004 HC RX 250 GENERAL PHARMACY W/ HCPCS (ALT 636 FOR OP/ED)

## 2023-11-26 PROCEDURE — 2500000002 HC RX 250 W HCPCS SELF ADMINISTERED DRUGS (ALT 637 FOR MEDICARE OP, ALT 636 FOR OP/ED): Performed by: SURGERY

## 2023-11-26 PROCEDURE — 80053 COMPREHEN METABOLIC PANEL: CPT

## 2023-11-26 PROCEDURE — 2500000001 HC RX 250 WO HCPCS SELF ADMINISTERED DRUGS (ALT 637 FOR MEDICARE OP): Performed by: STUDENT IN AN ORGANIZED HEALTH CARE EDUCATION/TRAINING PROGRAM

## 2023-11-26 PROCEDURE — 96372 THER/PROPH/DIAG INJ SC/IM: CPT

## 2023-11-26 PROCEDURE — 83735 ASSAY OF MAGNESIUM: CPT

## 2023-11-26 PROCEDURE — 85027 COMPLETE CBC AUTOMATED: CPT

## 2023-11-26 PROCEDURE — 1100000001 HC PRIVATE ROOM DAILY

## 2023-11-26 RX ADMIN — CARVEDILOL 12.5 MG: 12.5 TABLET, FILM COATED ORAL at 08:44

## 2023-11-26 RX ADMIN — AMLODIPINE BESYLATE 10 MG: 10 TABLET ORAL at 08:43

## 2023-11-26 RX ADMIN — WARFARIN SODIUM 5 MG: 5 TABLET ORAL at 17:47

## 2023-11-26 RX ADMIN — ACETAMINOPHEN 650 MG: 325 TABLET ORAL at 23:18

## 2023-11-26 RX ADMIN — ACETAMINOPHEN 650 MG: 325 TABLET ORAL at 01:05

## 2023-11-26 RX ADMIN — CLOPIDOGREL BISULFATE 75 MG: 75 TABLET ORAL at 08:43

## 2023-11-26 RX ADMIN — ATORVASTATIN CALCIUM 40 MG: 40 TABLET, FILM COATED ORAL at 21:00

## 2023-11-26 RX ADMIN — ENOXAPARIN SODIUM 105 MG: 150 INJECTION SUBCUTANEOUS at 17:47

## 2023-11-26 RX ADMIN — CARVEDILOL 12.5 MG: 12.5 TABLET, FILM COATED ORAL at 17:47

## 2023-11-26 RX ADMIN — PANTOPRAZOLE SODIUM 40 MG: 40 TABLET, DELAYED RELEASE ORAL at 08:44

## 2023-11-26 RX ADMIN — EZETIMIBE 10 MG: 10 TABLET ORAL at 08:44

## 2023-11-26 RX ADMIN — ALLOPURINOL 100 MG: 100 TABLET ORAL at 08:44

## 2023-11-26 RX ADMIN — ENOXAPARIN SODIUM 105 MG: 150 INJECTION SUBCUTANEOUS at 06:53

## 2023-11-26 ASSESSMENT — COGNITIVE AND FUNCTIONAL STATUS - GENERAL
HELP NEEDED FOR BATHING: A LITTLE
WALKING IN HOSPITAL ROOM: A LOT
STANDING UP FROM CHAIR USING ARMS: A LOT
MOBILITY SCORE: 14
MOVING FROM LYING ON BACK TO SITTING ON SIDE OF FLAT BED WITH BEDRAILS: A LITTLE
DAILY ACTIVITIY SCORE: 19
DRESSING REGULAR LOWER BODY CLOTHING: A LITTLE
MOVING TO AND FROM BED TO CHAIR: A LOT
TURNING FROM BACK TO SIDE WHILE IN FLAT BAD: A LITTLE
TOILETING: A LITTLE
CLIMB 3 TO 5 STEPS WITH RAILING: A LOT
PERSONAL GROOMING: A LITTLE
DRESSING REGULAR UPPER BODY CLOTHING: A LITTLE

## 2023-11-26 ASSESSMENT — PAIN SCALES - GENERAL
PAINLEVEL_OUTOF10: 0 - NO PAIN
PAINLEVEL_OUTOF10: 5 - MODERATE PAIN

## 2023-11-26 ASSESSMENT — PAIN - FUNCTIONAL ASSESSMENT: PAIN_FUNCTIONAL_ASSESSMENT: 0-10

## 2023-11-26 NOTE — HOSPITAL COURSE
Mukesh Garg is a 73 y.o. male with PMHx of Afib, HTN, JENNY (on CPAP at home), gout, CABG x3 vessels (2013), recurrent DVTs  (was on eliq) and peripheral neuropathy.  He was found to have severe PAD and was also found to have b/l pulmonary emboli (despite being compliant with apixaban at that time). Furthermore,  CTA for LLE on 11/14 demonstrated intraluminal filling defect involving the popliteal artery, which eventually caused popliteal artery occlusion, causing limb ischemia for which he was started on heparin gtt.     Echo finding revealed a possible small L to R shunt on color doppler flow. Bubble study was technically difficult which may have demonstrated some bubble within the LV and thus, a RHC was done which showed no R-L shunt and was unremarkable. Labs were ordered for B2 glycoprotein ab and cardiolipin ab all were WNL, however,  lupus antibody came back positive. Patient then underwent a left iliofemoral, femoropopliteal, tibioperoneal trunk thromboembolectomy, and four compartment fasciotomy.    Heparin gtt was stopped and lovenox bridged with coumadin until INR became therapeutic (2-3) today is 3 . Today he was complaining of rt big toe pain concerning for gout (had few episodes in the past in the same foot) and because of that we started Colchicine.  He will be discharged to acute rehab. He will has follow up in 3 months in clinic with [facility] and with vascular surgery after outpatient ALLISON test on December 21s

## 2023-11-26 NOTE — PROGRESS NOTES
Mukesh Garg is a 73 y.o. male on day 11 of admission presenting with Acute lower limb ischemia.    Precert remains pending [facility] on 11/25 + 11/26.  Will monitor for determination.    Rosalie Delong RN TCC weekend  [facility] messenger

## 2023-11-26 NOTE — PROGRESS NOTES
"Mukesh Garg is a 73 y.o. male on day 11 of admission presenting with Acute lower limb ischemia.    Subjective   -No acute event overnight  -[facility] and he was sleeping on his nasal CPAP and had no complaint  - Had BM 2 days ago   - Patient has no chest pain or SOB  - LLE pain and swelling continue to improve and today his pain score was  0-1 /10  - Had no urine retention today    Objective     Physical Exam  Constitutional:       Appearance: Normal appearance. He is obese.   HENT:      Head: Normocephalic and atraumatic.      Nose: Nose normal.   Eyes:      Extraocular Movements: Extraocular movements intact.      Conjunctiva/sclera: Conjunctivae normal.      Pupils: Pupils are equal, round, and reactive to light.   Cardiovascular:      Rate and Rhythm: Normal rate. Rhythm irregular.   Pulmonary:      Effort: Pulmonary effort is normal.      Breath sounds: Normal breath sounds.   Abdominal:      Palpations: Abdomen is soft.   Musculoskeletal:         General: Normal range of motion.      Cervical back: Normal range of motion and neck supple.   Skin:     General: Skin is warm and dry.      Capillary Refill: Capillary refill takes less than 2 seconds.   Neurological:      General: No focal deficit present.      Mental Status: He is alert and oriented to person, place, and time.   Psychiatric:         Mood and Affect: Mood normal.         Behavior: Behavior normal.             Last Recorded Vitals  Blood pressure 104/74, pulse 85, temperature 36.8 °C (98.2 °F), resp. rate 18, height 1.753 m (5' 9\"), weight 111 kg (244 lb 14.9 oz), SpO2 94 %.  Intake/Output last 3 Shifts:  I/O last 3 completed shifts:  In: - (0 mL/kg)   Out: 2325 (20.9 mL/kg) [Urine:2325 (0.6 mL/kg/hr)]  Weight: 111.1 kg     Relevant Results              Results for orders placed or performed during the hospital encounter of 11/15/23 (from the past 24 hour(s))   POCT GLUCOSE   Result Value Ref Range    POCT Glucose 114 " (H) 74 - 99 mg/dL   Coagulation Screen   Result Value Ref Range    Protime 20.7 (H) 9.8 - 12.8 seconds    INR 1.8 (H) 0.9 - 1.1    aPTT 39 (H) 27 - 38 seconds   POCT GLUCOSE   Result Value Ref Range    POCT Glucose 191 (H) 74 - 99 mg/dL   POCT GLUCOSE   Result Value Ref Range    POCT Glucose 142 (H) 74 - 99 mg/dL   POCT GLUCOSE   Result Value Ref Range    POCT Glucose 123 (H) 74 - 99 mg/dL      TRANSTHORACIC ECHOCARDIOGRAM REPORT 11/15/2023        1. Left ventricular systolic function is normal with a 65-70% EF.   2. The patient is in atrial fibrillation which may influence the estimate of left ventricular function and transvalvular flows.   3. The RV appears upper limits of normal size vs mildly dilated with moderate to severely reduced fx with a McConnels sign consistent with pulmonary embolus.   4. Color Doppler flow in the region of the atrial septum suggestive of possible small left to right shunt.    5 .Mild AR   6. Compared with the images from the prior TTE done 10/25/2023 there was already RV systolic dysfunction with possible McConnels sign at that time         Assessment/Plan   Principal Problem:    Acute lower limb ischemia  Active Problems:    Multiple subsegmental pulmonary emboli without acute cor pulmonale (CMS/HCC)    Septal defect, heart    Mukesh Garg is a 73 y.o. male with PMHx of Afib, HTN, JENNY (on CPAP at home), gout, CABG x3 vessels (2013), recurrent DVTs  (held eliq) and peripheral neuropathy. Presented with Acute left LE limb ischemia 2/2 popliteal artery occlusion and when worked up found to have bilateral PE on CT. Currently is being managed for acute L LE ischemia and PE on heparin drip.  S/P thrombectomy in the LLE (11/17/23). S/P RHC on 11/21 because of PFO that could be contributing to PE ari. being afib which was completely unremarkable.          #Occlusion of L-popliteal artery  #PAD  ##CAD  ## L to R shunt  - On inial physical exam, patient's left leg showed pallor, increasingly  shiny, and was severely tender, however now his pain decreased from presentation. This with the CT angio which showed extensive occlusion of the left common iliac, femoral and popliteal artery. Along with the LE blood flow was reconstituted to the tibialis posterior, anterior and peroneal arteries, are indicative of atherosclerotic build up and points to severe LE ischemia. For this, left popliteal embolectomy was recommended to reestablish blood flow. He was taken to the OR on   OR on 11/17 for L fem cutdown w/ ileofem thromboembolectomy, L pop cutdown w/ fempop  thromboembolectomy and L TP trunk cutdown w/ thromboembolectomy.     -He has a Hx of CABG in 2013 Coronary artery bypass grafting x 3 , partial left sided MAZE,   (LIMA to a large Diag, a free ALEX y'd off the LIMA to the LAD , SVG-PDA)  - Hx of PCI 2006 (emergent for STEMI): DESx1 to prox diag, DESx2 to mid and distal LAD     - On TTE (11/17/23) Color Doppler flow in the region of the atrial septum suggestive of possible small left to right shunt. Because of that the patient had RHC on11/21 with no evidence of intracardiac shunting   - Currently the patient LLE pain and swelling improved significantly compared to inial presentation    - carotid Doppler arterial ultra sound, done on 11/19 and was unremarkable  - Lupus antibody came back positive, might explain his hypercoagulable status, however he was scheduled for follow up with [facility] clinic in 3 months   - RHC showed no evidence of intracardiac shunting and normal filling pressures with preserved CO/CI    - Started on warfarin 5 mg on 11/22 while bridging on Lovenox until we get the therapeutic INR (2-3), on 11/23  warfarin dose decreased to 3 mg and on 11/25 we increased it back to 5 mg because  INR decreased to 1.8 on 11/25  (from 1.9)  - Today INR result didn't came back yet      Plan:    - Cont. Warfarin 5 mg while bridging on Lovenox until we get the therapeutic INR (2-3)    - He will be  discharged to acute rehab pending approval    - Cont. Polyethylene glycol and sennosides-docusate sodium PRN and for constipation   - Stop heparin gtt (HI)   - continue home medication Ezetimibe 10 mg   - continue Plavix  - Follow up in 3 months in clinic with [facility]  - follow up with vascular surgery after outpatient [facility] December 21st                                               ## PE   ##Hx of DVT    -Hx of provoked DVT on 10/24/2023 and also 20 years ago in the right leg after long flight  - He has bilateral pulmonary emboli with moderate clot burden in the distal right main pulmonary artery extending into the right middle and right lower lobes and small clot burden in the left lower lobe pulmonary with a suggestive right ventricular stain.   -Has been taking Apixaban since 10/24/2023 and been complaint but now stopped and is on heparin gtt  -Vitally stable on admission without increased O2 requirement.  -There is no clear etiology for hypercoaguable status but might be related to   Covid infection that he had recently on 10/24/2023, Lab also showed + lupus     - patient had been on warfarin for many years for atrial fibrillation. INR was subtherapeutic during last admission (10/24/2023) as patient had not been taking his medications due to symptomatic COVID infection. He was initially being bridged back to warfarin, but decision was made to switch to apixaban on discharge. He was fully adherent to apixaban since then. In addition, CTA chest had not been performed during last admission, so the chronicity of PE is not clear   - B2 glycoprotein ab and cardiolipin ab labs all were WNL  - Lupus antibody came back positive, might explain his hypercoagulable status  - RHC showed no evidence of intracardiac shunting and normal filling pressures with preserved CO/CI    - patient is okay to be discharged from [facility]-surg team  - INR today is 1.9      Plan:     - Cont. Warfarin 5 mg while bridging on Lovenox until we get  the therapeutic INR (2-3)  - He will be discharged to acute rehab pending approval  - contacted [facility] meds about positive Lupus antibody and they want to see the patient after 3 months to repeat the labs       #Atrial fibrillation  ##Hypertension  #DLD  - Patient has extensive previous hx of afib, on admition he had irregular rhythm and the EKG showed no P waves  - patient denied any chest pain dyspnea and with negative trops, thus more inline with afib with rvr    Plan:     - Cont. Warfarin 5 mg while bridging on Lovenox until we get the therapeutic INR (2-3)  -Continue home Carvedilol 12.5 BID   -Continue home Amlodipine 10 mg   -Continue home chlorthalidone 25 mg    -Hold home Lisinopril 40 mg   - continue home medication atorvastatin 40 mg          ##Loose teethe  ## Minimal bleeding   - OMFS consulted for tooth extractions as the patient has a probable loose tooth. They recommended to  extraction in the outpatient setting with a dentist as they think it has no risk of aspiration.     OMFS Recs:   - F/U with general dentist for extraction of #14 in the outpatient settings     - On 11/18 morning the patient was having his breakfast and has a tooth crown that fell off, He was seen by Dentist today and his crown was fixed in #29 tooth and was recommended to follow up in the outpatient clinic.      ##Gout  Continue home allopurinol 100 mg       ##UTI  ## Urine retention:   -I saw the patient on 11/ 20 in the [facility], and he was sleeping on his nasal CPAP and complained of abdominal pain, and mentioned to me that he hasn't urinated since yesterday morning.   - on PE  11/20 his lower abdomin was tender and has urinary bladder was distended and straight cath was done and withdrawn 675 ml around 8 am , and then later on he urinated 75ml and around 4 pm bladder scan showed 547 cc, followed by straight cath which withdrawn 525cc.    - Probably his urine retention related to opoid that he has been receiving for pain control.    - On 11/21 patient symptoms of urine retention improved and now he is able to urinate   - On 11/23 the patient started to have some difficulty urinating and bladder scan inially showed 280 ml , however he was also having tachycardia (114 bpm) and his temp was 38.3 (was attributed to the head cover/eye cover that he usually sleep with as his temp was repeated on hour later and was 37.1...... but his symptoms in conjunction with tachycardia promoted us for UTI workup, his UA  came back and was unremarkable and blood culture didn't grow anything so far (preliminary result)     - on 11/24 bladder scan was done and was 180ml urine, will consider repeating if his symptoms worsened or wasn't able to urinate.      - Today he had no difficulty urinating.     Plan:     - Will follow up the urine output daily and closely monitor his symptom   - Do bladder scan if he started to have some degree of retention and if ut above 300 ml will do straight cath  - PSA ordered will follow up result            ##DM-2   ## High fasting Glucose   -HgbA1c was 6.7 4 months ago  - Patient has been constantly having butch reading in the morning  - HgbA1c on 11/21 is 6.2% indicating prediabetes , however his FPG  has been >126 but also he has been under stress which could be a contributing factor.     Plan:  - cont. SSI   - cont. To follow up POCT values and plan accordingly       # Onychomycosis   Plan:  - referral to podiatrist in the outpatient settings           F: Replete PRN  E: Replete PRN  N:  cardiac diet   DVT Ppx: On Heparin sc  GI Ppx: pantoprazole po  Access/Lines: PIV  Abx: none           Code status: Full Code  Emergency contact: Sweetie Garg (Spouse) # [phone]                  Varinder Boogie MD    Anesthesiology PGY 1  [place] team pager# [phone]

## 2023-11-27 ENCOUNTER — TELEPHONE (OUTPATIENT)
Dept: ORTHOPEDIC SURGERY | Facility: CLINIC | Age: 73
End: 2023-11-27
Payer: COMMERCIAL

## 2023-11-27 ENCOUNTER — HOSPITAL ENCOUNTER (INPATIENT)
Facility: HOSPITAL | Age: 73
LOS: 12 days | Discharge: HOME | DRG: 949 | End: 2023-12-09
Attending: INTERNAL MEDICINE | Admitting: INTERNAL MEDICINE
Payer: COMMERCIAL

## 2023-11-27 VITALS
HEIGHT: 69 IN | SYSTOLIC BLOOD PRESSURE: 115 MMHG | TEMPERATURE: 98.1 F | OXYGEN SATURATION: 98 % | DIASTOLIC BLOOD PRESSURE: 71 MMHG | BODY MASS INDEX: 35.27 KG/M2 | WEIGHT: 238.1 LBS | RESPIRATION RATE: 19 BRPM | HEART RATE: 94 BPM

## 2023-11-27 DIAGNOSIS — R53.81 DEBILITY: ICD-10-CM

## 2023-11-27 DIAGNOSIS — I77.9 ARTERIOPATHY (CMS-HCC): Primary | ICD-10-CM

## 2023-11-27 PROBLEM — M10.9 GOUT: Status: ACTIVE | Noted: 2023-11-27

## 2023-11-27 LAB
ALBUMIN SERPL BCP-MCNC: 2.7 G/DL (ref 3.4–5)
ALP SERPL-CCNC: 103 U/L (ref 33–136)
ALT SERPL W P-5'-P-CCNC: 48 U/L (ref 10–52)
ANION GAP SERPL CALC-SCNC: 12 MMOL/L (ref 10–20)
APTT PPP: 45 SECONDS (ref 27–38)
AST SERPL W P-5'-P-CCNC: 33 U/L (ref 9–39)
BACTERIA BLD CULT: NORMAL
BACTERIA BLD CULT: NORMAL
BILIRUB SERPL-MCNC: 1.2 MG/DL (ref 0–1.2)
BUN SERPL-MCNC: 21 MG/DL (ref 6–23)
CALCIUM SERPL-MCNC: 8.2 MG/DL (ref 8.6–10.6)
CHLORIDE SERPL-SCNC: 107 MMOL/L (ref 98–107)
CO2 SERPL-SCNC: 26 MMOL/L (ref 21–32)
CREAT SERPL-MCNC: 0.99 MG/DL (ref 0.5–1.3)
ERYTHROCYTE [DISTWIDTH] IN BLOOD BY AUTOMATED COUNT: 14.4 % (ref 11.5–14.5)
GFR SERPL CREATININE-BSD FRML MDRD: 79 ML/MIN/1.73M*2
GLUCOSE BLD MANUAL STRIP-MCNC: 139 MG/DL (ref 74–99)
GLUCOSE SERPL-MCNC: 124 MG/DL (ref 74–99)
HCT VFR BLD AUTO: 33.5 % (ref 41–52)
HGB BLD-MCNC: 10.6 G/DL (ref 13.5–17.5)
INR PPP: 3 (ref 0.9–1.1)
MAGNESIUM SERPL-MCNC: 1.92 MG/DL (ref 1.6–2.4)
MCH RBC QN AUTO: 30 PG (ref 26–34)
MCHC RBC AUTO-ENTMCNC: 31.6 G/DL (ref 32–36)
MCV RBC AUTO: 95 FL (ref 80–100)
NRBC BLD-RTO: 0.3 /100 WBCS (ref 0–0)
PLATELET # BLD AUTO: 402 X10*3/UL (ref 150–450)
POTASSIUM SERPL-SCNC: 3.6 MMOL/L (ref 3.5–5.3)
PROT SERPL-MCNC: 5 G/DL (ref 6.4–8.2)
PROTHROMBIN TIME: 34.1 SECONDS (ref 9.8–12.8)
RBC # BLD AUTO: 3.53 X10*6/UL (ref 4.5–5.9)
SODIUM SERPL-SCNC: 141 MMOL/L (ref 136–145)
WBC # BLD AUTO: 8.7 X10*3/UL (ref 4.4–11.3)

## 2023-11-27 PROCEDURE — 2500000002 HC RX 250 W HCPCS SELF ADMINISTERED DRUGS (ALT 637 FOR MEDICARE OP, ALT 636 FOR OP/ED): Performed by: SURGERY

## 2023-11-27 PROCEDURE — 2500000004 HC RX 250 GENERAL PHARMACY W/ HCPCS (ALT 636 FOR OP/ED)

## 2023-11-27 PROCEDURE — 2500000001 HC RX 250 WO HCPCS SELF ADMINISTERED DRUGS (ALT 637 FOR MEDICARE OP): Performed by: STUDENT IN AN ORGANIZED HEALTH CARE EDUCATION/TRAINING PROGRAM

## 2023-11-27 PROCEDURE — 2500000001 HC RX 250 WO HCPCS SELF ADMINISTERED DRUGS (ALT 637 FOR MEDICARE OP)

## 2023-11-27 PROCEDURE — 85610 PROTHROMBIN TIME: CPT

## 2023-11-27 PROCEDURE — 96372 THER/PROPH/DIAG INJ SC/IM: CPT

## 2023-11-27 PROCEDURE — 2500000001 HC RX 250 WO HCPCS SELF ADMINISTERED DRUGS (ALT 637 FOR MEDICARE OP): Performed by: SURGERY

## 2023-11-27 PROCEDURE — 83735 ASSAY OF MAGNESIUM: CPT

## 2023-11-27 PROCEDURE — 36415 COLL VENOUS BLD VENIPUNCTURE: CPT

## 2023-11-27 PROCEDURE — 80053 COMPREHEN METABOLIC PANEL: CPT

## 2023-11-27 PROCEDURE — 2500000001 HC RX 250 WO HCPCS SELF ADMINISTERED DRUGS (ALT 637 FOR MEDICARE OP): Performed by: INTERNAL MEDICINE

## 2023-11-27 PROCEDURE — 85027 COMPLETE CBC AUTOMATED: CPT

## 2023-11-27 PROCEDURE — 99239 HOSP IP/OBS DSCHRG MGMT >30: CPT

## 2023-11-27 PROCEDURE — 82947 ASSAY GLUCOSE BLOOD QUANT: CPT

## 2023-11-27 PROCEDURE — 1180000001 HC REHAB PRIVATE ROOM DAILY

## 2023-11-27 PROCEDURE — 99232 SBSQ HOSP IP/OBS MODERATE 35: CPT | Performed by: INTERNAL MEDICINE

## 2023-11-27 RX ORDER — ACETAMINOPHEN 325 MG/1
650 TABLET ORAL EVERY 4 HOURS PRN
Status: DISCONTINUED | OUTPATIENT
Start: 2023-11-27 | End: 2023-12-09 | Stop reason: HOSPADM

## 2023-11-27 RX ORDER — WARFARIN SODIUM 5 MG/1
TABLET ORAL
Qty: 30 TABLET | Refills: 0 | Status: SHIPPED | OUTPATIENT
Start: 2023-11-27 | End: 2023-11-27 | Stop reason: SDUPTHER

## 2023-11-27 RX ORDER — ALLOPURINOL 100 MG/1
100 TABLET ORAL DAILY
Status: DISCONTINUED | OUTPATIENT
Start: 2023-11-28 | End: 2023-12-09 | Stop reason: HOSPADM

## 2023-11-27 RX ORDER — WARFARIN 3 MG/1
3 TABLET ORAL DAILY
Status: DISCONTINUED | OUTPATIENT
Start: 2023-11-27 | End: 2023-11-28

## 2023-11-27 RX ORDER — BISACODYL 5 MG
10 TABLET, DELAYED RELEASE (ENTERIC COATED) ORAL DAILY PRN
Status: DISCONTINUED | OUTPATIENT
Start: 2023-11-27 | End: 2023-12-09 | Stop reason: HOSPADM

## 2023-11-27 RX ORDER — WARFARIN 3 MG/1
TABLET ORAL
Qty: 30 TABLET | Refills: 0 | Status: SHIPPED | OUTPATIENT
Start: 2023-11-27 | End: 2023-12-09 | Stop reason: HOSPADM

## 2023-11-27 RX ORDER — POLYETHYLENE GLYCOL 3350 17 G/17G
17 POWDER, FOR SOLUTION ORAL DAILY PRN
Status: DISCONTINUED | OUTPATIENT
Start: 2023-11-27 | End: 2023-12-09 | Stop reason: HOSPADM

## 2023-11-27 RX ORDER — ALUMINUM HYDROXIDE, MAGNESIUM HYDROXIDE, AND SIMETHICONE 1200; 120; 1200 MG/30ML; MG/30ML; MG/30ML
30 SUSPENSION ORAL EVERY 6 HOURS PRN
Status: DISCONTINUED | OUTPATIENT
Start: 2023-11-27 | End: 2023-12-09 | Stop reason: HOSPADM

## 2023-11-27 RX ORDER — ACETAMINOPHEN 325 MG/1
650 TABLET ORAL EVERY 6 HOURS PRN
Status: DISCONTINUED | OUTPATIENT
Start: 2023-11-27 | End: 2023-12-09 | Stop reason: HOSPADM

## 2023-11-27 RX ORDER — ENOXAPARIN SODIUM 150 MG/ML
1 INJECTION SUBCUTANEOUS EVERY 12 HOURS
Qty: 6 EACH | Refills: 0 | Status: SHIPPED | OUTPATIENT
Start: 2023-11-27 | End: 2023-12-09 | Stop reason: HOSPADM

## 2023-11-27 RX ORDER — EZETIMIBE 10 MG/1
10 TABLET ORAL DAILY
Status: DISCONTINUED | OUTPATIENT
Start: 2023-11-28 | End: 2023-12-09 | Stop reason: HOSPADM

## 2023-11-27 RX ORDER — ATORVASTATIN CALCIUM 40 MG/1
40 TABLET, FILM COATED ORAL NIGHTLY
Status: DISCONTINUED | OUTPATIENT
Start: 2023-11-27 | End: 2023-12-09 | Stop reason: HOSPADM

## 2023-11-27 RX ORDER — POLYETHYLENE GLYCOL 3350 17 G/17G
17 POWDER, FOR SOLUTION ORAL DAILY PRN
Status: DISCONTINUED | OUTPATIENT
Start: 2023-11-27 | End: 2023-11-27

## 2023-11-27 RX ORDER — CARVEDILOL 12.5 MG/1
12.5 TABLET ORAL
Status: DISCONTINUED | OUTPATIENT
Start: 2023-11-27 | End: 2023-12-09 | Stop reason: HOSPADM

## 2023-11-27 RX ORDER — COLCHICINE 0.6 MG/1
1.2 TABLET ORAL ONCE
Status: COMPLETED | OUTPATIENT
Start: 2023-11-27 | End: 2023-11-27

## 2023-11-27 RX ORDER — DOCUSATE SODIUM 100 MG/1
100 CAPSULE, LIQUID FILLED ORAL 2 TIMES DAILY PRN
Status: DISCONTINUED | OUTPATIENT
Start: 2023-11-27 | End: 2023-12-09 | Stop reason: HOSPADM

## 2023-11-27 RX ORDER — FAMOTIDINE 20 MG/1
20 TABLET, FILM COATED ORAL DAILY
Status: DISCONTINUED | OUTPATIENT
Start: 2023-11-28 | End: 2023-12-09 | Stop reason: HOSPADM

## 2023-11-27 RX ORDER — ENOXAPARIN SODIUM 150 MG/ML
110 INJECTION SUBCUTANEOUS EVERY 12 HOURS
Status: DISCONTINUED | OUTPATIENT
Start: 2023-11-27 | End: 2023-11-27

## 2023-11-27 RX ORDER — LISINOPRIL 40 MG/1
40 TABLET ORAL DAILY
Status: DISCONTINUED | OUTPATIENT
Start: 2023-11-28 | End: 2023-11-29

## 2023-11-27 RX ORDER — DICYCLOMINE HYDROCHLORIDE 20 MG/1
20 TABLET ORAL DAILY
Status: DISCONTINUED | OUTPATIENT
Start: 2023-11-28 | End: 2023-12-09 | Stop reason: HOSPADM

## 2023-11-27 RX ORDER — LOPERAMIDE HYDROCHLORIDE 2 MG/1
2 CAPSULE ORAL DAILY
Status: DISCONTINUED | OUTPATIENT
Start: 2023-11-28 | End: 2023-11-28

## 2023-11-27 RX ORDER — CLOPIDOGREL BISULFATE 75 MG/1
75 TABLET ORAL DAILY
Status: DISCONTINUED | OUTPATIENT
Start: 2023-11-28 | End: 2023-12-09 | Stop reason: HOSPADM

## 2023-11-27 RX ORDER — COLCHICINE 0.6 MG/1
0.6 TABLET ORAL ONCE
Status: COMPLETED | OUTPATIENT
Start: 2023-11-27 | End: 2023-11-27

## 2023-11-27 RX ORDER — ACETAMINOPHEN 160 MG/5ML
650 SOLUTION ORAL EVERY 4 HOURS PRN
Status: DISCONTINUED | OUTPATIENT
Start: 2023-11-27 | End: 2023-12-09 | Stop reason: HOSPADM

## 2023-11-27 RX ORDER — COLCHICINE 0.6 MG/1
0.6 TABLET ORAL 2 TIMES DAILY
Status: DISCONTINUED | OUTPATIENT
Start: 2023-11-27 | End: 2023-11-28

## 2023-11-27 RX ORDER — COLCHICINE 0.6 MG/1
0.6 TABLET ORAL 2 TIMES DAILY
Qty: 8 TABLET | Refills: 0 | Status: SHIPPED | OUTPATIENT
Start: 2023-11-27 | End: 2023-12-09 | Stop reason: HOSPADM

## 2023-11-27 RX ORDER — AMLODIPINE BESYLATE 10 MG/1
10 TABLET ORAL DAILY
Status: DISCONTINUED | OUTPATIENT
Start: 2023-11-28 | End: 2023-12-09

## 2023-11-27 RX ADMIN — COLCHICINE 0.6 MG: 0.6 TABLET, FILM COATED ORAL at 12:53

## 2023-11-27 RX ADMIN — PANTOPRAZOLE SODIUM 40 MG: 40 TABLET, DELAYED RELEASE ORAL at 06:25

## 2023-11-27 RX ADMIN — CARVEDILOL 12.5 MG: 12.5 TABLET, FILM COATED ORAL at 08:36

## 2023-11-27 RX ADMIN — EZETIMIBE 10 MG: 10 TABLET ORAL at 08:36

## 2023-11-27 RX ADMIN — AMLODIPINE BESYLATE 10 MG: 10 TABLET ORAL at 08:35

## 2023-11-27 RX ADMIN — ALLOPURINOL 100 MG: 100 TABLET ORAL at 08:35

## 2023-11-27 RX ADMIN — CLOPIDOGREL BISULFATE 75 MG: 75 TABLET ORAL at 08:35

## 2023-11-27 RX ADMIN — ATORVASTATIN CALCIUM 40 MG: 40 TABLET, FILM COATED ORAL at 22:05

## 2023-11-27 RX ADMIN — COLCHICINE 1.2 MG: 0.6 TABLET, FILM COATED ORAL at 11:04

## 2023-11-27 RX ADMIN — Medication: at 08:40

## 2023-11-27 RX ADMIN — ENOXAPARIN SODIUM 105 MG: 150 INJECTION SUBCUTANEOUS at 06:25

## 2023-11-27 RX ADMIN — COLCHICINE 0.6 MG: 0.6 TABLET, FILM COATED ORAL at 22:05

## 2023-11-27 ASSESSMENT — PAIN - FUNCTIONAL ASSESSMENT: PAIN_FUNCTIONAL_ASSESSMENT: 0-10

## 2023-11-27 ASSESSMENT — COGNITIVE AND FUNCTIONAL STATUS - GENERAL
STANDING UP FROM CHAIR USING ARMS: A LOT
TOILETING: A LOT
PERSONAL GROOMING: A LITTLE
MOBILITY SCORE: 13
TURNING FROM BACK TO SIDE WHILE IN FLAT BAD: A LITTLE
CLIMB 3 TO 5 STEPS WITH RAILING: TOTAL
MOVING TO AND FROM BED TO CHAIR: A LOT
HELP NEEDED FOR BATHING: A LITTLE
EATING MEALS: A LITTLE
DRESSING REGULAR LOWER BODY CLOTHING: A LOT
WALKING IN HOSPITAL ROOM: TOTAL
DRESSING REGULAR UPPER BODY CLOTHING: A LITTLE
DAILY ACTIVITIY SCORE: 16

## 2023-11-27 ASSESSMENT — PAIN SCALES - GENERAL
PAINLEVEL_OUTOF10: 1
PAINLEVEL_OUTOF10: 2

## 2023-11-27 ASSESSMENT — ACTIVITIES OF DAILY LIVING (ADL): LACK_OF_TRANSPORTATION: NO

## 2023-11-27 NOTE — PROGRESS NOTES
"Mukesh Garg is a 73 y.o. male on day 12 of admission presenting with Acute lower limb ischemia.    Subjective   INR this morning 3.0; was 2.3 yesterday. Therapeutic for 2 days while bridging on therapeutic Lovenox.  AGREE    Doing otherwise well.  DENIES CP OR BLEEDING ON THE AC    Objective     Physical Exam    CLINICALLY STABLE LAYING IN BED  CV REG  LUNGS CTA  EXT RIGHT CALF +SVT - TENDER TO PALPATION  LEFT CALF SWOLLEN INCISIONS WITH SANTIAGO CDI    Last Recorded Vitals  Blood pressure 122/77, pulse 82, temperature 36.4 °C (97.5 °F), resp. rate 22, height 1.753 m (5' 9\"), weight 108 kg (238 lb 1.6 oz), SpO2 96 %.  Intake/Output last 3 Shifts:  I/O last 3 completed shifts:  In: 480 (4.4 mL/kg) [P.O.:480]  Out: 1225 (11.3 mL/kg) [Urine:1225 (0.3 mL/kg/hr)]  Weight: 108 kg     Relevant Results  Scheduled medications  allopurinol, 100 mg, oral, Daily  amLODIPine, 10 mg, oral, Daily  atorvastatin, 40 mg, oral, Nightly  carvedilol, 12.5 mg, oral, BID with meals  clopidogrel, 75 mg, oral, Daily  colchicine, 0.6 mg, oral, Once  [Held by provider] dicyclomine, 20 mg, oral, Daily  enoxaparin, 1 mg/kg, subcutaneous, q12h  ezetimibe, 10 mg, oral, Daily  insulin lispro, 0-5 Units, subcutaneous, TID with meals  pantoprazole, 40 mg, oral, Daily before breakfast  perflutren protein A microsphere, 0.5 mL, intravenous, Once in imaging  sodium phosphates, 1 enema, rectal, Once  sulfur hexafluoride microsphr, 2 mL, intravenous, Once in imaging  warfarin, 5 mg, oral, Daily      Continuous medications     PRN medications  PRN medications: acetaminophen, bisacodyl, dextrose 10 % in water (D10W), dextrose, docusate sodium, glucagon, HYDROmorphone, naloxone, naloxone, oxyCODONE, oxygen, polyethylene glycol    Results for orders placed or performed during the hospital encounter of 11/15/23 (from the past 24 hour(s))   POCT GLUCOSE   Result Value Ref Range    POCT Glucose 122 (H) 74 - 99 mg/dL   Coagulation Screen   Result Value Ref Range "    Protime 26.0 (H) 9.8 - 12.8 seconds    INR 2.3 (H) 0.9 - 1.1    aPTT 40 (H) 27 - 38 seconds   CBC   Result Value Ref Range    WBC 9.4 4.4 - 11.3 x10*3/uL    nRBC 0.5 (H) 0.0 - 0.0 /100 WBCs    RBC 3.83 (L) 4.50 - 5.90 x10*6/uL    Hemoglobin 11.4 (L) 13.5 - 17.5 g/dL    Hematocrit 36.1 (L) 41.0 - 52.0 %    MCV 94 80 - 100 fL    MCH 29.8 26.0 - 34.0 pg    MCHC 31.6 (L) 32.0 - 36.0 g/dL    RDW 14.2 11.5 - 14.5 %    Platelets 457 (H) 150 - 450 x10*3/uL   Comprehensive metabolic panel   Result Value Ref Range    Glucose 136 (H) 74 - 99 mg/dL    Sodium 140 136 - 145 mmol/L    Potassium 3.7 3.5 - 5.3 mmol/L    Chloride 105 98 - 107 mmol/L    Bicarbonate 22 21 - 32 mmol/L    Anion Gap 17 10 - 20 mmol/L    Urea Nitrogen 23 6 - 23 mg/dL    Creatinine 1.17 0.50 - 1.30 mg/dL    eGFR 65 >60 mL/min/1.73m*2    Calcium 8.4 (L) 8.6 - 10.6 mg/dL    Albumin 2.9 (L) 3.4 - 5.0 g/dL    Alkaline Phosphatase 111 33 - 136 U/L    Total Protein 5.6 (L) 6.4 - 8.2 g/dL    AST 37 9 - 39 U/L    Bilirubin, Total 1.4 (H) 0.0 - 1.2 mg/dL    ALT 52 10 - 52 U/L   Magnesium   Result Value Ref Range    Magnesium 2.02 1.60 - 2.40 mg/dL   POCT GLUCOSE   Result Value Ref Range    POCT Glucose 207 (H) 74 - 99 mg/dL   POCT GLUCOSE   Result Value Ref Range    POCT Glucose 135 (H) 74 - 99 mg/dL   POCT GLUCOSE   Result Value Ref Range    POCT Glucose 135 (H) 74 - 99 mg/dL   POCT GLUCOSE   Result Value Ref Range    POCT Glucose 139 (H) 74 - 99 mg/dL   CBC   Result Value Ref Range    WBC 8.7 4.4 - 11.3 x10*3/uL    nRBC 0.3 (H) 0.0 - 0.0 /100 WBCs    RBC 3.53 (L) 4.50 - 5.90 x10*6/uL    Hemoglobin 10.6 (L) 13.5 - 17.5 g/dL    Hematocrit 33.5 (L) 41.0 - 52.0 %    MCV 95 80 - 100 fL    MCH 30.0 26.0 - 34.0 pg    MCHC 31.6 (L) 32.0 - 36.0 g/dL    RDW 14.4 11.5 - 14.5 %    Platelets 402 150 - 450 x10*3/uL   Coagulation Screen   Result Value Ref Range    Protime 34.1 (H) 9.8 - 12.8 seconds    INR 3.0 (H) 0.9 - 1.1    aPTT 45 (H) 27 - 38 seconds        Component  Ref  Range & Units 8 d ago   DRVVT Screen  RATIO 3.73   DRVVT Confirmation  RATIO 2.76   DRVVT Test Ratio  <=1.20 RATIO 1.35 High    SCT Screen  RATIO 0.66   SCT Confirmation  RATIO 1.23   SCT Test Ratio  <=1.16 RATIO 0.54     Anticardiolipin IgA  <20.0 APL U/mL 2.9   Comment: Elevated levels of IgA anti-cardiolipin have not been included  in the laboratory criteria for anti-phospholipid syndrome  according to an international consensus (J Thromb Haemost 2006 4:295).  It may be helpful in identifying subgroups of patients at risk for  specific clinical manifestations of anti-phospholipid syndrome.   Anticardiolipin IgG  <20.0 GPL U/mL <1.6   Comment: Elevated levels of IgG anti-cardiolipin on 2 occasions at least  12 weeks apart are laboratory criteria for anti-phospholipid  syndrome according to an international consensus (J Thromb Haemost  2006 4:295).   Anticardiolipin IgM  <20.0 MPL U/mL 4.7     Beta-2 Glyco 1 IgG  <20.0 U/mL <1.4   Comment: Elevated levels of IgG anti-Beta 2 Glycoprotein-I on 2 occasions  at least 12 weeks apart are laboratory criteria for anti-phospholipid  syndrome according to an international consensus (J Thromb Haemost  2006 4:295).   Beta-2 Glyco 1 IgA  <20.0 U/mL 1.9   Comment: Elevated levels of IgA anti-Beta 2 Glycoprotein-I have not been  included in the laboratory criteria for anti-phospholipid syndrome  according to an international consensus (J Thromb Haemost 2006 4:295).  It may be helpful in identifying subgroups of patients at risk for  specific clinical manifestations of anti-phospholipid syndrome. A  significant proportion of IgA anti-Beta 2 Glycoprotein- positive  tests has no apparent association with any clinical manifestation  of anti-phospholipid syndrome.   Beta 2 Glyco 1 IgM  <20.0 U/mL 4.5     Assessment/Plan   Mukesh Garg is 73 y.o. male with history of with history of CAD s/p CABG x3, recurrent DVT (most recent 10/24/2023), Afib (on Eliquis), recent COVID (10/2023) who  "presented with LLE subacute on chronic limb ischemia and PE s/p left iliofemoral, femoropopliteal and tibioperoneal thrombectomy (11/14). RHC without evidence of PFO.    Plan:  - Please continue home Coumadin regimen (Coumadin 2.5mg MWF and 5mg T/Th/Sat/Sun) ([facility])  - Compression stockings 15-20 mmhg  - Follow up to be scheduled by vascular medicine team.    Discussed with attending, Dr. Ortega.    Shiv Carney MD  Vascular Surgery, PGY3  Team Pager: [phone]    HE WAS SIGNED OUT AS APS BUT ONLY HAS INDETERMINATE +LA ON LABS.  GIVEN THAT THIS HAPPENED ON THE ELIQUIS - AGREE WITH CONTINUING WARFARIN NOW.     HE WILL FOLLOW UP WITH ME IN THE [facility] FOR ONGOING EVAL.    I saw and evaluated the patient. I personally obtained the key and critical portions of the history and physical exam or was physically present for key and critical portions performed by the resident/fellow. I reviewed the resident/fellow's documentation and discussed the patient with the resident/fellow. I agree with the resident/fellow's medical decision making as documented in the note with the exception/addition of the following:    MY ADDITIONS ARE NOTED IN \"CAPS\".    Kenyetta Ortega MD    "

## 2023-11-27 NOTE — PROGRESS NOTES
Auth obtained for pt to admit to [facility]. PCN arranged transport via stretcher with [facility] ([phone]) for 3pm , PCN updated [facility] Justina, provided discharge slip to floor and report number [phone] to RN, pt wife Sweetie updated as well

## 2023-11-27 NOTE — DISCHARGE INSTRUCTIONS
Dana Mr. Garg,     You were admitted to the hospital because of the left leg pain, and we have did some imaging that showed occlusion in the artery explaining the pain. And because of that we removed the clot in your left leg and placed on blood thinner. You had also right heart cathter which was normal and showed no concern that it might be causing a travelling clot.     Please repeat INR blood test tomorrow and later on do it twice weekly for two weeks, just to make sure you are within the therapeutic INR level (2-3)      -For follow up with Dr. Ervin, please call [phone] and ask for to make a 3 month follow up appointment.   -Please call to schedule an appointment with vascular surgery after outpatient ALLISON test on December 21s        Best wishes,   Your [facility] friends

## 2023-11-27 NOTE — NURSING NOTE
Patient discharged to SNF, transport via stretcher to [facility], report called to [phone]. IV and telemetry removed, all personal items left with patient

## 2023-11-27 NOTE — DISCHARGE SUMMARY
Discharge Diagnosis  Acute lower limb ischemia    Issues Requiring Follow-Up  PE  DVT  PAD    Test Results Pending At Discharge  Pending Labs       Order Current Status    Heparin Assay, UFH Collected (11/22/23 0604)    Prostate Specific Antigen, Screen In process    Surgical Pathology Exam In process    Blood Culture Preliminary result    Blood Culture Preliminary result            Hospital Course  Mukesh Garg is a 73 y.o. male with PMHx of Afib, HTN, JENNY (on CPAP at home), gout, CABG x3 vessels (2013), recurrent DVTs  (was on eliq) and peripheral neuropathy.  He was found to have severe PAD and was also found to have b/l pulmonary emboli (despite being compliant with apixaban at that time). Furthermore,  CTA for LLE on 11/14 demonstrated intraluminal filling defect involving the popliteal artery, which eventually caused popliteal artery occlusion, causing limb ischemia for which he was started on heparin gtt.     Echo finding revealed a possible small L to R shunt on color doppler flow. Bubble study was technically difficult which may have demonstrated some bubble within the LV and thus, a RHC was done which showed no R-L shunt and was unremarkable. Labs were ordered for B2 glycoprotein ab and cardiolipin ab all were WNL, however,  lupus antibody came back positive. Patient then underwent a left iliofemoral, femoropopliteal, tibioperoneal trunk thromboembolectomy, and four compartment fasciotomy.    Heparin gtt was stopped and lovenox bridged with coumadin until INR became therapeutic (2-3) today is 3 . Today he was complaining of rt big toe pain concerning for gout (had few episodes in the past in the same foot) and because of that we started Colchicine.  He will be discharged to acute rehab. He will has follow up in 3 months in clinic with [facility] campuss and with vascular surgery after outpatient ALLISON test on December 21s      Pertinent Physical Exam At Time of Discharge  Constitutional:       Appearance: Normal  appearance. He is obese.   HENT:      Head: Normocephalic and atraumatic.      Nose: Nose normal.   Eyes:      Extraocular Movements: Extraocular movements intact.      Conjunctiva/sclera: Conjunctivae normal.      Pupils: Pupils are equal, round, and reactive to light.   Cardiovascular:      Rate and Rhythm: Normal rate. Rhythm irregular.   Pulmonary:      Effort: Pulmonary effort is normal.      Breath sounds: Normal breath sounds.   Abdominal:      Palpations: Abdomen is soft.   Musculoskeletal:         General: Normal range of motion.      Cervical back: Normal range of motion and neck supple.   Skin:     General: Skin is warm and dry.      Capillary Refill: Capillary refill takes less than 2 seconds.   Neurological:      General: No focal deficit present.      Mental Status: He is alert and oriented to person, place, and time.   Psychiatric:         Mood and Affect: Mood normal.         Behavior: Behavior normal.    Home Medications     Medication List      START taking these medications     acetaminophen 325 mg tablet; Commonly known as: Tylenol; Take 2 tablets   (650 mg) by mouth every 6 hours if needed for moderate pain (4 - 6).   bisacodyl 10 mg suppository; Commonly known as: Dulcolax; Unwrap and   Insert 1 suppository (10 mg) into the rectum once daily as needed for   constipation.   clopidogrel 75 mg tablet; Commonly known as: Plavix; Take 1 tablet (75   mg) by mouth once daily. Do not start before November 25, 2023.   colchicine 0.6 mg tablet; Take 1 tablet (0.6 mg) by mouth 2 times a day   for 4 days.   docusate sodium 100 mg capsule; Commonly known as: Colace; Take 1   capsule (100 mg) by mouth 2 times a day as needed for constipation.   enoxaparin 120 mg/0.8 mL syringe; Commonly known as: Lovenox; Inject   0.74 mL (111 mg) under the skin every 12 hours for 3 days. Discontinue   when INR between 2-3 based on coags on 11/28 and 11/29   polyethylene glycol 17 gram/dose powder; Commonly known as:  Glycolax,   Miralax; Take 1 capful (17 g) mixed in 4-8 ounces of liquid by mouth once   daily as needed (Constipation).   warfarin 3 mg tablet; Commonly known as: Coumadin; Take as directed per   After Visit Summary.     CONTINUE taking these medications     allopurinol 100 mg tablet; Commonly known as: Zyloprim; TAKE 1 TABLET BY   MOUTH EVERY DAY   amLODIPine 10 mg tablet; Commonly known as: Norvasc   atorvastatin 40 mg tablet; Commonly known as: Lipitor; TAKE 1 TABLET BY   MOUTH EVERY DAY FOR 90 DAYS   carvedilol 12.5 mg tablet; Commonly known as: Coreg   chlorthalidone 25 mg tablet; Commonly known as: [place]   dicyclomine 20 mg tablet; Commonly known as: Bentyl   ezetimibe 10 mg tablet; Commonly known as: Zetia   famotidine 20 mg tablet; Commonly known as: Pepcid   lisinopril 40 mg tablet; TAKE 1 TABLET BY MOUTH EVERY DAY FOR 90 DAYS   Nitrostat 0.4 mg SL tablet; Generic drug: nitroglycerin     STOP taking these medications     dexAMETHasone 6 mg tablet; Commonly known as: Decadron   Eliquis 5 mg tablet; Generic drug: apixaban       Outpatient Follow-Up  Future Appointments   Date Time Provider Department Center   12/14/2023  9:00 AM Monika Pruett MD [id number]   1/3/2024 10:45 AM [address]   5/24/2024 10:15 AM Steven Wayne DO [address]       Varinder Boogie MD

## 2023-11-27 NOTE — CARE PLAN
The patient's goals for the shift include      The clinical goals for the shift include Pt will remain HDS throughout shift    Over the shift, the patient did not make progress toward the following goals. Barriers to progression include none. Recommendations to address these barriers include none.

## 2023-11-27 NOTE — TELEPHONE ENCOUNTER
Patient must cancel surgery with dr Hull on Jan. 17, 2024 with no reschedule.  I will also [facility] schedule.

## 2023-11-28 PROBLEM — D68.59 HYPERCOAGULABLE STATE (MULTI): Status: ACTIVE | Noted: 2023-11-28

## 2023-11-28 PROBLEM — R19.7 DIARRHEA: Status: ACTIVE | Noted: 2023-11-28

## 2023-11-28 PROBLEM — I74.3: Status: ACTIVE | Noted: 2023-11-28

## 2023-11-28 PROBLEM — I48.0 PAROXYSMAL ATRIAL FIBRILLATION (MULTI): Status: ACTIVE | Noted: 2023-08-20

## 2023-11-28 LAB
ANION GAP SERPL CALC-SCNC: 12 MMOL/L
BUN SERPL-MCNC: 16 MG/DL (ref 8–25)
CALCIUM SERPL-MCNC: 8.4 MG/DL (ref 8.5–10.4)
CHLORIDE SERPL-SCNC: 106 MMOL/L (ref 97–107)
CO2 SERPL-SCNC: 22 MMOL/L (ref 24–31)
CREAT SERPL-MCNC: 1 MG/DL (ref 0.4–1.6)
ERYTHROCYTE [DISTWIDTH] IN BLOOD BY AUTOMATED COUNT: 14.6 % (ref 11.5–14.5)
GFR SERPL CREATININE-BSD FRML MDRD: 78 ML/MIN/1.73M*2
GLUCOSE SERPL-MCNC: 127 MG/DL (ref 65–99)
HCT VFR BLD AUTO: 34.8 % (ref 41–52)
HGB BLD-MCNC: 10.9 G/DL (ref 13.5–17.5)
INR PPP: 2.6 (ref 0.9–1.2)
MCH RBC QN AUTO: 29.7 PG (ref 26–34)
MCHC RBC AUTO-ENTMCNC: 31.3 G/DL (ref 32–36)
MCV RBC AUTO: 95 FL (ref 80–100)
NRBC BLD-RTO: 0.2 /100 WBCS (ref 0–0)
PLATELET # BLD AUTO: 422 X10*3/UL (ref 150–450)
POTASSIUM SERPL-SCNC: 3.5 MMOL/L (ref 3.4–5.1)
PROTHROMBIN TIME: 25.5 SECONDS (ref 9.3–12.7)
RBC # BLD AUTO: 3.67 X10*6/UL (ref 4.5–5.9)
SODIUM SERPL-SCNC: 140 MMOL/L (ref 133–145)
WBC # BLD AUTO: 10 X10*3/UL (ref 4.4–11.3)

## 2023-11-28 PROCEDURE — 97530 THERAPEUTIC ACTIVITIES: CPT | Mod: GP

## 2023-11-28 PROCEDURE — 85027 COMPLETE CBC AUTOMATED: CPT | Performed by: INTERNAL MEDICINE

## 2023-11-28 PROCEDURE — 97535 SELF CARE MNGMENT TRAINING: CPT | Mod: GO

## 2023-11-28 PROCEDURE — 80048 BASIC METABOLIC PNL TOTAL CA: CPT | Performed by: INTERNAL MEDICINE

## 2023-11-28 PROCEDURE — 85610 PROTHROMBIN TIME: CPT | Performed by: INTERNAL MEDICINE

## 2023-11-28 PROCEDURE — 36415 COLL VENOUS BLD VENIPUNCTURE: CPT | Performed by: INTERNAL MEDICINE

## 2023-11-28 PROCEDURE — 94760 N-INVAS EAR/PLS OXIMETRY 1: CPT | Mod: MUE

## 2023-11-28 PROCEDURE — 97167 OT EVAL HIGH COMPLEX 60 MIN: CPT | Mod: GO

## 2023-11-28 PROCEDURE — 97110 THERAPEUTIC EXERCISES: CPT | Mod: GP

## 2023-11-28 PROCEDURE — 2500000002 HC RX 250 W HCPCS SELF ADMINISTERED DRUGS (ALT 637 FOR MEDICARE OP, ALT 636 FOR OP/ED): Mod: MUE | Performed by: INTERNAL MEDICINE

## 2023-11-28 PROCEDURE — 94760 N-INVAS EAR/PLS OXIMETRY 1: CPT

## 2023-11-28 PROCEDURE — 99223 1ST HOSP IP/OBS HIGH 75: CPT | Performed by: INTERNAL MEDICINE

## 2023-11-28 PROCEDURE — 1180000001 HC REHAB PRIVATE ROOM DAILY

## 2023-11-28 PROCEDURE — 2500000002 HC RX 250 W HCPCS SELF ADMINISTERED DRUGS (ALT 637 FOR MEDICARE OP, ALT 636 FOR OP/ED): Performed by: INTERNAL MEDICINE

## 2023-11-28 PROCEDURE — 2500000001 HC RX 250 WO HCPCS SELF ADMINISTERED DRUGS (ALT 637 FOR MEDICARE OP): Performed by: INTERNAL MEDICINE

## 2023-11-28 PROCEDURE — 97162 PT EVAL MOD COMPLEX 30 MIN: CPT | Mod: GP

## 2023-11-28 PROCEDURE — 97110 THERAPEUTIC EXERCISES: CPT | Mod: GO,CO

## 2023-11-28 PROCEDURE — 97530 THERAPEUTIC ACTIVITIES: CPT | Mod: GO

## 2023-11-28 RX ORDER — WARFARIN 2 MG/1
4 TABLET ORAL DAILY
Status: DISCONTINUED | OUTPATIENT
Start: 2023-11-28 | End: 2023-12-02

## 2023-11-28 RX ORDER — LOPERAMIDE HYDROCHLORIDE 2 MG/1
2 CAPSULE ORAL 3 TIMES DAILY
Status: DISCONTINUED | OUTPATIENT
Start: 2023-11-28 | End: 2023-12-01

## 2023-11-28 RX ADMIN — AMLODIPINE BESYLATE 10 MG: 10 TABLET ORAL at 08:26

## 2023-11-28 RX ADMIN — EZETIMIBE 10 MG: 10 TABLET ORAL at 08:26

## 2023-11-28 RX ADMIN — DICYCLOMINE HYDROCHLORIDE 20 MG: 20 TABLET ORAL at 08:26

## 2023-11-28 RX ADMIN — CARVEDILOL 12.5 MG: 12.5 TABLET, FILM COATED ORAL at 08:26

## 2023-11-28 RX ADMIN — ALLOPURINOL 100 MG: 100 TABLET ORAL at 08:26

## 2023-11-28 RX ADMIN — LOPERAMIDE HYDROCHLORIDE 2 MG: 2 CAPSULE ORAL at 08:27

## 2023-11-28 RX ADMIN — LISINOPRIL 40 MG: 40 TABLET ORAL at 08:26

## 2023-11-28 RX ADMIN — COLCHICINE 0.6 MG: 0.6 TABLET, FILM COATED ORAL at 08:26

## 2023-11-28 RX ADMIN — WARFARIN SODIUM 4 MG: 2 TABLET ORAL at 17:17

## 2023-11-28 RX ADMIN — FAMOTIDINE 20 MG: 20 TABLET, FILM COATED ORAL at 09:00

## 2023-11-28 RX ADMIN — ATORVASTATIN CALCIUM 40 MG: 40 TABLET, FILM COATED ORAL at 20:18

## 2023-11-28 RX ADMIN — ACETAMINOPHEN 650 MG: 325 TABLET ORAL at 02:39

## 2023-11-28 RX ADMIN — LOPERAMIDE HYDROCHLORIDE 2 MG: 2 CAPSULE ORAL at 20:18

## 2023-11-28 RX ADMIN — LOPERAMIDE HYDROCHLORIDE 2 MG: 2 CAPSULE ORAL at 14:42

## 2023-11-28 RX ADMIN — CLOPIDOGREL BISULFATE 75 MG: 75 TABLET ORAL at 08:27

## 2023-11-28 RX ADMIN — CARVEDILOL 12.5 MG: 12.5 TABLET, FILM COATED ORAL at 17:16

## 2023-11-28 SDOH — SOCIAL STABILITY: SOCIAL INSECURITY: DOES ANYONE TRY TO KEEP YOU FROM HAVING/CONTACTING OTHER FRIENDS OR DOING THINGS OUTSIDE YOUR HOME?: NO

## 2023-11-28 SDOH — SOCIAL STABILITY: SOCIAL INSECURITY: DO YOU FEEL UNSAFE GOING BACK TO THE PLACE WHERE YOU ARE LIVING?: NO

## 2023-11-28 SDOH — HEALTH STABILITY: MENTAL HEALTH: EXPERIENCED ANY OF THE FOLLOWING LIFE EVENTS: DEATH OF FAMILY/FRIEND

## 2023-11-28 SDOH — SOCIAL STABILITY: SOCIAL INSECURITY: ARE YOU OR HAVE YOU BEEN THREATENED OR ABUSED PHYSICALLY, EMOTIONALLY, OR SEXUALLY BY ANYONE?: NO

## 2023-11-28 SDOH — SOCIAL STABILITY: SOCIAL INSECURITY: HAS ANYONE EVER THREATENED TO HURT YOUR FAMILY OR YOUR PETS?: NO

## 2023-11-28 SDOH — SOCIAL STABILITY: SOCIAL INSECURITY: ABUSE: ADULT

## 2023-11-28 SDOH — SOCIAL STABILITY: SOCIAL INSECURITY: HAVE YOU HAD THOUGHTS OF HARMING ANYONE ELSE?: NO

## 2023-11-28 SDOH — SOCIAL STABILITY: SOCIAL INSECURITY: DO YOU FEEL ANYONE HAS EXPLOITED OR TAKEN ADVANTAGE OF YOU FINANCIALLY OR OF YOUR PERSONAL PROPERTY?: NO

## 2023-11-28 SDOH — SOCIAL STABILITY: SOCIAL INSECURITY: ARE THERE ANY APPARENT SIGNS OF INJURIES/BEHAVIORS THAT COULD BE RELATED TO ABUSE/NEGLECT?: NO

## 2023-11-28 SDOH — SOCIAL STABILITY: SOCIAL INSECURITY: WERE YOU ABLE TO COMPLETE ALL THE BEHAVIORAL HEALTH SCREENINGS?: YES

## 2023-11-28 SDOH — SOCIAL STABILITY: SOCIAL INSECURITY: HAVE YOU HAD THOUGHTS OF HARMING ANYONE ELSE?: YES

## 2023-11-28 ASSESSMENT — PAIN SCALES - GENERAL
PAINLEVEL_OUTOF10: 0 - NO PAIN
PAINLEVEL_OUTOF10: 2
PAINLEVEL_OUTOF10: 1
PAINLEVEL_OUTOF10: 1
PAINLEVEL_OUTOF10: 2
PAINLEVEL_OUTOF10: 5 - MODERATE PAIN
PAINLEVEL_OUTOF10: 3

## 2023-11-28 ASSESSMENT — COGNITIVE AND FUNCTIONAL STATUS - GENERAL
DRESSING REGULAR UPPER BODY CLOTHING: A LITTLE
DAILY ACTIVITIY SCORE: 16
HELP NEEDED FOR BATHING: A LOT
TOILETING: A LOT
DRESSING REGULAR LOWER BODY CLOTHING: TOTAL

## 2023-11-28 ASSESSMENT — COLUMBIA-SUICIDE SEVERITY RATING SCALE - C-SSRS
2. HAVE YOU ACTUALLY HAD ANY THOUGHTS OF KILLING YOURSELF?: NO
2. HAVE YOU ACTUALLY HAD ANY THOUGHTS OF KILLING YOURSELF?: NO
1. IN THE PAST MONTH, HAVE YOU WISHED YOU WERE DEAD OR WISHED YOU COULD GO TO SLEEP AND NOT WAKE UP?: NO
6. HAVE YOU EVER DONE ANYTHING, STARTED TO DO ANYTHING, OR PREPARED TO DO ANYTHING TO END YOUR LIFE?: NO
6. HAVE YOU EVER DONE ANYTHING, STARTED TO DO ANYTHING, OR PREPARED TO DO ANYTHING TO END YOUR LIFE?: NO

## 2023-11-28 ASSESSMENT — BRIEF INTERVIEW FOR MENTAL STATUS (BIMS)
GENERAL MEMORY AND RECALL ABILITY: CURRENT SEASON;LOCATION OF OWN ROOM;STAFF NAMES AND FACES;RECOGNIZES APPROPRIATE HEALTHCARE SETTING
COGNITIVE PATTERN ASSESSMENT USED: STAFF ASSESSMENT

## 2023-11-28 ASSESSMENT — LIFESTYLE VARIABLES
HOW OFTEN DO YOU HAVE A DRINK CONTAINING ALCOHOL: NEVER
SKIP TO QUESTIONS 9-10: 1
HOW MANY STANDARD DRINKS CONTAINING ALCOHOL DO YOU HAVE ON A TYPICAL DAY: PATIENT DOES NOT DRINK
HOW OFTEN DO YOU HAVE 6 OR MORE DRINKS ON ONE OCCASION: NEVER
SKIP TO QUESTIONS 9-10: 1
HOW MANY STANDARD DRINKS CONTAINING ALCOHOL DO YOU HAVE ON A TYPICAL DAY: PATIENT DOES NOT DRINK
PRESCIPTION_ABUSE_PAST_12_MONTHS: NO
SUBSTANCE_ABUSE_PAST_12_MONTHS: NO
SUBSTANCE_ABUSE_PAST_12_MONTHS: NO
AUDIT-C TOTAL SCORE: 0
PRESCIPTION_ABUSE_PAST_12_MONTHS: NO
AUDIT-C TOTAL SCORE: 0
HOW OFTEN DO YOU HAVE A DRINK CONTAINING ALCOHOL: NEVER
HOW OFTEN DO YOU HAVE 6 OR MORE DRINKS ON ONE OCCASION: NEVER

## 2023-11-28 ASSESSMENT — PATIENT HEALTH QUESTIONNAIRE - PHQ9
SUM OF ALL RESPONSES TO PHQ9 QUESTIONS 1 & 2: 0
2. FEELING DOWN, DEPRESSED OR HOPELESS: NOT AT ALL
2. FEELING DOWN, DEPRESSED OR HOPELESS: NOT AT ALL
1. LITTLE INTEREST OR PLEASURE IN DOING THINGS: NOT AT ALL
SUM OF ALL RESPONSES TO PHQ9 QUESTIONS 1 & 2: 0
SUM OF ALL RESPONSES TO PHQ9 QUESTIONS 1 & 2: 0
1. LITTLE INTEREST OR PLEASURE IN DOING THINGS: NOT AT ALL
1. LITTLE INTEREST OR PLEASURE IN DOING THINGS: NOT AT ALL
2. FEELING DOWN, DEPRESSED OR HOPELESS: NOT AT ALL

## 2023-11-28 ASSESSMENT — PAIN - FUNCTIONAL ASSESSMENT
PAIN_FUNCTIONAL_ASSESSMENT: 0-10

## 2023-11-28 ASSESSMENT — PAIN DESCRIPTION - LOCATION: LOCATION: LEG

## 2023-11-28 ASSESSMENT — ACTIVITIES OF DAILY LIVING (ADL)
ADL_ASSISTANCE: INDEPENDENT
HOME_MANAGEMENT_TIME_ENTRY: 45
ADL_ASSISTANCE: INDEPENDENT
BATHING_ASSISTANCE: MAXIMAL

## 2023-11-28 ASSESSMENT — PAIN DESCRIPTION - DESCRIPTORS: DESCRIPTORS: ACHING

## 2023-11-28 ASSESSMENT — PAIN DESCRIPTION - ORIENTATION: ORIENTATION: RIGHT

## 2023-11-28 NOTE — PROGRESS NOTES
11/28/23 1330 [id number]   OT Last Visit   OT Received On 11/28/23   General   Reason for Referral Presented with LLE subacute on chronic limb ischemia and PE.s/p fasciotomies, L fem cutdown w/ ileofem thromboembolectomy, L pop cutdown w/ fempop thromboembolectomy, L TP trunk cutdown w/ thromboembolectomy, selective angiography LLE, Concern for L to R shunt   Past Medical History Relevant to Rehab CAD s/p PCIx3 and CABGx3 (LIMA-Diag, free ALEX y to LAD, SVG-PDA, 2013), afib s/p MAZE (2013), and recent admission for R DVT iso COVID infection (10/24/23) on apixaban   Prior to Session Communication Bedside nurse  (Dicussed POC with OTR/L)   Patient Position Received Up in chair   Preferred Learning Style visual;verbal   General Comment Patient aggreeable to therapy   Pain Assessment   Pain Assessment 0-10   Pain Score 1   Pain Type Surgical pain   Pain Location Leg   Pain Orientation Left   Therapeutic Exercise   Therapeutic Exercise Performed Yes   Therapeutic Exercise Activity 1 Patient challenged to an endurance activity using bilateral UE's using 1lbs wrist weight with arms at 90 degrees  crossing midline complete at a set up level.   Therapeutic Exercise Activity 2 pres 1lb wrist weights 15 reps 6 sets patient required some rest breaks between sets with cues for breathing.

## 2023-11-28 NOTE — PROGRESS NOTES
11/28/23 [id number]   OT Last Visit   OT Received On 11/28/23   General   Reason for Referral Presented with LLE subacute on chronic limb ischemia and PE.s/p fasciotomies, L fem cutdown w/ ileofem thromboembolectomy, L pop cutdown w/ fempop thromboembolectomy, L TP trunk cutdown w/ thromboembolectomy, selective angiography LLE, Concern for L to R shunt   Past Medical History Relevant to Rehab CAD s/p PCIx3 and CABGx3 (LIMA-Diag, free ALEX y to LAD, SVG-PDA, 2013), afib s/p MAZE (2013), and recent admission for R DVT iso COVID infection (10/24/23) on apixaban   Missed Visit No   Family/Caregiver Present No   Prior to Session Communication Bedside nurse   Patient Position Received Bed, 3 rail up   Preferred Learning Style visual;verbal   Precautions   LE Weight Bearing Status Weight Bearing as Tolerated   Medical Precautions Fall precautions   Pain Assessment   Pain Assessment 0-10   Pain Score 2   Pain Type Chronic pain   Pain Location Leg   Pain Orientation Right   Pain Radiating Towards right knee   Pain Descriptors Aching   Pain Frequency Intermittent   Pain Onset Progressive   Clinical Progression Gradually improving   Effect of Pain on Daily Activities painful during transfers   Patient's Stated Pain Goal No pain   Pain Interventions Medication (See MAR)   Cognition   Overall Cognitive Status WFL   Orientation Level Oriented X4   Home Living   Type of Home House   Lives With Spouse   Home Adaptive Equipment Walker rolling or standard;Cane   Home Layout Multi-level   Alternate Level Stairs-Rails Right   Alternate Level Stairs-Number of Steps 4  (in kitchen/living room area take 4 steps down to sunken living room and full bath on that level, main floor 9 steps to bedroom)   Home Access Stairs to enter without rails   Entrance Stairs-Rails None   Entrance Stairs-Number of Steps 2   Bathroom Shower/Tub Walk-in shower   Bathroom Toilet Standard   Bathroom Equipment None   Bathroom Accessibility pt reports narrow,  small bathroom.  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   ADL   Eating Assistance Independent   Grooming Assistance Stand by   Grooming Deficit Setup   Bathing Assistance Maximal   Bathing Deficit Buttocks;Perineal area;Right lower leg including foot;Left lower leg including foot  (pt incontinent of bowels; nuring administered Immodium)   UE Dressing Assistance Minimal  (to sandra t shirt)   UE Dressing Deficit Pull over head   LE Dressing Assistance Total   LE Dressing Deficit Thread RLE into pants;Thread LLE into pants;Pull up over hips;Don/doff R sock;Don/doff L sock   Toileting Assistance with Device Total   Toileting Deficit Incontinent  (bowels/diahhrea)   ADL Comments Pt requires max assist standing on Kerline Stedy to manage pants over hips  (Pt tolerates approximately 30 seconds standing prior to requiring a RB)   Activity Tolerance   Endurance Tolerates less than 10 min exercise with changes in vital signs   Bed Mobility   Bed Mobility Yes   Bed Mobility 1   Bed Mobility 1 Supine to sitting   Level of Assistance 1 Moderate assistance   Bed Mobility Comments 1 HOB elevated with use of bed rail   Transfers   Transfer Yes   Transfer 1   Transfer From 1 Sit to   Transfer to 1 Stand   Technique 1 Sit to stand   Transfer Device 1 Kerline Stedy   Transfer Level of Assistance 1 Dependent  (mod assist x 2)   Trials/Comments 1 standing from bed in an elevated position  for ease of standing  (pt reports an increase of pain in LLE to 7/10; reported to nursing)   Transfers 2   Transfer From 2   (Kerline Stesabrina)   Transfer to 2 Wheelchair   Technique 2 Stand to sit   Transfer Device 2 Kerline Stedy   Transfer Level of Assistance 2 Dependent  (mod assist x 2)   Trials/Comments 2 provided v/c's for safe hand/foot placement   Modalities   Modalities Used No   Static Sitting Balance   Static Sitting-Balance Support No upper extremity supported   Static Sitting-Level of Assistance Close supervision   Dynamic Sitting Balance   Dynamic Sitting-Balance Support No upper extremity supported   Dynamic Sitting-Balance Reaching for objects   Static Standing Balance   Static Standing-Balance Support Bilateral upper extremity supported  (while standing on Kerline Stedy)   Static Standing-Level of Assistance Dependent  (mod assist x 2)   Vision - Basic Assessment   Current Vision No visual deficits   Sensation   Light Touch No apparent deficits   Strength   Strength Comments GENNY muscle strength 4+/5   Coordination   Movements are Fluid and Coordinated Yes   Coordination Comment GENNY's WFL   Hand Function   Gross Grasp Functional   Coordination Functional   RUE    RUE  WFL   LUNITIN RASCONE WFL   IP OT Assessment   OT Assessment Pt is a 73 year old male who presents with decreased balance, strength and activity tolerance.  Pt would benefit from skilled OT intervention to return to PLOF.   Prognosis Good   Barriers to Discharge None   Evaluation/Treatment Tolerance Patient limited by fatigue;Patient limited by pain   Medical Staff Made Aware Yes   End of Session Communication Bedside nurse   End of Session Patient Position Up in chair   OT Assessment   OT Assessment Results Decreased ADL status;Decreased endurance;Decreased upper extremity strength;Decreased functional mobility;Decreased gross motor control;Decreased IADLs;Decreased trunk control for functional activities   Strengths Ability to acquire  knowledge;Support of Caregivers;Premorbid level of function;Attitude of self   Barriers to Participation Other (Comment)  (none)   Education   Individual(s) Educated Patient   Education Provided Risk and benefits of OT discussed with patient or other;POC discussed and agreed upon;Diagnosis & Precautions   Risk and Benefits Discussed with Patient/Caregiver/Other yes   Patient/Caregiver Demonstrated Understanding yes   Patient Response to Education Patient/Caregiver Verbalized Understanding of Information;Patient/Caregiver Performed Return Demonstration of Exercises/Activities   Inpatient/Swing Bed or Outpatient   Inpatient/Swing Bed or Outpatient Inpatient   Inpatient Plan   Treatment Interventions ADL retraining;Functional transfer training;UE strengthening/ROM;Endurance training;Patient/family training;Equipment evaluation/education;Neuromuscular reeducation;Compensatory technique education   OT Frequency 5 times per week   OT Recommended Transfer Status Assist of 2   OT - OK to Discharge Yes      11/28/23 0800   OT Last Visit   OT Received On 11/28/23   General   Reason for Referral Presented with LLE subacute on chronic limb ischemia and PE.s/p fasciotomies, L fem cutdown w/ ileofem thromboembolectomy, L pop cutdown w/ fempop thromboembolectomy, L TP trunk cutdown w/ thromboembolectomy, selective angiography LLE, Concern for L to R shunt   Past Medical History Relevant to Rehab CAD s/p PCIx3 and CABGx3 (LIMA-Diag, free ALEX y to LAD, SVG-PDA, 2013), afib s/p MAZE (2013), and recent admission for R DVT iso COVID infection (10/24/23) on apixaban   Missed Visit No   Family/Caregiver Present No   Prior to Session Communication Bedside nurse   Patient Position Received Bed, 3 rail up   Preferred Learning Style visual;verbal   Precautions   LE Weight Bearing Status Weight Bearing as Tolerated   Medical Precautions Fall precautions   Pain Assessment   Pain Assessment 0-10   Pain Score 2   Pain Type Chronic pain   Pain  Location Leg   Pain Orientation Right   Pain Radiating Towards right knee   Pain Descriptors Aching   Pain Frequency Intermittent   Pain Onset Progressive   Clinical Progression Gradually improving   Effect of Pain on Daily Activities painful during transfers   Patient's Stated Pain Goal No pain   Pain Interventions Medication (See MAR)   Cognition   Overall Cognitive Status WFL   Orientation Level Oriented X4   Home Living   Type of Home House   Lives With Spouse   Home Adaptive Equipment Walker rolling or standard;Cane   Home Layout Multi-level   Alternate Level Stairs-Rails Right   Alternate Level Stairs-Number of Steps 4  (in kitchen/living room area take 4 steps down to sunken living room and full bath on that level, main floor 9 steps to bedroom)   Home Access Stairs to enter without rails   Entrance Stairs-Rails None   Entrance Stairs-Number of Steps 2   Bathroom Shower/Tub Walk-in shower   Bathroom Toilet Standard   Bathroom Equipment None   Bathroom Accessibility pt reports narrow, small bathroom.  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   ADL   Eating Assistance Independent   Grooming Assistance Stand by   Grooming Deficit Setup   Bathing Assistance Maximal   Bathing Deficit Buttocks;Perineal area;Right lower leg including foot;Left lower leg including foot  (pt incontinent of bowels; nuring administered Immodium)   UE Dressing Assistance Minimal  (to  sandra t shirt)   UE Dressing Deficit Pull over head   LE Dressing Assistance Total   LE Dressing Deficit Thread RLE into pants;Thread LLE into pants;Pull up over hips;Don/doff R sock;Don/doff L sock   Toileting Assistance with Device Total   Toileting Deficit Incontinent  (bowels/diahhrea)   ADL Comments Pt requires max assist standing on Kerline Stedy to manage pants over hips  (Pt tolerates approximately 30 seconds standing prior to requiring a RB)   Activity Tolerance   Endurance Tolerates less than 10 min exercise with changes in vital signs   Bed Mobility   Bed Mobility Yes   Bed Mobility 1   Bed Mobility 1 Supine to sitting   Level of Assistance 1 Moderate assistance   Bed Mobility Comments 1 HOB elevated with use of bed rail   Transfers   Transfer Yes   Transfer 1   Transfer From 1 Sit to   Transfer to 1 Stand   Technique 1 Sit to stand   Transfer Device 1 Kerline Stedy   Transfer Level of Assistance 1 Dependent  (mod assist x 2)   Trials/Comments 1 standing from bed in an elevated position for ease of standing  (pt reports an increase of pain in LLE to 7/10; reported to nursing)   Transfers 2   Transfer From 2   (Kerline Stedy)   Transfer to 2 Wheelchair   Technique 2 Stand to sit   Transfer Device 2 Kerline Stedy   Transfer Level of Assistance 2 Dependent  (mod assist x 2)   Trials/Comments 2 provided v/c's for safe hand/foot placement   Modalities   Modalities Used No   Static Sitting Balance   Static Sitting-Balance Support No upper extremity supported   Static Sitting-Level of Assistance Close supervision   Dynamic Sitting Balance   Dynamic Sitting-Balance Support No upper extremity supported   Dynamic Sitting-Balance Reaching for objects   Static Standing Balance   Static Standing-Balance Support Bilateral upper extremity supported  (while standing on Kerline Stedy)   Static Standing-Level of Assistance Dependent  (mod assist x 2)   Vision - Basic Assessment   Current Vision No visual deficits   Sensation   Light Touch  No apparent deficits   Strength   Strength Comments GENNY muscle strength 4+/5   Coordination   Movements are Fluid and Coordinated Yes   Coordination Comment GENNY's WFL   Hand Function   Gross Grasp Functional   Coordination Functional   RUNITIN    RUE  WFL   ROBERTO CARLOS AZEVEDO WFL   IP OT Assessment   OT Assessment Pt is a 73 year old male who presents with decreased balance, strength and activity tolerance.  Pt would benefit from skilled OT intervention to return to PLOF.   Prognosis Good   Barriers to Discharge None   Evaluation/Treatment Tolerance Patient limited by fatigue;Patient limited by pain   Medical Staff Made Aware Yes   End of Session Communication Bedside nurse   End of Session Patient Position Up in chair   OT Assessment   OT Assessment Results Decreased ADL status;Decreased endurance;Decreased upper extremity strength;Decreased functional mobility;Decreased gross motor control;Decreased IADLs;Decreased trunk control for functional activities   Strengths Ability to acquire knowledge;Support of Caregivers;Premorbid level of function;Attitude of self   Barriers to Participation Other (Comment)  (none)   Education   Individual(s) Educated Patient   Education Provided Risk and benefits of OT discussed with patient or other;POC discussed and agreed upon;Diagnosis & Precautions   Risk and Benefits Discussed with Patient/Caregiver/Other yes   Patient/Caregiver Demonstrated Understanding yes   Patient Response to Education Patient/Caregiver Verbalized Understanding of Information;Patient/Caregiver Performed Return Demonstration of Exercises/Activities   Inpatient/Swing Bed or Outpatient   Inpatient/Swing Bed or Outpatient Inpatient   Inpatient Plan   Treatment Interventions ADL retraining;Functional transfer training;UE strengthening/ROM;Endurance training;Patient/family training;Equipment evaluation/education;Neuromuscular reeducation;Compensatory technique education   OT Frequency 5 times per week   OT Recommended  Transfer Status Assist of 2   OT - OK to Discharge Yes      11/28/23 0800   [facility] Daily Activity   Putting on and taking off regular lower body clothing 1   Bathing (including washing, rinsing, drying) 2   Putting on and taking off regular upper body clothing 3   Toileting, which includes using toilet, bedpan or urinal 2   Taking care of personal grooming such as brushing teeth 4   Eating Meals 4   Daily Activity - Total Score 16     Problem: Bathing  Goal: LTG - Patient will utilize adaptive techniques to bathe body with modified independence.  Outcome: Progressing  Goal: STG - Patient will bathe body with minimal assistance with AE PRN.  Outcome: Progressing     Problem: Dressings Lower Extremities  Goal: LTG - Patient will demonstrate use of appropriate intervention to ensure safe dressing of lower extremities with modified independence.  Outcome: Progressing  Goal: STG - Patient will complete lower body dressing with minimal assistance with use of AE PRN.  Outcome: Progressing     Problem: Dressing Upper Extremities  Goal: LTG - Patient will complete upper body dressing with distant supervision.  Outcome: Progressing  Goal: STG - Patient will dress upper body with close supervision.  Outcome: Progressing     Problem: Instrumental Activities of Daily Living  Goal: LTG - Patient will independently complete basic home making activities to return to independent functioning at home.  Description: 1. Light meal prep  2. Ed on kitchen safety  3. Laundry task  4. Making bed  Outcome: Progressing     Problem: Toileting  Goal: LTG - Patient will utilize adaptive techniques/equipment to complete daily toileting tasks with modified independence.  Outcome: Progressing  Goal: STG - Patient will complete toileting tasks with minimal assistance.  Outcome: Progressing     Problem: Transfers  Goal: LTG - Patient will demonstrate safe transfer techniques with modified independence with FWW.  Outcome: Progressing  Goal: LTG -  Patient will demonstrate safe transfer techniques with minimal assistance with FWW.  Outcome: Progressing

## 2023-11-28 NOTE — CARE PLAN
Problem: Balance  Goal: STG - Patient will perform TUG with least restrictive assistive device in 50 seconds or less to promote mobility and reduce fall risk with dynamic standing tasks.   Outcome: Progressing  Goal: LTG - Patient will perform TUG with least restrictive assistive device in 30 seconds or less to promote mobility and reduce fall risk with dynamic standing tasks.   Outcome: Progressing     Problem: Mobility  Goal: LTG - Patient will propel wheelchair 150 feet with two turns on even surface with independent assist to facilitate safe mobility.    Outcome: Progressing  Goal: LTG - Patient will amb 150 feet with rolling walker device including two turns on even surface with independent assist to facilitate safe mobility.   Outcome: Progressing  Goal: LTG - Patient will negotiate up to 12 stairs with one rail and contact guard assist with no device to enter home.   Outcome: Progressing  Goal: STG -Patient will propel wheelchair 50 feet with two turns on even surface with supervision assist to facilitate safe mobility.    Outcome: Progressing  Goal: STG - Patient will amb 20 feet with rolling walker device including no turns on even surface with moderate assist and wheelchair follow to facilitate safe mobility.   Outcome: Progressing     Problem: Transfers  Goal: STG - Patient will transfer bed to chair and chair to bed with moderate assist to facilitate mobility.   Outcome: Progressing  Goal: STG - Patient will transfer supine to sit and sit to supine with minimal assist to facilitate mobility.   Outcome: Progressing  Goal: STG - Patient will transfer sit to stand and stand to sit with  moderate assist to facilitate mobility.   Outcome: Progressing  Goal: LTG- Patient will transfer bed to chair and chair to bed with independent assist to facilitate mobility.   Outcome: Progressing  Goal: LTG - Patient will transfer supine to sit and sit to supine with independent assist to facilitate mobility.   Outcome:  Progressing  Goal: LTG - Patient will transfer sit to stand and stand to sit with  independent assist to facilitate mobility.   Outcome: Progressing

## 2023-11-28 NOTE — INDIVIDUALIZED OVERALL PLAN OF CARE NOTE
Physical Medicine and Rehabilitation  Individualized Overall Plan of Care    Patient Name: Mukesh Garg  Medical Record Number: [id number]  YOB: 1950  Sex: Male  Payor Info: Payor: [employer] [place] / Plan: [facility] MED / Product Type: *No Product type* /     Primary Rehab (Etiologic) Diagnosis: Debility    Admit Date/Time: 11/27/2023  6:15 PM    Estimated Length of Stay: 14 days  Anticipated Discharge Destination:home  Anticipated Level of Function: modified independent    The following plan of care has been synthesized from the Preadmission Screening/consults, the Post-Admission Physician Evaluation and the individual therapy assessments.     Medical Prognosis:    The patient's medical prognosis is good to achieve the stated goals below.    Anticipated Interventions:    The patient will undergo inpatient rehabilitation to manage complex medical and rehabilitation needs related to Debility.    The patient will receive interdisciplinary care, including daily rehabilitation physician management for the following: Pain, Wound Care, and Monitoring for medication side effects    The patient will benefit from continued services by: PT for 90 minutes per day, 5 days per week  and OT for 90 minutes per day, 5 days per week     Expected Functional Outcomes:  Mobility: modified independent  Self Care: modified independent  Cognition: modified independent  Communication: modified independent    Patient goals were discussed and agreed upon with the patient.  I have reviewed the therapy notes and agree with the current plan of care.    Assessment/Plan   Principal Problem:    Debility  Active Problems:    Paroxysmal atrial fibrillation (CMS/HCC)    ASHD (arteriosclerotic heart disease)    Hyperlipidemia    Hypertension    Limb ischemia    Multiple subsegmental pulmonary emboli without acute cor pulmonale (CMS/HCC)    Acute occlusion of popliteal artery due to thrombosis (CMS/HCC)     Hypercoagulable state (CMS/[facility])    Gout    Diarrhea             Fritz Turner MD  11/28/2023 1:44 PM

## 2023-11-28 NOTE — PROGRESS NOTES
Physical Therapy Initial Evaluation       11/28/23 [id number]    PT  Visit   PT Received On 11/28/23   General   Reason for Referral Presented with LLE subacute on chronic limb ischemia and PE.s/p fasciotomies, L fem cutdown w/ ileofem thromboembolectomy, L pop cutdown w/ fempop thromboembolectomy, L TP trunk cutdown w/ thromboembolectomy, selective angiography LLE, Concern for L to R shunt   Past Medical History Relevant to Rehab CAD s/p PCIx3 and CABGx3 (LIMA-Diag, free ALEX y to LAD, SVG-PDA, 2013), afib s/p MAZE (2013), and recent admission for R DVT iso COVID infection (10/24/23) on apixaban   Patient Position Received Up in chair   General Comment incisions with staples medial and lateral left lower leg.   Precautions   Hearing/Visual Limitations reading glasses   Medical Precautions Fall precautions   Pain Assessment   Pain Assessment 0-10   Pain Score 3   Pain Type Surgical pain   Pain Location Leg   Pain Orientation Left   Response to Interventions Patient reports had Tylenol earlier this morning   Cognition   Overall Cognitive Status WFL   Orientation Level Oriented X4   Home Living   Type of Home House   Lives With Spouse   Home Adaptive Equipment Walker rolling or standard;Cane   Home Layout Multi-level   Alternate Level Stairs-Rails Left   Alternate Level Stairs-Number of Steps 4 steps to sunken living room, same floor as full bath, 9 stairs from main floor to bedroom   Home Access Stairs to enter without rails   Entrance Stairs-Rails None   Entrance Stairs-Number of Steps 2   Bathroom Shower/Tub Walk-in shower   Bathroom Toilet Standard   Bathroom Equipment None   Prior Function Per Pt/Caregiver Report   Level of [place] Independent with ADLs and functional transfers;Independent with homemaking with ambulation   ADL Assistance Independent   Homemaking Assistance Independent   Ambulatory Assistance Independent   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)   Leisure teaching   Hand Dominance Right   Prior Function Comments drives   Activity Tolerance   Endurance Tolerates less than 10 min exercise, no significant change in vital signs   Activity Tolerance Comments Modified PANKAJ 5/10   Sensation   Light Touch No apparent deficits   Coordination   Alternating Toe Taps Bradykinesia   Static Sitting Balance   Static Sitting-Level of Assistance Close supervision   Dynamic Sitting Balance   Dynamic Sitting-Comments Close supervsion   Static Standing Balance   Static Standing-Level of Assistance Maximum assistance   Static Standing-Comment/Number of Minutes static  Kerline Stedy x 3 trials: 5 seconds, 7 seconds, 11 seconds. Flexd posture with soft knees. Cues for hip forward and upright posture.   Dynamic Standing Balance   Dynamic Standing-Comments unable   Bed Mobility 1   Bed Mobility 1 Sitting to supine   Level of Assistance 1 Moderate assistance   Bed Mobility Comments 1 trunk and LEs   Bed Mobility 2   Bed Mobility  2 Supine to sitting   Level of Assistance 2 Maximum assistance   Bed Mobility Comments 2 trunk and LEs with head of bed flat   Bed Mobility 3   Bed Mobility 3 Rolling right;Rolling left   Level of Assistance 3 Minimum assistance   Bed Mobility Comments 3 with use of bed rail   Transfer 1   Technique 1 Sit to stand;Stand to sit   Transfer Device 1 Kerline Stedy   Transfer Level of Assistance 1 Maximum assistance;+2   Trials/Comments 1 pulls up on Kerline Stedy   Transfers 2   Transfer From 2 Wheelchair to   Transfer to 2 Bed   Transfer Device 2 Kerline Stedy   Transfer Level of Assistance 2 +2;Moderate assistance   Trials/Comments 2 cues for technique   Transfers 3   Transfer From 3 Wheelchair to   Transfer to 3 Commode-standard  (with bedside commode frame over toilet)   Transfer Device 3 Kerline Stedy   Transfer Level of Assistance 3 Moderate assistance;+2   Trials/Comments 3 dependnet for pants up/down, flaco care and brief change    Ambulation/Gait Training 1   Comments/Distance (ft) 1 static  Kerline Bond x 3 trials: 5 seconds, 7 seconds, 11 seconds. Flexd posture with soft knees. Cues for hip forward and upright posture.   AROM RLE (degrees)   RLE AROM Comment WFL   Strength RLE   R Hip Flexion 4-/5   R Hip Extension 4-/5   R Hip ABduction 4/5   R Hip ADduction 4/5   R Knee Flexion 4/5   R Knee Extension 4/5   R Ankle Dorsiflexion 4/5   R Ankle Plantar Flexion 4/5   AROM LLE (degrees)   LLE AROM Comment WFL except knee limited due to incisional pain and edema   Strength LLE   L Hip Flexion 4-/5   L Knee Flexion 4-/5   L Knee Extension 4/5   L Ankle Dorsiflexion 4/5   L Ankle Plantar Flexion 4/5   L Hip Extension 4-/5   L Hip ABduction 4-/5   L Hip ADduction 4-/5   There ex : Supine Therapeutic Exercise   Activity Repetitions Weights Comments   SAQ  [x] 1 x 15   [] 2 x 15   []  [x] Bilateral    [] Right    [] Left        Bridging     [x] 1 x 15   [] 2 x 15   []  [] Bilateral   [] Right    [] Left     [x] Bolster   [] No Bolster   Hip abd /add  [x] 1 x 15   [] 2 x 15   []  [x] Bilateral   [] Right    [] Left        Heel slides  [x] 1 x 15   [] 2 x 15   []  [x] Bilateral   [] Right    [] Left        SLR  [] 1 x 10   [] 2 x 15   []  [x] Bilateral   [] Right    [] Left            PT Assessment   PT Assessment Results Decreased strength;Decreased range of motion;Decreased endurance;Impaired balance;Decreased mobility;Decreased coordination;Pain   Rehab Prognosis Excellent   Evaluation/Treatment Tolerance Patient limited by fatigue;Patient limited by pain   Strengths Ability to acquire knowledge;Attitude of self;Coping skills;Living arrangement secure;Premorbid level of function;Support of Caregivers   Assessment Comment Patient is a 73 y.o. year old male  who was independent prior to surgeries and prolonged hospital stay . Patient is currently below baseline function. Patient now requires assist for all mobility. Patient presents with  decreased strength, balance, activity tolerance, coordination, and surgical pain. This has resulted in the inability to transfer, ambulate, and negotiate stairs safely. Patient will benefit from therapeutic exercise to improve strength, ROM and activity tolerance; therapeutic activity to improve safe mobility; neuromuscular reeducation to improve coordination and balance,; gait training to promote safe ambulation; modalities PRN for pain relief. Patient will benefit from intense rehab to address all impairments and activity limitations to facilitate functional independence and return to home safely.    End of Session Patient Position Bed, 2 rail up   IP OR SWING BED PT PLAN   Inpatient or Swing Bed Inpatient   PT Plan   Treatment/Interventions Bed mobility;Transfer training;Gait training;Stair training;Balance training;Neuromuscular re-education;Strengthening;Endurance training;Range of motion;Therapeutic exercise;Therapeutic activity;Home exercise program;Wheelchair management   PT Plan Skilled PT   PT Frequency Other (Comment)  (11x/week, 90 minutes 5x/week, 45 minutes 1x/week)   Equipment Recommended upon Discharge Wheeled walker   PT Recommended Transfer Status Assist x2  (Kerline Bond)   PT - OK to Discharge Yes     Encounter Problems       Encounter Problems (Active)       Balance       STG - Patient will perform TUG with least restrictive assistive device in 50 seconds or less to promote mobility and reduce fall risk with dynamic standing tasks.  (Progressing)       Start:  11/28/23    Expected End:  12/05/23            LTG - Patient will perform TUG with least restrictive assistive device in 30 seconds or less to promote mobility and reduce fall risk with dynamic standing tasks.  (Progressing)       Start:  11/28/23    Expected End:  12/19/23               Mobility       LTG - Patient will propel wheelchair 150 feet with two turns on even surface with independent assist to facilitate safe mobility.    (Progressing)       Start:  11/28/23    Expected End:  12/19/23            LTG - Patient will amb 150 feet with rolling walker device including two turns on even surface with independent assist to facilitate safe mobility.  (Progressing)       Start:  11/28/23    Expected End:  12/19/23            LTG - Patient will negotiate up to 12 stairs with one rail and contact guard assist with no device to enter home.  (Progressing)       Start:  11/28/23    Expected End:  12/19/23            STG -Patient will propel wheelchair 50 feet with two turns on even surface with supervision assist to facilitate safe mobility.   (Progressing)       Start:  11/28/23    Expected End:  12/05/23            STG - Patient will amb 20 feet with rolling walker device including no turns on even surface with moderate assist and wheelchair follow to facilitate safe mobility.  (Progressing)       Start:  11/28/23    Expected End:  12/05/23               Transfers       LTG - Patient will demonstrate safe transfer techniques with modified independence with FWW. (Progressing)       Start:  11/28/23    Expected End:  12/12/23            LTG - Patient will demonstrate safe transfer techniques with minimal assistance with FWW. (Progressing)       Start:  11/28/23    Expected End:  12/05/23               Transfers       STG - Patient will transfer bed to chair and chair to bed with moderate assist to facilitate mobility.  (Progressing)       Start:  11/28/23    Expected End:  12/04/23            STG - Patient will transfer supine to sit and sit to supine with minimal assist to facilitate mobility.  (Progressing)       Start:  11/28/23    Expected End:  12/05/23            STG - Patient will transfer sit to stand and stand to sit with  moderate assist to facilitate mobility.  (Progressing)       Start:  11/28/23    Expected End:  12/05/23            LTG- Patient will transfer bed to chair and chair to bed with independent assist to facilitate mobility.   (Progressing)       Start:  11/28/23    Expected End:  12/19/23            LTG - Patient will transfer supine to sit and sit to supine with independent assist to facilitate mobility.  (Progressing)       Start:  11/28/23    Expected End:  12/19/23            LTG - Patient will transfer sit to stand and stand to sit with  independent assist to facilitate mobility.  (Progressing)       Start:  11/28/23    Expected End:  12/19/23

## 2023-11-28 NOTE — NURSING NOTE
Patient arrived via stretcher at 1815. Patient was a 4 person transfer from stretcher to bed. Dr Turner is aware patient is here. Patient is here post acute LLE ischemia.

## 2023-11-28 NOTE — NURSING NOTE
Assumed care of patient. Patient sitting up in WC. Finished with therapy for the day. Denies any pain or needs at this time. On RA. Call light is within reach. Will continue to monitor.

## 2023-11-28 NOTE — CARE PLAN
Problem: Bathing  Goal: LTG - Patient will utilize adaptive techniques to bathe body with modified independence.  Outcome: Progressing  Goal: STG - Patient will bathe body with minimal assistance with AE PRN.  Outcome: Progressing     Problem: Dressings Lower Extremities  Goal: LTG - Patient will demonstrate use of appropriate intervention to ensure safe dressing of lower extremities with modified independence.  Outcome: Progressing  Goal: STG - Patient will complete lower body dressing with minimal assistance with use of AE PRN.  Outcome: Progressing     Problem: Dressing Upper Extremities  Goal: LTG - Patient will complete upper body dressing with distant supervision.  Outcome: Progressing  Goal: STG - Patient will dress upper body with close supervision.  Outcome: Progressing     Problem: Instrumental Activities of Daily Living  Goal: LTG - Patient will independently complete basic home making activities to return to independent functioning at home.  Description: 1. Light meal prep  2. Ed on kitchen safety  3. Laundry task  4. Making bed  Outcome: Progressing     Problem: Toileting  Goal: LTG - Patient will utilize adaptive techniques/equipment to complete daily toileting tasks with modified independence.  Outcome: Progressing  Goal: STG - Patient will complete toileting tasks with minimal assistance.  Outcome: Progressing     Problem: Transfers  Goal: LTG - Patient will demonstrate safe transfer techniques with modified independence with FWW.  Outcome: Progressing  Goal: LTG - Patient will demonstrate safe transfer techniques with minimal assistance with FWW.  Outcome: Progressing

## 2023-11-28 NOTE — H&P
[facility]  Admission History and Physical    History of present illness    This is a 73-year-old man with a history of coronary artery disease status post coronary artery bypass grafting, as well as atrial fibrillation on apixaban therapy with which he was compliant, history of DVT the last one in 2003, as well as obstructive sleep apnea, hypertension, and gout.  He also had coronavirus infection in October 2023.  He presented with acute lower extremity pain and ischemia.  He was found to have left lower extremity popliteal artery occlusion, bilateral pulmonary emboli as well.  He was transferred to [facility] and underwent left femoral cutdown with iliofemoral thromboembolectomy, left popliteal cutdown with femoral-popliteal thromboembolectomy.  He did have right heart catheterization on November 21, 2023 with no evidence of intracardiac shunting.  Placed on heparin drip, now on warfarin, lupus anticoagulant is positive, could explain the failure of apixaban.  INR is now therapeutic on the warfarin.  He also urinary retention requiring catheterization, he also developed pain in the great toe, treated for gout with colchicine which is caused diarrhea and we are now going to be stopping that as the pain has improved.  He is very anxious to begin the rehabilitation program but finds himself quite fatigued and debilitated after a long and complicated hospital stay.    Pre-admission functional status: Independent with ADLs and IADLs    Support System and Family Circumstances: Could support system.    Past Medical History:   Diagnosis Date    Atrial fibrillation (CMS/[facility])     CAD (coronary artery disease)     Gout     Heart disease     Hyperlipidemia     Hypertension     MI (myocardial infarction) (CMS/[facility])     Sleep apnea         Past Surgical History:   Procedure Laterality Date    CARDIAC CATHETERIZATION Right 11/21/2023     Procedure: Right Heart Cath;  Surgeon: Zachariah Gonsales MD;  Location: [place] [address] Cardiac Cath Lab;  Service: Cardiovascular;  Laterality: Right;  Left to right shunt    CORONARY ARTERY BYPASS GRAFT      CT LOWER EXTREMITY LEFT ANGIOGRAM W AND/OR WO IV CONTRAST Left 11/14/2023    CT LOWER EXTREMITY LEFT ANGIOGRAM W AND/OR WO IV CONTRAST 11/14/2023 [facility] CT    IR ANGIOGRAM LOWER EXTREMITY RIGHT Right 11/17/2023    IR ANGIOGRAM LOWER EXTREMITY RIGHT 11/17/2023 Monika Pruett MD [facility] ANGIO    MR HEAD ANGIO WO IV CONTRAST  03/21/2017    MR HEAD ANGIO WO IV CONTRAST [facility]    MR HEAD ANGIO WO IV CONTRAST  10/30/2020    MR HEAD ANGIO WO IV CONTRAST [facility]        Family History   Problem Relation Name Age of Onset    Hypertension Mother      Stroke Mother      Heart attack Father          Cause of death    Heart disease Father      Diabetes Other Grandmother        Social History     Socioeconomic History    Marital status:      Spouse name: Not on file    Number of children: Not on file    Years of education: Not on file    Highest education level: Not on file   Occupational History    Not on file   Tobacco Use    Smoking status: Never     Passive exposure: Never    Smokeless tobacco: Never   Vaping Use    Vaping Use: Never used   Substance and Sexual Activity    Alcohol use: Not Currently     Alcohol/week: 2.0 standard drinks of alcohol     Types: 2 Cans of beer per week    Drug use: Never    Sexual activity: Defer   Other Topics Concern    Not on file   Social History Narrative    Not on file     Social Determinants of Health     Financial Resource Strain: Low Risk  (11/27/2023)    Overall Financial Resource Strain (CARDIA)     Difficulty of Paying Living Expenses: Not hard at all   Food Insecurity: No Food Insecurity (10/26/2023)    Hunger Vital Sign     Worried About Running Out of Food in the Last Year: Never true     Ran Out of Food in the Last Year: Never true   Transportation Needs: No  "Transportation Needs (11/27/2023)    PRAPARE - Transportation     Lack of Transportation (Medical): No     Lack of Transportation (Non-Medical): No   Physical Activity: Not on file   Stress: No Stress Concern Present (10/26/2023)    [place] [employer] - Occupational Stress Questionnaire     Feeling of Stress : Not at all   Social Connections: Unknown (10/26/2023)    Social Connection and Isolation Panel [NHANES]     Frequency of Communication with Friends and Family: More than three times a week     Frequency of Social Gatherings with Friends and Family: More than three times a week     Attends Restorationist Services: Not on file     Active Member of Clubs or Organizations: Not on file     Attends Club or Organization Meetings: Not on file     Marital Status:    Intimate Partner Violence: Not At Risk (10/26/2023)    Humiliation, Afraid, Rape, and Kick questionnaire     Fear of Current or Ex-Partner: No     Emotionally Abused: No     Physically Abused: No     Sexually Abused: No   Housing Stability: Low Risk  (11/27/2023)    Housing Stability Vital Sign     Unable to Pay for Housing in the Last Year: No     Number of Places Lived in the Last Year: 1     Unstable Housing in the Last Year: No       Review of Systems     Objective   /74 (BP Location: Right arm, Patient Position: Lying)   Pulse 100   Temp 36.8 °C (98.2 °F) (Oral)   Resp 18   Ht 1.752 m (5' 8.98\")   Wt 110 kg (243 lb 6.2 oz)   SpO2 95%   BMI 35.97 kg/m²     Physical Exam  Vitals reviewed.   Constitutional:       General: He is not in acute distress.     Appearance: He is not toxic-appearing.   HENT:      Head: Normocephalic and atraumatic.      Mouth/Throat:      Mouth: Mucous membranes are moist.   Eyes:      Pupils: Pupils are equal, round, and reactive to light.   Cardiovascular:      Rate and Rhythm: Normal rate and regular rhythm.      Heart sounds: No murmur heard.  Pulmonary:      Breath sounds: Normal breath " sounds. No wheezing, rhonchi or rales.   Abdominal:      General: There is no distension.      Palpations: Abdomen is soft.   Musculoskeletal:      Right lower leg: Edema present.      Left lower leg: Edema present.      Comments: Trace bilateral lower extremity edema.  The great toes show no significant evidence of erythema, warmth or other signs of gout   Neurological:      General: No focal deficit present.      Mental Status: He is alert and oriented to person, place, and time.          Recent Lab Results:  Results for orders placed or performed during the hospital encounter of 11/27/23 (from the past 24 hour(s))   Protime-INR   Result Value Ref Range    Protime 25.5 (H) 9.3 - 12.7 seconds    INR 2.6 (H) 0.9 - 1.2   CBC   Result Value Ref Range    WBC 10.0 4.4 - 11.3 x10*3/uL    nRBC 0.2 (H) 0.0 - 0.0 /100 WBCs    RBC 3.67 (L) 4.50 - 5.90 x10*6/uL    Hemoglobin 10.9 (L) 13.5 - 17.5 g/dL    Hematocrit 34.8 (L) 41.0 - 52.0 %    MCV 95 80 - 100 fL    MCH 29.7 26.0 - 34.0 pg    MCHC 31.3 (L) 32.0 - 36.0 g/dL    RDW 14.6 (H) 11.5 - 14.5 %    Platelets 422 150 - 450 x10*3/uL   Basic Metabolic Panel   Result Value Ref Range    Glucose 127 (H) 65 - 99 mg/dL    Sodium 140 133 - 145 mmol/L    Potassium 3.5 3.4 - 5.1 mmol/L    Chloride 106 97 - 107 mmol/L    Bicarbonate 22 (L) 24 - 31 mmol/L    Urea Nitrogen 16 8 - 25 mg/dL    Creatinine 1.00 0.40 - 1.60 mg/dL    eGFR 78 >60 mL/min/1.73m*2    Calcium 8.4 (L) 8.5 - 10.4 mg/dL    Anion Gap 12 <=19 mmol/L        Imaging Results:  Cardiac catheterization - non-coronary    Result Date: 11/26/2023   [facility], Cath Lab, [address] [place] [address] Cardiovascular Catheterization Report Patient Name:      ESTEPHANIA ELIZABETH         Performing Physician:  [id number] Zachariah Gonsales MD Study Date:        11/21/2023           Verifying Physician:   [id number] Zachariah Gonsales MD MRN/PID:           [id number]             Cardiologist/Co-scrub: Accession#:        [id number]         Ordering  Physician:    [id number] UMU KELLY Date of Birth/Age: 1950 / 73 years Fellow:                [id number] Loc Montanez MD Gender:            M                    Fellow:                [id number] Harvey Dupree MD Encounter#:        [id number]  Study: Right Heart Cath  Indications: ESTEPHANIA ELIZABETH is a 74 year old male who presents with TTE with concern for left-right shunt; hx CABG, recurrent DVT, AF on DOAC; p/w LLE subacute on chronic limb ischemia and PE.  Appropriate Use Criteria: Known or suspected intracardiac shunt with indeterminate shunt anatomy or shunt fraction; AUC score = 8.  Procedure Description: After infiltration of local anesthetic, the right internal jugular vein was identified with two-dimensional ultrasound. Under direct ultrasound visualization, the right internal jugular vein was cannulated with a micropuncture technique. A 5 Jamaican sheath was placed in the vein. A balloon tipped catheter was advanced through the right heart to record pressures. Cardiac output was calculated via the Nickolas method. Post-procedure, the venous sheath was pulled and pressure was applied to the site.  Right Heart Catheterization: A balloon tipped catheter was advanced through the right heart to record pressures. Cardiac output was calculated via the Nickolas method. Normal filling pressures. Normal ventricular filling pressure. Cardiac output is normal. Preserved cardiac output at rest. No evidence of shunt. No pulmonary vascular resistance. RA 5 mmHg RV 30/5 mmHg PA 30/20 mmHg PCWP 15 mmHg Saturations: SVC 61% IVC 72% RA 62% RV 65% PA 64% Qp/Qs 1.06 CO 6.6, CI 2.93.  Hemo Personnel: +----------+---------+ Name      Duty      +----------+---------+ Zachariah Gonsales MD 1 +----------+---------+  Hemodynamic Pressures:   +----+----------+---------+------------+-------------+---+-----+-------+-------+ SiteDate Time   Phase    Systolic    Diastolic  ED Mean A-Wave V-Wave                  Name       mmHg        mmHg     mmHmmHg  mmHg   mmHg                                                    g                     +----+----------+---------+------------+-------------+---+-----+-------+-------+   RA11/21/2023     Rest                                4      4      6     9:37:30 AM                                                         +----+----------+---------+------------+-------------+---+-----+-------+-------+   PW11/21/2023     Rest                                9     10     15     9:38:43 AM                                                         +----+----------+---------+------------+-------------+---+-----+-------+-------+   PA11/21/2023     Rest          31            4      18                   9:39:30 AM                                                         +----+----------+---------+------------+-------------+---+-----+-------+-------+   PW11/21/2023     Rest                                9     10     10     9:40:25 AM                                                         +----+----------+---------+------------+-------------+---+-----+-------+-------+   PA11/21/2023     Rest          29            7      18                   9:40:59 AM                                                         +----+----------+---------+------------+-------------+---+-----+-------+-------+   RV11/21/2023     Rest          30            1  3                        9:42:47 AM                                                         +----+----------+---------+------------+-------------+---+-----+-------+-------+  Oxygen Saturation %: +-----------+------------+ Sample SiteHB (g/100ml)  +-----------+------------+     SYS ART         9.5 +-----------+------------+     SYS NAYLA         9.5 +-----------+------------+     PUL ART         9.5 +-----------+------------+     PUL NAYLA         9.5 +-----------+------------+  Complications: No in-lab complications observed.  Cardiac Cath Post Procedure Notes: Post Procedure Diagnosis: Normal filling pressures with preserved CO/CI. Blood Loss:               Estimated blood loss during the procedure was 5 mls. Specimens Removed:        Number of specimen(s) removed: none.  Recommendations: Further management per primary team. ____________________________________________________________________________________ CONCLUSIONS:  1. No evidence of L to R shunting.  2. Normal filling pressures and cardiac index. ICD 10 Codes: Other pulmonary embolism without acute cor pulmonale-I26.99  CPT Codes: Right Heart Cath O2/Cardiac output without biopsy (RHC)-79554; Moderate Sedation Services initial 15 minutes patient >5 years-64226; Moderate Sedation Services 1st additional 15 minutes patient >5 years-30396  [id number] Zachariah Gonsales MD Performing Physician Electronically signed by [id number] Gonsales MD on 11/26/2023 at 7:56:35 AM  ** Final **     XR chest 1 view    Result Date: 11/23/2023  Interpreted By:  Rogers Stuart, STUDY: XR CHEST 1 VIEW;  11/23/2023 11:08 am   INDICATION: Signs/Symptoms:infection workup.   COMPARISON: Chest radiograph dated 10/24/2023 and CT scan 11/15/2023   ACCESSION NUMBER(S): [id number]   ORDERING CLINICIAN: AYAKA MONTIEL   FINDINGS: AP radiograph of the chest Status post prior median sternotomy with intact sternal cerclage wires.   CARDIOMEDIASTINAL SILHOUETTE: The cardiomediastinal silhouette is stable in size and configuration.   LUNGS: There are low lung volumes with bronchovascular crowding and no pulmonary edema. No focal consolidation or sizeable pleural effusion is identified. No pneumothorax is seen.   ABDOMEN: No remarkable upper abdominal  findings.   BONES: No acute osseous abnormality.       1. No evidence of acute cardiopulmonary process.   Signed by: Rogers Stuart 11/23/2023 2:29 PM Dictation workstation:   [id number]    Vascular US carotid artery duplex bilateral    Result Date: 11/21/2023            [place] [address]   Tel [phone] and Fax [phone]  Vascular Lab Report VASC US CAROTID ARTERY DUPLEX BILATERAL  Patient Name:      ESTEPHANIA ELIZABETH         Reading Physician:  [id number] Loc Ervin MD, RPVI Study Date:        11/20/2023           Ordering Physician: [id number] SHERLEY KING MRN/PID:           [id number]             Technologist:       Rosa Elena Skinner T Accession#:        [id number]         Technologist 2: Date of Birth/Age: 1950 / 73 years Encounter#:         [id number] Gender:            M Admission Status:  Inpatient            Location Performed: [facility]  Diagnosis/ICD: Other specified symptoms and signs involving the circulatory and                respiratory systems-R09.89 CPT Codes:     82388 Cerebrovascular Carotid Duplex scan complete  CONCLUSIONS: Right Carotid: Findings are consistent with less than 50% stenosis of the right proximal internal carotid artery. Laminar flow seen by color Doppler. Right external carotid artery appears patent with no evidence of stenosis. The right vertebral artery is patent with antegrade flow. No evidence of hemodynamically significant stenosis in the right subclavian artery. Left Carotid: Findings are consistent with less than 50% stenosis of the left proximal internal carotid artery. Left external carotid artery appears patent with no evidence of stenosis. The left vertebral artery is patent with antegrade flow. No evidence of hemodynamically significant stenosis in the left subclavian artery.  Additional  Findings: Slightly elevated velocities in right ECA.  Imaging & Doppler Findings: Right Plaque Morph: The proximal right internal carotid artery demonstrates heterogenous, irregular and calcified plaque. The distal right common carotid artery demonstrates heterogenous, calcified and irregular plaque. Left Plaque Morph: The proximal left internal carotid artery demonstrates heterogenous and irregular plaque. The distal left common carotid artery demonstrates smooth and heterogenous plaque.   Right                        Left   PSV      EDV                PSV      EDV 76 cm/s            CCA P    109 cm/s 58 cm/s            CCA D    79 cm/s 94 cm/s  14 cm/s   ICA P    70 cm/s  18 cm/s 68 cm/s  22 cm/s   ICA M    81 cm/s  26 cm/s 46 cm/s  17 cm/s   ICA D    55 cm/s  18 cm/s 203 cm/s            ECA     129 cm/s 45 cm/s  17 cm/s Vertebral  43 cm/s  16 cm/s 75 cm/s          Subclavian 80 cm/s               Right Left ICA/CCA Ratio  1.6  0.9   [id number] Ervin MD, STEFANIE Electronically signed by [id number] Loc Ervin MD, STEFANIE on 11/21/2023 at 8:22:00 AM  ** Final **     Vascular US Ankle Brachial Index (ALLISON) Without Exercise    Result Date: 11/20/2023            [place] [address]   Tel [phone] and Fax [phone]  Vascular Lab Report [facility] US ANKLE BRACHIAL INDEX (ALLISON) WITHOUT EXERCISE  Patient Name:      ESTEPHANIA Gracia Physician:  [id number] Loc Ervin MD, STEFANIE Study Date:        11/20/2023           Ordering Physician: [id number] LOPEZ MRN/PID:           [id number]             Technologist:       Rosa Elena Skinner OLIVIA Accession#:        [id number]         Technologist 2: Date of Birth/Age: 1950 / 73 years Encounter#:         [id number] Gender:            M Admission Status:  Inpatient            Location Performed: [facility]  Diagnosis/ICD: Peripheral vascular disease,  unspecified-I73.9 CPT Codes:     23709 Peripheral artery ALLISON Only  CONCLUSIONS: Right Lower PVR: No evidence of arterial occlusive disease in the right lower extremity at rest. Normal digital perfusion noted. Biphasic flow is noted in the right dorsalis pedis artery and right posterior tibial artery. Triphasic flow is noted in the right common femoral artery. Left Lower PVR: No evidence of arterial occlusive disease in the left lower extremity at rest. Decreased digital perfusion noted. Biphasic flow is noted in the left posterior tibial artery and left dorsalis pedis artery. Triphasic flow is noted in the left common femoral artery.  Imaging & Doppler Findings:  RIGHT Lower PVR                Pressures Ratios Right Posterior Tibial (Ankle) 131 mmHg  1.25 Right Dorsalis Pedis (Ankle)   129 mmHg  1.23 Right Digit (Great Toe)        80 mmHg   0.76   LEFT Lower PVR                Pressures Ratios Left Posterior Tibial (Ankle) 108 mmHg  1.03 Left Dorsalis Pedis (Ankle)   120 mmHg  1.14 Left Digit (Great Toe)        38 mmHg   0.36                     Right     Left Brachial Pressure 105 mmHg 103 mmHg   [id number] Loc Ervin MD, RPVI Electronically signed by [id number] Loc Ervin MD, RPVI on 11/20/2023 at 4:22:55 PM  ** Final **     XR abdomen 1 view    Result Date: 11/20/2023  Interpreted By:  Wayne Gutiérrez and Liller Gregory STUDY: XR ABDOMEN 1 VIEW;  11/20/2023 7:27 am   INDICATION: Signs/Symptoms:Abd pain.   COMPARISON: CT abdomen pelvis 07/16/2019   ACCESSION NUMBER(S): [id number]   ORDERING CLINICIAN: GEOVANNA RUELAS   FINDINGS: Low and high centered abdomen images supine in position. There is gas and a mild amount of stool in the rectosigmoid and distal rectal distribution. Nonobstructive bowel gas pattern. Limited evaluation of pneumoperitoneum on supine imaging, however no gross evidence of free air is noted.   Visualized lungs are clear.   Osseous structures demonstrate no acute bony changes.        Nonobstructive bowel gas pattern.   I personally reviewed the images/study and I agree with the findings as stated above by resident physician, Dr. Edwin Salgado. The study was interpreted at [facility] in [facility].   Signed by: Wayne Gutiérrez 11/20/2023 1:04 PM Dictation workstation:   [id number]    IR angiogram lower extremity right    Result Date: 11/20/2023  These images are not reportable by radiology and will not be interpreted by  Radiologists.    Lower extremity vein mapping bilateral    Result Date: 11/16/2023            [place] [address]   Tel [phone] and Fax [phone]  Vascular Lab Report [facility] US LOWER EXTREMITY VEIN MAPPING BILATERAL  Patient Name:      ESTEPHANIA ELIZABETH         Reading Physician:  [id number] Scot Osman DO Study Date:        11/16/2023           Ordering Physician: [id number] LOPEZ MRN/PID:           [id number]             Technologist:       Leonila Joiner RVT Accession#:        [id number]         Technologist 2: Date of Birth/Age: 1950 / 73 years Encounter#:         [id number] Gender:            M Admission Status:  Inpatient            Location Performed: [facility]  Diagnosis/ICD: Peripheral vascular disease, unspecified-I73.9; Encounter for                other preprocedural examination-Z01.818 Indication:    Pre-operative exam CPT Codes:     96502 Vein mapping complete  **CRITICAL RESULT** Critical Result: Acute non -occlusive DVT in the right CFV. Notification called to Roberth Beth MD on 11/16/2023 at 9:09:19 AM by Leonila Joiner RVT.  CONCLUSIONS: Right Lower Venous: There is acute non-occlusive deep vein thrombosis visualized in the common femoral vein. Left Lower Venous: Left common femoral vein is negative for deep vein thrombus. Right Lower Vein Mapping: Right lower extremity vein: The right great saphenous vein is positive for acute SVT from the  saphenofemoral junction thru the mid calf. The distal calf great saphenous vein is fibrotic. Left Lower Vein Mapping: The left distal thigh great saphenous, proximal calf great saphenous, mid calf great saphenous and distal calf great saphenous were not evaluated due to previously being harvested. Left lower extremity vein: The left great saphenous vein appears widely patent with no evidence of thrombosis or fibrosis from the proximal thru mid thigh.  Imaging & Doppler Findings:  Right          Compress Thrombus SFJ               No     Acute Prox Thigh GSV    No     Acute Mid Thigh GSV            Acute Knee GSV          No     Acute Prox Calf GSV     No     Acute Mid Calf GSV      No     Acute Dist Calf GSV    Yes    Fibrotic  Left           Compress Thrombus  Diam SFJ              Yes      None   10.0 mm Prox Thigh GSV   Yes      None   5.2 mm Mid Thigh GSV    Yes      None   4.0 mm  Right Compressible      Thrombus              Flow CFV     Partial    Acute non-occlusive Spontaneous/Phasic  Left Compress Thrombus        Flow CFV    Yes      None   Spontaneous/Phasic  [id number] Scot Osman DO Electronically signed by Lucas Osman DO on 11/16/2023 at 11:20:36 AM  ** Final **     Upper extremity vein mapping bilateral    Result Date: 11/16/2023            [place] [address]   Tel [phone] and Fax [phone]  Vascular Lab Report VASC US UPPER EXTREMITY VEIN MAPPING BILATERAL  Patient Name:      ESTEPHANIA Gracia Physician:  [id number] Scot Osman DO Study Date:        11/16/2023           Ordering Physician: [id number] LOPEZ MRN/PID:           [id number]             Technologist:       Leonila Joiner T Accession#:        [id number]         Technologist 2: Date of Birth/Age: 1950 / 73 years Encounter#:         [id number] Gender:            M Admission Status:  Inpatient            Location Performed: [facility]  Diagnosis/ICD:  Peripheral vascular disease, unspecified-I73.9 CPT Codes:     95087 Vein mapping complete  CONCLUSIONS: Left Upper Vein Mapping: Left upper extremity vein: The basilic vein in the upper arm begins at the mid upper arm. The basilic vein appears widely patent iwth no evidence of thrombosis or fibrosis from the mid upper arm to the distal forearm. The cephalic vein appears widely patent with no evidence of thrombosis or fibrosis from the proximal upper arm to the proximal forearm. The cephalic vein was not visualized from mid to distal forearm. Right Upper Vein Mapping: Right upper extremity vein: Unable to evaluate the cephalic vein in the forearm due to IV lines. The cephalic vein appears widely patent with no evidence of thrombosis or fibrosis in the upper arm. The basilic vein appears widely patent with no evidence of thrombosis or fibrosis from the proximal upper arm to the mid forearm.  Imaging & Doppler Findings:  Right                Compress Thrombus  Diam Cephalic Prox Arm      Yes      None   4.4 mm Cephalic Mid Arm       Yes      None   4.7 mm Cephalic Distal Arm    Yes      None   4.6 mm Basilic Prox Arm       Yes      None   6.7 mm Basilic Mid Arm        Yes      None   4.3 mm Basilic Distal Arm     Yes      None   5.1 mm Basilic Prox Forearm   Yes      None   2.4 mm Basilic Mid Forearm    Yes      None   2.3 mm  Left                   Compress Thrombus  Diam Cephalic Prox Arm        Yes      None   2.2 mm Cephalic Mid Arm         Yes      None   2.8 mm Cephalic Distal Arm      Yes      None   2.2 mm Cephalic Prox Forearm    Yes      None   2.5 mm Basilic Mid Arm          Yes      None   8.1 mm Basilic Distal Arm       Yes      None   5.0 mm Basilic Prox Forearm     Yes      None   5.4 mm Basilic Mid Forearm      Yes      None   3.1 mm Basilic Distal Forearm   Yes      None   2.9 mm  [id number] Scot Osman DO Electronically signed by [id number] Scot Osman DO on 11/16/2023 at 11:19:53 AM  ** Final **      Transthoracic Echo (TTE) Complete    Result Date: 11/15/2023   [facility], [address] [place] [address]                Tel [phone] and Fax [phone] TRANSTHORACIC ECHOCARDIOGRAM REPORT  Patient Name:      ESTEPHANIA KEE CLARA Gracia Physician:   [id number] Monisha He MD Study Date:        11/15/2023          Ordering Provider:   [id number] MICAH LOPEZ MRN/PID:           [id number]            Fellow: Accession#:        [id number]        Nurse:               Kathy Eduardo RN Date of Birth/Age: 1950 / 73      Sonographer:         Gordon cordova                                    RDCS Gender:            M                   Additional Staff: Height:            177.80 cm           Admit Date:          11/15/2023 Weight:            108.86 kg           Admission Status:    Inpatient - STAT BSA:               2.26 m2             Encounter#:          [id number]                                        Department Location: [facility] Blood Pressure: 92 /64 mmHg Study Type:    TRANSTHORACIC ECHO (TTE) COMPLETE Diagnosis/ICD: Multiple subsegmental pulmonary emboli without acute cor                pulmonale-I26.94 Indication:    multiple subsegmental pulmonary emboli without acute cor                pulmonale CPT Code:      Echo Complete w Full Doppler-22146 Patient History: Pertinent History: DVTs, PE, HTN, CABG x3, Afib w/ RVR, HLD, MI. Study Detail: The following Echo studies were performed: 2D, M-Mode, Doppler and               color flow. Technically challenging study due to body habitus,               patient lying in supine position, poor acoustic windows and               prominent lung artifact. Definity used as a contrast agent for               endocardial border definition and agitated saline used as a               contrast agent for intraseptal flow evaluation. Total contrast               used for this procedure was 4.0 mL via IV push.  PHYSICIAN  INTERPRETATION: Left Ventricle: The left ventricular systolic function is normal, with an estimated ejection fraction of 65-70%. The patient is in atrial fibrillation which may influence the estimate of left ventricular function and transvalvular flows. There are no regional wall motion abnormalities. The left ventricular cavity size is normal. There is paradoxical septal wall motion. Left ventricular diastolic filling was indeterminate. Left Atrium: The left atrium is upper limits of normal in size. Color Doppler flow in the region of the atrial septum suggestive of possible small left to right shunt. Agitated saline contrast study was technically difficult but may have demonstrated some bubbles within the LV ( unclear) and exact timing of their appearance is unclear. Right Ventricle: The right ventricle is mildly enlarged. There is moderate to severely reduced right ventricular systolic function. The RV appears upper limits of normal size vs mildly dilated with moderate to severely reduced fx with a McConnels sign consistent iwth pulmonary embolus. Right Atrium: The right atrium is mildly dilated. Aortic Valve: The aortic valve is trileaflet. There is minimal aortic valve cusp calcification. There is mild aortic valve regurgitation. The peak instantaneous gradient of the aortic valve is 7.4 mmHg. Mitral Valve: The mitral valve is normal in structure. There is mild mitral valve regurgitation. Tricuspid Valve: The tricuspid valve is structurally normal. There is mild tricuspid regurgitation. The Doppler estimated RVSP is mildly elevated at 36.1 mmHg. Pulmonic Valve: The pulmonic valve is not well visualized. There is physiologic pulmonic valve regurgitation. Pericardium: There is a trivial pericardial effusion. Aorta: The aortic root is abnormal. There is mild dilatation of the ascending aorta. There is mild dilatation of the aortic root. Systemic Veins: The inferior vena cava appears dilated. There is IVC  inspiratory collapse greater than 50%. In comparison to the previous echocardiogram(s): Compared with the images from the prior TTE done 10/25/2023 there was already RV systolic dysfunction with possible McConnels sign at that time.  CONCLUSIONS:  1. Left ventricular systolic function is normal with a 65-70% estimated ejection fraction.  2. The patient is in atrial fibrillation which may influence the estimate of left ventricular function and transvalvular flows.  3. The RV appears upper limits of normal size vs mildly dilated with moderate to severely reduced fx with a McConnels sign consistent iwth pulmonary embolus.  4. Color Doppler flow in the region of the atrial septum suggestive of possible small left to right shunt. Agitated saline contrast study was technically difficult but may have demonstrated some bubbles within the LV ( unclear) and exact timing of their appearance is unclear.  5. Mildly elevated RVSP.  6. Mild aortic valve regurgitation.  7. Findings discussed with the ER team at the time of reporting.  8. Compared with the images from the prior TTE done 10/25/2023 there was already RV systolic dysfunction with possible McConnels sign at that time. QUANTITATIVE DATA SUMMARY: 2D MEASUREMENTS:                          Normal Ranges: Ao Root d:     3.90 cm   (2.0-3.7cm) LAs:           4.80 cm   (2.7-4.0cm) IVSd:          1.10 cm   (0.6-1.1cm) LVPWd:         1.10 cm   (0.6-1.1cm) LVIDd:         4.90 cm   (3.9-5.9cm) LVIDs:         3.30 cm LV Mass Index: 88.9 g/m2 LV % FS        32.7 % LA VOLUME:                               Normal Ranges: LA Vol A4C:        71.6 ml    (22+/-6mL/m2) LA Vol A2C:        76.6 ml LA Vol BP:         76.3 ml LA Vol Index A4C:  31.7ml/m2 LA Vol Index A2C:  34.0 ml/m2 LA Vol Index BP:   33.8 ml/m2 LA Area A4C:       24.4 cm2 LA Area A2C:       26.0 cm2 LA Major Axis A4C: 7.1 cm LA Major Axis A2C: 7.5 cm LA Volume Index:   33.7 ml/m2 RA VOLUME BY A/L METHOD:                        Normal Ranges: RA Area A4C: 19.4 cm2 AORTA MEASUREMENTS:                      Normal Ranges: Ao Sinus, d: 3.80 cm (2.1-3.5cm) Asc Ao, d:   3.60 cm (2.1-3.4cm) LV SYSTOLIC FUNCTION BY 2D PLANIMETRY (MOD):                     Normal Ranges: EF-A4C View: 69.6 % (>=55%) EF-A2C View: 66.6 % EF-Biplane:  67.1 % LV DIASTOLIC FUNCTION:                     Normal Ranges: MV Peak E: 0.98 m/s (0.7-1.2 m/s) MV DT:     190 msec (150-240 msec) MITRAL VALVE:                 Normal Ranges: MV DT: 190 msec (150-240msec) AORTIC VALVE:                         Normal Ranges: AoV Vmax:      1.36 m/s (<=1.7m/s) AoV Peak P.4 mmHg (<20mmHg) LVOT Max Mark:  0.84 m/s (<=1.1m/s) LVOT VTI:      16.90 cm LVOT Diameter: 1.90 cm  (1.8-2.4cm) AoV Area,Vmax: 1.74 cm2 (2.5-4.5cm2)  RIGHT VENTRICLE: RV Basal 4.20 cm RV Mid   3.00 cm RV Major 7.2 cm TAPSE:   6.1 mm RV s'    0.06 m/s TRICUSPID VALVE/RVSP:                             Normal Ranges: Peak TR Velocity: 2.65 m/s RV Syst Pressure: 36.1 mmHg (< 30mmHg) IVC Diam:         2.30 cm PULMONIC VALVE:                         Normal Ranges: PV Accel Time: 65 msec  (>120ms) PV Max Mark:    0.8 m/s  (0.6-0.9m/s) PV Max P.8 mmHg  [id number] Monisha He MD Electronically signed on 11/15/2023 at 3:36:33 PM  ** Final **     CT angio chest for pulmonary embolism    Result Date: 11/15/2023  Interpreted By:  Wesley Avilez, STUDY: CT ANGIO CHEST FOR PULMONARY EMBOLISM;  11/15/2023 2:45 am   INDICATION: Signs/Symptoms:PE with RH strain from OSH.   COMPARISON: CT chest 2023   ACCESSION NUMBER(S): [id number]   ORDERING CLINICIAN: EMILI AMATO   TECHNIQUE: Contiguous axial images of the chest were obtained after the intravenous administration of 90 mL Omnipaque 350 contrast using angiographic PE protocol. Coronal and sagittal reformatted images were reconstructed from the axial data. MIP images were created on an independent workstation and reviewed.   FINDINGS: PULMONARY ARTERIES: Adequate  opacification to the level of the subsegmental arteries. Filling defect within the distal main pulmonary artery extending into the right middle lobar artery interlobar artery and lower lobar arteries. Additional nonocclusive filling defects are noted within the segmental and subsegmental arteries of the left lower lobe. The main pulmonary artery is enlarged at 3.2 cm. Left ventricular right ventricular ratio of approximately 1 and straightening of the interventricular septum. Reflux of contrast into the hepatic venous circuit. These findings suggest right heart strain.   HEART: Mild 4 chamber cardiomegaly. Moderate triple-vessel coronary vascular calcifications. Postsurgical changes suggestive of coronary artery bypass grafting. Please note this examination is not optimized for the assessment of the coronary arteries or bypass grafts. No significant pericardial effusion.   VESSELS: Normal caliber aorta without dissection. Mild calcifications of the aortic arch and proximal great vessels.   MEDIASTINUM AND LYMPH NODES: Visualized thyroid is within normal limits. No enlarged intrathoracic or axillary lymph nodes by imaging criteria. No pneumomediastinum. The esophagus is within normal limits. Small hiatal hernia.   LUNG, AIRWAYS, AND PLEURA: Trachea and proximal mainstem bronchi are patent. Subsegmental atelectatic changes in the bilateral lung bases. No pleural effusion or pneumothorax.   OSSEOUS STRUCTURES: No acute osseous abnormality. Multilevel degenerative changes of the thoracic spine. Median sternotomy changes.   CHEST WALL SOFT TISSUES: No discernible abnormality.   UPPER ABDOMEN/OTHER: Cholelithiasis. Left upper pole renal cyst.       1. Bilateral pulmonary emboli with moderate clot burden in the distal right main pulmonary artery extending into the right middle and right lower lobes. Small clot burden in the left lower lobe pulmonary arteries. 2. Findings suggestive of right heart strain, correlate with  echocardiogram. 3. Additional findings and discussion as above.   MACRO: Wesley Avilez discussed the significance and urgency of this critical finding by telephone with Dr. Leonid Sahu  on 11/15/2023 at 2:58 am. (**-RCF-**) Findings:  See findings.   Signed by: Wesley Avilez 11/15/2023 3:02 AM Dictation workstation:   [id number]    Point of Care Ultrasound    Result Date: 11/15/2023  Leonid Sahu MD     11/15/2023  7:30 AM Performed by: Leonid Sahu MD Authorized by: Francisco Frey MD  Cardiac Indications: Assessment of right heart strain in patient with PE Procedure: Cardiac Ultrasound Findings:  Views: parasternal long, parasternal short, apical four and subxiphoid The pericardial space was visualized and was NEGATIVE for a significant pericardial effusion. Activity: Ventricular contractions were visualized. LV: LV systolic function was NORMAL. RV: RV size was NORMAL. Impression: Cardiac: The focused cardiac ultrasound exam had ABNORMAL findings as specified. Comments: No D sign.  RV mildly dilated but still smaller in diameter than LV.  Some ventricular septal bounce in parasternal short and apical four-chamber.  TAPSE measured at 9 mm.    CT angio lower extremity left w and or wo IV contrast    Result Date: 11/14/2023  Interpreted By:  Roland Dubon, STUDY: CT ANGIO LOWER EXTREMITY LEFT W AND OR WO IV CONTRAST;  11/14/2023 6:35 pm   INDICATION: Signs/Symptoms:pain, concern for arterial occlusion.   COMPARISON: None.   ACCESSION NUMBER(S): [id number]   ORDERING CLINICIAN: GENNA ARCE   TECHNIQUE: Multiple contiguous axial images from the left lower chest through the left lower extremity were obtained after the administration of contrast material in the arterial phase. Coronal and sagittal reformats were submitted for review.       Atherosclerotic disease of the left common iliac, femoral and popliteal arteries. There is an intraluminal filling defect involving the popliteal artery, which eventually causes  popliteal artery occlusion. There are no significant collateral vessels identified. There is however reconstitution of flow to the tibialis posterior, anterior and peroneal arteries, which demonstrate enhancement. There is a however very tenuous flow to the tibialis anterior and peroneal arteries at the level of the ankle, extending distally.   Visualized soft tissues of the chest, abdomen and pelvis demonstrate a left renal cyst.   There is soft tissue thickening around the left lower extremity, beginning at the level of the distal tibial metaphysis, with extension along the dorsum of the foot.   MACRO: Occlusion of the left popliteal artery. There is reconstitution of flow to the left tibialis anterior, tibialis posterior and peroneal vessels. There is however very tenuous flow to the left tibialis anterior and peroneal arteries at the level of the left ankle, extending distally.   Signed by: Roland Dubon 11/14/2023 10:56 PM Dictation workstation:   [id number]    XR foot left 3+ views    Result Date: 11/14/2023  Interpreted By:  Ghassan Campbell, STUDY: XR FOOT LEFT 3+ VIEWS; ;  11/14/2023 4:30 pm   INDICATION: Signs/Symptoms:pain.   COMPARISON: June 2014   ACCESSION NUMBER(S): [id number]   ORDERING CLINICIAN: GENNA ARCE   FINDINGS:   There is no fracture or subluxation. Degenerative changes tarsal and tarsometatarsal joints are present, with joint space narrowing, mild sclerotic changes and small osteophytes. The pattern is suggestive of for possible neuropathic arthropathy. No focal lytic lesion or abnormal periosteal reaction is seen to suggest acute osteomyelitis. The alignment is anatomic. Diffuse soft tissue swelling is noted, especially involving the dorsum of the forefoot and ankle. Vascular calcifications are also noted.       Diffuse soft tissue swelling, especially involving the dorsum of the forefoot and degenerative changes probably related to early Charcot's arthropathy. No radiographic evidence of  acute osteomyelitis; correlate clinically and follow-up as needed.   Signed by: Ghassan Campbell 11/14/2023 5:05 PM Dictation workstation:   [id number]    Vascular US lower extremity venous duplex left    Result Date: 11/14/2023  Interpreted By:  Roland Dubon, STUDY: [facility] US LOWER EXTREMITY VENOUS DUPLEX LEFT  11/14/2023 4:19 pm   INDICATION: 72 y/o   M with  Signs/Symptoms:pain. LMP:  Unknown.   COMPARISON: None.   ACCESSION NUMBER(S): [id number]   ORDERING CLINICIAN: GENNA ARCE   TECHNIQUE: Routine ultrasound of the  left lower extremity was performed with duplex Doppler (color and spectral) evaluation.   Static images were obtained for remote interpretation.   FINDINGS: THIGH VEINS:  The common femoral, femoral, popliteal, proximal medial saphenous, and deep femoral veins are patent and free of thrombus. The veins are normally compressible.  They demonstrate normal phasic flow and augmentation response.   CALF VEINS:  The paired peroneal and posterior tibial calf veins are patent.       Negative study.  No deep venous thrombosis of the left lower extremity.   MACRO: None   Signed by: Roland Dubon 11/14/2023 4:24 PM Dictation workstation:   [id number]    ECG 12 Lead    Result Date: 11/13/2023  Atrial fibrillation with rapid ventricular response with premature ventricular or aberrantly conducted complexes Low voltage QRS Septal infarct , age undetermined ST & T wave abnormality, consider inferolateral ischemia Abnormal ECG No previous ECGs available Confirmed by Anastasiya Aguilera ([id number]) on 11/13/2023 7:51:55 AM       Medications:  Scheduled medications  allopurinol, 100 mg, oral, Daily  amLODIPine, 10 mg, oral, Daily  atorvastatin, 40 mg, oral, Nightly  carvedilol, 12.5 mg, oral, BID with meals  clopidogrel, 75 mg, oral, Daily  colchicine, 0.6 mg, oral, BID  dicyclomine, 20 mg, oral, Daily  ezetimibe, 10 mg, oral, Daily  famotidine, 20 mg, oral, Daily  lisinopril, 40 mg, oral, Daily  loperamide, 2 mg, oral,  Daily  warfarin, 3 mg, oral, Daily      Continuous medications     PRN medications  PRN medications: acetaminophen **OR** acetaminophen, acetaminophen, alum-mag hydroxide-simeth, bisacodyl, docusate sodium, polyethylene glycol    Assessment/Plan   Admitted for comprehensive inpatient rehabilitation including PT, OT, RT, SLP, and supportive services to improve functional outcome of impaired mobility, ADLs, transfers, and self-care.     Problem   Debility   Acute Occlusion of Popliteal Artery Due to Thrombosis ([facility])  Local care to the wounds, off of the low molecular weight heparin, warfarin for goal INR of 2-3   Hypercoagulable State (Cms/[facility])   Multiple Subsegmental Pulmonary Emboli Without Acute Cor Pulmonale (Cms/[facility])  Stable respiratory status at this time   Limb Ischemia   Paroxysmal Atrial Fibrillation (Cms/[facility])  Rate controlled anticoagulated   Ashd (Arteriosclerotic Heart Disease), status post coronary artery bypass grafting x3  Free of anginal symptoms   Hyperlipidemia  Continue statin   Hypertension  Continue carvedilol, amlodipine and lisinopril   Diarrhea  We will increase the Imodium to 3 times daily for now   Gout  That the colchicine as pain has improved and unacceptable GI adverse effects are noted.  He could use a short course of steroids if needed        The patient is admitted to the acute rehabilitation unit, where he will receive intensive physical therapy, occupational therapy and rehabilitation nursing care with a goal of returning to his prior level of functional independence.      Rehab Plan of Care :  The Interdisciplinary Team will work collaboratively to address the patient problems and goals to be managed by the Individualized Interdisciplinary Plan of Care. See the IPOC note for a complete review of the plan of care.    Medical Necessity:  This patient requires, and is capable of participating in, an intensive and coordinated interdisciplinary acute inpatient rehabilitation program.  He requires close rehabilitation physician monitoring and management to monitor his complex medical conditions and coordinate rehabilitation care. The patient's rehabilitation goals and medical complexity cannot adequately be managed in a less intensive setting. Potential risks for clinical complications include: respiratory compromise, falls.    Rehabilitation Potential: good    Frizt Turner MD

## 2023-11-28 NOTE — CARE PLAN
The patient's goals for the shift include  staying continent    The clinical goals for the shift include manage diarrhea    Over the shift, the patient did not make progress toward the following goals. Barriers to progression include diarrhea. Recommendations to address these barriers include immodium.

## 2023-11-28 NOTE — PROGRESS NOTES
Physical Therapy       11/28/23 [id number]   PT  Visit   PT Received On 11/28/23   Response to Previous Treatment Patient reporting fatigue but able to participate.   General   Reason for Referral Presented with LLE subacute on chronic limb ischemia and PE.s/p fasciotomies, L fem cutdown w/ ileofem thromboembolectomy, L pop cutdown w/ fempop thromboembolectomy, L TP trunk cutdown w/ thromboembolectomy, selective angiography LLE, Concern for L to R shunt   Past Medical History Relevant to Rehab CAD s/p PCIx3 and CABGx3 (LIMA-Diag, free ALEX y to LAD, SVG-PDA, 2013), afib s/p MAZE (2013), and recent admission for R DVT iso COVID infection (10/24/23) on apixaban   Patient Position Received Up in chair   Pain Assessment   Pain Assessment 0-10   Pain Score 1  (1/10 left LE in sitting, 6/10 in standing left LE taht returns to 1/10 upon sitting)   Pain Type Surgical pain   Therapeutic Exercise   Therapeutic Exercise Activity 1 LAQ 2 x 15   Therapeutic Exercise Activity 2 seated hip flex 2 x 15   Therapeutic Exercise Activity 3 hip abd 2 x 15 with green theraband   Therapeutic Exercise Activity 4 hip add/ball squeezes 2 x15   Therapeutic Exercise Activity 5 seated hamstring curls 2 x 15, with green theraband   Ambulation/Gait Training 1   Surface 1 Level tile   Device 1 Parallel bars   Assistance 1 Maximum assistance   Quality of Gait 1 Antalgic;Soft knee(s);Knee(s) buckle;Shuffling gait   Comments/Distance (ft) 1 2 small steps in ll bars with knees blocked. Cues for upright posture. Unable to safely take more steps but able to stand x 1 minute with mod assist, knees blocked, cues for upright posture. Fatigues quickly. Mild dizziness at end of stand but resolves quickly upon sitting.   Transfer 1   Technique 1 Sit to stand;Stand to sit   Transfer Level of Assistance 1 Moderate assistance   Trials/Comments 1 pull up on llbars. Cues for technique and for eccentric control on sit.   Wheelchair Activities   Propulsion Type 1  Manual   Level 1 Level tile   Method 1 Right upper extremity;Left upper extremity   Level of Assistance 1 Minimum assistance;Close supervision   Description/Details 1 50' supervised straight, min assist on turns, cues for technique, fatigues quickly   Activity Tolerance   Endurance Tolerates less than 10 min exercise, no significant change in vital signs   PT Assessment   PT Assessment Results Decreased strength;Decreased range of motion;Decreased endurance;Impaired balance;Decreased mobility;Decreased coordination;Pain   Rehab Prognosis Excellent   End of Session Patient Position Up in chair   PT Plan   Inpatient/Swing Bed or Outpatient Inpatient   PT Plan   Treatment/Interventions Transfer training;Gait training;Strengthening;Therapeutic exercise;Therapeutic activity;Wheelchair management   PT Plan Skilled PT   PT Recommended Transfer Status Assist x2  (Kerline Bond)   PT - OK to Discharge Yes

## 2023-11-29 LAB
INR PPP: 2.8 (ref 0.9–1.2)
PROTHROMBIN TIME: 27.1 SECONDS (ref 9.3–12.7)

## 2023-11-29 PROCEDURE — 97110 THERAPEUTIC EXERCISES: CPT | Mod: GO

## 2023-11-29 PROCEDURE — 97530 THERAPEUTIC ACTIVITIES: CPT | Mod: GO

## 2023-11-29 PROCEDURE — 1180000001 HC REHAB PRIVATE ROOM DAILY

## 2023-11-29 PROCEDURE — 97535 SELF CARE MNGMENT TRAINING: CPT | Mod: GO

## 2023-11-29 PROCEDURE — 36415 COLL VENOUS BLD VENIPUNCTURE: CPT | Performed by: INTERNAL MEDICINE

## 2023-11-29 PROCEDURE — 97530 THERAPEUTIC ACTIVITIES: CPT | Mod: GP

## 2023-11-29 PROCEDURE — 2500000002 HC RX 250 W HCPCS SELF ADMINISTERED DRUGS (ALT 637 FOR MEDICARE OP, ALT 636 FOR OP/ED): Performed by: INTERNAL MEDICINE

## 2023-11-29 PROCEDURE — 97110 THERAPEUTIC EXERCISES: CPT | Mod: GP

## 2023-11-29 PROCEDURE — 2500000001 HC RX 250 WO HCPCS SELF ADMINISTERED DRUGS (ALT 637 FOR MEDICARE OP): Performed by: INTERNAL MEDICINE

## 2023-11-29 PROCEDURE — 85610 PROTHROMBIN TIME: CPT | Performed by: INTERNAL MEDICINE

## 2023-11-29 PROCEDURE — 2500000002 HC RX 250 W HCPCS SELF ADMINISTERED DRUGS (ALT 637 FOR MEDICARE OP, ALT 636 FOR OP/ED): Mod: MUE | Performed by: INTERNAL MEDICINE

## 2023-11-29 PROCEDURE — 94760 N-INVAS EAR/PLS OXIMETRY 1: CPT

## 2023-11-29 RX ORDER — FLUOCINONIDE 0.5 MG/G
OINTMENT TOPICAL 2 TIMES DAILY
Status: DISCONTINUED | OUTPATIENT
Start: 2023-11-29 | End: 2023-12-09 | Stop reason: HOSPADM

## 2023-11-29 RX ADMIN — DICYCLOMINE HYDROCHLORIDE 20 MG: 20 TABLET ORAL at 08:39

## 2023-11-29 RX ADMIN — FLUOCINONIDE 1 APPLICATION: 0.5 OINTMENT TOPICAL at 21:00

## 2023-11-29 RX ADMIN — LISINOPRIL 40 MG: 40 TABLET ORAL at 08:38

## 2023-11-29 RX ADMIN — ATORVASTATIN CALCIUM 40 MG: 40 TABLET, FILM COATED ORAL at 21:22

## 2023-11-29 RX ADMIN — EZETIMIBE 10 MG: 10 TABLET ORAL at 08:38

## 2023-11-29 RX ADMIN — ACETAMINOPHEN 650 MG: 325 TABLET ORAL at 14:23

## 2023-11-29 RX ADMIN — ACETAMINOPHEN 650 MG: 325 TABLET ORAL at 21:21

## 2023-11-29 RX ADMIN — LOPERAMIDE HYDROCHLORIDE 2 MG: 2 CAPSULE ORAL at 08:39

## 2023-11-29 RX ADMIN — LOPERAMIDE HYDROCHLORIDE 2 MG: 2 CAPSULE ORAL at 14:07

## 2023-11-29 RX ADMIN — WARFARIN SODIUM 4 MG: 2 TABLET ORAL at 17:23

## 2023-11-29 RX ADMIN — ACETAMINOPHEN 650 MG: 325 TABLET ORAL at 01:10

## 2023-11-29 RX ADMIN — ALLOPURINOL 100 MG: 100 TABLET ORAL at 08:39

## 2023-11-29 RX ADMIN — ACETAMINOPHEN 650 MG: 325 TABLET ORAL at 08:47

## 2023-11-29 RX ADMIN — CARVEDILOL 12.5 MG: 12.5 TABLET, FILM COATED ORAL at 08:38

## 2023-11-29 RX ADMIN — FAMOTIDINE 20 MG: 20 TABLET, FILM COATED ORAL at 08:38

## 2023-11-29 RX ADMIN — LOPERAMIDE HYDROCHLORIDE 2 MG: 2 CAPSULE ORAL at 21:22

## 2023-11-29 RX ADMIN — CLOPIDOGREL BISULFATE 75 MG: 75 TABLET ORAL at 08:39

## 2023-11-29 RX ADMIN — AMLODIPINE BESYLATE 10 MG: 10 TABLET ORAL at 08:38

## 2023-11-29 SDOH — SOCIAL STABILITY: SOCIAL INSECURITY: ARE YOU OR HAVE YOU BEEN THREATENED OR ABUSED PHYSICALLY, EMOTIONALLY, OR SEXUALLY BY ANYONE?: NO

## 2023-11-29 SDOH — SOCIAL STABILITY: SOCIAL INSECURITY: DO YOU FEEL ANYONE HAS EXPLOITED OR TAKEN ADVANTAGE OF YOU FINANCIALLY OR OF YOUR PERSONAL PROPERTY?: NO

## 2023-11-29 SDOH — SOCIAL STABILITY: SOCIAL INSECURITY: ABUSE: ADULT

## 2023-11-29 SDOH — SOCIAL STABILITY: SOCIAL INSECURITY: DOES ANYONE TRY TO KEEP YOU FROM HAVING/CONTACTING OTHER FRIENDS OR DOING THINGS OUTSIDE YOUR HOME?: NO

## 2023-11-29 SDOH — SOCIAL STABILITY: SOCIAL INSECURITY: DO YOU FEEL UNSAFE GOING BACK TO THE PLACE WHERE YOU ARE LIVING?: NO

## 2023-11-29 SDOH — SOCIAL STABILITY: SOCIAL INSECURITY: HAVE YOU HAD THOUGHTS OF HARMING ANYONE ELSE?: NO

## 2023-11-29 SDOH — SOCIAL STABILITY: SOCIAL INSECURITY: WERE YOU ABLE TO COMPLETE ALL THE BEHAVIORAL HEALTH SCREENINGS?: YES

## 2023-11-29 SDOH — SOCIAL STABILITY: SOCIAL INSECURITY: ARE THERE ANY APPARENT SIGNS OF INJURIES/BEHAVIORS THAT COULD BE RELATED TO ABUSE/NEGLECT?: NO

## 2023-11-29 SDOH — SOCIAL STABILITY: SOCIAL INSECURITY: HAS ANYONE EVER THREATENED TO HURT YOUR FAMILY OR YOUR PETS?: NO

## 2023-11-29 ASSESSMENT — PAIN DESCRIPTION - LOCATION
LOCATION: FOOT
LOCATION: LEG
LOCATION: LEG
LOCATION: FOOT
LOCATION: LEG
LOCATION: LEG

## 2023-11-29 ASSESSMENT — PAIN SCALES - GENERAL
PAINLEVEL_OUTOF10: 0 - NO PAIN
PAINLEVEL_OUTOF10: 3
PAINLEVEL_OUTOF10: 0 - NO PAIN
PAINLEVEL_OUTOF10: 2
PAINLEVEL_OUTOF10: 4
PAINLEVEL_OUTOF10: 3
PAINLEVEL_OUTOF10: 5 - MODERATE PAIN
PAINLEVEL_OUTOF10: 0 - NO PAIN
PAINLEVEL_OUTOF10: 1
PAINLEVEL_OUTOF10: 3
PAINLEVEL_OUTOF10: 5 - MODERATE PAIN
PAINLEVEL_OUTOF10: 3
PAINLEVEL_OUTOF10: 0 - NO PAIN

## 2023-11-29 ASSESSMENT — ACTIVITIES OF DAILY LIVING (ADL)
BOWEL: INCONTINENT
HOME_MANAGEMENT_TIME_ENTRY: 45
BATHING_LEVEL_OF_ASSISTANCE: CLOSE SUPERVISION
TOILETING: INDEPENDENT
DRESSING: INDEPENDENT
BATHING_WHERE_ASSESSED: SHOWER
BLADDER: CONTINENT
BATHING_EQUIPMENT_NEEDED: LONG-HANDLED SPONGE;REMOVEABLE SHOWER HEAD
BATHING: INDEPENDENT
DOMESTIC_CHORES: INDEPENDENT

## 2023-11-29 ASSESSMENT — LIFESTYLE VARIABLES
AUDIT TOTAL SCORE: 0
HOW OFTEN DURING THE LAST YEAR HAVE YOU HAD A FEELING OF GUILT OR REMORSE AFTER DRINKING: NEVER
AUDIT-C TOTAL SCORE: 3
HOW OFTEN DURING THE LAST YEAR HAVE YOU NEEDED AN ALCOHOLIC DRINK FIRST THING IN THE MORNING TO GET YOURSELF GOING AFTER A NIGHT OF HEAVY DRINKING: NEVER
HOW OFTEN DURING THE LAST YEAR HAVE YOU BEEN UNABLE TO REMEMBER WHAT HAPPENED THE NIGHT BEFORE BECAUSE YOU HAD BEEN DRINKING: NEVER
SKIP TO QUESTIONS 9-10: 0
HOW MANY STANDARD DRINKS CONTAINING ALCOHOL DO YOU HAVE ON A TYPICAL DAY: 1 OR 2
HOW OFTEN DO YOU HAVE A DRINK CONTAINING ALCOHOL: 2-4 TIMES A MONTH
HAS A RELATIVE, FRIEND, DOCTOR, OR ANOTHER HEALTH PROFESSIONAL EXPRESSED CONCERN ABOUT YOUR DRINKING OR SUGGESTED YOU CUT DOWN: NO
AUDIT-C TOTAL SCORE: 3
HOW OFTEN DURING THE LAST YEAR HAVE YOU FOUND THAT YOU WERE NOT ABLE TO STOP DRINKING ONCE YOU HAD STARTED: NEVER
PRESCIPTION_ABUSE_PAST_12_MONTHS: NO
AUDIT TOTAL SCORE: 3
HOW OFTEN DURING THE LAST YEAR HAVE YOU FAILED TO DO WHAT WAS NORMALLY EXPECTED FROM YOU BECAUSE OF DRINKING: NEVER
SUBSTANCE_ABUSE_PAST_12_MONTHS: NO
HOW OFTEN DO YOU HAVE 6 OR MORE DRINKS ON ONE OCCASION: LESS THAN MONTHLY
HAVE YOU OR SOMEONE ELSE BEEN INJURED AS A RESULT OF YOUR DRINKING: NO

## 2023-11-29 ASSESSMENT — PAIN - FUNCTIONAL ASSESSMENT
PAIN_FUNCTIONAL_ASSESSMENT: 0-10

## 2023-11-29 ASSESSMENT — PAIN DESCRIPTION - ORIENTATION
ORIENTATION: LEFT

## 2023-11-29 ASSESSMENT — PATIENT HEALTH QUESTIONNAIRE - PHQ9
2. FEELING DOWN, DEPRESSED OR HOPELESS: NOT AT ALL
SUM OF ALL RESPONSES TO PHQ9 QUESTIONS 1 & 2: 0
1. LITTLE INTEREST OR PLEASURE IN DOING THINGS: NOT AT ALL

## 2023-11-29 ASSESSMENT — PAIN DESCRIPTION - DESCRIPTORS: DESCRIPTORS: ACHING

## 2023-11-29 NOTE — PROGRESS NOTES
11/29/23 5730-6850   General   Reason for Referral Presented with LLE subacute on chronic limb ischemia and PE.s/p fasciotomies, L fem cutdown w/ ileofem thromboembolectomy, L pop cutdown w/ fempop thromboembolectomy, L TP trunk cutdown w/ thromboembolectomy, selective angiography LLE, Concern for L to R shunt   Referred By Dr. Turner   Past Medical History Relevant to Rehab CAD s/p PCIx3 and CABGx3 (LIMA-Diag, free ALEX y to LAD, SVG-PDA, 2013), afib s/p MAZE (2013), and recent admission for R DVT iso COVID infection (10/24/23) on apixaban   Missed Visit No   Family/Caregiver Present No   Prior to Session Communication Bedside nurse   Patient Position Received Bed, 2 rail up   Preferred Learning Style visual;verbal   General Comment Patient agreeable to OT tx.   Precautions   Hearing/Visual Limitations reading glasses   LE Weight Bearing Status Weight Bearing as Tolerated   Medical Precautions Fall precautions   Precautions Comment orthostatic BP   Vital Signs   Heart Rate 103   Heart Rate Source Brachial   BP 82/54   BP Location Right arm   BP Method Automatic   Patient Position Sitting   Pain Assessment   Pain Assessment 0-10   Pain Score 5 - Moderate pain   Pain Type Surgical pain   Pain Location Leg   Pain Orientation Left   Pain Radiating Towards right knee   Pain Descriptors Aching   Pain Frequency Intermittent   Pain Onset Progressive   Clinical Progression Not changed   Effect of Pain on Daily Activities painful during transfers   Patient's Stated Pain Goal No pain   Pain Interventions Medication (See MAR)   Cognition   Overall Cognitive Status WFL   Orientation Level Oriented X4   Feeding   Feeding Level of Assistance Independent   Feeding Where Assessed Bed level   Grooming   Grooming Level of Assistance Setup   Grooming Where Assessed Other (Comment)  (seated)   UE Bathing   UE Bathing Adaptive Equipment Long-handled sponge;Removeable shower head   UE Bathing Level of Assistance Close supervision   UE  Bathing Where Assessed Shower  (3:1 commode)   LE Bathing   LE Bathing Adaptive Equipment Long-handled sponge;Removeable shower head   LE Bathing Level of Assistance Close supervision   LE Bathing Where Assessed Shower  (seated on 3:1 commode)   UE Dressing   UE Dressing Level of Assistance Setup   UE Dressing Where Assessed Other (Comment)  (seated on 3:1 commode)   LE Dressing   LE Dressing Yes   LE Dressing Where Assessed Bedsied commode  (in shower)   LE Dressing Comments patient's LLE is painful to the touch at incision areas; incisions appeared red; notified nursing   Toileting   Toileting Level of Assistance Dependent   Where Assessed Bedside commode  (over toilet)   Toileting Comments pt incontinent of bowels; loose stool; reported to nursing   Functional Standing Tolerance   Time 2 mins  (pt becomes SOB, dizziness, and diaphoretic; reported to nursing; BP taken after transferred to EOB)   Activity posterior care after toileting task   Functional Standing Tolerance Comments symptomatic when standing   Bed Mobility   Bed Mobility Yes   Bed Mobility 1   Bed Mobility 1 Sitting to supine   Level of Assistance 1 Minimum assistance   Bed Mobility Comments 1 with HOB elevated and use of bed rail for trunk up   Transfers   Transfer Yes   Transfer 1   Transfer From 1 Sit to   Transfer to 1 Stand   Technique 1 Sit to stand;Stand to sit   Transfer Device 1 Kerline Stedy   Transfer Level of Assistance 1 Moderate assistance   Trials/Comments 1 pull up on Kerline Stedy   Modalities   Modalities Used No   Static Sitting Balance   Static Sitting-Balance Support No upper extremity supported   Static Sitting-Level of Assistance Close supervision   Dynamic Sitting Balance   Dynamic Sitting-Balance Support No upper extremity supported   Dynamic Sitting-Balance Reaching for objects   Static Standing Balance   Static Standing-Balance Support Bilateral upper extremity supported   Static Standing-Level of Assistance Dependent   Toilet  Transfers   Toilet Transfer From Other (Comment)  (Kerline Stesabrina)   Toilet Transfer Type To and from   Toilet Transfer to Standard bedside commode  (over toilet)   Toilet Transfer Technique   (Kerline Stedy)   Toilet Transfers Dependent  (Mod x 2)   Shower Transfers   Shower Transfer From Bed   Shower Transfer Type To and from   Shower Transfer to Other (Comment)  (3:1 commode)   Shower Transfer Technique   (via Kerline Stedy)   Shower Transfers Dependent  (Mod A x 2)   Inpatient/Swing Bed or Outpatient   Inpatient/Swing Bed or Outpatient Inpatient

## 2023-11-29 NOTE — PROGRESS NOTES
11/29/23 1228-2418   OT Last Visit   OT Received On 11/29/23   General   Reason for Referral Presented with LLE subacute on chronic limb ischemia and PE.s/p fasciotomies, L fem cutdown w/ ileofem thromboembolectomy, L pop cutdown w/ fempop thromboembolectomy, L TP trunk cutdown w/ thromboembolectomy, selective angiography LLE, Concern for L to R shunt   Referred By Dr. Turner   Past Medical History Relevant to Rehab CAD s/p PCIx3 and CABGx3 (LIMA-Diag, free ALEX y to LAD, SVG-PDA, 2013), afib s/p MAZE (2013), and recent admission for R DVT iso COVID infection (10/24/23) on apixaban   Missed Visit No   Family/Caregiver Present No   Prior to Session Communication Bedside nurse   Patient Position Received Bed, 2 rail up   Preferred Learning Style visual;verbal   General Comment Patient agreeable to OT tx.   Precautions   Hearing/Visual Limitations reading glasses   LE Weight Bearing Status Weight Bearing as Tolerated   Medical Precautions Fall precautions   Precautions Comment orthostatic BP   Pain Assessment   Pain Assessment 0-10   Pain Score 0 - No pain   Cognition   Overall Cognitive Status WFL   Orientation Level Oriented X4   Coordination   Movements are Fluid and Coordinated Yes   Bed Mobility   Bed Mobility Yes   Bed Mobility 1   Bed Mobility 1 Sitting to supine   Level of Assistance 1 Minimum assistance   Bed Mobility Comments 1 with HOB elevated and use of bedrail   Transfers   Transfer Yes   Transfer 1   Transfer From 1 Sit to   Transfer to 1 Stand   Technique 1 Sit to stand;Stand to sit   Transfer Device 1 Katrina   Transfer Level of Assistance 1 Moderate assistance   Trials/Comments 1 pull up on Kerline Stedy   Modalities   Modalities Used No   Therapeutic Exercise   Therapeutic Exercise Performed Yes   Therapeutic Exercise Activity 1 PREs with BUE 15 reps x 2 sets with good tolerance to improve UB muscle strength for ADLs and ADL trasnfers.   Inpatient/Swing Bed or Outpatient   Inpatient/Swing Bed or  Outpatient Inpatient

## 2023-11-29 NOTE — GROUP NOTE
Group Topic: Music   Group Date: 11/29/2023  Start Time: 1200  End Time: 1230  Facilitators: Monisha Saelh   Department: [facility]    Number of Participants: 3   Group Focus: music therapy  Treatment Modality: Music Therapy  Interventions Utilized were: active music engagement      Name: Mukesh Garg YOB: 1950   MR: [id number]      Level of Participation: active  Quality of Participation: appropriate/pleasant  Interactions with others: appropriate  Mood/Affect: appropriate  Cognition, Pre Treatment: attentive  Cognition, Post Treatment: attentive  Progress: Significant  Plan: continue with services

## 2023-11-29 NOTE — PROGRESS NOTES
Mukesh Garg is a 73 y.o. male admitted to  [facility] on 11.27.23. Patient had been at [facility] where he had been hospitalized due to LLE popliteal artery occlusion and bilateral PEs.s/p L fem cutdown w/ ileofem thromboembolectomy, L pop cutdown w/ fempop  thromboembolectomy and L TP trunk cutdown w/ thromboembolectomy on 11.17.23.  RHC on11/21 with no evidence of intracardiac shunting. LSW is following for discharge planning. Met with patient to review Care transitions role and acute rehab discharge planning process. Identified patient's preferred point person as spouse Sweetie [phone]. At baseline, patient lives with spouse and is independent with ADL's and IADL's. There are 2 stairs to enter the residence. Patient does not require an assistive device for ambulation. The home is multilevel with 4 steps to sunken living room, same floor as full bath, 9 stairs from main floor to bedroom. No advanced directives in HIM and LSW is available for document completion upon request. PCP is Lorenza Hager. Pharmacy of choice is [facility] on [facility]. Reviewed prior level of function, current status, and plan of care for IRF stay as well as average length of stay, anticipated date of discharge, and discharge goals. Patient hopes to meet therapy goals and return home with support of spouse. LSW will continue to follow as a resource for transition of care and will facilitate family training, updates regarding insurance, meeting with patient to answer questions or concerns, and collaborate with IDT on patient needs to ensure safe return home. Patient was thankful for meeting with LSW feels care transitions has met needs in regards to discharge planning at this time.

## 2023-11-29 NOTE — NURSING NOTE
Assumed care of patient. Patient showering with OT at this time. On RA. Just received tylenol for discomfort before therapy. Call light is within reach. Will continue to monitor.

## 2023-11-29 NOTE — PROGRESS NOTES
Physical Therapy       11/29/23 4714-2948   PT  Visit   PT Received On 11/29/23   Response to Previous Treatment Patient reporting fatigue but able to participate.   General   Reason for Referral Presented with LLE subacute on chronic limb ischemia and PE.s/p fasciotomies, L fem cutdown w/ ileofem thromboembolectomy, L pop cutdown w/ fempop thromboembolectomy, L TP trunk cutdown w/ thromboembolectomy, selective angiography LLE, Concern for L to R shunt   Referred By Dr. Turner   Past Medical History Relevant to Rehab CAD s/p PCIx3 and CABGx3 (LIMA-Diag, free ALEX y to LAD, SVG-PDA, 2013), afib s/p MAZE (2013), and recent admission for R DVT iso COVID infection (10/24/23) on apixaban   Patient Position Received Up in chair   Precautions   Medical Precautions Fall precautions   Precautions Comment orthostatic BP   Pain Assessment   Pain Assessment 0-10   Pain Score 4   Pain Type Surgical pain   Pain Location Leg   Pain Orientation Left   Response to Interventions Had Tylenol   Therapeutic Exercise   Therapeutic Exercise Activity 1 LAQ 2 x 15   Therapeutic Exercise Activity 2 seated hip flex 2 x 15   Therapeutic Exercise Activity 3 hip abd 2 x 15 with green theraband   Therapeutic Exercise Activity 4 hip add/ball squeezes 2 x15   Therapeutic Exercise Activity 5 seated hamstring curls 2 x 15, with green theraband   Ambulation/Gait Training 1   Comments/Distance (ft) 1 Amb deferred due to BP seated in wheelchair 83/51, HR 89. Patient reports very minimal dizziness. Nursing notified.   Wheelchair Activities   Propulsion Type 1 Manual   Level 1 Level tile   Method 1 Right upper extremity;Left upper extremity;Right lower extremity;Left lower extremity   Level of Assistance 1 Close supervision   Description/Details 1 65' includes turns   Activity Tolerance   Endurance Tolerates less than 10 min exercise with changes in vital signs   Activity Tolerance Comments Mobility limited due to lowBP   PT Assessment   PT Assessment  Results Decreased strength;Decreased range of motion;Decreased endurance;Impaired balance;Decreased mobility;Decreased coordination;Pain   Rehab Prognosis Excellent   Evaluation/Treatment Tolerance Patient limited by fatigue;Other (Comment)  (orthostatic BP)   End of Session Patient Position Up in chair   PT Plan   Treatment/Interventions Transfer training;Gait training;Strengthening;Endurance training;Therapeutic exercise;Therapeutic activity;Wheelchair management   PT Plan Skilled PT   PT Recommended Transfer Status Assist x2

## 2023-11-29 NOTE — PROGRESS NOTES
Physical Therapy       11/29/23 7351-3108   PT  Visit   PT Received On 11/29/23   Response to Previous Treatment Patient reporting fatigue but able to participate.   General   Reason for Referral Presented with LLE subacute on chronic limb ischemia and PE.s/p fasciotomies, L fem cutdown w/ ileofem thromboembolectomy, L pop cutdown w/ fempop thromboembolectomy, L TP trunk cutdown w/ thromboembolectomy, selective angiography LLE, Concern for L to R shunt   Referred By Dr. Turner   Past Medical History Relevant to Rehab CAD s/p PCIx3 and CABGx3 (LIMA-Diag, free ALEX y to LAD, SVG-PDA, 2013), afib s/p MAZE (2013), and recent admission for R DVT iso COVID infection (10/24/23) on apixaban   Patient Position Received   (sitting edge of bed with OT at completion of OT session and reporting dizziness.)   Precautions   Hearing/Visual Limitations reading glasses   Medical Precautions Fall precautions   Vital Signs   Patient Position   (seated BP 82/54,  bpm with reports of dizziness. Standing in Kerline Stedy 69/43, HR 90 and dizzy. Supine 103/58, HR 87, dizziness resolved. Nursing notified.)   Pain Assessment   Pain Assessment 0-10   Pain Score 3   Pain Type Surgical pain   Pain Location Leg   Pain Orientation Left   Response to Interventions Reports had Tylenol   Therapeutic Exercise   Therapeutic Exercise Activity 1 SAQ 2 x 15   Therapeutic Exercise Activity 2 bridging 2 x 15 with roll   Therapeutic Exercise Activity 3 supine hip abd/add 2 x 15   Therapeutic Exercise Activity 4 supine heel slides 2 x 15   Therapeutic Exercise Activity 5 SLR 3 x 10   Therapeutic Exercise Activity 6 hooklying hip add/ball squeezes 2 x 15   Bed Mobility 1   Bed Mobility 1 Sitting to supine   Bed Mobility Comments 1 Moderate assist x 2 trunk and LEs. Patient reports dizziness   Transfer 1   Technique 1 Sit to stand;Stand to sit   Transfer Device 1 Kerline Stedy   Transfer Level of Assistance 1 Moderate assistance   Trials/Comments 1 pull up on  Kerline Bond. Dependent for pants up. Stood approx 10 seconds. Dizziness with low BP requiring return to supine   Activity Tolerance   Endurance Tolerates less than 10 min exercise, no significant change in vital signs   Activity Tolerance Comments Patient sitting near foot of bed with pants at ankles and positioned with Kerline Bond at completion of OT session with OT present. Patient reporting dizziness. Seated BP 82/54, . Stood in Kerline to pullup pants and readjust patient postion towards head of bed. BP after stand 69/43, HR 90. Supine 103/58, HR 87 and dizziness resolves within 1 minute. Nursing notified.   PT Assessment   PT Assessment Results Decreased strength;Decreased range of motion;Decreased endurance;Impaired balance;Decreased mobility;Decreased coordination;Pain   Rehab Prognosis Excellent   Assessment Comment Patient mobility limited this am due to low BP and dizziness. Nursing notified MD. Good tolerance to ther ex at bedside. Will reattempt out of bed this afternoon.   End of Session Patient Position Bed, 2 rail up   PT Plan   Inpatient/Swing Bed or Outpatient Inpatient   PT Plan   Treatment/Interventions Bed mobility;Transfer training;Strengthening;Endurance training;Therapeutic exercise;Therapeutic activity   PT Plan Skilled PT   PT Recommended Transfer Status Assist x2  (Kerline Bond)

## 2023-11-29 NOTE — NURSING NOTE
Late entry: Assumed care of patient at report. Patient had another episode incontinence of bowel, trying to make it on a bedpan. He will receive imodium this morning. He is tolerating his pain of 2 at this time. He states he does not want to eat breakfast. Will monitor for changes through shift

## 2023-11-30 LAB
ANION GAP SERPL CALC-SCNC: 14 MMOL/L
BUN SERPL-MCNC: 22 MG/DL (ref 8–25)
CALCIUM SERPL-MCNC: 8.6 MG/DL (ref 8.5–10.4)
CHLORIDE SERPL-SCNC: 102 MMOL/L (ref 97–107)
CO2 SERPL-SCNC: 19 MMOL/L (ref 24–31)
CREAT SERPL-MCNC: 1.3 MG/DL (ref 0.4–1.6)
ERYTHROCYTE [DISTWIDTH] IN BLOOD BY AUTOMATED COUNT: 14.8 % (ref 11.5–14.5)
GFR SERPL CREATININE-BSD FRML MDRD: 57 ML/MIN/1.73M*2
GLUCOSE SERPL-MCNC: 159 MG/DL (ref 65–99)
HCT VFR BLD AUTO: 35.6 % (ref 41–52)
HGB BLD-MCNC: 11.2 G/DL (ref 13.5–17.5)
MCH RBC QN AUTO: 29.7 PG (ref 26–34)
MCHC RBC AUTO-ENTMCNC: 31.5 G/DL (ref 32–36)
MCV RBC AUTO: 94 FL (ref 80–100)
NRBC BLD-RTO: 0 /100 WBCS (ref 0–0)
PLATELET # BLD AUTO: 428 X10*3/UL (ref 150–450)
POTASSIUM SERPL-SCNC: 3.4 MMOL/L (ref 3.4–5.1)
RBC # BLD AUTO: 3.77 X10*6/UL (ref 4.5–5.9)
SODIUM SERPL-SCNC: 135 MMOL/L (ref 133–145)
WBC # BLD AUTO: 10.2 X10*3/UL (ref 4.4–11.3)

## 2023-11-30 PROCEDURE — 97530 THERAPEUTIC ACTIVITIES: CPT | Mod: GO,CO

## 2023-11-30 PROCEDURE — 99232 SBSQ HOSP IP/OBS MODERATE 35: CPT | Performed by: INTERNAL MEDICINE

## 2023-11-30 PROCEDURE — 97530 THERAPEUTIC ACTIVITIES: CPT | Mod: GP

## 2023-11-30 PROCEDURE — 85027 COMPLETE CBC AUTOMATED: CPT | Performed by: INTERNAL MEDICINE

## 2023-11-30 PROCEDURE — 36415 COLL VENOUS BLD VENIPUNCTURE: CPT | Performed by: INTERNAL MEDICINE

## 2023-11-30 PROCEDURE — 2500000001 HC RX 250 WO HCPCS SELF ADMINISTERED DRUGS (ALT 637 FOR MEDICARE OP): Performed by: INTERNAL MEDICINE

## 2023-11-30 PROCEDURE — 2500000004 HC RX 250 GENERAL PHARMACY W/ HCPCS (ALT 636 FOR OP/ED): Performed by: INTERNAL MEDICINE

## 2023-11-30 PROCEDURE — 97110 THERAPEUTIC EXERCISES: CPT | Mod: GP

## 2023-11-30 PROCEDURE — 80048 BASIC METABOLIC PNL TOTAL CA: CPT | Performed by: INTERNAL MEDICINE

## 2023-11-30 PROCEDURE — 2500000002 HC RX 250 W HCPCS SELF ADMINISTERED DRUGS (ALT 637 FOR MEDICARE OP, ALT 636 FOR OP/ED): Mod: MUE | Performed by: INTERNAL MEDICINE

## 2023-11-30 PROCEDURE — 97535 SELF CARE MNGMENT TRAINING: CPT | Mod: GO,CO

## 2023-11-30 PROCEDURE — 97116 GAIT TRAINING THERAPY: CPT | Mod: GP

## 2023-11-30 PROCEDURE — 1180000001 HC REHAB PRIVATE ROOM DAILY

## 2023-11-30 PROCEDURE — 2500000002 HC RX 250 W HCPCS SELF ADMINISTERED DRUGS (ALT 637 FOR MEDICARE OP, ALT 636 FOR OP/ED): Performed by: INTERNAL MEDICINE

## 2023-11-30 PROCEDURE — 94760 N-INVAS EAR/PLS OXIMETRY 1: CPT

## 2023-11-30 RX ORDER — AMMONIUM LACTATE 12 G/100G
LOTION TOPICAL 2 TIMES DAILY
Status: DISCONTINUED | OUTPATIENT
Start: 2023-11-30 | End: 2023-12-09 | Stop reason: HOSPADM

## 2023-11-30 RX ORDER — SODIUM CHLORIDE 9 MG/ML
100 INJECTION, SOLUTION INTRAVENOUS CONTINUOUS
Status: DISCONTINUED | OUTPATIENT
Start: 2023-11-30 | End: 2023-12-02

## 2023-11-30 RX ADMIN — ATORVASTATIN CALCIUM 40 MG: 40 TABLET, FILM COATED ORAL at 20:35

## 2023-11-30 RX ADMIN — ACETAMINOPHEN 650 MG: 325 TABLET ORAL at 14:04

## 2023-11-30 RX ADMIN — Medication: at 09:29

## 2023-11-30 RX ADMIN — FAMOTIDINE 20 MG: 20 TABLET, FILM COATED ORAL at 08:16

## 2023-11-30 RX ADMIN — LOPERAMIDE HYDROCHLORIDE 2 MG: 2 CAPSULE ORAL at 20:35

## 2023-11-30 RX ADMIN — LOPERAMIDE HYDROCHLORIDE 2 MG: 2 CAPSULE ORAL at 08:16

## 2023-11-30 RX ADMIN — WARFARIN SODIUM 4 MG: 2 TABLET ORAL at 17:04

## 2023-11-30 RX ADMIN — LOPERAMIDE HYDROCHLORIDE 2 MG: 2 CAPSULE ORAL at 14:04

## 2023-11-30 RX ADMIN — CLOPIDOGREL BISULFATE 75 MG: 75 TABLET ORAL at 08:16

## 2023-11-30 RX ADMIN — DICYCLOMINE HYDROCHLORIDE 20 MG: 20 TABLET ORAL at 08:16

## 2023-11-30 RX ADMIN — SODIUM CHLORIDE 100 ML/HR: 900 INJECTION, SOLUTION INTRAVENOUS at 12:57

## 2023-11-30 RX ADMIN — EZETIMIBE 10 MG: 10 TABLET ORAL at 08:16

## 2023-11-30 RX ADMIN — ALLOPURINOL 100 MG: 100 TABLET ORAL at 08:16

## 2023-11-30 RX ADMIN — Medication: at 21:08

## 2023-11-30 SDOH — ECONOMIC STABILITY: INCOME INSECURITY: IN THE PAST 12 MONTHS, HAS THE ELECTRIC, GAS, OIL, OR WATER COMPANY THREATENED TO SHUT OFF SERVICE IN YOUR HOME?: NO

## 2023-11-30 SDOH — SOCIAL STABILITY: SOCIAL INSECURITY: WITHIN THE LAST YEAR, HAVE YOU BEEN AFRAID OF YOUR PARTNER OR EX-PARTNER?: NO

## 2023-11-30 SDOH — SOCIAL STABILITY: SOCIAL NETWORK: ARE YOU MARRIED, WIDOWED, DIVORCED, SEPARATED, NEVER MARRIED, OR LIVING WITH A PARTNER?: MARRIED

## 2023-11-30 SDOH — ECONOMIC STABILITY: TRANSPORTATION INSECURITY
IN THE PAST 12 MONTHS, HAS LACK OF TRANSPORTATION KEPT YOU FROM MEETINGS, WORK, OR FROM GETTING THINGS NEEDED FOR DAILY LIVING?: NO

## 2023-11-30 SDOH — ECONOMIC STABILITY: HOUSING INSECURITY
IN THE LAST 12 MONTHS, WAS THERE A TIME WHEN YOU DID NOT HAVE A STEADY PLACE TO SLEEP OR SLEPT IN A SHELTER (INCLUDING NOW)?: NO

## 2023-11-30 SDOH — SOCIAL STABILITY: SOCIAL INSECURITY: RELIGIOUS/CULTURAL FACTORS: PRESBYTERIAN

## 2023-11-30 SDOH — ECONOMIC STABILITY: HOUSING INSECURITY: IN THE LAST 12 MONTHS, HOW MANY PLACES HAVE YOU LIVED?: 1

## 2023-11-30 SDOH — HEALTH STABILITY: MENTAL HEALTH: PATIENT PERSONAL STRENGTHS: ABLE TO ADAPT;HUMOR;FUTURE/ GOAL ORIENTED;MOTIVATED;POSITIVE ATTITUDE;RESILIENT

## 2023-11-30 SDOH — SOCIAL STABILITY: SOCIAL INSECURITY: WITHIN THE LAST YEAR, HAVE YOU BEEN HUMILIATED OR EMOTIONALLY ABUSED IN OTHER WAYS BY YOUR PARTNER OR EX-PARTNER?: NO

## 2023-11-30 SDOH — ECONOMIC STABILITY: INCOME INSECURITY: IN THE LAST 12 MONTHS, WAS THERE A TIME WHEN YOU WERE NOT ABLE TO PAY THE MORTGAGE OR RENT ON TIME?: NO

## 2023-11-30 SDOH — SOCIAL STABILITY: SOCIAL NETWORK: HOW OFTEN DO YOU GET TOGETHER WITH FRIENDS OR RELATIVES?: MORE THAN THREE TIMES A WEEK

## 2023-11-30 SDOH — ECONOMIC STABILITY: FOOD INSECURITY: WITHIN THE PAST 12 MONTHS, THE FOOD YOU BOUGHT JUST DIDN'T LAST AND YOU DIDN'T HAVE MONEY TO GET MORE.: NEVER TRUE

## 2023-11-30 SDOH — ECONOMIC STABILITY: TRANSPORTATION INSECURITY
IN THE PAST 12 MONTHS, HAS THE LACK OF TRANSPORTATION KEPT YOU FROM MEDICAL APPOINTMENTS OR FROM GETTING MEDICATIONS?: NO

## 2023-11-30 SDOH — HEALTH STABILITY: MENTAL HEALTH: MAJOR CHANGE/ LOSS/ STRESSOR: HOSPITALIZATION

## 2023-11-30 SDOH — HEALTH STABILITY: MENTAL HEALTH: BEHAVIOR: ORIENTED

## 2023-11-30 SDOH — ECONOMIC STABILITY: INCOME INSECURITY: HOW HARD IS IT FOR YOU TO PAY FOR THE VERY BASICS LIKE FOOD, HOUSING, MEDICAL CARE, AND HEATING?: NOT HARD AT ALL

## 2023-11-30 ASSESSMENT — PAIN - FUNCTIONAL ASSESSMENT
PAIN_FUNCTIONAL_ASSESSMENT: 0-10

## 2023-11-30 ASSESSMENT — ACTIVITIES OF DAILY LIVING (ADL)
BATHING_EQUIPMENT_NEEDED: REMOVEABLE SHOWER HEAD;LONG-HANDLED SPONGE
HOME_MANAGEMENT_TIME_ENTRY: 30
BATHING_LEVEL_OF_ASSISTANCE: CLOSE SUPERVISION
BATHING_WHERE_ASSESSED: SHOWER

## 2023-11-30 ASSESSMENT — COGNITIVE AND FUNCTIONAL STATUS - GENERAL
DRESSING REGULAR UPPER BODY CLOTHING: A LITTLE
TOILETING: A LITTLE
PERSONAL GROOMING: A LITTLE
DRESSING REGULAR LOWER BODY CLOTHING: A LOT
HELP NEEDED FOR BATHING: A LITTLE
DAILY ACTIVITIY SCORE: 18

## 2023-11-30 ASSESSMENT — PAIN SCALES - GENERAL
PAINLEVEL_OUTOF10: 7
PAINLEVEL_OUTOF10: 3
PAINLEVEL_OUTOF10: 3
PAINLEVEL_OUTOF10: 0 - NO PAIN
PAINLEVEL_OUTOF10: 6
PAINLEVEL_OUTOF10: 4

## 2023-11-30 ASSESSMENT — PAIN DESCRIPTION - DESCRIPTORS
DESCRIPTORS: SHARP
DESCRIPTORS: JABBING;SHARP

## 2023-11-30 NOTE — PROGRESS NOTES
"[facility]  Physician Progress Note    Subjective   Mukesh Garg is a 73 y.o. male admitted to inpatient rehabilitation unit.    Diarrhea better.  BP was low yesterday.  Labs pending this AM.    Objective   /63 (BP Location: Left arm, Patient Position: Lying)   Pulse 93   Temp 36.8 °C (98.2 °F) (Oral)   Resp 18   Ht 1.752 m (5' 8.98\")   Wt 110 kg (243 lb 6.2 oz)   SpO2 98%   BMI 35.97 kg/m²      Physical Exam  Vitals reviewed.   Constitutional:       General: He is not in acute distress.     Appearance: He is not toxic-appearing.   HENT:      Head: Normocephalic and atraumatic.      Mouth/Throat:      Mouth: Mucous membranes are moist.   Eyes:      Pupils: Pupils are equal, round, and reactive to light.   Cardiovascular:      Rate and Rhythm: Normal rate and regular rhythm.      Heart sounds: No murmur heard.  Pulmonary:      Breath sounds: Normal breath sounds. No wheezing, rhonchi or rales.   Abdominal:      General: There is no distension.      Palpations: Abdomen is soft.   Musculoskeletal:      Right lower leg: Edema present.      Left lower leg: Edema present.      Comments: Trace bilateral lower extremity edema.  Neurological:      General: No focal deficit present.      Mental Status: He is alert and oriented to person, place, and time.    Labs  BMP:  Results from last 7 days   Lab Units 11/28/23  0603   CREATININE mg/dL 1.00   BUN mg/dL 16   SODIUM mmol/L 140   POTASSIUM mmol/L 3.5   CHLORIDE mmol/L 106   CO2 mmol/L 22*     CBC:  Results from last 7 days   Lab Units 11/28/23  0603   WBC AUTO x10*3/uL 10.0   HEMOGLOBIN g/dL 10.9*   HEMATOCRIT % 34.8*   MCV fL 95   PLATELETS AUTO x10*3/uL 422     Coagulation:  Results from last 7 days   Lab Units 11/29/23  0554 11/28/23  0603 11/27/23  0918   INR  2.8* 2.6* 3.0*   PROTIME seconds 27.1* 25.5* 34.1*        Assesment and Plan    Debility   Acute Occlusion of Popliteal Artery Due to Thrombosis " ([facility])  Local care to the wounds, off of the low molecular weight heparin, warfarin for goal INR of 2-3   Hypercoagulable State ([facility])   Multiple Subsegmental Pulmonary Emboli Without Acute Cor Pulmonale ([facility])  Stable respiratory status at this time   Limb Ischemia   Paroxysmal Atrial Fibrillation ([facility])  Rate controlled anticoagulated.  Monitoring INR   Ashd (Arteriosclerotic Heart Disease), status post coronary artery bypass grafting x3  Free of anginal symptoms   Hyperlipidemia  Continue statin   Hypertension  Meds held due to low BP - ?dehydration causing low BP - if not improved today then IVF - d/w pt and nurse.   Diarrhea  Better - continue the imodium.   Gout  That the colchicine as pain has improved and unacceptable GI adverse effects are noted.  He could use a short course of steroids if needed         Fritz Turner MD

## 2023-11-30 NOTE — NURSING NOTE
Fever Nursing claudette pt INR. Per lab INR needed redrawn not enough blood in tube.    Nursing re-claudette INR. Per lab INR needed redrawn this tube hemoylzed.     Pt did not want blood work drawn again today. Dr. Turner notified. Okay to draw INR in AM.

## 2023-11-30 NOTE — PROGRESS NOTES
Occupational Therapy    OT Treatment    Patient Name: Mukesh Garg  MRN: [id number]  Today's Date: 11/30/2023  Time Calculation  Start Time: 0800  Stop Time: 0900  Time Calculation (min): 60 min          11/30/23 1402-1779   General   Reason for Referral Presented with LLE subacute on chronic limb ischemia and PE.s/p fasciotomies, L fem cutdown w/ ileofem thromboembolectomy, L pop cutdown w/ fempop thromboembolectomy, L TP trunk cutdown w/ thromboembolectomy, selective angiography LLE, Concern for L to R shunt   Referred By Dr. Turner   Past Medical History Relevant to Rehab CAD s/p PCIx3 and CABGx3 (LIMA-Diag, free ALEX y to LAD, SVG-PDA, 2013), afib s/p MAZE (2013), and recent admission for R DVT iso COVID infection (10/24/23) on apixaban   General Comment Patient agreeable to OT tx.   Pain Assessment   Pain Assessment 0-10   Pain Score 4   Pain Type Chronic pain;Surgical pain   Pain Location Foot   Pain Orientation Left   Feeding   Feeding Level of Assistance Independent   Grooming   Grooming Level of Assistance Modified independent   Grooming Where Assessed Sitting sinkside   Grooming Comments Brushing teeth, combing hair.   UE Bathing   UE Bathing Adaptive Equipment Removeable shower head   UE Bathing Level of Assistance Modified independent   UE Bathing Where Assessed   (Seated in shower.)   LE Bathing   LE Bathing Adaptive Equipment Removeable shower head;Long-handled sponge   LE Bathing Level of Assistance Close supervision   LE Bathing Where Assessed Shower  (Seated on shower chair.)   UE Dressing   UE Dressing Level of Assistance Modified independent   UE Dressing Where Assessed Wheelchair   UE Dressing Comments To don/doff akin shirt seated.   LE Dressing   LE Dressing Yes   LE Dressing Adaptive Equipment Reacher   LE Dressing Where Assessed Wheelchair   Toileting   Toileting Level of Assistance Dependent   Where Assessed Bedside commode  (Over toilet.)   Toileting Comments Unable to have BM. Dep for  clothing management.   Bed Mobility 1   Bed Mobility 1 Supine to sitting   Level of Assistance 1 Contact guard   Transfer 1   Transfer From 1 Sit to   Transfer to 1 Stand   Technique 1 Sit to stand;Stand to sit   Transfer Device 1 Liss Stedy   Transfer Level of Assistance 1 Minimum assistance   Trials/Comments 1 Min VC's on technique.   Toilet Transfers   Toilet Transfer From Other (Comment)  (Liss Steady)   Toilet Transfer Type To and from   Toilet Transfer to Standard bedside commode   Toilet Transfers Minimal assistance  (X1 with use of Liss steady.)   Shower Transfers   Shower Transfer From Bed   Shower Transfer Type To and from   Shower Transfer to Shower seat with back   Shower Transfers   (Min assist with use of liss steady.)   IP OT Assessment   Evaluation/Treatment Tolerance Patient limited by pain  (Low BP continue to monitor.)   End of Session Communication Bedside nurse  (Lower BP and dizziness after standing pants donning.BP- 87/66)   Inpatient Plan   OT Frequency 5 times per week      11/30/23 0800   [facility] Daily Activity   Putting on and taking off regular lower body clothing 2   Bathing (including washing, rinsing, drying) 3   Putting on and taking off regular upper body clothing 3   Toileting, which includes using toilet, bedpan or urinal 3   Taking care of personal grooming such as brushing teeth 3   Eating Meals 4   Daily Activity - Total Score 18

## 2023-11-30 NOTE — PROGRESS NOTES
Physical Therapy Treatment Note        11/30/23 9736-0074   PT  Visit   PT Received On 11/30/23   Response to Previous Treatment Patient reporting fatigue but able to participate.   General   Reason for Referral Presented with LLE subacute on chronic limb ischemia and PE.s/p fasciotomies, L fem cutdown w/ ileofem thromboembolectomy, L pop cutdown w/ fempop thromboembolectomy, L TP trunk cutdown w/ thromboembolectomy, selective angiography LLE, Concern for L to R shunt   Referred By Dr. Turner   Past Medical History Relevant to Rehab CAD s/p PCIx3 and CABGx3 (LIMA-Diag, free ALEX y to LAD, SVG-PDA, 2013), afib s/p MAZE (2013), and recent admission for R DVT iso COVID infection (10/24/23) on apixaban   Vital Signs   Heart Rate 90   BP 97/73   MAP (mmHg) 79   BP Location Right arm   BP Method Manual   Patient Position Sitting   Pain Assessment   Pain Assessment 0-10   Pain Score 7   Pain Type Surgical pain   Pain Location Leg   Pain Orientation Left   Cognition   Overall Cognitive Status WFL   Orientation Level Oriented X4   Postural Control   Postural Control Impaired   Therapeutic Exercise   Therapeutic Exercise Activity 1 Seated LAQ 2x15   Therapeutic Exercise Activity 2 Seated Hip Flexion 2x15   Therapeutic Exercise Activity 3 Seated Hip Abd 2x15   Therapeutic Exercise Activity 4 seated Hip Add with ball 2x15   Therapeutic Exercise Activity 5 Seated Hamstring Curls with ther band 2x15   Ambulation/Gait Training   Ambulation/Gait Training Performed Yes   Ambulation/Gait Training 1   Surface 1 Level tile   Device 1 Parallel bars   Assistance 1 Minimum assistance   Quality of Gait 1 Antalgic;Soft knee(s);Knee(s) buckle;Shuffling gait   Comments/Distance (ft) 1   (Ambulated 10x3  feet ll bars min Ax1.  TDWB LLE due to pain)   Transfers   Transfer Yes   Transfer 1   Transfer From 1 Sit to   Transfer to 1 Stand   Technique 1 Sit to stand;Stand to sit   Transfer Device 1   (parallel bars pull up)   Transfer Level of  Assistance 1 Minimum assistance   Transfers 2   Transfer From 2 Wheelchair to   Transfer to 2 Bed   Technique 2 Stand to sit   Transfer Device 2 Walker   Transfer Level of Assistance 2 Moderate assistance;Moderate verbal cues   Wheelchair Activities   Propulsion Type 1 Manual   Level 1 Level tile   Method 1 Right upper extremity;Left upper extremity;Right lower extremity;Left lower extremity   Level of Assistance 1 Close supervision   Description/Details 1 50 feet supervised   Other Activity   Other Activity Performed Yes   Activity Tolerance   Endurance Tolerates less than 10 min exercise with changes in vital signs  BP after ambulation 87/72  down after 1 min 110.  Nsg made aware    Activity Tolerance Comments Limited by pain with loading LLE  (LOw BP but better than yesterday.)   PT Assessment   PT Assessment Results Decreased strength;Decreased range of motion;Decreased endurance;Impaired balance;Decreased mobility;Decreased coordination;Pain   Rehab Prognosis Excellent   Outpatient Education   Individual(s) Educated Patient   Education Provided   (Low BP, increase PO Intake)   Diagnosis and Precautions Current Precautions   Risk and Benefits Discussed with Patient/Caregiver/Other yes   Patient/Caregiver Demonstrated Understanding yes   PT Plan   Inpatient/Swing Bed or Outpatient Inpatient   PT Plan   Treatment/Interventions Transfer training;Gait training;Range of motion;Therapeutic exercise;Positioning;Wheelchair management   PT Plan Skilled PT   PT Frequency Other (Comment)   Equipment Recommended upon Discharge Wheeled walker   PT Recommended Transfer Status Assist x2   PT - OK to Discharge Yes

## 2023-11-30 NOTE — PROGRESS NOTES
Physical Therapy Treatment Note        11/30/23 [id number]   PT  Visit   PT Received On 11/30/23   Response to Previous Treatment Patient reporting fatigue but able to participate.   General   Reason for Referral Presented with LLE subacute on chronic limb ischemia and PE.s/p fasciotomies, L fem cutdown w/ ileofem thromboembolectomy, L pop cutdown w/ fempop thromboembolectomy, L TP trunk cutdown w/ thromboembolectomy, selective angiography LLE, Concern for L to R shunt   Referred By Dr. Turner   Past Medical History Relevant to Rehab CAD s/p PCIx3 and CABGx3 (LIMA-Diag, free ALEX y to LAD, SVG-PDA, 2013), afib s/p MAZE (2013), and recent admission for R DVT iso COVID infection (10/24/23) on apixaban   Pain Assessment   Pain Assessment 0-10   Pain Score 6   Pain Type Surgical pain   Pain Location Leg   Pain Orientation Left   Pain Descriptors Jabbing;Sharp   Pain Frequency Constant/continuous   Cognition   Overall Cognitive Status WFL   Orientation Level Oriented X4   AROM LLE (degrees)   L Hip Flexion 0-125   (heelslide 0-60 6/10 pain)   Therapeutic Exercise   Therapeutic Exercise Performed Yes   Therapeutic Exercise Activity 2 Supine Heel slide  (2# LLE 3# RLE 2x15)   Therapeutic Exercise Activity 3 Supine Hip Abd/Add  (2# LLE 3 #RLE 2x15)   Therapeutic Exercise Activity 4 SAQ  (2# LLE 3#RLE 2x15)   Therapeutic Exercise Activity 5 Bridging  (2x15 No Roll)   Therapeutic Exercise Activity 6 Knee to Chest LLE 1x15 effortful   Bed Mobility   Bed Mobility Yes   Bed Mobility 1   Bed Mobility 1 Supine to sitting   Level of Assistance 1 Contact guard   Bed Mobility Comments 1   (Flat Mat)   Bed Mobility 2   Bed Mobility  2 Supine to sitting   Level of Assistance 2 Moderate assistance   Bed Mobility Comments 2 Trunk and LEs   Bed Mobility 3   Bed Mobility 3 Rolling right;Rolling left   Level of Assistance 3 Minimum assistance   Bed Mobility Comments 3 with rail   Ambulation/Gait Training   Ambulation/Gait Training Performed  Yes   Ambulation/Gait Training 1   Surface 1 Level tile   Device 1 Rolling walker   Assistance 1 Minimum assistance;Moderate assistance   Quality of Gait 1 Antalgic;Soft knee(s);Knee(s) buckle;Shuffling gait   Comments/Distance (ft) 1 Pain with loading LLE, demos TDWB LLE Close w/c follow.   after sitting.   Transfers   Transfer Yes   Transfer 1   Transfer From 1 Sit to   Transfer to 1 Stand   Technique 1 Sit to stand;Stand to sit   Transfer Level of Assistance 1 Minimum assistance   Trials/Comments 1 Cues for pushing up   Transfers 2   Transfer From 2 Wheelchair to   Transfer to 2 Mat   Technique 2 Stand pivot   Transfer Device 2 Walker   Transfer Level of Assistance 2 Moderate assistance   Trials/Comments 2   (Assist for IV Pole)   Other Activity   Other Activity Performed Yes   Activity Tolerance   Endurance Tolerates less than 10 min exercise with changes in vital signs   Activity Tolerance Comments Standing Activity Tolerance 1-2 minutes but Tachy after   PT Assessment   PT Assessment Results Decreased strength;Decreased range of motion;Decreased endurance;Impaired balance;Decreased mobility;Decreased coordination;Pain  (Low BP past 2 days.  IV Fluids this PM .)   Rehab Prognosis Excellent   Evaluation/Treatment Tolerance Patient tolerated treatment well   Strengths Ability to acquire knowledge   Barriers to Participation Comorbidities   Assessment Comment Patient mobility limited this am due to low BP and dizziness. Nursing notified MD. Good tolerance to ther ex at bedside. Will reattempt out of bed this afternoon.   End of Session Patient Position Up in chair   Outpatient Education   Individual(s) Educated Patient   Education Provided Fall Risk;Home Exercise Program  (Discussed HR and relation to Dehydration.)   Diagnosis and Precautions Current Precautions   Patient Response to Education Patient/Caregiver Verbalized Understanding of Information   PT Plan   Inpatient/Swing Bed or Outpatient Inpatient    PT Plan   Treatment/Interventions Transfer training;Gait training;Strengthening;Endurance training;Therapeutic exercise   PT Plan Skilled PT   PT Frequency Other (Comment)  (11x/week 90 mins 5x/week)   Equipment Recommended upon Discharge Wheeled walker   PT Recommended Transfer Status Assist x1  (Kerline Bond)   PT - OK to Discharge Yes

## 2023-11-30 NOTE — PROGRESS NOTES
OT Treatment       11/30/23 4788-1798   General   Reason for Referral Presented with LLE subacute on chronic limb ischemia and PE.s/p fasciotomies, L fem cutdown w/ ileofem thromboembolectomy, L pop cutdown w/ fempop thromboembolectomy, L TP trunk cutdown w/ thromboembolectomy, selective angiography LLE, Concern for L to R shunt   Referred By Dr. Turner   Past Medical History Relevant to Rehab CAD s/p PCIx3 and CABGx3 (LIMA-Diag, free ALEX y to LAD, SVG-PDA, 2013), afib s/p MAZE (2013), and recent admission for R DVT iso COVID infection (10/24/23) on apixaban   General Comment Supine in bed. Agreeable to OT session. IV attached D/T lower BP.   Pain Assessment   Pain Score 3   Toilet Transfers   Toilet Transfer From Other (Comment)  (Liss steady.)   Toilet Transfer Type To and from   Toilet Transfer to Standard bedside commode   Toilet Transfer Technique Mechanical lift  (Liss steady.)   Toilet Transfers Minimal assistance  (With Liss steady.)   Toilet Transfers Comments Required increased time to perform toileting. Pt performed standing anterior pericare at liss steady. Required seated restbreaks. Pt fatigues quickly. Pt with difficulty performing pericare while on BSC.   IP OT Assessment   End of Session Patient Position Up in chair   Inpatient Plan   OT Frequency 5 times per week

## 2023-12-01 LAB
INR PPP: 2.6 (ref 0.9–1.2)
PROTHROMBIN TIME: 25.6 SECONDS (ref 9.3–12.7)

## 2023-12-01 PROCEDURE — 85610 PROTHROMBIN TIME: CPT | Performed by: INTERNAL MEDICINE

## 2023-12-01 PROCEDURE — 1180000001 HC REHAB PRIVATE ROOM DAILY

## 2023-12-01 PROCEDURE — 36415 COLL VENOUS BLD VENIPUNCTURE: CPT | Performed by: INTERNAL MEDICINE

## 2023-12-01 PROCEDURE — 97110 THERAPEUTIC EXERCISES: CPT | Mod: GP

## 2023-12-01 PROCEDURE — 2500000001 HC RX 250 WO HCPCS SELF ADMINISTERED DRUGS (ALT 637 FOR MEDICARE OP): Performed by: INTERNAL MEDICINE

## 2023-12-01 PROCEDURE — 2500000002 HC RX 250 W HCPCS SELF ADMINISTERED DRUGS (ALT 637 FOR MEDICARE OP, ALT 636 FOR OP/ED): Mod: MUE | Performed by: INTERNAL MEDICINE

## 2023-12-01 PROCEDURE — 94760 N-INVAS EAR/PLS OXIMETRY 1: CPT

## 2023-12-01 PROCEDURE — 97530 THERAPEUTIC ACTIVITIES: CPT | Mod: GP

## 2023-12-01 PROCEDURE — 97535 SELF CARE MNGMENT TRAINING: CPT | Mod: GO,CO

## 2023-12-01 PROCEDURE — 2500000002 HC RX 250 W HCPCS SELF ADMINISTERED DRUGS (ALT 637 FOR MEDICARE OP, ALT 636 FOR OP/ED): Performed by: INTERNAL MEDICINE

## 2023-12-01 PROCEDURE — 97116 GAIT TRAINING THERAPY: CPT | Mod: GP

## 2023-12-01 PROCEDURE — 97530 THERAPEUTIC ACTIVITIES: CPT | Mod: GO,CO

## 2023-12-01 PROCEDURE — 99232 SBSQ HOSP IP/OBS MODERATE 35: CPT | Performed by: INTERNAL MEDICINE

## 2023-12-01 RX ORDER — OXYCODONE HYDROCHLORIDE 5 MG/1
5 TABLET ORAL EVERY 6 HOURS PRN
Status: DISCONTINUED | OUTPATIENT
Start: 2023-12-01 | End: 2023-12-09 | Stop reason: HOSPADM

## 2023-12-01 RX ADMIN — Medication: at 08:16

## 2023-12-01 RX ADMIN — DICYCLOMINE HYDROCHLORIDE 20 MG: 20 TABLET ORAL at 08:11

## 2023-12-01 RX ADMIN — ALLOPURINOL 100 MG: 100 TABLET ORAL at 08:09

## 2023-12-01 RX ADMIN — Medication: at 20:18

## 2023-12-01 RX ADMIN — ATORVASTATIN CALCIUM 40 MG: 40 TABLET, FILM COATED ORAL at 20:18

## 2023-12-01 RX ADMIN — OXYCODONE HYDROCHLORIDE 5 MG: 5 TABLET ORAL at 10:00

## 2023-12-01 RX ADMIN — FLUOCINONIDE: 0.5 OINTMENT TOPICAL at 20:18

## 2023-12-01 RX ADMIN — CLOPIDOGREL BISULFATE 75 MG: 75 TABLET ORAL at 08:10

## 2023-12-01 RX ADMIN — EZETIMIBE 10 MG: 10 TABLET ORAL at 08:11

## 2023-12-01 RX ADMIN — WARFARIN SODIUM 4 MG: 2 TABLET ORAL at 17:04

## 2023-12-01 RX ADMIN — ACETAMINOPHEN 650 MG: 325 TABLET ORAL at 04:46

## 2023-12-01 RX ADMIN — FAMOTIDINE 20 MG: 20 TABLET, FILM COATED ORAL at 08:12

## 2023-12-01 ASSESSMENT — COGNITIVE AND FUNCTIONAL STATUS - GENERAL
HELP NEEDED FOR BATHING: A LITTLE
DRESSING REGULAR LOWER BODY CLOTHING: A LOT
TOILETING: A LOT
DAILY ACTIVITIY SCORE: 18
DRESSING REGULAR UPPER BODY CLOTHING: A LITTLE

## 2023-12-01 ASSESSMENT — ACTIVITIES OF DAILY LIVING (ADL)
BATHING_LEVEL_OF_ASSISTANCE: CLOSE SUPERVISION
BATHING_EQUIPMENT_NEEDED: LONG-HANDLED SPONGE
HOME_MANAGEMENT_TIME_ENTRY: 30

## 2023-12-01 ASSESSMENT — PAIN - FUNCTIONAL ASSESSMENT
PAIN_FUNCTIONAL_ASSESSMENT: 0-10
PAIN_FUNCTIONAL_ASSESSMENT: FLACC (FACE, LEGS, ACTIVITY, CRY, CONSOLABILITY)
PAIN_FUNCTIONAL_ASSESSMENT: 0-10
PAIN_FUNCTIONAL_ASSESSMENT: 0-10

## 2023-12-01 ASSESSMENT — PAIN SCALES - GENERAL
PAINLEVEL_OUTOF10: 7
PAINLEVEL_OUTOF10: 6
PAINLEVEL_OUTOF10: 0 - NO PAIN
PAINLEVEL_OUTOF10: 0 - NO PAIN
PAINLEVEL_OUTOF10: 5 - MODERATE PAIN
PAINLEVEL_OUTOF10: 5 - MODERATE PAIN
PAINLEVEL_OUTOF10: 0 - NO PAIN

## 2023-12-01 ASSESSMENT — PAIN DESCRIPTION - DESCRIPTORS
DESCRIPTORS: RADIATING;SHOOTING
DESCRIPTORS: SHARP

## 2023-12-01 NOTE — NURSING NOTE
IV NS 0.9% 1 liter complete. IV site D/I, IV patent, no redness, no c/o pain or discomfort at IV site. New bag 1 liter NS 0.9% at 100 ml/hr started at 2245; infusing w/o difficulty.

## 2023-12-01 NOTE — NURSING NOTE
Report received.  Assumed care of pt.  Pt. In bed w/ TV on. Call light w/in reach. Will update as needed throughout shift.

## 2023-12-01 NOTE — PROGRESS NOTES
12/01/23 [id number]   PT  Visit   PT Received On 12/01/23   Response to Previous Treatment Patient reporting fatigue but able to participate.   General   Reason for Referral Presented with LLE subacute on chronic limb ischemia and PE.s/p fasciotomies, L fem cutdown w/ ileofem thromboembolectomy, L pop cutdown w/ fempop thromboembolectomy, L TP trunk cutdown w/ thromboembolectomy, selective angiography LLE, Concern for L to R shunt   Referred By Dr. Turner   Past Medical History Relevant to Rehab CAD s/p PCIx3 and CABGx3 (LIMA-Diag, free ALEX y to LAD, SVG-PDA, 2013), afib s/p MAZE (2013), and recent admission for R DVT iso COVID infection (10/24/23) on apixaban   Caregiver Feedback family present for support           Prior to Session Communication Bedside nurse   Patient Position Received Up in chair   Preferred Learning Style visual;verbal   General Comment Supine in bed. Agreeable to OT session. IV attached D/T lower BP.   Precautions   Hearing/Visual Limitations reading glasses   LE Weight Bearing Status Weight Bearing as Tolerated   Medical Precautions Fall precautions   Precautions Comment orthostatic BP  (seated start of session 84/52 After walking 110/70)   Pain Assessment   Pain Assessment 0-10   Pain Score 6   Pain Type Surgical pain   Pain Location Leg   Pain Orientation Left   Pain Descriptors Sharp   Pain Frequency Constant/continuous   Cognition   Overall Cognitive Status WFL   Orientation Level Oriented X4   Postural Control   Postural Control Impaired   AROM LLE (degrees)   LLE AROM Comment Limited standing hip extension, short hamstrings ankle df   Therapeutic Exercise   Therapeutic Exercise Performed Yes   Therapeutic Exercise Activity 1 Standing ll bars with emphasis on loading LLE Right Hip Flexion added 2x10   Therapeutic Exercise Activity 2 w/c push ups 1x10   Therapeutic Exercise Activity 3 Setaed Hamstring curls  (2x10 black tband RLE, Orange tband LLE)   Therapeutic Activity   Therapeutic  Activity Performed Yes   Transfers   Transfer Yes   Wheelchair Activities   Propulsion Type 1 Manual   Level 1 Level tile   Method 1 Right upper extremity;Left upper extremity;Right lower extremity;Left lower extremity   Level of Assistance 1 Close supervision   Description/Details 1 Shoes put on which assisted with traction. Propelled 100 feet x2 Assist for IV   Other Activity   Other Activity Performed Yes   Other Activity 1 Omnicycle x 10 min 2.4 KM level 3   Dtr is OTR here from Out of town.  Observed entire PT Session.

## 2023-12-01 NOTE — PROGRESS NOTES
Occupational Therapy    OT Treatment    Patient Name: Mukesh Garg  MRN: [id number]  Today's Date: 12/1/2023 12/01/23 [id number]   General   Reason for Referral Presented with LLE subacute on chronic limb ischemia and PE.s/p fasciotomies, L fem cutdown w/ ileofem thromboembolectomy, L pop cutdown w/ fempop thromboembolectomy, L TP trunk cutdown w/ thromboembolectomy, selective angiography LLE, Concern for L to R shunt   Referred By Dr. Turner   Past Medical History Relevant to Rehab CAD s/p PCIx3 and CABGx3 (LIMA-Diag, free ALEX y to LAD, SVG-PDA, 2013), afib s/p MAZE (2013), and recent admission for R DVT iso COVID infection (10/24/23) on apixaban   Pain Assessment   Pain Assessment 0-10   Pain Score 5 - Moderate pain   Pain Type Surgical pain   Pain Location Leg   Pain Orientation Left   Cognition   Orientation Level Oriented X4   Grooming   Grooming Level of Assistance Modified independent   Grooming Where Assessed Sitting sinkside   Grooming Comments Brushing teeth, combing hair.   UE Bathing   UE Bathing Level of Assistance Setup   UE Bathing Where Assessed   (Seated on toilet.)   UE Bathing Comments Performed seated on toilet. D/T time   LE Bathing   LE Bathing Adaptive Equipment Long-handled sponge   LE Bathing Level of Assistance Close supervision   LE Bathing Where Assessed   (Seated on toilet.)   UE Dressing   UE Dressing Level of Assistance Modified independent   UE Dressing Where Assessed   (toilet)   LE Dressing   LE Dressing Yes   Pants Level of Assistance Moderate assistance  (Pt able to thread pants over LE's. Assist for standing hiking for safety and pain management.)   Sock Level of Assistance Dependent  (Pt able to doff with reach. Dep assist to don D/T pain.)   LE Dressing Where Assessed Toilet   Toileting   Toileting Level of Assistance Moderate assistance   Where Assessed Toilet   Toileting Comments Pt was able to perform posterior pericare while seated with supervision. Mod assist  required for clothing management.   Bed Mobility 1   Bed Mobility 1 Supine to sitting   Level of Assistance 1 Close supervision   Transfer 1   Transfer From 1 Bed to   Transfer to 1 Toilet   Technique 1   (Kerline Steady)   Transfer Device 1 Kerline Stedy   Transfer Level of Assistance 1 Moderate assistance   Trials/Comments 1 Cueing for technique.   Toilet Transfers   Toilet Transfer From Other (Comment)  (Kerline steady)   Toilet Transfer Type To and from   Toilet Transfer to Standard toilet   Toilet Transfer Technique Mechanical lift   Toilet Transfers Moderate assistance   Toilet Transfers Comments To rise and descend to toilet.   IP OT Assessment   Evaluation/Treatment Tolerance Patient limited by pain   Inpatient Plan   OT Frequency 5 times per week      12/01/23 0900   [facility] Daily Activity   Putting on and taking off regular lower body clothing 2   Bathing (including washing, rinsing, drying) 3   Putting on and taking off regular upper body clothing 3   Toileting, which includes using toilet, bedpan or urinal 2   Taking care of personal grooming such as brushing teeth 4   Eating Meals 4   Daily Activity - Total Score 18

## 2023-12-01 NOTE — CARE PLAN
The patient's goals for the shift include sleep    The clinical goals for the shift include maintain safety; promote sleep    Over the shift, the patient did not make progress toward the following goals. Barriers to progression include . Recommendations to address these barriers include .

## 2023-12-01 NOTE — PROGRESS NOTES
Occupational Therapy    OT Treatment    Patient Name: Mukesh Garg  MRN: [id number]  Today's Date: 12/1/2023 12/01/23 9480-6447   General   Reason for Referral Presented with LLE subacute on chronic limb ischemia and PE.s/p fasciotomies, L fem cutdown w/ ileofem thromboembolectomy, L pop cutdown w/ fempop thromboembolectomy, L TP trunk cutdown w/ thromboembolectomy, selective angiography LLE, Concern for L to R shunt   Referred By Dr. Turner   Past Medical History Relevant to Rehab CAD s/p PCIx3 and CABGx3 (LIMA-Diag, free ALEX y to LAD, SVG-PDA, 2013), afib s/p MAZE (2013), and recent admission for R DVT iso COVID infection (10/24/23) on apixaban   Precautions   LE Weight Bearing Status Weight Bearing as Tolerated   Medical Precautions Fall precautions   Pain Assessment   Pain Assessment 0-10   Pain Score 0 - No pain   Cognition   Overall Cognitive Status WFL   Orientation Level Oriented X4   Bed Mobility 1   Bed Mobility 1 Sitting to supine   Level of Assistance 1 Modified independent   Transfer 1   Transfer From 1 Wheelchair to   Transfer to 1   (Stand at sink)   Technique 1 Sit to stand;Stand to sit   Transfer Device 1   (Countertop/ sink)   Transfer Level of Assistance 1 Close supervision   Trials/Comments 1 Pt able to perform x5 sit/stand @ countertop level. Good tolerance and pain tolerance this date.   Transfers 2   Transfer From 2 Wheelchair to   Transfer to 2 Bed   Technique 2 Stand to sit;Sit to stand   Transfer Device 2 Walker   Transfer Level of Assistance 2 Contact guard   Kitchen Mobility   Kitchen Mobility Level of Assistance Contact guard   Kitchen-Mobility Level   (Mobility @ countertop level.)   Kitchen Activity Transport items   Kitchen Mobility Comments Pt able to stand at countertop and perform side steps to navigate around OT kitchen. Slower to transfer. Improved pain tolerance this date.   Therapeutic Exercise   Therapeutic Exercise Activity 1 Seated UE bike x10 mins. performed to  increase UE strength and endurance.   IP OT Assessment   Evaluation/Treatment Tolerance Patient tolerated treatment well   End of Session Communication Bedside nurse   End of Session Patient Position Bed, 2 rail up   Inpatient Plan   OT Frequency 5 times per week

## 2023-12-01 NOTE — PROGRESS NOTES
Physical Therapy Treatment Note        12/01/23 0730-5251    PT  Visit   PT Received On 12/01/23   Response to Previous Treatment Patient reporting fatigue but able to participate.   General   Reason for Referral Presented with LLE subacute on chronic limb ischemia and PE.s/p fasciotomies, L fem cutdown w/ ileofem thromboembolectomy, L pop cutdown w/ fempop thromboembolectomy, L TP trunk cutdown w/ thromboembolectomy, selective angiography LLE, Concern for L to R shunt   Referred By Dr. Turner   Past Medical History Relevant to Rehab CAD s/p PCIx3 and CABGx3 (LIMA-Diag, free ALEX y to LAD, SVG-PDA, 2013), afib s/p MAZE (2013), and recent admission for R DVT iso COVID infection (10/24/23) on apixaban   Precautions   LE Weight Bearing Status Weight Bearing as Tolerated   Medical Precautions Fall precautions   Pain Assessment   Pain Assessment 0-10   Pain Radiating Towards   (LLE with loading during ambulation)   Pain Descriptors Radiating;Shooting   Cognition   Overall Cognitive Status WFL   Orientation Level Oriented X4   Therapeutic Exercise   Therapeutic Exercise Performed Yes   Therapeutic Exercise Activity 1 Standing Heel raises 1x15 effortful   Therapeutic Exercise Activity 2 LAQ 2x15 3# RLE, 0 LLE  (2x15)   Therapeutic Exercise Activity 3 Hamstring curls 2x10 orange LLE Blaqck tband RLE   Ambulation/Gait Training 1   Surface 1 Level tile   Device 1 Rolling walker   Assistance 1 Minimum assistance   Quality of Gait 1 Antalgic;Soft knee(s);Knee(s) buckle;Shuffling gait  (Cues to advance RLE longer step length to promote trail leg for LLE)   Comments/Distance (ft) 1 80 feet Slow Darline Antalgic but improving during increased time up. w/c follow   Transfers   Transfer Yes   Transfer 1   Transfer From 1 Wheelchair to   Transfer to 1 Stand   Technique 1 Sit to stand;Stand to sit   Transfer Device 1 Gait belt   Transfer Level of Assistance 1 Contact guard   Trials/Comments 1 As fatigued hand transition effortful    Wheelchair Activities   Propulsion Type 1 Manual   Level 1 Level tile   Method 1 Right upper extremity;Left upper extremity;Right lower extremity;Left lower extremity   Level of Assistance 1 Close supervision   Description/Details 1 50 feet Assist for IV Pole.  Encouraged  to perform in hallway for locomotion as much as tolerated   Activity Tolerance   Endurance Tolerates less than 10 min exercise with changes in vital signs   Early Mobility/Exercise Safety Screen Proceed with mobilization - No exclusion criteria met  (Improving with increased activity.  No c/o of dizziness this session.  BP stable)   PT Assessment   PT Assessment Results Decreased strength;Decreased range of motion;Decreased endurance;Impaired balance;Decreased mobility;Decreased coordination;Pain   Rehab Prognosis Excellent   Evaluation/Treatment Tolerance Patient limited by pain  (MD changed pain meds vto Oxy today.  Tolerating PT, increasedc ambulation.)   Strengths   (Very Motivated)   Outpatient Education   Individual(s) Educated Patient   Education Provided Fall Risk;Home Exercise Program   Diagnosis and Precautions Current Precautions   Patient Response to Education Patient/Caregiver Verbalized Understanding of Information   PT Plan   Inpatient/Swing Bed or Outpatient Inpatient   PT Plan   Treatment/Interventions Transfer training;Gait training;Strengthening;Range of motion;Endurance training;Therapeutic exercise;Wheelchair management   PT Plan Skilled PT   PT Recommended Transfer Status Assist x1  (with Kerline Bond)   PT - OK to Discharge Yes

## 2023-12-01 NOTE — PROGRESS NOTES
"[facility]  Physician Progress Note    Subjective   Mukesh Garg is a 73 y.o. male admitted to inpatient rehabilitation unit.    Bp better.  Diarrhea resolved.  NS infusing.  Therapists identify pain as a barrier.  He was taking oxycodone at the hospital without adverse effects.  Will resume.    Objective   BP 98/76 (BP Location: Left arm, Patient Position: Lying)   Pulse 88   Temp 37.2 °C (98.9 °F) (Oral)   Resp 16   Ht 1.752 m (5' 8.98\")   Wt 110 kg (243 lb 6.2 oz)   SpO2 100%   BMI 35.97 kg/m²      Physical Exam  Vitals reviewed.   Constitutional:       General: He is not in acute distress.     Appearance: He is not toxic-appearing.   HENT:      Head: Normocephalic and atraumatic.      Mouth/Throat:      Mouth: Mucous membranes are moist.   Eyes:      Pupils: Pupils are equal, round, and reactive to light.   Cardiovascular:      Rate and Rhythm: Normal rate and regular rhythm.      Heart sounds: No murmur heard.  Pulmonary:      Breath sounds: Normal breath sounds. No wheezing, rhonchi or rales.   Abdominal:      General: There is no distension.      Palpations: Abdomen is soft.   Musculoskeletal:      Right lower leg: Edema present.      Left lower leg: Edema present.      Comments: Trace bilateral lower extremity edema.  Neurological:      General: No focal deficit present.      Mental Status: He is alert and oriented to person, place, and time.    Labs  BMP:  Results from last 7 days   Lab Units 11/30/23  1009   CREATININE mg/dL 1.30   BUN mg/dL 22   SODIUM mmol/L 135   POTASSIUM mmol/L 3.4   CHLORIDE mmol/L 102   CO2 mmol/L 19*       CBC:  Results from last 7 days   Lab Units 11/30/23  1009   WBC AUTO x10*3/uL 10.2   HEMOGLOBIN g/dL 11.2*   HEMATOCRIT % 35.6*   MCV fL 94   PLATELETS AUTO x10*3/uL 428       Coagulation:  Results from last 7 days   Lab Units 11/29/23  0554 11/28/23  0603 11/27/23  0918   INR  2.8* 2.6* 3.0*   PROTIME seconds 27.1* " 25.5* 34.1*          Assesment and Plan    Debility   Acute Occlusion of Popliteal Artery Due to Thrombosis (Cms/Hcc)  Local care to the wounds, off of the low molecular weight heparin, warfarin for goal INR of 2-3   Hypercoagulable State (Cms/Hcc)   Multiple Subsegmental Pulmonary Emboli Without Acute Cor Pulmonale (Cms/Hcc)  Stable respiratory status at this time   Limb Ischemia   Paroxysmal Atrial Fibrillation (Cms/Hcc)  Rate controlled anticoagulated.  Monitoring INR - obtain today.   Ashd (Arteriosclerotic Heart Disease), status post coronary artery bypass grafting x3  Free of anginal symptoms   Hyperlipidemia  Continue statin   Hypertension  Meds held due to low BP - ?dehydration causing low BP - cont IVF   Diarrhea  Better - continue the imodium.   Gout  Has not reucred.         Fritz Turner MD

## 2023-12-02 LAB
INR PPP: 2.9 (ref 0.9–1.2)
PROTHROMBIN TIME: 28.3 SECONDS (ref 9.3–12.7)

## 2023-12-02 PROCEDURE — 97535 SELF CARE MNGMENT TRAINING: CPT | Mod: GO

## 2023-12-02 PROCEDURE — 97110 THERAPEUTIC EXERCISES: CPT | Mod: GO

## 2023-12-02 PROCEDURE — 2500000002 HC RX 250 W HCPCS SELF ADMINISTERED DRUGS (ALT 637 FOR MEDICARE OP, ALT 636 FOR OP/ED): Performed by: INTERNAL MEDICINE

## 2023-12-02 PROCEDURE — 94760 N-INVAS EAR/PLS OXIMETRY 1: CPT

## 2023-12-02 PROCEDURE — 2500000002 HC RX 250 W HCPCS SELF ADMINISTERED DRUGS (ALT 637 FOR MEDICARE OP, ALT 636 FOR OP/ED): Mod: MUE | Performed by: INTERNAL MEDICINE

## 2023-12-02 PROCEDURE — 97116 GAIT TRAINING THERAPY: CPT | Mod: GP,CQ

## 2023-12-02 PROCEDURE — 2500000001 HC RX 250 WO HCPCS SELF ADMINISTERED DRUGS (ALT 637 FOR MEDICARE OP): Performed by: INTERNAL MEDICINE

## 2023-12-02 PROCEDURE — 36415 COLL VENOUS BLD VENIPUNCTURE: CPT | Performed by: INTERNAL MEDICINE

## 2023-12-02 PROCEDURE — 97530 THERAPEUTIC ACTIVITIES: CPT | Mod: GO

## 2023-12-02 PROCEDURE — 2500000004 HC RX 250 GENERAL PHARMACY W/ HCPCS (ALT 636 FOR OP/ED): Mod: MUE | Performed by: INTERNAL MEDICINE

## 2023-12-02 PROCEDURE — 85610 PROTHROMBIN TIME: CPT | Performed by: INTERNAL MEDICINE

## 2023-12-02 PROCEDURE — 97530 THERAPEUTIC ACTIVITIES: CPT | Mod: GP,CQ

## 2023-12-02 PROCEDURE — 1180000001 HC REHAB PRIVATE ROOM DAILY

## 2023-12-02 PROCEDURE — 2500000004 HC RX 250 GENERAL PHARMACY W/ HCPCS (ALT 636 FOR OP/ED): Performed by: INTERNAL MEDICINE

## 2023-12-02 RX ORDER — WARFARIN 3 MG/1
3 TABLET ORAL DAILY
Status: DISCONTINUED | OUTPATIENT
Start: 2023-12-02 | End: 2023-12-06

## 2023-12-02 RX ADMIN — OXYCODONE HYDROCHLORIDE 5 MG: 5 TABLET ORAL at 10:07

## 2023-12-02 RX ADMIN — Medication: at 21:00

## 2023-12-02 RX ADMIN — DICYCLOMINE HYDROCHLORIDE 20 MG: 20 TABLET ORAL at 08:59

## 2023-12-02 RX ADMIN — ACETAMINOPHEN 650 MG: 325 TABLET ORAL at 21:39

## 2023-12-02 RX ADMIN — FLUOCINONIDE: 0.5 OINTMENT TOPICAL at 09:05

## 2023-12-02 RX ADMIN — ATORVASTATIN CALCIUM 40 MG: 40 TABLET, FILM COATED ORAL at 21:39

## 2023-12-02 RX ADMIN — AMLODIPINE BESYLATE 10 MG: 10 TABLET ORAL at 09:00

## 2023-12-02 RX ADMIN — Medication: at 09:05

## 2023-12-02 RX ADMIN — ALLOPURINOL 100 MG: 100 TABLET ORAL at 08:59

## 2023-12-02 RX ADMIN — EZETIMIBE 10 MG: 10 TABLET ORAL at 09:00

## 2023-12-02 RX ADMIN — CARVEDILOL 12.5 MG: 12.5 TABLET, FILM COATED ORAL at 08:59

## 2023-12-02 RX ADMIN — CLOPIDOGREL BISULFATE 75 MG: 75 TABLET ORAL at 09:00

## 2023-12-02 RX ADMIN — FAMOTIDINE 20 MG: 20 TABLET, FILM COATED ORAL at 09:00

## 2023-12-02 RX ADMIN — FLUOCINONIDE: 0.5 OINTMENT TOPICAL at 21:00

## 2023-12-02 RX ADMIN — WARFARIN SODIUM 3 MG: 3 TABLET ORAL at 17:13

## 2023-12-02 ASSESSMENT — PAIN - FUNCTIONAL ASSESSMENT
PAIN_FUNCTIONAL_ASSESSMENT: 0-10

## 2023-12-02 ASSESSMENT — ACTIVITIES OF DAILY LIVING (ADL)
HOME_MANAGEMENT_TIME_ENTRY: 45
BATHING_LEVEL_OF_ASSISTANCE: CLOSE SUPERVISION
BATHING_WHERE_ASSESSED: SHOWER
BATHING_EQUIPMENT_NEEDED: LONG-HANDLED SPONGE

## 2023-12-02 ASSESSMENT — PAIN DESCRIPTION - LOCATION
LOCATION: LEG
LOCATION: LEG

## 2023-12-02 ASSESSMENT — PAIN DESCRIPTION - DESCRIPTORS: DESCRIPTORS: RADIATING

## 2023-12-02 ASSESSMENT — PAIN SCALES - GENERAL
PAINLEVEL_OUTOF10: 2
PAINLEVEL_OUTOF10: 4
PAINLEVEL_OUTOF10: 4
PAINLEVEL_OUTOF10: 2

## 2023-12-02 ASSESSMENT — PAIN DESCRIPTION - ORIENTATION
ORIENTATION: LEFT
ORIENTATION: LEFT

## 2023-12-02 NOTE — NURSING NOTE
IV NS 0.9% 1000 liters complete. New bag NS 0.9% 1000 liters up at 0340 infusing at 100 liters/hr. IV site w/o redness, swelling or irritation. No discomfort per pt. Dsng D/I. Will monitor.

## 2023-12-02 NOTE — NURSING NOTE
Assumed care of patient. Patient in bed sleeping, easily aroused, CPAP maintained, call light within reach.

## 2023-12-02 NOTE — CARE PLAN
The patient's goals for the shift include improve mobility    The clinical goals for the shift include Pain Control    Over the shift, the patient did not make progress toward the following goals. Barriers to progression include . Recommendations to address these barriers include .

## 2023-12-02 NOTE — PROGRESS NOTES
12/02/23 7064-0117   OT Last Visit   OT Received On 12/02/23   General   Reason for Referral Presented with LLE subacute on chronic limb ischemia and PE.s/p fasciotomies, L fem cutdown w/ ileofem thromboembolectomy, L pop cutdown w/ fempop thromboembolectomy, L TP trunk cutdown w/ thromboembolectomy, selective angiography LLE, Concern for L to R shunt   Referred By Dr. Turner   Past Medical History Relevant to Rehab CAD s/p PCIx3 and CABGx3 (LIMA-Diag, free ALEX y to LAD, SVG-PDA, 2013), afib s/p MAZE (2013), and recent admission for R DVT iso COVID infection (10/24/23) on apixaban   Missed Visit No   Family/Caregiver Present No   Prior to Session Communication Bedside nurse   Patient Position Received Up in chair   Preferred Learning Style visual;verbal   General Comment Pt agreeable to OT tx.   Precautions   Hearing/Visual Limitations reading glasses   LE Weight Bearing Status Weight Bearing as Tolerated   Medical Precautions Fall precautions   Transfers   Transfer Yes   Transfer 1   Transfer From 1 Wheelchair to   Transfer to 1 Bed   Technique 1 Sit to stand;Stand to sit   Transfer Device 1 Gait belt;Walker   Transfer Level of Assistance 1 Close supervision   Therapeutic Exercise   Therapeutic Exercise Performed Yes   Therapeutic Exercise Activity 1 BUE PREs with 2# hand weights 20 reps x 1 set in all planes to improve muscle strength for ADLs with good tolerance.   Balance/Neuromuscular Re-Education   Balance/Neuromuscular Re-Education Activity Performed Yes   Balance/Neuromuscular Re-Education Activity 1 pt tolerated 6 minutes dynamic standing with close supervision via FWW to engage in BITS activities with good tolerance.   Education   Individual(s) Educated Patient   Home Program AROM;Handout issued   Risk and Benefits Discussed with Patient/Caregiver/Other yes   Patient/Caregiver Demonstrated Understanding yes   Plan of Care Discussed and Agreed Upon yes   Patient Response to Education Patient/Caregiver  Verbalized Understanding of Information;Patient/Caregiver Performed Return Demonstration of Exercises/Activities   Inpatient/Swing Bed or Outpatient   Inpatient/Swing Bed or Outpatient Inpatient   Inpatient Plan   OT Recommended Transfer Status Assist of 1   OT - OK to Discharge Yes

## 2023-12-02 NOTE — NURSING NOTE
Report received. Assumed care of pt. Pt. In bed, TV on, call light w/in reach. No c/o or needs voiced at this time. Will update as needed throughout shift.

## 2023-12-02 NOTE — PROGRESS NOTES
Physical Therapy       12/02/23 0418-0210   PT  Visit   PT Received On 12/02/23   Response to Previous Treatment Patient with no complaints from previous session.  (pt reported BP was lower this PM, but not symptomatic (100/52, HR 80))   General   Reason for Referral Presented with LLE subacute on chronic limb ischemia and PE.s/p fasciotomies, L fem cutdown w/ ileofem thromboembolectomy, L pop cutdown w/ fempop thromboembolectomy, L TP trunk cutdown w/ thromboembolectomy, selective angiography LLE, Concern for L to R shunt   Referred By Dr. Turner   Past Medical History Relevant to Rehab CAD s/p PCIx3 and CABGx3 (LIMA-Diag, free ALEX y to LAD, SVG-PDA, 2013), afib s/p MAZE (2013), and recent admission for R DVT iso COVID infection (10/24/23) on apixaban   Patient Position Received Up in chair   Preferred Learning Style visual;verbal   Precautions   Hearing/Visual Limitations reading glasses   Medical Precautions Fall precautions   Precautions Comment orthostatic BP issues   Pain Assessment   Pain Assessment 0-10   Pain Score 2   Pain Type Acute pain   Pain Location Leg   Pain Orientation Left   Therapeutic Exercise   Therapeutic Exercise Activity 1 seated isomeric hip adduction sx15 reps   Therapeutic Exercise Activity 2 seated hip abduction 2x15 reps gteen theraband   Therapeutic Activity   Therapeutic Activity 1 omnicycle x 10 minutes, level 3 resistance, 2.1 km   Ambulation/Gait Training 1   Surface 1 Level tile   Device 1 Rolling walker   Assistance 1 Contact guard   Quality of Gait 1 Diminished heel strike;Antalgic;Soft knee(s);Forward flexed posture   Comments/Distance (ft) 1 vc's to turn L foot in for neutral rotation and for more more smooth continuous stepping   Gait Support Devices Gait belt;Wheelchair follow   Transfer 1   Technique 1 Sit to stand;Stand to sit   Transfer Device 1 Gait belt   Transfer Level of Assistance 1 Close supervision   Wheelchair Activities   Propulsion Type 1 Manual   Level 1 Level  reena   Method 1 Right upper extremity;Left upper extremity;Right lower extremity;Left lower extremity   Level of Assistance 1 Independent   Description/Details 1 50 ft, turns   PT Assessment   PT Assessment Results Decreased strength;Decreased range of motion;Decreased endurance;Impaired balance;Decreased mobility;Decreased coordination;Pain   Rehab Prognosis Excellent   Evaluation/Treatment Tolerance Patient limited by fatigue   Strengths Ability to acquire knowledge;Attitude of self;Coping skills   Barriers to Participation Comorbidities   End of Session Patient Position Up in chair   PT Plan   Inpatient/Swing Bed or Outpatient Inpatient   PT Plan   Treatment/Interventions Bed mobility;Transfer training;Gait training;Stair training;Neuromuscular re-education;Therapeutic exercise;Therapeutic activity   PT Plan Skilled PT   Equipment Recommended upon Discharge Wheeled walker   PT Recommended Transfer Status Assist x1

## 2023-12-02 NOTE — PROGRESS NOTES
Occupational Therapy       12/02/23 [id number]   OT Last Visit   OT Received On 12/02/23   General   Reason for Referral Presented with LLE subacute on chronic limb ischemia and PE.s/p fasciotomies, L fem cutdown w/ ileofem thromboembolectomy, L pop cutdown w/ fempop thromboembolectomy, L TP trunk cutdown w/ thromboembolectomy, selective angiography LLE, Concern for L to R shunt   Referred By Dr. Turner   Past Medical History Relevant to Rehab CAD s/p PCIx3 and CABGx3 (LIMA-Diag, free ALEX y to LAD, SVG-PDA, 2013), afib s/p MAZE (2013), and recent admission for R DVT iso COVID infection (10/24/23) on apixaban   Missed Visit No   Family/Caregiver Present No   Prior to Session Communication Bedside nurse   Patient Position Received Bed, 2 rail up   Preferred Learning Style visual;verbal   General Comment Pt agreeable to OT tx.   Precautions   Hearing/Visual Limitations reading glasses   LE Weight Bearing Status Weight Bearing as Tolerated   Medical Precautions Fall precautions   Pain Assessment   Pain Assessment 0-10   Pain Score 2   Pain Type Acute pain   Pain Location Leg   Pain Orientation Left   Pain Descriptors Radiating   Pain Frequency Constant/continuous   Pain Onset Progressive   Clinical Progression Not changed   Effect of Pain on Daily Activities none   Patient's Stated Pain Goal No pain   Pain Interventions Medication (See MAR)   Cognition   Overall Cognitive Status WFL   Orientation Level Oriented X4   Coordination   Coordination Comment GENNY's WFL   Grooming   Grooming Level of Assistance Modified independent   Grooming Where Assessed Sitting sinkside   Grooming Comments to wash face and brush hair   UE Bathing   UE Bathing Adaptive Equipment Removeable shower head   UE Bathing Level of Assistance Setup   UE Bathing Where Assessed Shower   UE Bathing Comments pt seated on shower chair   LE Bathing   LE Bathing Adaptive Equipment Long-handled sponge   LE Bathing Level of Assistance Close supervision   LE  Bathing Where Assessed Shower   LE Bathing Comments pt seated on shower chair   UE Dressing   UE Dressing Level of Assistance Modified independent   UE Dressing Where Assessed Wheelchair   UE Dressing Comments to doff/sandra shirt   LE Dressing   LE Dressing Yes   LE Dressing Adaptive Equipment Reacher   Pants Level of Assistance Moderate assistance   Sock Level of Assistance Dependent   LE Dressing Where Assessed Wheelchair   LE Dressing Comments notified pts nurse redness around LLE incision sites   Toileting   Toileting Level of Assistance Moderate assistance   Where Assessed Toilet   Toileting Comments mod assist required for pants management   Bed Mobility   Bed Mobility Yes   Bed Mobility 1   Bed Mobility 1 Supine to sitting   Level of Assistance 1 Modified independent   Transfers   Transfer Yes   Transfer 1   Transfer From 1 Bed to   Transfer to 1 Wheelchair   Technique 1 Sit to stand;Stand to sit   Transfer Device 1 Gait belt   Transfer Level of Assistance 1 Close supervision   Toilet Transfers   Toilet Transfer From Wheelchair   Toilet Transfer Type To and from   Toilet Transfer to Standard toilet   Toilet Transfer Technique Stand pivot   Toilet Transfers Minimal assistance   Toilet Transfers Comments on/off toilet   Shower Transfers   Shower Transfer From Wheelchair   Shower Transfer Type To and from   Shower Transfer to Shower seat with back   Shower Transfer Technique Stand pivot   Shower Transfers Minimal assistance   Shower Transfers Comments with use of grab bar for support   Education   Individual(s) Educated Patient   Education Provided Fall precautons   Risk and Benefits Discussed with Patient/Caregiver/Other yes   Patient/Caregiver Demonstrated Understanding yes   Plan of Care Discussed and Agreed Upon yes   Patient Response to Education Patient/Caregiver Verbalized Understanding of Information;Patient/Caregiver Performed Return Demonstration of Exercises/Activities   Inpatient/Swing Bed or  Outpatient   Inpatient/Swing Bed or Outpatient Inpatient   Inpatient Plan   OT Recommended Transfer Status Assist of 1   OT - OK to Discharge Yes

## 2023-12-02 NOTE — PROGRESS NOTES
Physical Therapy       23 [id number]   PT  Visit   PT Received On 23   Response to Previous Treatment Patient with no complaints from previous session.   General   Reason for Referral Presented with LLE subacute on chronic limb ischemia and PE.s/p fasciotomies, L fem cutdown w/ ileofem thromboembolectomy, L pop cutdown w/ fempop thromboembolectomy, L TP trunk cutdown w/ thromboembolectomy, selective angiography LLE, Concern for L to R shunt   Referred By Dr. Turner   Past Medical History Relevant to Rehab CAD s/p PCIx3 and CABGx3 (LIMA-Diag, free ALEX y to LAD, SVG-PDA, ), afib s/p MAZE (), and recent admission for R DVT iso COVID infection (10/24/23) on apixaban   Patient Position Received Up in chair   Preferred Learning Style visual;verbal   Precautions   Hearing/Visual Limitations reading glasses   Medical Precautions Fall precautions   Precautions Comment orthostatic BP issues  (pre tx BP in sittin/64, HR 97)   Pain Assessment   Pain Assessment 0-10   Pain Score 2   Pain Type Acute pain   Pain Location Leg   Pain Orientation Left   Therapeutic Activity   Therapeutic Activity 1 sit/stands 1x10 reps, ephasis on keeping feet planted under knees for left calf stretch   Therapeutic Activity 2 chair push ups 2x5 reps, Estee   Ambulation/Gait Training 1   Surface 1 Level tile   Device 1 Rolling walker   Assistance 1 Contact guard;Minimum assistance   Quality of Gait 1 Diminished heel strike;Antalgic;Soft knee(s);Forward flexed posture   Comments/Distance (ft) 1 vc's for heel strike with LLE, step-through pattern with RLE for L calf stretch, and for posture.  110 ft, 90 ft.  Mild dizziness reported between amb trials but quickly passed.  BP taken before and after within normal limits.   Gait Support Devices Gait belt;Wheelchair follow   Stairs   Rails 1 Bilateral   Device 1 Railing   Support Devices 1 Gait belt   Assistance 1 Minimum assistance   Comment/Number of Steps 1 up/down 6 4-inch steps,  step-to pattern, vc's for foot placement   Wheelchair Activities   Propulsion Type 1 Manual   Level 1 Level tile   Method 1 Right upper extremity;Left upper extremity;Right lower extremity;Left lower extremity   Level of Assistance 1 Independent   Description/Details 1 100 ft, low pile carpet, turns   Activity Tolerance   Endurance Tolerates less than 10 min exercise, no significant change in vital signs   Activity Tolerance Comments BP stable during tx, HR stable and WNL   PT Assessment   Evaluation/Treatment Tolerance Patient limited by fatigue   Strengths Attitude of self;Ability to acquire knowledge;Coping skills   Barriers to Participation Comorbidities   End of Session Patient Position Up in chair   PT Plan   Inpatient/Swing Bed or Outpatient Inpatient   PT Plan   Treatment/Interventions Bed mobility;Transfer training;Gait training;Stair training;Neuromuscular re-education;Therapeutic exercise;Therapeutic activity   PT Plan Skilled PT   Equipment Recommended upon Discharge Wheeled walker   PT Recommended Transfer Status Assist x1

## 2023-12-03 PROCEDURE — 2500000001 HC RX 250 WO HCPCS SELF ADMINISTERED DRUGS (ALT 637 FOR MEDICARE OP): Performed by: INTERNAL MEDICINE

## 2023-12-03 PROCEDURE — 1180000001 HC REHAB PRIVATE ROOM DAILY

## 2023-12-03 PROCEDURE — 2500000002 HC RX 250 W HCPCS SELF ADMINISTERED DRUGS (ALT 637 FOR MEDICARE OP, ALT 636 FOR OP/ED): Performed by: INTERNAL MEDICINE

## 2023-12-03 PROCEDURE — 94760 N-INVAS EAR/PLS OXIMETRY 1: CPT

## 2023-12-03 PROCEDURE — 2500000004 HC RX 250 GENERAL PHARMACY W/ HCPCS (ALT 636 FOR OP/ED): Performed by: INTERNAL MEDICINE

## 2023-12-03 PROCEDURE — 2500000004 HC RX 250 GENERAL PHARMACY W/ HCPCS (ALT 636 FOR OP/ED): Mod: MUE | Performed by: INTERNAL MEDICINE

## 2023-12-03 PROCEDURE — 2500000002 HC RX 250 W HCPCS SELF ADMINISTERED DRUGS (ALT 637 FOR MEDICARE OP, ALT 636 FOR OP/ED): Mod: MUE | Performed by: INTERNAL MEDICINE

## 2023-12-03 RX ADMIN — ALLOPURINOL 100 MG: 100 TABLET ORAL at 08:14

## 2023-12-03 RX ADMIN — WARFARIN SODIUM 3 MG: 3 TABLET ORAL at 17:35

## 2023-12-03 RX ADMIN — CLOPIDOGREL BISULFATE 75 MG: 75 TABLET ORAL at 08:14

## 2023-12-03 RX ADMIN — FLUOCINONIDE: 0.5 OINTMENT TOPICAL at 08:14

## 2023-12-03 RX ADMIN — Medication: at 21:34

## 2023-12-03 RX ADMIN — Medication: at 08:14

## 2023-12-03 RX ADMIN — ACETAMINOPHEN 650 MG: 325 TABLET ORAL at 16:21

## 2023-12-03 RX ADMIN — DICYCLOMINE HYDROCHLORIDE 20 MG: 20 TABLET ORAL at 08:14

## 2023-12-03 RX ADMIN — EZETIMIBE 10 MG: 10 TABLET ORAL at 08:14

## 2023-12-03 RX ADMIN — FAMOTIDINE 20 MG: 20 TABLET, FILM COATED ORAL at 08:14

## 2023-12-03 RX ADMIN — ATORVASTATIN CALCIUM 40 MG: 40 TABLET, FILM COATED ORAL at 21:34

## 2023-12-03 RX ADMIN — FLUOCINONIDE: 0.5 OINTMENT TOPICAL at 21:34

## 2023-12-03 ASSESSMENT — PAIN DESCRIPTION - ORIENTATION: ORIENTATION: LEFT

## 2023-12-03 ASSESSMENT — PAIN - FUNCTIONAL ASSESSMENT
PAIN_FUNCTIONAL_ASSESSMENT: 0-10

## 2023-12-03 ASSESSMENT — PAIN SCALES - GENERAL
PAINLEVEL_OUTOF10: 1
PAINLEVEL_OUTOF10: 0 - NO PAIN

## 2023-12-03 ASSESSMENT — PAIN DESCRIPTION - LOCATION: LOCATION: FOOT

## 2023-12-03 NOTE — CARE PLAN
The patient's goals for the shift include improve mobility    The clinical goals for the shift include pain control    Over the shift, the patient did not make progress toward the following goals. Barriers to progression include impaired mobility. Recommendations to address these barriers include pain control.

## 2023-12-04 LAB
LABORATORY COMMENT REPORT: NORMAL
PATH REPORT.FINAL DX SPEC: NORMAL
PATH REPORT.GROSS SPEC: NORMAL
PATH REPORT.MICROSCOPIC SPEC OTHER STN: NORMAL
PATH REPORT.RELEVANT HX SPEC: NORMAL
PATH REPORT.TOTAL CANCER: NORMAL

## 2023-12-04 PROCEDURE — 97110 THERAPEUTIC EXERCISES: CPT | Mod: GP

## 2023-12-04 PROCEDURE — 97116 GAIT TRAINING THERAPY: CPT | Mod: GP

## 2023-12-04 PROCEDURE — 2500000001 HC RX 250 WO HCPCS SELF ADMINISTERED DRUGS (ALT 637 FOR MEDICARE OP): Performed by: INTERNAL MEDICINE

## 2023-12-04 PROCEDURE — 2500000002 HC RX 250 W HCPCS SELF ADMINISTERED DRUGS (ALT 637 FOR MEDICARE OP, ALT 636 FOR OP/ED): Mod: MUE | Performed by: INTERNAL MEDICINE

## 2023-12-04 PROCEDURE — 99232 SBSQ HOSP IP/OBS MODERATE 35: CPT | Performed by: INTERNAL MEDICINE

## 2023-12-04 PROCEDURE — 94760 N-INVAS EAR/PLS OXIMETRY 1: CPT

## 2023-12-04 PROCEDURE — 2500000002 HC RX 250 W HCPCS SELF ADMINISTERED DRUGS (ALT 637 FOR MEDICARE OP, ALT 636 FOR OP/ED): Performed by: INTERNAL MEDICINE

## 2023-12-04 PROCEDURE — 2500000004 HC RX 250 GENERAL PHARMACY W/ HCPCS (ALT 636 FOR OP/ED): Performed by: INTERNAL MEDICINE

## 2023-12-04 PROCEDURE — 2500000004 HC RX 250 GENERAL PHARMACY W/ HCPCS (ALT 636 FOR OP/ED): Mod: MUE | Performed by: INTERNAL MEDICINE

## 2023-12-04 PROCEDURE — 97110 THERAPEUTIC EXERCISES: CPT | Mod: GO,CO

## 2023-12-04 PROCEDURE — 1180000001 HC REHAB PRIVATE ROOM DAILY

## 2023-12-04 PROCEDURE — 97535 SELF CARE MNGMENT TRAINING: CPT | Mod: GO,CO

## 2023-12-04 PROCEDURE — 97112 NEUROMUSCULAR REEDUCATION: CPT | Mod: GP

## 2023-12-04 RX ORDER — FUROSEMIDE 20 MG/1
20 TABLET ORAL DAILY
Status: DISCONTINUED | OUTPATIENT
Start: 2023-12-04 | End: 2023-12-09 | Stop reason: HOSPADM

## 2023-12-04 RX ADMIN — ALLOPURINOL 100 MG: 100 TABLET ORAL at 09:18

## 2023-12-04 RX ADMIN — Medication: at 09:19

## 2023-12-04 RX ADMIN — FLUOCINONIDE: 0.5 OINTMENT TOPICAL at 20:47

## 2023-12-04 RX ADMIN — EZETIMIBE 10 MG: 10 TABLET ORAL at 09:19

## 2023-12-04 RX ADMIN — OXYCODONE HYDROCHLORIDE 5 MG: 5 TABLET ORAL at 09:18

## 2023-12-04 RX ADMIN — Medication: at 20:47

## 2023-12-04 RX ADMIN — ACETAMINOPHEN 650 MG: 325 TABLET ORAL at 02:42

## 2023-12-04 RX ADMIN — DICYCLOMINE HYDROCHLORIDE 20 MG: 20 TABLET ORAL at 09:19

## 2023-12-04 RX ADMIN — CLOPIDOGREL BISULFATE 75 MG: 75 TABLET ORAL at 09:19

## 2023-12-04 RX ADMIN — WARFARIN SODIUM 3 MG: 3 TABLET ORAL at 17:30

## 2023-12-04 RX ADMIN — FLUOCINONIDE: 0.5 OINTMENT TOPICAL at 09:19

## 2023-12-04 RX ADMIN — ATORVASTATIN CALCIUM 40 MG: 40 TABLET, FILM COATED ORAL at 20:25

## 2023-12-04 RX ADMIN — FUROSEMIDE 20 MG: 20 TABLET ORAL at 09:19

## 2023-12-04 RX ADMIN — FAMOTIDINE 20 MG: 20 TABLET, FILM COATED ORAL at 09:19

## 2023-12-04 ASSESSMENT — COGNITIVE AND FUNCTIONAL STATUS - GENERAL
HELP NEEDED FOR BATHING: A LITTLE
DRESSING REGULAR LOWER BODY CLOTHING: A LITTLE
DAILY ACTIVITIY SCORE: 22

## 2023-12-04 ASSESSMENT — BRIEF INTERVIEW FOR MENTAL STATUS (BIMS)
BIMS SUMMARY SCORE: 15
ASKED TO RECALL SOCK: YES, NO CUE REQUIRED
WHAT MONTH IS IT: ACCURATE WITHIN 5 DAYS
INITIAL REPETITION OF BED BLUE SOCK - FIRST ATTEMPT: 3
COGNITIVE PATTERN ASSESSMENT USED: BIMS
ASKED TO RECALL BLUE: YES, NO CUE REQUIRED
WHAT YEAR IS IT: CORRECT
ASKED TO RECALL BED: YES, NO CUE REQUIRED
WHAT DAY OF THE WEEK IS IT: CORRECT
GENERAL MEMORY AND RECALL ABILITY: CURRENT SEASON;LOCATION OF OWN ROOM;STAFF NAMES AND FACES;RECOGNIZES APPROPRIATE HEALTHCARE SETTING

## 2023-12-04 ASSESSMENT — PAIN - FUNCTIONAL ASSESSMENT
PAIN_FUNCTIONAL_ASSESSMENT: 0-10

## 2023-12-04 ASSESSMENT — PAIN DESCRIPTION - DESCRIPTORS: DESCRIPTORS: SHARP;STABBING

## 2023-12-04 ASSESSMENT — PAIN SCALES - GENERAL
PAINLEVEL_OUTOF10: 0 - NO PAIN
PAINLEVEL_OUTOF10: 4
PAINLEVEL_OUTOF10: 0 - NO PAIN
PAINLEVEL_OUTOF10: 3
PAINLEVEL_OUTOF10: 2
PAINLEVEL_OUTOF10: 2
PAINLEVEL_OUTOF10: 0 - NO PAIN
PAINLEVEL_OUTOF10: 2

## 2023-12-04 ASSESSMENT — PAIN DESCRIPTION - ORIENTATION: ORIENTATION: LEFT

## 2023-12-04 ASSESSMENT — PAIN DESCRIPTION - LOCATION: LOCATION: LEG

## 2023-12-04 ASSESSMENT — ACTIVITIES OF DAILY LIVING (ADL)
HOME_MANAGEMENT_TIME_ENTRY: 15
BATHING_LEVEL_OF_ASSISTANCE: CLOSE SUPERVISION
BATHING_WHERE_ASSESSED: SHOWER
HOME_MANAGEMENT_TIME_ENTRY: 60

## 2023-12-04 ASSESSMENT — PATIENT HEALTH QUESTIONNAIRE - PHQ9: 1. LITTLE INTEREST OR PLEASURE IN DOING THINGS: NOT AT ALL

## 2023-12-04 NOTE — PROGRESS NOTES
Physical Therapy       12/04/23 [id number]   PT  Visit   PT Received On 12/04/23   Response to Previous Treatment Patient with no complaints from previous session.   General   Reason for Referral Presented with LLE subacute on chronic limb ischemia and PE.s/p fasciotomies, L fem cutdown w/ ileofem thromboembolectomy, L pop cutdown w/ fempop thromboembolectomy, L TP trunk cutdown w/ thromboembolectomy, selective angiography LLE, Concern for L to R shunt   Referred By Dr. Turner   Past Medical History Relevant to Rehab CAD s/p PCIx3 and CABGx3 (LIMA-Diag, free ALEX y to LAD, SVG-PDA, 2013), afib s/p MAZE (2013), and recent admission for R DVT iso COVID infection (10/24/23) on apixaban   Precautions   Hearing/Visual Limitations reading glasses   LE Weight Bearing Status Weight Bearing as Tolerated   Medical Precautions Fall precautions   Pain Assessment   Pain Assessment 0-10   Pain Score 2   Pain Type Acute pain;Surgical pain   Pain Location Leg   Pain Orientation Left   Therapeutic Exercise   Therapeutic Exercise Activity 1 standing heel raises ll bars  1 x 15   Therapeutic Exercise Activity 2 standing hip flex ll bars 1 x 15   Therapeutic Exercise Activity 3 standing hip abd ll bars 1 x 15   Therapeutic Exercise Activity 4 standing hip ext ll bars 1 x 15   Therapeutic Exercise Activity 5 standing hamstring curls ll bars 1 x 15   Therapeutic Exercise Activity 6 mini squats ll bars 1 x 15   Balance/Neuromuscular Re-Education   Balance/Neuromuscular Re-Education Activity 1 ll bars cone reach with CGA   Balance/Neuromuscular Re-Education Activity 2 ll bars balloon toss with CGA   Balance/Neuromuscular Re-Education Activity 3 ll bars with ball toss with dowel giuliana, CGA   Balance/Neuromuscular Re-Education Activity 4 sidestepping ll bars, no UE support, CGA   Ambulation/Gait Training 1   Surface 1 Level tile   Device 1 Rolling walker   Assistance 1 Contact guard   Quality of Gait 1 Diminished heel strike;Antalgic;Soft  knee(s);Forward flexed posture   Comments/Distance (ft) 1 80' x 3 left LE slightly ER, cues to correct. Step to gait pattern.   Gait Support Devices Gait belt;Wheelchair follow   Transfer 1   Technique 1 Sit to stand;Stand to sit   Transfer Device 1 Gait belt;Walker   Transfer Level of Assistance 1 Contact guard   Wheelchair Activities   Propulsion Type 1 Manual   Level 1 Level tile   Method 1 Right upper extremity;Left upper extremity;Right lower extremity;Left lower extremity   Level of Assistance 1 Independent   Description/Details 1 60', includes turns and low pile capet   Activity Tolerance   Endurance Tolerates 10 - 20 min exercise with multiple rests   Activity Tolerance Comments no reports of dizziness during session   PT Assessment   PT Assessment Results Decreased strength;Decreased range of motion;Decreased endurance;Impaired balance;Decreased mobility;Pain   Rehab Prognosis Excellent   Evaluation/Treatment Tolerance Patient tolerated treatment well   Assessment Comment Improved activity tolerance. No reports of dizziness this am. Antalgic gait due to left LE pain and edema.   End of Session Patient Position Up in chair   PT Plan   Inpatient/Swing Bed or Outpatient Inpatient   PT Plan   Treatment/Interventions Transfer training;Gait training;Balance training;Neuromuscular re-education;Strengthening;Endurance training;Therapeutic exercise;Wheelchair management   PT Plan Skilled PT   PT Recommended Transfer Status Assist x1   PT - OK to Discharge Yes

## 2023-12-04 NOTE — PROGRESS NOTES
12/04/23 1000 to 1100   OT Last Visit   OT Received On 12/05/23   General   Reason for Referral Presented with LLE subacute on chronic limb ischemia and PE.s/p fasciotomies, L fem cutdown w/ ileofem thromboembolectomy, L pop cutdown w/ fempop thromboembolectomy, L TP trunk cutdown w/ thromboembolectomy, selective angiography LLE, Concern for L to R shunt   Referred By Dr. Turner   Past Medical History Relevant to Rehab CAD s/p PCIx3 and CABGx3 (LIMA-Diag, free ALEX y to LAD, SVG-PDA, 2013), afib s/p MAZE (2013), and recent admission for R DVT iso COVID infection (10/24/23) on apixaban   Patient Position Received Bed, 2 rail up   Preferred Learning Style verbal;auditory   General Comment Patient aggreeable to therapy   Pain Assessment   Pain Assessment 0-10   Pain Score 2   Pain Location Leg   Pain Orientation Left   Feeding   Feeding Level of Assistance Independent   Feeding Where Assessed Bed level   Grooming   Grooming Level of Assistance Modified independent   Grooming Where Assessed Sitting sinkside   UE Bathing   UE Bathing Level of Assistance Setup   UE Bathing Where Assessed Shower   LE Bathing   LE Bathing Level of Assistance Close supervision   LE Bathing Where Assessed Shower   UE Dressing   UE Dressing Level of Assistance Modified independent   UE Dressing Where Assessed Wheelchair   LE Dressing   LE Dressing Yes   LE Dressing Adaptive Equipment Reacher   Pants Level of Assistance Moderate assistance   Sock Level of Assistance Dependent   Adult Briefs Level of Assistance Moderate assistance   LE Dressing Where Assessed Wheelchair   LE Dressing Comments assist with pull up   Toileting   Toileting Level of Assistance Minimum assistance   Where Assessed Toilet   Toileting Comments no hygiene able to adjust pants  (Patient diarrhea in shower nurse aware. incision site red nurse aware)   Bed Mobility   Bed Mobility Yes   Bed Mobility 1   Bed Mobility 1 Supine to sitting   Level of Assistance 1 Modified  independent   Bed Mobility Comments 1 head of bed up marc of bed rails   Toilet Transfers   Toilet Transfer From Wheelchair   Toilet Transfer Type To and from   Toilet Transfer to Standard toilet   Toilet Transfer Technique Stand pivot   Toilet Transfers Contact guard   Toilet Transfers Comments use of wall mounted grab bar.   Shower Transfers   Shower Transfer From Wheelchair   Shower Transfer Type To and from   Shower Transfer to Shower seat with back   Shower Transfer Technique Stand pivot   Shower Transfers Minimal assistance

## 2023-12-04 NOTE — PROGRESS NOTES
WEEKLY PROGRESS NOTE  Evaluation date: 11/28/2023    Treatment Rx provided: See daily flowsheet        PRECAUTIONS  Precautions  Hearing/Visual Limitations: reading glasses  LE Weight Bearing Status: Weight Bearing as Tolerated  Medical Precautions: Fall precautions  Precautions Comment: orthostatic BP issues    ADL/ Functional Status:  Eating  Feeding Level of Assistance: Independent LE pants  Pants Level of Assistance: Moderate assistance   Grooming  Grooming Level of Assistance: Modified independent LE socks  Sock Level of Assistance: Dependent   Toileting Toileting Level of Assistance: Minimum assistance LE shoes   N/A   Bathing UE UE Bathing Level of Assistance: Setup LE adult brief  Adult Briefs Level of Assistance: Moderate assistance   Bathing LE LE Bathing Level of Assistance: Close supervision UE dressing  UE Dressing Level of Assistance: Modified independent   Toilet Transfer  Toilet Transfers: Contact guard Meal prep       Car Transfer Car Transfers: Contact guard Kitchen   Kitchen Mobility Level of Assistance: Contact guard     ADL improvements as follows: grooming improved from set up to MOD I, bathing from max A to supervision, UE dressing from min A to mod I, LB dressing from dependent assist to Mod A, toileting from dependent assist to Min A.     Plan Of Care:   STG attained this reporting period include: bathing, UB dressing, toileting. LTG attainted at UB dressing. Remaining POC remains partially attained at this time.  Patient has made steady improvements in activity tolerance, tolerating dynamic standing for ~6 minutes with supervision assist, has participated in IADL completion (completing kitchen mobility at [facility]).  Recommend continued skilled OT services to address remaining POC, DC needs and family training as needed.           Problem: Bathing  Goal: LTG - Patient will utilize adaptive techniques to bathe body with modified independence.  Outcome: Progressing     Problem: Dressings Lower  Extremities  Goal: LTG - Patient will demonstrate use of appropriate intervention to ensure safe dressing of lower extremities with modified independence.  Outcome: Progressing  Goal: STG - Patient will complete lower body dressing with minimal assistance with use of AE PRN.  Outcome: Progressing     Problem: Dressing Upper Extremities  Goal: LTG - Patient will complete upper body dressing with distant supervision.  Outcome: Progressing     Problem: Instrumental Activities of Daily Living  Goal: LTG - Patient will independently complete basic home making activities to return to independent functioning at home.  Description: 1. Light meal prep  2. Ed on kitchen safety  3. Laundry task  4. Making bed  Outcome: Progressing     Problem: Toileting  Goal: LTG - Patient will utilize adaptive techniques/equipment to complete daily toileting tasks with modified independence.  Outcome: Progressing     Problem: Transfers  Goal: LTG - Patient will demonstrate safe transfer techniques with modified independence with FWW.  Outcome: Progressing

## 2023-12-04 NOTE — PROGRESS NOTES
12/04/23 1435 to 1505   OT Last Visit   OT Received On 12/04/23   General   Reason for Referral Presented with LLE subacute on chronic limb ischemia and PE.s/p fasciotomies, L fem cutdown w/ ileofem thromboembolectomy, L pop cutdown w/ fempop thromboembolectomy, L TP trunk cutdown w/ thromboembolectomy, selective angiography LLE, Concern for L to R shunt   Referred By Dr. Turner   Past Medical History Relevant to Rehab CAD s/p PCIx3 and CABGx3 (LIMA-Diag, free ALEX y to LAD, SVG-PDA, 2013), afib s/p MAZE (2013), and recent admission for R DVT iso COVID infection (10/24/23) on apixaban   Patient Position Received Up in chair   Preferred Learning Style verbal;auditory   General Comment patient cleared to be seen by therapy and aggreeable   Pain Assessment   Pain Assessment 0-10   Pain Score 0 - No pain   Car Transfers   Car Transfer From Walker   Car Transfer Technique Ambulating   Car Transfers Contact guard   Car Transfers Comments simulated transfer.   Kitchen Mobility   Kitchen Mobility Level of Assistance Contact guard   Kitchen-Mobility Level Walker   Kitchen Activity Transport items   Kitchen Mobility Comments Patient educated on item transport from Fridge, upper cabinet , drawers and lower cabinets using counter for stability.   Therapeutic Exercise   Therapeutic Exercise Performed Yes   Therapeutic Exercise Activity 1 Pre's 2lbs weights to improve strenght and endurance for adls and Iadls.   Inpatient/Swing Bed or Outpatient   Inpatient/Swing Bed or Outpatient Inpatient   Inpatient Plan   Treatment Interventions UE strengthening/ROM;ADL retraining   OT - OK to Discharge Yes

## 2023-12-04 NOTE — PROGRESS NOTES
Physical Therapy       12/04/23 [id number]   PT  Visit   PT Received On 12/04/23   Response to Previous Treatment Patient with no complaints from previous session.   General   Reason for Referral Presented with LLE subacute on chronic limb ischemia and PE.s/p fasciotomies, L fem cutdown w/ ileofem thromboembolectomy, L pop cutdown w/ fempop thromboembolectomy, L TP trunk cutdown w/ thromboembolectomy, selective angiography LLE, Concern for L to R shunt   Referred By Dr. Turner   Past Medical History Relevant to Rehab CAD s/p PCIx3 and CABGx3 (LIMA-Diag, free ALEX y to LAD, SVG-PDA, 2013), afib s/p MAZE (2013), and recent admission for R DVT iso COVID infection (10/24/23) on apixaban   Patient Position Received Up in chair   Precautions   Hearing/Visual Limitations reading glasses   LE Weight Bearing Status Weight Bearing as Tolerated   Medical Precautions Fall precautions   Pain Assessment   Pain Assessment 0-10   Pain Score 4   Pain Type Acute pain;Surgical pain   Pain Location Leg   Pain Orientation Left   Therapeutic Exercise   Therapeutic Exercise Activity 1 LAQ 2 x 15, 3# right, 1# left   Therapeutic Exercise Activity 2 seated hip flex 2 x 15, 3# right, 1# left   Therapeutic Exercise Activity 3 hip abd 2 x 15 with green theraband   Therapeutic Exercise Activity 4 hip add/ball squeezes 2 x15   Therapeutic Exercise Activity 5 seated hamstring curls with green thera band 2 x 15   Ambulation/Gait Training 1   Surface 1 Level tile   Device 1 Rolling walker   Assistance 1 Contact guard   Quality of Gait 1 Diminished heel strike;Antalgic;Soft knee(s);Forward flexed posture   Comments/Distance (ft) 1 100' left LE slight ER with cues to correct. Instructed to begin step through gait pattern.   Gait Support Devices Gait belt;Wheelchair follow   Transfer 1   Technique 1 Sit to stand;Stand to sit   Transfer Device 1 Gait belt;Walker   Transfer Level of Assistance 1 Contact guard   Activity Tolerance   Endurance Tolerates 10  - 20 min exercise with multiple rests   PT Assessment   PT Assessment Results Decreased strength;Decreased range of motion;Decreased endurance;Impaired balance;Decreased mobility;Pain   Rehab Prognosis Excellent   Assessment Comment Improved activity tolerance. Began step through gait pattern during ambulation.   End of Session Patient Position Up in chair   PT Plan   Inpatient/Swing Bed or Outpatient Inpatient   PT Plan   Treatment/Interventions Transfer training;Gait training;Strengthening;Therapeutic exercise;Endurance training   PT Plan Skilled PT   PT Recommended Transfer Status Assist x1   PT - OK to Discharge Yes

## 2023-12-04 NOTE — CARE PLAN
Problem: Bathing  Goal: LTG - Patient will utilize adaptive techniques to bathe body with modified independence.  Outcome: Progressing     Problem: Dressings Lower Extremities  Goal: LTG - Patient will demonstrate use of appropriate intervention to ensure safe dressing of lower extremities with modified independence.  Outcome: Progressing  Goal: STG - Patient will complete lower body dressing with minimal assistance with use of AE PRN.  Outcome: Progressing     Problem: Dressing Upper Extremities  Goal: LTG - Patient will complete upper body dressing with distant supervision.  Outcome: Progressing     Problem: Instrumental Activities of Daily Living  Goal: LTG - Patient will independently complete basic home making activities to return to independent functioning at home.  Description: 1. Light meal prep  2. Ed on kitchen safety  3. Laundry task  4. Making bed  Outcome: Progressing     Problem: Toileting  Goal: LTG - Patient will utilize adaptive techniques/equipment to complete daily toileting tasks with modified independence.  Outcome: Progressing     Problem: Transfers  Goal: LTG - Patient will demonstrate safe transfer techniques with modified independence with FWW.  Outcome: Progressing

## 2023-12-04 NOTE — PROGRESS NOTES
"[facility]  Physician Progress Note    Subjective   Mukesh Garg is a 73 y.o. male admitted to inpatient rehabilitation unit.    BP much more stable.  Feels ok. Same pain in legs, L>R. Oxy helps w/ therapy.    Objective   /73 (BP Location: Right arm, Patient Position: Lying)   Pulse 83   Temp 36.5 °C (97.7 °F) (Oral)   Resp 18   Ht 1.752 m (5' 8.98\")   Wt 110 kg (243 lb 6.2 oz)   SpO2 97%   BMI 35.97 kg/m²      Physical Exam  Vitals reviewed.   Constitutional:       General: He is not in acute distress.     Appearance: He is not toxic-appearing.   HENT:      Head: Normocephalic and atraumatic.      Mouth/Throat:      Mouth: Mucous membranes are moist.   Eyes:      Pupils: Pupils are equal, round, and reactive to light.   Cardiovascular:      Rate and Rhythm: Normal rate and regular rhythm.      Heart sounds: No murmur heard.  Pulmonary:      Breath sounds: Normal breath sounds. No wheezing, rhonchi or rales.   Abdominal:      General: There is no distension.      Palpations: Abdomen is soft.   Musculoskeletal:      Right lower leg: Edema present. trace     Left lower leg: Edema present. 1+  Neurological:      General: No focal deficit present.      Mental Status: He is alert and oriented to person, place, and time.    There is minor erythema about the incision states, the staples are in place and there is no drainage from the wounds    Labs  BMP:  Results from last 7 days   Lab Units 11/30/23  1009   CREATININE mg/dL 1.30   BUN mg/dL 22   SODIUM mmol/L 135   POTASSIUM mmol/L 3.4   CHLORIDE mmol/L 102   CO2 mmol/L 19*       CBC:  Results from last 7 days   Lab Units 11/30/23  1009   WBC AUTO x10*3/uL 10.2   HEMOGLOBIN g/dL 11.2*   HEMATOCRIT % 35.6*   MCV fL 94   PLATELETS AUTO x10*3/uL 428       Coagulation:  Results from last 7 days   Lab Units 12/02/23  0607 12/01/23  1139 11/29/23  0554   INR  2.9* 2.6* 2.8*   PROTIME seconds 28.3* 25.6* 27.1* "          Assesment and Plan    Debility   Acute Occlusion of Popliteal Artery Due to Thrombosis (Cms/Hcc)  Local care to the wounds, off of the low molecular weight heparin, warfarin for goal INR of 2-3  Recheck INR Wednesday.   Hypercoagulable State (Cms/Hcc)   Multiple Subsegmental Pulmonary Emboli Without Acute Cor Pulmonale (Cms/Hcc)  Stable respiratory status at this time   Limb Ischemia   Paroxysmal Atrial Fibrillation (Cms/Hcc)  Rate controlled anticoagulated.  Monitoring INR - obtain today.   Ashd (Arteriosclerotic Heart Disease), status post coronary artery bypass grafting x3  Free of anginal symptoms   Hyperlipidemia  Continue statin   Hypertension  Meds resumed w/ parameters - much better BP after IVF   Diarrhea  Resolved.   Gout  Has not reucred.         Fritz Turner MD

## 2023-12-04 NOTE — NURSING NOTE
Assumed care of pt @ 0730, report received, in to assess pt, pt axox3, pleasant, here s/p left popliteal artery occlusion s/p left pop thrombectomy, s/p LLE fasciotomy d/t compartment syndrome, pt has x 2 LLE incision noted that are ELO with staples, pt has redness noted to LLE outter incision and + 4 pitting edema noted, pt has pain in LLE and recives oxycodone as needed and tylenol as needed for pain, pt lung sounds CTA, apical irregular, abdomen soft and round, bowl sounds active x 4 quadrants, pt denies any acute needs, call light within reach, continuing to monitor.

## 2023-12-05 PROCEDURE — 2500000002 HC RX 250 W HCPCS SELF ADMINISTERED DRUGS (ALT 637 FOR MEDICARE OP, ALT 636 FOR OP/ED): Mod: MUE | Performed by: INTERNAL MEDICINE

## 2023-12-05 PROCEDURE — 97110 THERAPEUTIC EXERCISES: CPT | Mod: GP

## 2023-12-05 PROCEDURE — 97535 SELF CARE MNGMENT TRAINING: CPT | Mod: GO

## 2023-12-05 PROCEDURE — 2500000001 HC RX 250 WO HCPCS SELF ADMINISTERED DRUGS (ALT 637 FOR MEDICARE OP): Performed by: INTERNAL MEDICINE

## 2023-12-05 PROCEDURE — 2500000004 HC RX 250 GENERAL PHARMACY W/ HCPCS (ALT 636 FOR OP/ED): Mod: MUE | Performed by: INTERNAL MEDICINE

## 2023-12-05 PROCEDURE — 97110 THERAPEUTIC EXERCISES: CPT | Mod: GO,CO

## 2023-12-05 PROCEDURE — 94760 N-INVAS EAR/PLS OXIMETRY 1: CPT

## 2023-12-05 PROCEDURE — 97530 THERAPEUTIC ACTIVITIES: CPT | Mod: GO

## 2023-12-05 PROCEDURE — 2500000004 HC RX 250 GENERAL PHARMACY W/ HCPCS (ALT 636 FOR OP/ED): Performed by: INTERNAL MEDICINE

## 2023-12-05 PROCEDURE — 97116 GAIT TRAINING THERAPY: CPT | Mod: GP

## 2023-12-05 PROCEDURE — 2500000002 HC RX 250 W HCPCS SELF ADMINISTERED DRUGS (ALT 637 FOR MEDICARE OP, ALT 636 FOR OP/ED): Performed by: INTERNAL MEDICINE

## 2023-12-05 PROCEDURE — 1180000001 HC REHAB PRIVATE ROOM DAILY

## 2023-12-05 RX ADMIN — CLOPIDOGREL BISULFATE 75 MG: 75 TABLET ORAL at 08:17

## 2023-12-05 RX ADMIN — WARFARIN SODIUM 3 MG: 3 TABLET ORAL at 17:04

## 2023-12-05 RX ADMIN — Medication: at 08:18

## 2023-12-05 RX ADMIN — ATORVASTATIN CALCIUM 40 MG: 40 TABLET, FILM COATED ORAL at 20:24

## 2023-12-05 RX ADMIN — FLUOCINONIDE: 0.5 OINTMENT TOPICAL at 20:59

## 2023-12-05 RX ADMIN — FLUOCINONIDE: 0.5 OINTMENT TOPICAL at 08:19

## 2023-12-05 RX ADMIN — DICYCLOMINE HYDROCHLORIDE 20 MG: 20 TABLET ORAL at 09:38

## 2023-12-05 RX ADMIN — FAMOTIDINE 20 MG: 20 TABLET, FILM COATED ORAL at 08:16

## 2023-12-05 RX ADMIN — ALLOPURINOL 100 MG: 100 TABLET ORAL at 08:16

## 2023-12-05 RX ADMIN — OXYCODONE HYDROCHLORIDE 5 MG: 5 TABLET ORAL at 23:16

## 2023-12-05 RX ADMIN — FUROSEMIDE 20 MG: 20 TABLET ORAL at 08:16

## 2023-12-05 RX ADMIN — OXYCODONE HYDROCHLORIDE 5 MG: 5 TABLET ORAL at 09:41

## 2023-12-05 RX ADMIN — Medication: at 20:59

## 2023-12-05 RX ADMIN — EZETIMIBE 10 MG: 10 TABLET ORAL at 08:16

## 2023-12-05 RX ADMIN — ACETAMINOPHEN 650 MG: 325 TABLET ORAL at 15:59

## 2023-12-05 ASSESSMENT — PAIN - FUNCTIONAL ASSESSMENT
PAIN_FUNCTIONAL_ASSESSMENT: 0-10

## 2023-12-05 ASSESSMENT — ACTIVITIES OF DAILY LIVING (ADL)
HOME_MANAGEMENT_TIME_ENTRY: 15
BATHING_WHERE_ASSESSED: SHOWER
HOME_MANAGEMENT_TIME_ENTRY: 45
BATHING_LEVEL_OF_ASSISTANCE: CLOSE SUPERVISION
BATHING_EQUIPMENT_NEEDED: LONG-HANDLED SPONGE

## 2023-12-05 ASSESSMENT — PAIN SCALES - PAIN ASSESSMENT IN ADVANCED DEMENTIA (PAINAD)
CONSOLABILITY: NO NEED TO CONSOLE
FACIALEXPRESSION: FACIAL GRIMACING
BODYLANGUAGE: TENSE, DISTRESSED PACING, FIDGETING
BREATHING: OCCASIONAL LABORED BREATHING, SHORT PERIOD OF HYPERVENTILATION
TOTALSCORE: 5
NEGVOCALIZATION: OCCASIONAL MOAN/GROAN, LOW SPEECH, NEGATIVE/DISAPPROVING QUALITY

## 2023-12-05 ASSESSMENT — PAIN SCALES - GENERAL
PAINLEVEL_OUTOF10: 3
PAINLEVEL_OUTOF10: 3
PAINLEVEL_OUTOF10: 4
PAINLEVEL_OUTOF10: 0 - NO PAIN
PAINLEVEL_OUTOF10: 3
PAINLEVEL_OUTOF10: 5 - MODERATE PAIN
PAINLEVEL_OUTOF10: 0 - NO PAIN
PAINLEVEL_OUTOF10: 3

## 2023-12-05 ASSESSMENT — PAIN SCALES - WONG BAKER: WONGBAKER_NUMERICALRESPONSE: HURTS LITTLE MORE

## 2023-12-05 ASSESSMENT — PAIN DESCRIPTION - DESCRIPTORS: DESCRIPTORS: ACHING

## 2023-12-05 NOTE — PROGRESS NOTES
12/05/23 8065-5153   OT Last Visit   OT Received On 12/05/23   General   Reason for Referral Presented with LLE subacute on chronic limb ischemia and PE.s/p fasciotomies, L fem cutdown w/ ileofem thromboembolectomy, L pop cutdown w/ fempop thromboembolectomy, L TP trunk cutdown w/ thromboembolectomy, selective angiography LLE, Concern for L to R shunt   Referred By Dr. Turner   Past Medical History Relevant to Rehab CAD s/p PCIx3 and CABGx3 (LIMA-Diag, free ALEX y to LAD, SVG-PDA, 2013), afib s/p MAZE (2013), and recent admission for R DVT iso COVID infection (10/24/23) on apixaban   Missed Visit No   Family/Caregiver Present No   Prior to Session Communication Bedside nurse   Patient Position Received Bed, 2 rail up   Preferred Learning Style verbal;auditory   General Comment patient cleared to be seen by therapy and aggreeable   Precautions   Hearing/Visual Limitations reading glasses   LE Weight Bearing Status Weight Bearing as Tolerated   Medical Precautions Fall precautions   Pain Assessment   Pain Assessment 0-10   Pain Score 4   Pain Type Acute pain   Pain Location Leg   Pain Orientation Left   Pain Descriptors Aching   Pain Frequency Intermittent   Pain Onset Ongoing   Clinical Progression Gradually improving   Effect of Pain on Daily Activities none   Patient's Stated Pain Goal No pain   Pain Interventions Medication (See MAR)   Response to Interventions reassessment   Cognition   Overall Cognitive Status WFL   Orientation Level Oriented X4   Coordination   Movements are Fluid and Coordinated Yes   Coordination Comment BUE's WFL   Feeding   Feeding Level of Assistance Independent   Feeding Where Assessed Bed level   Grooming   Grooming Level of Assistance Modified independent   Grooming Where Assessed Sitting sinkside   Grooming Comments to comb hair and to brush teeth   UE Bathing   UE Bathing Adaptive Equipment Removeable shower head   UE Bathing Level of Assistance Setup   UE Bathing Where Assessed  Shower   UE Bathing Comments pt seated on shower chair   LE Bathing   LE Bathing Adaptive Equipment Long-handled sponge   LE Bathing Level of Assistance Close supervision   LE Bathing Where Assessed Shower   LE Bathing Comments pt seated on shower chair   UE Dressing   UE Dressing Level of Assistance Modified independent   UE Dressing Where Assessed Wheelchair   UE Dressing Comments to doff/sandra shirt   LE Dressing   LE Dressing Yes   LE Dressing Adaptive Equipment Reacher   Pants Level of Assistance Minimum assistance   Adult Briefs Level of Assistance Minimum assistance   LE Dressing Where Assessed Wheelchair   LE Dressing Comments assist with pull up   Toileting   Toileting Level of Assistance Minimum assistance   Where Assessed Toilet   Toileting Comments assist with pants management over hips   Bed Mobility   Bed Mobility Yes   Bed Mobility 1   Bed Mobility 1 Supine to sitting   Level of Assistance 1 Modified independent   Bed Mobility Comments 1 from hospital bed with HOB elevated and use of bed rail   Transfers   Transfer Yes   Transfer 1   Transfer From 1 Bed to   Transfer to 1 Wheelchair   Technique 1 Sit to stand;Stand to sit   Transfer Device 1 Gait belt   Transfer Level of Assistance 1 Contact guard   Modalities   Modalities Used No   Toilet Transfers   Toilet Transfer From Wheelchair   Toilet Transfer Type To and from   Toilet Transfer to Standard toilet   Toilet Transfer Technique Stand pivot   Toilet Transfers Contact guard   Toilet Transfers Comments with grab bar   Shower Transfers   Shower Transfer From Wheelchair   Shower Transfer Type To and from   Shower Transfer to Shower seat with back   Shower Transfer Technique Stand pivot   Shower Transfers Contact guard   Shower Transfers Comments with use of grab bar   Inpatient/Swing Bed or Outpatient   Inpatient/Swing Bed or Outpatient Inpatient   Inpatient Plan   OT Recommended Transfer Status Assist of 1   OT - OK to Discharge Yes

## 2023-12-05 NOTE — NURSING NOTE
Assumed care of pt. Report received. Pt. In bed, visitors in room,  call light w/in reach. No c/o or needs voiced at this time. Will update as needed throughout shift

## 2023-12-05 NOTE — CARE PLAN
Problem: Balance  Goal: STG - Patient will perform TUG with least restrictive assistive device in 50 seconds or less to promote mobility and reduce fall risk with dynamic standing tasks.   Outcome: Progressing  Goal: LTG - Patient will perform TUG with least restrictive assistive device in 30 seconds or less to promote mobility and reduce fall risk with dynamic standing tasks.   Outcome: Progressing     Problem: Mobility  Goal: LTG - Patient will propel wheelchair 150 feet with two turns on even surface with independent assist to facilitate safe mobility.    Outcome: Progressing  Goal: LTG - Patient will amb 150 feet with rolling walker device including two turns on even surface with independent assist to facilitate safe mobility.   Outcome: Progressing  Goal: LTG - Patient will negotiate up to 12 stairs with one rail and contact guard assist with no device to enter home.   Outcome: Progressing     Problem: Transfers  Goal: LTG- Patient will transfer bed to chair and chair to bed with independent assist to facilitate mobility.   Outcome: Progressing  Goal: LTG - Patient will transfer supine to sit and sit to supine with independent assist to facilitate mobility.   Outcome: Progressing  Goal: LTG - Patient will transfer sit to stand and stand to sit with  independent assist to facilitate mobility.   Outcome: Progressing     Problem: Mobility  Goal: STG -Patient will propel wheelchair 50 feet with two turns on even surface with supervision assist to facilitate safe mobility.    Outcome: Met  Goal: STG - Patient will amb 20 feet with rolling walker device including no turns on even surface with moderate assist and wheelchair follow to facilitate safe mobility.   Outcome: Met     Problem: Transfers  Goal: STG - Patient will transfer bed to chair and chair to bed with moderate assist to facilitate mobility.   Outcome: Met  Goal: STG - Patient will transfer supine to sit and sit to supine with minimal assist to  facilitate mobility.   Outcome: Met  Goal: STG - Patient will transfer sit to stand and stand to sit with  moderate assist to facilitate mobility.   Outcome: Met

## 2023-12-05 NOTE — PROGRESS NOTES
Physical Therapy       12/05/23 5787-6066   PT  Visit   PT Received On 12/05/23   Response to Previous Treatment Patient with no complaints from previous session.   General   Reason for Referral Presented with LLE subacute on chronic limb ischemia and PE.s/p fasciotomies, L fem cutdown w/ ileofem thromboembolectomy, L pop cutdown w/ fempop thromboembolectomy, L TP trunk cutdown w/ thromboembolectomy, selective angiography LLE, Concern for L to R shunt   Referred By Dr. Turner   Past Medical History Relevant to Rehab CAD s/p PCIx3 and CABGx3 (LIMA-Diag, free ALEX y to LAD, SVG-PDA, 2013), afib s/p MAZE (2013), and recent admission for R DVT iso COVID infection (10/24/23) on apixaban   Patient Position Received Up in chair   Precautions   Hearing/Visual Limitations reading glasses   LE Weight Bearing Status Weight Bearing as Tolerated   Pain Assessment   Pain Assessment 0-10   Pain Score 3   Pain Type Surgical pain   Pain Location Leg   Pain Orientation Left   Therapeutic Exercise   Therapeutic Exercise Activity 1 LAQ 2 x 15, 3#   Therapeutic Exercise Activity 2 seated hip flex 2 x 15, 3#   Therapeutic Exercise Activity 3 hip abd 2 x 15 with blue theraband   Therapeutic Exercise Activity 4 hip add/ball squeezes 2 x15   Ambulation/Gait Training 1   Surface 1 Level tile   Device 1 Rolling walker   Assistance 1 Contact guard   Quality of Gait 1 Diminished heel strike;Inconsistent stride length;Decreased step length;Antalgic   Comments/Distance (ft) 1 120' x 2 with cues for step through gait pattern   Gait Support Devices Gait belt   Transfer 1   Technique 1 Sit to stand;Stand to sit   Transfer Device 1 Gait belt   Transfer Level of Assistance 1 Contact guard   Activity Tolerance   Endurance Tolerates 10 - 20 min exercise with multiple rests   PT Assessment   PT Assessment Results Decreased strength;Decreased range of motion;Decreased endurance;Impaired balance;Decreased mobility;Pain   Rehab Prognosis Excellent    Assessment Comment Good effort in therapy   End of Session Patient Position Up in chair   PT Plan   Inpatient/Swing Bed or Outpatient Inpatient   PT Plan   Treatment/Interventions Transfer training;Gait training;Bed mobility;Stair training;Balance training;Strengthening;Endurance training;Range of motion;Therapeutic exercise;Therapeutic activity;Wheelchair management   PT Plan Skilled PT   PT Recommended Transfer Status Contact guard   PT - OK to Discharge Yes

## 2023-12-05 NOTE — PROGRESS NOTES
12/05/23 1300 to 1330   OT Last Visit   OT Received On 12/05/23   General   Reason for Referral Presented with LLE subacute on chronic limb ischemia and PE.s/p fasciotomies, L fem cutdown w/ ileofem thromboembolectomy, L pop cutdown w/ fempop thromboembolectomy, L TP trunk cutdown w/ thromboembolectomy, selective angiography LLE, Concern for L to R shunt   Referred By Dr. Turner   Past Medical History Relevant to Rehab CAD s/p PCIx3 and CABGx3 (LIMA-Diag, free ALEX y to LAD, SVG-PDA, 2013), afib s/p MAZE (2013), and recent admission for R DVT iso COVID infection (10/24/23) on apixaban   Patient Position Received Up in chair   Preferred Learning Style verbal;auditory   General Comment Patient aggreeable to therapy   Pain Assessment   Pain Assessment 0-10   Pain Score 3   Pain Type Surgical pain   Pain Location Leg   Pain Orientation Left   Bed Mobility   Bed Mobility Yes   Bed Mobility 1   Bed Mobility 1 Supine to sitting;Sitting to supine   Level of Assistance 1 Close supervision   Bed Mobility Comments 1 standard bed from RW   Therapeutic Exercise   Therapeutic Exercise Performed Yes   Therapeutic Exercise Activity 1 Pre's 2 lbs 15 reps 6 sets to improve strength and endurance for adls and Iadls. Set up.   Therapeutic Exercise Activity 2 UBe setting level 1 6 minutes 3 forward 3 backwards. Set up.   Inpatient/Swing Bed or Outpatient   Inpatient/Swing Bed or Outpatient Inpatient   Inpatient Plan   Treatment Interventions ADL retraining;UE strengthening/ROM   OT - OK to Discharge Yes

## 2023-12-05 NOTE — PROGRESS NOTES
Physical Therapy Treatment and Weekly Progress Note       12/05/23 1527-2228   PT  Visit   PT Received On 12/05/23   Response to Previous Treatment Patient with no complaints from previous session.   General   Reason for Referral Presented with LLE subacute on chronic limb ischemia and PE.s/p fasciotomies, L fem cutdown w/ ileofem thromboembolectomy, L pop cutdown w/ fempop thromboembolectomy, L TP trunk cutdown w/ thromboembolectomy, selective angiography LLE, Concern for L to R shunt   Referred By Dr. Turner   Past Medical History Relevant to Rehab CAD s/p PCIx3 and CABGx3 (LIMA-Diag, free ALEX y to LAD, SVG-PDA, 2013), afib s/p MAZE (2013), and recent admission for R DVT iso COVID infection (10/24/23) on apixaban   Patient Position Received Up in chair   Pain Assessment   Pain Assessment 0-10   Pain Score 3   Pain Type Acute pain;Surgical pain   Pain Location Leg   Pain Orientation Left   AROM RLE (degrees)   RLE AROM Comment WFL   Strength RLE   R Hip Flexion 4/5   R Hip Extension 4/5   R Hip ABduction 4+/5   R Hip ADduction 4+/5   R Knee Flexion 4+/5   R Knee Extension 4+/5   R Ankle Dorsiflexion 4+/5   R Ankle Plantar Flexion 4+/5   AROM LLE (degrees)   L Hip Flexion 0-125 68   L Knee Flexion 0-130 77   Strength LLE   L Hip Flexion 4-/5   L Knee Flexion 4/5   L Knee Extension 4/5   L Ankle Dorsiflexion 4/5   L Ankle Plantar Flexion 4/5   L Hip Extension 4-/5   L Hip ABduction 4/5   L Hip ADduction 4/5   Therapeutic Exercise   Therapeutic Exercise Activity 1 SAQ 2 x 15, 2# left, 3# right   Therapeutic Exercise Activity 2 bridging 2 x 15 with roll   Therapeutic Exercise Activity 3 supine hip abd/add 2 x 15, 3#   Therapeutic Exercise Activity 4 supine heel slides 2 x 15, 3#   Bed Mobility 1   Bed Mobility 1 Supine to sitting;Sitting to supine   Level of Assistance 1 Close supervision   Bed Mobility Comments 1 head of bed flat   Bed Mobility 2   Bed Mobility  2 Rolling right;Rolling left   Level of Assistance 2  "Modified independent   Ambulation/Gait Training 1   Surface 1 Level tile   Device 1 Rolling walker   Assistance 1 Contact guard   Quality of Gait 1 Diminished heel strike;Antalgic;Soft knee(s);Forward flexed posture   Comments/Distance (ft) 1 120' x 2  left LE slight ER with cues to correct. Instructed to begin step through gait pattern with good carryover. Includes turns.   Gait Support Devices Gait belt   Transfer 1   Technique 1 Sit to stand;Stand to sit   Transfer Device 1 Gait belt   Transfer Level of Assistance 1 Contact guard   Transfers 2   Transfer From 2 Mat to   Transfer to 2 Wheelchair   Transfer Device 2 Walker   Transfer Level of Assistance 2 Contact guard   Stairs   Rails 1 Bilateral   Support Devices 1 Gait belt   Assistance 1 Contact guard   Comment/Number of Steps 1 up/down four, 6\" steps, non reciprocal pattern, cues for sequence   Wheelchair Activities   Propulsion Type 1 Manual   Level 1 Level tile   Method 1 Right upper extremity;Left upper extremity;Right lower extremity;Left lower extremity   Level of Assistance 1 Independent   Description/Details 1 70' includes turns and low pile carpet   PT Assessment   PT Assessment Results Decreased strength;Decreased range of motion;Decreased endurance;Impaired balance;Decreased mobility;Pain   Rehab Prognosis Excellent   Assessment Comment Patient making excellent progress in all areas of therapy. Met all STGs except TUG. LTGs remain appropriate. Strength, balance, activity tolerance, and all functional mobility improved. Patient highly motivated and displays good effort during sessions. Patient without complaints of dizziness in therapy over past several days.   End of Session Patient Position Up in chair   PT Plan   Treatment/Interventions Transfer training   PT Plan Skilled PT   PT Recommended Transfer Status Contact guard   PT - OK to Discharge Yes   Problem: Balance  Goal: STG - Patient will perform TUG with least restrictive assistive device in 50 " seconds or less to promote mobility and reduce fall risk with dynamic standing tasks.   Outcome: Progressing  Goal: LTG - Patient will perform TUG with least restrictive assistive device in 30 seconds or less to promote mobility and reduce fall risk with dynamic standing tasks.   Outcome: Progressing     Problem: Mobility  Goal: LTG - Patient will propel wheelchair 150 feet with two turns on even surface with independent assist to facilitate safe mobility.    Outcome: Progressing  Goal: LTG - Patient will amb 150 feet with rolling walker device including two turns on even surface with independent assist to facilitate safe mobility.   Outcome: Progressing  Goal: LTG - Patient will negotiate up to 12 stairs with one rail and contact guard assist with no device to enter home.   Outcome: Progressing     Problem: Transfers  Goal: LTG- Patient will transfer bed to chair and chair to bed with independent assist to facilitate mobility.   Outcome: Progressing  Goal: LTG - Patient will transfer supine to sit and sit to supine with independent assist to facilitate mobility.   Outcome: Progressing  Goal: LTG - Patient will transfer sit to stand and stand to sit with  independent assist to facilitate mobility.   Outcome: Progressing     Problem: Mobility  Goal: STG -Patient will propel wheelchair 50 feet with two turns on even surface with supervision assist to facilitate safe mobility.    Outcome: Met  Goal: STG - Patient will amb 20 feet with rolling walker device including no turns on even surface with moderate assist and wheelchair follow to facilitate safe mobility.   Outcome: Met     Problem: Transfers  Goal: STG - Patient will transfer bed to chair and chair to bed with moderate assist to facilitate mobility.   Outcome: Met  Goal: STG - Patient will transfer supine to sit and sit to supine with minimal assist to facilitate mobility.   Outcome: Met  Goal: STG - Patient will transfer sit to stand and stand to sit with   moderate assist to facilitate mobility.   Outcome: Met

## 2023-12-06 LAB
ANION GAP SERPL CALC-SCNC: 9 MMOL/L
BASOPHILS # BLD AUTO: 0.02 X10*3/UL (ref 0–0.1)
BASOPHILS NFR BLD AUTO: 0.3 %
BUN SERPL-MCNC: 11 MG/DL (ref 8–25)
CALCIUM SERPL-MCNC: 8.5 MG/DL (ref 8.5–10.4)
CHLORIDE SERPL-SCNC: 106 MMOL/L (ref 97–107)
CHOLEST SERPL-MCNC: 88 MG/DL (ref 0–199)
CHOLESTEROL/HDL RATIO: 2.9
CO2 SERPL-SCNC: 26 MMOL/L (ref 24–31)
CREAT SERPL-MCNC: 1.1 MG/DL (ref 0.4–1.6)
EOSINOPHIL # BLD AUTO: 0.1 X10*3/UL (ref 0–0.4)
EOSINOPHIL NFR BLD AUTO: 1.6 %
ERYTHROCYTE [DISTWIDTH] IN BLOOD BY AUTOMATED COUNT: 16.2 % (ref 11.5–14.5)
GFR SERPL CREATININE-BSD FRML MDRD: 70 ML/MIN/1.73M*2
GLUCOSE SERPL-MCNC: 111 MG/DL (ref 65–99)
HCT VFR BLD AUTO: 33.2 % (ref 41–52)
HDLC SERPL-MCNC: 30.2 MG/DL
HGB BLD-MCNC: 10.4 G/DL (ref 13.5–17.5)
IMM GRANULOCYTES # BLD AUTO: 0.03 X10*3/UL (ref 0–0.5)
IMM GRANULOCYTES NFR BLD AUTO: 0.5 % (ref 0–0.9)
INR PPP: 1.5 (ref 0.9–1.2)
LDLC SERPL CALC-MCNC: 38 MG/DL
LYMPHOCYTES # BLD AUTO: 1.85 X10*3/UL (ref 0.8–3)
LYMPHOCYTES NFR BLD AUTO: 30.2 %
MCH RBC QN AUTO: 29.8 PG (ref 26–34)
MCHC RBC AUTO-ENTMCNC: 31.3 G/DL (ref 32–36)
MCV RBC AUTO: 95 FL (ref 80–100)
MONOCYTES # BLD AUTO: 0.77 X10*3/UL (ref 0.05–0.8)
MONOCYTES NFR BLD AUTO: 12.6 %
NEUTROPHILS # BLD AUTO: 3.35 X10*3/UL (ref 1.6–5.5)
NEUTROPHILS NFR BLD AUTO: 54.8 %
NON HDL CHOLESTEROL: 58 MG/DL (ref 0–149)
NRBC BLD-RTO: 0 /100 WBCS (ref 0–0)
PLATELET # BLD AUTO: 226 X10*3/UL (ref 150–450)
POTASSIUM SERPL-SCNC: 3.6 MMOL/L (ref 3.4–5.1)
PROTHROMBIN TIME: 15.5 SECONDS (ref 9.3–12.7)
PSA SERPL-MCNC: 2.24 NG/ML
RBC # BLD AUTO: 3.49 X10*6/UL (ref 4.5–5.9)
SODIUM SERPL-SCNC: 141 MMOL/L (ref 133–145)
TRIGL SERPL-MCNC: 98 MG/DL (ref 0–149)
VLDL: 20 MG/DL (ref 0–40)
WBC # BLD AUTO: 6.1 X10*3/UL (ref 4.4–11.3)

## 2023-12-06 PROCEDURE — 94760 N-INVAS EAR/PLS OXIMETRY 1: CPT | Mod: MUE

## 2023-12-06 PROCEDURE — 1180000001 HC REHAB PRIVATE ROOM DAILY

## 2023-12-06 PROCEDURE — 85610 PROTHROMBIN TIME: CPT | Performed by: INTERNAL MEDICINE

## 2023-12-06 PROCEDURE — 2500000002 HC RX 250 W HCPCS SELF ADMINISTERED DRUGS (ALT 637 FOR MEDICARE OP, ALT 636 FOR OP/ED): Mod: MUE | Performed by: INTERNAL MEDICINE

## 2023-12-06 PROCEDURE — 80061 LIPID PANEL: CPT | Mod: TRILAB | Performed by: INTERNAL MEDICINE

## 2023-12-06 PROCEDURE — 97110 THERAPEUTIC EXERCISES: CPT | Mod: GP

## 2023-12-06 PROCEDURE — 36415 COLL VENOUS BLD VENIPUNCTURE: CPT | Performed by: INTERNAL MEDICINE

## 2023-12-06 PROCEDURE — 2500000001 HC RX 250 WO HCPCS SELF ADMINISTERED DRUGS (ALT 637 FOR MEDICARE OP): Performed by: INTERNAL MEDICINE

## 2023-12-06 PROCEDURE — 97116 GAIT TRAINING THERAPY: CPT | Mod: GP

## 2023-12-06 PROCEDURE — 80048 BASIC METABOLIC PNL TOTAL CA: CPT | Performed by: INTERNAL MEDICINE

## 2023-12-06 PROCEDURE — 85025 COMPLETE CBC W/AUTO DIFF WBC: CPT | Performed by: INTERNAL MEDICINE

## 2023-12-06 PROCEDURE — 2500000002 HC RX 250 W HCPCS SELF ADMINISTERED DRUGS (ALT 637 FOR MEDICARE OP, ALT 636 FOR OP/ED): Performed by: INTERNAL MEDICINE

## 2023-12-06 PROCEDURE — 97535 SELF CARE MNGMENT TRAINING: CPT | Mod: GO,CO

## 2023-12-06 PROCEDURE — 97112 NEUROMUSCULAR REEDUCATION: CPT | Mod: GP

## 2023-12-06 RX ORDER — WARFARIN 2 MG/1
4 TABLET ORAL DAILY
Status: DISCONTINUED | OUTPATIENT
Start: 2023-12-06 | End: 2023-12-08

## 2023-12-06 RX ADMIN — CLOPIDOGREL BISULFATE 75 MG: 75 TABLET ORAL at 09:50

## 2023-12-06 RX ADMIN — Medication 1 APPLICATION: at 10:25

## 2023-12-06 RX ADMIN — EZETIMIBE 10 MG: 10 TABLET ORAL at 09:50

## 2023-12-06 RX ADMIN — OXYCODONE HYDROCHLORIDE 5 MG: 5 TABLET ORAL at 07:37

## 2023-12-06 RX ADMIN — ATORVASTATIN CALCIUM 40 MG: 40 TABLET, FILM COATED ORAL at 20:26

## 2023-12-06 RX ADMIN — FUROSEMIDE 20 MG: 20 TABLET ORAL at 09:50

## 2023-12-06 RX ADMIN — ACETAMINOPHEN 650 MG: 325 TABLET ORAL at 20:26

## 2023-12-06 RX ADMIN — Medication: at 21:00

## 2023-12-06 RX ADMIN — FAMOTIDINE 20 MG: 20 TABLET, FILM COATED ORAL at 09:50

## 2023-12-06 RX ADMIN — ALLOPURINOL 100 MG: 100 TABLET ORAL at 09:50

## 2023-12-06 RX ADMIN — DICYCLOMINE HYDROCHLORIDE 20 MG: 20 TABLET ORAL at 09:50

## 2023-12-06 RX ADMIN — FLUOCINONIDE 1 APPLICATION: 0.5 OINTMENT TOPICAL at 10:25

## 2023-12-06 RX ADMIN — FLUOCINONIDE: 0.5 OINTMENT TOPICAL at 21:00

## 2023-12-06 RX ADMIN — ACETAMINOPHEN 650 MG: 325 TABLET ORAL at 20:20

## 2023-12-06 RX ADMIN — WARFARIN SODIUM 4 MG: 2 TABLET ORAL at 17:31

## 2023-12-06 ASSESSMENT — PAIN SCALES - GENERAL
PAINLEVEL_OUTOF10: 3
PAINLEVEL_OUTOF10: 4
PAINLEVEL_OUTOF10: 5 - MODERATE PAIN
PAINLEVEL_OUTOF10: 3
PAINLEVEL_OUTOF10: 6
PAINLEVEL_OUTOF10: 4
PAINLEVEL_OUTOF10: 5 - MODERATE PAIN
PAINLEVEL_OUTOF10: 2
PAINLEVEL_OUTOF10: 1

## 2023-12-06 ASSESSMENT — PAIN - FUNCTIONAL ASSESSMENT
PAIN_FUNCTIONAL_ASSESSMENT: 0-10

## 2023-12-06 ASSESSMENT — ACTIVITIES OF DAILY LIVING (ADL)
HOME_MANAGEMENT_TIME_ENTRY: 60
HOME_MANAGEMENT_TIME_ENTRY: 15
BATHING_LEVEL_OF_ASSISTANCE: DISTANT SUPERVISION
BATHING_WHERE_ASSESSED: SHOWER

## 2023-12-06 ASSESSMENT — PAIN DESCRIPTION - LOCATION
LOCATION: LEG
LOCATION: LEG

## 2023-12-06 ASSESSMENT — PAIN DESCRIPTION - ORIENTATION
ORIENTATION: LEFT
ORIENTATION: LEFT

## 2023-12-06 NOTE — PROGRESS NOTES
12/06/23 1300 to 1330   OT Last Visit   OT Received On 12/06/23   General   Reason for Referral Presented with LLE subacute on chronic limb ischemia and PE.s/p fasciotomies, L fem cutdown w/ ileofem thromboembolectomy, L pop cutdown w/ fempop thromboembolectomy, L TP trunk cutdown w/ thromboembolectomy, selective angiography LLE, Concern for L to R shunt   Referred By Dr. Turner   Past Medical History Relevant to Rehab CAD s/p PCIx3 and CABGx3 (LIMA-Diag, free ALEX y to LAD, SVG-PDA, 2013), afib s/p MAZE (2013), and recent admission for R DVT iso COVID infection (10/24/23) on apixaban   Prior to Session Communication Bedside nurse  (Discussed POC with OTR/L)   Patient Position Received Bed, 2 rail up   Preferred Learning Style verbal;auditory   General Comment Patient aggreeable to therapy   Vital Signs   Heart Rate 105   Heart Rate Source Brachial;Monitor   Resp 17   SpO2 96 %   /79   MAP (mmHg) 93   BP Location Left arm   BP Method Automatic   Patient Position Lying   Pain Assessment   Pain Assessment 0-10   Pain Score 1   Pain Type Surgical pain   Pain Location Leg   Pain Orientation Left;Lower   Toileting   Toileting Level of Assistance Close supervision   Where Assessed Toilet   Toilet Transfers   Toilet Transfer From Wheelchair;Walker   Toilet Transfer Type To and from   Toilet Transfer to Standard toilet   Toilet Transfer Technique Ambulating   Toilet Transfers Contact guard   Therapeutic Exercise   Therapeutic Exercise Performed Yes   Therapeutic Exercise Activity 1 Pre's 2lbs 15 reps 6 sets to improve strength and endurance for adls and Iadls. Patient completed set up level.   Community/Work Reintegration Training   Community/Work Reintegration Training Activity 1 Functional ambulation with RW trialing different shmuel and surfaces. Patient completed contact guard. Patient trialed different seating patient trailed couch using one arm to push from completed mod i   Inpatient/Swing Bed or  Outpatient   Inpatient/Swing Bed or Outpatient Inpatient   Inpatient Plan   Treatment Interventions ADL retraining;UE strengthening/ROM   Equipment Recommended upon Discharge   (handout issued and reviewed for DME)   OT - OK to Discharge Yes

## 2023-12-06 NOTE — PROGRESS NOTES
Physical Therapy       12/06/23 0800--0900   PT  Visit   PT Received On 12/06/23   Response to Previous Treatment Patient with no complaints from previous session.   General   Reason for Referral Presented with LLE subacute on chronic limb ischemia and PE.s/p fasciotomies, L fem cutdown w/ ileofem thromboembolectomy, L pop cutdown w/ fempop thromboembolectomy, L TP trunk cutdown w/ thromboembolectomy, selective angiography LLE, Concern for L to R shunt   Referred By Dr. Turner   Past Medical History Relevant to Rehab CAD s/p PCIx3 and CABGx3 (LIMA-Diag, free ALEX y to LAD, SVG-PDA, 2013), afib s/p MAZE (2013), and recent admission for R DVT iso COVID infection (10/24/23) on apixaban   Patient Position Received Up in chair   Pain Assessment   Pain Assessment 0-10   Pain Score 6   Pain Type Acute pain;Surgical pain   Pain Location Leg   Pain Orientation Left   Pain Interventions   (patient reports just had pain medication prior to session)   Therapeutic Exercise   Therapeutic Exercise Activity 1 SAQ 2 x 15, 4#   Therapeutic Exercise Activity 2 bridging 2 x 15 with roll   Therapeutic Exercise Activity 3 supine hip abd/add 2 x 15, 4#   Therapeutic Exercise Activity 4 supine heel slides 2 x 15, 4#   Therapeutic Exercise Activity 5 hip add/ball squeezes 2 x 15   Therapeutic Exercise Activity 6 seated hip abd with blue thera band 2 x 15   Bed Mobility 1   Bed Mobility 1 Supine to sitting;Sitting to supine   Level of Assistance 1 Distant supervision   Ambulation/Gait Training 1   Surface 1 Level tile   Device 1 Rolling walker   Assistance 1 Contact guard   Quality of Gait 1 Diminished heel strike;Inconsistent stride length;Decreased step length;Antalgic   Comments/Distance (ft) 1 120' x 2 with cues for step through gait pattern   Gait Support Devices Gait belt   Transfer 1   Technique 1 Sit to stand;Stand to sit   Transfer Device 1 Gait belt   Transfer Level of Assistance 1 Close supervision   Stairs   Rails 1 Left   Device  "1 Single point cane   Support Devices 1 Gait belt   Assistance 1 Contact guard   Comment/Number of Steps 1 up/down four 6\" steps, non reciprocal pattern, cues for sequencing.   Wheelchair Activities   Propulsion Type 1 Manual   Level 1 Level tile   Method 1 Right upper extremity;Left upper extremity;Right lower extremity;Left lower extremity   Level of Assistance 1 Independent   Description/Details 1 80' includes turns and low pile carpet   Activity Tolerance   Endurance Tolerates 30 min exercise with multiple rests   PT Assessment   PT Assessment Results Decreased strength;Decreased range of motion;Decreased endurance;Impaired balance;Decreased mobility;Pain   Rehab Prognosis Excellent   Assessment Comment Good effort in therapy. Progressed to stair negotiation with rail and cane. Patient has RW and cane at home.   End of Session Patient Position Up in chair   Outpatient Education   Individual(s) Educated Patient   Education Provided Other  (educated on stair negotiation with left up rail and cane.)   Patient Response to Education Patient/Caregiver Verbalized Understanding of Information;Patient/Caregiver Performed Return Demonstration of Exercises/Activities   PT Plan   Inpatient/Swing Bed or Outpatient Inpatient   PT Plan   Treatment/Interventions Bed mobility;Transfer training;Gait training;Stair training;Strengthening;Endurance training;Range of motion;Therapeutic exercise;Therapeutic activity;Wheelchair management  (family training with spouse scheduled on Friday 12/8/23)   PT Recommended Transfer Status Contact guard   PT - OK to Discharge Yes     "

## 2023-12-06 NOTE — PROGRESS NOTES
Physical Therapy       12/06/23 7121-1984   PT  Visit   PT Received On 12/06/23   Response to Previous Treatment Patient with no complaints from previous session.   General   Reason for Referral Presented with LLE subacute on chronic limb ischemia and PE.s/p fasciotomies, L fem cutdown w/ ileofem thromboembolectomy, L pop cutdown w/ fempop thromboembolectomy, L TP trunk cutdown w/ thromboembolectomy, selective angiography LLE, Concern for L to R shunt   Referred By Dr. Turner   Past Medical History Relevant to Rehab CAD s/p PCIx3 and CABGx3 (LIMA-Diag, free ALEX y to LAD, SVG-PDA, 2013), afib s/p MAZE (2013), and recent admission for R DVT iso COVID infection (10/24/23) on apixaban   Patient Position Received Up in chair   Precautions   Hearing/Visual Limitations reading glasses   LE Weight Bearing Status Weight Bearing as Tolerated   Pain Assessment   Pain Assessment 0-10   Pain Score 4   Pain Type Surgical pain   Pain Location Leg   Pain Orientation Left   Therapeutic Exercise   Therapeutic Exercise Activity 1 standing heel raises in ll bars 2 x 15   Therapeutic Exercise Activity 2 standing hip flex in ll bars 2 x 15   Therapeutic Exercise Activity 3 standing hip abd in ll bars 2 x 15   Balance/Neuromuscular Re-Education   Balance/Neuromuscular Re-Education Activity 1 ll bars cone reach with supervision   Balance/Neuromuscular Re-Education Activity 2 ll bars with ball toss with dowel giuliana, CGA   Balance/Neuromuscular Re-Education Activity 3 ll bars sparring ll bars CGA   Balance/Neuromuscular Re-Education Activity 4 sidestepping ll bars with throw catch CGA   Ambulation/Gait Training 1   Surface 1 Level tile   Device 1 Rolling walker   Assistance 1 Contact guard   Quality of Gait 1 Diminished heel strike;Inconsistent stride length;Decreased step length;Antalgic   Comments/Distance (ft) 1 120' x 2 with cues for step through gait pattern   Gait Support Devices Gait belt   Transfer 1   Technique 1 Sit to stand;Stand  to sit   Transfer Device 1 Walker   Transfer Level of Assistance 1 Close supervision   Wheelchair Activities   Propulsion Type 1 Manual   Level 1 Level tile   Method 1 Right upper extremity;Left upper extremity;Right lower extremity;Left lower extremity   Level of Assistance 1 Independent   Description/Details 1 80' includes turns and low pile carpet   PT Assessment   PT Assessment Results Decreased strength;Decreased range of motion;Decreased endurance;Impaired balance;Decreased mobility;Pain   Rehab Prognosis Excellent   Assessment Comment Good effort in therapy. Increased standing tolerance with dynamic standing balance activities   PT Plan   Inpatient/Swing Bed or Outpatient Inpatient   PT Plan   Treatment/Interventions Transfer training;Gait training;Balance training;Neuromuscular re-education;Strengthening;Endurance training;Therapeutic exercise   PT Plan Skilled PT   PT Recommended Transfer Status Contact guard   PT - OK to Discharge Yes

## 2023-12-06 NOTE — PROGRESS NOTES
12/06/23 1000 to 1100   OT Last Visit   OT Received On 12/06/23   General   Reason for Referral Presented with LLE subacute on chronic limb ischemia and PE.s/p fasciotomies, L fem cutdown w/ ileofem thromboembolectomy, L pop cutdown w/ fempop thromboembolectomy, L TP trunk cutdown w/ thromboembolectomy, selective angiography LLE, Concern for L to R shunt   Referred By Dr. Turner   Past Medical History Relevant to Rehab CAD s/p PCIx3 and CABGx3 (LIMA-Diag, free ALEX y to LAD, SVG-PDA, 2013), afib s/p MAZE (2013), and recent admission for R DVT iso COVID infection (10/24/23) on apixaban   Prior to Session Communication Bedside nurse  (Discussed POC with OTR/L Nurse aware of edema in left LUE and redness around incision.)   Patient Position Received Bed, 2 rail up   Preferred Learning Style verbal;auditory   General Comment Patient aggreeable to therapy   Pain Assessment   Pain Assessment 0-10   Pain Score 3   Pain Type Surgical pain   Pain Location Leg   Pain Orientation Lower;Left   Cognition   Orientation Level Oriented X4   Feeding   Feeding Level of Assistance Independent   Feeding Where Assessed Bed level   Grooming   Grooming Level of Assistance Modified independent   Grooming Where Assessed Sitting sinkside   UE Bathing   UE Bathing Level of Assistance Setup   UE Bathing Where Assessed Shower   LE Bathing   LE Bathing Level of Assistance Distant supervision   LE Bathing Where Assessed Shower   UE Dressing   UE Dressing Level of Assistance Modified independent   UE Dressing Where Assessed Wheelchair   LE Dressing   LE Dressing Yes   LE Dressing Adaptive Equipment Reacher;Sock aide   Pants Level of Assistance Contact guard   Sock Level of Assistance Moderate assistance   Adult Briefs Level of Assistance Contact guard   LE Dressing Where Assessed Wheelchair   LE Dressing Comments Patient utilized reacher for donning and doffng pants. Used sockaide with set up for right foot assist with left secondary to  incision.   Bed Mobility   Bed Mobility Yes   Bed Mobility 1   Bed Mobility 1 Supine to sitting;Sitting to supine   Level of Assistance 1 Distant supervision   Transfers   Transfer Yes   Transfer 1   Transfer From 1 Bed to   Transfer to 1 Stand   Technique 1 Sit to stand   Transfer Device 1 Walker   Transfer Level of Assistance 1 Close supervision   Transfers 2   Transfer From 2 Wheelchair to   Transfer to 2 Stand   Technique 2 Sit to stand   Transfer Device 2 Walker   Transfer Level of Assistance 2 Contact guard   Shower Transfers   Shower Transfer From Walker   Shower Transfer Type To and from   Shower Transfer to Shower seat with back   Shower Transfer Technique Ambulating   Shower Transfers Contact guard   Education   Education Provided Ergonomics and postural realignment   Education Comment Educated patient on DME recommended a seat for the shower, Grab bars wall mounted., toilet safety frame and reacher

## 2023-12-06 NOTE — NURSING NOTE
Assumed care of patient. Patient is in his wheel chair for breakfast, call light within reach. Requesting pain medication.

## 2023-12-06 NOTE — PROGRESS NOTES
continues to follow for discharge planning. IDT meeting was held 12.5.23 to review overall clinical status and progress toward therapy goals. Plan for family training 12.8.23 at 9am with spouse Sweetie [phone] in preparation for discharge home 12.12.23. Therapy has recommended home health and LSW will follow for provider of choice and refer accordingly. There are no barriers anticipated in regards to patient's discharge goal/ADOD.

## 2023-12-06 NOTE — CONSULTS
"Nutrition Assessment Note  Admitted for rehab post left popliteal cutdown with femoral-popliteal thromboembolectomy. Spoke with RN. PO intake is very good, no nutritional concerns at this time.  Nutrition Assessment    Reason for Assessment: Length of stay    Reason for Hospital Admission:  Mukesh Garg is a 73 y.o. male who is admitted for rehab.    Nutrition History:     Energy Intake: Good > 75 %  Food Allergies/Intolerances:  None  GI Symptoms: None  Oral Problems: None    Anthropometrics:  Ht: 175.2 cm (5' 8.98\"), Wt: 113 kg (248 lb 7.3 oz), BMI: 36.72  IBW/kg (Dietitian Calculated): 72.73 kg     Weight Change: Unknown     Nutrition Focused Physical Exam Findings:   Subcutaneous Fat Loss  Orbital Fat Pads: Defer  Buccal Fat Pads: Defer  Triceps: Defer  Ribs: Defer    Muscle Wasting  Temporalis: Defer  Pectoralis (Clavicular Region): Defer  Deltoid/Trapezius: Defer  Interosseous: Defer  Trapezius/Infraspinatus/Supraspinatus (Scapular Region): Defer  Quadriceps: Defer  Gastrocnemius: Defer    Nutrition Significant Labs:  Lab Results   Component Value Date    WBC 6.1 12/06/2023    HGB 10.4 (L) 12/06/2023    HCT 33.2 (L) 12/06/2023     12/06/2023    CHOL 132 (L) 07/15/2023    TRIG 128 07/15/2023    HDL 44 07/15/2023    LDLDIRECT 68 07/02/2018    ALT 48 11/27/2023    AST 33 11/27/2023     12/06/2023    K 3.6 12/06/2023     12/06/2023    CREATININE 1.10 12/06/2023    BUN 11 12/06/2023    CO2 26 12/06/2023    TSH 0.99 10/24/2023    PSA 2.2 09/15/2022    INR 1.5 (H) 12/06/2023    HGBA1C 6.2 (H) 11/21/2023    ALBUR <12 07/02/2018       Current Facility-Administered Medications:     acetaminophen (Tylenol) oral liquid 650 mg, 650 mg, oral, q4h PRN **OR** acetaminophen (Tylenol) tablet 650 mg, 650 mg, oral, q4h PRN, Fritz Turner MD, 650 mg at 12/02/23 2139    acetaminophen (Tylenol) tablet 650 mg, 650 mg, oral, q6h PRN, Fritz Turner MD, 650 mg at 12/05/23 1559    allopurinol (Zyloprim) " tablet 100 mg, 100 mg, oral, Daily, Fritz Turner MD, 100 mg at 12/06/23 0950    alum-mag hydroxide-simeth (Mylanta) 200-200-20 mg/5 mL oral suspension 30 mL, 30 mL, oral, q6h PRN, Fritz Turner MD    amLODIPine (Norvasc) tablet 10 mg, 10 mg, oral, Daily, Fritz Turner MD, 10 mg at 12/02/23 0900    ammonium lactate (Lac-Hydrin) 12 % lotion, , Topical, BID, Fritz Turner MD, 1 Application at 12/06/23 1025    atorvastatin (Lipitor) tablet 40 mg, 40 mg, oral, Nightly, Fritz Turner MD, 40 mg at 12/05/23 2024    bisacodyl (Dulcolax) EC tablet 10 mg, 10 mg, oral, Daily PRN, Fritz Turner MD    carvedilol (Coreg) tablet 12.5 mg, 12.5 mg, oral, BID with meals, Fritz Turner MD, 12.5 mg at 12/02/23 0859    clopidogrel (Plavix) tablet 75 mg, 75 mg, oral, Daily, Fritz Turner MD, 75 mg at 12/06/23 0950    dicyclomine (Bentyl) tablet 20 mg, 20 mg, oral, Daily, Fritz Turner MD, 20 mg at 12/06/23 0950    docusate sodium (Colace) capsule 100 mg, 100 mg, oral, BID PRN, Fritz Turner MD    ezetimibe (Zetia) tablet 10 mg, 10 mg, oral, Daily, Fritz Turner MD, 10 mg at 12/06/23 0950    famotidine (Pepcid) tablet 20 mg, 20 mg, oral, Daily, Fritz Turner MD, 20 mg at 12/06/23 0950    fluocinonide (Lidex) 0.05 % ointment, , Topical, BID, Fritz Turner MD, 1 Application at 12/06/23 1025    furosemide (Lasix) tablet 20 mg, 20 mg, oral, Daily, Fritz Turner MD, 20 mg at 12/06/23 0950    oxyCODONE (Roxicodone) immediate release tablet 5 mg, 5 mg, oral, q6h PRN, Fritz Turner MD, 5 mg at 12/06/23 0737    polyethylene glycol (Glycolax, Miralax) packet 17 g, 17 g, oral, Daily PRN, Fritz Turner MD    warfarin (Coumadin) tablet 3 mg, 3 mg, oral, Daily, Fritz Turner MD, 3 mg at 12/05/23 1704  Propofol @  ml/hr provides:  kcals/day    Dietary Orders (From admission, onward)       Start     Ordered    11/27/23 1924  Adult diet Cardiac; 70 gm fat; 2 - 3 grams  Sodium  Diet effective now        Question Answer Comment   Diet type Cardiac    Fat restriction: 70 gm fat    Sodium restriction: 2 - 3 grams Sodium        11/27/23 1924                     Estimated Needs:   Estimated Energy Needs  Total Energy Estimated Needs (kCal): 1825 kCal  Total Estimated Energy Need per Day (kCal/kg): 25 kCal/kg  Method for Estimating Needs: IBW    Estimated Protein Needs  Total Protein Estimated Needs (g): 73 g  Total Protein Estimated Needs (g/kg): 1 g/kg  Method for Estimating Needs: IBW    Estimated Fluid Needs  Total Fluid Estimated Needs (mL): 1825 mL  Method for Estimating Needs: 1 mL/kcal        Nutrition Diagnosis   Nutrition Diagnosis:  Malnutrition Diagnosis  Patient has Malnutrition Diagnosis: No    Nutrition Diagnosis  Patient has Nutrition Diagnosis: No       Nutrition Interventions/Recommendations   Nutrition Interventions and Recommendations:    Nutrition Prescription:  Individualized Nutrition Prescription Provided for : 1825 calories, 73 gm protein via current diet order    Nutrition Interventions:   Food and/or Nutrient Delivery Interventions  Interventions: Meals and snacks  Meals and Snacks: Fat-modified diet, Mineral-modified diet  Goal: Provide as ordered    Education Documentation  No documentation found.           Nutrition Monitoring and Evaluation   Monitoring/Evaluation:   Food/Nutrient Related History Monitoring  Monitoring and Evaluation Plan: Energy intake  Energy Intake: Estimated energy intake  Criteria: Patient will consume =/>75% of meals       Time Spent/Follow-up:   Follow Up  Time Spent (min): 30 minutes  Last Date of Nutrition Visit: 12/06/23  Nutrition Follow-Up Needed?: 7-10 days  Follow up Comment: 12/13/23

## 2023-12-06 NOTE — NURSING NOTE
Assumed care of pt. Report received. Pt in bed, call light w/in reach. No c/o or needs voiced at this time. Will update as needed throughout shift.

## 2023-12-06 NOTE — CARE PLAN
The patient's goals for the shift include pain management    The clinical goals for the shift include No Fall    Over the shift, the patient did not make progress toward the following goals. Barriers to progression include . Recommendations to address these barriers include .

## 2023-12-07 PROCEDURE — 97110 THERAPEUTIC EXERCISES: CPT | Mod: GO,CO

## 2023-12-07 PROCEDURE — 97535 SELF CARE MNGMENT TRAINING: CPT | Mod: GO,CO

## 2023-12-07 PROCEDURE — 2500000002 HC RX 250 W HCPCS SELF ADMINISTERED DRUGS (ALT 637 FOR MEDICARE OP, ALT 636 FOR OP/ED): Mod: MUE | Performed by: INTERNAL MEDICINE

## 2023-12-07 PROCEDURE — 2500000001 HC RX 250 WO HCPCS SELF ADMINISTERED DRUGS (ALT 637 FOR MEDICARE OP): Performed by: INTERNAL MEDICINE

## 2023-12-07 PROCEDURE — 97110 THERAPEUTIC EXERCISES: CPT | Mod: GP

## 2023-12-07 PROCEDURE — 2500000002 HC RX 250 W HCPCS SELF ADMINISTERED DRUGS (ALT 637 FOR MEDICARE OP, ALT 636 FOR OP/ED): Performed by: INTERNAL MEDICINE

## 2023-12-07 PROCEDURE — 97530 THERAPEUTIC ACTIVITIES: CPT | Mod: GP

## 2023-12-07 PROCEDURE — 97530 THERAPEUTIC ACTIVITIES: CPT | Mod: GO,CO

## 2023-12-07 PROCEDURE — 99232 SBSQ HOSP IP/OBS MODERATE 35: CPT | Performed by: INTERNAL MEDICINE

## 2023-12-07 PROCEDURE — 97116 GAIT TRAINING THERAPY: CPT | Mod: GP

## 2023-12-07 PROCEDURE — 1180000001 HC REHAB PRIVATE ROOM DAILY

## 2023-12-07 RX ADMIN — ALLOPURINOL 100 MG: 100 TABLET ORAL at 08:04

## 2023-12-07 RX ADMIN — FLUOCINONIDE: 0.5 OINTMENT TOPICAL at 08:03

## 2023-12-07 RX ADMIN — Medication: at 08:03

## 2023-12-07 RX ADMIN — EZETIMIBE 10 MG: 10 TABLET ORAL at 08:03

## 2023-12-07 RX ADMIN — FUROSEMIDE 20 MG: 20 TABLET ORAL at 08:03

## 2023-12-07 RX ADMIN — DICYCLOMINE HYDROCHLORIDE 20 MG: 20 TABLET ORAL at 08:03

## 2023-12-07 RX ADMIN — ATORVASTATIN CALCIUM 40 MG: 40 TABLET, FILM COATED ORAL at 20:31

## 2023-12-07 RX ADMIN — OXYCODONE HYDROCHLORIDE 5 MG: 5 TABLET ORAL at 01:36

## 2023-12-07 RX ADMIN — Medication 1 APPLICATION: at 20:32

## 2023-12-07 RX ADMIN — WARFARIN SODIUM 4 MG: 2 TABLET ORAL at 17:00

## 2023-12-07 RX ADMIN — ACETAMINOPHEN 650 MG: 325 TABLET ORAL at 18:09

## 2023-12-07 RX ADMIN — FLUOCINONIDE 1 APPLICATION: 0.5 OINTMENT TOPICAL at 21:00

## 2023-12-07 RX ADMIN — OXYCODONE HYDROCHLORIDE 5 MG: 5 TABLET ORAL at 07:43

## 2023-12-07 RX ADMIN — FAMOTIDINE 20 MG: 20 TABLET, FILM COATED ORAL at 08:03

## 2023-12-07 RX ADMIN — CLOPIDOGREL BISULFATE 75 MG: 75 TABLET ORAL at 08:03

## 2023-12-07 ASSESSMENT — PAIN - FUNCTIONAL ASSESSMENT
PAIN_FUNCTIONAL_ASSESSMENT: 0-10

## 2023-12-07 ASSESSMENT — PAIN SCALES - PAIN ASSESSMENT IN ADVANCED DEMENTIA (PAINAD)
BODYLANGUAGE: TENSE, DISTRESSED PACING, FIDGETING
FACIALEXPRESSION: FACIAL GRIMACING
CONSOLABILITY: NO NEED TO CONSOLE

## 2023-12-07 ASSESSMENT — ACTIVITIES OF DAILY LIVING (ADL)
BATHING_LEVEL_OF_ASSISTANCE: DISTANT SUPERVISION
BATHING_EQUIPMENT_NEEDED: LONG-HANDLED SPONGE;REMOVEABLE SHOWER HEAD
BATHING_WHERE_ASSESSED: SHOWER

## 2023-12-07 ASSESSMENT — PAIN SCALES - GENERAL
PAINLEVEL_OUTOF10: 6
PAINLEVEL_OUTOF10: 5 - MODERATE PAIN
PAINLEVEL_OUTOF10: 3
PAINLEVEL_OUTOF10: 5 - MODERATE PAIN
PAINLEVEL_OUTOF10: 0 - NO PAIN
PAINLEVEL_OUTOF10: 6
PAINLEVEL_OUTOF10: 6
PAINLEVEL_OUTOF10: 5 - MODERATE PAIN
PAINLEVEL_OUTOF10: 0 - NO PAIN
PAINLEVEL_OUTOF10: 4

## 2023-12-07 ASSESSMENT — PAIN DESCRIPTION - ORIENTATION: ORIENTATION: LEFT

## 2023-12-07 ASSESSMENT — PAIN DESCRIPTION - LOCATION: LOCATION: LEG

## 2023-12-07 ASSESSMENT — PAIN SCALES - WONG BAKER: WONGBAKER_NUMERICALRESPONSE: HURTS EVEN MORE

## 2023-12-07 NOTE — PROGRESS NOTES
[facility] [place] has denied continued IRF stay with last covered day of 12.6.23. Peer to peer offered and scheduled for 12.8.23 at 1230pm. Ongoing stay recommended for family training planned for 12/8 at 9am. ADOD is 12.12.23. Patient aware of insurance and plan for peer to peer. Patient's goal is to return home with home health services. LSW will meet with patient and spouse post training to finalize discharge plan.

## 2023-12-07 NOTE — PROGRESS NOTES
Occupational Therapy   12/07/23 [id number]   OT Last Visit   OT Received On 12/07/23   General   Reason for Referral Presented with LLE subacute on chronic limb ischemia and PE.s/p fasciotomies, L fem cutdown w/ ileofem thromboembolectomy, L pop cutdown w/ fempop thromboembolectomy, L TP trunk cutdown w/ thromboembolectomy, selective angiography LLE, Concern for L to R shunt   Referred By Dr. Turner   Past Medical History Relevant to Rehab CAD s/p PCIx3 and CABGx3 (LIMA-Diag, free ALEX y to LAD, SVG-PDA, 2013), afib s/p MAZE (2013), and recent admission for R DVT iso COVID infection (10/24/23) on apixaban   Patient Position Received   (w/c)   General Comment Agreeable to treatment   Pain Assessment   Pain Assessment 0-10   Pain Score 5 - Moderate pain   Pain Location Foot   Pain Orientation Left   Grooming   Grooming Level of Assistance Modified independent   Grooming Where Assessed Sitting sinkside   Grooming Comments shaving face, oral care, brushing hair   UE Bathing   UE Bathing Level of Assistance Distant supervision   UE Bathing Where Assessed Shower   UE Bathing Comments seated   LE Bathing   LE Bathing Adaptive Equipment Long-handled sponge;Removeable shower head   LE Bathing Level of Assistance Distant supervision   LE Bathing Where Assessed Shower   LE Bathing Comments stand for flaco area with use of grab bars   UE Dressing   UE Dressing Level of Assistance Setup   UE Dressing Where Assessed Wheelchair   UE Dressing Comments pt doffed/donned overhead shirt   LE Dressing   LE Dressing Adaptive Equipment Reacher   Pants Level of Assistance Minimum assistance   Sock Level of Assistance Maximum assistance   Adult Briefs Level of Assistance Minimum assistance   LE Dressing Where Assessed Other (Comment)  (shower chair)   LE Dressing Comments to doff/sandra socks   Toileting   Toileting Level of Assistance Distant supervision   Where Assessed Toilet   Toileting Comments hygiene, clothing management   Bed Mobility 1    Bed Mobility 1 Sitting to supine   Level of Assistance 1 Independent   Transfer 1   Transfer From 1 Wheelchair to   Transfer to 1 Bed   Technique 1 Sit to stand;Stand to sit   Transfer Level of Assistance 1 Close supervision   Trials/Comments 1 90 degree turn, use of bedrail/w/c arm for stability   Toilet Transfers   Toilet Transfer From Wheelchair   Toilet Transfer Type To and from   Toilet Transfer to Standard toilet   Toilet Transfer Technique Ambulating   Toilet Transfers Supervision   Toilet Transfers Comments use of grab bars   Shower Transfers   Shower Transfer From Wheelchair   Shower Transfer Type To and from   Shower Transfer to Shower seat with back   Shower Transfer Technique Ambulating   Shower Transfers Supervision   Shower Transfers Comments use of grab bars   Light Housekeeping   Light Housekeeping Level of Assistance Close supervision   Light Housekeeping Level   (standing)   Light Housekeeping Pt completes simulated laundry task at washer and dryer x 5 minutes with cues for safe hand placement with dynamic movement.   Therapeutic Exercise   Therapeutic Exercise Activity 1 scap protraction/retraction   Therapeutic Exercise Activity 2 shoulder elevation   Therapeutic Exercise Activity 3 shoulder flexion   Therapeutic Exercise Activity 4 bicep curls  2 sets each x 15 reps, 2 lb free wt   IP OT Assessment   End of Session Patient Position Up in chair   OT Assessment   OT Assessment Results Decreased ADL status;Decreased endurance;Decreased functional mobility  (limited with pain)   Inpatient/Swing Bed or Outpatient   Inpatient/Swing Bed or Outpatient Inpatient   Inpatient Plan   Treatment Interventions ADL retraining;Functional transfer training   OT Frequency 5 times per week   OT Discharge Recommendations High intensity level of continued care   Equipment Recommended upon Discharge Wheeled walker   OT Recommended Transfer Status Stand by assist

## 2023-12-07 NOTE — PROGRESS NOTES
[facility]: [facility]  PROGRESS NOTE      Mukesh Garg is a 73 y.o. male that is being seen  today for follow-up at [facility] patient is a 73-year-old male with a past history of coronary artery disease.  Subjective   Patient is a 73-year-old male with a history of coronary artery disease, history of DVT, atrial fibrillation bilateral pulmonary emboli, popliteal artery occlusion s/p left femoral cutdown with ileal femoral thromboembolectomy who is being seen for follow-up at [facility].  Patient has been getting physical therapy.  Patient lab work reviewed and white cell count has been stable.  Patient has mild anemia.  Patient's vital signs are stable.  Patient is on Plavix and Coumadin.  INR is being monitored.  Patient's INR yesterday was 1.5 and dose of Coumadin was adjusted.  Repeat labs are pending today.      ROS  Negative for fever or chills  Negative for sore throat, ear pain, nasal discharge  Negative for cough, shortness of breath or wheezing  Negative for chest pain, palpitations, mild swelling  of legs  Negative for abdominal pain, constipation, diarrhea, blood in the stools  Negative for urinary complaints  Negative for headache, dizziness, weakness or numbness  Positive for leg pain  Negative for depression or anxiety  All other systems reviewed and were negative   Vitals:    12/07/23 0500   BP: 130/78   Pulse: 86   Resp: 16   Temp: 36.8 °C (98.2 °F)   SpO2: 96%      Vitals:    12/04/23 1924   Weight: 113 kg (248 lb 7.3 oz)     Body mass index is 36.72 kg/m².  Physical Exam  Constitutional: Patient does not appear to be in any acute distress  Head and Face: NCAT  Eyes: Normal external exam, EOMI  ENT: Normal external inspection of ears and nose. Oropharynx normal.  Cardiovascular: RRR, S1/S2, no murmurs, rubs, or gallops, radial pulses +2, no edema of extremities  Pulmonary: CTAB, no respiratory distress.  Abdomen: +BS, soft, non-tender, nondistended, no guarding or rebound, no masses  noted  MSK: Patient has difficulty with walking but has mild pain in left leg.  Skin- No lesions, contusions, or erythema.    Neuro: AAO X3, Cranial nerves 2-12 grossly intact,DTR 2+ in all 4 limbs   Psychiatric: Judgment intact. Appropriate mood and behavior    LABS   [unfilled]  Lab Results   Component Value Date    GLUCOSE 111 (H) 12/06/2023    CALCIUM 8.5 12/06/2023     12/06/2023    K 3.6 12/06/2023    CO2 26 12/06/2023     12/06/2023    BUN 11 12/06/2023    CREATININE 1.10 12/06/2023     Lab Results   Component Value Date    ALT 48 11/27/2023    AST 33 11/27/2023    ALKPHOS 103 11/27/2023    BILITOT 1.2 11/27/2023     Lab Results   Component Value Date    WBC 6.1 12/06/2023    HGB 10.4 (L) 12/06/2023    HCT 33.2 (L) 12/06/2023    MCV 95 12/06/2023     12/06/2023     Lab Results   Component Value Date    CHOL 88 12/06/2023    CHOL 132 (L) 07/15/2023    CHOL 125 (L) 12/12/2022     Lab Results   Component Value Date    HDL 30.2 12/06/2023    HDL 44 07/15/2023    HDL 38 (L) 12/12/2022     Lab Results   Component Value Date    LDLCALC 38 12/06/2023    LDLCALC 62 (L) 07/15/2023    LDLCALC 65 12/12/2022     Lab Results   Component Value Date    TRIG 98 12/06/2023    TRIG 128 07/15/2023    TRIG 111 12/12/2022     Lab Results   Component Value Date    HGBA1C 6.2 (H) 11/21/2023     Other labs not included in the list above were reviewed either before or during this encounter.    History    Past Medical History:   Diagnosis Date    Atrial fibrillation (CMS/HCC)     CAD (coronary artery disease)     Gout     Heart disease     Hyperlipidemia     Hypertension     MI (myocardial infarction) (CMS/HCC)     Sleep apnea      Past Surgical History:   Procedure Laterality Date    CARDIAC CATHETERIZATION Right 11/21/2023    Procedure: Right Heart Cath;  Surgeon: Zachariah Gonsales MD;  Location: [place] [address] Cardiac Cath Lab;  Service: Cardiovascular;  Laterality: Right;  Left to right shunt    CORONARY ARTERY BYPASS GRAFT       CT LOWER EXTREMITY LEFT ANGIOGRAM W AND/OR WO IV CONTRAST Left 2023    CT LOWER EXTREMITY LEFT ANGIOGRAM W AND/OR WO IV CONTRAST 2023 [facility]    IR ANGIOGRAM LOWER EXTREMITY RIGHT Right 2023    IR ANGIOGRAM LOWER EXTREMITY RIGHT 2023 [place] [facility] [place]    [place] HEAD ANGIO WO IV CONTRAST  2017    MR HEAD ANGIO WO IV CONTRAST [facility]    MR HEAD ANGIO WO IV CONTRAST  10/30/2020    MR HEAD ANGIO WO IV CONTRAST [facility]     Family History   Problem Relation Name Age of Onset    Hypertension Mother      Stroke Mother      Heart attack Father          Cause of death    Heart disease Father      Diabetes Other Grandmother      Allergies   Allergen Reactions    Talwin Compound Dizziness    Pentazocine Lactate Itching     Burning      No current facility-administered medications on file prior to encounter.     Current Outpatient Medications on File Prior to Encounter   Medication Sig Dispense Refill    acetaminophen (Tylenol) 325 mg tablet Take 2 tablets (650 mg) by mouth every 6 hours if needed for moderate pain (4 - 6). 100 tablet 0    allopurinol (Zyloprim) 100 mg tablet TAKE 1 TABLET BY MOUTH EVERY DAY 90 tablet 0    amLODIPine (Norvasc) 10 mg tablet TAKE 1 TABLET BY MOUTH EVERY DAY for 90      atorvastatin (Lipitor) 40 mg tablet TAKE 1 TABLET BY MOUTH EVERY DAY FOR 90 DAYS 90 tablet 3    bisacodyl (Dulcolax) 10 mg suppository Unwrap and Insert 1 suppository (10 mg) into the rectum once daily as needed for constipation. 12 suppository 0    carvedilol (Coreg) 12.5 mg tablet TAKE 1 TABLET BY MOUTH TWICE A DAY WITH FOOD for 90      chlorthalidone (Hygroton) 25 mg tablet Take 1 tablet (25 mg) by mouth once daily.      clopidogrel (Plavix) 75 mg tablet Take 1 tablet (75 mg) by mouth once daily. Do not start before 2023. 30 tablet 0    [] colchicine 0.6 mg tablet Take 1 tablet (0.6 mg) by mouth 2 times a day for 4 days. 8 tablet 0    dicyclomine  (Bentyl) 20 mg tablet Take 1 tablet (20 mg) by mouth once daily.      docusate sodium (Colace) 100 mg capsule Take 1 capsule (100 mg) by mouth 2 times a day as needed for constipation. 60 capsule 0    [] enoxaparin (Lovenox) 120 mg/0.8 mL syringe Inject 0.74 mL (111 mg) under the skin every 12 hours for 3 days. Discontinue when INR between 2-3 based on coags on  and  6 each 0    ezetimibe (Zetia) 10 mg tablet Take 1 tablet (10 mg) by mouth once daily.      famotidine (Pepcid) 20 mg tablet 1 tablet at bedtime as needed Orally Once a day      lisinopril 40 mg tablet TAKE 1 TABLET BY MOUTH EVERY DAY FOR 90 DAYS 90 tablet 3    nitroglycerin (Nitrostat) 0.4 mg SL tablet as directed Sublingual as directed      polyethylene glycol (Glycolax, Miralax) 17 gram/dose powder Take 1 capful (17 g) mixed in 4-8 ounces of liquid by mouth once daily as needed (Constipation). 510 g 0    warfarin (Coumadin) 3 mg tablet Take as directed per After Visit Summary. 30 tablet 0     Immunization History   Administered Date(s) Administered    DT (pediatric) 2006    Flu vaccine, quadrivalent, high-dose, preservative free, age 65y+ (FLUZONE) 2020, 10/01/2022    Influenza, High Dose Seasonal, Preservative Free 10/30/2015, 10/04/2017, 12/10/2018    Influenza, Seasonal, Quadrivalent, Adjuvanted 09/15/2021    Influenza, seasonal, injectable 2012, 10/04/2013, 2014    Influenza, trivalent, adjuvanted 2019    Moderna SARS-CoV-2 Vaccination 2021, 2021, 2021, 2022    Pfizer COVID-19 vaccine, bivalent, age 12 years and older (30 mcg/0.3 mL) 10/01/2022    Pneumococcal conjugate vaccine, 13-valent (PREVNAR 13) 2016    Pneumococcal polysaccharide vaccine, 23-valent, age 2 years and older (PNEUMOVAX 23) 2013, 2014, 2016    Tdap vaccine, age 7 year and older (BOOSTRIX) 2018    Zoster, live 2009     Patient's medical history was reviewed and updated  either before or during this encounter.  ASSESSMENT / PLAN:  Active Hospital Problems    Acute occlusion of popliteal artery due to thrombosis (CMS/HCC)      Hypercoagulable state (CMS/HCC)      Diarrhea      *Debility      Gout      Multiple subsegmental pulmonary emboli without acute cor pulmonale (CMS/HCC)      Paroxysmal atrial fibrillation (CMS/HCC)      ASHD (arteriosclerotic heart disease)      Hyperlipidemia      Hypertension      Limb ischemia       Patient overall has been doing well.  Patient is on Coumadin and INR is being monitored.      Sandeep Jernigan MD

## 2023-12-07 NOTE — PROGRESS NOTES
Physical Therapy Treatment Note        12/07/23 [id number]   PT  Visit   PT Received On 12/07/23   General   Reason for Referral Presented with LLE subacute on chronic limb ischemia and PE.s/p fasciotomies, L fem cutdown w/ ileofem thromboembolectomy, L pop cutdown w/ fempop thromboembolectomy, L TP trunk cutdown w/ thromboembolectomy, selective angiography LLE, Concern for L to R shunt   Referred By Dr. Turner   Past Medical History Relevant to Rehab CAD s/p PCIx3 and CABGx3 (LIMA-Diag, free ALEX y to LAD, SVG-PDA, 2013), afib s/p MAZE (2013), and recent admission for R DVT iso COVID infection (10/24/23) on apixaban   Pain Assessment   Pain Assessment 0-10   Pain Score 5 - Moderate pain   Pain Type Acute pain  (Top of Left Foot 2nd MT with ambulation.  Tender)   Pain Location Foot   Pain Orientation Left   Cognition   Overall Cognitive Status WFL   Orientation Level Oriented X4   Postural Control   Postural Control Impaired   General Observation   General Observation Moderate Edema LLE very sore and tender to touch   Strength LLE   L Ankle Dorsiflexion   (ankle df -12 degrees n)   Therapeutic Exercise   Therapeutic Exercise Performed Yes   Therapeutic Exercise Activity 2 Supine Knee to Chest    Therapeutic Exercise Activity 3 Supine Hip Abd/Add 2x15 2 # R LE 0 LLE    Therapeutic Exercise Activity 4 SAQ  (4# BLE)   Therapeutic Exercise Activity 5 Bridging 2x15   Bed Mobility   Bed Mobility Yes   Bed Mobility 1   Bed Mobility 1 Sitting to supine   Level of Assistance 1 Independent   Bed Mobility Comments 1 Increased time for LLE   Bed Mobility 2   Bed Mobility  2 Rolling right;Rolling left   Level of Assistance 2 Modified independent   Ambulation/Gait Training   Ambulation/Gait Training Performed Yes   Ambulation/Gait Training 1   Surface 1 Level tile   Device 1 Rollator   Assistance 1 Close supervision   Quality of Gait 1 Diminished heel strike;Inconsistent stride length;Decreased step length;Antalgic    Comments/Distance (ft) 1 125 x2 Faster pace with Rollator.  Emphasis to have trail limb, LLE to decrease gait deviations.  Pt c/o right foot pain.   Gait Support Devices Gait belt   Transfers   Transfer Yes   Transfer 1   Transfer From 1 Sit to   Transfer to 1 Stand   Transfer Device 1 Walker   Transfer Level of Assistance 1 Set up   Transfers 2   Transfer From 2 Wheelchair to   Transfer to 2 Chair with arms   Technique 2 Sit to stand;Stand to sit   Transfer Device 2 Walker   Transfer Level of Assistance 2 Contact guard   Stairs   Rails 1 Left   Device 1 Single point cane   Support Devices 1 Gait belt   Assistance 1 Contact guard   Comment/Number of Steps 1 Up and Down 8  6 Inch steps non reciprocal pattern   Wheelchair Activities   Propulsion Type 1 Manual   Level 1 Level tile   Method 1 Right upper extremity;Left upper extremity;Right lower extremity;Left lower extremity   Level of Assistance 1 Independent   Description/Details 1 150 feet with turns   PT Assessment   PT Assessment Results Decreased strength;Decreased range of motion;Decreased endurance;Impaired balance;Decreased mobility;Pain   Rehab Prognosis Excellent   Evaluation/Treatment Tolerance Patient tolerated treatment well   Strengths Ability to acquire knowledge   Barriers to Participation Comorbidities   Assessment Comment Improved gait speed, AROM LLE, loading duringg gait cycle.  Still painful.   End of Session Patient Position Up in chair   Outpatient Education   Individual(s) Educated Patient   Patient Response to Education Patient/Caregiver Verbalized Understanding of Information;Patient/Caregiver Performed Return Demonstration of Exercises/Activities   PT Plan   Inpatient/Swing Bed or Outpatient Inpatient   PT Plan   Treatment/Interventions Bed mobility;Transfer training;Gait training;Stair training;Balance training;Range of motion;Therapeutic exercise;Wheelchair management;Therapeutic activity

## 2023-12-07 NOTE — PROGRESS NOTES
Physical Therapy Treatment Note        12/07/23 [id number]   PT  Visit   PT Received On 12/07/23   Response to Previous Treatment Patient with no complaints from previous session.   General   Reason for Referral Presented with LLE subacute on chronic limb ischemia and PE.s/p fasciotomies, L fem cutdown w/ ileofem thromboembolectomy, L pop cutdown w/ fempop thromboembolectomy, L TP trunk cutdown w/ thromboembolectomy, selective angiography LLE, Concern for L to R shunt   Referred By Dr. Turner   Past Medical History Relevant to Rehab CAD s/p PCIx3 and CABGx3 (LIMA-Diag, free ALEX y to LAD, SVG-PDA, 2013), afib s/p MAZE (2013), and recent admission for R DVT iso COVID infection (10/24/23) on apixaban   Precautions   Hearing/Visual Limitations reading glasses   LE Weight Bearing Status Weight Bearing as Tolerated   Medical Precautions Fall precautions   Pain Assessment   Pain Assessment 0-10   Pain Score 6   Pain Type Surgical pain   Pain Location Leg   Pain Orientation Left   Cognition   Overall Cognitive Status WFL   Orientation Level Oriented X4   Therapeutic Exercise   Therapeutic Exercise Performed Yes   Therapeutic Exercise Activity 1 standing heel raises in ll bars 2 x 15   Therapeutic Exercise Activity 2 Standing Hip Flexion 2x15 emphasis loading LLE   Therapeutic Exercise Activity 3 Hip add with ball 3x15   Therapeutic Exercise Activity 4 Hamstring Curls with green tband 2x15   Balance/Neuromuscular Re-Education   Balance/Neuromuscular Re-Education Activity Performed Yes   Balance/Neuromuscular Re-Education Activity 1 Balloon Toss  (on Airex balance beam WBOS needed frequent UE support due to LOB improved throughout activity)   Balance/Neuromuscular Re-Education Activity 2 Sidestepping  (No UE support ll bars No lob)   Balance/Neuromuscular Re-Education Activity 3 Small Hurdles  (ll bars needed UE support with LOB)   Ambulation/Gait Training   Ambulation/Gait Training Performed Yes   Ambulation/Gait Training 1    Surface 1 Level tile   Device 1 Rolling walker   Assistance 1 Contact guard   Quality of Gait 1 Diminished heel strike;Inconsistent stride length;Decreased step length;Antalgic   Comments/Distance (ft) 1 125, 120, 125 feet cues for longer step RLE to promote trail limb LLE.  Pt tends to perform TDWB LLE initially due to tight gastroc LLE, edema   Gait Support Devices Gait belt   Transfer 1   Transfer From 1 Wheelchair to   Transfer to 1 Stand   Technique 1 Sit to stand;Stand to sit   Transfer Device 1 Walker   Transfer Level of Assistance 1 Close supervision   Trials/Comments 1 cues for hands   Transfers 2   Transfer From 2 Wheelchair to   Transfer to 2 Stand   Technique 2 Sit to stand   Transfer Device 2 Walker   Transfer Level of Assistance 2 Contact guard   Other Activity   Other Activity Performed Yes   Other Activity 1 NUSTEP x 10 mins level 2   Other Activity 2 Gastroc stretch LLE at kunz rail 1x5 20-30 seconds each   PT Assessment   PT Assessment Results Decreased strength;Decreased range of motion;Decreased endurance;Impaired balance;Decreased mobility;Pain   Rehab Prognosis Excellent   Evaluation/Treatment Tolerance Patient tolerated treatment well   Strengths Ability to acquire knowledge   Barriers to Participation Comorbidities   Outpatient Education   Individual(s) Educated Patient   Education Provided Other   Diagnosis and Precautions Current Precautions   PT Plan   Inpatient/Swing Bed or Outpatient Inpatient   PT Plan   Treatment/Interventions Transfer training;Gait training;Balance training;Neuromuscular re-education;Endurance training;Strengthening;Therapeutic exercise;Range of motion;Therapeutic activity;Positioning   PT Plan Skilled PT   Equipment Recommended upon Discharge Wheeled walker   PT - OK to Discharge Yes

## 2023-12-08 ENCOUNTER — HOME HEALTH ADMISSION (OUTPATIENT)
Dept: HOME HEALTH SERVICES | Facility: HOME HEALTH | Age: 73
End: 2023-12-08
Payer: MEDICARE

## 2023-12-08 PROBLEM — I48.0 PAROXYSMAL ATRIAL FIBRILLATION (MULTI): Status: RESOLVED | Noted: 2023-08-20 | Resolved: 2023-12-08

## 2023-12-08 PROBLEM — R53.81 DEBILITY: Status: RESOLVED | Noted: 2023-11-27 | Resolved: 2023-12-08

## 2023-12-08 PROBLEM — R19.7 DIARRHEA: Status: RESOLVED | Noted: 2023-11-28 | Resolved: 2023-12-08

## 2023-12-08 PROBLEM — I74.3: Status: RESOLVED | Noted: 2023-11-28 | Resolved: 2023-12-08

## 2023-12-08 LAB
INR PPP: 1.5 (ref 0.9–1.2)
PROTHROMBIN TIME: 15.6 SECONDS (ref 9.3–12.7)

## 2023-12-08 PROCEDURE — 97110 THERAPEUTIC EXERCISES: CPT | Mod: GP

## 2023-12-08 PROCEDURE — 1180000001 HC REHAB PRIVATE ROOM DAILY

## 2023-12-08 PROCEDURE — 2500000004 HC RX 250 GENERAL PHARMACY W/ HCPCS (ALT 636 FOR OP/ED): Performed by: INTERNAL MEDICINE

## 2023-12-08 PROCEDURE — 97112 NEUROMUSCULAR REEDUCATION: CPT | Mod: GP

## 2023-12-08 PROCEDURE — 36415 COLL VENOUS BLD VENIPUNCTURE: CPT | Performed by: INTERNAL MEDICINE

## 2023-12-08 PROCEDURE — 2500000002 HC RX 250 W HCPCS SELF ADMINISTERED DRUGS (ALT 637 FOR MEDICARE OP, ALT 636 FOR OP/ED): Performed by: INTERNAL MEDICINE

## 2023-12-08 PROCEDURE — 97116 GAIT TRAINING THERAPY: CPT | Mod: GP

## 2023-12-08 PROCEDURE — 2500000002 HC RX 250 W HCPCS SELF ADMINISTERED DRUGS (ALT 637 FOR MEDICARE OP, ALT 636 FOR OP/ED): Mod: MUE | Performed by: INTERNAL MEDICINE

## 2023-12-08 PROCEDURE — 2500000004 HC RX 250 GENERAL PHARMACY W/ HCPCS (ALT 636 FOR OP/ED): Mod: MUE | Performed by: INTERNAL MEDICINE

## 2023-12-08 PROCEDURE — 2500000001 HC RX 250 WO HCPCS SELF ADMINISTERED DRUGS (ALT 637 FOR MEDICARE OP): Performed by: INTERNAL MEDICINE

## 2023-12-08 PROCEDURE — 85610 PROTHROMBIN TIME: CPT | Performed by: INTERNAL MEDICINE

## 2023-12-08 PROCEDURE — 97530 THERAPEUTIC ACTIVITIES: CPT | Mod: GO

## 2023-12-08 PROCEDURE — 97535 SELF CARE MNGMENT TRAINING: CPT | Mod: GO

## 2023-12-08 RX ORDER — OXYCODONE HYDROCHLORIDE 5 MG/1
5 TABLET ORAL 3 TIMES DAILY PRN
Qty: 21 TABLET | Refills: 0 | Status: SHIPPED | OUTPATIENT
Start: 2023-12-08 | End: 2023-12-15

## 2023-12-08 RX ORDER — WARFARIN SODIUM 5 MG/1
5 TABLET ORAL DAILY
Status: DISCONTINUED | OUTPATIENT
Start: 2023-12-08 | End: 2023-12-09 | Stop reason: HOSPADM

## 2023-12-08 RX ORDER — WARFARIN SODIUM 5 MG/1
TABLET ORAL
Qty: 30 TABLET | Refills: 0 | Status: SHIPPED | OUTPATIENT
Start: 2023-12-08 | End: 2024-01-11 | Stop reason: SDUPTHER

## 2023-12-08 RX ADMIN — FUROSEMIDE 20 MG: 20 TABLET ORAL at 08:57

## 2023-12-08 RX ADMIN — CARVEDILOL 12.5 MG: 12.5 TABLET, FILM COATED ORAL at 09:06

## 2023-12-08 RX ADMIN — ATORVASTATIN CALCIUM 40 MG: 40 TABLET, FILM COATED ORAL at 20:36

## 2023-12-08 RX ADMIN — WARFARIN SODIUM 5 MG: 5 TABLET ORAL at 17:42

## 2023-12-08 RX ADMIN — CLOPIDOGREL BISULFATE 75 MG: 75 TABLET ORAL at 08:57

## 2023-12-08 RX ADMIN — ALLOPURINOL 100 MG: 100 TABLET ORAL at 08:57

## 2023-12-08 RX ADMIN — EZETIMIBE 10 MG: 10 TABLET ORAL at 08:57

## 2023-12-08 RX ADMIN — OXYCODONE HYDROCHLORIDE 5 MG: 5 TABLET ORAL at 23:42

## 2023-12-08 RX ADMIN — CARVEDILOL 12.5 MG: 12.5 TABLET, FILM COATED ORAL at 17:42

## 2023-12-08 RX ADMIN — FLUOCINONIDE: 0.5 OINTMENT TOPICAL at 09:04

## 2023-12-08 RX ADMIN — AMLODIPINE BESYLATE 10 MG: 10 TABLET ORAL at 09:06

## 2023-12-08 RX ADMIN — Medication: at 20:36

## 2023-12-08 RX ADMIN — OXYCODONE HYDROCHLORIDE 5 MG: 5 TABLET ORAL at 00:24

## 2023-12-08 RX ADMIN — OXYCODONE HYDROCHLORIDE 5 MG: 5 TABLET ORAL at 08:57

## 2023-12-08 RX ADMIN — FAMOTIDINE 20 MG: 20 TABLET, FILM COATED ORAL at 08:57

## 2023-12-08 RX ADMIN — DICYCLOMINE HYDROCHLORIDE 20 MG: 20 TABLET ORAL at 09:00

## 2023-12-08 RX ADMIN — Medication: at 09:02

## 2023-12-08 RX ADMIN — FLUOCINONIDE: 0.5 OINTMENT TOPICAL at 20:36

## 2023-12-08 ASSESSMENT — ACTIVITIES OF DAILY LIVING (ADL)
BATHING_LEVEL_OF_ASSISTANCE: SETUP
BATHING_EQUIPMENT_NEEDED: LONG-HANDLED SPONGE;REMOVEABLE SHOWER HEAD
BATHING_WHERE_ASSESSED: SHOWER
HOME_MANAGEMENT_TIME_ENTRY: 45

## 2023-12-08 ASSESSMENT — PAIN - FUNCTIONAL ASSESSMENT
PAIN_FUNCTIONAL_ASSESSMENT: 0-10

## 2023-12-08 ASSESSMENT — PAIN DESCRIPTION - ORIENTATION
ORIENTATION: LEFT

## 2023-12-08 ASSESSMENT — COGNITIVE AND FUNCTIONAL STATUS - GENERAL
DAILY ACTIVITIY SCORE: 22
DRESSING REGULAR LOWER BODY CLOTHING: A LITTLE
HELP NEEDED FOR BATHING: A LITTLE

## 2023-12-08 ASSESSMENT — PAIN SCALES - WONG BAKER: WONGBAKER_NUMERICALRESPONSE: HURTS EVEN MORE

## 2023-12-08 ASSESSMENT — PAIN DESCRIPTION - DESCRIPTORS
DESCRIPTORS: ACHING
DESCRIPTORS: ACHING;SHARP;BURNING
DESCRIPTORS: ACHING
DESCRIPTORS: ACHING

## 2023-12-08 ASSESSMENT — PAIN SCALES - GENERAL
PAINLEVEL_OUTOF10: 6
PAINLEVEL_OUTOF10: 6
PAINLEVEL_OUTOF10: 2
PAINLEVEL_OUTOF10: 0 - NO PAIN
PAINLEVEL_OUTOF10: 6
PAINLEVEL_OUTOF10: 6
PAINLEVEL_OUTOF10: 7
PAINLEVEL_OUTOF10: 6
PAINLEVEL_OUTOF10: 0 - NO PAIN
PAINLEVEL_OUTOF10: 1

## 2023-12-08 ASSESSMENT — PAIN DESCRIPTION - LOCATION
LOCATION: LEG

## 2023-12-08 NOTE — PROGRESS NOTES
Occupational Therapy Note     12/08/23 6180-5008   OT Last Visit   OT Received On 12/07/23   General   Reason for Referral Presented with LLE subacute on chronic limb ischemia and PE.s/p fasciotomies, L fem cutdown w/ ileofem thromboembolectomy, L pop cutdown w/ fempop thromboembolectomy, L TP trunk cutdown w/ thromboembolectomy, selective angiography LLE, Concern for L to R shunt   Referred By Dr. Turner   Past Medical History Relevant to Rehab CAD s/p PCIx3 and CABGx3 (LIMA-Diag, free ALEX y to LAD, SVG-PDA, 2013), afib s/p MAZE (2013), and recent admission for R DVT iso COVID infection (10/24/23) on apixaban   Missed Visit No   Family/Caregiver Present Yes   Caregiver Feedback pts spouse present during family training session   Prior to Session Communication Bedside nurse   Patient Position Received Bed, 2 rail up   Preferred Learning Style verbal;auditory   General Comment Agreeable to treatment   Precautions   Hearing/Visual Limitations reading glasses   LE Weight Bearing Status Weight Bearing as Tolerated   Medical Precautions Fall precautions   Pain Assessment   Pain Assessment 0-10   Pain Score 6   [place] FACES Pain Rating 6   Pain Type Chronic pain   Pain Location Leg   Pain Orientation Left   Pain Descriptors Aching   Pain Frequency Intermittent   Pain Onset Ongoing   Clinical Progression Gradually improving   Effect of Pain on Daily Activities none   Patient's Stated Pain Goal No pain   Cognition   Overall Cognitive Status WFL   Orientation Level Oriented X4   Feeding   Feeding Level of Assistance Independent   Feeding Where Assessed Bed level   Grooming   Grooming Level of Assistance Modified independent   Grooming Where Assessed Sitting sinkside   Grooming Comments oral care, brush hair, shave face   UE Bathing   UE Bathing Adaptive Equipment Removeable shower head   UE Bathing Level of Assistance Setup   UE Bathing Where Assessed Shower   UE Bathing Comments seated on shower chair   LE Bathing   LE  Bathing Adaptive Equipment Long-handled sponge;Removeable shower head   LE Bathing Level of Assistance Setup   LE Bathing Where Assessed Shower   UE Dressing   UE Dressing Level of Assistance Setup   UE Dressing Where Assessed Wheelchair   UE Dressing Comments to doff/sandra shirt   LE Dressing   LE Dressing Yes   LE Dressing Adaptive Equipment Reacher   Pants Level of Assistance Minimum assistance   Sock Level of Assistance Maximum assistance   Adult Briefs Level of Assistance Minimum assistance   LE Dressing Where Assessed Wheelchair   LE Dressing Comments assist with pants over hips; to doff/sandra socks; L foot edematous; notified nursing   Toileting   Toileting Level of Assistance Distant supervision   Where Assessed Toilet   Toileting Comments assist with pants over hips; able to perform toilet hygiene   Bed Mobility   Bed Mobility Yes   Bed Mobility 1   Bed Mobility 1 Supine to sitting   Level of Assistance 1 Independent   Bed Mobility Comments 1 with HOB elevated and use of bed rail   Transfers   Transfer Yes   Transfer 1   Transfer From 1 Sit to   Transfer to 1 Stand   Technique 1 Sit to stand;Stand to sit   Transfer Device 1 Walker   Transfer Level of Assistance 1 Set up   Modalities   Modalities Used No   Toilet Transfers   Toilet Transfer From Walker   Toilet Transfer Type To and from   Toilet Transfer to Standard toilet   Toilet Transfer Technique Ambulating   Toilet Transfers Supervision   Toilet Transfers Comments with use of grab bars for support   Car Transfers   Car Transfers Comments ran out of time during am session; Family ed on safe technique; issued handout   Education   Individual(s) Educated Patient;Spouse   Equipment   (Recommend: shower chair, grab bars for shower and by toilet,walker tray, FWW, reacher, LH sponge, toilet safety frame)   Risk and Benefits Discussed with Patient/Caregiver/Other yes   Patient/Caregiver Demonstrated Understanding yes   Plan of Care Discussed and Agreed Upon yes    Patient Response to Education Patient/Caregiver Verbalized Understanding of Information;Patient/Caregiver Performed Return Demonstration of Exercises/Activities   Education Comment Educated patient on DME recommended a seat for the shower, Grab bars wall mounted., toilet safety frame and reacher   Inpatient/Swing Bed or Outpatient   Inpatient/Swing Bed or Outpatient Inpatient   Inpatient Plan   OT Frequency 5 times per week   OT Discharge Recommendations High intensity level of continued care   Equipment Recommended upon Discharge Wheeled walker   OT Recommended Transfer Status Stand by assist   OT - OK to Discharge Yes

## 2023-12-08 NOTE — CARE PLAN
Problem: Bathing  Goal: LTG - Patient will utilize adaptive techniques to bathe body with modified independence.  Outcome: Adequate for Discharge     Problem: Dressings Lower Extremities  Goal: LTG - Patient will demonstrate use of appropriate intervention to ensure safe dressing of lower extremities with modified independence.  Outcome: Not met  Goal: STG - Patient will complete lower body dressing with minimal assistance with use of AE PRN.  Outcome: Met     Problem: Toileting  Goal: LTG - Patient will utilize adaptive techniques/equipment to complete daily toileting tasks with modified independence.  Outcome: Adequate for Discharge     Problem: Instrumental Activities of Daily Living  Goal: LTG - Patient will independently complete basic home making activities to return to independent functioning at home.  Description: 1. Light meal prep  2. Ed on kitchen safety  3. Laundry task  4. Making bed  Outcome: Adequate for Discharge

## 2023-12-08 NOTE — NURSING NOTE
Assumed care of patient at report. Patient is sleeping without any signs of pain or distress noted at this time. Patient is scheduled for training today and also MD will be completing a P2P. Labs already drawn this AM. Will monitor for changes this shift

## 2023-12-08 NOTE — DISCHARGE SUMMARY
Discharge Diagnosis  Debility    Issues Requiring Follow-Up  Follow INR ([facility])    Test Results Pending At Discharge  Pending Labs       No current pending labs.            Hospital Course   73 year old man with LLE arterial occlusion and pulmonary emboli status post vascular surgery admitted for rehab. The patient was admitted to the inpatient rehabilitation unit and received intensive physical therapy, occupational therapy and rehabilitation nursing care.  The patient made strides toward functional independence and at the time of discharge was at a supervised to modified independent level with mobility, transfers and ADLs.  Home health care was arranged.  The patient will be following up with the providers as outlined in the discharge instructions.  Discharge medications were reconciled in detail and the patient and family were educated on appropriate medication use.  Prescriptions were provided either in hardcopy or electronic format.  The clinical condition was stable at the time of discharge.      Pertinent Physical Exam At Time of Discharge  Physical Exam  Constitutional:       General: He is not in acute distress.     Appearance: He is not toxic-appearing.   HENT:      Head: Normocephalic and atraumatic.      Mouth/Throat:      Mouth: Mucous membranes are moist.   Eyes:      Pupils: Pupils are equal, round, and reactive to light.   Cardiovascular:      Rate and Rhythm: Normal rate and regular rhythm.      Heart sounds: No murmur heard.  Pulmonary:      Breath sounds: Normal breath sounds. No wheezing, rhonchi or rales.   Abdominal:      General: There is no distension.      Palpations: Abdomen is soft.   Musculoskeletal:      Right lower leg: Edema present. trace     Left lower leg: Edema present. 1+  Neurological:      General: No focal deficit present.      Mental Status: He is alert and oriented to person, place, and time.     There is minor erythema about the incision states, the staples are in place and  there is no drainage from the wounds  Home Medications     Medication List      START taking these medications     oxyCODONE 5 mg immediate release tablet; Commonly known as: Roxicodone;   Take 1 tablet (5 mg) by mouth 3 times a day as needed for severe pain (7 -   10) for up to 7 days.     CHANGE how you take these medications     warfarin 5 mg tablet; Commonly known as: Coumadin; Take as directed per   After Visit Summary.; What changed: medication strength     CONTINUE taking these medications     acetaminophen 325 mg tablet; Commonly known as: Tylenol; Take 2 tablets   (650 mg) by mouth every 6 hours if needed for moderate pain (4 - 6).   allopurinol 100 mg tablet; Commonly known as: Zyloprim; TAKE 1 TABLET BY   MOUTH EVERY DAY   amLODIPine 10 mg tablet; Commonly known as: Norvasc   atorvastatin 40 mg tablet; Commonly known as: Lipitor; TAKE 1 TABLET BY   MOUTH EVERY DAY FOR 90 DAYS   carvedilol 12.5 mg tablet; Commonly known as: Coreg   clopidogrel 75 mg tablet; Commonly known as: Plavix; Take 1 tablet (75   mg) by mouth once daily. Do not start before November 25, 2023.   dicyclomine 20 mg tablet; Commonly known as: Bentyl   ezetimibe 10 mg tablet; Commonly known as: Zetia     STOP taking these medications     bisacodyl 10 mg suppository; Commonly known as: Dulcolax   chlorthalidone 25 mg tablet; Commonly known as: [place]   colchicine 0.6 mg tablet   docusate sodium 100 mg capsule; Commonly known as: Colace   enoxaparin 120 mg/0.8 mL syringe; Commonly known as: Lovenox   famotidine 20 mg tablet; Commonly known as: Pepcid   lisinopril 40 mg tablet   Nitrostat 0.4 mg SL tablet; Generic drug: nitroglycerin   polyethylene glycol 17 gram/dose powder; Commonly known as: Glycolax,   Miralax       Outpatient Follow-Up  Future Appointments   Date Time Provider Department Center   12/14/2023 11:40 AM Monika Pruett MD [id number] [facility]   1/3/2024 10:45 AM [address]   1/19/2024  8:30 AM Anjali FRIEDMAN  MD [place] [id number] [facility]   1/23/2024  3:20 PM Kenyetta Ortega MD [id number] [facility]   5/24/2024 10:15 AM Steven Wayne DO [id number] [facility]       Fritz Turner MD

## 2023-12-08 NOTE — PROGRESS NOTES
12/08/23 [id number]   OT Last Visit   OT Received On 12/08/23   Pain Assessment   Pain Assessment 0-10   Pain Score 1   Pain Location Leg   Pain Orientation Left   Pain Descriptors Aching;Sharp;Burning  (worse with movement)   Pain Onset Ongoing   Functional Standing Tolerance   Time 6 min   Activity during UB exercise   Functional Standing Tolerance Comments close sup, no LOB   Transfer 1   Transfer From 1 Sit to   Transfer to 1 Stand   Technique 1 Stand pivot   Transfer Device 1 Walker   Transfer Level of Assistance 1 Set up   Trials/Comments 1 from wheelchair to edge of bed.   Transfers 2   Transfer From 2 Sit to   Transfer to 2   (supine)   Transfer Level of Assistance 2 Modified independent   Trials/Comments 2 use of bed rails   Therapeutic Exercise   Therapeutic Exercise Performed Yes  (IP THER EXER PROGRAM -Written copy provided as HEP  2# Free weights bilaterally  Shoulder flexion  Shoulder abduction  Horizontal abduction  Bicep curls  FA supination/pronation  Tricep presses   , 2 sets of 20 reps(up from 15).)   Education   Individual(s) Educated Patient   Education Provided POC discussed and agreed upon   Home Program Strengthening   Risk and Benefits Discussed with Patient/Caregiver/Other yes   Patient/Caregiver Demonstrated Understanding yes   Plan of Care Discussed and Agreed Upon yes   Patient Response to Education Patient/Caregiver Verbalized Understanding of Information;Patient/Caregiver Performed Return Demonstration of Exercises/Activities;Patient/Caregiver Asked Appropriate Questions

## 2023-12-08 NOTE — PROGRESS NOTES
Peer to peer completed this date and denial was upheld by [facility]. LSW will follow for second level appeal and patient aware. Patient plans to discharge home 12.9.23. Discharge options/recommendations have been reviewed with patient and spouse post training this date. Home with home health is preferred plan.      TRANSPORT DATE AND TIME:  spouse will transport, patient will update team on time when confirmed with spouse    EQUIPMENT NEEDS:  patient has walker in place at home, no other DME needs identified     HOME GOING SERVICES:  home care to follow for PT/INR lab draws PT OT, outpatient PT OT prescription provided for transition to services when appropriate     IDT aware of discharge plan. Patient aware and in agreement with safe discharge plan for 12.9.23. LSW will pursue second level appeal post patient discharge. No questions or concerns noted.

## 2023-12-08 NOTE — PROGRESS NOTES
Physical Therapy Treatment Note        12/08/23 [id number]   PT  Visit   PT Received On 12/08/23   Response to Previous Treatment Patient with no complaints from previous session.   General   Reason for Referral Presented with LLE subacute on chronic limb ischemia and PE.s/p fasciotomies, L fem cutdown w/ ileofem thromboembolectomy, L pop cutdown w/ fempop thromboembolectomy, L TP trunk cutdown w/ thromboembolectomy, selective angiography LLE, Concern for L to R shunt   Referred By Dr. Turner   Past Medical History Relevant to Rehab CAD s/p PCIx3 and CABGx3 (LIMA-Diag, free ALEX y to LAD, SVG-PDA, 2013), afib s/p MAZE (2013), and recent admission for R DVT iso COVID infection (10/24/23) on apixaban   General Comment Pt in good spirits.   Vital Signs   Heart Rate 102   Heart Rate Source Brachial   Resp 16   SpO2 97 %   BP 94/70   MAP (mmHg) 79   BP Location Left arm   BP Method Automatic   Patient Position Sitting   Pain Assessment   Pain Assessment 0-10   Pain Score 6  (worse with loading LLE, Stretching Left Gastroc)   Pain Location Leg   Pain Orientation Left   Pain Descriptors Aching   Pain Onset Ongoing   Balance/Neuromuscular Re-Education   Balance/Neuromuscular Re-Education Activity Performed Yes   Balance/Neuromuscular Re-Education Activity 1 Ambulation changing speeds, turns without AD.  Antalgic LLE   Balance/Neuromuscular Re-Education Activity 2 Stepping over small hurdles No UE's Cgx1   Balance/Neuromuscular Re-Education Activity 3 Tapping on 6 inch step alternating x 1 minutes Frequent LOB min Assist grabbing single ll bar prn   Balance/Neuromuscular Re-Education Activity 4 Balloon Toss  Blue Ther pads modified tandem alternating UE's Minassist x1   Balance/Neuromuscular Re-Education Activity 5 Resisted walking with change to fast walking with turns   Ambulation/Gait Training   Ambulation/Gait Training Performed Yes   Ambulation/Gait Training 1   Surface 1 Level tile;Carpet;Ramp   Device 1 No device    Assistance 1 Contact guard   Quality of Gait 1 Diminished heel strike;Inconsistent stride length;Decreased step length;Antalgic;WBOS  (Some ER LLE.  Pt reports some of that vhe had at baseline, just worse now.)   Comments/Distance (ft) 1 100, 125 feet Cues for heel strike LLE.   Gait Support Devices Gait belt   Transfer 1   Transfer From 1 Sit to   Transfer to 1 Stand   Technique 1 Sit to stand;Stand to sit   Transfer Device 1 Walker   Transfer Level of Assistance 1 Set up   Trials/Comments 1 x6 reps for ther exercises, ambulation   Transfers 2   Transfer From 2 Wheelchair to   Transfer to 2 Mat   Technique 2 Stand pivot   Transfer Device 2 Cane   Transfer Level of Assistance 2 Close supervision   PT Assessment   PT Assessment Results Decreased strength;Decreased range of motion;Decreased endurance;Impaired balance;Decreased mobility;Pain   Rehab Prognosis Excellent   Evaluation/Treatment Tolerance Patient tolerated treatment well   Strengths Ability to acquire knowledge   Barriers to Participation Comorbidities   End of Session Patient Position Up in chair   Outpatient Education   Individual(s) Educated Patient   Diagnosis and Precautions Current Precautions   Education Comment FAmily Training with Spouse this am.  HEP, Tband Issued, reviewed.  (Reviewed details from AM session.)   PT Plan   Inpatient/Swing Bed or Outpatient Inpatient   PT Plan   Treatment/Interventions Transfer training;Gait training;Stair training;Balance training;Neuromuscular re-education;Strengthening;Endurance training;Range of motion;Therapeutic exercise;Therapeutic activity   PT Plan Skilled PT   Equipment Recommended upon Discharge   (He has RW and [place])   PT - OK to Discharge Yes

## 2023-12-08 NOTE — PROGRESS NOTES
Occupational Therapy  DISCHARGE STATUS  Discharge Date:  12/8/2023  Reason for Discharge:  Pt met max rehab potential at this level of care.  Upper Extremity Status    Left Right   Dominance WFL WFL   Gross Motor WFL WFL   Fine Motor WFL WFL   Balance   Static Dynamic   Sitting good good   Standing good fair  (+)     Visual / Perceptual  Visual Acuity WFL   Visual Spatial WFL   Hearing WFL   Proprioception WFL   Praxis WFL   Memory WFL   Attention WFL   Rt/Lt Neglect WFL     Discharge Functional Status  Eating  Feeding Level of Assistance: Independent LE pants  Pants Level of Assistance: Minimum assistance   Grooming  Grooming Level of Assistance: Modified independent LE socks  Sock Level of Assistance: Maximum assistance   Toileting Toileting Level of Assistance: Distant supervision LE shoes   Maximum Assistance   Bathing UE UE Bathing Level of Assistance: Setup LE adult brief  Adult Briefs Level of Assistance: Minimum assistance   Bathing LE LE Bathing Level of Assistance: Setup UE dressing  UE Dressing Level of Assistance: Setup   Toilet Transfer  Toilet Transfers: Supervision        Car Transfer Car Transfers: Contact guard Kitchen   Kitchen Mobility Level of Assistance: Contact guard      12/08/23 1700   [facility] Daily Activity   Putting on and taking off regular lower body clothing 3   Bathing (including washing, rinsing, drying) 3   Putting on and taking off regular upper body clothing 4   Toileting, which includes using toilet, bedpan or urinal 4   Taking care of personal grooming such as brushing teeth 4   Eating Meals 4   Daily Activity - Total Score 22     Treatment Summary:  Pt has progressed with mobility, strength, activity tolerance, balance, and safety awareness.  Family training with spouse has been completed.  AE/DME recommended, handouts issued, and educated spouse on community resources.  Pt educated on an UB strengthening HEP and issued a handout for home.  Goal Attainment: Partially met    Remaining Goals/Needs:  bathing, LB dressing, homemaking,   Equipment Recommendations:  shower seat , grab bar by shower/toilet, toilet safety frame  Discharge Plan:  Pt plans to return home with spouse with [facility] services for continued rehab services.

## 2023-12-08 NOTE — PROGRESS NOTES
Physical Therapy Discharge Summary        12/08/23 7164-4421   PT  Visit   PT Received On 12/08/23   Response to Previous Treatment Patient with no complaints from previous session.   General   Reason for Referral Presented with LLE subacute on chronic limb ischemia and PE.s/p fasciotomies, L fem cutdown w/ ileofem thromboembolectomy, L pop cutdown w/ fempop thromboembolectomy, L TP trunk cutdown w/ thromboembolectomy, selective angiography LLE, Concern for L to R shunt   Referred By Dr. Turner   Past Medical History Relevant to Rehab CAD s/p PCIx3 and CABGx3 (LIMA-Diag, free ALEX y to LAD, SVG-PDA, 2013), afib s/p MAZE (2013), and recent admission for R DVT iso COVID infection (10/24/23) on apixaban   General Comment Family Training with Spouse   Precautions   Precautions Comment WBAT LLE   Pain Assessment   Pain Assessment 0-10   Pain Score 6   Pain Location Leg   Pain Orientation Left   Pain Descriptors Aching  (increased with ankle AROM, stretching)   Pain Onset Ongoing   Pain Interventions Medication (See MAR)  (Still taking Oxy)   Therapeutic Exercise   Therapeutic Exercise Performed Yes   Therapeutic Exercise Activity 1 Standing Gastroc stretch from Muse RAil  (1x5 with moderate pain)   Therapeutic Exercise Activity 2 Supine bridging with wedge under Left foot for stretch 2x15   Therapeutic Exercise Activity 3 Supine Hip abd/add 3# LLE 2x15   Therapeutic Exercise Activity 4 Supine Heel slide 3# LLE 2x15   Therapeutic Activity   Therapeutic Activity 1 Picking up item from floor with St Cane CGx1   Therapeutic Activity 2 sharp turns with St Cane Cgx1   Balance/Neuromuscular Re-Education   Balance/Neuromuscular Re-Education Activity 1 Sidestepping  without AD CGx1   Bed Mobility   Bed Mobility Yes   Bed Mobility 1   Bed Mobility 1 Supine to sitting   Level of Assistance 1 Independent   Bed Mobility 2   Bed Mobility  2 Rolling right;Rolling left   Level of Assistance 2 Modified independent   Bed Mobility 3   Bed  Mobility 3 Rolling right;Rolling left   Ambulation/Gait Training 1   Surface 1 Level tile   Device 1 Rollator  (RW, then St Cane)   Assistance 1 Close supervision   Quality of Gait 1 Diminished heel strike;Inconsistent stride length;Decreased step length;Antalgic   Comments/Distance (ft) 1 140, 125x2   Gait Support Devices Gait belt   Transfer 1   Transfer From 1 Sit to   Transfer to 1 Stand   Technique 1 Sit to stand;Stand to sit   Transfer Device 1 Walker   Transfer Level of Assistance 1 Mod Indepenedent with RW CGX1 With St Cane    Transfers 2   Transfer From 2 Wheelchair to   Transfer to 2 Chair with arms   Technique 2 Sit to stand;Stand to sit   Transfer Device 2 Cane   Transfer Level of Assistance 2 Close supervision   Stairs   Rails 1 Left   Device 1 Single point cane   Support Devices 1 Gait belt   Assistance 1 Contact guard   Comment/Number of Steps 1 Up and Down 8-12  6 Inch steps non reciprocal pattern   Wheelchair Activities   Propulsion Type 1 Manual   Level 1 Level tile   Method 1 Right upper extremity;Left upper extremity;Right lower extremity;Left lower extremity   Level of Assistance 1 Independent   Description/Details 1 150 feet with turns   Other Activity   Other Activity Performed Yes   Other Activity 1 NUSTEP x 10 mins level 3   Activity Tolerance   Endurance Tolerates 30 min exercise with multiple rests   Early Mobility/Exercise Safety Screen Proceed with mobilization - No exclusion criteria met   PT Assessment   PT Assessment Results Decreased strength;Decreased range of motion;Decreased endurance;Impaired balance;Decreased mobility;Pain  Pt made great progress in all areas.  LTGs nearly all met however higher level balance still impaired.  Very Painful LLE ROM limitations.  Would get best prognosis from OPPT to focus on balance, activity tolerance, ROM /Strength LLE and ambulation with LRAD.    Rehab Prognosis Excellent   Evaluation/Treatment Tolerance Patient tolerated treatment well;Patient  limited by pain   Strengths Ability to acquire knowledge   Barriers to Participation Comorbidities   End of Session Patient Position Up in chair   Outpatient Education   Individual(s) Educated Patient   Diagnosis and Precautions Current Precautions   Education Comment FAmily Training with Spouse this am.  HEP, Tband Issued, reviewed.  (Recommend OPPT after discharge)   PT Plan   Inpatient/Swing Bed or Outpatient Inpatient   PT Plan   Treatment/Interventions Bed mobility;Transfer training;Gait training;Stair training;Neuromuscular re-education;Strengthening;Endurance training;Therapeutic exercise;Therapeutic activity;Home exercise program;Postural re-education   PT Plan Skilled PT   PT - OK to Discharge Yes.  Insurance Cut Today.  Home tomm with Spouse.  HEP, tband issued.  Suggested OPPT.  They have St Cane and RW.  Discussed Rollator for community distances.         12/08/23 1518   Roll Left and Right   Assistance Needed Independent   CARE Score - Roll Left and Right 6   Sit to Lying   Assistance Needed Independent   CARE Score - Sit to Lying 6   Lying to Sitting on Side of Bed   Assistance Needed Independent   CARE Score - Lying to Sitting on Side of Bed 6   Sit to Stand   Assistance Needed Independent   CARE Score - Sit to Stand 6   Chair/Bed-to-Chair Transfer   Assistance Needed Independent   CARE Score - Chair/Bed-to-Chair Transfer 6   Walk 10 Feet   Assistance Needed Set-up / clean-up   CARE Score - Walk 10 Feet 5   Walk 50 Feet with Two Turns   Assistance Needed Set-up / clean-up   CARE Score - Walk 50 Feet with Two Turns 5   Walk 150 Feet   Assistance Needed Set-up / clean-up   CARE Score - Walk 150 Feet 5   Walk 150 Feet Discharge Goal   Discharge Goal 5   Walking 10 Feet on Uneven Surfaces   Assistance Needed Set-up / clean-up   CARE Score - Walking 10 Feet on Uneven Surfaces 5   1 Step (Curb)   Assistance Needed Set-up / clean-up   CARE Score - 1 Step (Curb) 5   1 Step (Curb) Discharge Goal   Discharge  Goal 5   4 Steps   Assistance Needed Set-up / clean-up   CARE Score - 4 Steps 5   12 Steps   Assistance Needed Set-up / clean-up   CARE Score - 12 Steps 5   Picking Up Object   Assistance Needed Incidental touching   CARE Score - Picking Up Object 4   Wheel 50 Feet with Two Turns   Assistance Needed Independent   CARE Score - Wheel 50 Feet with Two Turns 6   Wheel 150 Feet   Assistance Needed Independent   CARE Score - Wheel 150 Feet 6     Encounter Problems       Encounter Problems (Active)       Balance       STG - Patient will perform TUG with least restrictive assistive device in 50 seconds or less to promote mobility and reduce fall risk with dynamic standing tasks.  (Progressing)       Start:  11/28/23    Expected End:  12/05/23            LTG - Patient will perform TUG with least restrictive assistive device in 30 seconds or less to promote mobility and reduce fall risk with dynamic standing tasks.  (Progressing)       Start:  11/28/23    Expected End:  12/19/23               Mobility       LTG - Patient will propel wheelchair 150 feet with two turns on even surface with independent assist to facilitate safe mobility.   (Progressing)       Start:  11/28/23    Expected End:  12/19/23            LTG - Patient will amb 150 feet with rolling walker device including two turns on even surface with independent assist to facilitate safe mobility.  (Progressing)       Start:  11/28/23    Expected End:  12/19/23            LTG - Patient will negotiate up to 12 stairs with one rail and contact guard assist with no device to enter home.  (Progressing)       Start:  11/28/23    Expected End:  12/19/23            STG -Patient will propel wheelchair 50 feet with two turns on even surface with supervision assist to facilitate safe mobility.   (Met)       Start:  11/28/23    Expected End:  12/05/23    Resolved:  12/05/23         STG - Patient will amb 20 feet with rolling walker device including no turns on even surface with  moderate assist and wheelchair follow to facilitate safe mobility.  (Met)       Start:  11/28/23    Expected End:  12/05/23    Resolved:  12/05/23            Transfers       LTG - Patient will demonstrate safe transfer techniques with modified independence with FWW. (Progressing)       Start:  11/28/23    Expected End:  12/12/23            LTG - Patient will demonstrate safe transfer techniques with minimal assistance with FWW. (Met)       Start:  11/28/23    Expected End:  12/05/23    Resolved:  12/04/23            Transfers       STG - Patient will transfer bed to chair and chair to bed with moderate assist to facilitate mobility.  (Met)       Start:  11/28/23    Expected End:  12/04/23    Resolved:  12/05/23         STG - Patient will transfer supine to sit and sit to supine with minimal assist to facilitate mobility.  (Met)       Start:  11/28/23    Expected End:  12/05/23    Resolved:  12/05/23         STG - Patient will transfer sit to stand and stand to sit with  moderate assist to facilitate mobility.  (Met)       Start:  11/28/23    Expected End:  12/05/23    Resolved:  12/05/23         LTG- Patient will transfer bed to chair and chair to bed with independent assist to facilitate mobility.  (Progressing)       Start:  11/28/23    Expected End:  12/19/23            LTG - Patient will transfer supine to sit and sit to supine with independent assist to facilitate mobility.  (Progressing)       Start:  11/28/23    Expected End:  12/19/23            LTG - Patient will transfer sit to stand and stand to sit with  independent assist to facilitate mobility.  (Progressing)       Start:  11/28/23    Expected End:  12/19/23

## 2023-12-09 VITALS
SYSTOLIC BLOOD PRESSURE: 96 MMHG | HEART RATE: 90 BPM | WEIGHT: 248.46 LBS | HEIGHT: 69 IN | OXYGEN SATURATION: 99 % | DIASTOLIC BLOOD PRESSURE: 66 MMHG | BODY MASS INDEX: 36.8 KG/M2 | TEMPERATURE: 98.1 F | RESPIRATION RATE: 17 BRPM

## 2023-12-09 PROCEDURE — 99239 HOSP IP/OBS DSCHRG MGMT >30: CPT | Performed by: INTERNAL MEDICINE

## 2023-12-09 PROCEDURE — 2500000001 HC RX 250 WO HCPCS SELF ADMINISTERED DRUGS (ALT 637 FOR MEDICARE OP): Performed by: INTERNAL MEDICINE

## 2023-12-09 PROCEDURE — 97116 GAIT TRAINING THERAPY: CPT | Mod: GP,CQ

## 2023-12-09 PROCEDURE — 94760 N-INVAS EAR/PLS OXIMETRY 1: CPT

## 2023-12-09 PROCEDURE — 97530 THERAPEUTIC ACTIVITIES: CPT | Mod: GP,CQ

## 2023-12-09 PROCEDURE — 2500000002 HC RX 250 W HCPCS SELF ADMINISTERED DRUGS (ALT 637 FOR MEDICARE OP, ALT 636 FOR OP/ED): Mod: MUE | Performed by: INTERNAL MEDICINE

## 2023-12-09 PROCEDURE — 2500000002 HC RX 250 W HCPCS SELF ADMINISTERED DRUGS (ALT 637 FOR MEDICARE OP, ALT 636 FOR OP/ED): Performed by: INTERNAL MEDICINE

## 2023-12-09 RX ADMIN — CLOPIDOGREL BISULFATE 75 MG: 75 TABLET ORAL at 08:20

## 2023-12-09 RX ADMIN — ALLOPURINOL 100 MG: 100 TABLET ORAL at 08:20

## 2023-12-09 RX ADMIN — FLUOCINONIDE: 0.5 OINTMENT TOPICAL at 08:23

## 2023-12-09 RX ADMIN — FAMOTIDINE 20 MG: 20 TABLET, FILM COATED ORAL at 08:20

## 2023-12-09 RX ADMIN — EZETIMIBE 10 MG: 10 TABLET ORAL at 08:20

## 2023-12-09 RX ADMIN — OXYCODONE HYDROCHLORIDE 5 MG: 5 TABLET ORAL at 11:18

## 2023-12-09 RX ADMIN — Medication: at 08:23

## 2023-12-09 RX ADMIN — DICYCLOMINE HYDROCHLORIDE 20 MG: 20 TABLET ORAL at 08:20

## 2023-12-09 RX ADMIN — FUROSEMIDE 20 MG: 20 TABLET ORAL at 08:20

## 2023-12-09 RX ADMIN — OXYCODONE HYDROCHLORIDE 5 MG: 5 TABLET ORAL at 17:00

## 2023-12-09 ASSESSMENT — PAIN - FUNCTIONAL ASSESSMENT
PAIN_FUNCTIONAL_ASSESSMENT: 0-10

## 2023-12-09 ASSESSMENT — BRIEF INTERVIEW FOR MENTAL STATUS (BIMS)
WHAT YEAR IS IT: CORRECT
WHAT MONTH IS IT: ACCURATE WITHIN 5 DAYS
BIMS SUMMARY SCORE: 15
INITIAL REPETITION OF BED BLUE SOCK - FIRST ATTEMPT: 3
ASKED TO RECALL SOCK: YES, NO CUE REQUIRED
ASKED TO RECALL BLUE: YES, NO CUE REQUIRED
COGNITIVE PATTERN ASSESSMENT USED: BIMS
WHAT DAY OF THE WEEK IS IT: CORRECT
ASKED TO RECALL BED: YES, NO CUE REQUIRED

## 2023-12-09 ASSESSMENT — PAIN SCALES - GENERAL
PAINLEVEL_OUTOF10: 0 - NO PAIN
PAINLEVEL_OUTOF10: 3
PAINLEVEL_OUTOF10: 3
PAINLEVEL_OUTOF10: 0 - NO PAIN
PAINLEVEL_OUTOF10: 4

## 2023-12-09 ASSESSMENT — PAIN DESCRIPTION - LOCATION
LOCATION: LEG
LOCATION: LEG

## 2023-12-09 ASSESSMENT — PAIN DESCRIPTION - ORIENTATION
ORIENTATION: LEFT
ORIENTATION: LEFT

## 2023-12-09 NOTE — NURSING NOTE
Verbal understanding of discharge instructions by patient and spouse and daughter. Scripts sent to patient's pharmacy by Dr Turner. Patient escorted out of the building via wheelchair and RN.

## 2023-12-09 NOTE — PROGRESS NOTES
"[facility]  Physician Progress Note    Subjective   Mukesh Garg is a 73 y.o. male admitted to inpatient rehabilitation unit.    Feels about the same.  Discharge day today, insurance would not approve additional time. Knows we have to have INR watched closely and to discharge on warfarin 5 mg daily.  INR check order written for [facility].  Warfarin and oxydone Rx sent to pharmacy.    Objective   /69 (BP Location: Left arm, Patient Position: Lying)   Pulse 89   Temp 36.2 °C (97.2 °F) (Oral)   Resp 18   Ht 1.752 m (5' 8.98\")   Wt 113 kg (248 lb 7.3 oz)   SpO2 98%   BMI 36.72 kg/m²      Physical Exam  Vitals reviewed.   Constitutional:       General: He is not in acute distress.     Appearance: He is not toxic-appearing.   HENT:      Head: Normocephalic and atraumatic.      Mouth/Throat:      Mouth: Mucous membranes are moist.   Eyes:      Pupils: Pupils are equal, round, and reactive to light.   Cardiovascular:      Rate and Rhythm: Normal rate and regular rhythm.      Heart sounds: No murmur heard.  Pulmonary:      Breath sounds: Normal breath sounds. No wheezing, rhonchi or rales.   Abdominal:      General: There is no distension.      Palpations: Abdomen is soft.   Musculoskeletal:      Right lower leg: Edema present. trace     Left lower leg: Edema present. 1+  Neurological:      General: No focal deficit present.      Mental Status: He is alert and oriented to person, place, and time.    There is minor erythema about the incision states, the staples are in place and there is no drainage from the wounds    Labs  BMP:  Results from last 7 days   Lab Units 12/06/23  0605   CREATININE mg/dL 1.10   BUN mg/dL 11   SODIUM mmol/L 141   POTASSIUM mmol/L 3.6   CHLORIDE mmol/L 106   CO2 mmol/L 26       CBC:  Results from last 7 days   Lab Units 12/06/23  0605   WBC AUTO x10*3/uL 6.1   HEMOGLOBIN g/dL 10.4*   HEMATOCRIT % 33.2*   MCV fL 95   PLATELETS AUTO " x10*3/uL 226       Coagulation:  Results from last 7 days   Lab Units 12/08/23  0600 12/06/23  0605   INR  1.5* 1.5*   PROTIME seconds 15.6* 15.5*          Assesment and Plan    Debility   Acute Occlusion of Popliteal Artery Due to Thrombosis (Cms/Hcc)  Local care to the wounds, off of the low molecular weight heparin, warfarin for goal INR of 2-3, as above.   Hypercoagulable State (Cms/Hcc)   Multiple Subsegmental Pulmonary Emboli Without Acute Cor Pulmonale (Cms/Hcc)  Stable respiratory status at this time   Limb Ischemia   Paroxysmal Atrial Fibrillation (Cms/Hcc)  Rate controlled anticoagulated.  Monitoring INR - obtain today.   Ashd (Arteriosclerotic Heart Disease), status post coronary artery bypass grafting x3  Free of anginal symptoms   Hyperlipidemia  Continue statin   Hypertension  Will put amlodipine back on hold for DC.   Diarrhea  Resolved.   Gout  Resolved.     Over 30 minutes spent on discharge today.    Fritz Turner MD

## 2023-12-09 NOTE — PROGRESS NOTES
Physical Therapy       12/09/23 [id number]   PT  Visit   PT Received On 12/09/23   Response to Previous Treatment Patient with no complaints from previous session.   General   Reason for Referral Presented with LLE subacute on chronic limb ischemia and PE.s/p fasciotomies, L fem cutdown w/ ileofem thromboembolectomy, L pop cutdown w/ fempop thromboembolectomy, L TP trunk cutdown w/ thromboembolectomy, selective angiography LLE, Concern for L to R shunt   Referred By Dr. Turner   Past Medical History Relevant to Rehab CAD s/p PCIx3 and CABGx3 (LIMA-Diag, free ALEX y to LAD, SVG-PDA, 2013), afib s/p MAZE (2013), and recent admission for R DVT iso COVID infection (10/24/23) on apixaban   Patient Position Received Up in chair   Preferred Learning Style verbal;auditory   Precautions   Hearing/Visual Limitations reading glasses   LE Weight Bearing Status Weight Bearing as Tolerated   Medical Precautions Fall precautions   Precautions Comment WBAT LLE   Pain Assessment   Pain Assessment 0-10   Pain Score 3   Pain Type Chronic pain   Pain Location Leg   Pain Orientation Left   Therapeutic Activity   Therapeutic Activity 1 nu-step x 10 minutes, level 3 resistance, seat distance at 11 for tolerable but effective stretch on L gastroc, 648 steps   Ambulation/Gait Training 1   Surface 1 Level tile   Device 1 Rolling walker   Assistance 1 Close supervision   Quality of Gait 1 Diminished heel strike;Inconsistent stride length;Decreased step length;Antalgic;WBOS   Comments/Distance (ft) 1 150ft x 2, initally step- to pattern but progressed to smoother and reciprocal pattern, vc's for keeping hips tucked under during loading of LLE   Ambulation/Gait Training 2   Surface 2 Level tile;Carpet   Device 2 Single point cane   Gait Support Devices Gait belt   Assistance 2 Close supervision;Contact guard   Quality of Gait 2 Diminished heel strike;Inconsistent stride length;Antalgic   Comments/Distance (ft) 2 150 ft x 2, turns, low-pile  carpet, close supervision/CGA   Transfer 1   Technique 1 Sit to stand;Stand to sit   Transfer Device 1 Walker   Transfer Level of Assistance 1 Modified independent   Stairs   Rails 1 Left   Device 1 Single point cane   Support Devices 1 Gait belt   Assistance 1 Contact guard   Comment/Number of Steps 1 up/down 12 steps, step-to pattern, vcs for correct sequencing with cane and for WBOS   Wheelchair Activities   Propulsion Type 1 Manual   Level 1 Level tile   Method 1 Right upper extremity;Left upper extremity;Right lower extremity;Left lower extremity   Level of Assistance 1 Independent   Description/Details 1 150 ft, turns, low-pile carpet   PT Assessment   PT Assessment Results Decreased strength;Decreased range of motion;Decreased endurance;Impaired balance;Decreased mobility;Pain   Rehab Prognosis Excellent   Evaluation/Treatment Tolerance Patient tolerated treatment well   Strengths Ability to acquire knowledge   Barriers to Participation Comorbidities   End of Session Patient Position Up in chair   PT Plan   Inpatient/Swing Bed or Outpatient Inpatient   PT Plan   Treatment/Interventions Bed mobility;Transfer training;Gait training;Stair training;Neuromuscular re-education;Therapeutic exercise;Therapeutic activity   PT Plan Skilled PT   PT Recommended Transfer Status Assist x1

## 2023-12-11 ENCOUNTER — TELEPHONE (OUTPATIENT)
Dept: PRIMARY CARE | Facility: CLINIC | Age: 73
End: 2023-12-11
Payer: COMMERCIAL

## 2023-12-11 ENCOUNTER — DOCUMENTATION (OUTPATIENT)
Dept: PRIMARY CARE | Facility: CLINIC | Age: 73
End: 2023-12-11
Payer: COMMERCIAL

## 2023-12-11 ENCOUNTER — PATIENT OUTREACH (OUTPATIENT)
Dept: PRIMARY CARE | Facility: CLINIC | Age: 73
End: 2023-12-11
Payer: COMMERCIAL

## 2023-12-11 DIAGNOSIS — I73.9 PERIPHERAL VASCULAR DISEASE, UNSPECIFIED (CMS-HCC): ICD-10-CM

## 2023-12-11 DIAGNOSIS — Z79.01 ANTICOAGULATED: Primary | ICD-10-CM

## 2023-12-11 RX ORDER — CLOPIDOGREL BISULFATE 75 MG/1
75 TABLET ORAL DAILY
Qty: 30 TABLET | Refills: 0 | Status: SHIPPED | OUTPATIENT
Start: 2023-12-11 | End: 2024-01-04 | Stop reason: SDUPTHER

## 2023-12-11 NOTE — PROGRESS NOTES
Discharge Facility: [facility]  Discharge Diagnosis: Debility   Admission Date: 11/27/2023   Discharge Date: 12/9/2023     PCP Appointment Date: Tasked to office   Specialist Appointment Date: NA  Hospital Encounter and Summary: Linked     See discharge assessment below for further details     Engagement  Call Start Time: 1329 (12/11/2023  1:28 PM)    Medications  Medications reviewed with patient/caregiver?: Yes (12/11/2023  1:28 PM)  Is the patient having any side effects they believe may be caused by any medication additions or changes?: No (12/11/2023  1:28 PM)  Does the patient have all medications ordered at discharge?: Yes (12/11/2023  1:28 PM)  Care Management Interventions: No intervention needed (12/11/2023  1:28 PM)  Prescription Comments: family aware to follow SNF DC med list, Stop, bisacodyl 10 mg suppository; Commonly known as: Dulcolax   chlorthalidone 25 mg tablet; Commonly known as: Hygroton   colchicine 0.6 mg tablet   docusate sodium 100 mg capsule; Commonly known as: Colace   enoxaparin 120 mg/0.8 mL syringe; Commonly known as: Lovenox   famotidine 20 mg tablet; Commonly known as: Pepcid   lisinopril 40 mg tablet   Nitrostat 0.4 mg SL tablet; Generic drug: nitroglycerin   polyethylene glycol 17 gram/dose powder; Commonly known as: Glycolax,   Miralax... NEW  reviewed... ,oxyCODONE 5 mg immediate release tablet; Commonly known as: Roxicodone;   Take 1 tablet (5 mg) by mouth 3 times a day as needed for severe pain (7 -   10) for up to 7 days. Warfarin dose changed (12/11/2023  1:28 PM)  Is the patient taking all medications as directed (includes completed medication regime)?: Yes (12/11/2023  1:28 PM)  Care Management Interventions: Provided patient education (12/11/2023  1:28 PM)  Medication Comments: Family will speak with SNF and or PCP regarding PLavix and Warfarin together as a concern (12/11/2023  1:28 PM)    Appointments  Does the patient have a primary care  provider?: Yes (12/11/2023  1:28 PM)  Care Management Interventions: Educated patient on importance of making appointment (tasked to office) (12/11/2023  1:28 PM)  Has the patient kept scheduled appointments due by today?: Yes (12/11/2023  1:28 PM)    Self Management  What is the home health agency?: [facility] nurse to see pt 12/12 (12/11/2023  1:28 PM)  Has home health visited the patient within 72 hours of discharge?: Call prior to 72 hours (12/11/2023  1:28 PM)  What Durable Medical Equipment (DME) was ordered?: Walker (12/11/2023  1:28 PM)    Patient Teaching  Does the patient have access to their discharge instructions?: Yes (12/11/2023  1:28 PM)  Care Management Interventions: Reviewed instructions with patient (12/11/2023  1:28 PM)  What is the patient's perception of their health status since discharge?: Improving (12/11/2023  1:28 PM)  Is the patient/caregiver able to teach back the hierarchy of who to call/visit for symptoms/problems? PCP, Specialist, Home Health nurse, Urgent Care, ED, 911: Yes (12/11/2023  1:28 PM)  Patient/Caregiver Education Comments: Patients family aware to call providers for any questions cocnerns or change in condition (12/11/2023  1:28 PM)

## 2023-12-11 NOTE — TELEPHONE ENCOUNTER
Pt's wife left message. Pt was discharged home from physical therapy after 2 weeks due to insurance. Pt is now home and will be monitored by home care.  She wanted to know who will be monitoring his INR, Dr. Gautam or his cardiologist?     Please advise

## 2023-12-11 NOTE — TELEPHONE ENCOUNTER
When is he due for his next INR check?  Dr. Gautam is out of office until Wednesday, so would not be able to get a final answer until then.

## 2023-12-11 NOTE — TELEPHONE ENCOUNTER
Patient called - wanting to know if he is supposed to be on plavix and warfarin? Stated he is unsure because his discharge ppwk and his pharmacy have different answers for him. He is scheduled for an ER follow up on 1/4/2024 - does he need to be squeezed in sooner for this? Call back number is [phone].

## 2023-12-12 ENCOUNTER — HOME CARE VISIT (OUTPATIENT)
Dept: HOME HEALTH SERVICES | Facility: HOME HEALTH | Age: 73
End: 2023-12-12
Payer: MEDICARE

## 2023-12-12 VITALS
HEIGHT: 69 IN | RESPIRATION RATE: 18 BRPM | BODY MASS INDEX: 36.69 KG/M2 | HEART RATE: 81 BPM | SYSTOLIC BLOOD PRESSURE: 138 MMHG | OXYGEN SATURATION: 97 % | TEMPERATURE: 98.2 F | DIASTOLIC BLOOD PRESSURE: 84 MMHG

## 2023-12-12 PROCEDURE — 169592 NO-PAY CLAIM PROCEDURE

## 2023-12-12 PROCEDURE — G0299 HHS/HOSPICE OF RN EA 15 MIN: HCPCS | Mod: HHH

## 2023-12-12 PROCEDURE — 1090000001 HH PPS REVENUE CREDIT

## 2023-12-12 PROCEDURE — 0023 HH SOC

## 2023-12-12 PROCEDURE — 1090000002 HH PPS REVENUE DEBIT

## 2023-12-12 ASSESSMENT — ENCOUNTER SYMPTOMS
LOWER EXTREMITY EDEMA: 1
LAST BOWEL MOVEMENT: 66820
HIGHEST PAIN SEVERITY IN PAST 24 HOURS: 7/10
DYSPNEA ACTIVITY LEVEL: AFTER AMBULATING 10 - 20 FT
PAIN: 1
LOWEST PAIN SEVERITY IN PAST 24 HOURS: 1/10
BOWEL PATTERN NORMAL: 1
PAIN SEVERITY GOAL: 2/10
APPETITE LEVEL: GOOD
MUSCLE WEAKNESS: 1
STOOL FREQUENCY: DAILY
PERSON REPORTING PAIN: PATIENT
CHANGE IN APPETITE: UNCHANGED
DRY SKIN: 1
SUBJECTIVE PAIN PROGRESSION: GRADUALLY IMPROVING
ARTHRALGIAS: 1
DYSPNEA ON EXERTION: 1
PAIN LOCATION: LEFT LEG
PAIN LOCATION - PAIN SEVERITY: 3/10
SHORTNESS OF BREATH: 1

## 2023-12-12 ASSESSMENT — ACTIVITIES OF DAILY LIVING (ADL)
AMBULATION ASSISTANCE: 1
CURRENT_FUNCTION: STAND BY ASSIST
ENTERING_EXITING_HOME: NEEDS ASSISTANCE
OASIS_M1830: 03
AMBULATION ASSISTANCE: STAND BY ASSIST
PHYSICAL TRANSFERS ASSESSED: 1

## 2023-12-12 NOTE — TELEPHONE ENCOUNTER
Discharge summary from 12/9/2023 reviewed (med instructions copied and italicized below).  Discharge instructions states that patient is supposed to take both warfarin and Plavix.  Please notify patient.    Is home care managing his INR checks?  If INR is checked and/or warfarin needs adjusting in the next 2 days with no response from cardiology, can forward results to our office for management.      I suspect the patient would benefit from sooner follow-up to clarify medications and other instructions.  Please forward this call back to Dr. Hager' in basket so that she can determine need for sooner follow-up and where he may fit in her schedule if appropriate.      Home Medications     Medication List      START taking these medications     oxyCODONE 5 mg immediate release tablet; Commonly known as: Roxicodone;   Take 1 tablet (5 mg) by mouth 3 times a day as needed for severe pain (7 -   10) for up to 7 days.     CHANGE how you take these medications     warfarin 5 mg tablet; Commonly known as: Coumadin; Take as directed per   After Visit Summary.; What changed: medication strength     CONTINUE taking these medications     acetaminophen 325 mg tablet; Commonly known as: Tylenol; Take 2 tablets   (650 mg) by mouth every 6 hours if needed for moderate pain (4 - 6).   allopurinol 100 mg tablet; Commonly known as: Zyloprim; TAKE 1 TABLET BY   MOUTH EVERY DAY   amLODIPine 10 mg tablet; Commonly known as: Norvasc   atorvastatin 40 mg tablet; Commonly known as: Lipitor; TAKE 1 TABLET BY   MOUTH EVERY DAY FOR 90 DAYS   carvedilol 12.5 mg tablet; Commonly known as: Coreg   clopidogrel 75 mg tablet; Commonly known as: Plavix; Take 1 tablet (75   mg) by mouth once daily. Do not start before November 25, 2023.   dicyclomine 20 mg tablet; Commonly known as: Bentyl   ezetimibe 10 mg tablet; Commonly known as: Zetia     STOP taking these medications     bisacodyl 10 mg suppository; Commonly known as: Dulcolax   chlorthalidone 25  mg tablet; Commonly known as: Hygroton   colchicine 0.6 mg tablet   docusate sodium 100 mg capsule; Commonly known as: Colace   enoxaparin 120 mg/0.8 mL syringe; Commonly known as: Lovenox   famotidine 20 mg tablet; Commonly known as: Pepcid   lisinopril 40 mg tablet   Nitrostat 0.4 mg SL tablet; Generic drug: nitroglycerin   polyethylene glycol 17 gram/dose powder; Commonly known as: Glycolax,   Miralax

## 2023-12-12 NOTE — TELEPHONE ENCOUNTER
Calling stating PT was discharged from rehab over the weekend.  Calling in to give INR results, as today was his first appointment with home care.    INR is 2.3 will check again on Friday and will call with results then.

## 2023-12-13 ENCOUNTER — HOME CARE VISIT (OUTPATIENT)
Dept: HOME HEALTH SERVICES | Facility: HOME HEALTH | Age: 73
End: 2023-12-13
Payer: MEDICARE

## 2023-12-13 PROCEDURE — 1090000002 HH PPS REVENUE DEBIT

## 2023-12-13 PROCEDURE — G0151 HHCP-SERV OF PT,EA 15 MIN: HCPCS | Mod: HHH

## 2023-12-13 PROCEDURE — 1090000001 HH PPS REVENUE CREDIT

## 2023-12-13 SDOH — HEALTH STABILITY: PHYSICAL HEALTH
EXERCISE COMMENTS: 7.  STANDING TOE RAISES  8.  STANDING MARCHING  9.  STANDING HIP ABDUCTION  10.  STANDING MINI SQUATS  11.  STANDING KNEE FLEXION  12.  STANDING HIP EXTENSION        FOR ABOUT 15 TO 20 REPS EACH, RESISTANCE WHERE INDICATED.   I DO RECOMMEND THAT TH

## 2023-12-13 SDOH — HEALTH STABILITY: PHYSICAL HEALTH
EXERCISE COMMENTS: EXERCISES  1.  SEATED FULL ARC QUADS  2.  SEATED HIP FLEXION  3.  SEATED HIP ADDUCTION VS. PILLOW  4.  SEATED HIP ADDUCTION VS. GREEN THERABAND OWNED BY PATIENT  5.  SEATED HAMSTRING CURL VS. GREEN THERABAND OWNED BY PATIENT  6.  SEATED KNEE FLEXION

## 2023-12-13 SDOH — HEALTH STABILITY: PHYSICAL HEALTH
EXERCISE COMMENTS: 11 17 23 SURGERY SEVERAL BLOOD CLOTS IN LEFT LEG ARTERY.  BLOOD CLOTS IN RIGHT LEG.  CLUSTERS OF BLOOD CLOTS IN LUNGS.  CHANGED TO ELIQUIS FROM COUMADIN.  AFTER THANKSGIVING, TRANSFERRED TO REHAB 11 27 23.  12 9 23 RETURNED HOME.   TUG 25 SECONDS.

## 2023-12-13 SDOH — HEALTH STABILITY: PHYSICAL HEALTH: EXERCISE COMMENTS: E PATIENT OBTAIN ADJUSTABLE ANKLE WEIGHTS FOR USE IN COMPLETING HIS EXERCISES.

## 2023-12-13 ASSESSMENT — ENCOUNTER SYMPTOMS
PAIN: 1
HIGHEST PAIN SEVERITY IN PAST 24 HOURS: 5/10
SUBJECTIVE PAIN PROGRESSION: GRADUALLY IMPROVING
LOWEST PAIN SEVERITY IN PAST 24 HOURS: 1/10
PAIN LOCATION - PAIN FREQUENCY: CONSTANT
PAIN LOCATION - PAIN SEVERITY: 3/10
PERSON REPORTING PAIN: PATIENT
PAIN LOCATION - PAIN QUALITY: ACHING
PAIN SEVERITY GOAL: 0/10
PAIN LOCATION: LEFT LEG

## 2023-12-13 NOTE — HOME HEALTH
11 17 23 Surgery several blood clots in left leg artery.  Blood clots in right leg.  Clusters of blood clots in lungs.  Changed to Eliquis from Coumadin.  After Thanksgiving, transferred to Rehab 11 27 23.  12 9 23 returned home.   TUG 25 seconds.    Exercises  1.  Seated full arc quads  2.  Seated hip flexion  3.  Seated hip adduction vs. pillow  4.  Seated hip adduction vs. green theraband owned by patient  5.  Seated hamstring curl vs. green theraband owned by patient  6.  Seated knee flexion  7.  Standing Toe raises  8.  Standing marching  9.  Standing hip abduction  10.  Standing mini squats  11.  Standing knee flexion  12.  Standing hip extension        for about 15 to 20 reps each, resistance where indicated.   I do recommend that the patient obtain adjustable ankle weights for use in completing his exercises.    Also walking on the steps within his home was reviewed and established, both patient and his wife reporting understanding of recommendations made.

## 2023-12-14 ENCOUNTER — OFFICE VISIT (OUTPATIENT)
Dept: VASCULAR SURGERY | Facility: HOSPITAL | Age: 73
End: 2023-12-14
Payer: MEDICARE

## 2023-12-14 ENCOUNTER — APPOINTMENT (OUTPATIENT)
Dept: VASCULAR SURGERY | Facility: HOSPITAL | Age: 73
End: 2023-12-14
Payer: COMMERCIAL

## 2023-12-14 VITALS
DIASTOLIC BLOOD PRESSURE: 63 MMHG | HEART RATE: 80 BPM | WEIGHT: 245.6 LBS | SYSTOLIC BLOOD PRESSURE: 98 MMHG | BODY MASS INDEX: 36.27 KG/M2 | OXYGEN SATURATION: 97 %

## 2023-12-14 DIAGNOSIS — I87.8 CHRONIC VENOUS STASIS: ICD-10-CM

## 2023-12-14 DIAGNOSIS — I99.8 ACUTE LOWER LIMB ISCHEMIA: ICD-10-CM

## 2023-12-14 PROBLEM — H53.9 ABNORMAL VISION: Status: ACTIVE | Noted: 2023-12-14

## 2023-12-14 PROBLEM — M76.899 ENTHESOPATHY OF HIP REGION: Status: ACTIVE | Noted: 2023-12-14

## 2023-12-14 PROBLEM — M16.10 PRIMARY LOCALIZED OSTEOARTHRITIS OF PELVIC REGION AND THIGH: Status: ACTIVE | Noted: 2023-12-14

## 2023-12-14 PROBLEM — S69.90XA INJURY OF WRIST: Status: ACTIVE | Noted: 2023-12-14

## 2023-12-14 PROBLEM — R07.89 CHEST WALL PAIN: Status: ACTIVE | Noted: 2023-12-14

## 2023-12-14 PROBLEM — H53.9 VISUAL DISTURBANCE: Status: ACTIVE | Noted: 2023-12-14

## 2023-12-14 PROCEDURE — 1159F MED LIST DOCD IN RCRD: CPT | Performed by: SURGERY

## 2023-12-14 PROCEDURE — 3078F DIAST BP <80 MM HG: CPT | Performed by: SURGERY

## 2023-12-14 PROCEDURE — 1126F AMNT PAIN NOTED NONE PRSNT: CPT | Performed by: SURGERY

## 2023-12-14 PROCEDURE — 1036F TOBACCO NON-USER: CPT | Performed by: SURGERY

## 2023-12-14 PROCEDURE — 1090000002 HH PPS REVENUE DEBIT

## 2023-12-14 PROCEDURE — 1160F RVW MEDS BY RX/DR IN RCRD: CPT | Performed by: SURGERY

## 2023-12-14 PROCEDURE — 3074F SYST BP LT 130 MM HG: CPT | Performed by: SURGERY

## 2023-12-14 PROCEDURE — 1111F DSCHRG MED/CURRENT MED MERGE: CPT | Performed by: SURGERY

## 2023-12-14 PROCEDURE — 1090000001 HH PPS REVENUE CREDIT

## 2023-12-14 PROCEDURE — 99024 POSTOP FOLLOW-UP VISIT: CPT | Performed by: SURGERY

## 2023-12-14 RX ORDER — HYDROCHLOROTHIAZIDE 25 MG/1
25 TABLET ORAL EVERY 24 HOURS
COMMUNITY
End: 2023-12-19 | Stop reason: ALTCHOICE

## 2023-12-14 RX ORDER — LANOLIN ALCOHOL/MO/W.PET/CERES
400 CREAM (GRAM) TOPICAL 2 TIMES DAILY
COMMUNITY
End: 2023-12-19 | Stop reason: ALTCHOICE

## 2023-12-14 RX ORDER — ASPIRIN 81 MG/1
1 TABLET ORAL DAILY
COMMUNITY
Start: 2013-08-01 | End: 2023-12-19 | Stop reason: ALTCHOICE

## 2023-12-14 RX ORDER — SILDENAFIL CITRATE 20 MG/1
TABLET ORAL
COMMUNITY
Start: 2013-09-04 | End: 2023-12-19 | Stop reason: ALTCHOICE

## 2023-12-14 RX ORDER — OMEPRAZOLE 40 MG/1
40 CAPSULE, DELAYED RELEASE ORAL
COMMUNITY
End: 2023-12-19 | Stop reason: ALTCHOICE

## 2023-12-14 RX ORDER — CHLORTHALIDONE 25 MG/1
25 TABLET ORAL EVERY 24 HOURS
COMMUNITY
Start: 2022-07-25 | End: 2023-12-19 | Stop reason: ALTCHOICE

## 2023-12-14 RX ORDER — TURMERIC/TURMERIC EXT/PEPR EXT 900-100 MG
CAPSULE ORAL
COMMUNITY
End: 2023-12-19 | Stop reason: ALTCHOICE

## 2023-12-14 RX ORDER — CLOTRIMAZOLE 1 %
CREAM (GRAM) TOPICAL 2 TIMES DAILY
COMMUNITY
Start: 2023-11-09 | End: 2023-12-19 | Stop reason: ALTCHOICE

## 2023-12-14 RX ORDER — FUROSEMIDE 20 MG/1
20 TABLET ORAL
COMMUNITY
Start: 2013-11-05 | End: 2023-12-19 | Stop reason: ALTCHOICE

## 2023-12-14 ASSESSMENT — PAIN SCALES - GENERAL: PAINLEVEL: 0-NO PAIN

## 2023-12-14 NOTE — PROGRESS NOTES
Vascular Surgery Clinic Note    CC: Hx of LLE acute limb ischemia    HPI:  Mukesh Garg is 73 y.o. male with history of LLE acute limb ischemia and history of hypercoagulability with recurrent DVT s/p left femoral cutdown with iliofemoral thrombectomy, left popliteal cutdown and femoropopliteal thrombectomy, left TP trunk cutdown and tibial thrombectomy on 11/17/23. He also presented with RML and RLL PE. He underwent another hypercoagulability workup while hospitalized with positive lupus anticoagulant. Has been managed on warfarin.    Past Vascular History:  11/17/23 (Pruett) - Left femoral cutdown with iliofemoral thrombectomy, left popliteal cutdown and femoropopliteal thrombectomy, left TP trunk cutdown and tibial thrombectomy    Past Vascular Testing:  LE PVR (11/20/23) - R 1.25 (0.76), L 1.14 (0.36)    Medical History:  Patient Active Problem List   Diagnosis    Arteriopathy (CMS/HCC)    Calculus of gallbladder without cholecystitis without obstruction    ASHD (arteriosclerotic heart disease)    Coronary artery disease    Enlarged prostate    Hyperlipidemia    Hypertension    Lung mass    Mass of right lower leg    Osteoarthritis of left hip    Osteoarthritis of right hip    Polythelia    Post-phlebitic syndrome    Postsurgical aortocoronary bypass status    Postsurgical percutaneous transluminal coronary angioplasty status    Left hip pain    Right hip pain    Right inguinal hernia    Stenosis of right carotid artery    Tinnitus    Transient ischemic attack    Trochanteric bursitis of left hip    Trochanteric bursitis of right hip    Varicose veins of both lower extremities with pain    Hip instability, right    Atrial fibrillation with RVR (CMS/HCC)    COVID    Limb ischemia    Multiple subsegmental pulmonary emboli without acute cor pulmonale (CMS/HCC)    Septal defect, heart    Gout    Hypercoagulable state (CMS/HCC)    Abnormal vision    Atrial flutter, paroxysmal (CMS/HCC)    Chest wall pain    CKD  (chronic kidney disease)    Enthesopathy of hip region    GERD (gastroesophageal reflux disease)    Injury of wrist    Primary localized osteoarthritis of pelvic region and thigh    Visual disturbance    Hx of maze procedure        Meds:   Current Outpatient Medications on File Prior to Visit   Medication Sig Dispense Refill    acetaminophen (Tylenol) 325 mg tablet Take 2 tablets (650 mg) by mouth every 6 hours if needed for moderate pain (4 - 6). 100 tablet 0    allopurinol (Zyloprim) 100 mg tablet TAKE 1 TABLET BY MOUTH EVERY DAY 90 tablet 0    aspirin 81 mg EC tablet Take 1 tablet (81 mg) by mouth once daily.      atorvastatin (Lipitor) 40 mg tablet TAKE 1 TABLET BY MOUTH EVERY DAY FOR 90 DAYS 90 tablet 3    carvedilol (Coreg) 12.5 mg tablet TAKE 1 TABLET BY MOUTH TWICE A DAY WITH FOOD for 90      chlorthalidone (Hygroton) 25 mg tablet Take 1 tablet (25 mg) by mouth once every 24 hours.      clopidogrel (Plavix) 75 mg tablet Take 1 tablet (75 mg) by mouth once daily. 30 tablet 0    clotrimazole (Lotrimin) 1 % cream Apply topically 2 times a day.      dicyclomine (Bentyl) 20 mg tablet Take 1 tablet by mouth once daily.      ezetimibe (Zetia) 10 mg tablet Take 1 tablet (10 mg) by mouth once daily.      furosemide (Lasix) 20 mg tablet Take 1 tablet (20 mg) by mouth once daily.      hydroCHLOROthiazide (HYDRODiuril) 25 mg tablet Take 1 tablet (25 mg) by mouth once every 24 hours.      magnesium oxide (Mag-Ox) 400 mg (241.3 mg magnesium) tablet Take 1 tablet (400 mg) by mouth 2 times a day.      omeprazole (PriLOSEC) 40 mg DR capsule Take 1 capsule (40 mg) by mouth once daily in the morning. Take before meals.      oxyCODONE (Roxicodone) 5 mg immediate release tablet Take 1 tablet (5 mg) by mouth 3 times a day as needed for severe pain (7 - 10) for up to 7 days. 21 tablet 0    sildenafil (Revatio) 20 mg tablet TAKE 3 TO 5 TABLETS BY MOUTH AS DIRECTED BY PHYSICIAN PRIOR TO SEXUAL ACTIVITY. (THERAPEUTIC SUBSTITUTION FOR  VIAGRA) (PLEASE REORDER 2 BUSINESS DAYS IN ADVANCE)      turmeric/turmeric ext/pepr ext (turmeric-turmeric ext-pepper) 900-100-5 mg capsule as directed Orally      warfarin (Coumadin) 5 mg tablet Take as directed per After Visit Summary. 30 tablet 0    [DISCONTINUED] amLODIPine (Norvasc) 10 mg tablet TAKE 1 TABLET BY MOUTH EVERY DAY for 90      [DISCONTINUED] bisacodyl (Dulcolax) 10 mg suppository Unwrap and Insert 1 suppository (10 mg) into the rectum once daily as needed for constipation. 12 suppository 0    [DISCONTINUED] chlorthalidone (Hygroton) 25 mg tablet Take 1 tablet (25 mg) by mouth once daily.      [DISCONTINUED] clopidogrel (Plavix) 75 mg tablet Take 1 tablet (75 mg) by mouth once daily. Do not start before November 25, 2023. 30 tablet 0    [DISCONTINUED] colchicine 0.6 mg tablet Take 1 tablet (0.6 mg) by mouth 2 times a day for 4 days. 8 tablet 0    [DISCONTINUED] docusate sodium (Colace) 100 mg capsule Take 1 capsule (100 mg) by mouth 2 times a day as needed for constipation. 60 capsule 0    [DISCONTINUED] enoxaparin (Lovenox) 120 mg/0.8 mL syringe Inject 0.74 mL (111 mg) under the skin every 12 hours for 3 days. Discontinue when INR between 2-3 based on coags on 11/28 and 11/29 6 each 0    [DISCONTINUED] famotidine (Pepcid) 20 mg tablet 1 tablet at bedtime as needed Orally Once a day      [DISCONTINUED] lisinopril 40 mg tablet TAKE 1 TABLET BY MOUTH EVERY DAY FOR 90 DAYS 90 tablet 3    [DISCONTINUED] nitroglycerin (Nitrostat) 0.4 mg SL tablet as directed Sublingual as directed      [DISCONTINUED] polyethylene glycol (Glycolax, Miralax) 17 gram/dose powder Take 1 capful (17 g) mixed in 4-8 ounces of liquid by mouth once daily as needed (Constipation). 510 g 0    [DISCONTINUED] warfarin (Coumadin) 3 mg tablet Take as directed per After Visit Summary. 30 tablet 0     No current facility-administered medications on file prior to visit.        Allergies:   Allergies   Allergen Reactions    Talwin Compound  Itching and Dizziness       SH:    Social Determinants of Health     Tobacco Use: Low Risk  (12/8/2023)    Patient History     Smoking Tobacco Use: Never     Smokeless Tobacco Use: Never     Passive Exposure: Never   Alcohol Use: Not At Risk (11/29/2023)    AUDIT-C     Frequency of Alcohol Consumption: 2-4 times a month     Average Number of Drinks: 1 or 2     Frequency of Binge Drinking: Less than monthly   Recent Concern: Alcohol Use - Heavy Drinker (10/25/2023)    AUDIT-C     Frequency of Alcohol Consumption: 2-3 times a week     Average Number of Drinks: 1 or 2     Frequency of Binge Drinking: Daily or almost daily   Financial Resource Strain: Low Risk  (11/30/2023)    Overall Financial Resource Strain (CARDIA)     Difficulty of Paying Living Expenses: Not hard at all   Food Insecurity: No Food Insecurity (11/30/2023)    Hunger Vital Sign     Worried About Running Out of Food in the Last Year: Never true     Ran Out of Food in the Last Year: Never true   Transportation Needs: No Transportation Needs (12/12/2023)    OASIS : Transportation     Lack of Transportation (Medical): No     Lack of Transportation (Non-Medical): No     Patient Unable or Declines to Respond: No   Physical Activity: Not on file   Stress: No Stress Concern Present (11/30/2023)    [place] [employer] - Occupational Stress Questionnaire     Feeling of Stress : Not at all   Social Connections: Feeling Socially Integrated (12/12/2023)    OASIS : Social Isolation     Frequency of experiencing loneliness or isolation: Never   Intimate Partner Violence: Not At Risk (11/30/2023)    Humiliation, Afraid, Rape, and Kick questionnaire     Fear of Current or Ex-Partner: No     Emotionally Abused: No     Physically Abused: No     Sexually Abused: No   Depression: Not at risk (12/9/2023)    PHQ-2     PHQ-2 Score: 0   Housing Stability: Low Risk  (11/30/2023)    Housing Stability Vital Sign     Unable to Pay for Housing in the  Last Year: No     Number of Places Lived in the Last Year: 1     Unstable Housing in the Last Year: No   Utilities: Not At Risk (11/30/2023)    [facility] Utilities     Threatened with loss of utilities: No   Digital Equity: Not on file        FH:  Family History   Problem Relation Name Age of Onset    Hypertension Mother      Stroke Mother      Heart attack Father          Cause of death    Heart disease Father      Diabetes Other Grandmother         ROS:  All systems were reviewed and are negative except as per HPI.    Objective:  Vitals:  There were no vitals filed for this visit.     Exam:  Constitutional: normal, well appearing  HEENT: normocephalic  CV: regular rate  RESP: symmetric expansion, unlabored breathing  GI: soft, nontender, nondistended  MSK: normal ROM  INT: no lesions  PSYCH: appropriate mood  NEURO: no deficits  VASC: Left AT palpable. Left foot swollen. Some redness around left lateral AT incision. Otherwise incisions well healed.    Assessment & Plan:  Mukesh Garg is 73 y.o. male history of LLE acute limb ischemia and history of hypercoagulability with recurrent DVT s/p left femoral cutdown with iliofemoral thrombectomy, left popliteal cutdown and femoropopliteal thrombectomy, left TP trunk cutdown and tibial thrombectomy on 11/17/23.    - Palpable pulse. Foot perfusion appears good  - Swelling suspected to disruption of superficial venous and lymphatic drainage from surgical incisions. Should improve over time. Can do a light ACE wrap for compression and elevate legs above level of heart when resting  - Staples removed today. Can shower, but take precautions about fatigue and overexertion  - Continue coumadin  - Virtual visit in 1 month    Meds  - Plavix 75 mg  - Warfarin 5 mg  - Atorvastatin 40 mg    Screening/Surveillance  - PVR in 6 months    Next Followup  - Virtual visit 1 month    Monika Pruett MD

## 2023-12-15 ENCOUNTER — HOME CARE VISIT (OUTPATIENT)
Dept: HOME HEALTH SERVICES | Facility: HOME HEALTH | Age: 73
End: 2023-12-15
Payer: MEDICARE

## 2023-12-15 VITALS
TEMPERATURE: 98.4 F | HEART RATE: 81 BPM | OXYGEN SATURATION: 98 % | SYSTOLIC BLOOD PRESSURE: 136 MMHG | DIASTOLIC BLOOD PRESSURE: 88 MMHG

## 2023-12-15 DIAGNOSIS — L03.116 CELLULITIS OF LEFT LOWER EXTREMITY: Primary | ICD-10-CM

## 2023-12-15 PROCEDURE — 1090000001 HH PPS REVENUE CREDIT

## 2023-12-15 PROCEDURE — G0299 HHS/HOSPICE OF RN EA 15 MIN: HCPCS | Mod: HHH

## 2023-12-15 PROCEDURE — 1090000002 HH PPS REVENUE DEBIT

## 2023-12-15 PROCEDURE — G0151 HHCP-SERV OF PT,EA 15 MIN: HCPCS

## 2023-12-15 RX ORDER — CEPHALEXIN 250 MG/1
250 CAPSULE ORAL 4 TIMES DAILY
Qty: 40 CAPSULE | Refills: 0 | Status: SHIPPED | OUTPATIENT
Start: 2023-12-15 | End: 2023-12-19 | Stop reason: ALTCHOICE

## 2023-12-15 SDOH — ECONOMIC STABILITY: GENERAL

## 2023-12-15 ASSESSMENT — ENCOUNTER SYMPTOMS
APPETITE LEVEL: FAIR
SUBJECTIVE PAIN PROGRESSION: GRADUALLY IMPROVING
PAIN LOCATION - PAIN QUALITY: BURNING
PAIN LOCATION - RELIEVING FACTORS: MEDICATION/REST
PAIN LOCATION - RELIEVING FACTORS: REST, ELEVATION
PAIN: 1
PAIN LOCATION: LEFT LEG
PAIN LOCATION - PAIN SEVERITY: 3/10
HIGHEST PAIN SEVERITY IN PAST 24 HOURS: 3/10
PAIN: 1
PAIN LOCATION - EXACERBATING FACTORS: MOVEMENTS
PAIN LOCATION: LEFT LEG
PAIN SEVERITY GOAL: 0/10
PAIN LOCATION - PAIN QUALITY: ACHING
LOWEST PAIN SEVERITY IN PAST 24 HOURS: 1/10
PAIN LOCATION - PAIN SEVERITY: 3/10
PAIN LOCATION - PAIN FREQUENCY: CONSTANT
PAIN LOCATION - PAIN FREQUENCY: CONSTANT
PAIN LOCATION - PAIN DURATION: CONSTANT
LOWEST PAIN SEVERITY IN PAST 24 HOURS: 2/10
PAIN LOCATION - PAIN DURATION: CONSTANT
CHANGE IN APPETITE: UNCHANGED
PERSON REPORTING PAIN: PATIENT
PAIN LOCATION - EXACERBATING FACTORS: UPRIGHT ACTIVITY
HIGHEST PAIN SEVERITY IN PAST 24 HOURS: 6/10

## 2023-12-15 ASSESSMENT — ACTIVITIES OF DAILY LIVING (ADL)
PHYSICAL TRANSFERS ASSESSED: 1
CURRENT_FUNCTION: SUPERVISION
AMBULATION ASSISTANCE: SUPERVISION
AMBULATION ASSISTANCE: 1
MONEY MANAGEMENT (EXPENSES/BILLS): INDEPENDENT

## 2023-12-15 NOTE — PROGRESS NOTES
This is a 73 year old male for FU URGENT TRANFER TO [facility] for VASCULAR SURGERY STATUS POST LIFE SAVED APPROX 2 WEEKS PRIOR WITH TRANSFER TO [facility] AT THAT TIME WITH COVID AND A FIB AND TWO DVTS IN CONTRALATERAL LIMB.    HAS HYPERCOAGULABILITY and will send to HEMATOLOGY as is taking both PLAVIX and COUMADIN    HAS LIGHT COMPRESSION and ongoing swelling adv likely simply a cosmetic issue at this time but defer to VASC MEDICINE who may wish to continue wrapping  LASIX is tough on kidneys and electrolytes so prefer not to RE INITIATE.  CONSERVATIVE measures rec by PCP Dr Hager pending VASC MED review and opinion.    Was on CEFALEXIN preventive but VAS did not see INFECTION so HE DC with DIARRHEA that occurred and is doing well.    HAD FULL VASCULAR WORK UP including CAROTIDs         CALLED OUR OFFICE FOR 'PAIN MED' FOR OTHER LEG AND WAS  BROUGHT IN AND FOUND TO HAVE ABSENT PULSES IN UNAFFECTED LIMB     He is continuing in care of VASCULAR MEDICINE Dr Ross at [facility]  Mukesh Garg is a 73 y.o. male with PMHx of Afib, HTN, JENNY (on CPAP at home), gout, CABG x3 vessels (2013), recurrent DVTs  (was on eliq) and peripheral neuropathy.  He was found to have severe PAD and was also found to have b/l pulmonary emboli (despite being compliant with apixaban at that time). Furthermore,  CTA for LLE on 11/14 demonstrated intraluminal filling defect involving the popliteal artery, which eventually caused popliteal artery occlusion, causing limb ischemia for which he was started on heparin gtt.      Echo finding revealed a possible small L to R shunt on color doppler flow. Bubble study was technically difficult which may have demonstrated some bubble within the LV and thus, a RHC was done which showed no R-L shunt and was unremarkable. Labs were ordered for B2 glycoprotein ab and cardiolipin ab all were WNL, however,  lupus antibody came back positive. Patient then underwent a left iliofemoral, femoropopliteal,  "tibioperoneal trunk thromboembolectomy, and four compartment fasciotomy.     Heparin gtt was stopped and lovenox bridged with coumadin until INR became therapeutic (2-3) today is 3 . Today he was complaining of rt big toe pain concerning for gout (had few episodes in the past in the same foot) and because of that we started Colchicine.  He will be discharged to acute rehab. He will has follow up in 3 months in clinic with [facility]-meds and with vascular surgery after outpatient ALLISON test on December 21s  REHAB NOTES:     Hospital Course   73 year old man with LLE arterial occlusion and pulmonary emboli status post vascular surgery admitted for rehab. The patient was admitted to the inpatient rehabilitation unit and received intensive physical therapy, occupational therapy and rehabilitation nursing care.  The patient made strides toward functional independence and at the time of discharge was at a supervised to modified independent level with mobility, transfers and ADLs.  Home health care was arranged.  The patient will be following up with the providers as outlined in the discharge instructions.  Discharge medications were reconciled in detail and the patient and family were educated on appropriate medication use.  Prescriptions were provided either in hardcopy or electronic format.  The clinical condition was stable at the time of discharge.           ROS is NEG for HEADACHE, NAUSEA, VOMITING, DIARRHEA, CHEST PAIN, SOB, and BLEEDING and as further REVIEWED BELOW.      Subjective   Mukesh Garg is a 73 y.o. male who presents for Hospital Follow-up.    HPI:        Review of systems is essentially negative for all systems except for any identified issues in HPI above.    Objective     /74   Pulse 99   Temp 36.3 °C (97.4 °F)   Ht 1.753 m (5' 9\")   Wt 113 kg (249 lb)   SpO2 96%   BMI 36.77 kg/m²      Physical Examination:       GENERAL           General Appearance: well-appearing, well-developed, well-hydrated, " well-nourished, no acute distress.        HEENT           NECK supple, no masses or thyromegaly, no carotid bruit.        EYES           Extraocular Movements: normal, bilateral eyes JESSENIA, no conjunctival injection.        HEART           Rate and Rhythm regular rate and rhythm. Heart sounds: normal S1S2, no S3 or S4. Murmurs: none.        CHEST           Breath sounds: Clear to IPPA, RR<16 no use of accessory muscles.        ABDOMEN           General: Neg for LKKS or masses, no scleral icterus or jaundice.        MUSCULOSKELETAL           Joints Demonstration: Neg for erythema, swelling or joint deformities. gross abnormalities no gross abnormalities.        EXTREMITIES           Lower Extremities: Neg for cyanosis, clubbing  RESOLVING post surgical sites NO INFECTION seen and NORMAL post OP and stasis dermatitis also RESOLVING    Assessment/Plan   Problem List Items Addressed This Visit       Hypercoagulable state (CMS/HCC)    Relevant Orders    Referral to Hematology and Oncology    Referral to Vascular Medicine     Other Visit Diagnoses       Hospital discharge follow-up    -  Primary    Arterial vascular disease        Relevant Orders    Referral to Hematology and Oncology    Referral to Vascular Medicine    History of major vascular surgery        History of 2019 novel coronavirus disease (COVID-19)        Relevant Orders    Referral to Hematology and Oncology    History of atrial fibrillation        Relevant Orders    Referral to Hematology and Oncology    Referral to Vascular Medicine    Other acute pulmonary embolism, unspecified whether acute cor pulmonale present (CMS/HCC)        Relevant Orders    Referral to Hematology and Oncology    Referral to Vascular Medicine    DVT of deep femoral vein, right (CMS/[facility])                FOLLOW UP:  PRN and as specified above         Lorenza Hager M.D.

## 2023-12-15 NOTE — TELEPHONE ENCOUNTER
PT called stating that his home care nurse was concerned with the swelling in his legs, and was wondering if he should be prescribed a water pill. Please advise.  PT has upcoming appointment 12/19/2023.

## 2023-12-15 NOTE — TELEPHONE ENCOUNTER
Angela from  Home care states PT had order for INR 2x weekly.  Today is 2.9  Needs to know if INR still needs to be checked twice weekly now that it is at a therapeutic level. Please advise.

## 2023-12-15 NOTE — HOME HEALTH
No falls.  Saw Dr. Pruett, surgeon,  She took staples out of the leg.  She declared it good to go.  She is concerned about the lateral incision and so gave me a presciption for an oral antibiotic.  Have not filled it yet.    Timed up and go test.  14 seconds.    This is 11 seconds faster than last visit.    Patient challenged to walk as far as he is able.  He responded by walking about 500 feet outdoors with straight cane before asking to return indoors due to opposite hip symptoms, shortness of breath.    After a rest patient was challenged to perform his written exercises.    Exercises   1. Seated full arc quads  2. Seated hip flexion or marching  3. Seated hip adduction vs. pillow  4. Seated hip abduction vs. green theraband owned by patient  5. Seated hamstring curl vs. green theraband owned by patient  6. Seated knee flexion, holding 3 to 5 seconds  7. Standing Toe raises  8. Standing marching  9. Standing hip abduction  10. Standing mini squats  11. Standing knee flexion  12. Standing hip extension   for about 15 to 20 reps each, resistance where indicated.  I do recommend that the patient obtain adjustable ankle weights for use in completing his exercises.  He demonstrated understanding of that recommendation, reports a friend is going to order them for him soon.

## 2023-12-16 PROCEDURE — 1090000001 HH PPS REVENUE CREDIT

## 2023-12-16 PROCEDURE — 1090000002 HH PPS REVENUE DEBIT

## 2023-12-17 PROCEDURE — 1090000002 HH PPS REVENUE DEBIT

## 2023-12-17 PROCEDURE — 1090000001 HH PPS REVENUE CREDIT

## 2023-12-18 ENCOUNTER — HOME CARE VISIT (OUTPATIENT)
Dept: HOME HEALTH SERVICES | Facility: HOME HEALTH | Age: 73
End: 2023-12-18
Payer: MEDICARE

## 2023-12-18 VITALS
OXYGEN SATURATION: 99 % | RESPIRATION RATE: 18 BRPM | TEMPERATURE: 98.2 F | SYSTOLIC BLOOD PRESSURE: 118 MMHG | DIASTOLIC BLOOD PRESSURE: 70 MMHG | HEART RATE: 90 BPM

## 2023-12-18 VITALS
HEART RATE: 100 BPM | OXYGEN SATURATION: 98 % | TEMPERATURE: 97.9 F | SYSTOLIC BLOOD PRESSURE: 130 MMHG | DIASTOLIC BLOOD PRESSURE: 64 MMHG | RESPIRATION RATE: 16 BRPM

## 2023-12-18 PROCEDURE — 1090000002 HH PPS REVENUE DEBIT

## 2023-12-18 PROCEDURE — G0157 HHC PT ASSISTANT EA 15: HCPCS | Mod: HHH

## 2023-12-18 PROCEDURE — G0152 HHCP-SERV OF OT,EA 15 MIN: HCPCS

## 2023-12-18 PROCEDURE — 1090000001 HH PPS REVENUE CREDIT

## 2023-12-18 ASSESSMENT — PAIN SCALES - PAIN ASSESSMENT IN ADVANCED DEMENTIA (PAINAD)
BODYLANGUAGE: 0
BODYLANGUAGE: 0 - RELAXED.
TOTALSCORE: 0
CONSOLABILITY: 0 - NO NEED TO CONSOLE.
NEGVOCALIZATION: 0 - NONE.
FACIALEXPRESSION: 0 - SMILING OR INEXPRESSIVE.
FACIALEXPRESSION: 0
BREATHING: 0
NEGVOCALIZATION: 0
CONSOLABILITY: 0

## 2023-12-18 ASSESSMENT — ENCOUNTER SYMPTOMS
PAIN SEVERITY GOAL: 0/10
DENIES PAIN: 1
PAIN LOCATION - PAIN FREQUENCY: INTERMITTENT
SUBJECTIVE PAIN PROGRESSION: UNCHANGED
HIGHEST PAIN SEVERITY IN PAST 24 HOURS: 0/10
HIGHEST PAIN SEVERITY IN PAST 24 HOURS: 6/10
PAIN SEVERITY GOAL: 0/10
PAIN LOCATION: LEFT LEG
PAIN LOCATION - RELIEVING FACTORS: DEAL WITH IT
LOWEST PAIN SEVERITY IN PAST 24 HOURS: 2/10
LOWEST PAIN SEVERITY IN PAST 24 HOURS: 0/10
PAIN LOCATION - PAIN SEVERITY: 3/10

## 2023-12-18 ASSESSMENT — ACTIVITIES OF DAILY LIVING (ADL)
BATHING ASSESSED: 1
TOILETING: 1
DRESSING_LB_CURRENT_FUNCTION: CONTACT GUARD ASSIST
BATHING_CURRENT_FUNCTION: CONTACT GUARD ASSIST
TOILETING: INDEPENDENT

## 2023-12-19 ENCOUNTER — OFFICE VISIT (OUTPATIENT)
Dept: PRIMARY CARE | Facility: CLINIC | Age: 73
End: 2023-12-19
Payer: MEDICARE

## 2023-12-19 ENCOUNTER — HOME CARE VISIT (OUTPATIENT)
Dept: HOME HEALTH SERVICES | Facility: HOME HEALTH | Age: 73
End: 2023-12-19
Payer: MEDICARE

## 2023-12-19 VITALS
DIASTOLIC BLOOD PRESSURE: 74 MMHG | TEMPERATURE: 97.4 F | HEIGHT: 69 IN | WEIGHT: 249 LBS | HEART RATE: 99 BPM | OXYGEN SATURATION: 96 % | SYSTOLIC BLOOD PRESSURE: 110 MMHG | BODY MASS INDEX: 36.88 KG/M2

## 2023-12-19 VITALS
HEART RATE: 89 BPM | RESPIRATION RATE: 18 BRPM | TEMPERATURE: 97.8 F | SYSTOLIC BLOOD PRESSURE: 126 MMHG | DIASTOLIC BLOOD PRESSURE: 70 MMHG | OXYGEN SATURATION: 97 %

## 2023-12-19 DIAGNOSIS — Z86.16 HISTORY OF 2019 NOVEL CORONAVIRUS DISEASE (COVID-19): ICD-10-CM

## 2023-12-19 DIAGNOSIS — I87.009 POST-PHLEBITIC SYNDROME: ICD-10-CM

## 2023-12-19 DIAGNOSIS — Z86.79 HISTORY OF ATRIAL FIBRILLATION: ICD-10-CM

## 2023-12-19 DIAGNOSIS — I82.411 DVT OF DEEP FEMORAL VEIN, RIGHT (MULTI): ICD-10-CM

## 2023-12-19 DIAGNOSIS — Z98.890 HISTORY OF MAJOR VASCULAR SURGERY: ICD-10-CM

## 2023-12-19 DIAGNOSIS — Z09 HOSPITAL DISCHARGE FOLLOW-UP: Primary | ICD-10-CM

## 2023-12-19 DIAGNOSIS — I70.90 ARTERIAL VASCULAR DISEASE: ICD-10-CM

## 2023-12-19 DIAGNOSIS — D68.59 HYPERCOAGULABLE STATE (MULTI): ICD-10-CM

## 2023-12-19 DIAGNOSIS — I26.99 OTHER ACUTE PULMONARY EMBOLISM, UNSPECIFIED WHETHER ACUTE COR PULMONALE PRESENT (MULTI): ICD-10-CM

## 2023-12-19 PROCEDURE — 99214 OFFICE O/P EST MOD 30 MIN: CPT | Performed by: FAMILY MEDICINE

## 2023-12-19 PROCEDURE — 1036F TOBACCO NON-USER: CPT | Performed by: FAMILY MEDICINE

## 2023-12-19 PROCEDURE — G0299 HHS/HOSPICE OF RN EA 15 MIN: HCPCS | Mod: HHH

## 2023-12-19 PROCEDURE — 1125F AMNT PAIN NOTED PAIN PRSNT: CPT | Performed by: FAMILY MEDICINE

## 2023-12-19 PROCEDURE — 1090000002 HH PPS REVENUE DEBIT

## 2023-12-19 PROCEDURE — 1090000001 HH PPS REVENUE CREDIT

## 2023-12-19 PROCEDURE — 3078F DIAST BP <80 MM HG: CPT | Performed by: FAMILY MEDICINE

## 2023-12-19 PROCEDURE — 1111F DSCHRG MED/CURRENT MED MERGE: CPT | Performed by: FAMILY MEDICINE

## 2023-12-19 PROCEDURE — 1160F RVW MEDS BY RX/DR IN RCRD: CPT | Performed by: FAMILY MEDICINE

## 2023-12-19 PROCEDURE — 3074F SYST BP LT 130 MM HG: CPT | Performed by: FAMILY MEDICINE

## 2023-12-19 PROCEDURE — 1159F MED LIST DOCD IN RCRD: CPT | Performed by: FAMILY MEDICINE

## 2023-12-19 SDOH — HEALTH STABILITY: PHYSICAL HEALTH: EXERCISE ACTIVITY: MARCHES, HIP ABD, EXT, KNEE FLEX, CALF RAISES, MINI SQUATS

## 2023-12-19 SDOH — HEALTH STABILITY: PHYSICAL HEALTH: PHYSICAL EXERCISE: 20

## 2023-12-19 SDOH — HEALTH STABILITY: PHYSICAL HEALTH: EXERCISE TYPE: BLE EXS

## 2023-12-19 SDOH — HEALTH STABILITY: PHYSICAL HEALTH: EXERCISE ACTIVITIES SETS: 1

## 2023-12-19 SDOH — HEALTH STABILITY: PHYSICAL HEALTH: PHYSICAL EXERCISE: SEATED

## 2023-12-19 SDOH — HEALTH STABILITY: PHYSICAL HEALTH: PHYSICAL EXERCISE: STANDING

## 2023-12-19 SDOH — HEALTH STABILITY: PHYSICAL HEALTH: EXERCISE ACTIVITY: APS, LAQS, MARCHES, HIP ABD WITH GREEN TBAND, KNEE FLEX WITH GREEN TBAND, HIP ADD WITH PILLOW

## 2023-12-19 SDOH — ECONOMIC STABILITY: GENERAL

## 2023-12-19 ASSESSMENT — ENCOUNTER SYMPTOMS
DENIES PAIN: 1
APPETITE LEVEL: GOOD
FATIGUES EASILY: 1
LOWER EXTREMITY EDEMA: 1
DIARRHEA: 1
PERSON REPORTING PAIN: PATIENT
STOOL FREQUENCY: DAILY
PAIN: 1
LAST BOWEL MOVEMENT: 66827
CHANGE IN APPETITE: UNCHANGED

## 2023-12-19 ASSESSMENT — ACTIVITIES OF DAILY LIVING (ADL)
AMBULATION ASSISTANCE: STAND BY ASSIST
AMBULATION ASSISTANCE: 1
AMBULATION_DISTANCE/DURATION_TOLERATED: 20'X2
PHYSICAL TRANSFERS ASSESSED: 1
CURRENT_FUNCTION: SUPERVISION
AMBULATION ASSISTANCE: SUPERVISION
MONEY MANAGEMENT (EXPENSES/BILLS): INDEPENDENT
AMBULATION ASSISTANCE ON FLAT SURFACES: 1
AMBULATION ASSISTANCE: 1

## 2023-12-19 ASSESSMENT — PAIN SCALES - GENERAL: PAINLEVEL: 5

## 2023-12-19 NOTE — PATIENT INSTRUCTIONS
Our new EPIC  is not cooperating today for me to enter ORDERS for REFERRAL to have COUMADIN CARE by [facility] COUMADIN CLIN[facility] and HOME CARE to monitor with HOME COUMADIN DEVICE as apparently requested by HOME CARE nurse.    Please have the HOME CARE nurse enter an order and send to my office for signature.

## 2023-12-20 ENCOUNTER — TELEPHONE (OUTPATIENT)
Dept: CARDIOLOGY | Facility: CLINIC | Age: 73
End: 2023-12-20
Payer: COMMERCIAL

## 2023-12-20 ENCOUNTER — TELEPHONE (OUTPATIENT)
Dept: PHYSICAL MEDICINE AND REHAB | Facility: HOSPITAL | Age: 73
End: 2023-12-20
Payer: COMMERCIAL

## 2023-12-20 PROBLEM — Z79.01 ANTICOAGULATED: Status: ACTIVE | Noted: 2023-12-20

## 2023-12-20 PROCEDURE — 1090000001 HH PPS REVENUE CREDIT

## 2023-12-20 PROCEDURE — 1090000002 HH PPS REVENUE DEBIT

## 2023-12-20 NOTE — TELEPHONE ENCOUNTER
Patient called, recent hosp admission, since home taking 5mg warfarin daily. Yesterday INR taken at home was 4.2. He stated he is also on plavix. He is concerned since there is no one managing his warfarin dose. Please advise.

## 2023-12-21 ENCOUNTER — HOME CARE VISIT (OUTPATIENT)
Dept: HOME HEALTH SERVICES | Facility: HOME HEALTH | Age: 73
End: 2023-12-21
Payer: MEDICARE

## 2023-12-21 ENCOUNTER — PATIENT OUTREACH (OUTPATIENT)
Dept: PRIMARY CARE | Facility: CLINIC | Age: 73
End: 2023-12-21
Payer: COMMERCIAL

## 2023-12-21 ENCOUNTER — TELEMEDICINE (OUTPATIENT)
Dept: PRIMARY CARE | Facility: CLINIC | Age: 73
End: 2023-12-21
Payer: COMMERCIAL

## 2023-12-21 ENCOUNTER — ANTICOAGULATION - WARFARIN VISIT (OUTPATIENT)
Dept: CARDIOLOGY | Facility: HOSPITAL | Age: 73
End: 2023-12-21
Payer: COMMERCIAL

## 2023-12-21 VITALS
RESPIRATION RATE: 18 BRPM | OXYGEN SATURATION: 99 % | SYSTOLIC BLOOD PRESSURE: 118 MMHG | HEART RATE: 90 BPM | DIASTOLIC BLOOD PRESSURE: 72 MMHG | TEMPERATURE: 98 F

## 2023-12-21 VITALS
TEMPERATURE: 97.9 F | HEART RATE: 85 BPM | OXYGEN SATURATION: 96 % | DIASTOLIC BLOOD PRESSURE: 65 MMHG | SYSTOLIC BLOOD PRESSURE: 140 MMHG

## 2023-12-21 DIAGNOSIS — Z79.01 ANTICOAGULATED: Primary | ICD-10-CM

## 2023-12-21 PROCEDURE — 1090000001 HH PPS REVENUE CREDIT

## 2023-12-21 PROCEDURE — G0300 HHS/HOSPICE OF LPN EA 15 MIN: HCPCS | Mod: HHH

## 2023-12-21 PROCEDURE — G0157 HHC PT ASSISTANT EA 15: HCPCS | Mod: HHH

## 2023-12-21 PROCEDURE — 99442 PR PHYS/QHP TELEPHONE EVALUATION 11-20 MIN: CPT | Performed by: FAMILY MEDICINE

## 2023-12-21 PROCEDURE — 1090000002 HH PPS REVENUE DEBIT

## 2023-12-21 SDOH — HEALTH STABILITY: PHYSICAL HEALTH: PHYSICAL EXERCISE: SEATED

## 2023-12-21 SDOH — HEALTH STABILITY: PHYSICAL HEALTH: EXERCISE ACTIVITY: APS, LAQS MARC HES, HIP ABD WITH BLUE TBAND KNEE FLEX WITH BLUE TBAND HIP ADD WITH PILLOW,

## 2023-12-21 SDOH — HEALTH STABILITY: PHYSICAL HEALTH: EXERCISE COMMENTS: PT MOVES SLOWLY THROUGH HIS EXS TAKING ABOUT AN HOUR TO COMPLETE 20 REPS OF EA EX

## 2023-12-21 SDOH — HEALTH STABILITY: PHYSICAL HEALTH: EXERCISE ACTIVITIES SETS: 1

## 2023-12-21 SDOH — HEALTH STABILITY: PHYSICAL HEALTH: EXERCISE TYPE: BLE EXS

## 2023-12-21 SDOH — HEALTH STABILITY: PHYSICAL HEALTH: PHYSICAL EXERCISE: STANDING

## 2023-12-21 SDOH — HEALTH STABILITY: PHYSICAL HEALTH: PHYSICAL EXERCISE: 20

## 2023-12-21 SDOH — HEALTH STABILITY: PHYSICAL HEALTH: EXERCISE ACTIVITY: HIP ABD, MARCHES KNEE FLEX,

## 2023-12-21 ASSESSMENT — ENCOUNTER SYMPTOMS
LAST BOWEL MOVEMENT: 66828
DENIES PAIN: 1
PAIN SEVERITY GOAL: 0/10
PAIN LOCATION - PAIN SEVERITY: 2/10
APPETITE LEVEL: GOOD
CHANGE IN APPETITE: UNCHANGED
PAIN: 1
PAIN LOCATION - PAIN QUALITY: ACHING
SUBJECTIVE PAIN PROGRESSION: GRADUALLY IMPROVING
PAIN LOCATION - PAIN FREQUENCY: CONSTANT
HIGHEST PAIN SEVERITY IN PAST 24 HOURS: 6/10
PAIN LOCATION: RIGHT LEG
PERSON REPORTING PAIN: PATIENT
LOWEST PAIN SEVERITY IN PAST 24 HOURS: 1/10
PAIN LOCATION - RELIEVING FACTORS: REST PAIN MEDS

## 2023-12-21 ASSESSMENT — PAIN SCALES - PAIN ASSESSMENT IN ADVANCED DEMENTIA (PAINAD)
TOTALSCORE: 0
FACIALEXPRESSION: 0 - SMILING OR INEXPRESSIVE.
FACIALEXPRESSION: 0
CONSOLABILITY: 0
NEGVOCALIZATION: 0 - NONE.
NEGVOCALIZATION: 0
BODYLANGUAGE: 0 - RELAXED.
BODYLANGUAGE: 0
BREATHING: 0
CONSOLABILITY: 0 - NO NEED TO CONSOLE.

## 2023-12-21 ASSESSMENT — ACTIVITIES OF DAILY LIVING (ADL)
AMBULATION ASSISTANCE: SUPERVISION
AMBULATION ASSISTANCE: STAND BY ASSIST
CURRENT_FUNCTION: SUPERVISION
PHYSICAL TRANSFERS ASSESSED: 1
AMBULATION ASSISTANCE: 1
AMBULATION_DISTANCE/DURATION_TOLERATED: 60' X 2
AMBULATION ASSISTANCE ON FLAT SURFACES: 1

## 2023-12-21 NOTE — PROGRESS NOTES
Spoke with patient today after referral was placed by Dr Hager. He still has home care and Dr Wayne is managing him until his home care is completed, then he will return to clinic. He will call when he is discharged, and we will follow up after the first of the year.

## 2023-12-21 NOTE — PROGRESS NOTES
This is a 73 year old male for FU INR now improved after holding COUMADIN x one day; however still high normal so will adjust dose and set up with COUMADIN CLINIC recent INR now 3.0 AND DOSE OF COUMADIN is now 5 mg once daily.   He HELD COUMADIN x 2 days on Dr Wayne's orders.  He will resume 5 mg daily EXCEPT each  MWF 2.5 mg and management may stay with Dr Wayne until transitioned to COUMADIN CLINIC (and he continues on PLAVIX as taking)    He continues to feel well and states he went to [place] to walk the corridor yesterday for exercise and enjoyed this      PAST INRs now 3.0  was above 4 the other day  Lab Results   Component Value Date    INR 1.5 (H) 12/08/2023    INR 1.5 (H) 12/06/2023    INR 2.9 (H) 12/02/2023    PROTIME 15.6 (H) 12/08/2023    PROTIME 15.5 (H) 12/06/2023    PROTIME 28.3 (H) 12/02/2023     STAFF at [facility] to update MED LIST to coumadin (warfarin) 2.5 mg every M W and Friday and 5 mg each TUES and THURS and SUNDAY

## 2023-12-21 NOTE — PROGRESS NOTES
Call regarding appt. with PCP on 12/19/2023 after hospitalization.  At time of outreach call the patient feels as if their condition has improved since last visit.  Reviewed the PCP appointment with the pt and addressed any questions or concerns.

## 2023-12-22 PROCEDURE — 1090000001 HH PPS REVENUE CREDIT

## 2023-12-22 PROCEDURE — 1090000002 HH PPS REVENUE DEBIT

## 2023-12-22 PROCEDURE — G0180 MD CERTIFICATION HHA PATIENT: HCPCS | Performed by: FAMILY MEDICINE

## 2023-12-23 PROCEDURE — 1090000001 HH PPS REVENUE CREDIT

## 2023-12-23 PROCEDURE — 1090000002 HH PPS REVENUE DEBIT

## 2023-12-24 PROCEDURE — 1090000001 HH PPS REVENUE CREDIT

## 2023-12-24 PROCEDURE — 1090000002 HH PPS REVENUE DEBIT

## 2023-12-25 ENCOUNTER — HOSPITAL ENCOUNTER (EMERGENCY)
Facility: HOSPITAL | Age: 73
Discharge: HOME | End: 2023-12-25
Attending: STUDENT IN AN ORGANIZED HEALTH CARE EDUCATION/TRAINING PROGRAM
Payer: MEDICARE

## 2023-12-25 VITALS
OXYGEN SATURATION: 98 % | HEIGHT: 69 IN | BODY MASS INDEX: 36.29 KG/M2 | DIASTOLIC BLOOD PRESSURE: 101 MMHG | RESPIRATION RATE: 16 BRPM | TEMPERATURE: 97.3 F | SYSTOLIC BLOOD PRESSURE: 162 MMHG | HEART RATE: 87 BPM | WEIGHT: 245 LBS

## 2023-12-25 DIAGNOSIS — K92.1 BLOODY STOOL: Primary | ICD-10-CM

## 2023-12-25 DIAGNOSIS — K64.9 HEMORRHOIDS, UNSPECIFIED HEMORRHOID TYPE: ICD-10-CM

## 2023-12-25 LAB
ALBUMIN SERPL-MCNC: 3.5 G/DL (ref 3.5–5)
ALP BLD-CCNC: 63 U/L (ref 35–125)
ALT SERPL-CCNC: 7 U/L (ref 5–40)
ANION GAP SERPL CALC-SCNC: 10 MMOL/L
AST SERPL-CCNC: 11 U/L (ref 5–40)
BASOPHILS # BLD AUTO: 0.05 X10*3/UL (ref 0–0.1)
BASOPHILS NFR BLD AUTO: 0.7 %
BILIRUB SERPL-MCNC: 0.7 MG/DL (ref 0.1–1.2)
BUN SERPL-MCNC: 14 MG/DL (ref 8–25)
CALCIUM SERPL-MCNC: 8.5 MG/DL (ref 8.5–10.4)
CHLORIDE SERPL-SCNC: 107 MMOL/L (ref 97–107)
CO2 SERPL-SCNC: 25 MMOL/L (ref 24–31)
CREAT SERPL-MCNC: 1.3 MG/DL (ref 0.4–1.6)
EOSINOPHIL # BLD AUTO: 0.17 X10*3/UL (ref 0–0.4)
EOSINOPHIL NFR BLD AUTO: 2.4 %
ERYTHROCYTE [DISTWIDTH] IN BLOOD BY AUTOMATED COUNT: 15.2 % (ref 11.5–14.5)
GFR SERPL CREATININE-BSD FRML MDRD: 57 ML/MIN/1.73M*2
GLUCOSE SERPL-MCNC: 122 MG/DL (ref 65–99)
HCT VFR BLD AUTO: 39.7 % (ref 41–52)
HGB BLD-MCNC: 12.1 G/DL (ref 13.5–17.5)
IMM GRANULOCYTES # BLD AUTO: 0.02 X10*3/UL (ref 0–0.5)
IMM GRANULOCYTES NFR BLD AUTO: 0.3 % (ref 0–0.9)
INR PPP: 2.2 (ref 0.9–1.2)
LYMPHOCYTES # BLD AUTO: 1.96 X10*3/UL (ref 0.8–3)
LYMPHOCYTES NFR BLD AUTO: 27.8 %
MCH RBC QN AUTO: 29.1 PG (ref 26–34)
MCHC RBC AUTO-ENTMCNC: 30.5 G/DL (ref 32–36)
MCV RBC AUTO: 95 FL (ref 80–100)
MONOCYTES # BLD AUTO: 0.76 X10*3/UL (ref 0.05–0.8)
MONOCYTES NFR BLD AUTO: 10.8 %
NEUTROPHILS # BLD AUTO: 4.08 X10*3/UL (ref 1.6–5.5)
NEUTROPHILS NFR BLD AUTO: 58 %
NRBC BLD-RTO: 0 /100 WBCS (ref 0–0)
PLATELET # BLD AUTO: 241 X10*3/UL (ref 150–450)
POTASSIUM SERPL-SCNC: 4.1 MMOL/L (ref 3.4–5.1)
PROT SERPL-MCNC: 5.9 G/DL (ref 5.9–7.9)
PROTHROMBIN TIME: 21.7 SECONDS (ref 9.3–12.7)
RBC # BLD AUTO: 4.16 X10*6/UL (ref 4.5–5.9)
SODIUM SERPL-SCNC: 142 MMOL/L (ref 133–145)
WBC # BLD AUTO: 7 X10*3/UL (ref 4.4–11.3)

## 2023-12-25 PROCEDURE — 36415 COLL VENOUS BLD VENIPUNCTURE: CPT | Performed by: STUDENT IN AN ORGANIZED HEALTH CARE EDUCATION/TRAINING PROGRAM

## 2023-12-25 PROCEDURE — 99284 EMERGENCY DEPT VISIT MOD MDM: CPT | Performed by: STUDENT IN AN ORGANIZED HEALTH CARE EDUCATION/TRAINING PROGRAM

## 2023-12-25 PROCEDURE — 85610 PROTHROMBIN TIME: CPT | Performed by: STUDENT IN AN ORGANIZED HEALTH CARE EDUCATION/TRAINING PROGRAM

## 2023-12-25 PROCEDURE — 1090000002 HH PPS REVENUE DEBIT

## 2023-12-25 PROCEDURE — 99283 EMERGENCY DEPT VISIT LOW MDM: CPT

## 2023-12-25 PROCEDURE — 1090000001 HH PPS REVENUE CREDIT

## 2023-12-25 PROCEDURE — 80053 COMPREHEN METABOLIC PANEL: CPT | Performed by: STUDENT IN AN ORGANIZED HEALTH CARE EDUCATION/TRAINING PROGRAM

## 2023-12-25 PROCEDURE — 85025 COMPLETE CBC W/AUTO DIFF WBC: CPT | Performed by: STUDENT IN AN ORGANIZED HEALTH CARE EDUCATION/TRAINING PROGRAM

## 2023-12-25 RX ORDER — HYDROCORTISONE ACETATE 25 MG/1
25 SUPPOSITORY RECTAL 2 TIMES DAILY
Qty: 20 SUPPOSITORY | Refills: 0 | Status: SHIPPED | OUTPATIENT
Start: 2023-12-25 | End: 2024-01-04

## 2023-12-25 ASSESSMENT — LIFESTYLE VARIABLES
EVER HAD A DRINK FIRST THING IN THE MORNING TO STEADY YOUR NERVES TO GET RID OF A HANGOVER: NO
HAVE YOU EVER FELT YOU SHOULD CUT DOWN ON YOUR DRINKING: NO
EVER FELT BAD OR GUILTY ABOUT YOUR DRINKING: NO
REASON UNABLE TO ASSESS: NO
HAVE PEOPLE ANNOYED YOU BY CRITICIZING YOUR DRINKING: NO

## 2023-12-25 ASSESSMENT — PAIN SCALES - GENERAL: PAINLEVEL_OUTOF10: 0 - NO PAIN

## 2023-12-25 ASSESSMENT — PAIN DESCRIPTION - PROGRESSION: CLINICAL_PROGRESSION: RESOLVED

## 2023-12-25 ASSESSMENT — COLUMBIA-SUICIDE SEVERITY RATING SCALE - C-SSRS
2. HAVE YOU ACTUALLY HAD ANY THOUGHTS OF KILLING YOURSELF?: NO
6. HAVE YOU EVER DONE ANYTHING, STARTED TO DO ANYTHING, OR PREPARED TO DO ANYTHING TO END YOUR LIFE?: NO
1. IN THE PAST MONTH, HAVE YOU WISHED YOU WERE DEAD OR WISHED YOU COULD GO TO SLEEP AND NOT WAKE UP?: NO

## 2023-12-25 ASSESSMENT — PAIN - FUNCTIONAL ASSESSMENT: PAIN_FUNCTIONAL_ASSESSMENT: 0-10

## 2023-12-25 NOTE — PROGRESS NOTES
;This is a 73 year old male for FU MULTIPLE HOSP ADMISSIONS due to YUSRA AND POST COVID and acute on CHRONIC DVT/A FIB and ARTERIAL THROMBOSIS and VASCULAR SURGERY and SUBSEQUENT ANTICOAGULATION management as he was SEEN AT ER for BLOODY STOOLS:    He used the SUPPOSITORIES and bleeding resolved.    UTD with Dr Li for C SCOPE but having 'GERD' and urged to be ruled out for OCCULT HEART FAILURE by his CARDIOLOGIST Dr Wayne due to WEIGHT GAIN and multiple organ system insults post and yusra covid.    HAS SOME GERD issues and will defer to Dr Li  ari due to wishes FAMOTIDINE and is encouraged to have GI refill this but to have SOB on reclining worked up thoroughly first by PULM and CARDIOLOGY Terra Pretty/Rosana to rule out POST COVID ISSUES and HF especially due to weight gain and fatigue, etc.    EPIC not allowing ProBNP and will therefore defer to Dr Wayne.    NEEDS REFERRAL for PT      Has weight gain and will have CXR today and work up with CARDIOLOGY to rule out HF due to multiple insults to body systems with COVID Pes and DVTs and PVD arterial surgery and A FIB        ER NOTES:      HPI       Chief Complaint   Patient presents with    Black or Bloody Stool         This is a 73-year-old male presenting to ED for evaluation of bloody stools.  He is on warfarin due to a history of previous DVTs, PEs, and arterial occlusion due to a clot earlier this year.  Goal INR is between 2 and 3.  He states that he had an episode of bloody stools which she describes as bright red blood on the surface of the stool and a small amount spreading with release of gas when he had vomiting earlier today.  He went to urgent care initially to have his INR checked which was was recommended to do by his cardiologist office, he found out that they cannot do labs so he went back home.  He denies any abdominal pain, nausea or vomiting, melena, or frequent bloody stools today.  He had another episode of bright red blood in his stool tonight  so he came to the ED for assessment as he was not able to have his INR checked earlier today.         He is in no acute distress here, he is hypertensive, vitals otherwise normal.  Abdominal exam is benign, there is no focal tenderness, no suspect acute intra-abdominal inflammatory infectious process causing his symptoms today.  Physical exam reveals multiple internal hemorrhoids but no active bleeding.  It is likely that the hemorrhoids are the cause for his bright red blood in the stool.  There is no melena suggesting upper GI bleeding, no elevation in his BUN.  Patient denies any constipation, he states that he does not strain when he has a bowel movement but he does have known history of vascular issues and clotting.  This could be contributing to his hemorrhoid formation.  INR is within therapeutic range, and blood counts are normal.  He is not serially anemic.  He is only had 2 episodes of red blood in the stool, given his benign presentation I do not suspect any brisk GI bleeding.  He was advised to follow-up with his primary care physician as an outpatient, start Anusol suppositories for treatment of his hemorrhoids.  He is agreeable with this plan, he was discharged in stable condition.  Return precautions were discussed including signs of symptomatic anemia.       ROS is NEG for HEADACHE, NAUSEA, VOMITING, DIARRHEA, CHEST PAIN, SOB, and BLEEDING and as further REVIEWED BELOW.      Subjective   Mukesh Garg is a 73 y.o. male who presents for HOSP ER FU visit .    HPI:    SEE ABOVE     Review of systems is essentially negative for all systems except for any identified issues in HPI above.    Objective     /78   Pulse 88   Temp 35.7 °C (96.2 °F)   Wt 119 kg (262 lb)   SpO2 98%   BMI 38.69 kg/m²      Physical Examination:       GENERAL           General Appearance: well-appearing, well-developed, well-hydrated, well-nourished, no acute distress.        HEENT           NECK supple, no masses or  thyromegaly, no carotid bruit.        EYES           Extraocular Movements: normal, bilateral eyes JESSENIA, no conjunctival injection.        HEART           Rate and Rhythm regular rate and rhythm. Heart sounds: normal S1S2, no S3 or S4. Murmurs: none.        CHEST           Breath sounds: Clear to IPPA, RR<16 no use of accessory muscles.        ABDOMEN           General: Neg for LKKS or masses, no scleral icterus or jaundice.        MUSCULOSKELETAL           Joints Demonstration: Neg for erythema, swelling or joint deformities. gross abnormalities no gross abnormalities.        EXTREMITIES           Lower Extremities: Neg for cyanosis, clubbing or edema.       Assessment/Plan   Problem List Items Addressed This Visit       GERD (gastroesophageal reflux disease)    Relevant Orders    XR chest 2 views    Referral to Pulmonology    Anticoagulated    Relevant Orders    Referral to Gastroenterology    Protime-INR     Other Visit Diagnoses       Rectal bleed    -  Primary    Relevant Orders    Referral to Gastroenterology    Hospital discharge follow-up        History of DVT of lower extremity        Relevant Orders    Protime-INR    PVD (peripheral vascular disease) (CMS/[facility])        Relevant Orders    Referral to Gastroenterology    Protime-INR    History of major vascular surgery        Relevant Orders    Referral to Gastroenterology    Atrial fibrillation, unspecified type (CMS/[facility])        Relevant Medications    clopidogrel (Plavix) 75 mg tablet    Other Relevant Orders    Referral to Gastroenterology    Post covid-19 condition, unspecified        Relevant Orders    Referral to Gastroenterology    Peripheral vascular disease, unspecified (CMS/[facility])        Relevant Medications    clopidogrel (Plavix) 75 mg tablet    Other Relevant Orders    Protime-INR    JENNY (obstructive sleep apnea)                FOLLOW UP:  PRN and as specified above         Lorenza Hager M.D.

## 2023-12-25 NOTE — DISCHARGE INSTRUCTIONS
You to monitor your symptoms at home.  Your INR was 2.2 today which is in the therapeutic range.  Return to the ED for assessment for any new or worsening symptoms including severe worsening abdominal pain, any significant shortness of breath on exertion, severe fatigue, lightheadedness upon standing or episode of passing out, severe generalized weakness associated with continued episodes of bleeding which may be signs of developing anemia and would require reevaluation by physician.

## 2023-12-25 NOTE — ED PROVIDER NOTES
HPI   Chief Complaint   Patient presents with    Black or Bloody Stool       This is a 73-year-old male presenting to ED for evaluation of bloody stools.  He is on warfarin due to a history of previous DVTs, PEs, and arterial occlusion due to a clot earlier this year.  Goal INR is between 2 and 3.  He states that he had an episode of bloody stools which she describes as bright red blood on the surface of the stool and a small amount spreading with release of gas when he had vomiting earlier today.  He went to urgent care initially to have his INR checked which was was recommended to do by his cardiologist office, he found out that they cannot do labs so he went back home.  He denies any abdominal pain, nausea or vomiting, melena, or frequent bloody stools today.  He had another episode of bright red blood in his stool tonight so he came to the ED for assessment as he was not able to have his INR checked earlier today.      History provided by:  Patient   used: No                        Jakin Coma Scale Score: 15                  Patient History   Past Medical History:   Diagnosis Date    Atrial fibrillation (CMS/[facility])     CAD (coronary artery disease)     Gout     Heart disease     Hyperlipidemia     Hypertension     MI (myocardial infarction) (CMS/[facility])     Sleep apnea      Past Surgical History:   Procedure Laterality Date    CARDIAC CATHETERIZATION Right 11/21/2023    Procedure: Right Heart Cath;  Surgeon: Zachariah Gonsales MD;  Location: [place] [address] Cardiac Cath Lab;  Service: Cardiovascular;  Laterality: Right;  Left to right shunt    CORONARY ARTERY BYPASS GRAFT      CT LOWER EXTREMITY LEFT ANGIOGRAM W AND/OR WO IV CONTRAST Left 11/14/2023    CT LOWER EXTREMITY LEFT ANGIOGRAM W AND/OR WO IV CONTRAST 11/14/2023 [facility]    INVASIVE VASCULAR PROCEDURE N/A 11/17/2023    Procedure: Aortogram;  Surgeon: Monika Pruett MD;  Location: [facility];  Service: Vascular Surgery;  Laterality: N/A;    IR ANGIOGRAM  LOWER EXTREMITY RIGHT Right 11/17/2023    IR ANGIOGRAM LOWER EXTREMITY RIGHT 11/17/2023 Monika Pruett MD [facility]GIO    MR HEAD ANGIO WO IV CONTRAST  03/21/2017    MR HEAD ANGIO WO IV CONTRAST [facility]    MR HEAD ANGIO WO IV CONTRAST  10/30/2020    MR HEAD ANGIO WO IV CONTRAST [facility]     Family History   Problem Relation Name Age of Onset    Hypertension Mother      Stroke Mother      Heart attack Father          Cause of death    Heart disease Father      Diabetes Other Grandmother      Social History     Tobacco Use    Smoking status: Never     Passive exposure: Never    Smokeless tobacco: Never   Vaping Use    Vaping Use: Never used   Substance Use Topics    Alcohol use: Not Currently     Alcohol/week: 2.0 standard drinks of alcohol     Types: 2 Cans of beer per week    Drug use: Never       Physical Exam   ED Triage Vitals [12/25/23 0322]   Temp Heart Rate Resp BP   36.3 °C (97.3 °F) 87 16 (!) 162/101      SpO2 Temp Source Heart Rate Source Patient Position   98 % Oral Monitor --      BP Location FiO2 (%)     -- --       Physical Exam  General: well developed, obese 73-year-old male who is awake and alert, in no apparent distress.  Wife at bedside.  CV: regular rate and rhythm, no murmur, no gallops, or rubs.   Resp: clear to ascultation bilaterally, no wheezes, rales, or rhonchi  GI: abdomen soft, nontender without rigidity or guarding, no peritoneal signs, abdomen is nondistended, no masses palpated  Rectal exam performed with MICHAELA Jara in the room.  Patient does have multiple palpable internal hemorrhoids, there is no active bleeding, no melena on exam.  No external lesions.  Skin: warm, dry, without evidence of rash or abrasions    ED Course & MDM   ED Course as of 12/25/23 1627   Mon Dec 25, 2023   0405 INR(!): 2.2 [NT]   0406 HEMOGLOBIN(!): 12.1 [NT]      ED Course User Index  [NT] Fritz Middleton DO         Diagnoses as of 12/25/23 1627   Bloody stool   Hemorrhoids,  unspecified hemorrhoid type       Medical Decision Making  PMH: Recurrent blood clots on warfarin  History obtained: directly from patient   Social factors affecting disposition: none    He is in no acute distress here, he is hypertensive, vitals otherwise normal.  Abdominal exam is benign, there is no focal tenderness, no suspect acute intra-abdominal inflammatory infectious process causing his symptoms today.  Physical exam reveals multiple internal hemorrhoids but no active bleeding.  It is likely that the hemorrhoids are the cause for his bright red blood in the stool.  There is no melena suggesting upper GI bleeding, no elevation in his BUN.  Patient denies any constipation, he states that he does not strain when he has a bowel movement but he does have known history of vascular issues and clotting.  This could be contributing to his hemorrhoid formation.  INR is within therapeutic range, and blood counts are normal.  He is not serially anemic.  He is only had 2 episodes of red blood in the stool, given his benign presentation I do not suspect any brisk GI bleeding.  He was advised to follow-up with his primary care physician as an outpatient, start Anusol suppositories for treatment of his hemorrhoids.  He is agreeable with this plan, he was discharged in stable condition.  Return precautions were discussed including signs of symptomatic anemia.    Labs Reviewed   CBC WITH AUTO DIFFERENTIAL - Abnormal       Result Value    WBC 7.0      nRBC 0.0      RBC 4.16 (*)     Hemoglobin 12.1 (*)     Hematocrit 39.7 (*)     MCV 95      MCH 29.1      MCHC 30.5 (*)     RDW 15.2 (*)     Platelets 241      Neutrophils % 58.0      Immature Granulocytes %, Automated 0.3      Lymphocytes % 27.8      Monocytes % 10.8      Eosinophils % 2.4      Basophils % 0.7      Neutrophils Absolute 4.08      Immature Granulocytes Absolute, Automated 0.02      Lymphocytes Absolute 1.96      Monocytes Absolute 0.76      Eosinophils Absolute  0.17      Basophils Absolute 0.05     COMPREHENSIVE METABOLIC PANEL - Abnormal    Glucose 122 (*)     Sodium 142      Potassium 4.1      Chloride 107      Bicarbonate 25      Urea Nitrogen 14      Creatinine 1.30      eGFR 57 (*)     Calcium 8.5      Albumin 3.5      Alkaline Phosphatase 63      Total Protein 5.9      AST 11      Bilirubin, Total 0.7      ALT 7      Anion Gap 10     PROTIME-INR - Abnormal    Protime 21.7 (*)     INR 2.2 (*)     Narrative:     INR Therapeutic Range: 2.0-3.5   TYPE AND SCREEN         Procedure  Procedures     Fritz Middleton,   12/25/23 5489

## 2023-12-26 ENCOUNTER — HOME CARE VISIT (OUTPATIENT)
Dept: HOME HEALTH SERVICES | Facility: HOME HEALTH | Age: 73
End: 2023-12-26
Payer: MEDICARE

## 2023-12-26 VITALS
OXYGEN SATURATION: 98 % | HEART RATE: 87 BPM | DIASTOLIC BLOOD PRESSURE: 82 MMHG | SYSTOLIC BLOOD PRESSURE: 136 MMHG | TEMPERATURE: 98.7 F | RESPIRATION RATE: 16 BRPM

## 2023-12-26 VITALS
SYSTOLIC BLOOD PRESSURE: 120 MMHG | HEART RATE: 84 BPM | TEMPERATURE: 97.2 F | RESPIRATION RATE: 18 BRPM | DIASTOLIC BLOOD PRESSURE: 80 MMHG | OXYGEN SATURATION: 98 %

## 2023-12-26 PROCEDURE — 1090000002 HH PPS REVENUE DEBIT

## 2023-12-26 PROCEDURE — G0300 HHS/HOSPICE OF LPN EA 15 MIN: HCPCS

## 2023-12-26 PROCEDURE — G0152 HHCP-SERV OF OT,EA 15 MIN: HCPCS

## 2023-12-26 PROCEDURE — 1090000001 HH PPS REVENUE CREDIT

## 2023-12-26 ASSESSMENT — PAIN SCALES - PAIN ASSESSMENT IN ADVANCED DEMENTIA (PAINAD)
NEGVOCALIZATION: 0
BREATHING: 0
FACIALEXPRESSION: 0
FACIALEXPRESSION: 0 - SMILING OR INEXPRESSIVE.
FACIALEXPRESSION: 0
NEGVOCALIZATION: 0
CONSOLABILITY: 0 - NO NEED TO CONSOLE.
NEGVOCALIZATION: 0 - NONE.
TOTALSCORE: 0
NEGVOCALIZATION: 0 - NONE.
BODYLANGUAGE: 0 - RELAXED.
BREATHING: 0
FACIALEXPRESSION: 0 - SMILING OR INEXPRESSIVE.
TOTALSCORE: 0
CONSOLABILITY: 0
BODYLANGUAGE: 0 - RELAXED.
BODYLANGUAGE: 0
BODYLANGUAGE: 0
CONSOLABILITY: 0 - NO NEED TO CONSOLE.
CONSOLABILITY: 0

## 2023-12-26 ASSESSMENT — ENCOUNTER SYMPTOMS
PAIN SEVERITY GOAL: 0/10
PAIN LOCATION: RIGHT FOOT
DENIES PAIN: 1
HIGHEST PAIN SEVERITY IN PAST 24 HOURS: 0/10
PAIN LOCATION - PAIN SEVERITY: 8/10
RECTAL BLEEDING: 1
BOWEL PATTERN NORMAL: 1
CHANGE IN APPETITE: UNCHANGED
DRY SKIN: 1
APPETITE LEVEL: GOOD
LAST BOWEL MOVEMENT: 66833
LOWEST PAIN SEVERITY IN PAST 24 HOURS: 0/10
PERSON REPORTING PAIN: PATIENT
PAIN: 1

## 2023-12-26 ASSESSMENT — ACTIVITIES OF DAILY LIVING (ADL)
BATHING_CURRENT_FUNCTION: INDEPENDENT
BATHING ASSESSED: 1
DRESSING_LB_CURRENT_FUNCTION: INDEPENDENT

## 2023-12-27 ENCOUNTER — HOME CARE VISIT (OUTPATIENT)
Dept: HOME HEALTH SERVICES | Facility: HOME HEALTH | Age: 73
End: 2023-12-27
Payer: MEDICARE

## 2023-12-27 VITALS
RESPIRATION RATE: 16 BRPM | OXYGEN SATURATION: 99 % | DIASTOLIC BLOOD PRESSURE: 62 MMHG | TEMPERATURE: 97.8 F | SYSTOLIC BLOOD PRESSURE: 124 MMHG | HEART RATE: 87 BPM

## 2023-12-27 PROCEDURE — G0157 HHC PT ASSISTANT EA 15: HCPCS | Mod: HHH

## 2023-12-27 PROCEDURE — 1090000002 HH PPS REVENUE DEBIT

## 2023-12-27 PROCEDURE — 1090000001 HH PPS REVENUE CREDIT

## 2023-12-27 SDOH — HEALTH STABILITY: PHYSICAL HEALTH: EXERCISE ACTIVITY: APS, LAQS, MARCHES, HIP ABD WITH BLUE TBAND, KNEE FLEX WITH GREEN TBAND, AROM KNEE FLEX LLE ONLY

## 2023-12-27 SDOH — HEALTH STABILITY: PHYSICAL HEALTH: EXERCISE ACTIVITIES SETS: 1

## 2023-12-27 SDOH — HEALTH STABILITY: PHYSICAL HEALTH: PHYSICAL EXERCISE: 20

## 2023-12-27 SDOH — HEALTH STABILITY: PHYSICAL HEALTH: PHYSICAL EXERCISE: 10

## 2023-12-27 SDOH — HEALTH STABILITY: PHYSICAL HEALTH: PHYSICAL EXERCISE: SITTING

## 2023-12-27 SDOH — HEALTH STABILITY: PHYSICAL HEALTH: PHYSICAL EXERCISE: SEATED

## 2023-12-27 SDOH — HEALTH STABILITY: PHYSICAL HEALTH: EXERCISE ACTIVITY: CALF RAISES, KNEE FLEX, HIP ABD, EXT, MINI SQUATS, MARCHES

## 2023-12-27 SDOH — HEALTH STABILITY: PHYSICAL HEALTH: EXERCISE ACTIVITY: SIT<>STANDS

## 2023-12-27 SDOH — HEALTH STABILITY: PHYSICAL HEALTH: EXERCISE TYPE: BLE EXS

## 2023-12-27 SDOH — HEALTH STABILITY: PHYSICAL HEALTH: PHYSICAL EXERCISE: STANDING

## 2023-12-27 ASSESSMENT — ACTIVITIES OF DAILY LIVING (ADL)
AMBULATION ASSISTANCE: 1
PHYSICAL TRANSFERS ASSESSED: 1
CURRENT_FUNCTION: INDEPENDENT
PHYSICAL_TRANSFERS_DEVICES: SPC
AMBULATION_DISTANCE/DURATION_TOLERATED: 50'
AMBULATION ASSISTANCE ON FLAT SURFACES: 1
AMBULATION ASSISTANCE: SUPERVISION

## 2023-12-27 ASSESSMENT — ENCOUNTER SYMPTOMS
PAIN LOCATION - PAIN SEVERITY: 1/10
PAIN: 1
PERSON REPORTING PAIN: PATIENT
PAIN LOCATION: RIGHT FOOT
HIGHEST PAIN SEVERITY IN PAST 24 HOURS: 3/10
LOWEST PAIN SEVERITY IN PAST 24 HOURS: 0/10
PAIN LOCATION - PAIN QUALITY: ACHING
PAIN LOCATION - RELIEVING FACTORS: REST
PAIN SEVERITY GOAL: 0/10
SUBJECTIVE PAIN PROGRESSION: GRADUALLY IMPROVING
PAIN LOCATION - PAIN FREQUENCY: INTERMITTENT

## 2023-12-28 ENCOUNTER — HOME CARE VISIT (OUTPATIENT)
Dept: HOME HEALTH SERVICES | Facility: HOME HEALTH | Age: 73
End: 2023-12-28
Payer: MEDICARE

## 2023-12-28 PROCEDURE — 1090000001 HH PPS REVENUE CREDIT

## 2023-12-28 PROCEDURE — 1090000002 HH PPS REVENUE DEBIT

## 2023-12-29 ENCOUNTER — HOME CARE VISIT (OUTPATIENT)
Dept: HOME HEALTH SERVICES | Facility: HOME HEALTH | Age: 73
End: 2023-12-29
Payer: MEDICARE

## 2023-12-29 PROCEDURE — 1090000001 HH PPS REVENUE CREDIT

## 2023-12-29 PROCEDURE — 1090000002 HH PPS REVENUE DEBIT

## 2023-12-30 PROCEDURE — 1090000002 HH PPS REVENUE DEBIT

## 2023-12-30 PROCEDURE — 1090000001 HH PPS REVENUE CREDIT

## 2023-12-31 PROCEDURE — 1090000001 HH PPS REVENUE CREDIT

## 2023-12-31 PROCEDURE — 1090000002 HH PPS REVENUE DEBIT

## 2024-01-01 PROCEDURE — 1090000002 HH PPS REVENUE DEBIT

## 2024-01-01 PROCEDURE — 1090000001 HH PPS REVENUE CREDIT

## 2024-01-02 ENCOUNTER — HOME CARE VISIT (OUTPATIENT)
Dept: HOME HEALTH SERVICES | Facility: HOME HEALTH | Age: 74
End: 2024-01-02
Payer: MEDICARE

## 2024-01-02 VITALS
RESPIRATION RATE: 16 BRPM | TEMPERATURE: 97.9 F | DIASTOLIC BLOOD PRESSURE: 64 MMHG | SYSTOLIC BLOOD PRESSURE: 128 MMHG | OXYGEN SATURATION: 95 % | HEART RATE: 87 BPM

## 2024-01-02 PROCEDURE — 1090000001 HH PPS REVENUE CREDIT

## 2024-01-02 PROCEDURE — 1090000002 HH PPS REVENUE DEBIT

## 2024-01-02 PROCEDURE — G0157 HHC PT ASSISTANT EA 15: HCPCS | Mod: HHH

## 2024-01-02 SDOH — HEALTH STABILITY: PHYSICAL HEALTH: PHYSICAL EXERCISE: SEATED

## 2024-01-02 SDOH — HEALTH STABILITY: PHYSICAL HEALTH
EXERCISE COMMENTS: AFTER THER EXS AND SIT<.STANDS , SPO2  95%, HR 107 DECREASING HR QUICKLY WITH REST. PT USING 2# ANKLE WEIGHTS X 10 REPS, INST PT IF NO SORENESS NOTED TO INCREASE REPS AS TOLERATED

## 2024-01-02 SDOH — HEALTH STABILITY: PHYSICAL HEALTH: EXERCISE ACTIVITY: MARCHES, HIP ABD, EXT, KNEE FLEX, CALF RAISES, MINI SQUATS

## 2024-01-02 SDOH — HEALTH STABILITY: PHYSICAL HEALTH: EXERCISE ACTIVITY: APS, LAQS, MARCHES, KNEE FLEX WITH GREEN TBAND, HIP ABD WITH BLUE TBAND

## 2024-01-02 SDOH — HEALTH STABILITY: PHYSICAL HEALTH: PHYSICAL EXERCISE: 10

## 2024-01-02 SDOH — HEALTH STABILITY: PHYSICAL HEALTH: RESISTANCE: 2#

## 2024-01-02 SDOH — HEALTH STABILITY: PHYSICAL HEALTH: PHYSICAL EXERCISE: STANDING

## 2024-01-02 SDOH — HEALTH STABILITY: PHYSICAL HEALTH: EXERCISE TYPE: BLE EXS

## 2024-01-02 ASSESSMENT — ENCOUNTER SYMPTOMS
SUBJECTIVE PAIN PROGRESSION: GRADUALLY IMPROVING
PAIN LOCATION - PAIN SEVERITY: 2/10
PAIN SEVERITY GOAL: 0/10
PAIN: 1
PAIN LOCATION: LEFT LEG
PAIN LOCATION - EXACERBATING FACTORS: ACTIVITY,
HIGHEST PAIN SEVERITY IN PAST 24 HOURS: 7/10
PAIN LOCATION - RELIEVING FACTORS: REST, PAIN MEDS
LOWEST PAIN SEVERITY IN PAST 24 HOURS: 2/10
PAIN LOCATION - PAIN QUALITY: ACHING
PAIN LOCATION - PAIN FREQUENCY: CONSTANT

## 2024-01-02 ASSESSMENT — ACTIVITIES OF DAILY LIVING (ADL)
AMBULATION ASSISTANCE ON FLAT SURFACES: 1
PHYSICAL TRANSFERS ASSESSED: 1
CURRENT_FUNCTION: INDEPENDENT
AMBULATION ASSISTANCE: 1
AMBULATION_DISTANCE/DURATION_TOLERATED: 60'

## 2024-01-03 ENCOUNTER — HOME CARE VISIT (OUTPATIENT)
Dept: HOME HEALTH SERVICES | Facility: HOME HEALTH | Age: 74
End: 2024-01-03
Payer: MEDICARE

## 2024-01-03 ENCOUNTER — APPOINTMENT (OUTPATIENT)
Dept: PREADMISSION TESTING | Facility: HOSPITAL | Age: 74
End: 2024-01-03
Payer: MEDICARE

## 2024-01-03 VITALS
SYSTOLIC BLOOD PRESSURE: 118 MMHG | DIASTOLIC BLOOD PRESSURE: 80 MMHG | HEART RATE: 91 BPM | TEMPERATURE: 98.7 F | RESPIRATION RATE: 18 BRPM | OXYGEN SATURATION: 96 %

## 2024-01-03 PROCEDURE — G0299 HHS/HOSPICE OF RN EA 15 MIN: HCPCS | Mod: HHH

## 2024-01-03 PROCEDURE — 1090000002 HH PPS REVENUE DEBIT

## 2024-01-03 PROCEDURE — 1090000001 HH PPS REVENUE CREDIT

## 2024-01-03 SDOH — ECONOMIC STABILITY: GENERAL

## 2024-01-03 ASSESSMENT — ENCOUNTER SYMPTOMS
MUSCLE WEAKNESS: 1
DYSPNEA ON EXERTION: 1
LOWER EXTREMITY EDEMA: 1
DENIES PAIN: 1
CHANGE IN APPETITE: UNCHANGED
APPETITE LEVEL: GOOD

## 2024-01-03 ASSESSMENT — ACTIVITIES OF DAILY LIVING (ADL)
AMBULATION ASSISTANCE: STAND BY ASSIST
MONEY MANAGEMENT (EXPENSES/BILLS): INDEPENDENT
AMBULATION ASSISTANCE: 1

## 2024-01-04 ENCOUNTER — ANTICOAGULATION - WARFARIN VISIT (OUTPATIENT)
Dept: CARDIOLOGY | Facility: CLINIC | Age: 74
End: 2024-01-04

## 2024-01-04 ENCOUNTER — HOME CARE VISIT (OUTPATIENT)
Dept: HOME HEALTH SERVICES | Facility: HOME HEALTH | Age: 74
End: 2024-01-04
Payer: MEDICARE

## 2024-01-04 ENCOUNTER — PATIENT OUTREACH (OUTPATIENT)
Dept: PRIMARY CARE | Facility: CLINIC | Age: 74
End: 2024-01-04
Payer: COMMERCIAL

## 2024-01-04 ENCOUNTER — TELEPHONE (OUTPATIENT)
Dept: CARDIOLOGY | Facility: CLINIC | Age: 74
End: 2024-01-04

## 2024-01-04 ENCOUNTER — ANCILLARY PROCEDURE (OUTPATIENT)
Dept: RADIOLOGY | Facility: CLINIC | Age: 74
End: 2024-01-04
Payer: MEDICARE

## 2024-01-04 ENCOUNTER — OFFICE VISIT (OUTPATIENT)
Dept: PRIMARY CARE | Facility: CLINIC | Age: 74
End: 2024-01-04
Payer: MEDICARE

## 2024-01-04 VITALS
OXYGEN SATURATION: 98 % | WEIGHT: 262 LBS | TEMPERATURE: 96.2 F | HEART RATE: 88 BPM | BODY MASS INDEX: 38.69 KG/M2 | SYSTOLIC BLOOD PRESSURE: 134 MMHG | DIASTOLIC BLOOD PRESSURE: 78 MMHG

## 2024-01-04 DIAGNOSIS — R63.5 WEIGHT GAIN: ICD-10-CM

## 2024-01-04 DIAGNOSIS — R60.9 EDEMA, UNSPECIFIED TYPE: ICD-10-CM

## 2024-01-04 DIAGNOSIS — Z98.890 HISTORY OF MAJOR VASCULAR SURGERY: ICD-10-CM

## 2024-01-04 DIAGNOSIS — U09.9 POST COVID-19 CONDITION, UNSPECIFIED: ICD-10-CM

## 2024-01-04 DIAGNOSIS — I73.9 PERIPHERAL VASCULAR DISEASE, UNSPECIFIED (CMS-HCC): ICD-10-CM

## 2024-01-04 DIAGNOSIS — I10 PRIMARY HYPERTENSION: Primary | ICD-10-CM

## 2024-01-04 DIAGNOSIS — I25.10 ASHD (ARTERIOSCLEROTIC HEART DISEASE): ICD-10-CM

## 2024-01-04 DIAGNOSIS — Z79.01 ANTICOAGULATED: ICD-10-CM

## 2024-01-04 DIAGNOSIS — K62.5 RECTAL BLEED: Primary | ICD-10-CM

## 2024-01-04 DIAGNOSIS — Z86.718 HISTORY OF DVT OF LOWER EXTREMITY: ICD-10-CM

## 2024-01-04 DIAGNOSIS — G47.33 OSA (OBSTRUCTIVE SLEEP APNEA): ICD-10-CM

## 2024-01-04 DIAGNOSIS — Z79.01 ANTICOAGULATED: Primary | ICD-10-CM

## 2024-01-04 DIAGNOSIS — K21.9 GASTROESOPHAGEAL REFLUX DISEASE, UNSPECIFIED WHETHER ESOPHAGITIS PRESENT: ICD-10-CM

## 2024-01-04 DIAGNOSIS — I73.9 PVD (PERIPHERAL VASCULAR DISEASE) (CMS-HCC): ICD-10-CM

## 2024-01-04 DIAGNOSIS — I25.9 ISCHEMIC HEART DISEASE: ICD-10-CM

## 2024-01-04 DIAGNOSIS — Z09 HOSPITAL DISCHARGE FOLLOW-UP: ICD-10-CM

## 2024-01-04 DIAGNOSIS — I48.91 ATRIAL FIBRILLATION, UNSPECIFIED TYPE (MULTI): ICD-10-CM

## 2024-01-04 PROBLEM — R00.0 TACHYCARDIA: Status: ACTIVE | Noted: 2024-01-04

## 2024-01-04 PROBLEM — I70.91 GENERALIZED ATHEROSCLEROSIS: Status: ACTIVE | Noted: 2024-01-04

## 2024-01-04 LAB
INR IN PPP BY COAGULATION ASSAY EXTERNAL: 3.3
PROTHROMBIN TIME (PT) IN PPP BY COAGULATION ASSAY EXTERNAL: NORMAL SECONDS

## 2024-01-04 PROCEDURE — 1125F AMNT PAIN NOTED PAIN PRSNT: CPT | Performed by: FAMILY MEDICINE

## 2024-01-04 PROCEDURE — G0151 HHCP-SERV OF PT,EA 15 MIN: HCPCS | Mod: HHH

## 2024-01-04 PROCEDURE — 1036F TOBACCO NON-USER: CPT | Performed by: FAMILY MEDICINE

## 2024-01-04 PROCEDURE — 1111F DSCHRG MED/CURRENT MED MERGE: CPT | Performed by: FAMILY MEDICINE

## 2024-01-04 PROCEDURE — 1090000001 HH PPS REVENUE CREDIT

## 2024-01-04 PROCEDURE — 3078F DIAST BP <80 MM HG: CPT | Performed by: FAMILY MEDICINE

## 2024-01-04 PROCEDURE — 71046 X-RAY EXAM CHEST 2 VIEWS: CPT

## 2024-01-04 PROCEDURE — 99214 OFFICE O/P EST MOD 30 MIN: CPT | Performed by: FAMILY MEDICINE

## 2024-01-04 PROCEDURE — 1159F MED LIST DOCD IN RCRD: CPT | Performed by: FAMILY MEDICINE

## 2024-01-04 PROCEDURE — 1090000002 HH PPS REVENUE DEBIT

## 2024-01-04 PROCEDURE — 3075F SYST BP GE 130 - 139MM HG: CPT | Performed by: FAMILY MEDICINE

## 2024-01-04 RX ORDER — FUROSEMIDE 40 MG/1
40 TABLET ORAL DAILY
Qty: 30 TABLET | Refills: 11 | Status: SHIPPED | OUTPATIENT
Start: 2024-01-04 | End: 2024-01-28 | Stop reason: HOSPADM

## 2024-01-04 RX ORDER — LISINOPRIL 40 MG/1
40 TABLET ORAL
COMMUNITY
Start: 2022-07-25 | End: 2024-01-23 | Stop reason: ALTCHOICE

## 2024-01-04 RX ORDER — INSULIN LISPRO 100 [IU]/ML
INJECTION, SOLUTION INTRAVENOUS; SUBCUTANEOUS
COMMUNITY
Start: 2023-11-21 | End: 2024-01-23 | Stop reason: ALTCHOICE

## 2024-01-04 RX ORDER — CLOPIDOGREL BISULFATE 75 MG/1
75 TABLET ORAL DAILY
Qty: 30 TABLET | Refills: 0 | Status: SHIPPED | OUTPATIENT
Start: 2024-01-04 | End: 2024-02-08 | Stop reason: WASHOUT

## 2024-01-04 RX ORDER — CLOPIDOGREL BISULFATE 75 MG/1
75 TABLET ORAL DAILY
Qty: 30 TABLET | Refills: 0 | Status: SHIPPED | OUTPATIENT
Start: 2024-01-04 | End: 2024-01-04 | Stop reason: SDUPTHER

## 2024-01-04 RX ORDER — CLOPIDOGREL BISULFATE 75 MG/1
75 TABLET ORAL DAILY
Qty: 90 TABLET | Refills: 3 | OUTPATIENT
Start: 2024-01-04 | End: 2024-02-03

## 2024-01-04 RX ORDER — AMMONIUM LACTATE 12 G/100G
LOTION TOPICAL EVERY 12 HOURS
COMMUNITY
Start: 2023-11-30 | End: 2024-02-16 | Stop reason: ALTCHOICE

## 2024-01-04 RX ORDER — PANTOPRAZOLE SODIUM 40 MG/1
40 TABLET, DELAYED RELEASE ORAL
COMMUNITY
Start: 2023-10-25 | End: 2024-01-23 | Stop reason: ALTCHOICE

## 2024-01-04 ASSESSMENT — PAIN SCALES - GENERAL: PAINLEVEL: 2

## 2024-01-04 ASSESSMENT — PATIENT HEALTH QUESTIONNAIRE - PHQ9
2. FEELING DOWN, DEPRESSED OR HOPELESS: NOT AT ALL
1. LITTLE INTEREST OR PLEASURE IN DOING THINGS: NOT AT ALL
SUM OF ALL RESPONSES TO PHQ9 QUESTIONS 1 AND 2: 0

## 2024-01-04 NOTE — HOME HEALTH
We have had some trouble with getting the coumadin right.  3.3 yesterday.  I did take a 2.5 dose yesterday because of it.  Supposedly the plan is to take 5 MG tonight.  Then to alternate 2.5 and 5 MG each day after that.  I had blood in my stool and got concerned about that.  We went to the hospital trying to get my INR read.  We got there and they said they don't do labs.   Angeles saw me since you were here and she was quite nice.  She was last here Tuesday.  Appointment 1 11 24 at [facility].  Will see Dr. Hager tomorrow, PCP.          Timed up and go test. 12 seconds.     This is 11 seconds faster than last visit. Patient challenged to walk as far as he is able. He responded by walking about 500 feet outdoors with straight cane before asking to return indoors due to opposite hip symptoms, shortness of breath. After a rest patient was challenged to perform his written exercises.   Exercises   1. Seated full arc quads   2. Seated hip flexion or marching   3. Seated hip adduction vs. pillow   4. Seated hip abduction vs. stronger blue theraband owned by patient   5. Seated hamstring curl vs. stronger blue theraband owned by patient   6. Seated knee flexion, holding 3 to 5 seconds   7. Standing Toe raises   8. Standing marching   9. Standing hip abduction   10. Standing mini squats   11. Standing knee flexion   12. Standing hip extension for about 10 reps each, resistance where indicated.     Patient did use the adjustable ankle weights at 2 pounds each that he has borrowed from a family friend.

## 2024-01-04 NOTE — TELEPHONE ENCOUNTER
Kristine  home health nurse called the office today to let you know that Mukesh' INR is 3.3  She states that the patient needs to be called with instructions.  Thanks  Call back Mukesh [phone]

## 2024-01-04 NOTE — PROGRESS NOTES
INR 3.3, will have him hold warfarin 1 day then reduce to 5 mg M-W-F and 2.5 mg T-TH-S-S, recheck INR 1 week, visiting nurse to check.  Also, weight up 10 pounds in 1 week with increase edema.  Will add furosemide 40 mg once daily, discussed with patient over phone.

## 2024-01-05 ENCOUNTER — TELEPHONE (OUTPATIENT)
Dept: PRIMARY CARE | Facility: CLINIC | Age: 74
End: 2024-01-05

## 2024-01-05 PROCEDURE — 1090000002 HH PPS REVENUE DEBIT

## 2024-01-05 PROCEDURE — 1090000001 HH PPS REVENUE CREDIT

## 2024-01-05 NOTE — TELEPHONE ENCOUNTER
PT wife calling stating PT large right toe is experiencing extreme pain. Believes it may be gout.  PT was seen 1/4/2024.  PT brought it up at appointment, but it was not addressed.  PT has history of gout in exact joint/toe before. PT requesting pain meds before the weekend. Please advise.

## 2024-01-06 PROCEDURE — 1090000002 HH PPS REVENUE DEBIT

## 2024-01-06 PROCEDURE — 1090000001 HH PPS REVENUE CREDIT

## 2024-01-07 PROCEDURE — 1090000002 HH PPS REVENUE DEBIT

## 2024-01-07 PROCEDURE — 1090000001 HH PPS REVENUE CREDIT

## 2024-01-08 ENCOUNTER — OFFICE VISIT (OUTPATIENT)
Dept: CARDIOLOGY | Facility: CLINIC | Age: 74
End: 2024-01-08
Payer: MEDICARE

## 2024-01-08 VITALS
DIASTOLIC BLOOD PRESSURE: 80 MMHG | BODY MASS INDEX: 37.8 KG/M2 | WEIGHT: 256 LBS | SYSTOLIC BLOOD PRESSURE: 130 MMHG | HEART RATE: 71 BPM | RESPIRATION RATE: 15 BRPM | OXYGEN SATURATION: 98 %

## 2024-01-08 DIAGNOSIS — I25.10 ASHD (ARTERIOSCLEROTIC HEART DISEASE): Primary | ICD-10-CM

## 2024-01-08 DIAGNOSIS — E78.2 MIXED HYPERLIPIDEMIA: ICD-10-CM

## 2024-01-08 DIAGNOSIS — I10 PRIMARY HYPERTENSION: ICD-10-CM

## 2024-01-08 DIAGNOSIS — I48.91 ATRIAL FIBRILLATION WITH RVR (MULTI): ICD-10-CM

## 2024-01-08 PROCEDURE — 1125F AMNT PAIN NOTED PAIN PRSNT: CPT | Performed by: INTERNAL MEDICINE

## 2024-01-08 PROCEDURE — 99214 OFFICE O/P EST MOD 30 MIN: CPT | Performed by: INTERNAL MEDICINE

## 2024-01-08 PROCEDURE — 1090000002 HH PPS REVENUE DEBIT

## 2024-01-08 PROCEDURE — 3079F DIAST BP 80-89 MM HG: CPT | Performed by: INTERNAL MEDICINE

## 2024-01-08 PROCEDURE — 1090000001 HH PPS REVENUE CREDIT

## 2024-01-08 PROCEDURE — 1111F DSCHRG MED/CURRENT MED MERGE: CPT | Performed by: INTERNAL MEDICINE

## 2024-01-08 PROCEDURE — 1159F MED LIST DOCD IN RCRD: CPT | Performed by: INTERNAL MEDICINE

## 2024-01-08 PROCEDURE — 3075F SYST BP GE 130 - 139MM HG: CPT | Performed by: INTERNAL MEDICINE

## 2024-01-08 PROCEDURE — 1036F TOBACCO NON-USER: CPT | Performed by: INTERNAL MEDICINE

## 2024-01-08 ASSESSMENT — ENCOUNTER SYMPTOMS
OCCASIONAL FEELINGS OF UNSTEADINESS: 1
ABDOMINAL PAIN: 0
COUGH: 0
DYSPNEA ON EXERTION: 0
DEPRESSION: 0
BLURRED VISION: 0
PALPITATIONS: 0
DYSURIA: 0
HEMATURIA: 0
NUMBNESS: 0
SHORTNESS OF BREATH: 1
PARESTHESIAS: 0
LOSS OF SENSATION IN FEET: 0

## 2024-01-08 ASSESSMENT — PATIENT HEALTH QUESTIONNAIRE - PHQ9
SUM OF ALL RESPONSES TO PHQ9 QUESTIONS 1 & 2: 0
2. FEELING DOWN, DEPRESSED OR HOPELESS: NOT AT ALL
1. LITTLE INTEREST OR PLEASURE IN DOING THINGS: NOT AT ALL

## 2024-01-08 NOTE — ASSESSMENT & PLAN NOTE
Heart rate is well-controlled, continue warfarin for stroke prevention.  INRs with visiting nurse then anticoagulation clinic.

## 2024-01-08 NOTE — ASSESSMENT & PLAN NOTE
Cardiac wise patient stable with no anginal type symptoms.  Will 10 years post bypass surgery.  Will continue standard risk factor modification and follow on a regular basis.

## 2024-01-08 NOTE — PROGRESS NOTES
Subjective   Mukesh Garg is a 73 y.o. male.    Chief Complaint:  Follow-up    HPI  COVID19 in October.  Did develop DVT followed by PE.  Transferred to  and diagnosis of hypercoagulable disorder and had surgical procedure, embolectomy on lower extremities.  Placed on wafarin.  Patient with edema and SOB since that time.    Review of Systems   Constitutional: Negative for malaise/fatigue.   HENT:  Negative for congestion.    Eyes:  Negative for blurred vision.   Cardiovascular:  Negative for chest pain, dyspnea on exertion and palpitations.   Respiratory:  Positive for shortness of breath. Negative for cough.    Musculoskeletal:  Negative for joint pain.   Gastrointestinal:  Negative for abdominal pain.   Genitourinary:  Negative for dysuria and hematuria.   Neurological:  Negative for numbness and paresthesias.       Objective   Constitutional:       Appearance: Not in distress.   Eyes:      Conjunctiva/sclera: Conjunctivae normal.   Neck:      Vascular: JVD normal.   Pulmonary:      Breath sounds: Normal breath sounds. No wheezing. No rhonchi. No rales.   Cardiovascular:      Normal rate. Regular rhythm.      Murmurs: There is no murmur.      No gallop.  No click. No rub.   Abdominal:      Palpations: Abdomen is soft.   Neurological:      General: No focal deficit present.      Mental Status: Alert.         Lab Review:   Anticoagulation - Warfarin Visit on 01/04/2024   Component Date Value    INR External 01/04/2024 3.30    Admission on 12/25/2023, Discharged on 12/25/2023   Component Date Value    WBC 12/25/2023 7.0     nRBC 12/25/2023 0.0     RBC 12/25/2023 4.16 (L)     Hemoglobin 12/25/2023 12.1 (L)     Hematocrit 12/25/2023 39.7 (L)     MCV 12/25/2023 95     MCH 12/25/2023 29.1     MCHC 12/25/2023 30.5 (L)     RDW 12/25/2023 15.2 (H)     Platelets 12/25/2023 241     Neutrophils % 12/25/2023 58.0     Immature Granulocytes %,* 12/25/2023 0.3     Lymphocytes % 12/25/2023 27.8     Monocytes % 12/25/2023 10.8      Eosinophils % 12/25/2023 2.4     Basophils % 12/25/2023 0.7     Neutrophils Absolute 12/25/2023 4.08     Immature Granulocytes Ab* 12/25/2023 0.02     Lymphocytes Absolute 12/25/2023 1.96     Monocytes Absolute 12/25/2023 0.76     Eosinophils Absolute 12/25/2023 0.17     Basophils Absolute 12/25/2023 0.05     Glucose 12/25/2023 122 (H)     Sodium 12/25/2023 142     Potassium 12/25/2023 4.1     Chloride 12/25/2023 107     Bicarbonate 12/25/2023 25     Urea Nitrogen 12/25/2023 14     Creatinine 12/25/2023 1.30     eGFR 12/25/2023 57 (L)     Calcium 12/25/2023 8.5     Albumin 12/25/2023 3.5     Alkaline Phosphatase 12/25/2023 63     Total Protein 12/25/2023 5.9     AST 12/25/2023 11     Bilirubin, Total 12/25/2023 0.7     ALT 12/25/2023 7     Anion Gap 12/25/2023 10     Protime 12/25/2023 21.7 (H)     INR 12/25/2023 2.2 (H)    Admission on 11/27/2023, Discharged on 12/09/2023   Component Date Value    Protime 11/28/2023 25.5 (H)     INR 11/28/2023 2.6 (H)     WBC 11/28/2023 10.0     nRBC 11/28/2023 0.2 (H)     RBC 11/28/2023 3.67 (L)     Hemoglobin 11/28/2023 10.9 (L)     Hematocrit 11/28/2023 34.8 (L)     MCV 11/28/2023 95     MCH 11/28/2023 29.7     MCHC 11/28/2023 31.3 (L)     RDW 11/28/2023 14.6 (H)     Platelets 11/28/2023 422     Glucose 11/28/2023 127 (H)     Sodium 11/28/2023 140     Potassium 11/28/2023 3.5     Chloride 11/28/2023 106     Bicarbonate 11/28/2023 22 (L)     Urea Nitrogen 11/28/2023 16     Creatinine 11/28/2023 1.00     eGFR 11/28/2023 78     Calcium 11/28/2023 8.4 (L)     Anion Gap 11/28/2023 12     Protime 11/29/2023 27.1 (H)     INR 11/29/2023 2.8 (H)     WBC 11/30/2023 10.2     nRBC 11/30/2023 0.0     RBC 11/30/2023 3.77 (L)     Hemoglobin 11/30/2023 11.2 (L)     Hematocrit 11/30/2023 35.6 (L)     MCV 11/30/2023 94     MCH 11/30/2023 29.7     MCHC 11/30/2023 31.5 (L)     RDW 11/30/2023 14.8 (H)     Platelets 11/30/2023 428     Glucose 11/30/2023 159 (H)     Sodium 11/30/2023 135     Potassium  11/30/2023 3.4     Chloride 11/30/2023 102     Bicarbonate 11/30/2023 19 (L)     Urea Nitrogen 11/30/2023 22     Creatinine 11/30/2023 1.30     eGFR 11/30/2023 57 (L)     Calcium 11/30/2023 8.6     Anion Gap 11/30/2023 14     Protime 12/01/2023 25.6 (H)     INR 12/01/2023 2.6 (H)     Protime 12/02/2023 28.3 (H)     INR 12/02/2023 2.9 (H)     Protime 12/06/2023 15.5 (H)     INR 12/06/2023 1.5 (H)     Glucose 12/06/2023 111 (H)     Sodium 12/06/2023 141     Potassium 12/06/2023 3.6     Chloride 12/06/2023 106     Bicarbonate 12/06/2023 26     Urea Nitrogen 12/06/2023 11     Creatinine 12/06/2023 1.10     eGFR 12/06/2023 70     Calcium 12/06/2023 8.5     Anion Gap 12/06/2023 9     WBC 12/06/2023 6.1     nRBC 12/06/2023 0.0     RBC 12/06/2023 3.49 (L)     Hemoglobin 12/06/2023 10.4 (L)     Hematocrit 12/06/2023 33.2 (L)     MCV 12/06/2023 95     MCH 12/06/2023 29.8     MCHC 12/06/2023 31.3 (L)     RDW 12/06/2023 16.2 (H)     Platelets 12/06/2023 226     Neutrophils % 12/06/2023 54.8     Immature Granulocytes %,* 12/06/2023 0.5     Lymphocytes % 12/06/2023 30.2     Monocytes % 12/06/2023 12.6     Eosinophils % 12/06/2023 1.6     Basophils % 12/06/2023 0.3     Neutrophils Absolute 12/06/2023 3.35     Immature Granulocytes Ab* 12/06/2023 0.03     Lymphocytes Absolute 12/06/2023 1.85     Monocytes Absolute 12/06/2023 0.77     Eosinophils Absolute 12/06/2023 0.10     Basophils Absolute 12/06/2023 0.02     Protime 12/08/2023 15.6 (H)     INR 12/08/2023 1.5 (H)    No results displayed because visit has over 200 results.      Admission on 11/14/2023, Discharged on 11/15/2023   Component Date Value    Glucose 11/14/2023 175 (H)     Sodium 11/14/2023 141     Potassium 11/14/2023 4.3     Chloride 11/14/2023 103     Bicarbonate 11/14/2023 27     Urea Nitrogen 11/14/2023 24     Creatinine 11/14/2023 1.30     eGFR 11/14/2023 57 (L)     Calcium 11/14/2023 9.1     Anion Gap 11/14/2023 11     WBC 11/14/2023 8.9     nRBC 11/14/2023 0.0      RBC 11/14/2023 4.93     Hemoglobin 11/14/2023 14.9     Hematocrit 11/14/2023 46.9     MCV 11/14/2023 95     MCH 11/14/2023 30.2     MCHC 11/14/2023 31.8 (L)     RDW 11/14/2023 14.8 (H)     Platelets 11/14/2023 111 (L)     POCT Lactate, Venous 11/14/2023 2.0     AST 11/14/2023 16     ALT 11/14/2023 30     Alkaline Phosphatase 11/14/2023 57     Bilirubin, Total 11/14/2023 1.7 (H)     Bilirubin, Direct 11/14/2023 0.4 (H)     Total Protein 11/14/2023 6.4     Albumin 11/14/2023 3.5     Protime 11/14/2023 11.3     INR 11/14/2023 1.1     aPTT 11/14/2023 >139.0 (HH)     WBC 11/14/2023 8.6     nRBC 11/14/2023 0.0     RBC 11/14/2023 4.61     Hemoglobin 11/14/2023 14.0     Hematocrit 11/14/2023 43.6     MCV 11/14/2023 95     MCH 11/14/2023 30.4     MCHC 11/14/2023 32.1     RDW 11/14/2023 14.7 (H)     Platelets 11/14/2023 104 (L)        Assessment/Plan   The primary encounter diagnosis was ASHD (arteriosclerotic heart disease). Diagnoses of Atrial fibrillation with RVR (CMS/HCC), Primary hypertension, and Mixed hyperlipidemia were also pertinent to this visit.    ASHD (arteriosclerotic heart disease)  Cardiac wise patient stable with no anginal type symptoms.  Will 10 years post bypass surgery.  Will continue standard risk factor modification and follow on a regular basis.    Hypertension  Blood pressure borderline, we will just stressed hygienic measures and follow on a regular basis    Hyperlipidemia  Will check lipid panel on return.    Atrial fibrillation with RVR (CMS/HCC)  Heart rate is well-controlled, continue warfarin for stroke prevention.  INRs with visiting nurse then anticoagulation clinic.

## 2024-01-09 PROCEDURE — 1090000002 HH PPS REVENUE DEBIT

## 2024-01-09 PROCEDURE — 1090000001 HH PPS REVENUE CREDIT

## 2024-01-10 PROCEDURE — 1090000002 HH PPS REVENUE DEBIT

## 2024-01-10 PROCEDURE — 1090000001 HH PPS REVENUE CREDIT

## 2024-01-10 PROCEDURE — 1090000003 HH PPS REVENUE ADJ

## 2024-01-10 NOTE — PROGRESS NOTES
Physical Therapy Evaluation    Patient Name: Mukesh Garg  MRN: [id number]  Evaluation Date: 1/11/2024  Time Calculation  Start Time: 0930  Stop Time: 1015  Time Calculation (min): 45 min  PT Evaluation Time Entry  PT Evaluation (Moderate) Time Entry: 30          1. Anticoagulated  Referral to Physical Therapy    Follow Up In Physical Therapy      2. History of DVT of lower extremity  Referral to Physical Therapy      3. PVD (peripheral vascular disease) (CMS/HCC)  Referral to Physical Therapy      4. History of major vascular surgery  Referral to Physical Therapy      5. Post covid-19 condition, unspecified  Referral to Physical Therapy      6. Peripheral vascular disease, unspecified (CMS/[facility])  Referral to Physical Therapy           Subjective   General:  Patient is a 74 yo male with diagnosis of history of COVID with resulting DVT x 2 right LE 10/2023.  Patient started on anticoagulation therapy.  Patient with worsening symptoms and sent to [facility] vascular surgeon for removal of clot left LE/release of fascia secondary to compartment syndrome.  Patient reports known clots right LE/PE- no precautions/restrictions.  Patient to  [facility] x 2 weeks.  Patient received home PT x 2 weeks.  Patient independent with HEP.  Patient motivated to participate.        Surgery:   Arterial occlusion right LE 2023  CABG 2013    Precautions:  Cardiac history  Clotting disorder on coumadin  Compromised endurance       Relevant PMH:  Above surgeries  CAD  HTN  Right hip OA- was slated for ANDREAS      Pain:  Shooting pain left LE  1-4/10     Home Living:  Uses cane in home/walker for distancec  Home type: House  Stairs: Yes  Lives with: Spouse  Occupation: retired  Work tasks: NA  Hobbies/activities: travel    Prior Function Per Pt/Caregiver Report:  Active male    Objective     Left LE swollen/firm;  does wrap with Ace wrap    Incissions well healed    Right LE swollen- less so than left       Strength:     LE MMT L R   Hip flexion  4-/5 4-/5   Hip extension NE/5 NE/5   Hip abduction 4/5 4/5   Hip adduction 4/5 4/5   Knee flexion 4-/5 4-/5   Knee extension 4-/5 4-/5   Ankle DF 4-/5 4-/5   Ankle PF 4-/5 4-/5     Flexibility:    Tightness secondary to swelling   HS/MALORIE  GS  Limited secondary to swelling    Palpation:  Left LE sensitive to touch       Gait:  With cane- slow may  Decrease hip flexion     Balance:  Rhomberg  EO- no LOB  EC- no LOB       Bed Mobility:  independent     Transfers:   Independent- good body mechanics    Outcome Measures:  LEFS  72% impaired    Assessment       Pt is a 73 y.o. male who presents with impairments of weakness and poor functional endurance. These impairments have led to functional limitations including limited ambulation distances/ADL participation. Pt .would benefit from skilled physical therapy intervention to improve above impairments and facilitate return to function.    Plan  Patient to be seen   2x/week  9 additional visits  There-Ex  Aquatics  Neuro- Re-ed    Plan for next visit:   Initiate aquatics    OP EDUCATION:  Encouraged continued HEP    Today's Treatment:  Orientation to aquatics      Goals:  STG(3 visits)  Patient to tolerate 40 minutes of aqautic PT    LTG (10 visits)1  LEFS to < 40 % impaired  MMT LE to 4/5  Patient to walk for 30 minutes without need to sit secondary to pain/fatigue.  4.   Patient to return to pre morbid recreational activities.

## 2024-01-11 ENCOUNTER — ANTICOAGULATION - WARFARIN VISIT (OUTPATIENT)
Dept: CARDIOLOGY | Facility: HOSPITAL | Age: 74
End: 2024-01-11

## 2024-01-11 ENCOUNTER — EVALUATION (OUTPATIENT)
Dept: PHYSICAL THERAPY | Facility: CLINIC | Age: 74
End: 2024-01-11
Payer: MEDICARE

## 2024-01-11 ENCOUNTER — HOME CARE VISIT (OUTPATIENT)
Dept: HOME HEALTH SERVICES | Facility: HOME HEALTH | Age: 74
End: 2024-01-11
Payer: MEDICARE

## 2024-01-11 VITALS
SYSTOLIC BLOOD PRESSURE: 154 MMHG | DIASTOLIC BLOOD PRESSURE: 100 MMHG | HEART RATE: 85 BPM | TEMPERATURE: 98 F | OXYGEN SATURATION: 95 %

## 2024-01-11 DIAGNOSIS — Z79.01 ANTICOAGULATED: Primary | ICD-10-CM

## 2024-01-11 DIAGNOSIS — Z98.890 HISTORY OF MAJOR VASCULAR SURGERY: ICD-10-CM

## 2024-01-11 DIAGNOSIS — I48.91 ATRIAL FIBRILLATION WITH RVR (MULTI): Primary | ICD-10-CM

## 2024-01-11 DIAGNOSIS — I73.9 PVD (PERIPHERAL VASCULAR DISEASE) (CMS-HCC): ICD-10-CM

## 2024-01-11 DIAGNOSIS — U09.9 POST COVID-19 CONDITION, UNSPECIFIED: ICD-10-CM

## 2024-01-11 DIAGNOSIS — I73.9 PERIPHERAL VASCULAR DISEASE, UNSPECIFIED (CMS-HCC): ICD-10-CM

## 2024-01-11 DIAGNOSIS — Z86.718 HISTORY OF DVT OF LOWER EXTREMITY: ICD-10-CM

## 2024-01-11 PROCEDURE — 1090000002 HH PPS REVENUE DEBIT

## 2024-01-11 PROCEDURE — G0299 HHS/HOSPICE OF RN EA 15 MIN: HCPCS | Mod: HHH

## 2024-01-11 PROCEDURE — 1090000003 HH PPS REVENUE ADJ

## 2024-01-11 PROCEDURE — 169592 NO-PAY CLAIM PROCEDURE

## 2024-01-11 PROCEDURE — 1090000001 HH PPS REVENUE CREDIT

## 2024-01-11 PROCEDURE — 97162 PT EVAL MOD COMPLEX 30 MIN: CPT | Mod: GP

## 2024-01-11 PROCEDURE — 0023 HH SOC

## 2024-01-11 RX ORDER — WARFARIN SODIUM 5 MG/1
5 TABLET ORAL EVERY EVENING
Qty: 30 TABLET | Refills: 0 | Status: SHIPPED | OUTPATIENT
Start: 2024-01-11 | End: 2025-01-10

## 2024-01-11 SDOH — ECONOMIC STABILITY: GENERAL

## 2024-01-11 ASSESSMENT — ENCOUNTER SYMPTOMS
LOWER EXTREMITY EDEMA: 1
DENIES PAIN: 1
CHANGE IN APPETITE: UNCHANGED
APPETITE LEVEL: GOOD

## 2024-01-11 ASSESSMENT — ACTIVITIES OF DAILY LIVING (ADL)
AMBULATION ASSISTANCE: 1
AMBULATION ASSISTANCE: INDEPENDENT
MONEY MANAGEMENT (EXPENSES/BILLS): INDEPENDENT
HOME_HEALTH_OASIS: 00
OASIS_M1830: 00

## 2024-01-11 NOTE — PROGRESS NOTES
Left message to update on home care, per chart review appears he was discharged. Will call back with an update and to make an appointment.

## 2024-01-15 ENCOUNTER — TREATMENT (OUTPATIENT)
Dept: PHYSICAL THERAPY | Facility: CLINIC | Age: 74
End: 2024-01-15
Payer: MEDICARE

## 2024-01-15 DIAGNOSIS — Z79.01 ANTICOAGULATED: ICD-10-CM

## 2024-01-15 PROCEDURE — 97113 AQUATIC THERAPY/EXERCISES: CPT | Mod: GP,CQ

## 2024-01-15 ASSESSMENT — PAIN SCALES - GENERAL: PAINLEVEL_OUTOF10: 3

## 2024-01-15 ASSESSMENT — PAIN - FUNCTIONAL ASSESSMENT: PAIN_FUNCTIONAL_ASSESSMENT: 0-10

## 2024-01-15 NOTE — PROGRESS NOTES
"Physical Therapy Treatment    Patient Name: Mukesh Garg  MRN: [id number]  Encounter date:  1/15/2024  Time Calculation  Start Time: 1517  Stop Time: 1605  Time Calculation (min): 48 min     PT Therapeutic Procedures Time Entry  Aquatic Therapy Time Entry: 43    Visit Number:  2 (including evaluation)  Planned total visits: 9  Visit Authorized:  9    Visit Number:  2 / 9       Current Problem  1. Anticoagulated  Follow Up In Physical Therapy          Precautions  Precautions  Precautions Comment: NONE      Pain  Pain Assessment: 0-10  Pain Score: 3  Pain Type:  (Constant)  Pain Location:  (L Anteriror tibial region)    Subjective  General  General Comment: Patient reports pain in L anterior tib region where DVT was removed that is constant along with tightness throughuot distal B LE.    PT  Visit  Response to Previous Treatment:  (Eval only at last session.)      Objective  Patient descends/asends into pool with R leading foot both ways.  Significant edema B LE.      AQUATICS    3 Lap walk: Fwd, Bwd, Side to side    Heel toe raises x20 each    3 way hip 1 x10  Knee curl 2 x10  MIP x20    At stairs:  Gentle hamstrings 2 x10\"    Deep End:  Hang 2'  Bicycle 1'  Hang 1'  Abd/Add 1'  Hang 1'  C ski'      Assessment:  PT Assessment  Assessment Comment: Patient tolerated the initiation of aquatics fairly well with decreased pain but notes some fatigue.  Pt's response to treatment:  Good  Areas of improvements:  core strength  Limitations/deficits:  endurance    Pain end of session:  1/10    Plan:     Continue with current POC/no changes    Assessment of current progress against goals:  Symptomatic relief with treatment and Insufficient treatment time to assess progress    Goals:  STG(3 visits)  Patient to tolerate 40 minutes of aqautic PT     LTG (10 visits)1  LEFS to < 40 % impaired  MMT LE to 4/5  Patient to walk for 30 minutes without need to sit secondary to pain/fatigue.  4.   Patient to return to pre morbid " recreational activities.

## 2024-01-17 ENCOUNTER — TREATMENT (OUTPATIENT)
Dept: PHYSICAL THERAPY | Facility: CLINIC | Age: 74
End: 2024-01-17
Payer: MEDICARE

## 2024-01-17 DIAGNOSIS — Z79.01 ANTICOAGULATED: ICD-10-CM

## 2024-01-17 PROCEDURE — 97113 AQUATIC THERAPY/EXERCISES: CPT | Mod: GP,CQ

## 2024-01-17 NOTE — PROGRESS NOTES
"Physical Therapy Treatment    Patient Name: Mukesh Garg  MRN: [id number]  Encounter date:  1/17/2024  Time Calculation  Start Time: 1513  Stop Time: 1559  Time Calculation (min): 46 min     PT Therapeutic Procedures Time Entry  Aquatic Therapy Time Entry: 46    Visit Number:  3 (including evaluation)  Planned total visits: 10    Current Problem  1. Anticoagulated  Follow Up In Physical Therapy          Surgery:   Arterial occlusion right LE 2023  CABG 2013     Precautions:  Cardiac history  Clotting disorder on coumadin  Compromised endurance    Pain 1/10       Subjective: There is always a little bit of pain but primarily pt has 1/10 pain in the L calf.     Objective:   Vitals at start of session HR 57 BPM and SpO2 93-94%   Vitals during session HR 182426 BPM and Spo2 90-95%    Treatment:  Aquatic Therapy:   3 Lap walk: Fwd, Bwd, Side to side    Cues for breathing  Heel raises 2x10 each  Toe raise 2 x 10  3 way hip 1 x10  - abd  -ext  -Flex/SLR  Knee curl 2 x10  MIP 2x10    Deep End:  Hang 2'  Bicycle 1'  Hang 1'  Abd/Add 1'  Hang 2    At stairs:  Gentle hamstrings 2 x20\"      Assessment: Pt had good tolerance with session but did require rest breaks throughout and encouragement for diaphragmatic breathing. Pt did have a decrease in SpO2 and increase in HR following water walking but was able to recovery with cues for breaks and improved breathing (in through nose and out through the mouth).    Pain end of session:  no reported change.    Plan: Cont w/ POC.      Continue with current POC/no changes    Assessment of current progress against goals:  Progressing toward functional goals    Goals:                "

## 2024-01-19 ENCOUNTER — APPOINTMENT (OUTPATIENT)
Dept: CARDIOLOGY | Facility: CLINIC | Age: 74
End: 2024-01-19
Payer: MEDICARE

## 2024-01-19 ENCOUNTER — DOCUMENTATION (OUTPATIENT)
Dept: PRIMARY CARE | Facility: CLINIC | Age: 74
End: 2024-01-19
Payer: COMMERCIAL

## 2024-01-22 ENCOUNTER — ANTICOAGULATION - WARFARIN VISIT (OUTPATIENT)
Dept: CARDIOLOGY | Facility: HOSPITAL | Age: 74
End: 2024-01-22
Payer: MEDICARE

## 2024-01-22 ENCOUNTER — TREATMENT (OUTPATIENT)
Dept: PHYSICAL THERAPY | Facility: CLINIC | Age: 74
End: 2024-01-22
Payer: MEDICARE

## 2024-01-22 DIAGNOSIS — Z79.01 ANTICOAGULATED: Primary | ICD-10-CM

## 2024-01-22 DIAGNOSIS — Z79.01 ANTICOAGULATED: ICD-10-CM

## 2024-01-22 LAB
POC INR: 2.5
POC PROTHROMBIN TIME: NORMAL

## 2024-01-22 PROCEDURE — 99211 OFF/OP EST MAY X REQ PHY/QHP: CPT | Performed by: INTERNAL MEDICINE

## 2024-01-22 PROCEDURE — 97113 AQUATIC THERAPY/EXERCISES: CPT | Mod: GP,CQ

## 2024-01-22 ASSESSMENT — PAIN - FUNCTIONAL ASSESSMENT: PAIN_FUNCTIONAL_ASSESSMENT: 0-10

## 2024-01-22 ASSESSMENT — PAIN SCALES - GENERAL: PAINLEVEL_OUTOF10: 1

## 2024-01-22 NOTE — PROGRESS NOTES
Patient identification verified with 2 identifiers.    Location: [facility] - 1st Floor Anticoagulation Clinic [address] [id number]    Referring Physician: DR Wayne  Enrollment/ Re-enrollment date:    INR Goal: 2.0-3.0  INR monitoring is per [facility] protocol.  Anticoagulation Medication: warfarin  Indication: DVT    Subjective   Bleeding signs/symptoms: No    Bruising: No   Major bleeding event: No  Thrombosis signs/symptoms: No  Thromboembolic event: No  Missed doses: No  Extra doses: No  Medication changes: No  Dietary changes: No  Change in health: No  Change in activity: No  Alcohol: No  Other concerns: No    Upcoming Surgeries:  Does the Patient Have any upcoming surgeries that require interruption in anticoagulation therapy? no  Does the patient require bridging? no      Anticoagulation Summary  As of 2024      INR goal:  2.0-3.0   TTR:  59.0 % (3.3 wk)   INR used for dosin.50 (2024)   Weekly warfarin total:  25 mg               Assessment/Plan   Therapeutic     1. New dose: no change    2. Next INR: 1 month      Education provided to patient during the visit:  Patient instructed to call in interim with questions, concerns and changes.

## 2024-01-22 NOTE — PROGRESS NOTES
"Physical Therapy Treatment    Patient Name: Mukesh Garg  MRN: [id number]  Encounter date:  1/22/2024  Time Calculation  Start Time: 1346          Visit Number:  4 (including evaluation)  Planned total visits: 9  Visit Authorized:  9      Current Problem  1. Anticoagulated  Follow Up In Physical Therapy        Precautions  Precautions  Precautions Comment: None      Pain  Pain Assessment: 0-10  Pain Score: 1  Pain Location: Leg  Pain Orientation: Other (Comment) (B lower leg L>R)    Subjective  General  General Comment: Patient reports that he was wiped out after last session but felt good.  He will see the [facility] tomorrow and woutd like to get permission to drive..         Objective  BLE heaviness when ascending/descending steps with R foot leading.     Treatment:     Aquatic Therapy:   3 Lap walk: Fwd, Bwd, Side to side     Cues for breathing  Heel raises 2x10   Toe raise 2 x 10  3 way hip 1 x12  - abd  -ext  -Flex/SLR  Knee curl 2 x10  MIP 2x10     Deep End:  Hang 2'  Bicycle 1'  Hang 1'  Abd/Add 1'  Hang 2'  Cross ski 1'     At stairs:  Gentle hamstrings 2 x20\"  Billed Treatment Times:  Aquatic Exercise 43 min      Assessment:  PT Assessment  Assessment Comment: Patient was able to perfoor greater reps of shallow end exercises although he continues with endurance idefiencies secondary to cardio and heaviness of BLEs.  Pt's response to treatment:  Good  Areas of improvements:  silight endurance  Limitations/deficits:  Endurance, L leg pain, strength, breathing    Pain end of session:  0    Plan:     Continue with current POC/no changes    Assessment of current progress against goals:  Progressing toward functional goals    Goals:  STG(3 visits)  Patient to tolerate 40 minutes of aqautic PT     LTG (10 visits)1  LEFS to < 40 % impaired  MMT LE to 4/5  Patient to walk for 30 minutes without need to sit secondary to pain/fatigue.  4.   Patient to return to pre morbid recreational activities.                 "

## 2024-01-23 ENCOUNTER — OFFICE VISIT (OUTPATIENT)
Dept: CARDIOLOGY | Facility: HOSPITAL | Age: 74
End: 2024-01-23
Payer: COMMERCIAL

## 2024-01-23 ENCOUNTER — APPOINTMENT (OUTPATIENT)
Dept: RADIOLOGY | Facility: HOSPITAL | Age: 74
DRG: 291 | End: 2024-01-23
Payer: COMMERCIAL

## 2024-01-23 ENCOUNTER — HOSPITAL ENCOUNTER (INPATIENT)
Facility: HOSPITAL | Age: 74
LOS: 5 days | Discharge: HOME | DRG: 291 | End: 2024-01-28
Attending: STUDENT IN AN ORGANIZED HEALTH CARE EDUCATION/TRAINING PROGRAM | Admitting: INTERNAL MEDICINE
Payer: COMMERCIAL

## 2024-01-23 DIAGNOSIS — I50.9 ACUTE DECOMPENSATED HEART FAILURE: Primary | ICD-10-CM

## 2024-01-23 DIAGNOSIS — Z86.718 HISTORY OF DVT OF LOWER EXTREMITY: ICD-10-CM

## 2024-01-23 DIAGNOSIS — I73.9 PERIPHERAL VASCULAR DISEASE, UNSPECIFIED (CMS-HCC): ICD-10-CM

## 2024-01-23 DIAGNOSIS — I48.91 ATRIAL FIBRILLATION WITH RVR (MULTI): ICD-10-CM

## 2024-01-23 DIAGNOSIS — I74.9 ARTERIAL THROMBOEMBOLISM (MULTI): Primary | ICD-10-CM

## 2024-01-23 DIAGNOSIS — I26.99 OTHER ACUTE PULMONARY EMBOLISM, UNSPECIFIED WHETHER ACUTE COR PULMONALE PRESENT (MULTI): ICD-10-CM

## 2024-01-23 DIAGNOSIS — I48.92 ATRIAL FLUTTER, PAROXYSMAL (MULTI): ICD-10-CM

## 2024-01-23 DIAGNOSIS — K58.0 IRRITABLE BOWEL SYNDROME WITH DIARRHEA: ICD-10-CM

## 2024-01-23 PROCEDURE — 1200000002 HC GENERAL ROOM WITH TELEMETRY DAILY

## 2024-01-23 PROCEDURE — G0378 HOSPITAL OBSERVATION PER HR: HCPCS

## 2024-01-23 PROCEDURE — 1157F ADVNC CARE PLAN IN RCRD: CPT | Performed by: INTERNAL MEDICINE

## 2024-01-23 PROCEDURE — 71045 X-RAY EXAM CHEST 1 VIEW: CPT

## 2024-01-23 PROCEDURE — 2500000004 HC RX 250 GENERAL PHARMACY W/ HCPCS (ALT 636 FOR OP/ED): Performed by: STUDENT IN AN ORGANIZED HEALTH CARE EDUCATION/TRAINING PROGRAM

## 2024-01-23 PROCEDURE — 99215 OFFICE O/P EST HI 40 MIN: CPT | Performed by: INTERNAL MEDICINE

## 2024-01-23 PROCEDURE — 36415 COLL VENOUS BLD VENIPUNCTURE: CPT | Performed by: STUDENT IN AN ORGANIZED HEALTH CARE EDUCATION/TRAINING PROGRAM

## 2024-01-23 PROCEDURE — 1159F MED LIST DOCD IN RCRD: CPT | Performed by: INTERNAL MEDICINE

## 2024-01-23 PROCEDURE — 2500000001 HC RX 250 WO HCPCS SELF ADMINISTERED DRUGS (ALT 637 FOR MEDICARE OP): Performed by: STUDENT IN AN ORGANIZED HEALTH CARE EDUCATION/TRAINING PROGRAM

## 2024-01-23 PROCEDURE — 84443 ASSAY THYROID STIM HORMONE: CPT | Performed by: STUDENT IN AN ORGANIZED HEALTH CARE EDUCATION/TRAINING PROGRAM

## 2024-01-23 PROCEDURE — 1036F TOBACCO NON-USER: CPT | Performed by: INTERNAL MEDICINE

## 2024-01-23 PROCEDURE — 1160F RVW MEDS BY RX/DR IN RCRD: CPT | Performed by: INTERNAL MEDICINE

## 2024-01-23 PROCEDURE — 83735 ASSAY OF MAGNESIUM: CPT | Performed by: STUDENT IN AN ORGANIZED HEALTH CARE EDUCATION/TRAINING PROGRAM

## 2024-01-23 PROCEDURE — 85027 COMPLETE CBC AUTOMATED: CPT | Performed by: STUDENT IN AN ORGANIZED HEALTH CARE EDUCATION/TRAINING PROGRAM

## 2024-01-23 PROCEDURE — 1125F AMNT PAIN NOTED PAIN PRSNT: CPT | Performed by: INTERNAL MEDICINE

## 2024-01-23 PROCEDURE — 80048 BASIC METABOLIC PNL TOTAL CA: CPT | Performed by: STUDENT IN AN ORGANIZED HEALTH CARE EDUCATION/TRAINING PROGRAM

## 2024-01-23 PROCEDURE — 84484 ASSAY OF TROPONIN QUANT: CPT | Performed by: STUDENT IN AN ORGANIZED HEALTH CARE EDUCATION/TRAINING PROGRAM

## 2024-01-23 PROCEDURE — 71045 X-RAY EXAM CHEST 1 VIEW: CPT | Performed by: RADIOLOGY

## 2024-01-23 RX ORDER — FUROSEMIDE 10 MG/ML
40 INJECTION INTRAMUSCULAR; INTRAVENOUS ONCE
Status: COMPLETED | OUTPATIENT
Start: 2024-01-23 | End: 2024-01-23

## 2024-01-23 RX ORDER — ALLOPURINOL 100 MG/1
100 TABLET ORAL DAILY
Status: DISCONTINUED | OUTPATIENT
Start: 2024-01-24 | End: 2024-01-28 | Stop reason: HOSPADM

## 2024-01-23 RX ORDER — CARVEDILOL 12.5 MG/1
12.5 TABLET ORAL
Status: DISCONTINUED | OUTPATIENT
Start: 2024-01-23 | End: 2024-01-25

## 2024-01-23 RX ORDER — ACETAMINOPHEN 325 MG/1
650 TABLET ORAL EVERY 6 HOURS PRN
COMMUNITY

## 2024-01-23 RX ORDER — ATORVASTATIN CALCIUM 40 MG/1
40 TABLET, FILM COATED ORAL NIGHTLY
Status: DISCONTINUED | OUTPATIENT
Start: 2024-01-23 | End: 2024-01-28 | Stop reason: HOSPADM

## 2024-01-23 RX ORDER — OXYCODONE HYDROCHLORIDE 5 MG/1
5 CAPSULE ORAL 3 TIMES DAILY PRN
Status: ON HOLD | COMMUNITY
End: 2024-01-24 | Stop reason: WASHOUT

## 2024-01-23 RX ORDER — POLYETHYLENE GLYCOL 3350 17 G/17G
17 POWDER, FOR SOLUTION ORAL DAILY
Status: DISCONTINUED | OUTPATIENT
Start: 2024-01-24 | End: 2024-01-25

## 2024-01-23 RX ORDER — CLOPIDOGREL BISULFATE 75 MG/1
75 TABLET ORAL DAILY
Status: DISCONTINUED | OUTPATIENT
Start: 2024-01-24 | End: 2024-01-28 | Stop reason: HOSPADM

## 2024-01-23 RX ORDER — CALCIUM CARBONATE 200(500)MG
500 TABLET,CHEWABLE ORAL ONCE
Status: COMPLETED | OUTPATIENT
Start: 2024-01-23 | End: 2024-01-23

## 2024-01-23 RX ORDER — WARFARIN SODIUM 5 MG/1
5 TABLET ORAL
Status: DISCONTINUED | OUTPATIENT
Start: 2024-01-24 | End: 2024-01-28 | Stop reason: HOSPADM

## 2024-01-23 RX ORDER — EZETIMIBE 10 MG/1
10 TABLET ORAL DAILY
Status: DISCONTINUED | OUTPATIENT
Start: 2024-01-24 | End: 2024-01-28 | Stop reason: HOSPADM

## 2024-01-23 RX ORDER — WARFARIN SODIUM 5 MG/1
2.5 TABLET ORAL
Status: DISCONTINUED | OUTPATIENT
Start: 2024-01-23 | End: 2024-01-28 | Stop reason: HOSPADM

## 2024-01-23 RX ADMIN — CALCIUM CARBONATE (ANTACID) CHEW TAB 500 MG 500 MG: 500 CHEW TAB at 23:36

## 2024-01-23 RX ADMIN — FUROSEMIDE 40 MG: 10 INJECTION, SOLUTION INTRAVENOUS at 23:36

## 2024-01-23 RX ADMIN — CARVEDILOL 12.5 MG: 12.5 TABLET, FILM COATED ORAL at 23:36

## 2024-01-23 RX ADMIN — WARFARIN SODIUM 2.5 MG: 5 TABLET ORAL at 23:37

## 2024-01-23 RX ADMIN — ATORVASTATIN CALCIUM 40 MG: 40 TABLET, FILM COATED ORAL at 23:36

## 2024-01-23 SDOH — SOCIAL STABILITY: SOCIAL INSECURITY: WERE YOU ABLE TO COMPLETE ALL THE BEHAVIORAL HEALTH SCREENINGS?: YES

## 2024-01-23 SDOH — SOCIAL STABILITY: SOCIAL INSECURITY: ABUSE: ADULT

## 2024-01-23 SDOH — SOCIAL STABILITY: SOCIAL INSECURITY: ARE YOU OR HAVE YOU BEEN THREATENED OR ABUSED PHYSICALLY, EMOTIONALLY, OR SEXUALLY BY ANYONE?: NO

## 2024-01-23 SDOH — SOCIAL STABILITY: SOCIAL INSECURITY: HAVE YOU HAD THOUGHTS OF HARMING ANYONE ELSE?: NO

## 2024-01-23 SDOH — SOCIAL STABILITY: SOCIAL INSECURITY: ARE THERE ANY APPARENT SIGNS OF INJURIES/BEHAVIORS THAT COULD BE RELATED TO ABUSE/NEGLECT?: NO

## 2024-01-23 SDOH — SOCIAL STABILITY: SOCIAL INSECURITY: DOES ANYONE TRY TO KEEP YOU FROM HAVING/CONTACTING OTHER FRIENDS OR DOING THINGS OUTSIDE YOUR HOME?: NO

## 2024-01-23 SDOH — SOCIAL STABILITY: SOCIAL INSECURITY: DO YOU FEEL UNSAFE GOING BACK TO THE PLACE WHERE YOU ARE LIVING?: NO

## 2024-01-23 SDOH — SOCIAL STABILITY: SOCIAL INSECURITY: HAS ANYONE EVER THREATENED TO HURT YOUR FAMILY OR YOUR PETS?: NO

## 2024-01-23 SDOH — SOCIAL STABILITY: SOCIAL INSECURITY: DO YOU FEEL ANYONE HAS EXPLOITED OR TAKEN ADVANTAGE OF YOU FINANCIALLY OR OF YOUR PERSONAL PROPERTY?: NO

## 2024-01-23 ASSESSMENT — COGNITIVE AND FUNCTIONAL STATUS - GENERAL
PATIENT BASELINE BEDBOUND: NO
MOBILITY SCORE: 24
DAILY ACTIVITIY SCORE: 24

## 2024-01-23 ASSESSMENT — LIFESTYLE VARIABLES
HOW OFTEN DO YOU HAVE A DRINK CONTAINING ALCOHOL: MONTHLY OR LESS
SUBSTANCE_ABUSE_PAST_12_MONTHS: NO
HOW MANY STANDARD DRINKS CONTAINING ALCOHOL DO YOU HAVE ON A TYPICAL DAY: 1 OR 2
PRESCIPTION_ABUSE_PAST_12_MONTHS: NO
SKIP TO QUESTIONS 9-10: 1
AUDIT-C TOTAL SCORE: 1
AUDIT-C TOTAL SCORE: 1
HOW OFTEN DO YOU HAVE 6 OR MORE DRINKS ON ONE OCCASION: NEVER

## 2024-01-23 ASSESSMENT — PATIENT HEALTH QUESTIONNAIRE - PHQ9
1. LITTLE INTEREST OR PLEASURE IN DOING THINGS: NOT AT ALL
SUM OF ALL RESPONSES TO PHQ9 QUESTIONS 1 AND 2: 0
1. LITTLE INTEREST OR PLEASURE IN DOING THINGS: NOT AT ALL
2. FEELING DOWN, DEPRESSED OR HOPELESS: NOT AT ALL
SUM OF ALL RESPONSES TO PHQ9 QUESTIONS 1 & 2: 0
2. FEELING DOWN, DEPRESSED OR HOPELESS: NOT AT ALL

## 2024-01-23 ASSESSMENT — COLUMBIA-SUICIDE SEVERITY RATING SCALE - C-SSRS
6. HAVE YOU EVER DONE ANYTHING, STARTED TO DO ANYTHING, OR PREPARED TO DO ANYTHING TO END YOUR LIFE?: NO
2. HAVE YOU ACTUALLY HAD ANY THOUGHTS OF KILLING YOURSELF?: NO
6. HAVE YOU EVER DONE ANYTHING, STARTED TO DO ANYTHING, OR PREPARED TO DO ANYTHING TO END YOUR LIFE?: NO
1. IN THE PAST MONTH, HAVE YOU WISHED YOU WERE DEAD OR WISHED YOU COULD GO TO SLEEP AND NOT WAKE UP?: NO
1. IN THE PAST MONTH, HAVE YOU WISHED YOU WERE DEAD OR WISHED YOU COULD GO TO SLEEP AND NOT WAKE UP?: NO
2. HAVE YOU ACTUALLY HAD ANY THOUGHTS OF KILLING YOURSELF?: NO

## 2024-01-23 ASSESSMENT — ENCOUNTER SYMPTOMS
WHEEZING: 0
PALPITATIONS: 0
BRUISES/BLEEDS EASILY: 0
ABDOMINAL PAIN: 0
NAUSEA: 0
ORTHOPNEA: 1
DEPRESSION: 0
SPUTUM PRODUCTION: 0
DOUBLE VISION: 0
CHILLS: 0
SYNCOPE: 0
HEADACHES: 0
FEVER: 0
SORE THROAT: 0
FOCAL WEAKNESS: 0
HEMATURIA: 0
DIZZINESS: 0
SHORTNESS OF BREATH: 0
DYSURIA: 0
COUGH: 0
ALTERED MENTAL STATUS: 0
BLOATING: 1
BLURRED VISION: 0
VOMITING: 0
DYSPNEA ON EXERTION: 1
SEIZURES: 0
IRREGULAR HEARTBEAT: 0

## 2024-01-23 ASSESSMENT — PAIN SCALES - GENERAL: PAINLEVEL_OUTOF10: 1

## 2024-01-23 ASSESSMENT — ACTIVITIES OF DAILY LIVING (ADL)
WALKS IN HOME: INDEPENDENT
JUDGMENT_ADEQUATE_SAFELY_COMPLETE_DAILY_ACTIVITIES: YES
ADEQUATE_TO_COMPLETE_ADL: YES
PATIENT'S MEMORY ADEQUATE TO SAFELY COMPLETE DAILY ACTIVITIES?: YES
ASSISTIVE_DEVICE: CANE;WALKER
BATHING: INDEPENDENT
DRESSING YOURSELF: NEEDS ASSISTANCE
HEARING - LEFT EAR: FUNCTIONAL
GROOMING: INDEPENDENT
FEEDING YOURSELF: INDEPENDENT
LACK_OF_TRANSPORTATION: NO
HEARING - RIGHT EAR: FUNCTIONAL
TOILETING: INDEPENDENT

## 2024-01-23 NOTE — PROGRESS NOTES
OUTPATIENT CONSULTATION -  VASCULAR MEDICINE    DOS: 01/23/2024    REQUESTING PHYSICIAN:  [facility] Lorenza Garcia MD PCP    REASON FOR CONSULT:  here for follow up after hospital admission for ALI    HISTORY OF PRESENT ILLNESS:     73 yo man here for follow up after hospital admission for ALI. Admitted to [facility] 11/15-11/27/2023. Presented on transfer from [facility] after presenting with severe leg pain. Reports toward the end of the hospital stay in October his left foot started to hurt. Saw a podiatrist approx 5 days prior to admission - told to have neuropathy at that time. Re-preseted to [facility] with progressive LLE sx abd dx with acute/subacute limb ischemia and sent to [facility]. CTA = popliteal occlusion During evaluation also found to have PE - but this was essentially asymptomatic. Rx heparin gtt initially. S/P left iliofemoral, fem-pop and tibioperoneal thrombectomy 11/14 ([facility]). He was DC to acute rehab - there x 10 days. Now doing home PT and doing aquatic therapy at the [facility] center.     LA returned as indeterminate. But given the new? Thromboembolism on the eliquis - rx UFH gtt (lovenox) to warfarin and remains on this now. INR is followed by the [facility] coumadin clinic. Reports INR was 2.5 yesterday. No significant bleeding on the AC. Did have some hemorrhoidal bleeding - this was managed with suppositories.      Hx from October 2023 - presented with cough and SOB. Found to be in afib. Also found to have RLE Dvt. Reports there was no imaging for PE. Rx eliquis 5 mg q 12 hours at that time.      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 1980's    ECHO 10/23:  CONCLUSIONS:   1. Left ventricular systolic function is normal with a 65-70% estimated ejection fraction.   2. The left atrium is mild to moderately dilated.   3. Trace to mild mitral valve regurgitation.   4. Trace to mild tricuspid regurgitation.    CTA:  MACRO:  Occlusion of the left popliteal artery. There is reconstitution  of  flow to the left tibialis anterior, tibialis posterior and peroneal  vessels. There is however very tenuous flow to the left tibialis  anterior and peroneal arteries at the level of the left ankle,  extending distally.      IMPRESSION:  Atherosclerotic disease of the left common iliac, femoral and  popliteal arteries. There is an intraluminal filling defect involving  the popliteal artery, which eventually causes popliteal artery  occlusion. There are no significant collateral vessels identified.  There is however reconstitution of flow to the tibialis posterior,  anterior and peroneal arteries, which demonstrate enhancement. There  is a however very tenuous flow to the tibialis anterior and peroneal  arteries at the level of the ankle, extending distally.    CT PE:  PULMONARY ARTERIES: Adequate opacification to the level of the  subsegmental arteries. Filling defect within the distal main  pulmonary artery extending into the right middle lobar artery  interlobar artery and lower lobar arteries. Additional nonocclusive  filling defects are noted within the segmental and subsegmental  arteries of the left lower lobe.    ECHO 11/2023:  CONCLUSIONS:   1. Left ventricular systolic function is normal with a 65-70% estimated ejection fraction.   2. The patient is in atrial fibrillation which may influence the estimate of left ventricular function and transvalvular flows.   3. The RV appears upper limits of normal size vs mildly dilated with moderate to severely reduced fx with a McConnels sign consistent iwth pulmonary embolus.   4. Color Doppler flow in the region of the atrial septum suggestive of possible small left to right shunt. Agitated saline contrast study was technically difficult but may have demonstrated some bubbles within the LV ( unclear) and exact timing of their appearance is unclear.   5. Mildly elevated RVSP.   6. Mild aortic valve regurgitation.   7. Findings discussed with the ER team at the time  "of reporting.   8. Compared with the images from the prior TTE done 10/25/2023 there was already RV systolic dysfunction with possible McConnels sign at that time.     LA - indeterminate during this admission  B2GP1 and MARY both WNL      PMH/PSH:    COVID October 2023  DVT 10/2023 related to COVID  DVT 1980's related to a flight  Afib 10/2023  Afib  HTN  CABG x3 and MAZE 2013  CAD  JENNY on CPAP gout    FAMILY HISTORY:     No VTE  No CTD  No AAA    SOCIAL HISTORY:     Tobacco never  Employment ret /    REVIEW OF SYSTEMS:     No fevers, chills,  weight is stable  No HA, SZ, syncope, stroke, TIA - ?prev neurologic events  No CP, +chest pressure - thinks this is \"gas\"  No cough, SOB  +BAUMANN  No ABD pain  + BRBPR,  No melena, hematuria  No bleeding  + edema - right longstanding ; left since the surgery, no calf pain  Sleeping in the chair about 1/2 the time  No ambulatory leg pain  No paraesthesias  UTD colonoscopy    PHYSICAL EXAMINATION:   There were no vitals taken for this visit.    Gen: Appears well, NAD  HEENT: WNL  JVP to the earlobes  Chest: CTA  CVS: irregular without murmur or gallop  Abd: soft, NT/ND  Ext: 2-3+ edema to the proximal thighs R>L, +tender  Skin: tight with chronic skin changes right  Pulses: WWP  Neuro: grossly normal, CN intact, AUNDREA x 4  Mood and affect appropriate    ADDITIONAL DATA:   No compression worn. Calf measurements; R- 45.0 CM L- 50.5 CM.    ASSESSMENT/PLAN:  here for follow up after hospital admission for ALI, DVT, PE in the setting of COVID October with hx as noted. On warfarin now - tolerating well. Had indeterminate LA on testing. Prev on eliquis as noted.     Currently severely volume overloaded with likely acute HF - can not exclude other etiologies. Reports base line weight ?243 pre-covid and ALI admission. Discussed admission for diuresis and HF management.     Follow up here 4 months.   "

## 2024-01-24 ENCOUNTER — APPOINTMENT (OUTPATIENT)
Dept: CARDIOLOGY | Facility: HOSPITAL | Age: 74
DRG: 291 | End: 2024-01-24
Payer: COMMERCIAL

## 2024-01-24 ENCOUNTER — APPOINTMENT (OUTPATIENT)
Dept: PHYSICAL THERAPY | Facility: CLINIC | Age: 74
End: 2024-01-24
Payer: MEDICARE

## 2024-01-24 ENCOUNTER — TELEPHONE (OUTPATIENT)
Dept: PRIMARY CARE | Facility: CLINIC | Age: 74
End: 2024-01-24
Payer: COMMERCIAL

## 2024-01-24 LAB
ANION GAP SERPL CALC-SCNC: 10 MMOL/L (ref 10–20)
ANION GAP SERPL CALC-SCNC: 15 MMOL/L (ref 10–20)
AORTIC VALVE PEAK VELOCITY: 1.25 M/S
APTT PPP: 24 SECONDS (ref 27–38)
AV PEAK GRADIENT: 6.2 MMHG
AVA (PEAK VEL): 1.72 CM2
BNP SERPL-MCNC: 512 PG/ML (ref 0–99)
BUN SERPL-MCNC: 17 MG/DL (ref 6–23)
BUN SERPL-MCNC: 17 MG/DL (ref 6–23)
CALCIUM SERPL-MCNC: 8.8 MG/DL (ref 8.6–10.6)
CALCIUM SERPL-MCNC: 9.5 MG/DL (ref 8.6–10.6)
CARDIAC TROPONIN I PNL SERPL HS: 49 NG/L (ref 0–53)
CHLORIDE SERPL-SCNC: 106 MMOL/L (ref 98–107)
CHLORIDE SERPL-SCNC: 109 MMOL/L (ref 98–107)
CO2 SERPL-SCNC: 27 MMOL/L (ref 21–32)
CO2 SERPL-SCNC: 27 MMOL/L (ref 21–32)
CREAT SERPL-MCNC: 1.17 MG/DL (ref 0.5–1.3)
CREAT SERPL-MCNC: 1.19 MG/DL (ref 0.5–1.3)
EGFRCR SERPLBLD CKD-EPI 2021: 63 ML/MIN/1.73M*2
EGFRCR SERPLBLD CKD-EPI 2021: 65 ML/MIN/1.73M*2
EJECTION FRACTION APICAL 4 CHAMBER: 54.6
EJECTION FRACTION: 56 %
ERYTHROCYTE [DISTWIDTH] IN BLOOD BY AUTOMATED COUNT: 14.6 % (ref 11.5–14.5)
ERYTHROCYTE [DISTWIDTH] IN BLOOD BY AUTOMATED COUNT: 14.6 % (ref 11.5–14.5)
GLUCOSE BLD MANUAL STRIP-MCNC: 105 MG/DL (ref 74–99)
GLUCOSE BLD MANUAL STRIP-MCNC: 162 MG/DL (ref 74–99)
GLUCOSE BLD MANUAL STRIP-MCNC: 171 MG/DL (ref 74–99)
GLUCOSE SERPL-MCNC: 106 MG/DL (ref 74–99)
GLUCOSE SERPL-MCNC: 113 MG/DL (ref 74–99)
HCT VFR BLD AUTO: 38.6 % (ref 41–52)
HCT VFR BLD AUTO: 40.4 % (ref 41–52)
HGB BLD-MCNC: 11.8 G/DL (ref 13.5–17.5)
HGB BLD-MCNC: 12.8 G/DL (ref 13.5–17.5)
INR PPP: 2.2 (ref 0.9–1.1)
INR PPP: 2.2 (ref 0.9–1.1)
LEFT ATRIUM VOLUME AREA LENGTH INDEX BSA: 37.2 ML/M2
LEFT VENTRICLE INTERNAL DIMENSION DIASTOLE: 4.66 CM (ref 3.5–6)
LEFT VENTRICULAR OUTFLOW TRACT DIAMETER: 1.97 CM
MAGNESIUM SERPL-MCNC: 1.88 MG/DL (ref 1.6–2.4)
MAGNESIUM SERPL-MCNC: 2.05 MG/DL (ref 1.6–2.4)
MCH RBC QN AUTO: 28.3 PG (ref 26–34)
MCH RBC QN AUTO: 29.4 PG (ref 26–34)
MCHC RBC AUTO-ENTMCNC: 30.6 G/DL (ref 32–36)
MCHC RBC AUTO-ENTMCNC: 31.7 G/DL (ref 32–36)
MCV RBC AUTO: 93 FL (ref 80–100)
MCV RBC AUTO: 93 FL (ref 80–100)
MITRAL VALVE E/A RATIO: 2.55
MITRAL VALVE E/E' RATIO: 15.7
NRBC BLD-RTO: 0 /100 WBCS (ref 0–0)
NRBC BLD-RTO: 0 /100 WBCS (ref 0–0)
PLATELET # BLD AUTO: 184 X10*3/UL (ref 150–450)
PLATELET # BLD AUTO: 196 X10*3/UL (ref 150–450)
POTASSIUM SERPL-SCNC: 3.6 MMOL/L (ref 3.5–5.3)
POTASSIUM SERPL-SCNC: 3.6 MMOL/L (ref 3.5–5.3)
PROTHROMBIN TIME: 25.2 SECONDS (ref 9.8–12.8)
PROTHROMBIN TIME: 25.2 SECONDS (ref 9.8–12.8)
RBC # BLD AUTO: 4.17 X10*6/UL (ref 4.5–5.9)
RBC # BLD AUTO: 4.36 X10*6/UL (ref 4.5–5.9)
RIGHT VENTRICLE FREE WALL PEAK S': 5 CM/S
RIGHT VENTRICLE PEAK SYSTOLIC PRESSURE: 37.2 MMHG
SODIUM SERPL-SCNC: 142 MMOL/L (ref 136–145)
SODIUM SERPL-SCNC: 144 MMOL/L (ref 136–145)
TRICUSPID ANNULAR PLANE SYSTOLIC EXCURSION: 1.4 CM
TSH SERPL-ACNC: 1.02 MIU/L (ref 0.44–3.98)
WBC # BLD AUTO: 6.6 X10*3/UL (ref 4.4–11.3)
WBC # BLD AUTO: 7 X10*3/UL (ref 4.4–11.3)

## 2024-01-24 PROCEDURE — 2500000004 HC RX 250 GENERAL PHARMACY W/ HCPCS (ALT 636 FOR OP/ED): Performed by: STUDENT IN AN ORGANIZED HEALTH CARE EDUCATION/TRAINING PROGRAM

## 2024-01-24 PROCEDURE — 93306 TTE W/DOPPLER COMPLETE: CPT

## 2024-01-24 PROCEDURE — 83880 ASSAY OF NATRIURETIC PEPTIDE: CPT | Performed by: STUDENT IN AN ORGANIZED HEALTH CARE EDUCATION/TRAINING PROGRAM

## 2024-01-24 PROCEDURE — 85610 PROTHROMBIN TIME: CPT | Performed by: STUDENT IN AN ORGANIZED HEALTH CARE EDUCATION/TRAINING PROGRAM

## 2024-01-24 PROCEDURE — G0378 HOSPITAL OBSERVATION PER HR: HCPCS

## 2024-01-24 PROCEDURE — 2500000001 HC RX 250 WO HCPCS SELF ADMINISTERED DRUGS (ALT 637 FOR MEDICARE OP): Performed by: STUDENT IN AN ORGANIZED HEALTH CARE EDUCATION/TRAINING PROGRAM

## 2024-01-24 PROCEDURE — 80048 BASIC METABOLIC PNL TOTAL CA: CPT | Performed by: STUDENT IN AN ORGANIZED HEALTH CARE EDUCATION/TRAINING PROGRAM

## 2024-01-24 PROCEDURE — 82947 ASSAY GLUCOSE BLOOD QUANT: CPT

## 2024-01-24 PROCEDURE — 99222 1ST HOSP IP/OBS MODERATE 55: CPT | Performed by: STUDENT IN AN ORGANIZED HEALTH CARE EDUCATION/TRAINING PROGRAM

## 2024-01-24 PROCEDURE — 93306 TTE W/DOPPLER COMPLETE: CPT | Performed by: INTERNAL MEDICINE

## 2024-01-24 PROCEDURE — 1200000002 HC GENERAL ROOM WITH TELEMETRY DAILY

## 2024-01-24 PROCEDURE — 85027 COMPLETE CBC AUTOMATED: CPT | Performed by: STUDENT IN AN ORGANIZED HEALTH CARE EDUCATION/TRAINING PROGRAM

## 2024-01-24 PROCEDURE — 2500000002 HC RX 250 W HCPCS SELF ADMINISTERED DRUGS (ALT 637 FOR MEDICARE OP, ALT 636 FOR OP/ED): Performed by: STUDENT IN AN ORGANIZED HEALTH CARE EDUCATION/TRAINING PROGRAM

## 2024-01-24 PROCEDURE — 2500000004 HC RX 250 GENERAL PHARMACY W/ HCPCS (ALT 636 FOR OP/ED): Performed by: NURSE PRACTITIONER

## 2024-01-24 PROCEDURE — 83735 ASSAY OF MAGNESIUM: CPT | Performed by: STUDENT IN AN ORGANIZED HEALTH CARE EDUCATION/TRAINING PROGRAM

## 2024-01-24 PROCEDURE — 85610 PROTHROMBIN TIME: CPT | Performed by: NURSE PRACTITIONER

## 2024-01-24 PROCEDURE — 36415 COLL VENOUS BLD VENIPUNCTURE: CPT | Performed by: STUDENT IN AN ORGANIZED HEALTH CARE EDUCATION/TRAINING PROGRAM

## 2024-01-24 RX ORDER — DEXTROSE MONOHYDRATE 100 MG/ML
0.3 INJECTION, SOLUTION INTRAVENOUS ONCE AS NEEDED
Status: DISCONTINUED | OUTPATIENT
Start: 2024-01-24 | End: 2024-01-28 | Stop reason: HOSPADM

## 2024-01-24 RX ORDER — SPIRONOLACTONE 25 MG/1
25 TABLET ORAL DAILY
Status: DISCONTINUED | OUTPATIENT
Start: 2024-01-24 | End: 2024-01-26

## 2024-01-24 RX ORDER — POTASSIUM CHLORIDE 20 MEQ/1
40 TABLET, EXTENDED RELEASE ORAL ONCE
Status: COMPLETED | OUTPATIENT
Start: 2024-01-24 | End: 2024-01-24

## 2024-01-24 RX ORDER — CLOTRIMAZOLE 1 %
CREAM (GRAM) TOPICAL DAILY
Status: ON HOLD | COMMUNITY
End: 2024-01-24 | Stop reason: ALTCHOICE

## 2024-01-24 RX ORDER — INSULIN LISPRO 100 [IU]/ML
0-5 INJECTION, SOLUTION INTRAVENOUS; SUBCUTANEOUS
Status: DISCONTINUED | OUTPATIENT
Start: 2024-01-24 | End: 2024-01-28 | Stop reason: HOSPADM

## 2024-01-24 RX ORDER — DEXTROSE 50 % IN WATER (D50W) INTRAVENOUS SYRINGE
25
Status: DISCONTINUED | OUTPATIENT
Start: 2024-01-24 | End: 2024-01-28 | Stop reason: HOSPADM

## 2024-01-24 RX ORDER — DICYCLOMINE HYDROCHLORIDE 10 MG/1
20 CAPSULE ORAL DAILY PRN
Status: DISCONTINUED | OUTPATIENT
Start: 2024-01-24 | End: 2024-01-28 | Stop reason: HOSPADM

## 2024-01-24 RX ORDER — FAMOTIDINE 20 MG/1
20 TABLET, FILM COATED ORAL 2 TIMES DAILY
COMMUNITY

## 2024-01-24 RX ORDER — FUROSEMIDE 10 MG/ML
40 INJECTION INTRAMUSCULAR; INTRAVENOUS ONCE
Status: COMPLETED | OUTPATIENT
Start: 2024-01-24 | End: 2024-01-24

## 2024-01-24 RX ORDER — MAGNESIUM SULFATE 1 G/100ML
1 INJECTION INTRAVENOUS ONCE
Status: COMPLETED | OUTPATIENT
Start: 2024-01-24 | End: 2024-01-24

## 2024-01-24 RX ORDER — LISINOPRIL 40 MG/1
40 TABLET ORAL DAILY
COMMUNITY
End: 2024-01-28 | Stop reason: HOSPADM

## 2024-01-24 RX ADMIN — EZETIMIBE 10 MG: 10 TABLET ORAL at 08:15

## 2024-01-24 RX ADMIN — ATORVASTATIN CALCIUM 40 MG: 40 TABLET, FILM COATED ORAL at 19:59

## 2024-01-24 RX ADMIN — CLOPIDOGREL BISULFATE 75 MG: 75 TABLET ORAL at 08:15

## 2024-01-24 RX ADMIN — WARFARIN SODIUM 5 MG: 5 TABLET ORAL at 17:38

## 2024-01-24 RX ADMIN — ALLOPURINOL 100 MG: 100 TABLET ORAL at 08:15

## 2024-01-24 RX ADMIN — POLYETHYLENE GLYCOL 3350 17 G: 17 POWDER, FOR SOLUTION ORAL at 08:15

## 2024-01-24 RX ADMIN — CARVEDILOL 12.5 MG: 12.5 TABLET, FILM COATED ORAL at 17:38

## 2024-01-24 RX ADMIN — POTASSIUM CHLORIDE 40 MEQ: 1500 TABLET, EXTENDED RELEASE ORAL at 01:24

## 2024-01-24 RX ADMIN — FUROSEMIDE 40 MG: 10 INJECTION, SOLUTION INTRAVENOUS at 10:41

## 2024-01-24 RX ADMIN — MAGNESIUM SULFATE IN DEXTROSE 1 G: 10 INJECTION, SOLUTION INTRAVENOUS at 02:24

## 2024-01-24 RX ADMIN — SPIRONOLACTONE 25 MG: 25 TABLET, FILM COATED ORAL at 15:15

## 2024-01-24 RX ADMIN — PERFLUTREN 2 ML OF DILUTION: 6.52 INJECTION, SUSPENSION INTRAVENOUS at 16:20

## 2024-01-24 RX ADMIN — INSULIN LISPRO 1 UNITS: 100 INJECTION, SOLUTION INTRAVENOUS; SUBCUTANEOUS at 13:17

## 2024-01-24 RX ADMIN — CARVEDILOL 12.5 MG: 12.5 TABLET, FILM COATED ORAL at 08:15

## 2024-01-24 ASSESSMENT — COGNITIVE AND FUNCTIONAL STATUS - GENERAL
MOBILITY SCORE: 24
DAILY ACTIVITIY SCORE: 24

## 2024-01-24 NOTE — PROGRESS NOTES
Transitional Care Coordinator   Met with patient and introduced myself as Care Coordinator and member of the discharge planning team.  Patient was admitted from vascular appointment due to concern for HF . Patient plans to return home with his wife at time of discharge. She will provide transportation. He was independent in ADL's prior to hospitalization. He uses a walker. Patient was receiving home care services but is now using outpatient physical therapy that he would like to continue. Pt uses [facility] [address] in [place] on the [place]. Dr. Hager is his PCP. His last visit was 3 weeks ago. No home care needs were identified at this time. Will continue to follow for home going needs. Megan Bautista RN

## 2024-01-24 NOTE — ED NOTES
Pharmacy Medication History Review    Mukesh Garg is a 74 y.o. male admitted for Acute decompensated heart failure (CMS/[facility]). Pharmacy reviewed the patient's [id number] medications and allergies for accuracy.    The list below reflects the updated PTA list. Comments regarding how patient may be taking medications differently can be found in the Admit Orders Activity    Prior to Admission Medications   Prescriptions Last Dose Informant Patient Reported?   acetaminophen (Tylenol) 325 mg tablet Past Week Self, Other Yes   Sig: Take 2 tablets (650 mg) by mouth every 6 hours if needed for mild pain (1 - 3).   allopurinol (Zyloprim) 100 mg tablet 1/23/2024 Self, Other No   Sig: TAKE 1 TABLET BY MOUTH EVERY DAY   ammonium lactate (Lac-Hydrin) 12 % lotion Past Week Self, Other Yes   Sig: Apply topically every 12 hours.   atorvastatin (Lipitor) 40 mg tablet 1/22/2024 Self, Other No   Sig: TAKE 1 TABLET BY MOUTH EVERY DAY FOR 90 DAYS   carvedilol (Coreg) 12.5 mg tablet 1/23/2024 Self, Other Yes   Sig: TAKE 1 TABLET BY MOUTH TWICE A DAY WITH FOOD for 90   clopidogrel (Plavix) 75 mg tablet 1/23/2024 Self, Other No   Sig: Take 1 tablet (75 mg) by mouth once daily.   dicyclomine (Bentyl) 20 mg tablet 1/23/2024 Self, Other Yes   Sig: Take 1 tablet (20 mg) by mouth once daily.   ezetimibe (Zetia) 10 mg tablet 1/23/2024 Self, Other Yes   Sig: Take 1 tablet (10 mg) by mouth once daily.   famotidine (Pepcid) 20 mg tablet Past Week Self, Other Yes   Sig: Take 1 tablet (20 mg) by mouth once daily.   furosemide (Lasix) 40 mg tablet Past Week Self, Other No   Sig: Take 1 tablet (40 mg) by mouth once daily.   Patient taking differently: Take 1 tablet (40 mg) by mouth if needed (Due to complications with frequent urination).   lisinopril 40 mg tablet Past Week Self, Other Yes   Sig: Take 1 tablet (40 mg) by mouth once daily.   oxyCODONE (Oxy-IR) 5 mg immediate release capsule Past Month Self, Other Yes   Sig: Take 1 capsule (5 mg)  by mouth 3 times a day as needed for severe pain (7 - 10).   warfarin (Coumadin) 5 mg tablet 1/22/2024 Self, Other No   Sig: Take 1 tablet (5 mg) by mouth once daily in the evening. 5mg M, W, F -2.5 mg all other days   Patient taking differently: Take 1 tablet (5 mg) by mouth. Take 5 mg by mouth on M, W, F  and 2.5 mg all other days      Facility-Administered Medications: None        The list below reflects the updated allergy list. Please review each documented allergy for additional clarification and justification.  Allergies  Reviewed by Niko Jaffe RN on 1/23/2024        Severity Reactions Comments    Pentazocine Medium Dizziness, Itching     Talwin Compound Medium Itching, Dizziness             Patient accepts M2B at discharge. Pharmacy has been updated to [facility] pharmacy.    Sources used to complete the med history include medication dispense reports, care everywhere, cardiologist office, and patient confirmation.    Patient was a moderate historian of home medications but was unable to verify all blood pressure agents due to recent hospitalizations. The patient's cardiologist office was used as a resource to confirm current active medications. Changes made to the patient's medication profile includes the dose administration of furosemide from once daily to prn due to experienced urinary complications.    Nia Peoples  PGY1 Pharmacy Resident    [facility]  Please reach out via [employer] Secure Chat for questions, or if no response call [phone] or [place] “MedRec”

## 2024-01-24 NOTE — CARE PLAN
The patient's goals for the shift include rest    The clinical goals for the shift include rest    Problem: Pain - Adult  Goal: Verbalizes/displays adequate comfort level or baseline comfort level  Outcome: Progressing     Problem: Safety - Adult  Goal: Free from fall injury  Outcome: Progressing     Problem: Discharge Planning  Goal: Discharge to home or other facility with appropriate resources  Outcome: Progressing     Problem: Chronic Conditions and Co-morbidities  Goal: Patient's chronic conditions and co-morbidity symptoms are monitored and maintained or improved  Outcome: Progressing     Problem: Fall/Injury  Goal: Not fall by end of shift  Outcome: Progressing  Goal: Be free from injury by end of the shift  Outcome: Progressing  Goal: Verbalize understanding of personal risk factors for fall in the hospital  Outcome: Progressing  Goal: Verbalize understanding of risk factor reduction measures to prevent injury from fall in the home  Outcome: Progressing  Goal: Use assistive devices by end of the shift  Outcome: Progressing  Goal: Pace activities to prevent fatigue by end of the shift  Outcome: Progressing

## 2024-01-24 NOTE — TELEPHONE ENCOUNTER
Received EPIC notification re ADMISSION yesterday to [facility] for HF issues etc.  Tried to contact patient and or spouse to offer HOSP FU visit once DC home and express my concern for him.    LEFT VM for him.  Please do attempt to reach to assist them to set up HOSP FU visit after his DC HOME from [facility].

## 2024-01-24 NOTE — PROGRESS NOTES
Subjective Data:  Patient states he has lost 15 pounds so far with diuresis, still volume up     Overnight Events:    None      Objective Data:  Last Recorded Vitals:  Vitals:    01/24/24 0202 01/24/24 0552 01/24/24 0908 01/24/24 1325   BP: 152/83 (!) 163/100 150/82 152/88   BP Location: Left arm Left arm Left arm Left arm   Patient Position: Lying Lying Sitting Sitting   Pulse: 85 74 89 77   Resp: 18 16 17 16   Temp: 36 °C (96.8 °F) 36.2 °C (97.2 °F) 36.7 °C (98.1 °F) 36.4 °C (97.5 °F)   TempSrc: Temporal Temporal Temporal Temporal   SpO2: 92% 97% 96% 96%   Weight:  110 kg (243 lb 3.2 oz)     Height:           Last Labs:  CBC - 1/24/2024:  7:16 AM  7.0 12.8 196    40.4      CMP - 1/24/2024:  7:16 AM  9.5 5.9 11 --- 0.7   3.4 3.5 7 63      PTT - 1/24/2024:  7:16 AM  2.2; 2.2   25.2; 25.2 24     TROPHS   Date/Time Value Ref Range Status   01/23/2024 11:50 PM 49 0 - 53 ng/L Final   11/15/2023 02:30 AM 21 0 - 53 ng/L Final     BNP   Date/Time Value Ref Range Status   01/24/2024 07:16  0 - 99 pg/mL Final   11/15/2023 02:30 AM 88 0 - 99 pg/mL Final     HGBA1C   Date/Time Value Ref Range Status   11/21/2023 05:23 AM 6.2 see below % Final   07/15/2023 11:54 AM 6.7 4.0 - 6.0 % Final     Comment:     Hemoglobin A1C levels are related to mean blood glucose during the   preceding 2-3 months. The relationship table below may be used as a   general guide. Each 1% increase in HGB A1C is a reflection of an   increase in mean glucose of approximately 30 mg/dl.   Reference: Diabetes Care, volume 29, supplement 1 Jan. 2006                        HGB A1C ................. Approx. Mean Glucose   _______________________________________________   6%   ...............................  120 mg/dl   7%   ...............................  150 mg/dl   8%   ...............................  180 mg/dl   9%   ...............................  210 mg/dl   10%  ...............................  240 mg/dl  Performed at [place] [address] [address]  [facility] [address]     10/31/2020 05:14 AM 5.6 4.0 - 6.0 % Final     Comment:     Hemoglobin A1C levels are related to mean blood glucose during the   preceding 2-3 months. The relationship table below may be used as a   general guide. Each 1% increase in HGB A1C is a reflection of an   increase in mean glucose of approximately 30 mg/dl.   Reference: Diabetes Care, volume 29, supplement 1 Jan. 2006                        HGB A1C ................. Approx. Mean Glucose   _______________________________________________   6%   ...............................  120 mg/dl   7%   ...............................  150 mg/dl   8%   ...............................  180 mg/dl   9%   ...............................  210 mg/dl   10%  ...............................  240 mg/dl  Performed at [address] [place] [facility] [address]       LDLCALC   Date/Time Value Ref Range Status   12/06/2023 06:05 AM 38 <=99 mg/dL Final     Comment:                                 Near   Borderline      AGE      Desirable  Optimal    High     High     Very High     0-19 Y     0 - 109     ---    110-129   >/= 130     ----    20-24 Y     0 - 119     ---    120-159   >/= 160     ----      >24 Y     0 -  99   100-129  130-159   160-189     >/=190     07/15/2023 11:54 AM 62 65 - 130 MG/DL Final   12/12/2022 12:08 PM 65 65 - 130 MG/DL Final   04/29/2022 03:35 PM 99 65 - 130 MG/DL Final     VLDL   Date/Time Value Ref Range Status   12/06/2023 06:05 AM 20 0 - 40 mg/dL Final      Last I/O:  I/O last 3 completed shifts:  In: - (0 mL/kg)   Out: 3750 (34 mL/kg) [Urine:3750 (0.9 mL/kg/hr)]  Weight: 110.3 kg     Past Cardiology Tests (Last 3 Years):  EKG:  ECG 12 Lead 11/13/2023      ECG 12 lead 10/25/2023    Echo:  Transthoracic Echo (TTE) Complete 11/15/2023      Transthoracic Echo (TTE) Complete 10/25/2023    Ejection Fractions:  EF   Date/Time Value Ref Range Status   11/15/2023 02:17 PM 67       Cath:  Cardiac catheterization - non-coronary  11/21/2023    Stress Test:  No results found for this or any previous visit from the past 1095 days.    Cardiac Imaging:  No results found for this or any previous visit from the past 1095 days.      Inpatient Medications:  Scheduled medications   Medication Dose Route Frequency    allopurinol  100 mg oral Daily    atorvastatin  40 mg oral Nightly    carvedilol  12.5 mg oral BID with meals    clopidogrel  75 mg oral Daily    ezetimibe  10 mg oral Daily    insulin lispro  0-5 Units subcutaneous TID with meals    polyethylene glycol  17 g oral Daily    spironolactone  25 mg oral Daily    warfarin  2.5 mg oral Once per day on Sun Tue Thu Sat    warfarin  5 mg oral Once per day on Mon Wed Fri     PRN medications   Medication    dextrose 10 % in water (D10W)    dextrose    glucagon     Continuous Medications   Medication Dose Last Rate       Physical Exam:  Gen: Elderly  male, looks comfortable, breathing RA, awake and alert, AAOx3  HEENT: normocephalic. Mild JVD   CV: irregularly irregular rhythm, rate in 90s. No murmurs, gallops, rubs  Pulm: bilateral basal crackles.   Abd: soft, non-distended. Normal active bowel sounds  Ext: warm and well-perfused. +3 JESE bilaterally up to mid thighs.   Psych: appropriate affect  Neuro: grossly intact sensation and motor functions.      Assessment/Plan   Mukesh Garg is a 74 y.o. male with PMH of pAfib, HTN, JENNY (on CPAP at home), gout, CAD s/p CABG x3 and Maze (2013), Rt LE DVTs (most recent 10/24/23 while on Eliquis) and bilateral PE (currently on warfarin), PAD s/p left iliofemoral, femoropopliteal and tibioperoneal thrombectomy (11/17/23) who was sent for direct admission from vascular medicine clinic due to volume overload and concern for ADHF.      ADHF  - new onset, symptoms: subacute JESE + orthopnea + BAUMANN.   - TTE 10/31/20 EF 55-59%  - TTE 11/15/23 65-70%, normal LVEF, however, moderate to severely reduced RV function. Differential diagnosis for etiology is broad and  include ischemia/missed MI, HTN, HFpEF, RV failure, pulmonary HTN, metabolic/thyroid, inflammatory/myocarditis.   - Admit weight: 115 kg (discharge wt in November: 111 kg)  - TTE on 11/15/23: LVEF 65-70%, mild MR, moderate to severely reduced RV fx  - Strict Is/Os, daily standing weights.   - Lasix 40 IV daily (home dose 40 oral daily)  - Ordered repeat formal Echo  -   - Mag 2.05  - TSH 1.02   - Trop 49  - Weight today: 110kg       CAD  - s/p CABG x3 in 2013  - currently chest pain free  - cont Plavix, atorvastatin, Coreg     HTN  - BP on admission 170/107, however, pt reports not taking his Coreg today  - cont home Coreg 12.5 BID  - BP still up, add spirolactone 25mg daily      Afib  - rate controlled.   - Cont Coreg 12.5 BID  - cont AC with warfarin 5 mg on MWF and 2.5 mg the rest of the wk.  - INR today 2.2       PAD  - s/p left iliofemoral, femoropopliteal and tibioperoneal thrombectomy (11/17/23)  - cont Plavix, statin     DVT/PE  - cont warfarin 5 mg on MWF and 2.5 mg the rest of the wk.     DM  -Recent A1C 6.2  -low dose SSI    Dispo:   - pending diuresis   - DVT ppx warfarin   - Patient says he is missing a medication, takes 5 tabs of a med in the am. Asked pharmacy to investigate.     Seen and discussed with Dr. Nolan           Peripheral IV 01/23/24 22 G Left Wrist (Active)   Site Assessment Clean;Dry;Intact 01/23/24 1930   Dressing Type Transparent 01/23/24 1930   Line Status Flushed 01/23/24 1930   Dressing Status Clean;Dry 01/23/24 1930   Number of days: 0         Code Status: Full Code  Diet: Cardiac  NOK: Sweetie (wife) [phone]  DVT ppx: warfarin     Peripheral IV 01/23/24 22 G Left Wrist (Active)   Site Assessment Clean;Dry;Intact 01/24/24 1119   Number of days: 1       Code Status:  Full Code            Duncan Matthews, MISTY-CNP

## 2024-01-24 NOTE — H&P
"History Of Present Illness:    Mukesh Garg is a 74 y.o. male with PMH of pAfib, HTN, JENNY (on CPAP at home), gout, CAD s/p CABG x3 and Maze (2013), Rt LE DVTs (most recent 10/24/23 while on Eliquis) and bilateral PE (on warfarin), PAD s/p left iliofemoral, femoropopliteal and tibioperoneal thrombectomy (11/17/23) who was sent for direct admission from vascular medicine clinic due to volume overload and concern for ADHF.     Pt reports bilateral JESE that has been there since November but got significantly worse over the past few weeks. Also, noted occasional chest pressure, orthopnea and BAUMANN after walking for 5-10 mins (previously walking only limited by hip pain and could walk for ~1 block). No palpitations, dizziness, syncope or falls. His cardiologist prescribed him Lasix 40 once daily but pt has not been compliant on it due to difficulty going to the bathroom multiple times a day and many \"accidents\" while on the way to the bathroom.     Of note, pt was admitted from 11/15-11/27/23 for ALI of JESE and underwent thrombectomy on 11/17/23. He was also found to have bilateral PE. He was discharged to rehab then went home on 12/15/23.       Last Recorded Vitals:  Vitals:    01/23/24 2046   Weight: 115 kg (253 lb 4.9 oz)   Height: 1.753 m (5' 9\")       Last Labs:  CBC - 12/25/2023:  3:45 AM  7.0 12.1 241    39.7      CMP - 12/25/2023:  3:45 AM  8.5 5.9 11 --- 0.7   3.4 3.5 7 63      PTT - 11/27/2023:  9:18 AM  2.50   21.7 45     Troponin I, High Sensitivity   Date/Time Value Ref Range Status   11/15/2023 02:30 AM 21 0 - 53 ng/L Final     BNP   Date/Time Value Ref Range Status   11/15/2023 02:30 AM 88 0 - 99 pg/mL Final     Hemoglobin A1C   Date/Time Value Ref Range Status   11/21/2023 05:23 AM 6.2 (H) see below % Final   07/15/2023 11:54 AM 6.7 (H) 4.0 - 6.0 % Final     Comment:     Hemoglobin A1C levels are related to mean blood glucose during the   preceding 2-3 months. The relationship table below may be used as a   " general guide. Each 1% increase in HGB A1C is a reflection of an   increase in mean glucose of approximately 30 mg/dl.   Reference: Diabetes Care, volume 29, supplement 1 Jan. 2006                        HGB A1C ................. Approx. Mean Glucose   _______________________________________________   6%   ...............................  120 mg/dl   7%   ...............................  150 mg/dl   8%   ...............................  180 mg/dl   9%   ...............................  210 mg/dl   10%  ...............................  240 mg/dl  Performed at [address] [address]     10/31/2020 05:14 AM 5.6 4.0 - 6.0 % Final     Comment:     Hemoglobin A1C levels are related to mean blood glucose during the   preceding 2-3 months. The relationship table below may be used as a   general guide. Each 1% increase in HGB A1C is a reflection of an   increase in mean glucose of approximately 30 mg/dl.   Reference: Diabetes Care, volume 29, supplement 1 Jan. 2006                        HGB A1C ................. Approx. Mean Glucose   _______________________________________________   6%   ...............................  120 mg/dl   7%   ...............................  150 mg/dl   8%   ...............................  180 mg/dl   9%   ...............................  210 mg/dl   10%  ...............................  240 mg/dl  Performed at [address] [address]       LDL Calculated   Date/Time Value Ref Range Status   12/06/2023 06:05 AM 38 <=99 mg/dL Final     Comment:                                 Near   Borderline      AGE      Desirable  Optimal    High     High     Very High     0-19 Y     0 - 109     ---    110-129   >/= 130     ----    20-24 Y     0 - 119     ---    120-159   >/= 160     ----      >24 Y     0 -  99   100-129  130-159   160-189     >/=190     07/15/2023 11:54 AM 62 (L) 65 - 130 MG/DL Final   12/12/2022 12:08 PM 65 65 - 130 MG/DL Final   04/29/2022  03:35 PM 99 65 - 130 MG/DL Final     VLDL   Date/Time Value Ref Range Status   12/06/2023 06:05 AM 20 0 - 40 mg/dL Final      Last I/O:  No intake/output data recorded.    Past Cardiology Tests (Last 3 Years):  EKG:  ECG 12 Lead 11/13/2023      ECG 12 lead 10/25/2023    Echo:  Transthoracic Echo (TTE) Complete 11/15/2023     CONCLUSIONS:   1. Left ventricular systolic function is normal with a 65-70% estimated ejection fraction.   2. The patient is in atrial fibrillation which may influence the estimate of left ventricular function and transvalvular flows.   3. The RV appears upper limits of normal size vs mildly dilated with moderate to severely reduced fx with a McConnels sign consistent iwth pulmonary embolus.   4. Color Doppler flow in the region of the atrial septum suggestive of possible small left to right shunt. Agitated saline contrast study was technically difficult but may have demonstrated some bubbles within the LV ( unclear) and exact timing of their appearance is unclear.   5. Mildly elevated RVSP.   6. Mild aortic valve regurgitation.   7. Findings discussed with the ER team at the time of reporting.   8. Compared with the images from the prior TTE done 10/25/2023 there was already RV systolic dysfunction with possible McConnels sign at that time.    Ejection Fractions:  EF   Date/Time Value Ref Range Status   11/15/2023 02:17 PM 67       Cath:  Cardiac catheterization - non-coronary 11/21/2023  CONCLUSIONS:   1. No evidence of L to R shunting.   2. Normal filling pressures and cardiac index.      Past Medical History:  He has a past medical history of Atrial fibrillation (CMS/[facility]), CAD (coronary artery disease), Gout, Heart disease, Hyperlipidemia, Hypertension, MI (myocardial infarction) (CMS/[facility]), and Sleep apnea.    Past Surgical History:  He has a past surgical history that includes MR angio head wo IV contrast (03/21/2017); MR angio head wo IV contrast (10/30/2020); Coronary artery bypass  graft; CT angio lower extremity left w and or wo IV contrast (Left, 11/14/2023); IR angiogram lower extremity right (Right, 11/17/2023); Cardiac catheterization (Right, 11/21/2023); and Invasive Vascular Procedure (N/A, 11/17/2023).      Social History:  He reports that he has never smoked. He has never been exposed to tobacco smoke. He has never used smokeless tobacco. He reports that he does not currently use alcohol after a past usage of about 2.0 standard drinks of alcohol per week. He reports that he does not use drugs.    Family History:  Family History   Problem Relation Name Age of Onset    Hypertension Mother      Stroke Mother      Heart attack Father          Cause of death    Heart disease Father      Diabetes Other Grandmother         Allergies:  Pentazocine and Talwin compound    Inpatient Medications:    Review of Systems   Constitutional: Negative for chills and fever.   HENT:  Positive for congestion. Negative for sore throat.    Eyes:  Negative for blurred vision and double vision.   Cardiovascular:  Positive for dyspnea on exertion, leg swelling and orthopnea. Negative for chest pain, irregular heartbeat, palpitations and syncope.   Respiratory:  Negative for cough, shortness of breath, sputum production and wheezing.    Hematologic/Lymphatic: Negative for bleeding problem. Does not bruise/bleed easily.   Skin:  Negative for flushing and rash.   Musculoskeletal:  Positive for arthritis (bilateral hips) and gout.   Gastrointestinal:  Positive for bloating. Negative for abdominal pain, nausea and vomiting.   Genitourinary:  Negative for dysuria and hematuria.   Neurological:  Negative for dizziness, focal weakness, headaches and seizures.   Psychiatric/Behavioral:  Negative for altered mental status and depression.         Outpatient Medications:  Current Outpatient Medications   Medication Instructions    acetaminophen (TYLENOL) 650 mg, oral, Every 6 hours PRN    allopurinol (ZYLOPRIM) 100 mg,  oral, Daily    ammonium lactate (Lac-Hydrin) 12 % lotion Topical, Every 12 hours    atorvastatin (LIPITOR) 40 mg, oral, Daily    carvedilol (Coreg) 12.5 mg tablet TAKE 1 TABLET BY MOUTH TWICE A DAY WITH FOOD for 90    clopidogrel (PLAVIX) 75 mg, oral, Daily    dicyclomine (Bentyl) 20 mg tablet 1 tablet, oral, Daily    ezetimibe (Zetia) 10 mg tablet 1 tablet, oral, Daily    furosemide (LASIX) 40 mg, oral, Daily    oxyCODONE (OXY-IR) 5 mg, oral, 3 times daily PRN    warfarin (COUMADIN) 5 mg, oral, Every evening, 5mg M, W, F -2.5 mg all other days       Physical Exam:  Gen: Elderly  male, looks comfortable, breathing RA, awake and alert, AAOx3  HEENT: normocephalic.  CV: irregularly irregular rhythm, rate in 90s. No murmurs, gallops, rubs  Pulm: bilateral basal crackles.   Abd: soft, non-distended. Normal active bowel sounds  Ext: warm and well-perfused. +3 JESE bilaterally up to mid thighs.   Psych: appropriate affect  Neuro: grossly intact sensation and motor functions.        Assessment/Plan   garcia Garg is a 74 y.o. male with PMH of pAfib, HTN, JENNY (on CPAP at home), gout, CAD s/p CABG x3 and Maze (2013), Rt LE DVTs (most recent 10/24/23 while on Eliquis) and bilateral PE (currently on warfarin), PAD s/p left iliofemoral, femoropopliteal and tibioperoneal thrombectomy (11/17/23) who was sent for direct admission from vascular medicine clinic due to volume overload and concern for ADHF.     #ADHF: new onset, symptoms: subacute JESE + orthopnea + BAUMANN. Last Echo showed normal LVEF, however, moderate to severely reduced RV function. Differential diagnosis for etiology is broad and include ischemia/missed MI, HTN, HFpEF, RV failure, pulmonary HTN, metabolic/thyroid, inflammatory/myocarditis.   -Admit weight: 115 kg (discharge wt in November: 111 kg)  - TTE on 11/15/23: LVEF 65-70%, mild MR, moderate to severely reduced RV fx  - Strict Is/Os, daily standing weights.   - Ordered Lasix 40 IV (home dose 40 oral  daily)  - CXR and Labs ordered including CBC, BMP, Mg, coags, BNP, trop, TSH.    - Ordered repeat formal Echo  - f/up UOP + BMP and re-dose Lasix in AM.     #CAD: s/p CABG x3 in 2013  - currently chest pain free  - cont Plavix, atorvastatin, Coreg    #HTN: BP on admission 170/107, however, pt reports not taking his Coreg today  - cont home Coreg 12.5 BID  - if remains elevated, plan to increase Coreg to 25 mg in AM  - After Echo is done and new EF is known, will likely add another agent for better BP control.     #Afib: rate controlled.   - Cont Coreg 12.5 BID  - cont AC with warfarin 5 mg on MWF and 2.5 mg the rest of the wk.     #PAD: s/p left iliofemoral, femoropopliteal and tibioperoneal thrombectomy (11/17/23)  - cont Plavix, statin    #DVT/PE:   - cont warfarin 5 mg on MWF and 2.5 mg the rest of the wk.     #DM:    -Recent A1C 6.2  -low dose SSI    Peripheral IV 01/23/24 22 G Left Wrist (Active)   Site Assessment Clean;Dry;Intact 01/23/24 1930   Dressing Type Transparent 01/23/24 1930   Line Status Flushed 01/23/24 1930   Dressing Status Clean;Dry 01/23/24 1930   Number of days: 0       Code Status: Full Code  Diet: Cardiac  NOK: Sweetie (wife) [phone]  DVT ppx: warfarin    I spent 45 minutes in the professional and overall care of this patient.      Overnight EDH fellow  Shin Butler MD

## 2024-01-25 LAB
ANION GAP SERPL CALC-SCNC: 12 MMOL/L (ref 10–20)
BUN SERPL-MCNC: 18 MG/DL (ref 6–23)
CALCIUM SERPL-MCNC: 8.8 MG/DL (ref 8.6–10.6)
CHLORIDE SERPL-SCNC: 105 MMOL/L (ref 98–107)
CO2 SERPL-SCNC: 29 MMOL/L (ref 21–32)
CREAT SERPL-MCNC: 1.22 MG/DL (ref 0.5–1.3)
EGFRCR SERPLBLD CKD-EPI 2021: 61 ML/MIN/1.73M*2
ERYTHROCYTE [DISTWIDTH] IN BLOOD BY AUTOMATED COUNT: 14.6 % (ref 11.5–14.5)
GLUCOSE BLD MANUAL STRIP-MCNC: 110 MG/DL (ref 74–99)
GLUCOSE BLD MANUAL STRIP-MCNC: 116 MG/DL (ref 74–99)
GLUCOSE BLD MANUAL STRIP-MCNC: 130 MG/DL (ref 74–99)
GLUCOSE BLD MANUAL STRIP-MCNC: 142 MG/DL (ref 74–99)
GLUCOSE SERPL-MCNC: 96 MG/DL (ref 74–99)
HCT VFR BLD AUTO: 39.1 % (ref 41–52)
HGB BLD-MCNC: 12.2 G/DL (ref 13.5–17.5)
INR PPP: 2.9 (ref 0.9–1.1)
MAGNESIUM SERPL-MCNC: 1.98 MG/DL (ref 1.6–2.4)
MCH RBC QN AUTO: 29 PG (ref 26–34)
MCHC RBC AUTO-ENTMCNC: 31.2 G/DL (ref 32–36)
MCV RBC AUTO: 93 FL (ref 80–100)
NRBC BLD-RTO: 0 /100 WBCS (ref 0–0)
PLATELET # BLD AUTO: 187 X10*3/UL (ref 150–450)
POTASSIUM SERPL-SCNC: 3.5 MMOL/L (ref 3.5–5.3)
PROTHROMBIN TIME: 32.7 SECONDS (ref 9.8–12.8)
RBC # BLD AUTO: 4.2 X10*6/UL (ref 4.5–5.9)
SODIUM SERPL-SCNC: 142 MMOL/L (ref 136–145)
WBC # BLD AUTO: 6.8 X10*3/UL (ref 4.4–11.3)

## 2024-01-25 PROCEDURE — 2500000004 HC RX 250 GENERAL PHARMACY W/ HCPCS (ALT 636 FOR OP/ED)

## 2024-01-25 PROCEDURE — 36415 COLL VENOUS BLD VENIPUNCTURE: CPT | Performed by: NURSE PRACTITIONER

## 2024-01-25 PROCEDURE — 85027 COMPLETE CBC AUTOMATED: CPT | Performed by: STUDENT IN AN ORGANIZED HEALTH CARE EDUCATION/TRAINING PROGRAM

## 2024-01-25 PROCEDURE — 2500000002 HC RX 250 W HCPCS SELF ADMINISTERED DRUGS (ALT 637 FOR MEDICARE OP, ALT 636 FOR OP/ED): Performed by: STUDENT IN AN ORGANIZED HEALTH CARE EDUCATION/TRAINING PROGRAM

## 2024-01-25 PROCEDURE — 1200000002 HC GENERAL ROOM WITH TELEMETRY DAILY

## 2024-01-25 PROCEDURE — 99232 SBSQ HOSP IP/OBS MODERATE 35: CPT | Performed by: INTERNAL MEDICINE

## 2024-01-25 PROCEDURE — 2500000004 HC RX 250 GENERAL PHARMACY W/ HCPCS (ALT 636 FOR OP/ED): Performed by: STUDENT IN AN ORGANIZED HEALTH CARE EDUCATION/TRAINING PROGRAM

## 2024-01-25 PROCEDURE — 83735 ASSAY OF MAGNESIUM: CPT | Performed by: STUDENT IN AN ORGANIZED HEALTH CARE EDUCATION/TRAINING PROGRAM

## 2024-01-25 PROCEDURE — 82947 ASSAY GLUCOSE BLOOD QUANT: CPT

## 2024-01-25 PROCEDURE — 2500000004 HC RX 250 GENERAL PHARMACY W/ HCPCS (ALT 636 FOR OP/ED): Performed by: NURSE PRACTITIONER

## 2024-01-25 PROCEDURE — 2500000001 HC RX 250 WO HCPCS SELF ADMINISTERED DRUGS (ALT 637 FOR MEDICARE OP): Performed by: STUDENT IN AN ORGANIZED HEALTH CARE EDUCATION/TRAINING PROGRAM

## 2024-01-25 PROCEDURE — 85610 PROTHROMBIN TIME: CPT | Performed by: NURSE PRACTITIONER

## 2024-01-25 PROCEDURE — 80048 BASIC METABOLIC PNL TOTAL CA: CPT | Performed by: STUDENT IN AN ORGANIZED HEALTH CARE EDUCATION/TRAINING PROGRAM

## 2024-01-25 PROCEDURE — 2500000001 HC RX 250 WO HCPCS SELF ADMINISTERED DRUGS (ALT 637 FOR MEDICARE OP)

## 2024-01-25 RX ORDER — FUROSEMIDE 10 MG/ML
40 INJECTION INTRAMUSCULAR; INTRAVENOUS ONCE
Status: COMPLETED | OUTPATIENT
Start: 2024-01-25 | End: 2024-01-25

## 2024-01-25 RX ORDER — CARVEDILOL 25 MG/1
25 TABLET ORAL
Status: DISCONTINUED | OUTPATIENT
Start: 2024-01-25 | End: 2024-01-28 | Stop reason: HOSPADM

## 2024-01-25 RX ORDER — CARVEDILOL 25 MG/1
25 TABLET ORAL
Status: DISCONTINUED | OUTPATIENT
Start: 2024-01-25 | End: 2024-01-25

## 2024-01-25 RX ORDER — POLYETHYLENE GLYCOL 3350 17 G/17G
17 POWDER, FOR SOLUTION ORAL DAILY PRN
Status: DISCONTINUED | OUTPATIENT
Start: 2024-01-25 | End: 2024-01-28 | Stop reason: HOSPADM

## 2024-01-25 RX ORDER — POTASSIUM CHLORIDE 20 MEQ/1
20 TABLET, EXTENDED RELEASE ORAL ONCE
Status: COMPLETED | OUTPATIENT
Start: 2024-01-25 | End: 2024-01-25

## 2024-01-25 RX ORDER — DICYCLOMINE HYDROCHLORIDE 20 MG/1
20 TABLET ORAL DAILY
Status: DISCONTINUED | OUTPATIENT
Start: 2024-01-25 | End: 2024-01-28 | Stop reason: HOSPADM

## 2024-01-25 RX ADMIN — SPIRONOLACTONE 25 MG: 25 TABLET, FILM COATED ORAL at 09:59

## 2024-01-25 RX ADMIN — CARVEDILOL 25 MG: 25 TABLET, FILM COATED ORAL at 18:10

## 2024-01-25 RX ADMIN — ALLOPURINOL 100 MG: 100 TABLET ORAL at 12:56

## 2024-01-25 RX ADMIN — CARVEDILOL 25 MG: 25 TABLET, FILM COATED ORAL at 10:08

## 2024-01-25 RX ADMIN — EMPAGLIFLOZIN 10 MG: 10 TABLET, FILM COATED ORAL at 10:08

## 2024-01-25 RX ADMIN — WARFARIN SODIUM 2.5 MG: 5 TABLET ORAL at 18:10

## 2024-01-25 RX ADMIN — FUROSEMIDE 40 MG: 10 INJECTION, SOLUTION INTRAVENOUS at 12:50

## 2024-01-25 RX ADMIN — EZETIMIBE 10 MG: 10 TABLET ORAL at 09:59

## 2024-01-25 RX ADMIN — CLOPIDOGREL BISULFATE 75 MG: 75 TABLET ORAL at 09:59

## 2024-01-25 RX ADMIN — DICYCLOMINE HYDROCHLORIDE 20 MG: 20 TABLET ORAL at 20:19

## 2024-01-25 RX ADMIN — ATORVASTATIN CALCIUM 40 MG: 40 TABLET, FILM COATED ORAL at 20:17

## 2024-01-25 RX ADMIN — DICYCLOMINE HYDROCHLORIDE 20 MG: 20 TABLET ORAL at 12:56

## 2024-01-25 RX ADMIN — POTASSIUM CHLORIDE 20 MEQ: 1500 TABLET, EXTENDED RELEASE ORAL at 09:58

## 2024-01-25 ASSESSMENT — PAIN SCALES - GENERAL
PAINLEVEL_OUTOF10: 0 - NO PAIN

## 2024-01-25 ASSESSMENT — PAIN - FUNCTIONAL ASSESSMENT: PAIN_FUNCTIONAL_ASSESSMENT: 0-10

## 2024-01-25 NOTE — CARE PLAN
The patient's goals for the shift include rest    The clinical goals for the shift include rest      Problem: Chronic Conditions and Co-morbidities  Goal: Patient's chronic conditions and co-morbidity symptoms are monitored and maintained or improved  Outcome: Progressing     Problem: Discharge Planning  Goal: Discharge to home or other facility with appropriate resources  Outcome: Progressing     Problem: Safety - Adult  Goal: Free from fall injury  Outcome: Progressing

## 2024-01-25 NOTE — PROGRESS NOTES
Subjective Data:  Patient reports LE swelling is improving ( to touch) and shortness of breath has improved as well.    - empagliflozin 10 mg started  - coreg increased to 25 mg BID for higher BPs  - consider restarting home lisinopril tomorrow is BPs remain high  - continue diuresis with 40 mg IV lasix     Overnight Events:    None      Objective Data:  Last Recorded Vitals:  Vitals:    01/25/24 0536 01/25/24 0641 01/25/24 0945 01/25/24 1100   BP: 142/87  (!) 153/102 135/83   BP Location: Left arm  Left arm Right arm   Patient Position: Lying  Lying Sitting   Pulse: 80  69 81   Resp: 16  17    Temp: 36.8 °C (98.2 °F)  37.1 °C (98.8 °F)    TempSrc: Temporal  Temporal    SpO2: 96%  95% 95%   Weight:  109 kg (239 lb 6.4 oz)     Height:           Last Labs:  CBC - 1/25/2024:  8:10 AM  6.8 12.2 187    39.1      CMP - 1/25/2024:  8:10 AM  8.8 5.9 11 --- 0.7   3.4 3.5 7 63      PTT - 1/24/2024:  7:16 AM  2.9   32.7 24     TROPHS   Date/Time Value Ref Range Status   01/23/2024 11:50 PM 49 0 - 53 ng/L Final   11/15/2023 02:30 AM 21 0 - 53 ng/L Final     BNP   Date/Time Value Ref Range Status   01/24/2024 07:16  0 - 99 pg/mL Final   11/15/2023 02:30 AM 88 0 - 99 pg/mL Final     HGBA1C   Date/Time Value Ref Range Status   11/21/2023 05:23 AM 6.2 see below % Final   07/15/2023 11:54 AM 6.7 4.0 - 6.0 % Final     Comment:     Hemoglobin A1C levels are related to mean blood glucose during the   preceding 2-3 months. The relationship table below may be used as a   general guide. Each 1% increase in HGB A1C is a reflection of an   increase in mean glucose of approximately 30 mg/dl.   Reference: Diabetes [employer], volume 29, supplement 1 Jan. 2006                        HGB A1C ................. Approx. Mean Glucose   _______________________________________________   6%   ...............................  120 mg/dl   7%   ...............................  150 mg/dl   8%   ...............................  180 mg/dl   9%    ...............................  210 mg/dl   10%  ...............................  240 mg/dl  Performed at [address] [address]     10/31/2020 05:14 AM 5.6 4.0 - 6.0 % Final     Comment:     Hemoglobin A1C levels are related to mean blood glucose during the   preceding 2-3 months. The relationship table below may be used as a   general guide. Each 1% increase in HGB A1C is a reflection of an   increase in mean glucose of approximately 30 mg/dl.   Reference: Diabetes Care, volume 29, supplement 1 Jan. 2006                        HGB A1C ................. Approx. Mean Glucose   _______________________________________________   6%   ...............................  120 mg/dl   7%   ...............................  150 mg/dl   8%   ...............................  180 mg/dl   9%   ...............................  210 mg/dl   10%  ...............................  240 mg/dl  Performed at [address] [address]       LDLCALC   Date/Time Value Ref Range Status   12/06/2023 06:05 AM 38 <=99 mg/dL Final     Comment:                                 Near   Borderline      AGE      Desirable  Optimal    High     High     Very High     0-19 Y     0 - 109     ---    110-129   >/= 130     ----    20-24 Y     0 - 119     ---    120-159   >/= 160     ----      >24 Y     0 -  99   100-129  130-159   160-189     >/=190     07/15/2023 11:54 AM 62 65 - 130 MG/DL Final   12/12/2022 12:08 PM 65 65 - 130 MG/DL Final   04/29/2022 03:35 PM 99 65 - 130 MG/DL Final     VLDL   Date/Time Value Ref Range Status   12/06/2023 06:05 AM 20 0 - 40 mg/dL Final      Last I/O:  I/O last 3 completed shifts:  In: 240 (2.2 mL/kg) [P.O.:240]  Out: 5800 (53.4 mL/kg) [Urine:5800 (1.5 mL/kg/hr)]  Weight: 108.6 kg     Inpatient Medications:  Scheduled medications   Medication Dose Route Frequency    allopurinol  100 mg oral Daily    atorvastatin  40 mg oral Nightly    carvedilol  25 mg oral BID with meals     clopidogrel  75 mg oral Daily    dicyclomine  20 mg oral Daily    empagliflozin  10 mg oral Daily    ezetimibe  10 mg oral Daily    insulin lispro  0-5 Units subcutaneous TID with meals    polyethylene glycol  17 g oral Daily    spironolactone  25 mg oral Daily    warfarin  2.5 mg oral Once per day on Sun Tue Thu Sat    warfarin  5 mg oral Once per day on Mon Wed Fri     PRN medications   Medication    dextrose 10 % in water (D10W)    dextrose    dicyclomine    glucagon     Continuous Medications   Medication Dose Last Rate       Physical Exam:  Gen: Elderly  male, looks comfortable, breathing RA, awake and alert, A & Ox3  HEENT: normocephalic. Elevated JVD above clavicle at 90 degrees  CV: irregularly irregular rhythm, rate in 80s. No murmurs, gallops, rubs  Pulm: posterior lung sounds CTA  Abd: soft, non-distended. Normal active bowel sounds  Ext: warm and well-perfused. 2+ Bl LE edema present to knees; ruborous skin discoloration BL LEs  Psych: appropriate affect  Neuro: grossly intact sensation and motor functions.      Assessment/Plan   Mukesh Garg is a 74 y.o. male with PMH of pAfib, HTN, JENNY (on CPAP at home), gout, CAD s/p CABG x3 and Maze (2013), Rt LE DVTs (most recent 10/24/23 while on Eliquis) and bilateral PE (currently on warfarin), PAD s/p left iliofemoral, femoropopliteal and tibioperoneal thrombectomy (11/17/23) who was sent for direct admission from vascular medicine clinic due to volume overload and concern for ADHF.      Acute on chronic diastolic heart failure  - new onset, symptoms: subacute JESE + orthopnea + BAUMANN.   - TTE 10/31/20 EF 55-59%  - TTE 11/15/23 65-70%, normal LVEF, however, moderate to severely reduced RV function. Differential diagnosis for etiology is broad and include ischemia/missed MI, HTN, HFpEF, RV failure, pulmonary HTN, metabolic/thyroid, inflammatory/myocarditis.   - TTE 1/24: LV EF 55-60%, reduced right ventricular systolic function  - Admit weight: 115 kg  (discharge wt in November: 111 kg)  - daily weight: 109 kg (110 kg)  - Strict Is/Os, daily standing weights.   - continue Lasix 40 IV daily (home dose 40 oral daily, but nonadherent due to urinary incontinence and frequency)  -   - continue spironolactone 25 mg daily, empagliflozin 10 mg daily started    CAD  - s/p CABG x3 in 2013  - currently chest pain free  - cont Plavix, atorvastatin, Coreg     HTN  - BP on admission 170/107, however, pt reports not taking his Coreg today  - last 24 hours systolic range: 135- 159  - Coreg 12.5 BID increased to 25 mg BID today for better BP control  - home med: lisinopril 40 mg, consider restart tomorrow as indicated     Afib  - rate controlled   - Coreg 12.5 BID increased to 25 mg BID today for better BP control  - cont AC with warfarin 5 mg on MWF and 2.5 mg the rest of the wk.  - INR today 2.2       PAD  - s/p left iliofemoral, femoropopliteal and tibioperoneal thrombectomy (11/17/23)  - cont Plavix, statin     Hx DVT/PE  - cont warfarin 5 mg on MWF and 2.5 mg the rest of the wk.   - INR 2.9 today    DM  -Recent A1C 6.2  -low dose SSI    Diarrhea  - restarted home bentyl today, bowel regimen changed to PRN    Dispo:   - pending further diuresis   - DVT ppx warfarin     Seen and discussed with Dr. Nolan           Peripheral IV 01/23/24 22 G Left Wrist (Active)   Site Assessment Clean;Dry;Intact 01/23/24 1930   Dressing Type Transparent 01/23/24 1930   Line Status Flushed 01/23/24 1930   Dressing Status Clean;Dry 01/23/24 1930   Number of days: 0         Code Status: Full Code  Diet: Cardiac  NOK: Sweetie (wife) [phone]  DVT ppx: warfarin     Peripheral IV 01/23/24 22 G Left Wrist (Active)   Site Assessment Clean;Dry;Intact 01/24/24 1119   Number of days: 1       Code Status:  Full Code            Estefani Marie, APRN-CNP

## 2024-01-26 ENCOUNTER — APPOINTMENT (OUTPATIENT)
Dept: PHYSICAL THERAPY | Facility: CLINIC | Age: 74
End: 2024-01-26
Payer: MEDICARE

## 2024-01-26 LAB
ANION GAP SERPL CALC-SCNC: 15 MMOL/L (ref 10–20)
BUN SERPL-MCNC: 22 MG/DL (ref 6–23)
CALCIUM SERPL-MCNC: 9.1 MG/DL (ref 8.6–10.6)
CHLORIDE SERPL-SCNC: 103 MMOL/L (ref 98–107)
CO2 SERPL-SCNC: 29 MMOL/L (ref 21–32)
CREAT SERPL-MCNC: 1.55 MG/DL (ref 0.5–1.3)
EGFRCR SERPLBLD CKD-EPI 2021: 46 ML/MIN/1.73M*2
ERYTHROCYTE [DISTWIDTH] IN BLOOD BY AUTOMATED COUNT: 14.5 % (ref 11.5–14.5)
GLUCOSE BLD MANUAL STRIP-MCNC: 102 MG/DL (ref 74–99)
GLUCOSE BLD MANUAL STRIP-MCNC: 112 MG/DL (ref 74–99)
GLUCOSE BLD MANUAL STRIP-MCNC: 162 MG/DL (ref 74–99)
GLUCOSE BLD MANUAL STRIP-MCNC: 163 MG/DL (ref 74–99)
GLUCOSE SERPL-MCNC: 86 MG/DL (ref 74–99)
HCT VFR BLD AUTO: 40.9 % (ref 41–52)
HGB BLD-MCNC: 12.6 G/DL (ref 13.5–17.5)
INR PPP: 2.6 (ref 0.9–1.1)
MAGNESIUM SERPL-MCNC: 2.09 MG/DL (ref 1.6–2.4)
MCH RBC QN AUTO: 28.6 PG (ref 26–34)
MCHC RBC AUTO-ENTMCNC: 30.8 G/DL (ref 32–36)
MCV RBC AUTO: 93 FL (ref 80–100)
NRBC BLD-RTO: 0 /100 WBCS (ref 0–0)
PLATELET # BLD AUTO: 193 X10*3/UL (ref 150–450)
POTASSIUM SERPL-SCNC: 3.9 MMOL/L (ref 3.5–5.3)
PROTHROMBIN TIME: 29.3 SECONDS (ref 9.8–12.8)
RBC # BLD AUTO: 4.4 X10*6/UL (ref 4.5–5.9)
SODIUM SERPL-SCNC: 143 MMOL/L (ref 136–145)
WBC # BLD AUTO: 7.7 X10*3/UL (ref 4.4–11.3)

## 2024-01-26 PROCEDURE — 2500000004 HC RX 250 GENERAL PHARMACY W/ HCPCS (ALT 636 FOR OP/ED): Performed by: NURSE PRACTITIONER

## 2024-01-26 PROCEDURE — 2500000001 HC RX 250 WO HCPCS SELF ADMINISTERED DRUGS (ALT 637 FOR MEDICARE OP)

## 2024-01-26 PROCEDURE — 85027 COMPLETE CBC AUTOMATED: CPT | Performed by: STUDENT IN AN ORGANIZED HEALTH CARE EDUCATION/TRAINING PROGRAM

## 2024-01-26 PROCEDURE — 97116 GAIT TRAINING THERAPY: CPT | Mod: GP | Performed by: STUDENT IN AN ORGANIZED HEALTH CARE EDUCATION/TRAINING PROGRAM

## 2024-01-26 PROCEDURE — 83735 ASSAY OF MAGNESIUM: CPT | Performed by: STUDENT IN AN ORGANIZED HEALTH CARE EDUCATION/TRAINING PROGRAM

## 2024-01-26 PROCEDURE — 36415 COLL VENOUS BLD VENIPUNCTURE: CPT | Performed by: STUDENT IN AN ORGANIZED HEALTH CARE EDUCATION/TRAINING PROGRAM

## 2024-01-26 PROCEDURE — 2500000001 HC RX 250 WO HCPCS SELF ADMINISTERED DRUGS (ALT 637 FOR MEDICARE OP): Performed by: STUDENT IN AN ORGANIZED HEALTH CARE EDUCATION/TRAINING PROGRAM

## 2024-01-26 PROCEDURE — 2500000004 HC RX 250 GENERAL PHARMACY W/ HCPCS (ALT 636 FOR OP/ED): Performed by: STUDENT IN AN ORGANIZED HEALTH CARE EDUCATION/TRAINING PROGRAM

## 2024-01-26 PROCEDURE — 99232 SBSQ HOSP IP/OBS MODERATE 35: CPT | Performed by: INTERNAL MEDICINE

## 2024-01-26 PROCEDURE — 80048 BASIC METABOLIC PNL TOTAL CA: CPT | Performed by: STUDENT IN AN ORGANIZED HEALTH CARE EDUCATION/TRAINING PROGRAM

## 2024-01-26 PROCEDURE — 82947 ASSAY GLUCOSE BLOOD QUANT: CPT

## 2024-01-26 PROCEDURE — 1200000002 HC GENERAL ROOM WITH TELEMETRY DAILY

## 2024-01-26 PROCEDURE — 85610 PROTHROMBIN TIME: CPT | Performed by: NURSE PRACTITIONER

## 2024-01-26 PROCEDURE — 97530 THERAPEUTIC ACTIVITIES: CPT | Mod: GP | Performed by: STUDENT IN AN ORGANIZED HEALTH CARE EDUCATION/TRAINING PROGRAM

## 2024-01-26 PROCEDURE — 99232 SBSQ HOSP IP/OBS MODERATE 35: CPT | Performed by: NURSE PRACTITIONER

## 2024-01-26 PROCEDURE — 2500000002 HC RX 250 W HCPCS SELF ADMINISTERED DRUGS (ALT 637 FOR MEDICARE OP, ALT 636 FOR OP/ED): Performed by: STUDENT IN AN ORGANIZED HEALTH CARE EDUCATION/TRAINING PROGRAM

## 2024-01-26 PROCEDURE — 2500000001 HC RX 250 WO HCPCS SELF ADMINISTERED DRUGS (ALT 637 FOR MEDICARE OP): Performed by: NURSE PRACTITIONER

## 2024-01-26 RX ORDER — SPIRONOLACTONE 25 MG/1
25 TABLET ORAL ONCE
Status: COMPLETED | OUTPATIENT
Start: 2024-01-26 | End: 2024-01-26

## 2024-01-26 RX ORDER — LOPERAMIDE HYDROCHLORIDE 2 MG/1
4 CAPSULE ORAL ONCE
Status: COMPLETED | OUTPATIENT
Start: 2024-01-26 | End: 2024-01-26

## 2024-01-26 RX ORDER — SPIRONOLACTONE 25 MG/1
50 TABLET ORAL DAILY
Status: DISCONTINUED | OUTPATIENT
Start: 2024-01-27 | End: 2024-01-28 | Stop reason: HOSPADM

## 2024-01-26 RX ORDER — FUROSEMIDE 10 MG/ML
40 INJECTION INTRAMUSCULAR; INTRAVENOUS ONCE
Status: COMPLETED | OUTPATIENT
Start: 2024-01-26 | End: 2024-01-26

## 2024-01-26 RX ADMIN — ALLOPURINOL 100 MG: 100 TABLET ORAL at 08:53

## 2024-01-26 RX ADMIN — SPIRONOLACTONE 25 MG: 25 TABLET, FILM COATED ORAL at 08:52

## 2024-01-26 RX ADMIN — CARVEDILOL 25 MG: 25 TABLET, FILM COATED ORAL at 17:54

## 2024-01-26 RX ADMIN — WARFARIN SODIUM 5 MG: 5 TABLET ORAL at 17:54

## 2024-01-26 RX ADMIN — EMPAGLIFLOZIN 10 MG: 10 TABLET, FILM COATED ORAL at 09:00

## 2024-01-26 RX ADMIN — CLOPIDOGREL BISULFATE 75 MG: 75 TABLET ORAL at 08:53

## 2024-01-26 RX ADMIN — EZETIMIBE 10 MG: 10 TABLET ORAL at 08:52

## 2024-01-26 RX ADMIN — LOPERAMIDE HYDROCHLORIDE 4 MG: 2 CAPSULE ORAL at 13:20

## 2024-01-26 RX ADMIN — DICYCLOMINE HYDROCHLORIDE 20 MG: 20 TABLET ORAL at 08:53

## 2024-01-26 RX ADMIN — FUROSEMIDE 40 MG: 10 INJECTION, SOLUTION INTRAVENOUS at 12:47

## 2024-01-26 RX ADMIN — ATORVASTATIN CALCIUM 40 MG: 40 TABLET, FILM COATED ORAL at 20:58

## 2024-01-26 RX ADMIN — SPIRONOLACTONE 25 MG: 25 TABLET, FILM COATED ORAL at 12:47

## 2024-01-26 RX ADMIN — CARVEDILOL 25 MG: 25 TABLET, FILM COATED ORAL at 08:53

## 2024-01-26 ASSESSMENT — COGNITIVE AND FUNCTIONAL STATUS - GENERAL
MOBILITY SCORE: 21
WALKING IN HOSPITAL ROOM: A LITTLE
MOVING TO AND FROM BED TO CHAIR: A LITTLE
CLIMB 3 TO 5 STEPS WITH RAILING: A LITTLE

## 2024-01-26 ASSESSMENT — PAIN SCALES - GENERAL
PAINLEVEL_OUTOF10: 3
PAINLEVEL_OUTOF10: 2

## 2024-01-26 ASSESSMENT — ACTIVITIES OF DAILY LIVING (ADL): ADL_ASSISTANCE: INDEPENDENT

## 2024-01-26 ASSESSMENT — PAIN - FUNCTIONAL ASSESSMENT: PAIN_FUNCTIONAL_ASSESSMENT: 0-10

## 2024-01-26 NOTE — PROGRESS NOTES
Physical Therapy    Physical Therapy    Physical Therapy Evaluation    Patient Name: Mukesh Garg  MRN: [id number]  Today's Date: 1/26/2024      Assessment/Plan   PT Assessment  PT Assessment Results: Decreased strength, Decreased endurance, Impaired balance, Decreased mobility  Rehab Prognosis: Good  Evaluation/Treatment Tolerance: Patient tolerated treatment well  Strengths: Ability to acquire knowledge, Attitude of self  End of Session Communication: Bedside nurse  End of Session Patient Position: Bed, 2 rail up, Alarm off, not on at start of session  IP OR SWING BED PT PLAN  Inpatient or Swing Bed: Inpatient  PT Plan  Treatment/Interventions: Bed mobility, Transfer training, Gait training, Stair training, Balance training, Therapeutic exercise, Therapeutic activity, Endurance training, Strengthening  PT Plan: Skilled PT  PT Frequency: 3 times per week  PT Discharge Recommendations: Low intensity level of continued care  Equipment Recommended upon Discharge:  (none)  PT Recommended Transfer Status: Stand by assist  PT - OK to Discharge: Yes    Subjective     General Visit Information:  Subjective: Pt pleasant and agreeable to therapy.  Reason for Referral: ADHF  Past Medical History Relevant to Rehab: pAfib, HTN, gout, CAD s/p CABG x3 and Maze (2013), Rt LE DVTs (most recent 10/24/23 and bilateral PE, PAD s/p left iliofemoral, femoropopliteal and tibioperoneal thrombectomy (11/17/23)  Prior to Session Communication: Bedside nurse  Patient Position Received:  (Pt returning from bathroom, amb in room ind)  Family/Caregiver Present: Yes (mobility aide present for stairs)   Home Living:  Home Living  Type of Home: House  Lives With: Spouse  Home Adaptive Equipment: Walker rolling or standard, Cane, Reacher  Home Layout: Multi-level  Home Access: Stairs to enter without rails  Entrance Stairs-Rails: None  Entrance Stairs-Number of Steps: 2  Bathroom Equipment: Grab bars in shower  Home Living Comments: 2 steps to enter  w/o rails and 9 steps to bedroom on second floor with rail on left  Prior Level of Function:  Prior Function Per Pt/Caregiver Report  Level of [place]: Independent with ADLs and functional transfers  Receives Help From: Family  ADL Assistance: Independent  Ambulatory Assistance: Independent  Prior Function Comments: Pt states he amb with walker for long distances and uses cane for stairs in the home  Precautions:  Precautions  Medical Precautions: Cardiac precautions, Fall precautions  Vital Signs:  Vital Signs  Heart Rate:  (after amb  -105, at end of session - 96)  Heart Rate Source: Monitor  SpO2:  (after amb -97, at end of session - 96)  BP: 122/90 (start of session)  BP Location: Left arm  BP Method: Automatic  Patient Position: Sitting  Medical Gas Therapy: None (Room air)    Lines/Tubes/Drains:     Continuous Medications/Drips:     Objective   Pain:  Pain Assessment  Pain Assessment: 0-10  Pain Score: 2  Pain Type: Chronic pain  Pain Location: Leg  Pain Orientation: Left  Pain Interventions: Ambulation/increased activity  Response to Interventions: pt stated no increase in pain during session    Cognition:  Cognition  Overall Cognitive Status: Within Functional Limits  Orientation Level: Oriented X4    General Assessments:  Extremity/Trunk Assessments:     Lower Extremity  ROM: WNL  Strength: WFL   Sitting Static Balance Normal    Functional Assessments:  Bed Mobility  Bed Mobility: No    Transfers  Transfer: Yes  Transfer 1  Transfer From 1: Sit to  Transfer to 1: Stand  Transfer Level of Assistance 1: Independent  Trials/Comments 1: x3  Transfers 2  Transfer From 2: Stand to  Transfer to 2: Sit  Transfer Level of Assistance 2: Independent  Trials/Comments 2: x3    Ambulation/Gait Training  Ambulation/Gait Training Performed: Yes  Ambulation/Gait Training 1  Surface 1: Level tile  Device 1: No device  Assistance 1: Close supervision  Quality of Gait 1: Shuffling gait, Decreased step length (decreased  may)  Comments/Distance (ft) 1: x150ft, x 50ft; pt amb and then later amb to stairs    Stairs  Stairs: Yes  Stairs  Rails 1: Left  Device 1: Railing  Assistance 1: Contact guard  Comment/Number of Steps 1: 1 flight; mobility aide present; pt stating he descends with his right onstead of his left         Outcome Measures:  [facility] Basic Mobility  Turning from your back to your side while in a flat bed without using bedrails: None  Moving from lying on your back to sitting on the side of a flat bed without using bedrails: None  Moving to and from bed to chair (including a wheelchair): A little  Standing up from a chair using your arms (e.g. wheelchair or bedside chair): None  To walk in hospital room: A little  Climbing 3-5 steps with railing: A little  Basic Mobility - Total Score: 21          Encounter Problems       Encounter Problems (Active)       PT Problem       Patient will ambulate >200ft' with No Device and Independent       Start:  01/26/24    Expected End:  02/09/24            Patient will ascend/descend 9 steps with Left Rail Ascending and independence        Start:  01/26/24    Expected End:  02/09/24            pt will perform static standing w/o UE support ind       Start:  01/26/24    Expected End:  02/09/24               Pain - Adult            Assessment: Pt able to amb steadily with close supervison and perform a flight of stairs with CGA and L rail with no LOB. Patient would benefit from skilled physical therapy to maximize functional mobility and safety. Pt remains appropriate for low intensity therapy when medically appropriate for DC.      Education Documentation  Body Mechanics, taught by Karli Joyner PT at 1/26/2024 10:53 AM.  Learner: Patient  Readiness: Acceptance  Method: Explanation  Response: Needs Reinforcement    Precautions, taught by Karli Joyner PT at 1/26/2024 10:53 AM.  Learner: Patient  Readiness: Acceptance  Method: Explanation  Response: Needs Reinforcement    Mobility  Training, taught by Karli Lion PT at 1/26/2024 10:53 AM.  Learner: Patient  Readiness: Acceptance  Method: Explanation  Response: Needs Reinforcement    Education Comments  No comments found.        01/26/24 at 10:53 AM   KARLI LION PT   Rehab Office: [phone]

## 2024-01-26 NOTE — PROGRESS NOTES
Subjective Data:  Patient reports LE swelling is improving ( to touch) and shortness of breath has improved as well.    C/o loose stools, imodium 4 mg x1  Cont lasix 40 mg IV  Increase jefry to 50 mg daily    Overnight Events:    None      Objective Data:  Last Recorded Vitals:  Vitals:    01/26/24 0201 01/26/24 0554 01/26/24 0914 01/26/24 0935   BP: 136/88 (!) 157/95 141/85 122/90   BP Location:   Left arm Left arm   Patient Position:   Lying Sitting   Pulse: 85 81 84    Resp: 18 18 16    Temp: 36.2 °C (97.2 °F) 36.4 °C (97.5 °F) 37.4 °C (99.3 °F)    TempSrc:   Temporal    SpO2: 96% 95% 95%    Weight:  107 kg (235 lb 0.2 oz)     Height:           Last Labs:  Results for orders placed or performed during the hospital encounter of 01/23/24 (from the past 24 hour(s))   POCT GLUCOSE   Result Value Ref Range    POCT Glucose 110 (H) 74 - 99 mg/dL   POCT GLUCOSE   Result Value Ref Range    POCT Glucose 142 (H) 74 - 99 mg/dL   CBC   Result Value Ref Range    WBC 7.7 4.4 - 11.3 x10*3/uL    nRBC 0.0 0.0 - 0.0 /100 WBCs    RBC 4.40 (L) 4.50 - 5.90 x10*6/uL    Hemoglobin 12.6 (L) 13.5 - 17.5 g/dL    Hematocrit 40.9 (L) 41.0 - 52.0 %    MCV 93 80 - 100 fL    MCH 28.6 26.0 - 34.0 pg    MCHC 30.8 (L) 32.0 - 36.0 g/dL    RDW 14.5 11.5 - 14.5 %    Platelets 193 150 - 450 x10*3/uL   Basic metabolic panel   Result Value Ref Range    Glucose 86 74 - 99 mg/dL    Sodium 143 136 - 145 mmol/L    Potassium 3.9 3.5 - 5.3 mmol/L    Chloride 103 98 - 107 mmol/L    Bicarbonate 29 21 - 32 mmol/L    Anion Gap 15 10 - 20 mmol/L    Urea Nitrogen 22 6 - 23 mg/dL    Creatinine 1.55 (H) 0.50 - 1.30 mg/dL    eGFR 46 (L) >60 mL/min/1.73m*2    Calcium 9.1 8.6 - 10.6 mg/dL   Magnesium   Result Value Ref Range    Magnesium 2.09 1.60 - 2.40 mg/dL   Protime-INR   Result Value Ref Range    Protime 29.3 (H) 9.8 - 12.8 seconds    INR 2.6 (H) 0.9 - 1.1   POCT GLUCOSE   Result Value Ref Range    POCT Glucose 112 (H) 74 - 99 mg/dL       I/O last 3  completed shifts:  In: 1165 (10.9 mL/kg) [P.O.:1065; I.V.:100 (0.9 mL/kg)]  Out: 3700 (34.7 mL/kg) [Urine:3700 (1 mL/kg/hr)]  Weight: 106.6 kg     Inpatient Medications:  Scheduled medications   Medication Dose Route Frequency    allopurinol  100 mg oral Daily    atorvastatin  40 mg oral Nightly    carvedilol  25 mg oral BID with meals    clopidogrel  75 mg oral Daily    dicyclomine  20 mg oral Daily    empagliflozin  10 mg oral Daily    ezetimibe  10 mg oral Daily    insulin lispro  0-5 Units subcutaneous TID with meals    [START ON 1/27/2024] spironolactone  50 mg oral Daily    warfarin  2.5 mg oral Once per day on Sun Tue Thu Sat    warfarin  5 mg oral Once per day on Mon Wed Fri     PRN medications   Medication    dextrose 10 % in water (D10W)    dextrose    dicyclomine    glucagon    polyethylene glycol     Continuous Medications   Medication Dose Last Rate       Physical Exam:  Gen: Elderly  male sitting up in bed in no distress  HEENT: normocephalic. Elevated JVD above clavicle at 90 degrees  CV: irregularly irregular rhythm consistent with known a fib, rate WNL  Pulm: respirations even and unlabored on room air; lungs CTA  Abd: soft, non-distended. Normal active bowel sounds  Ext: warm and well-perfused. 2+ Bl LE edema present to knees; ruborous skin discoloration BL LEs  Neuro: speech clear, a&O x3, no focal deficits noted  Psych: pleasant and cooperative     Assessment/Plan   Mukesh Garg is a 74 y.o. male with PMH of pAfib, HTN, JENNY (on CPAP at home), gout, CAD s/p CABG x3 and Maze (2013), Rt LE DVTs (most recent 10/24/23 while on Eliquis) and bilateral PE (currently on warfarin), PAD s/p left iliofemoral, femoropopliteal and tibioperoneal thrombectomy (11/17/23) who was sent for direct admission from vascular medicine clinic due to volume overload and concern for ADHF.      Acute on chronic diastolic heart failure  - new onset, symptoms: subacute JESE + orthopnea + BAUMANN.   - TTE 10/31/20 EF  55-59%  - TTE 11/15/23 65-70%, normal LVEF, however, moderate to severely reduced RV function. Differential diagnosis for etiology is broad and include ischemia/missed MI, HTN, HFpEF, RV failure, pulmonary HTN, metabolic/thyroid, inflammatory/myocarditis.   - TTE 1/24: LV EF 55-60%, reduced right ventricular systolic function  - Admit weight: 115 kg (discharge wt in November: 111 kg)  - daily weight: 109 kg (110 kg)  - Strict Is/Os, daily standing weights.   - continue Lasix 40 IV daily (home dose 40 oral daily, but nonadherent due to urinary incontinence and frequency)  -  on 1/24  - increase spironolactone 25 mg daily to 50 mg but monitor creatinine closely,   - cont empagliflozin 10 mg daily started on 1/25    CAD  - s/p CABG x3 in 2013  - currently chest pain free  - cont Plavix, atorvastatin, Coreg     HTN  - BP on admission 170/107, however, pt reports not taking his Coreg today  - last 24 hours systolic range 119-157  - Coreg 12.5 BID increased to 25 mg BID 1/25 for better BP control  - home med: lisinopril 40 mg  - restart in am if creatinine remains stable     Afib  - rate controlled 80s bpm  - Coreg  was increased to 25 mg BID on 1/25  - cont AC with warfarin 5 mg on MWF and 2.5 mg the rest of the wk.  - INR today 2.6 (2.2)      PAD  - s/p left iliofemoral, femoropopliteal and tibioperoneal thrombectomy (11/17/23)  - cont Plavix, statin     Hx DVT/PE  - cont warfarin 5 mg on MWF and 2.5 mg the rest of the wk.   - INR 2.6 today    DM  - Recent A1C 6.2  - POCT glucose 112 today; yesterday 110-142  - low dose SSI    Diarrhea  - restarted home bentyl today, bowel regimen changed to PRN  - immodium 4mg po x1    Dispo:   - pending further diuresis   - DVT ppx warfarin    Seen and discussed with Dr. Nolan        Peripheral IV 01/23/24 22 G Left Wrist (Active)   Site Assessment Clean;Dry;Intact 01/24/24 1119   Number of days: 1       Code Status:  Full Code        Ilda Alvarado, APRN-CNP

## 2024-01-26 NOTE — PROGRESS NOTES
Transitional Care Coordination Progress Note:  Plan per medical team: Monitoring BP's, Continue diuresis.   Payer: [employer]  Status: Obsv  Discharge disposition: Home  Potential barriers: none  ADOD: deandre Bautista RN

## 2024-01-27 LAB
ANION GAP SERPL CALC-SCNC: 13 MMOL/L (ref 10–20)
BUN SERPL-MCNC: 23 MG/DL (ref 6–23)
CALCIUM SERPL-MCNC: 8.9 MG/DL (ref 8.6–10.6)
CHLORIDE SERPL-SCNC: 102 MMOL/L (ref 98–107)
CO2 SERPL-SCNC: 28 MMOL/L (ref 21–32)
CREAT SERPL-MCNC: 1.48 MG/DL (ref 0.5–1.3)
EGFRCR SERPLBLD CKD-EPI 2021: 49 ML/MIN/1.73M*2
ERYTHROCYTE [DISTWIDTH] IN BLOOD BY AUTOMATED COUNT: 14.5 % (ref 11.5–14.5)
GLUCOSE BLD MANUAL STRIP-MCNC: 103 MG/DL (ref 74–99)
GLUCOSE BLD MANUAL STRIP-MCNC: 126 MG/DL (ref 74–99)
GLUCOSE BLD MANUAL STRIP-MCNC: 159 MG/DL (ref 74–99)
GLUCOSE BLD MANUAL STRIP-MCNC: 201 MG/DL (ref 74–99)
GLUCOSE SERPL-MCNC: 96 MG/DL (ref 74–99)
HCT VFR BLD AUTO: 39.6 % (ref 41–52)
HGB BLD-MCNC: 12.2 G/DL (ref 13.5–17.5)
INR PPP: 2.8 (ref 0.9–1.1)
MAGNESIUM SERPL-MCNC: 1.99 MG/DL (ref 1.6–2.4)
MCH RBC QN AUTO: 27.9 PG (ref 26–34)
MCHC RBC AUTO-ENTMCNC: 30.8 G/DL (ref 32–36)
MCV RBC AUTO: 90 FL (ref 80–100)
NRBC BLD-RTO: 0 /100 WBCS (ref 0–0)
PLATELET # BLD AUTO: 188 X10*3/UL (ref 150–450)
POTASSIUM SERPL-SCNC: 3.3 MMOL/L (ref 3.5–5.3)
PROTHROMBIN TIME: 31.8 SECONDS (ref 9.8–12.8)
RBC # BLD AUTO: 4.38 X10*6/UL (ref 4.5–5.9)
SODIUM SERPL-SCNC: 140 MMOL/L (ref 136–145)
WBC # BLD AUTO: 7 X10*3/UL (ref 4.4–11.3)

## 2024-01-27 PROCEDURE — 2500000004 HC RX 250 GENERAL PHARMACY W/ HCPCS (ALT 636 FOR OP/ED): Performed by: STUDENT IN AN ORGANIZED HEALTH CARE EDUCATION/TRAINING PROGRAM

## 2024-01-27 PROCEDURE — 2500000004 HC RX 250 GENERAL PHARMACY W/ HCPCS (ALT 636 FOR OP/ED): Performed by: NURSE PRACTITIONER

## 2024-01-27 PROCEDURE — 85027 COMPLETE CBC AUTOMATED: CPT | Performed by: STUDENT IN AN ORGANIZED HEALTH CARE EDUCATION/TRAINING PROGRAM

## 2024-01-27 PROCEDURE — 85610 PROTHROMBIN TIME: CPT | Performed by: NURSE PRACTITIONER

## 2024-01-27 PROCEDURE — 36415 COLL VENOUS BLD VENIPUNCTURE: CPT | Performed by: STUDENT IN AN ORGANIZED HEALTH CARE EDUCATION/TRAINING PROGRAM

## 2024-01-27 PROCEDURE — 2500000001 HC RX 250 WO HCPCS SELF ADMINISTERED DRUGS (ALT 637 FOR MEDICARE OP): Performed by: NURSE PRACTITIONER

## 2024-01-27 PROCEDURE — 2500000002 HC RX 250 W HCPCS SELF ADMINISTERED DRUGS (ALT 637 FOR MEDICARE OP, ALT 636 FOR OP/ED): Performed by: STUDENT IN AN ORGANIZED HEALTH CARE EDUCATION/TRAINING PROGRAM

## 2024-01-27 PROCEDURE — 2500000001 HC RX 250 WO HCPCS SELF ADMINISTERED DRUGS (ALT 637 FOR MEDICARE OP): Performed by: STUDENT IN AN ORGANIZED HEALTH CARE EDUCATION/TRAINING PROGRAM

## 2024-01-27 PROCEDURE — 1200000002 HC GENERAL ROOM WITH TELEMETRY DAILY

## 2024-01-27 PROCEDURE — 83735 ASSAY OF MAGNESIUM: CPT | Performed by: STUDENT IN AN ORGANIZED HEALTH CARE EDUCATION/TRAINING PROGRAM

## 2024-01-27 PROCEDURE — 99232 SBSQ HOSP IP/OBS MODERATE 35: CPT | Performed by: STUDENT IN AN ORGANIZED HEALTH CARE EDUCATION/TRAINING PROGRAM

## 2024-01-27 PROCEDURE — 80048 BASIC METABOLIC PNL TOTAL CA: CPT | Performed by: STUDENT IN AN ORGANIZED HEALTH CARE EDUCATION/TRAINING PROGRAM

## 2024-01-27 PROCEDURE — 2500000001 HC RX 250 WO HCPCS SELF ADMINISTERED DRUGS (ALT 637 FOR MEDICARE OP)

## 2024-01-27 PROCEDURE — 82947 ASSAY GLUCOSE BLOOD QUANT: CPT

## 2024-01-27 RX ORDER — POTASSIUM CHLORIDE 20 MEQ/1
40 TABLET, EXTENDED RELEASE ORAL 2 TIMES DAILY
Status: COMPLETED | OUTPATIENT
Start: 2024-01-27 | End: 2024-01-27

## 2024-01-27 RX ORDER — TORSEMIDE 20 MG/1
20 TABLET ORAL DAILY
Status: DISCONTINUED | OUTPATIENT
Start: 2024-01-27 | End: 2024-01-28 | Stop reason: HOSPADM

## 2024-01-27 RX ORDER — LOPERAMIDE HYDROCHLORIDE 2 MG/1
2 CAPSULE ORAL DAILY
Status: DISCONTINUED | OUTPATIENT
Start: 2024-01-27 | End: 2024-01-28 | Stop reason: HOSPADM

## 2024-01-27 RX ORDER — LOPERAMIDE HYDROCHLORIDE 2 MG/1
2 CAPSULE ORAL 4 TIMES DAILY PRN
Status: DISCONTINUED | OUTPATIENT
Start: 2024-01-27 | End: 2024-01-27

## 2024-01-27 RX ADMIN — CARVEDILOL 25 MG: 25 TABLET, FILM COATED ORAL at 18:15

## 2024-01-27 RX ADMIN — ATORVASTATIN CALCIUM 40 MG: 40 TABLET, FILM COATED ORAL at 21:43

## 2024-01-27 RX ADMIN — CARVEDILOL 25 MG: 25 TABLET, FILM COATED ORAL at 10:20

## 2024-01-27 RX ADMIN — POTASSIUM CHLORIDE 40 MEQ: 1500 TABLET, EXTENDED RELEASE ORAL at 21:42

## 2024-01-27 RX ADMIN — SPIRONOLACTONE 50 MG: 25 TABLET, FILM COATED ORAL at 10:20

## 2024-01-27 RX ADMIN — ALLOPURINOL 100 MG: 100 TABLET ORAL at 10:17

## 2024-01-27 RX ADMIN — EMPAGLIFLOZIN 10 MG: 10 TABLET, FILM COATED ORAL at 10:20

## 2024-01-27 RX ADMIN — LOPERAMIDE HYDROCHLORIDE 2 MG: 2 CAPSULE ORAL at 14:08

## 2024-01-27 RX ADMIN — DICYCLOMINE HYDROCHLORIDE 20 MG: 20 TABLET ORAL at 10:21

## 2024-01-27 RX ADMIN — EZETIMIBE 10 MG: 10 TABLET ORAL at 10:20

## 2024-01-27 RX ADMIN — CLOPIDOGREL BISULFATE 75 MG: 75 TABLET ORAL at 10:17

## 2024-01-27 RX ADMIN — TORSEMIDE 20 MG: 20 TABLET ORAL at 14:08

## 2024-01-27 RX ADMIN — WARFARIN SODIUM 2.5 MG: 5 TABLET ORAL at 18:14

## 2024-01-27 RX ADMIN — POTASSIUM CHLORIDE 40 MEQ: 1500 TABLET, EXTENDED RELEASE ORAL at 10:20

## 2024-01-27 NOTE — HOSPITAL COURSE
"Mukesh Garg is a 74 y.o. male with PMH of pAfib, HTN, JENNY (on CPAP at home), gout, CAD s/p CABG x3 and Maze (2013), Rt LE DVTs (most recent 10/24/23 while on Eliquis) and bilateral PE (on warfarin), PAD s/p left iliofemoral, femoropopliteal and tibioperoneal thrombectomy (11/17/23) who was sent for direct admission from vascular medicine clinic due to volume overload and concern for ADHF.      Pt reports bilateral JESE that has been there since November but got significantly worse over the past few weeks. Also, noted occasional chest pressure, orthopnea and BAUMANN after walking for 5-10 mins (previously walking only limited by hip pain and could walk for ~1 block). No palpitations, dizziness, syncope or falls. His cardiologist prescribed him Lasix 40 once daily a week prior to admission, but pt has not been compliant on it due to difficulty going to the bathroom multiple times a day and many \"accidents\" while on the way to the bathroom.      Admission 10/24/23 for afib RVR in the setting of COVID 19 and new DVT. Treatment of afib with rate control strategy. Coumadin changed to eliquis for DVT.     Of note, pt was admitted from 11/15-11/27/23 for ALI of JESE and underwent thrombectomy on 11/17/23. He was also found to have bilateral PE. Eliquis changed back to warfarin. He was discharged to rehab then went home on 12/15/23.       Floor Course:  Echo showed LV EF 55-60%, reduced right ventricular systolic function.   Patient was diuresed with IV lasix boluses. Transitioned to torsemide 20mg PO daily. He may not need to take diuretic long term and will need reassessed outpatient. We recommend outpatient evaluation for cardioversion as this may be contributing to his HFpEF. He was in SR for 10 years until COVID 10/2023 and has been in afib since. We increased coreg to help with BP and rate control (HR in the 80's). In case cardioversion is pursued, we asked patient to check INR's weekly for another 3 weeks. His INR has been " therapeutic during entire admission. We added empagliflozin and increased spirolactone for HFpEF. He had a small rise in Cr which is expected with initiation of empa, however, we elected to not restart lisinopril until Cr reevaluated in the outpatient setting. Cr at discharge is 1.54.     His warfarin remained therapeutic during admission and follow up with coumadin clinic.     Discharge weight: 102 kg    After all labs and VS were reviewed the decision was made that the patient was medically stable for discharge.  The patient was discharged in satisfactory condition.    More than 30 minutes were spent in coordinating patient discharge.

## 2024-01-27 NOTE — PROGRESS NOTES
Subjective Data:  Patient reports LE swelling is improving ( to touch) and shortness of breath has improved as well. Afib rate controlled on tele.         Overnight Events:    None      Objective Data:  Last Recorded Vitals:  Vitals:    01/26/24 2205 01/27/24 0145 01/27/24 0521 01/27/24 0941   BP: 99/60 135/82 128/83 136/85   BP Location: Left arm Left arm Left arm Right arm   Patient Position: Sitting Lying Lying Lying   Pulse: 69 78 95 78   Resp: 18 18  18   Temp: 36.8 °C (98.2 °F) 37 °C (98.6 °F) 37 °C (98.6 °F) 37.1 °C (98.8 °F)   TempSrc:    Temporal   SpO2: 94% 96% 96% 93%   Weight:   104 kg (229 lb 6.2 oz)    Height:           Last Labs:  Results for orders placed or performed during the hospital encounter of 01/23/24 (from the past 24 hour(s))   POCT GLUCOSE   Result Value Ref Range    POCT Glucose 163 (H) 74 - 99 mg/dL   POCT GLUCOSE   Result Value Ref Range    POCT Glucose 102 (H) 74 - 99 mg/dL   POCT GLUCOSE   Result Value Ref Range    POCT Glucose 162 (H) 74 - 99 mg/dL   CBC   Result Value Ref Range    WBC 7.0 4.4 - 11.3 x10*3/uL    nRBC 0.0 0.0 - 0.0 /100 WBCs    RBC 4.38 (L) 4.50 - 5.90 x10*6/uL    Hemoglobin 12.2 (L) 13.5 - 17.5 g/dL    Hematocrit 39.6 (L) 41.0 - 52.0 %    MCV 90 80 - 100 fL    MCH 27.9 26.0 - 34.0 pg    MCHC 30.8 (L) 32.0 - 36.0 g/dL    RDW 14.5 11.5 - 14.5 %    Platelets 188 150 - 450 x10*3/uL   Basic metabolic panel   Result Value Ref Range    Glucose 96 74 - 99 mg/dL    Sodium 140 136 - 145 mmol/L    Potassium 3.3 (L) 3.5 - 5.3 mmol/L    Chloride 102 98 - 107 mmol/L    Bicarbonate 28 21 - 32 mmol/L    Anion Gap 13 10 - 20 mmol/L    Urea Nitrogen 23 6 - 23 mg/dL    Creatinine 1.48 (H) 0.50 - 1.30 mg/dL    eGFR 49 (L) >60 mL/min/1.73m*2    Calcium 8.9 8.6 - 10.6 mg/dL   Magnesium   Result Value Ref Range    Magnesium 1.99 1.60 - 2.40 mg/dL   Protime-INR   Result Value Ref Range    Protime 31.8 (H) 9.8 - 12.8 seconds    INR 2.8 (H) 0.9 - 1.1   POCT GLUCOSE   Result Value  Ref Range    POCT Glucose 103 (H) 74 - 99 mg/dL       I/O last 3 completed shifts:  In: 120 (1.2 mL/kg) [P.O.:120]  Out: 4150 (39.9 mL/kg) [Urine:4150 (1.1 mL/kg/hr)]  Weight: 104 kg     Inpatient Medications:  Scheduled medications   Medication Dose Route Frequency    allopurinol  100 mg oral Daily    atorvastatin  40 mg oral Nightly    carvedilol  25 mg oral BID with meals    clopidogrel  75 mg oral Daily    dicyclomine  20 mg oral Daily    empagliflozin  10 mg oral Daily    ezetimibe  10 mg oral Daily    insulin lispro  0-5 Units subcutaneous TID with meals    loperamide  2 mg oral Daily    potassium chloride CR  40 mEq oral BID    spironolactone  50 mg oral Daily    torsemide  20 mg oral Daily    warfarin  2.5 mg oral Once per day on Sun Tue Thu Sat    warfarin  5 mg oral Once per day on Mon Wed Fri     PRN medications   Medication    dextrose 10 % in water (D10W)    dextrose    dicyclomine    glucagon    polyethylene glycol     Continuous Medications   Medication Dose Last Rate       Physical Exam:  Gen: Elderly  male sitting up in bed in no distress  HEENT: normocephalic. Elevated JVD   CV: irregularly irregular rhythm consistent with known a fib, rate WNL  Pulm: respirations even and unlabored on room air; lungs CTA  Abd: soft, non-distended. Normal active bowel sounds  Ext: warm and well-perfused. trace-1+ Bl LE edema present to knees; ruborous skin discoloration BL LEs  Neuro: speech clear, a&O x3, no focal deficits noted  Psych: pleasant and cooperative     Assessment/Plan   Mukesh Garg is a 74 y.o. male with PMH of pAfib, HTN, JENNY (on CPAP at home), gout, CAD s/p CABG x3 and Maze (2013), Rt LE DVTs (most recent 10/24/23 while on Eliquis) and bilateral PE (currently on warfarin), PAD s/p left iliofemoral, femoropopliteal and tibioperoneal thrombectomy (11/17/23) who was sent for direct admission from vascular medicine clinic due to volume overload and concern for ADHF.      Acute on chronic  diastolic heart failure  - May be due to DD or afib, symptoms of LE edema since 11/2023, worse the past few weeks. historically not needed lasix until a week ago when he was given lasix on PRN basis by cardiologist. Did not like to take it because it made him urinate all day.   - new onset, symptoms: subacute JESE + orthopnea + BAUMANN.   - TTE 10/31/20 EF 55-59%  - TTE 11/15/23 65-70%, normal LVEF, however, moderate to severely reduced RV function. Differential diagnosis for etiology is broad and include ischemia/missed MI, HTN, HFpEF, RV failure, pulmonary HTN, metabolic/thyroid, inflammatory/myocarditis.   - TTE 1/24: LV EF 55-60%, reduced right ventricular systolic function  - Admit weight: 115 kg (discharge wt in November: 111 kg)  - daily weight: 104kg (109)(110 kg)  - Strict Is/Os, daily standing weights.   - continue Lasix 40 IV daily (home dose 40 oral daily, but nonadherent due to urinary incontinence and frequency)  - Transition to torsemide 20mg daily. He may not need long term daily diuretic, will need reassessment outpatient  -  on 1/24  - 1/26 increase spironolactone 25 mg daily to 50 mg but monitor creatinine closely,   - cont empagliflozin 10 mg daily started on 1/25  - K 3.3-->given 40meq BID today     CAD  - History of STEMI 12/2006 S/P 3 DESX1 to prox diag, DESX2 to mid and distal LAD at LW  - Nuclear stress test performed 7/15/2013 for atypical symptoms revealed no ischemia. LVEF calculated 48%   - s/p CABG x3 in 2013  (LIMA to a large Diag, a free ALEX y'd off the LIMA to the LAD , SVG-PDA)   - currently chest pain free  - cont Plavix, atorvastatin, Coreg     HTN  - Historically well controlled  - BP on admission 170/107, however, pt reports not taking his Coreg today  - home meds: coreg 12.5 BID, lisinopril 40mg daily  - 1/25 Coreg 12.5 BID increased to 25 mg BID for better BP control  - last 24 hours systolic range -[id number] with increase of coreg  - home med: lisinopril 40 mg no  started on admit, will consider restarting tomorrow if Cr improves and hemodynamics permit.         Afib  - Diagnosed 2007 at time of MI  - S/P  partial left sided MAZE procedure at time of CABG 2013, remained on AC due to TIA's and maintained SR until 10/2023 when he got COVID, went into afib RVR. Rate controlled since  - rate controlled 80s bpm  - Coreg  was increased to 25 mg BID on 1/25  - cont AC with warfarin 5 mg on MWF and 2.5 mg the rest of the wk.  - INR today 2.8 (2.6) (2.2)   - Recommend he discuss with his cardiologist Dr. Wayne outpatient re: cardioversion       PAD  - acute lower limb ischemia s/p left iliofemoral, femoropopliteal and tibioperoneal thrombectomy (11/17/23)  - cont Plavix, statin     Hx DVT/PE  - Hx of provoked (in the setting of COVID) RLE DVT on 10/24/2023 and also 20 years ago in the right leg after long flight, treated with eliquis, he was compliant  - 11/14/23 He has bilateral pulmonary emboli with moderate clot burden in the distal right main pulmonary artery extending into the right middle and right lower lobes and small clot burden in the left lower lobe pulmonary with a suggestive right ventricular stain.    - patient had been on warfarin for many years for atrial fibrillation. INR was subtherapeutic during last admission (10/24/2023) as patient had not been taking his medications due to symptomatic COVID infection. He was initially being bridged back to warfarin, but decision was made to switch to apixaban on discharge. He was fully adherent to apixaban since then. In addition, CTA chest had not been performed during last admission, so the chronicity of PE is not clear-->Eliquis changed back to warfarin, consider failed on eliquis therapy.   - B2 glycoprotein ab and cardiolipin ab labs all were WNL  - cont warfarin 5 mg on MWF and 2.5 mg the rest of the wk.       History of elevated glucose without history of DM  - HgbA1C 6.7 6/2023  - Recent A1C 11/21/23 6.2  - Fasting  glucose during this admission <100  - Glucose with meals <160  - Elevated glucose levels in the past 11/2023, 10/2023 ranging 106-223  - low dose SSI    Diarrhea  - History of explosive diarrhea, evaluated by GI 1.5 years ago, no definitive diagnosis. Treated with immodium 2mg daily and 2 psyllium PO daily. Started on bentyl during rehab stay 11/2023 for gas.   - restarted home bentyl 1/26, bowel regimen changed to PRN  - immodium 2mg PO daily restarted    Gout  - continue allopurinol 100mg daily    DONI  - Cr normal at baseline  - DONI with Cr 1.55 1/26 after initiation of empa 10mg daily (expected)  - Cr today 1.48  - Continue to hold ACE due to DONI    Dispo:   - pending transition to PO diuretic and med adjustment for BP  - DVT ppx warfarin    Seen and discussed with Dr. Smart       Peripheral IV 01/23/24 22 G Left Wrist (Active)   Site Assessment Clean;Dry;Intact 01/24/24 1119   Number of days: 1       Code Status:  Full Code        MISTY Escalante-CNP

## 2024-01-28 VITALS
HEIGHT: 69 IN | RESPIRATION RATE: 18 BRPM | TEMPERATURE: 97.2 F | DIASTOLIC BLOOD PRESSURE: 81 MMHG | WEIGHT: 224.65 LBS | SYSTOLIC BLOOD PRESSURE: 124 MMHG | BODY MASS INDEX: 33.27 KG/M2 | HEART RATE: 81 BPM | OXYGEN SATURATION: 95 %

## 2024-01-28 LAB
ANION GAP SERPL CALC-SCNC: 15 MMOL/L (ref 10–20)
BUN SERPL-MCNC: 25 MG/DL (ref 6–23)
CALCIUM SERPL-MCNC: 9.4 MG/DL (ref 8.6–10.6)
CHLORIDE SERPL-SCNC: 103 MMOL/L (ref 98–107)
CO2 SERPL-SCNC: 26 MMOL/L (ref 21–32)
CREAT SERPL-MCNC: 1.54 MG/DL (ref 0.5–1.3)
EGFRCR SERPLBLD CKD-EPI 2021: 46 ML/MIN/1.73M*2
ERYTHROCYTE [DISTWIDTH] IN BLOOD BY AUTOMATED COUNT: 14.3 % (ref 11.5–14.5)
GLUCOSE BLD MANUAL STRIP-MCNC: 103 MG/DL (ref 74–99)
GLUCOSE BLD MANUAL STRIP-MCNC: 125 MG/DL (ref 74–99)
GLUCOSE SERPL-MCNC: 93 MG/DL (ref 74–99)
HCT VFR BLD AUTO: 39.9 % (ref 41–52)
HGB BLD-MCNC: 12.8 G/DL (ref 13.5–17.5)
INR PPP: 2.9 (ref 0.9–1.1)
MAGNESIUM SERPL-MCNC: 2.06 MG/DL (ref 1.6–2.4)
MCH RBC QN AUTO: 28.8 PG (ref 26–34)
MCHC RBC AUTO-ENTMCNC: 32.1 G/DL (ref 32–36)
MCV RBC AUTO: 90 FL (ref 80–100)
NRBC BLD-RTO: 0 /100 WBCS (ref 0–0)
PLATELET # BLD AUTO: 211 X10*3/UL (ref 150–450)
POTASSIUM SERPL-SCNC: 3.9 MMOL/L (ref 3.5–5.3)
PROTHROMBIN TIME: 33.5 SECONDS (ref 9.8–12.8)
RBC # BLD AUTO: 4.44 X10*6/UL (ref 4.5–5.9)
SODIUM SERPL-SCNC: 140 MMOL/L (ref 136–145)
WBC # BLD AUTO: 7.6 X10*3/UL (ref 4.4–11.3)

## 2024-01-28 PROCEDURE — 83735 ASSAY OF MAGNESIUM: CPT | Performed by: STUDENT IN AN ORGANIZED HEALTH CARE EDUCATION/TRAINING PROGRAM

## 2024-01-28 PROCEDURE — 82947 ASSAY GLUCOSE BLOOD QUANT: CPT

## 2024-01-28 PROCEDURE — 2500000004 HC RX 250 GENERAL PHARMACY W/ HCPCS (ALT 636 FOR OP/ED): Performed by: STUDENT IN AN ORGANIZED HEALTH CARE EDUCATION/TRAINING PROGRAM

## 2024-01-28 PROCEDURE — 85027 COMPLETE CBC AUTOMATED: CPT | Performed by: STUDENT IN AN ORGANIZED HEALTH CARE EDUCATION/TRAINING PROGRAM

## 2024-01-28 PROCEDURE — 85610 PROTHROMBIN TIME: CPT | Performed by: NURSE PRACTITIONER

## 2024-01-28 PROCEDURE — 2500000001 HC RX 250 WO HCPCS SELF ADMINISTERED DRUGS (ALT 637 FOR MEDICARE OP)

## 2024-01-28 PROCEDURE — 82374 ASSAY BLOOD CARBON DIOXIDE: CPT | Performed by: STUDENT IN AN ORGANIZED HEALTH CARE EDUCATION/TRAINING PROGRAM

## 2024-01-28 PROCEDURE — 2500000001 HC RX 250 WO HCPCS SELF ADMINISTERED DRUGS (ALT 637 FOR MEDICARE OP): Performed by: STUDENT IN AN ORGANIZED HEALTH CARE EDUCATION/TRAINING PROGRAM

## 2024-01-28 PROCEDURE — 36415 COLL VENOUS BLD VENIPUNCTURE: CPT | Performed by: NURSE PRACTITIONER

## 2024-01-28 PROCEDURE — 2500000001 HC RX 250 WO HCPCS SELF ADMINISTERED DRUGS (ALT 637 FOR MEDICARE OP): Performed by: NURSE PRACTITIONER

## 2024-01-28 PROCEDURE — 2500000002 HC RX 250 W HCPCS SELF ADMINISTERED DRUGS (ALT 637 FOR MEDICARE OP, ALT 636 FOR OP/ED): Performed by: STUDENT IN AN ORGANIZED HEALTH CARE EDUCATION/TRAINING PROGRAM

## 2024-01-28 PROCEDURE — 2500000004 HC RX 250 GENERAL PHARMACY W/ HCPCS (ALT 636 FOR OP/ED): Performed by: NURSE PRACTITIONER

## 2024-01-28 PROCEDURE — 99238 HOSP IP/OBS DSCHRG MGMT 30/<: CPT | Performed by: STUDENT IN AN ORGANIZED HEALTH CARE EDUCATION/TRAINING PROGRAM

## 2024-01-28 RX ORDER — SPIRONOLACTONE 50 MG/1
50 TABLET, FILM COATED ORAL DAILY
Qty: 30 TABLET | Refills: 0 | Status: SHIPPED | OUTPATIENT
Start: 2024-01-29 | End: 2024-01-28 | Stop reason: SDUPTHER

## 2024-01-28 RX ORDER — LOPERAMIDE HYDROCHLORIDE 2 MG/1
2 CAPSULE ORAL DAILY PRN
Qty: 30 CAPSULE | Refills: 0 | Status: SHIPPED | OUTPATIENT
Start: 2024-01-28 | End: 2024-02-27

## 2024-01-28 RX ORDER — TORSEMIDE 20 MG/1
20 TABLET ORAL DAILY
Qty: 30 TABLET | Refills: 1 | Status: SHIPPED | OUTPATIENT
Start: 2024-01-29 | End: 2024-03-27

## 2024-01-28 RX ORDER — CARVEDILOL 25 MG/1
25 TABLET ORAL
Qty: 60 TABLET | Refills: 1 | Status: SHIPPED | OUTPATIENT
Start: 2024-01-28 | End: 2024-04-12 | Stop reason: SDUPTHER

## 2024-01-28 RX ORDER — CARVEDILOL 25 MG/1
25 TABLET ORAL
Qty: 60 TABLET | Refills: 0 | Status: SHIPPED | OUTPATIENT
Start: 2024-01-28 | End: 2024-01-28 | Stop reason: SDUPTHER

## 2024-01-28 RX ORDER — POLYETHYLENE GLYCOL 3350 17 G/17G
17 POWDER, FOR SOLUTION ORAL 2 TIMES DAILY PRN
Qty: 60 PACKET | Refills: 0 | Status: SHIPPED | OUTPATIENT
Start: 2024-01-28 | End: 2024-02-16 | Stop reason: ALTCHOICE

## 2024-01-28 RX ORDER — TORSEMIDE 20 MG/1
20 TABLET ORAL DAILY
Qty: 30 TABLET | Refills: 1 | Status: SHIPPED | OUTPATIENT
Start: 2024-01-29 | End: 2024-01-28 | Stop reason: SDUPTHER

## 2024-01-28 RX ORDER — CARVEDILOL 25 MG/1
25 TABLET ORAL
Qty: 60 TABLET | Refills: 0 | Status: CANCELLED | OUTPATIENT
Start: 2024-01-28 | End: 2024-02-27

## 2024-01-28 RX ORDER — SPIRONOLACTONE 50 MG/1
50 TABLET, FILM COATED ORAL DAILY
Qty: 30 TABLET | Refills: 1 | Status: SHIPPED | OUTPATIENT
Start: 2024-01-29 | End: 2024-02-08 | Stop reason: WASHOUT

## 2024-01-28 RX ADMIN — SPIRONOLACTONE 50 MG: 25 TABLET, FILM COATED ORAL at 09:27

## 2024-01-28 RX ADMIN — CARVEDILOL 25 MG: 25 TABLET, FILM COATED ORAL at 17:27

## 2024-01-28 RX ADMIN — DICYCLOMINE HYDROCHLORIDE 20 MG: 20 TABLET ORAL at 09:26

## 2024-01-28 RX ADMIN — TORSEMIDE 20 MG: 20 TABLET ORAL at 09:28

## 2024-01-28 RX ADMIN — CARVEDILOL 25 MG: 25 TABLET, FILM COATED ORAL at 10:05

## 2024-01-28 RX ADMIN — ALLOPURINOL 100 MG: 100 TABLET ORAL at 09:27

## 2024-01-28 RX ADMIN — EZETIMIBE 10 MG: 10 TABLET ORAL at 09:26

## 2024-01-28 RX ADMIN — WARFARIN SODIUM 2.5 MG: 5 TABLET ORAL at 17:26

## 2024-01-28 RX ADMIN — LOPERAMIDE HYDROCHLORIDE 2 MG: 2 CAPSULE ORAL at 09:34

## 2024-01-28 RX ADMIN — CLOPIDOGREL BISULFATE 75 MG: 75 TABLET ORAL at 09:27

## 2024-01-28 RX ADMIN — EMPAGLIFLOZIN 10 MG: 10 TABLET, FILM COATED ORAL at 09:30

## 2024-01-28 ASSESSMENT — COGNITIVE AND FUNCTIONAL STATUS - GENERAL
MOBILITY SCORE: 21
DAILY ACTIVITIY SCORE: 24
CLIMB 3 TO 5 STEPS WITH RAILING: A LITTLE
WALKING IN HOSPITAL ROOM: A LITTLE
MOVING TO AND FROM BED TO CHAIR: A LITTLE

## 2024-01-28 ASSESSMENT — PAIN SCALES - GENERAL: PAINLEVEL_OUTOF10: 0 - NO PAIN

## 2024-01-28 NOTE — NURSING NOTE
Pt was discharged home after receiving his Carvedilol and coumadin at 1730. Called the patients pharmacy in [place] to ensure that pt had all prescribed meds to be filled. Pharmacy will be open at 9am and be ready for patient and his wife for pickup. Patient and his wife are aware. IV was removed along with his tele monitor. Pt was instructed to take meds as prescribed and that he can return to [facility] for workout and that he needs to get his labs drawn by the 1st of February which include a BMP and INR. There are no further questions at this time, pt was told to followup with appointments and call to solidify whether some previously made ones are necessary.  MICHAELA Zhao

## 2024-01-28 NOTE — DISCHARGE SUMMARY
"Discharge Diagnosis  Acute decompensated heart failure (CMS/[facility])    Issues Requiring Follow-Up  INR, renal panel     Test Results Pending At Discharge  Pending Labs       No current pending labs.            Hospital Course  Mukesh Garg is a 74 y.o. male with PMH of pAfib, HTN, JENNY (on CPAP at home), gout, CAD s/p CABG x3 and Maze (2013), Rt LE DVTs (most recent 10/24/23 while on Eliquis) and bilateral PE (on warfarin), PAD s/p left iliofemoral, femoropopliteal and tibioperoneal thrombectomy (11/17/23) who was sent for direct admission from vascular medicine clinic due to volume overload and concern for ADHF.      Pt reports bilateral JESE that has been there since November but got significantly worse over the past few weeks. Also, noted occasional chest pressure, orthopnea and BAUMANN after walking for 5-10 mins (previously walking only limited by hip pain and could walk for ~1 block). No palpitations, dizziness, syncope or falls. His cardiologist prescribed him Lasix 40 once daily a week prior to admission, but pt has not been compliant on it due to difficulty going to the bathroom multiple times a day and many \"accidents\" while on the way to the bathroom.      Admission 10/24/23 for afib RVR in the setting of COVID 19 and new DVT. Treatment of afib with rate control strategy. Coumadin changed to eliquis for DVT.     Of note, pt was admitted from 11/15-11/27/23 for ALI of JESE and underwent thrombectomy on 11/17/23. He was also found to have bilateral PE. Eliquis changed back to warfarin. He was discharged to rehab then went home on 12/15/23.       Floor Course:  Echo showed LV EF 55-60%, reduced right ventricular systolic function.   Patient was diuresed with IV lasix boluses. Transitioned to torsemide 20mg PO daily. He may not need to take diuretic long term and will need reassessed outpatient. We recommend outpatient evaluation for cardioversion as this may be contributing to his HFpEF. He was in SR for 10 years " until COVID 10/2023 and has been in afib since. We increased coreg to help with BP and rate control (HR in the 80's). In case cardioversion is pursued, we asked patient to check INR's weekly for another 3 weeks. His INR has been therapeutic during entire admission. We added empagliflozin and increased spirolactone for HFpEF. He had a small rise in Cr which is expected with initiation of empa, however, we elected to not restart lisinopril until Cr reevaluated in the outpatient setting. Cr at discharge is 1.54.     His warfarin remained therapeutic during admission and follow up with coumadin clinic.     Discharge weight: 102 kg    After all labs and VS were reviewed the decision was made that the patient was medically stable for discharge.  The patient was discharged in satisfactory condition.    More than 30 minutes were spent in coordinating patient discharge.        Home Medications     Medication List      START taking these medications     empagliflozin 10 mg; Commonly known as: Jardiance; Take 1 tablet (10 mg)   by mouth once daily. Do not start before January 29, 2024.; Start taking   on: January 29, 2024   spironolactone 50 mg tablet; Commonly known as: Aldactone; Take 1 tablet   (50 mg) by mouth once daily. Do not start before January 29, 2024.; Start   taking on: January 29, 2024   torsemide 20 mg tablet; Commonly known as: Demadex; Take 1 tablet (20   mg) by mouth once daily. Do not start before January 29, 2024.; Start   taking on: January 29, 2024     CHANGE how you take these medications     carvedilol 25 mg tablet; Commonly known as: Coreg; Take 1 tablet (25 mg)   by mouth 2 times a day with meals.; What changed: medication strength, See   the new instructions.     CONTINUE taking these medications     acetaminophen 325 mg tablet; Commonly known as: Tylenol   allopurinol 100 mg tablet; Commonly known as: Zyloprim; TAKE 1 TABLET BY   MOUTH EVERY DAY   ammonium lactate 12 % lotion; Commonly known as:  Lac-Hydrin   atorvastatin 40 mg tablet; Commonly known as: Lipitor; TAKE 1 TABLET BY   MOUTH EVERY DAY FOR 90 DAYS   clopidogrel 75 mg tablet; Commonly known as: Plavix; Take 1 tablet (75   mg) by mouth once daily.   dicyclomine 20 mg tablet; Commonly known as: Bentyl   ezetimibe 10 mg tablet; Commonly known as: Zetia   famotidine 20 mg tablet; Commonly known as: Pepcid   loperamide 2 mg capsule; Commonly known as: Imodium; Take 1 capsule (2   mg) by mouth once daily as needed for diarrhea.   polyethylene glycol 17 gram packet; Commonly known as: Glycolax,   Miralax; Take 17 g by mouth 2 times a day as needed (constipation).   warfarin 5 mg tablet; Commonly known as: Coumadin; Take as directed. If   you are unsure how to take this medication, talk to your nurse or doctor.;   Original instructions: Take 1 tablet (5 mg) by mouth once daily in the   evening. 5mg M, W, F -2.5 mg all other days     STOP taking these medications     furosemide 40 mg tablet; Commonly known as: Lasix   lisinopril 40 mg tablet       Outpatient Follow-Up  Future Appointments   Date Time Provider Department Center   1/30/2024  2:30 PM DANGELO Hood [facility]   2/1/2024  3:00 PM Mukesh Conklin PT NICOLETTE [facility]   2/2/2024  2:00 PM Heriberto Pimentel MD [id number] [facility]   2/5/2024  2:45 PM ARNIE TIERNEY NURSE [facility]   2/5/2024  3:45 PM DANGELO HoodBSMIRACLE [facility]   2/7/2024  3:45 PM Mukesh Conklin PT NICOLETTE [facility]   2/12/2024  1:45 PM Steven Wayne DO [id number] [facility]   2/12/2024  3:45 PM DANGELO Hood [facility]   5/21/2024  3:00 PM Kenyetta Ortega MD [id number] [facility]   5/24/2024 10:15 AM Steven Wayne DO [id number] [facility]   7/11/2024  9:00 AM [address]   7/11/2024 10:00 AM M[facility] [place] [facility]   7/11/2024 11:20 AM Monika Pruett MD [id number] Jadiel Matthews, MISTY-CNP

## 2024-01-28 NOTE — CARE PLAN
The patient's goals for the shift include rest    The clinical goals for the shift include Patient to remain HD stable.  Problem: Safety - Adult  Goal: Free from fall injury  Outcome: Progressing     Problem: Chronic Conditions and Co-morbidities  Goal: Patient's chronic conditions and co-morbidity symptoms are monitored and maintained or improved  Outcome: Progressing

## 2024-01-28 NOTE — DISCHARGE INSTRUCTIONS
- Please weigh yourself daily. Your dry weight is 224 pounds. Call your cardiologist if you gain more than 3 pounds in 24 hours or 5 pounds in 7 days.     - We made changes to your medictions: stopped lisinopril, increased coreg to 25mg twice a day, and started empaglifazolin and spironolactone. We started you on a new water pill called torsemide. Please stop lasix/furosemide.     - Do not eat bananas. It may cause your potassium to go up and that interacts with the spironolactone.     - Get your labs drawn late next week.     - Monitor your blood pressure regularly    Thank you for allowing us to care for you!

## 2024-01-28 NOTE — PROGRESS NOTES
Patient Akila score over 78 but states he makes  his own appointments and declined service offered to make appointment.

## 2024-01-29 ENCOUNTER — PATIENT OUTREACH (OUTPATIENT)
Dept: PRIMARY CARE | Facility: CLINIC | Age: 74
End: 2024-01-29
Payer: COMMERCIAL

## 2024-01-29 ENCOUNTER — DOCUMENTATION (OUTPATIENT)
Dept: PRIMARY CARE | Facility: CLINIC | Age: 74
End: 2024-01-29
Payer: COMMERCIAL

## 2024-01-29 ENCOUNTER — TELEPHONE (OUTPATIENT)
Dept: PRIMARY CARE | Facility: CLINIC | Age: 74
End: 2024-01-29
Payer: COMMERCIAL

## 2024-01-29 NOTE — PROGRESS NOTES
Discharge Facility: [facility]     Discharge Diagnosis:    Acute decompensated heart failure (CMS/HCC)     Issues Requiring Follow-Up  INR, renal panel     Admission Date: 1/23/2024   Discharge Date:  1/28/2024     PCP Appointment Date: tasked to office     Specialist Appointment Date:     Dr. Wayne cardiology F/U 2/12     Hospital Encounter and Summary: Linked     See discharge assessment below for further details     Engagement  Call Start Time: 1454 (1/29/2024  2:54 PM)    Medications  Medications reviewed with patient/caregiver?: Yes (1/29/2024  2:54 PM)  Is the patient having any side effects they believe may be caused by any medication additions or changes?: No (1/29/2024  2:54 PM)  Does the patient have all medications ordered at discharge?: Yes (1/29/2024  2:54 PM)  Care Management Interventions: No intervention needed (1/29/2024  2:54 PM)  Prescription Comments: New meds reviewed,   empagliflozin 10 mg; Commonly known as: Jardiance; Take 1 tablet (10 mg)   by mouth once daily. Do not start before January 29, 2024.; Start taking   on: January 29, 2024   spironolactone 50 mg tablet; Commonly known as: Aldactone; Take 1 tablet   (50 mg) by mouth once daily. Do not start before January 29, 2024.; Start   taking on: January 29, 2024   torsemide 20 mg tablet; Commonly known as: Demadex; Take 1 tablet (20   mg) by mouth once daily. Do not start before January 29, 2024.; Start   taking on: January 29, 2024,,,, STOP taking these medications     furosemide 40 mg tablet; Commonly known as: Lasix   lisinopril 40 mg tablet (1/29/2024  2:54 PM)  Is the patient taking all medications as directed (includes completed medication regime)?: Yes (1/29/2024  2:54 PM)  Care Management Interventions: Provided patient education (1/29/2024  2:54 PM)  Medication Comments: Patient has no questions or concerns (1/29/2024  2:54 PM)    Appointments  Does the patient have a primary care provider?: Yes (1/29/2024  2:54 PM)  Care Management  Interventions: Educated patient on importance of making appointment (Patient states spouse called , I sent message as well) (1/29/2024  2:54 PM)  Has the patient kept scheduled appointments due by today?: Yes (1/29/2024  2:54 PM)  Care Management Interventions: Advised to schedule with specialist (Dr. Wayne cardiology  F/U 2/12) (1/29/2024  2:54 PM)    Self Management  What is the home health agency?: NA (1/29/2024  2:54 PM)  What Durable Medical Equipment (DME) was ordered?: NA (1/29/2024  2:54 PM)    Patient Teaching  Does the patient have access to their discharge instructions?: Yes (1/29/2024  2:54 PM)  Care Management Interventions: Reviewed instructions with patient (1/29/2024  2:54 PM)  What is the patient's perception of their health status since discharge?: Improving (1/29/2024  2:54 PM)  Is the patient/caregiver able to teach back the hierarchy of who to call/visit for symptoms/problems? PCP, Specialist, Home Health nurse, Urgent Care, ED, 911: Yes (1/29/2024  2:54 PM)  Patient/Caregiver Education Comments: Patient aware to take medications as instructed along with daily weight monitoring , aware when to call providers for any questions concerns or change in condition (1/29/2024  2:54 PM)

## 2024-01-29 NOTE — TELEPHONE ENCOUNTER
PT wife states PT was referred to hematology due to  lupus anticoagulant markers in his blood.  PT was recently admitted to the hospital and vascular told them they would follow PT due to these markers instead of hematology.  PT wife wants to make sure this was the only reason he was being referred as they would prefer vascular to handle this instead of hematology.

## 2024-01-30 ENCOUNTER — TREATMENT (OUTPATIENT)
Dept: PHYSICAL THERAPY | Facility: CLINIC | Age: 74
End: 2024-01-30
Payer: MEDICARE

## 2024-01-30 DIAGNOSIS — Z79.01 ANTICOAGULATED: Primary | ICD-10-CM

## 2024-01-30 PROCEDURE — 97113 AQUATIC THERAPY/EXERCISES: CPT | Mod: GP

## 2024-01-30 NOTE — PROGRESS NOTES
"Physical Therapy Treatment    Patient Name: Mukesh Garg  MRN: [id number]  Encounter date:  1/30/2024  Time Calculation  Start Time: 1430  Stop Time: 1515  Time Calculation (min): 45 min     PT Therapeutic Procedures Time Entry  Aquatic Therapy Time Entry: 40    Visit Number:  5 (including evaluation)  Planned total visits: 10  Visit Authorized:  10    Current Problem  1. Anticoagulated  Follow Up In Physical Therapy          Precautions:  Cardiac history  Clotting disorder on coumadin  Compromised endurance          Pain  LBP  3/10    Subjective  General  Patient to ER/admitted x 6 days secondary to CHF.  Patient with note- clear to resume PT.  Patient had a total of 35# of fluid removed.  Patient \"tired\" otherwise eager to participate            Objective  Marked decrease in swelling LEs      Treatment:      Aquatic Therapy: - reduced intensity secondary to hospital admission  3 Lap walk: Fwd, Bwd, Side to side     Cues for breathing  Heel raises x 10  Toe raise  x 10  3 way hip x 10  - abd  -ext  -Flex/SLR  Knee curl x10  MIP x10     Deep End:  Hang 2'  Bicycle 1'  Hang 1'  Abd/Add 1'  Hang 2'  Cross ski 1'     At stairs:  Gentle hamstrings 2 x20\"    Activity tolerance:  good    OP EDUCATION:  Pace activity  Assessment:     Pt's response to treatment:  good  Areas of improvements:ease of mobility    Limitations/deficits:  endurance    Pain end of session:    LBP  3/10    Plan:     Continue with current POC with the following changes advance as per tolerance    Assessment of current progress against goals:  Progressing toward functional goals       Goals:  STG(3 visits)  Patient to tolerate 40 minutes of aqautic PT     LTG (10 visits)1  LEFS to < 40 % impaired  MMT LE to 4/5  Patient to walk for 30 minutes without need to sit secondary to pain/fatigue.  4.   Patient to return to pre morbid recreational activities.       "

## 2024-01-31 NOTE — PROGRESS NOTES
"This is a 74 year old male for COMPLICATED and REPEAT HOSPITAL ADMISSION starting initially with COVID POS and A FIB and next with PERIPHERAL VASCULAR OCCLUSION and surgery LOWER EXTREMITY admitted for CONCERN FOR HEART FAILURE preserved EF    COUMADIN was changed to ELIQUIS for DVT but changed back after THROMBECTOMY  now on COUMDIN which is CONTRAINDICATED with ALLOPURINOL so we will DC ALLOPURINOL    HAVING DIFFICULTY Rx renewal on [facility] for ALLOPURINOL as no recent URIC ACID done and INTERACTION between this and COUMADIN though COUMADIN has been DISCONTINUED at recent admission so will re try but also warn re CKD    ALSO ProBNP 2 weeks ago 512 and 2 months ago NORMAL at 88.    ADDITIONALLY HE IS CONCERNED for exposure to COVID but spouse tested NEG this morning apparently at  and is to be retested     HE HIMSELF is feeling well.      COPY of HOSP NOTES most recent ADMISSION:      Hospital Course  Mukesh Garg is a 74 y.o. male with PMH of pAfib, HTN, JENNY (on CPAP at home), gout, CAD s/p CABG x3 and Maze (2013), Rt LE DVTs (most recent 10/24/23 while on Eliquis) and bilateral PE (on warfarin), PAD s/p left iliofemoral, femoropopliteal and tibioperoneal thrombectomy (11/17/23) who was sent for direct admission from vascular medicine clinic due to volume overload and concern for ADHF.      Pt reports bilateral JESE that has been there since November but got significantly worse over the past few weeks. Also, noted occasional chest pressure, orthopnea and BAUMANN after walking for 5-10 mins (previously walking only limited by hip pain and could walk for ~1 block). No palpitations, dizziness, syncope or falls. His cardiologist prescribed him Lasix 40 once daily a week prior to admission, but pt has not been compliant on it due to difficulty going to the bathroom multiple times a day and many \"accidents\" while on the way to the bathroom.      Admission 10/24/23 for afib RVR in the setting of COVID 19 and new DVT. Treatment " of afib with rate control strategy. Coumadin changed to eliquis for DVT.      Of note, pt was admitted from 11/15-11/27/23 for ALI of JESE and underwent thrombectomy on 11/17/23. He was also found to have bilateral PE. Eliquis changed back to warfarin. He was discharged to rehab then went home on 12/15/23.       Floor Course:  Echo showed LV EF 55-60%, reduced right ventricular systolic function.   Patient was diuresed with IV lasix boluses. Transitioned to torsemide 20mg PO daily. He may not need to take diuretic long term and will need reassessed outpatient. We recommend outpatient evaluation for cardioversion as this may be contributing to his HFpEF. He was in SR for 10 years until COVID 10/2023 and has been in afib since. We increased coreg to help with BP and rate control (HR in the 80's). In case cardioversion is pursued, we asked patient to check INR's weekly for another 3 weeks. His INR has been therapeutic during entire admission. We added empagliflozin and increased spirolactone for HFpEF. He had a small rise in Cr which is expected with initiation of empa, however, we elected to not restart lisinopril until Cr reevaluated in the outpatient setting. Cr at discharge is 1.54.      His warfarin remained therapeutic during admission and follow up with coumadin clinic.      Discharge weight: 102 kg     After all labs and VS were reviewed the decision was made that the patient was medically stable for discharge.  The patient was discharged in satisfactory condition.     More than 30 minutes were spent in coordinating patient discharge.           Home Medications     Medication List      START taking these medications     empagliflozin 10 mg; Commonly known as: Jardiance; Take 1 tablet (10 mg)   by mouth once daily. Do not start before January 29, 2024.; Start taking   on: January 29, 2024   spironolactone 50 mg tablet; Commonly known as: Aldactone; Take 1 tablet   (50 mg) by mouth once daily. Do not start before  January 29, 2024.; Start   taking on: January 29, 2024   torsemide 20 mg tablet; Commonly known as: Demadex; Take 1 tablet (20   mg) by mouth once daily. Do not start before January 29, 2024.; Start   taking on: January 29, 2024     CHANGE how you take these medications     carvedilol 25 mg tablet; Commonly known as: Coreg; Take 1 tablet (25 mg)   by mouth 2 times a day with meals.; What changed: medication strength, See   the new instructions.     CONTINUE taking these medications     acetaminophen 325 mg tablet; Commonly known as: Tylenol   allopurinol 100 mg tablet; Commonly known as: Zyloprim; TAKE 1 TABLET BY   MOUTH EVERY DAY   ammonium lactate 12 % lotion; Commonly known as: Lac-Hydrin   atorvastatin 40 mg tablet; Commonly known as: Lipitor; TAKE 1 TABLET BY   MOUTH EVERY DAY FOR 90 DAYS   clopidogrel 75 mg tablet; Commonly known as: Plavix; Take 1 tablet (75   mg) by mouth once daily.   dicyclomine 20 mg tablet; Commonly known as: Bentyl   ezetimibe 10 mg tablet; Commonly known as: Zetia   famotidine 20 mg tablet; Commonly known as: Pepcid   loperamide 2 mg capsule; Commonly known as: Imodium; Take 1 capsule (2   mg) by mouth once daily as needed for diarrhea.   polyethylene glycol 17 gram packet; Commonly known as: Glycolax,   Miralax; Take 17 g by mouth 2 times a day as needed (constipation).   warfarin 5 mg tablet; Commonly known as: Coumadin; Take as directed. If   you are unsure how to take this medication, talk to your nurse or doctor.;   Original instructions: Take 1 tablet (5 mg) by mouth once daily in the   evening. 5mg M, W, F -2.5 mg all other days     STOP taking these medications     furosemide 40 mg tablet; Commonly known as: Lasix   lisinopril 40 mg tablet           ROS is NEG for HEADACHE, NAUSEA, VOMITING, DIARRHEA, CHEST PAIN, SOB, and BLEEDING and as further REVIEWED BELOW.    Subjective   Mukesh Garg is a 74 y.o. male who presents for No chief complaint on file..    HPI:    See above  with EPISODE of HF a above scheduled to see Dr Wayne.    Review of systems is essentially negative for all systems except for any identified issues in HPI above.    Objective     /78   Pulse 82   Temp 35.6 °C (96.1 °F)   Wt 98.4 kg (217 lb)   SpO2 97%   BMI 32.05 kg/m²      Physical Examination:       GENERAL           General Appearance: well-appearing, well-developed, well-hydrated, well-nourished, no acute distress.        HEENT           NECK supple, no masses or thyromegaly, no carotid bruit.        EYES           Extraocular Movements: normal, bilateral eyes JESSENIA, no conjunctival injection.        HEART           Rate and Rhythm regular rate and rhythm. Heart sounds: normal S1S2, no S3 or S4. Murmurs: none.        CHEST           Breath sounds: Clear to IPPA, RR<16 no use of accessory muscles.        ABDOMEN           General: Neg for LKKS or masses, no scleral icterus or jaundice.        MUSCULOSKELETAL           Joints Demonstration: Neg for erythema, swelling or joint deformities. gross abnormalities no gross abnormalities.        EXTREMITIES           Lower Extremities: Neg for cyanosis, clubbing or edema.       Assessment/Plan   We have also discussed his exposure at home to URI so far the family member is APPARENTLY NEGATIVE for COVID but is being re tested in a few days.      HE IS ADV that should he contract COVID he may well need evaluation at HOSPITAL SETTING due to WORRIED about taking him off statins to accommodate PAXLOVID use and his extensive cardio vascular atherosclerosis and HF and PVD and general ARTERIOPATHY.    HE WILL SEEK evaluation if and when he is known to have a CLOSE FAMILY EXPOSURE or if any Sxs occur.  Problem List Items Addressed This Visit       GERD (gastroesophageal reflux disease)    History of major vascular surgery    Anticoagulated    History of DVT of lower extremity    Post covid-19 condition, unspecified     Other Visit Diagnoses       Heart failure with  preserved ejection fraction, unspecified HF chronicity (CMS/[facility])    -  Primary    Hospital discharge follow-up        PVD (peripheral vascular disease) (CMS/[facility])        Atrial fibrillation, unspecified type (CMS/[facility])        Rectal bleed        JENNY (obstructive sleep apnea)        Bilateral pulmonary embolism (CMS/[facility])        Encounter for medication review        Arthralgia of foot, unspecified laterality        Relevant Orders    Uric acid            FOLLOW UP:  PRN and as specified above         Lorenza Hager M.D.

## 2024-01-31 NOTE — PROGRESS NOTES
"Physical Therapy Treatment    Patient Name: Mukesh Garg  MRN: [id number]  Encounter date:  2/1/2024  Time Calculation  Start Time: 1510  Stop Time: 1550  Time Calculation (min): 40 min     PT Therapeutic Procedures Time Entry  Aquatic Therapy Time Entry: 38    Visit Number:  6 (including evaluation)  Planned total visits: 10  Visit Authorized/Insurance Considerations:  10    Current Problem  1. Anticoagulated  Follow Up In Physical Therapy            Precautions:  Cardiac history  Clotting disorder on coumadin  Compromised endurance    Pain  LBP   2-3 /10    Subjective  General  Patient in good spirits; \"feel even better today\".  Patient with good tolerance of previous visit; motivated to participate.       Objective  Increased gait speed      Treatment:  Aquatic Therapy:     3 Lap walk: Fwd, Bwd, Side to side     Cues for breathing  Heel raises 2 x 10  Toe raise 2 x 10  3 way hip  x 15  - abd  -ext  -Flex/SLR  Knee curl  x 15  MIP x 15     Deep End:  Hang 2'  Bicycle 1'  Hang 1'  Abd/Add 1'  Hang 1'  Cross ski 1'  Hang x 1'       X 3 cool down lap 3'6\"    At stairs:  Gentle hamstrings 2 x20\"          Activity tolerance:  good    OP EDUCATION:  Pace activity    Assessment:     Pt's response to treatment:  good  Areas of improvements:  ease of mobility  Limitations/deficits:  endurance    Pain end of session:    unchanged      Plan:     Continue with current POC with the following changes advance as able    Assessment of current progress against goals:  Progressing toward functional goals      Goals:  STG(3 visits)  Patient to tolerate 40 minutes of aqautic PT     LTG (10 visits)1  LEFS to < 40 % impaired  MMT LE to 4/5  Patient to walk for 30 minutes without need to sit secondary to pain/fatigue.  4.   Patient to return to pre morbid recreational activities.                "

## 2024-01-31 NOTE — TELEPHONE ENCOUNTER
Call placed to patient, no answer. Left message for patient to call back to schedule appointment per PCP recommendation.

## 2024-02-01 ENCOUNTER — LAB (OUTPATIENT)
Dept: LAB | Facility: LAB | Age: 74
End: 2024-02-01
Payer: COMMERCIAL

## 2024-02-01 ENCOUNTER — TELEPHONE (OUTPATIENT)
Dept: PRIMARY CARE | Facility: CLINIC | Age: 74
End: 2024-02-01

## 2024-02-01 ENCOUNTER — TREATMENT (OUTPATIENT)
Dept: PHYSICAL THERAPY | Facility: CLINIC | Age: 74
End: 2024-02-01
Payer: MEDICARE

## 2024-02-01 DIAGNOSIS — Z79.01 LONG TERM (CURRENT) USE OF ANTICOAGULANTS: ICD-10-CM

## 2024-02-01 DIAGNOSIS — I73.9 PERIPHERAL VASCULAR DISEASE, UNSPECIFIED (CMS-HCC): ICD-10-CM

## 2024-02-01 DIAGNOSIS — Z79.01 ANTICOAGULATED: Primary | ICD-10-CM

## 2024-02-01 PROCEDURE — 97113 AQUATIC THERAPY/EXERCISES: CPT | Mod: GP

## 2024-02-01 NOTE — TELEPHONE ENCOUNTER
Pt wife called again requesting a call back to confirm weather or not the pt needs to go to the hematologist.

## 2024-02-02 ENCOUNTER — APPOINTMENT (OUTPATIENT)
Dept: HEMATOLOGY/ONCOLOGY | Facility: CLINIC | Age: 74
End: 2024-02-02
Payer: MEDICARE

## 2024-02-02 ENCOUNTER — LAB (OUTPATIENT)
Dept: LAB | Facility: LAB | Age: 74
End: 2024-02-02
Payer: MEDICARE

## 2024-02-02 DIAGNOSIS — I48.91 ATRIAL FIBRILLATION WITH RVR (MULTI): ICD-10-CM

## 2024-02-02 DIAGNOSIS — I50.9 ACUTE DECOMPENSATED HEART FAILURE (MULTI): ICD-10-CM

## 2024-02-02 LAB
ANION GAP SERPL CALC-SCNC: 16 MMOL/L
BUN SERPL-MCNC: 37 MG/DL (ref 8–25)
CALCIUM SERPL-MCNC: 9.7 MG/DL (ref 8.5–10.4)
CHLORIDE SERPL-SCNC: 97 MMOL/L (ref 97–107)
CO2 SERPL-SCNC: 26 MMOL/L (ref 24–31)
CREAT SERPL-MCNC: 2.4 MG/DL (ref 0.4–1.6)
EGFRCR SERPLBLD CKD-EPI 2021: 27 ML/MIN/1.73M*2
GLUCOSE SERPL-MCNC: 151 MG/DL (ref 65–99)
POTASSIUM SERPL-SCNC: 3.8 MMOL/L (ref 3.4–5.1)
SODIUM SERPL-SCNC: 139 MMOL/L (ref 133–145)

## 2024-02-02 PROCEDURE — 80048 BASIC METABOLIC PNL TOTAL CA: CPT

## 2024-02-02 PROCEDURE — 36415 COLL VENOUS BLD VENIPUNCTURE: CPT

## 2024-02-05 ENCOUNTER — TREATMENT (OUTPATIENT)
Dept: PHYSICAL THERAPY | Facility: CLINIC | Age: 74
End: 2024-02-05
Payer: MEDICARE

## 2024-02-05 ENCOUNTER — ANTICOAGULATION - WARFARIN VISIT (OUTPATIENT)
Dept: CARDIOLOGY | Facility: HOSPITAL | Age: 74
End: 2024-02-05
Payer: MEDICARE

## 2024-02-05 DIAGNOSIS — Z79.01 LONG TERM (CURRENT) USE OF ANTICOAGULANTS: ICD-10-CM

## 2024-02-05 DIAGNOSIS — I73.9 PERIPHERAL VASCULAR DISEASE, UNSPECIFIED (CMS-HCC): ICD-10-CM

## 2024-02-05 DIAGNOSIS — Z79.01 ANTICOAGULATED: Primary | ICD-10-CM

## 2024-02-05 DIAGNOSIS — I25.10 ASHD (ARTERIOSCLEROTIC HEART DISEASE): Primary | ICD-10-CM

## 2024-02-05 LAB
POC INR: 2.7
POC PROTHROMBIN TIME: NORMAL

## 2024-02-05 PROCEDURE — 97113 AQUATIC THERAPY/EXERCISES: CPT | Mod: GP

## 2024-02-05 PROCEDURE — 85610 PROTHROMBIN TIME: CPT | Mod: QW

## 2024-02-05 PROCEDURE — 99211 OFF/OP EST MAY X REQ PHY/QHP: CPT | Performed by: INTERNAL MEDICINE

## 2024-02-05 NOTE — PROGRESS NOTES
"Physical Therapy Treatment    Patient Name: Mukesh Garg  MRN: [id number]  Encounter date:  2/5/2024  Time Calculation  Start Time: 1550  Stop Time: 1635  Time Calculation (min): 45 min     PT Therapeutic Procedures Time Entry  Aquatic Therapy Time Entry: 40    Visit Number:  7 (including evaluation)  Planned total visits: 7  Visit Authorized/Insurance Considerations:  10    Current Problem  1. Anticoagulated        2. Long term (current) use of anticoagulants  Follow Up In Physical Therapy      3. Peripheral vascular disease, unspecified (CMS/HCC)  Follow Up In Physical Therapy            Precautions:  Cardiac history  Clotting disorder on coumadin  Compromised endurance    Pain  LBP  2-3/10    Subjective  General  Patient reports \"good weekend\" with respect to mobility; increased ambulation without cane       Objective  Modest trunk flexion      Treatment:  Aquatic Therapy:     3 Lap walk: Fwd, Bwd, Side to side     Cues for breathing  Heel raises 2 x 10  Toe raise 2 x 10  3 way hip  x 15  - abd  -ext  -Flex/SLR  Knee curl  x 15  MIP x 15     Deep End:  Hang 2'  Bicycle 1.5'  Hang 1'  Abd/Add 1.5'  Hang 1'  Cross ski 1.5'  Hang x 1'        X 3 cool down lap 3'6\"     At stairs:  Gentle hamstrings 2 x20\"          Activity tolerance:  good    OP EDUCATION:  good    Assessment:     Pt's response to treatment:  good  Areas of improvements:  posture  Limitations/deficits:  endurance    Pain end of session:    LBP  2-3    Plan:     Continue with current POC with the following changes increase activity as able    Assessment of current progress against goals:  Progressing toward functional goals      Goals:  STG(3 visits)  Patient to tolerate 40 minutes of aqautic PT     LTG (10 visits)1  LEFS to < 40 % impaired  MMT LE to 4/5  Patient to walk for 30 minutes without need to sit secondary to pain/fatigue.  4.   Patient to return to pre morbid recreational activities.                   "

## 2024-02-05 NOTE — PROGRESS NOTES
Patient identification verified with 2 identifiers.    [facility] [address] [address]    Referring Physician: DR. Wayne  Enrollment/ Re-enrollment date:    INR Goal: 2.0-3.0  INR monitoring is per [facility] protocol.  Anticoagulation Medication: warfarin  Indication: Atrial Fibrillation/Atrial Flutter    Subjective   Bleeding signs/symptoms: No    Bruising: No   Major bleeding event: No  Thrombosis signs/symptoms: No  Thromboembolic event: No  Missed doses: No  Extra doses: No  Medication changes: No  Dietary changes: No  Change in health: No  Change in activity: No  Alcohol: No  Other concerns: No    Upcoming Procedures:  Does the Patient Have any upcoming procedures that require interruption in anticoagulation therapy? no  Does the patient require bridging? no      Anticoagulation Summary  As of 2024      INR goal:  2.0-3.0   TTR:  59.0 % (3.3 wk)   INR used for dosin.70 (2024)   Weekly warfarin total:  25 mg               Assessment/Plan   Therapeutic     1. New dose: no change    2. Next INR: 1 month      Education provided to patient during the visit:  Patient instructed to call in interim with questions, concerns and changes.

## 2024-02-06 DIAGNOSIS — I50.9 ACUTE DECOMPENSATED HEART FAILURE (MULTI): Primary | ICD-10-CM

## 2024-02-07 ENCOUNTER — TREATMENT (OUTPATIENT)
Dept: PHYSICAL THERAPY | Facility: CLINIC | Age: 74
End: 2024-02-07
Payer: MEDICARE

## 2024-02-07 DIAGNOSIS — Z79.01 LONG TERM (CURRENT) USE OF ANTICOAGULANTS: ICD-10-CM

## 2024-02-07 DIAGNOSIS — Z79.01 ANTICOAGULATED: Primary | ICD-10-CM

## 2024-02-07 DIAGNOSIS — I73.9 PERIPHERAL VASCULAR DISEASE, UNSPECIFIED (CMS-HCC): ICD-10-CM

## 2024-02-07 PROCEDURE — 97113 AQUATIC THERAPY/EXERCISES: CPT | Mod: GP

## 2024-02-07 NOTE — PROGRESS NOTES
"Physical Therapy Treatment    Patient Name: Mukesh Garg  MRN: [id number]  Encounter date:  2/7/2024  Time Calculation  Start Time: 1600  Stop Time: 1630  Time Calculation (min): 30 min     PT Therapeutic Procedures Time Entry  Aquatic Therapy Time Entry: 25    Visit Number:  8 (including evaluation)  Planned total visits: 10  Visit Authorized/Insurance Considerations:  10    Current Problem  1. Anticoagulated        2. Long term (current) use of anticoagulants  Follow Up In Physical Therapy      3. Peripheral vascular disease, unspecified (CMS/HCC)  Follow Up In Physical Therapy          Precautions  Cardiac history  Clotting disorder on coumadin  Compromised endurance      Pain  Modest LBP    Subjective  General  Patient arrived 15 minutes late; able to complete abbreviated visit.   Patient has has a busy day but wishing to be \"pushed\".       Objective  Less rest breaks required    Treatment:      Aquatic Therapy:     3 Lap walk: Fwd, Bwd, Side to side     Cues for breathing  Heel raises 2 x 10  Toe raise 2 x 10  3 way hip  x 15  - abd  -ext  -Flex/SLR  Knee curl  x 15- DNP  MIP x 15     Deep End:- reduced times today secondary to late arrival  Hang 1'  Bicycle 1'  Hang 1'  Abd/Add 1'  Hang 1'  Cross ski 1'  Hang x 1'        X 1 cool down lap 3'6\"     At stairs:  Gentle hamstrings 2 x20\"          Activity tolerance:  Good- increased pace    OP EDUCATION:  Pace activity    Assessment:     Pt's response to treatment:  good  Areas of improvements:  mobility  Limitations/deficits:  endurance    Pain end of session:    unchanged    Plan:     Continue with current POC with the following changes increase as able    Assessment of current progress against goals:  Progressing toward functional goals      Goals:  STG(3 visits)  Patient to tolerate 40 minutes of aqautic PT     LTG (10 visits)1  LEFS to < 40 % impaired  MMT LE to 4/5  Patient to walk for 30 minutes without need to sit secondary to pain/fatigue.  4.   Patient to " return to pre morbid recreational activities.

## 2024-02-08 ENCOUNTER — OFFICE VISIT (OUTPATIENT)
Dept: PRIMARY CARE | Facility: CLINIC | Age: 74
End: 2024-02-08
Payer: MEDICARE

## 2024-02-08 VITALS
WEIGHT: 217 LBS | TEMPERATURE: 96.1 F | HEART RATE: 82 BPM | DIASTOLIC BLOOD PRESSURE: 78 MMHG | SYSTOLIC BLOOD PRESSURE: 126 MMHG | BODY MASS INDEX: 32.05 KG/M2 | OXYGEN SATURATION: 97 %

## 2024-02-08 DIAGNOSIS — I26.99 BILATERAL PULMONARY EMBOLISM (MULTI): ICD-10-CM

## 2024-02-08 DIAGNOSIS — K62.5 RECTAL BLEED: ICD-10-CM

## 2024-02-08 DIAGNOSIS — M25.579 ARTHRALGIA OF FOOT, UNSPECIFIED LATERALITY: ICD-10-CM

## 2024-02-08 DIAGNOSIS — Z86.718 HISTORY OF DVT OF LOWER EXTREMITY: ICD-10-CM

## 2024-02-08 DIAGNOSIS — I73.9 PVD (PERIPHERAL VASCULAR DISEASE) (CMS-HCC): ICD-10-CM

## 2024-02-08 DIAGNOSIS — Z79.899 ENCOUNTER FOR MEDICATION REVIEW: ICD-10-CM

## 2024-02-08 DIAGNOSIS — Z98.890 HISTORY OF MAJOR VASCULAR SURGERY: ICD-10-CM

## 2024-02-08 DIAGNOSIS — G47.33 OSA (OBSTRUCTIVE SLEEP APNEA): ICD-10-CM

## 2024-02-08 DIAGNOSIS — I48.91 ATRIAL FIBRILLATION, UNSPECIFIED TYPE (MULTI): ICD-10-CM

## 2024-02-08 DIAGNOSIS — I50.30 HEART FAILURE WITH PRESERVED EJECTION FRACTION, UNSPECIFIED HF CHRONICITY (MULTI): Primary | ICD-10-CM

## 2024-02-08 DIAGNOSIS — Z79.01 ANTICOAGULATED: ICD-10-CM

## 2024-02-08 DIAGNOSIS — Z09 HOSPITAL DISCHARGE FOLLOW-UP: ICD-10-CM

## 2024-02-08 DIAGNOSIS — U09.9 POST COVID-19 CONDITION, UNSPECIFIED: ICD-10-CM

## 2024-02-08 DIAGNOSIS — Z76.0 MEDICATION REFILL: ICD-10-CM

## 2024-02-08 DIAGNOSIS — K21.9 GASTROESOPHAGEAL REFLUX DISEASE, UNSPECIFIED WHETHER ESOPHAGITIS PRESENT: ICD-10-CM

## 2024-02-08 PROCEDURE — 1125F AMNT PAIN NOTED PAIN PRSNT: CPT | Performed by: FAMILY MEDICINE

## 2024-02-08 PROCEDURE — 3078F DIAST BP <80 MM HG: CPT | Performed by: FAMILY MEDICINE

## 2024-02-08 PROCEDURE — 99214 OFFICE O/P EST MOD 30 MIN: CPT | Performed by: FAMILY MEDICINE

## 2024-02-08 PROCEDURE — 1160F RVW MEDS BY RX/DR IN RCRD: CPT | Performed by: FAMILY MEDICINE

## 2024-02-08 PROCEDURE — 1111F DSCHRG MED/CURRENT MED MERGE: CPT | Performed by: FAMILY MEDICINE

## 2024-02-08 PROCEDURE — 1036F TOBACCO NON-USER: CPT | Performed by: FAMILY MEDICINE

## 2024-02-08 PROCEDURE — 1159F MED LIST DOCD IN RCRD: CPT | Performed by: FAMILY MEDICINE

## 2024-02-08 PROCEDURE — 1157F ADVNC CARE PLAN IN RCRD: CPT | Performed by: FAMILY MEDICINE

## 2024-02-08 PROCEDURE — 3074F SYST BP LT 130 MM HG: CPT | Performed by: FAMILY MEDICINE

## 2024-02-08 RX ORDER — ALLOPURINOL 100 MG/1
100 TABLET ORAL DAILY
Qty: 90 TABLET | Refills: 0 | OUTPATIENT
Start: 2024-02-08

## 2024-02-08 NOTE — PATIENT INSTRUCTIONS
Always a pleasure to see you, Dustin.  We are concerned about potential COVID exposures:    Assessment/Plan   We have also discussed his exposure at home to URI so far the family member is APPARENTLY NEGATIVE for COVID but is being re tested in a few days.      HE IS ADV that should he contract COVID he may well need evaluation at HOSPITAL SETTING due to WORRIED about taking him off statins to accommodate PAXLOVID use and his extensive cardio vascular atherosclerosis and HF and PVD and general ARTERIOPATHY.    HE WILL SEEK evaluation if and when he is known to have a CLOSE FAMILY EXPOSURE or if any Sxs occur.

## 2024-02-12 ENCOUNTER — OFFICE VISIT (OUTPATIENT)
Dept: CARDIOLOGY | Facility: CLINIC | Age: 74
End: 2024-02-12
Payer: MEDICARE

## 2024-02-12 ENCOUNTER — TREATMENT (OUTPATIENT)
Dept: PHYSICAL THERAPY | Facility: CLINIC | Age: 74
End: 2024-02-12
Payer: MEDICARE

## 2024-02-12 VITALS
BODY MASS INDEX: 31.9 KG/M2 | SYSTOLIC BLOOD PRESSURE: 116 MMHG | DIASTOLIC BLOOD PRESSURE: 80 MMHG | HEART RATE: 70 BPM | WEIGHT: 216 LBS

## 2024-02-12 DIAGNOSIS — I50.9 ACUTE DECOMPENSATED HEART FAILURE (MULTI): ICD-10-CM

## 2024-02-12 DIAGNOSIS — Z79.01 LONG TERM (CURRENT) USE OF ANTICOAGULANTS: ICD-10-CM

## 2024-02-12 DIAGNOSIS — I25.10 ASHD (ARTERIOSCLEROTIC HEART DISEASE): Primary | ICD-10-CM

## 2024-02-12 DIAGNOSIS — Z79.01 ANTICOAGULATED: Primary | ICD-10-CM

## 2024-02-12 DIAGNOSIS — I10 PRIMARY HYPERTENSION: ICD-10-CM

## 2024-02-12 DIAGNOSIS — I73.9 PERIPHERAL VASCULAR DISEASE, UNSPECIFIED (CMS-HCC): ICD-10-CM

## 2024-02-12 DIAGNOSIS — E78.2 MIXED HYPERLIPIDEMIA: ICD-10-CM

## 2024-02-12 DIAGNOSIS — I48.11 LONGSTANDING PERSISTENT ATRIAL FIBRILLATION (MULTI): ICD-10-CM

## 2024-02-12 PROBLEM — I48.91 ATRIAL FIBRILLATION WITH RVR (MULTI): Status: RESOLVED | Noted: 2023-10-24 | Resolved: 2024-02-12

## 2024-02-12 PROCEDURE — 99214 OFFICE O/P EST MOD 30 MIN: CPT | Performed by: INTERNAL MEDICINE

## 2024-02-12 PROCEDURE — 1160F RVW MEDS BY RX/DR IN RCRD: CPT | Performed by: INTERNAL MEDICINE

## 2024-02-12 PROCEDURE — 1111F DSCHRG MED/CURRENT MED MERGE: CPT | Performed by: INTERNAL MEDICINE

## 2024-02-12 PROCEDURE — 1157F ADVNC CARE PLAN IN RCRD: CPT | Performed by: INTERNAL MEDICINE

## 2024-02-12 PROCEDURE — 1159F MED LIST DOCD IN RCRD: CPT | Performed by: INTERNAL MEDICINE

## 2024-02-12 PROCEDURE — 97110 THERAPEUTIC EXERCISES: CPT | Mod: GP

## 2024-02-12 PROCEDURE — 3074F SYST BP LT 130 MM HG: CPT | Performed by: INTERNAL MEDICINE

## 2024-02-12 PROCEDURE — 1036F TOBACCO NON-USER: CPT | Performed by: INTERNAL MEDICINE

## 2024-02-12 PROCEDURE — 3079F DIAST BP 80-89 MM HG: CPT | Performed by: INTERNAL MEDICINE

## 2024-02-12 PROCEDURE — 1125F AMNT PAIN NOTED PAIN PRSNT: CPT | Performed by: INTERNAL MEDICINE

## 2024-02-12 ASSESSMENT — PAIN SCALES - GENERAL: PAINLEVEL: 2

## 2024-02-12 ASSESSMENT — ENCOUNTER SYMPTOMS
HEMATURIA: 0
SHORTNESS OF BREATH: 0
COUGH: 0
PARESTHESIAS: 0
DYSPNEA ON EXERTION: 0
PALPITATIONS: 0
NUMBNESS: 0
DYSURIA: 0
ABDOMINAL PAIN: 0
BLURRED VISION: 0

## 2024-02-12 NOTE — PROGRESS NOTES
"Physical Therapy Treatment/Re-Evaluation    Patient Name: Mukesh Garg  MRN: [id number]  Encounter date:  2/12/2024  Time Calculation  Start Time: 1545  Stop Time: 1635  Time Calculation (min): 50 min     PT Therapeutic Procedures Time Entry  Therapeutic Exercise Time Entry: 40    Visit Number:  9  Planned total visits: 10  Visits Authorized/Insurance Coverage:  2024 - 2024- 96% C[place]AGE/ [place] VISIT/ NO AUTH PER [employer] [id number]  Transaction Time Feb 1, 3:07 PM  [facility] - NEW [employer] ACTIVE / $0 DED / $1500 OOP / [place] VISITS/ $0 COPAY PER [employer]ILITY #[id number]    Current Problem  1. Anticoagulated        2. Long term (current) use of anticoagulants  Follow Up In Physical Therapy      3. Peripheral vascular disease, unspecified (CMS/HCC)  Follow Up In Physical Therapy          Precautions  Cardiac history  Clotting disorder on coumadin  Compromised endurance       Pain  0/10 at rest         Subjective  General  Patient reports able to walk up to 10 minutes without cane prior to need to sit.  Patient pleased with progress.       Objective  Hip MMT L R   Hip Flexion 4/5 4/5   Hip Extension 4/5 4/5   Hip Abduction 5/5 5/5   Hip Adduction 5/5 5/5     LEFS- 50% impaired    Treatment:    Therapeutic Exercise:  Nu-Step L2 x 6 min  HS stretch 30\" x 3  Bridges 2 x 10  Calf raises 2 x 10  Standing hip ABD x 10  MIP 2 x 10      Foam step up FWD/LAT x 10 ea  Hurdles FWD/LAT x 6      Current HEP:  Bridges  Calf raises  Standing hip ABD  MIP    Activity tolerance:  good    OP EDUCATION:  Reviewed HEP    Assessment:  Patient with good response to aquatics/land base PT to improve LE/core strenghts  Pt's response to treatment:  good  Areas of improvements:  strength/ease of mobility  Limitations/deficits:  endurance    Pain end of session:   0/10    Plan:     Continue with current POC with the following changes extend POC to total of 20 visits.  Alternate with aquatics    Assessment of current progress against goals:  Progressing toward " functional goals        Goals:  STG(3 visits)  Patient to tolerate 40 minutes of aqautic PT- MET     LTG (10 visits)1  LEFS to < 40 % impaired-NOT MET  MMT LE to 4/5- MET- MET  Patient to walk for 30 minutes without need to sit secondary to pain/fatigue- NOT MET  4.   Patient to return to pre morbid recreational activities.- NOT MET

## 2024-02-12 NOTE — PROGRESS NOTES
Subjective   Mukesh Garg is a 74 y.o. male.    Chief Complaint:   Main hosp f/u     HPI  Patient was seen by vascular medicine specialist at [facility] and admitted for approximately 6 days for IV diuretic therapy.  He states he lost 45 pounds with the diuresis.  Was on the combination of spironolactone and torsemide but the spironolactone was recently discontinued.  Pulmonary embolism diagnosed.  Review of Systems   Constitutional: Negative for malaise/fatigue.   HENT:  Negative for congestion.    Eyes:  Negative for blurred vision.   Cardiovascular:  Negative for chest pain, dyspnea on exertion and palpitations.   Respiratory:  Negative for cough and shortness of breath.    Musculoskeletal:  Negative for joint pain.   Gastrointestinal:  Negative for abdominal pain.   Genitourinary:  Negative for dysuria and hematuria.   Neurological:  Negative for numbness and paresthesias.       Objective   Constitutional:       Appearance: Not in distress.   Eyes:      Conjunctiva/sclera: Conjunctivae normal.   Neck:      Vascular: JVD normal.   Pulmonary:      Breath sounds: Normal breath sounds. No wheezing. No rhonchi. No rales.   Cardiovascular:      Normal rate. Irregularly irregular rhythm.      Murmurs: There is no murmur.      No gallop.  No click. No rub.   Abdominal:      Palpations: Abdomen is soft.   Neurological:      General: No focal deficit present.      Mental Status: Alert.         Lab Review:   Anticoagulation - Warfarin Visit on 02/05/2024   Component Date Value    POC INR 02/05/2024 2.70    Lab on 02/02/2024   Component Date Value    Glucose 02/02/2024 151 (H)     Sodium 02/02/2024 139     Potassium 02/02/2024 3.8     Chloride 02/02/2024 97     Bicarbonate 02/02/2024 26     Urea Nitrogen 02/02/2024 37 (H)     Creatinine 02/02/2024 2.40 (H)     eGFR 02/02/2024 27 (L)     Calcium 02/02/2024 9.7     Anion Gap 02/02/2024 16    Admission on 01/23/2024, Discharged on 01/28/2024    Component Date Value    Glucose 01/23/2024 113 (H)     Sodium 01/23/2024 142     Potassium 01/23/2024 3.6     Chloride 01/23/2024 109 (H)     Bicarbonate 01/23/2024 27     Anion Gap 01/23/2024 10     Urea Nitrogen 01/23/2024 17     Creatinine 01/23/2024 1.17     eGFR 01/23/2024 65     Calcium 01/23/2024 8.8     Magnesium 01/23/2024 1.88     WBC 01/24/2024 7.0     nRBC 01/24/2024 0.0     RBC 01/24/2024 4.36 (L)     Hemoglobin 01/24/2024 12.8 (L)     Hematocrit 01/24/2024 40.4 (L)     MCV 01/24/2024 93     MCH 01/24/2024 29.4     MCHC 01/24/2024 31.7 (L)     RDW 01/24/2024 14.6 (H)     Platelets 01/24/2024 196     Glucose 01/24/2024 106 (H)     Sodium 01/24/2024 144     Potassium 01/24/2024 3.6     Chloride 01/24/2024 106     Bicarbonate 01/24/2024 27     Anion Gap 01/24/2024 15     Urea Nitrogen 01/24/2024 17     Creatinine 01/24/2024 1.19     eGFR 01/24/2024 63     Calcium 01/24/2024 9.5     WBC 01/23/2024 6.6     nRBC 01/23/2024 0.0     RBC 01/23/2024 4.17 (L)     Hemoglobin 01/23/2024 11.8 (L)     Hematocrit 01/23/2024 38.6 (L)     MCV 01/23/2024 93     MCH 01/23/2024 28.3     MCHC 01/23/2024 30.6 (L)     RDW 01/23/2024 14.6 (H)     Platelets 01/23/2024 184     Magnesium 01/24/2024 2.05     Protime 01/24/2024 25.2 (H)     INR 01/24/2024 2.2 (H)     aPTT 01/24/2024 24 (L)     BNP 01/24/2024 512 (H)     AV pk christal 01/24/2024 1.25     LVOT diam 01/24/2024 1.97     LV biplane EF 01/24/2024 56     MV avg E/e' ratio 01/24/2024 15.70     MV E/A ratio 01/24/2024 2.55     LA vol index A/L 01/24/2024 37.2     Tricuspid annular plane * 01/24/2024 1.4     RV free wall pk S' 01/24/2024 5.00     LVIDd 01/24/2024 4.66     RVSP 01/24/2024 37.2     Aortic Valve Area by Con* 01/24/2024 1.72     AV pk grad 01/24/2024 6.2     LV A4C EF 01/24/2024 54.6     Troponin I, High Sensiti* 01/23/2024 49     Thyroid Stimulating Horm* 01/23/2024 1.02     Protime 01/24/2024 25.2 (H)     INR 01/24/2024 2.2 (H)     POCT Glucose 01/24/2024 171 (H)      POCT Glucose 01/24/2024 105 (H)     WBC 01/25/2024 6.8     nRBC 01/25/2024 0.0     RBC 01/25/2024 4.20 (L)     Hemoglobin 01/25/2024 12.2 (L)     Hematocrit 01/25/2024 39.1 (L)     MCV 01/25/2024 93     MCH 01/25/2024 29.0     MCHC 01/25/2024 31.2 (L)     RDW 01/25/2024 14.6 (H)     Platelets 01/25/2024 187     Glucose 01/25/2024 96     Sodium 01/25/2024 142     Potassium 01/25/2024 3.5     Chloride 01/25/2024 105     Bicarbonate 01/25/2024 29     Anion Gap 01/25/2024 12     Urea Nitrogen 01/25/2024 18     Creatinine 01/25/2024 1.22     eGFR 01/25/2024 61     Calcium 01/25/2024 8.8     Magnesium 01/25/2024 1.98     Protime 01/25/2024 32.7 (H)     INR 01/25/2024 2.9 (H)     POCT Glucose 01/24/2024 162 (H)     POCT Glucose 01/25/2024 116 (H)     POCT Glucose 01/25/2024 130 (H)     POCT Glucose 01/25/2024 110 (H)     WBC 01/26/2024 7.7     nRBC 01/26/2024 0.0     RBC 01/26/2024 4.40 (L)     Hemoglobin 01/26/2024 12.6 (L)     Hematocrit 01/26/2024 40.9 (L)     MCV 01/26/2024 93     MCH 01/26/2024 28.6     MCHC 01/26/2024 30.8 (L)     RDW 01/26/2024 14.5     Platelets 01/26/2024 193     Glucose 01/26/2024 86     Sodium 01/26/2024 143     Potassium 01/26/2024 3.9     Chloride 01/26/2024 103     Bicarbonate 01/26/2024 29     Anion Gap 01/26/2024 15     Urea Nitrogen 01/26/2024 22     Creatinine 01/26/2024 1.55 (H)     eGFR 01/26/2024 46 (L)     Calcium 01/26/2024 9.1     Magnesium 01/26/2024 2.09     Protime 01/26/2024 29.3 (H)     INR 01/26/2024 2.6 (H)     POCT Glucose 01/25/2024 142 (H)     POCT Glucose 01/26/2024 112 (H)     POCT Glucose 01/26/2024 163 (H)     POCT Glucose 01/26/2024 102 (H)     WBC 01/27/2024 7.0     nRBC 01/27/2024 0.0     RBC 01/27/2024 4.38 (L)     Hemoglobin 01/27/2024 12.2 (L)     Hematocrit 01/27/2024 39.6 (L)     MCV 01/27/2024 90     MCH 01/27/2024 27.9     MCHC 01/27/2024 30.8 (L)     RDW 01/27/2024 14.5     Platelets 01/27/2024 188     Glucose 01/27/2024 96     Sodium 01/27/2024 140      Potassium 01/27/2024 3.3 (L)     Chloride 01/27/2024 102     Bicarbonate 01/27/2024 28     Anion Gap 01/27/2024 13     Urea Nitrogen 01/27/2024 23     Creatinine 01/27/2024 1.48 (H)     eGFR 01/27/2024 49 (L)     Calcium 01/27/2024 8.9     Magnesium 01/27/2024 1.99     Protime 01/27/2024 31.8 (H)     INR 01/27/2024 2.8 (H)     POCT Glucose 01/26/2024 162 (H)     POCT Glucose 01/27/2024 103 (H)     POCT Glucose 01/27/2024 159 (H)     POCT Glucose 01/27/2024 126 (H)     WBC 01/28/2024 7.6     nRBC 01/28/2024 0.0     RBC 01/28/2024 4.44 (L)     Hemoglobin 01/28/2024 12.8 (L)     Hematocrit 01/28/2024 39.9 (L)     MCV 01/28/2024 90     MCH 01/28/2024 28.8     MCHC 01/28/2024 32.1     RDW 01/28/2024 14.3     Platelets 01/28/2024 211     Glucose 01/28/2024 93     Sodium 01/28/2024 140     Potassium 01/28/2024 3.9     Chloride 01/28/2024 103     Bicarbonate 01/28/2024 26     Anion Gap 01/28/2024 15     Urea Nitrogen 01/28/2024 25 (H)     Creatinine 01/28/2024 1.54 (H)     eGFR 01/28/2024 46 (L)     Calcium 01/28/2024 9.4     Magnesium 01/28/2024 2.06     Protime 01/28/2024 33.5 (H)     INR 01/28/2024 2.9 (H)     POCT Glucose 01/27/2024 201 (H)     POCT Glucose 01/28/2024 103 (H)     POCT Glucose 01/28/2024 125 (H)    Anticoagulation - Warfarin Visit on 01/22/2024   Component Date Value    POC INR 01/22/2024 2.50    Anticoagulation - Warfarin Visit on 01/04/2024   Component Date Value    INR External 01/04/2024 3.30    Admission on 12/25/2023, Discharged on 12/25/2023   Component Date Value    WBC 12/25/2023 7.0     nRBC 12/25/2023 0.0     RBC 12/25/2023 4.16 (L)     Hemoglobin 12/25/2023 12.1 (L)     Hematocrit 12/25/2023 39.7 (L)     MCV 12/25/2023 95     MCH 12/25/2023 29.1     MCHC 12/25/2023 30.5 (L)     RDW 12/25/2023 15.2 (H)     Platelets 12/25/2023 241     Neutrophils % 12/25/2023 58.0     Immature Granulocytes %,* 12/25/2023 0.3     Lymphocytes % 12/25/2023 27.8     Monocytes % 12/25/2023 10.8     Eosinophils %  12/25/2023 2.4     Basophils % 12/25/2023 0.7     Neutrophils Absolute 12/25/2023 4.08     Immature Granulocytes Ab* 12/25/2023 0.02     Lymphocytes Absolute 12/25/2023 1.96     Monocytes Absolute 12/25/2023 0.76     Eosinophils Absolute 12/25/2023 0.17     Basophils Absolute 12/25/2023 0.05     Glucose 12/25/2023 122 (H)     Sodium 12/25/2023 142     Potassium 12/25/2023 4.1     Chloride 12/25/2023 107     Bicarbonate 12/25/2023 25     Urea Nitrogen 12/25/2023 14     Creatinine 12/25/2023 1.30     eGFR 12/25/2023 57 (L)     Calcium 12/25/2023 8.5     Albumin 12/25/2023 3.5     Alkaline Phosphatase 12/25/2023 63     Total Protein 12/25/2023 5.9     AST 12/25/2023 11     Bilirubin, Total 12/25/2023 0.7     ALT 12/25/2023 7     Anion Gap 12/25/2023 10     Protime 12/25/2023 21.7 (H)     INR 12/25/2023 2.2 (H)        Assessment/Plan   The primary encounter diagnosis was ASHD (arteriosclerotic heart disease). Diagnoses of Primary hypertension, Mixed hyperlipidemia, Longstanding persistent atrial fibrillation (CMS/HCC), and Acute decompensated heart failure (CMS/HCC) were also pertinent to this visit.    Hyperlipidemia  Last LDL 62, plan on repeating on return visit.    Longstanding persistent atrial fibrillation (CMS/HCC)  Rates have been well controlled.  INRs at the anticoagulation clinic.    ASHD (arteriosclerotic heart disease)  Stable with no anginal symptoms, will follow clinically.    Hypertension  BP now adequately controlled, will follow.

## 2024-02-13 ENCOUNTER — PATIENT OUTREACH (OUTPATIENT)
Dept: PRIMARY CARE | Facility: CLINIC | Age: 74
End: 2024-02-13
Payer: COMMERCIAL

## 2024-02-13 NOTE — PROGRESS NOTES
Call regarding appt. with PCP on 2/8/2024, along with Cardiologist on 2/12/2024 after hospitalization.  At time of outreach call the patient feels as if their condition has improved since last visit.  Reviewed the PCP appointment with the pt and addressed any questions or concerns.

## 2024-02-14 ENCOUNTER — TREATMENT (OUTPATIENT)
Dept: PHYSICAL THERAPY | Facility: CLINIC | Age: 74
End: 2024-02-14
Payer: MEDICARE

## 2024-02-14 DIAGNOSIS — Z79.01 LONG TERM (CURRENT) USE OF ANTICOAGULANTS: ICD-10-CM

## 2024-02-14 DIAGNOSIS — I73.9 PERIPHERAL VASCULAR DISEASE, UNSPECIFIED (CMS-HCC): ICD-10-CM

## 2024-02-14 PROCEDURE — 97113 AQUATIC THERAPY/EXERCISES: CPT | Mod: GP,CQ

## 2024-02-14 ASSESSMENT — PAIN SCALES - GENERAL: PAINLEVEL_OUTOF10: 2

## 2024-02-14 ASSESSMENT — PAIN - FUNCTIONAL ASSESSMENT: PAIN_FUNCTIONAL_ASSESSMENT: 0-10

## 2024-02-14 NOTE — PROGRESS NOTES
"Physical Therapy Treatment    Patient Name: Mukesh Garg  MRN: [id number]  Encounter date:  2/14/2024  Time Calculation  Start Time: 1130  Stop Time: 1215  Time Calculation (min): 45 min     PT Therapeutic Procedures Time Entry  Aquatic Therapy Time Entry: 40    Visit Number:  9  Planned total visits: 20 POC  Visits Authorized/Insurance Coverage:  [place] / NO AUTH     Current Problem  1. Long term (current) use of anticoagulants  Follow Up In Physical Therapy      2. Peripheral vascular disease, unspecified (CMS/HCC)  Follow Up In Physical Therapy          Precautions  Cardiac history  Clotting disorder on coumadin  Compromised endurance     Pain  Pain Assessment: 0-10  Pain Score: 2  Pain Location: Leg    Subjective  General     \"I feel like Dustin over stretched my left hamstring last time. It has been hurting ever since, especially when I go up stairs.\"     Objective  Purplish discoloration of distal BLE decreased post aquatics     Treatment:    Aquatics:   No assistive device on deck  Step over with bilateral hand rails descend to pool    Cues for breathing - Light UE on rail   -Heel raises 2 x 10  -Toe raise 2 x 10  3 way hip  x 15  - abd  -ext toe touch   -Flex/SLR  Knee curl  x 15  MIP x 10     Deep End with noodle: x 2' ea   Hang   Bicycle   Hang   Abd/Add   Hang   Cross ski   Hang     Slow return to WB      At stairs:  Gentle hamstrings 2 x20\"    Current HEP:  Bridges  Calf raises  Standing hip ABD  MIP    Activity tolerance:  good    OP EDUCATION:  Reviewed HEP    Assessment:    Pt's response to treatment:  Good. Pain same at exit.   Areas of improvements:  strength/ease of mobility  Limitations/deficits:  endurance, LB ROM     Pain end of session: \"Same\"      Plan:     Continue with current POC with the following changes extend POC to total of 20 visits.  Alternate with aquatics    Assessment of current progress against goals:  Progressing toward functional goals        Goals:  STG(3 visits)  Patient to tolerate " 40 minutes of aqautic PT- MET     LTG (10 visits)1  LEFS to < 40 % impaired-NOT MET  MMT LE to 4/5- MET- MET  Patient to walk for 30 minutes without need to sit secondary to pain/fatigue- NOT MET  4.   Patient to return to pre morbid recreational activities.- NOT MET

## 2024-02-15 DIAGNOSIS — I73.9 PERIPHERAL VASCULAR DISEASE, UNSPECIFIED (CMS-HCC): ICD-10-CM

## 2024-02-16 ENCOUNTER — TELEMEDICINE (OUTPATIENT)
Dept: PHARMACY | Facility: HOSPITAL | Age: 74
End: 2024-02-16
Payer: COMMERCIAL

## 2024-02-16 DIAGNOSIS — I50.9 ACUTE DECOMPENSATED HEART FAILURE (MULTI): ICD-10-CM

## 2024-02-16 RX ORDER — CLOPIDOGREL BISULFATE 75 MG/1
75 TABLET ORAL DAILY
Qty: 30 TABLET | Refills: 0 | Status: SHIPPED | OUTPATIENT
Start: 2024-02-16 | End: 2024-04-03

## 2024-02-16 NOTE — PROGRESS NOTES
Pharmacy Post-Discharge Visit  Mukesh Garg is a 74 y.o. male was referred to Clinical Pharmacy Team to complete a post-discharge medication optimization and monitoring visit.  The patient was referred for their Congestive Heart Failure.    Admission Date: 1/23/2024  Discharge Date: 1/28/2024    Referring Provider: Lorenza Garcia MD    Subjective   Allergies   Allergen Reactions    Pentazocine Dizziness and Itching    Talwin Compound Itching and Dizziness       [id number] - [place], [place] - [address] AT [facility]  [address] [facility]  [place] [address]  Phone: [phone] Fax: [phone]      Social History     Social History Narrative    Not on file        Notable Medication changes following discharge:  Start:    empagliflozin 10 mg; Commonly known as: Jardiance; Take 1 tablet (10 mg)   by mouth once daily. Do not start before January 29, 2024.; Start taking   on: January 29, 2024   spironolactone 50 mg tablet; Commonly known as: Aldactone; Take 1 tablet   (50 mg) by mouth once daily. Do not start before January 29, 2024.; Start   taking on: January 29, 2024   torsemide 20 mg tablet; Commonly known as: Demadex; Take 1 tablet (20   mg) by mouth once daily. Do not start before January 29, 2024.; Start   taking on: January 29, 2024    Stop:    furosemide 40 mg tablet; Commonly known as: Lasix   lisinopril 40 mg tablet    Change:    carvedilol 25 mg tablet; Commonly known as: Coreg; Take 1 tablet (25 mg)   by mouth 2 times a day with meals.; What changed: medication strength, See   the new instructions.    Patient feels he has improved since being discharged from hospital. Still working on getting his stamina back. Of note, patient endorses small but semi frequent nose bleeds, patient is not on home O2, using a CPAP with humidifier.     CHF Assessment    Symptom/Staging:  -Most recent ejection fraction: 55-60% 1/23/2024  -NYHA Stage: 2  -ABCD Stage: C  -Weight changes?:  No, weight fluctuating between 1-4 pounds. Patient checking weight daily.     Guideline-Directed Medical Therapy:  -ARNI: No   -If no, then ACEi/ARB?: No  -Beta Blocker: Yes, describe: carvedilol 25 mg twice daily  -MRA: No  -SGLT2i: Yes, describe: Jardiance 10 mg once daily    Secondary Prevention:  -The ASCVD Risk score (Keren BATISTA, et al., 2019) failed to calculate for the following reasons:    The valid total cholesterol range is 130 to 320 mg/dL   -Aspirin 81mg? no  -Statin?: Yes, describe: atorvastatin 40 mg daily  -HTN?: Yes, describe: most recent document /80 mmHg    Medication System Management:  Affordability/Accessibility: Jardiance  Adherence/Organization: complete adherence  Adverse Effects: denies    [facility] Patient Assistance Screening (VAF)  Patient verbally reports monthly or yearly income which is greater than 400% federal poverty level    Objective     There were no vitals taken for this visit.     LAB  Lab Results   Component Value Date    BILITOT 0.7 12/25/2023    CALCIUM 9.7 02/02/2024    CO2 26 02/02/2024    CL 97 02/02/2024    CREATININE 2.40 (H) 02/02/2024    GLUCOSE 151 (H) 02/02/2024    ALKPHOS 63 12/25/2023    K 3.8 02/02/2024    PROT 5.9 12/25/2023     02/02/2024    AST 11 12/25/2023    ALT 7 12/25/2023    BUN 37 (H) 02/02/2024    ANIONGAP 16 02/02/2024    MG 2.06 01/28/2024    PHOS 3.4 11/24/2023    ALBUMIN 3.5 12/25/2023    AMYLASE 66 01/18/2018    LIPASE 52 01/18/2018     Lab Results   Component Value Date    TRIG 98 12/06/2023    CHOL 88 12/06/2023    LDLCALC 38 12/06/2023    HDL 30.2 12/06/2023     Lab Results   Component Value Date    HGBA1C 6.2 (H) 11/21/2023     Current Outpatient Medications on File Prior to Visit   Medication Sig Dispense Refill    acetaminophen (Tylenol) 325 mg tablet Take 2 tablets (650 mg) by mouth every 6 hours if needed for mild pain (1 - 3).      atorvastatin (Lipitor) 40 mg tablet TAKE 1 TABLET BY MOUTH EVERY DAY FOR 90 DAYS 90 tablet 3     carvedilol (Coreg) 25 mg tablet Take 1 tablet (25 mg) by mouth 2 times a day with meals. 60 tablet 1    clopidogrel (Plavix) 75 mg tablet TAKE 1 TABLET BY MOUTH ONCE DAILY. 30 tablet 0    dicyclomine (Bentyl) 20 mg tablet Take 1 tablet (20 mg) by mouth once daily.      empagliflozin (Jardiance) 10 mg Take 1 tablet (10 mg) by mouth once daily. Do not start before January 29, 2024. 30 tablet 1    ezetimibe (Zetia) 10 mg tablet Take 1 tablet (10 mg) by mouth once daily.      famotidine (Pepcid) 20 mg tablet Take 1 tablet (20 mg) by mouth 2 times a day.      loperamide (Imodium) 2 mg capsule Take 1 capsule (2 mg) by mouth once daily as needed for diarrhea. 30 capsule 0    torsemide (Demadex) 20 mg tablet Take 1 tablet (20 mg) by mouth once daily. Do not start before January 29, 2024. 30 tablet 1    warfarin (Coumadin) 5 mg tablet Take 1 tablet (5 mg) by mouth once daily in the evening. 5mg M, W, F -2.5 mg all other days 30 tablet 0    [DISCONTINUED] ammonium lactate (Lac-Hydrin) 12 % lotion Apply topically every 12 hours.      [DISCONTINUED] polyethylene glycol (Glycolax, Miralax) 17 gram packet Take 17 g by mouth 2 times a day as needed (constipation). 60 packet 0     No current facility-administered medications on file prior to visit.      DRUG INTERATIONS  - No significant drug interactions identified at this time    Assessment/Plan   Problem List Items Addressed This Visit       Acute decompensated heart failure (CMS/[facility])   CHF - stable  Continue medications as currently prescribed   Compliance at present is estimated to be excellent  Education Provided to Patient: MOA of Jardiance and benefit in CHF   Jardiance may be cost prohibitive, patient does not know cost, but patient does not qualify for patient assistance based on his income  Medications reconciled with patient  Follow-up: as needed based on provider or patient request  PCP Follow-Up: no visits scheduled at this time    Continue all meds under the  continuation of care with the referring provider and clinical pharmacy team.    Scot Andrews PharmD     Verbal consent to manage patient's drug therapy was obtained from [the patient and/or an individual authorized to act on behalf of a patient]. They were informed they may decline to participate or withdraw from participation in pharmacy services at any time.

## 2024-02-20 DIAGNOSIS — I48.92 ATRIAL FLUTTER, PAROXYSMAL (MULTI): ICD-10-CM

## 2024-02-20 DIAGNOSIS — I50.9 ACUTE DECOMPENSATED HEART FAILURE (MULTI): ICD-10-CM

## 2024-02-21 ENCOUNTER — TREATMENT (OUTPATIENT)
Dept: PHYSICAL THERAPY | Facility: CLINIC | Age: 74
End: 2024-02-21
Payer: MEDICARE

## 2024-02-21 DIAGNOSIS — I73.9 PERIPHERAL VASCULAR DISEASE, UNSPECIFIED (CMS-HCC): ICD-10-CM

## 2024-02-21 DIAGNOSIS — Z79.01 LONG TERM (CURRENT) USE OF ANTICOAGULANTS: ICD-10-CM

## 2024-02-21 PROCEDURE — 97113 AQUATIC THERAPY/EXERCISES: CPT | Mod: GP,CQ

## 2024-02-21 RX ORDER — CARVEDILOL 25 MG/1
TABLET ORAL
Qty: 180 TABLET | Refills: 1 | OUTPATIENT
Start: 2024-02-21

## 2024-02-21 RX ORDER — TORSEMIDE 20 MG/1
20 TABLET ORAL DAILY
Qty: 90 TABLET | Refills: 1 | OUTPATIENT
Start: 2024-02-21 | End: 2024-03-22

## 2024-02-21 ASSESSMENT — PAIN - FUNCTIONAL ASSESSMENT: PAIN_FUNCTIONAL_ASSESSMENT: 0-10

## 2024-02-21 NOTE — PROGRESS NOTES
"Physical Therapy Treatment    Patient Name: Mukesh Garg  MRN: [id number]  Encounter date:  2/21/2024  Time Calculation  Start Time: 1430  Stop Time: 1514  Time Calculation (min): 44 min     PT Therapeutic Procedures Time Entry  Aquatic Therapy Time Entry: 42    Visit Number:  11 (including evaluation)  Planned total visits: 25  Visit Authorized/insurance coverage:   2024 - 2024- 96% [place]AGE/ [place] VISIT/ NO AUTH PER [employer]ILITY [id number]  Transaction Time Feb 1, 3:07 PM  [facility] - NEW [employer] ACTIVE / $0 DED / $1500 OOP / [place] VISITS/ $0 COPAY PER AVAILITY #[id number]        Current Problem  1. Long term (current) use of anticoagulants  Follow Up In Physical Therapy      2. Peripheral vascular disease, unspecified (CMS/HCC)  Follow Up In Physical Therapy        Precautions  Cardiac history  Clotting disorder on coumadin  Compromised endurance      Pain  Pain Assessment: 0-10  Pain Score:  (1.5)  Pain Location: Leg    Subjective  General  General Comment: Patient reports that he he is now driving and he has been mall walking for approximately 1 mile at a time before exhaustion.         Objective  Improved gait with less cane use.    Treatment:     Aquatics:   No assistive device on deck  Step over with bilateral hand rails descend to pool     Cues for breathing - Light UE on rail   -Heel raises 2 x 10  -Toe raise 2 x 10  3 way hip  x 15  - abd  -ext toe touch   -Flex/SLR  Knee curl  x 15  MIP x 10  Squat x10     Deep End with noodle: x 2' ea   Hang   Bicycle   Hang   Abd/Add   Hang   Cross ski   Hang      Slow return to WB      At stairs:  Gentle hamstrings 2 x20\"  Billed Treatment Times:  Aquatic Exercise 42 min    Assessment:  PT Assessment  Assessment Comment: Patient is making good overall progress and tolerated aquatics with good control and no increase in sx.      Pain end of session:  1/10    Plan:     Continue with current POC/no changes    Assessment of current progress against goals:  Progressing toward functional " goals    Goals:  STG(3 visits)  Patient to tolerate 40 minutes of aqautic PT- MET     LTG (10 visits)1  LEFS to < 40 % impaired-NOT MET  MMT LE to 4/5- MET- MET  Patient to walk for 30 minutes without need to sit secondary to pain/fatigue- NOT MET  4.   Patient to return to pre morbid recreational activities.- NOT MET

## 2024-02-22 ENCOUNTER — ANTICOAGULATION - WARFARIN VISIT (OUTPATIENT)
Dept: CARDIOLOGY | Facility: HOSPITAL | Age: 74
End: 2024-02-22
Payer: MEDICARE

## 2024-02-22 DIAGNOSIS — Z79.01 LONG TERM (CURRENT) USE OF ANTICOAGULANTS: Primary | ICD-10-CM

## 2024-02-22 LAB
POC INR: 2.8
POC INR: 2.8
POC PROTHROMBIN TIME: NORMAL
POC PROTHROMBIN TIME: NORMAL

## 2024-02-22 PROCEDURE — 85610 PROTHROMBIN TIME: CPT | Mod: QW

## 2024-02-22 PROCEDURE — 99211 OFF/OP EST MAY X REQ PHY/QHP: CPT | Performed by: INTERNAL MEDICINE

## 2024-02-22 NOTE — PROGRESS NOTES
Patient identification verified with 2 identifiers.    Location: [facility] - 1st Floor Anticoagulation Clinic [address] [id number]    Referring Physician: Dr Wayne  Enrollment/ Re-enrollment date: 2024   INR Goal: 2.0-3.0  INR monitoring is per [facility] protocol.  Anticoagulation Medication: warfarin  Indication: Atrial Fibrillation/Atrial Flutter    Subjective   Bleeding signs/symptoms: Yes  recent nose bleeds    Bruising:     Major bleeding event:    Thrombosis signs/symptoms:    Thromboembolic event:    Missed doses:    Extra doses:    Medication changes:    Dietary changes:    Change in health:    Change in activity:    Alcohol:    Other concerns:      Upcoming Procedures:  Does the Patient Have any upcoming procedures that require interruption in anticoagulation therapy? no  Does the patient require bridging? no      Anticoagulation Summary  As of 2/22/2024      INR goal:  2.0-3.0   TTR:  --   INR used for dosing:  No new INR was available at the time of this encounter.   Weekly warfarin total:  20 mg               Assessment/Plan   Therapeutic     1. New dose: no change    2. Next INR: 1 week      Education provided to patient during the visit:  Patient instructed to call in interim with questions, concerns and changes.

## 2024-02-22 NOTE — PROGRESS NOTES
Patient identification verified with 2 identifiers.    Location: [facility] - 1st Floor Anticoagulation Clinic [address] [place], [place] [address]    Referring Physician: DR Wayne  Enrollment/ Re-enrollment date: 2024   INR Goal: 2.0-3.0  INR monitoring is per [facility] protocol.  Anticoagulation Medication: warfarin  Indication: Atrial Fibrillation/Atrial Flutter    Subjective   Bleeding signs/symptoms: Yes  has had nose bleeds with clots and did not seek care, stopped after about 15-minutes to half hour    Bruising: No   Major bleeding event: No  Thrombosis signs/symptoms: No  Thromboembolic event: No  Missed doses: No  Extra doses: No  Medication changes: No  Dietary changes: No  Change in health: No  Change in activity: No  Alcohol: No  Other concerns: No    Upcoming Procedures:  Does the Patient Have any upcoming procedures that require interruption in anticoagulation therapy? no  Does the patient require bridging? no      Anticoagulation Summary  As of 2/26/2024      INR goal:  2.0-3.0   TTR:  59.0 % (3.3 wk)   INR used for dosing:                 Assessment/Plan   Therapeutic     1. New dose:  dosing changed to reflect what was taken this past week     2. Next INR: 2 weeks      Education provided to patient during the visit:  Patient instructed to call in interim with questions, concerns and changes.   Patient educated on signs of bleeding/clotting.

## 2024-02-23 ENCOUNTER — APPOINTMENT (OUTPATIENT)
Dept: PHYSICAL THERAPY | Facility: CLINIC | Age: 74
End: 2024-02-23
Payer: MEDICARE

## 2024-02-23 NOTE — PROGRESS NOTES
"Physical Therapy Treatment    Patient Name: Mukesh Garg  MRN: [id number]  Encounter date:  2/23/2024             Visit Number:  Visit count could not be calculated. Make sure you are using a visit which is associated with an episode. (including evaluation)  Planned total visits: 20  Visits Authorized/Insurance Coverage:  2024 - 2024- 96% [place]/ [place]/ [place] AUTH PER AVAILITY [id number]  Transaction Time Feb 1, 3:07 PM    [facility] - NEW [employer] ACTIVE / $0 DED / $1500 OOP / [place]/ [place]0 COPAY PER AVAILITY #[id number]    Current Problem  No diagnosis found.        Precautions       Pain       Subjective  General        ***    Objective      Treatment:      Therapeutic Exercise:  Nu-Step L2 x 6 min  HS stretch 30\" x 3  Bridges 2 x 10  Calf raises 2 x 10  Standing hip ABD x 10  MIP 2 x 10        Foam step up FWD/LAT x 10 ea  Hurdles FWD/LAT x 6  Standing RB  Foam step tap to 8\" step          Current HEP:  Bridges  Calf raises  Standing hip ABD  MIP    Activity tolerance:  {Activity:84865}    OP EDUCATION:       Assessment:     Pt's response to treatment:  ***  Areas of improvements:  ***  Limitations/deficits:  ***    Pain end of session: ***    Plan:     {BASPLAN:38695}    Assessment of current progress against goals:  Progressing toward functional goals        Goals:  STG(3 visits)  Patient to tolerate 40 minutes of aqautic PT- MET     LTG (20 visits)1  LEFS to < 40 % impaired-NOT MET  MMT LE to 4/5- MET- MET  Patient to walk for 30 minutes without need to sit secondary to pain/fatigue- NOT MET  4.   Patient to return to pre morbid recreational activities.- NOT MET              "

## 2024-02-26 ENCOUNTER — PATIENT OUTREACH (OUTPATIENT)
Dept: PRIMARY CARE | Facility: CLINIC | Age: 74
End: 2024-02-26

## 2024-02-26 ENCOUNTER — ANTICOAGULATION - WARFARIN VISIT (OUTPATIENT)
Dept: CARDIOLOGY | Facility: HOSPITAL | Age: 74
End: 2024-02-26
Payer: MEDICARE

## 2024-02-26 ENCOUNTER — TREATMENT (OUTPATIENT)
Dept: PHYSICAL THERAPY | Facility: CLINIC | Age: 74
End: 2024-02-26
Payer: MEDICARE

## 2024-02-26 DIAGNOSIS — Z79.01 ANTICOAGULATED: Primary | ICD-10-CM

## 2024-02-26 DIAGNOSIS — Z79.01 LONG TERM (CURRENT) USE OF ANTICOAGULANTS: Primary | ICD-10-CM

## 2024-02-26 LAB
POC INR: 2.4
POC PROTHROMBIN TIME: NORMAL

## 2024-02-26 PROCEDURE — 99211 OFF/OP EST MAY X REQ PHY/QHP: CPT | Performed by: INTERNAL MEDICINE

## 2024-02-26 PROCEDURE — 97113 AQUATIC THERAPY/EXERCISES: CPT | Mod: GP

## 2024-02-26 PROCEDURE — 85610 PROTHROMBIN TIME: CPT | Mod: QW

## 2024-02-26 NOTE — PROGRESS NOTES
"Physical Therapy Treatment    Patient Name: Mukesh Garg  MRN: [id number]  Encounter date:  2/26/2024  Time Calculation  Start Time: 1540  Stop Time: 1625  Time Calculation (min): 45 min     PT Therapeutic Procedures Time Entry  Aquatic Therapy Time Entry: 40    Visit Number:  12 (including evaluation)  Planned total visits: 20  Visits Authorized/Insurance Coverage 2024 - 2024- 96% [place]/ [place]/ NO AUTH PER AVAILITY [id number]  Transaction Time Feb 1, 3:07 PM  [facility] - NEW [employer] ACTIVE / $0 DED / $1500 OOP / [place]/ $0 COPAY PER AVAILITY #[id number]:  2    Current Problem  1. Anticoagulated  Follow Up In Physical Therapy          Precautions  Cardiac history  Clotting disorder on coumadin  Compromised endurance    Pain  2-3/10  Left LE      Subjective  General  Patient in good spirits.  Patient to [facility] 2/23 for \"whole body workout\".          Objective  Gait speed increasing without LOB       Treatment:  Aquatics:   No assistive device on deck  Step over with bilateral hand rails descend to pool    4' warm up walking  x 3 laps  FWD  BKWD  LAT     Cues for breathing - Light UE on rail   -Heel raises 2 x 10  -Toe raise 2 x 10  3 way hip  x 20  - abd  -ext toe touch   -Flex/SLR  Knee curl  x 20  MIP x 20  Squat x10     Deep End with noodle: x 2' ea   Hang   Bicycle   Hang   Abd/Add   Hang   Cross ski   Hang     Slow return to WB    HS stretching 20\" x 2    Current HEP:  Bridges  Calf raises  Standing hip ABD  MIP    Activity tolerance:  good    OP EDUCATION:  Continue with HEP/[facility] workouts    Assessment:     Pt's response to treatment: good  Areas of improvements:  ease of mobility  Limitations/deficits:  endurance    Pain end of session:   2-3/10    Plan:     Continue with current POC with the following changes alternate with land based session    Assessment of current progress against goals:  Progressing toward functional goals          Goals:  STG(3 visits)  Patient to tolerate 40 minutes of " aqautic PT- MET     LTG (10 visits)1  LEFS to < 40 % impaired-NOT MET  MMT LE to 4/5- MET- MET  Patient to walk for 30 minutes without need to sit secondary to pain/fatigue- NOT MET  4.   Patient to return to pre morbid recreational activities.- NOT MET                       Electronically signed by Zully Bob PTA at 2/21/2024  4:57 PM  Electronically signed by Mukesh Conklin PT at 2/21/2024  5:12 PM

## 2024-02-27 NOTE — PROGRESS NOTES
"Physical Therapy Treatment    Patient Name: Mukesh Garg  MRN: [id number]  Encounter date:  2/28/2024  Time Calculation  Start Time: 1315  Stop Time: 1355  Time Calculation (min): 40 min     PT Therapeutic Procedures Time Entry  Therapeutic Exercise Time Entry: 38    Visit Number:  13 (including evaluation)  Planned total visits: 20  Visits Authorized/Insurance Coverage:  2024 - 2024- 96% [place]/ [place]/ NO AUTH PER AVAILITY [id number]  Transaction Time Feb 1, 3:07 PM  [facility] - NEW [employer] ACTIVE / $0 DED / $1500 OOP / [place]/ $0 COPAY PER AVAILITY #[id number]    Current Problem  1. Anticoagulated  Follow Up In Physical Therapy            Precautions  Cardiac history  Clotting disorder on coumadin  Compromised endurance    Pain  Variable levels of LE nerve type pain up to 5./10       Subjective  General  Patient in good spirits and motivated to participate.    Objective  Limited trunk rotation with gait    Treatment:     Therapeutic Exercise:  Nu-Step L2 x 8 min  HS stretch 30\" x 3  Ball flexion x 20  Bridges 2 x 10    Calf raises 2 x 10  Standing hip ABD x 10  MIP 2 x 10- on foam     Foam step up FWD x 10 ea  Foam step tap to 8\" step   Hurdles FWD/LAT x 6        Current HEP:  Bridges  Calf raises  Standing hip ABD  MIP    Activity tolerance:  good    OP EDUCATION:  Continue with HEP    Assessment:     Pt's response to treatment:  good  Areas of improvements:  ease of mobility  Limitations/deficits:  endurance    Pain end of session:   0/10    Plan:     Continue with current POC with the following changes alternate with aquatics    Assessment of current progress against goals:  Progressing toward functional goals        Goals:  STG(3 visits)  Patient to tolerate 40 minutes of aqautic PT- MET     LTG (20 visits)1  LEFS to < 40 % impaired-NOT MET  MMT LE to 4/5- MET- MET  Patient to walk for 30 minutes without need to sit secondary to pain/fatigue- NOT MET  4.   Patient to return to pre morbid recreational " activities.- NOT MET

## 2024-02-28 ENCOUNTER — TREATMENT (OUTPATIENT)
Dept: PHYSICAL THERAPY | Facility: CLINIC | Age: 74
End: 2024-02-28
Payer: MEDICARE

## 2024-02-28 DIAGNOSIS — Z79.01 ANTICOAGULATED: Primary | ICD-10-CM

## 2024-02-28 PROCEDURE — 97110 THERAPEUTIC EXERCISES: CPT | Mod: GP

## 2024-03-05 ENCOUNTER — TREATMENT (OUTPATIENT)
Dept: PHYSICAL THERAPY | Facility: CLINIC | Age: 74
End: 2024-03-05
Payer: MEDICARE

## 2024-03-05 DIAGNOSIS — Z79.01 ANTICOAGULATED: Primary | ICD-10-CM

## 2024-03-05 PROCEDURE — 97113 AQUATIC THERAPY/EXERCISES: CPT | Mod: GP

## 2024-03-05 NOTE — PROGRESS NOTES
"Physical Therapy Treatment    Patient Name: Mukesh Garg  MRN: [id number]  Encounter date:  3/5/2024             Visit Number:  14 (including evaluation)  Planned total visits: 20  Visits Authorized/Insurance Coverage:  2024 - 2024- 96% [place]/ [place]/ NO AUTH PER AVAILITY [id number]  Transaction Time Feb 1, 3:07 PM    [facility] - NEW [employer] ACTIVE / $0 DED / $1500 OOP / [place]/ $0 COPAY PER AVAILITY #[id number]                   Electronically signed by Mukesh Conklin PT at 2/12/2024  4:54 PM    Current Problem  1. Anticoagulated  Follow Up In Physical Therapy          Precautions  Cardiac history  Clotting disorder on coumadin  Compromised endurance    Pain  General LE soreness  2-3/10    Subjective  General  Patient in good spirits; walked 1.5 miles yesterday.  Patient without specific complaints of pain.    Objective  Increased gait speed in water      Treatment:      Aquatics:   No assistive device on deck  Step over with bilateral hand rails descend to pool     4' warm up walking  x 3 laps  FWD  BKWD  LAT     Cues for breathing - Light UE on rail   -Heel raises 2 x 10  -Toe raise 2 x 10  3 way hip  x 20  - abd  -ext toe touch   -Flex/SLR  Knee curl  x 20  MIP x 20  Squat x10     Deep End with noodle: x 2' ea   Hang   Bicycle   Hang   Abd/Add   Hang   Cross ski   Hang      Slow return to WB     HS stretching 20\" x 2    Current HEP:  Bridges  Calf raises  Standing hip ABD  MIP    Activity tolerance:  good    OP EDUCATION:  Pace activity    Assessment:     Pt's response to treatment:  good  Areas of improvements:  endurance  Limitations/deficits:  variable LE swelling    Pain end of session:   1-2    Plan:     Continue with current POC with the following changes land based session    Assessment of current progress against goals:  Progressing toward functional goals          Goals:  STG(3 visits)  Patient to tolerate 40 minutes of aqautic PT- MET     LTG (20 visits)  LEFS to < 40 % impaired-NOT MET  MMT LE to 4/5- " MET- MET  Patient to walk for 30 minutes without need to sit secondary to pain/fatigue- NOT MET  4.   Patient to return to pre morbid recreational activities.- NOT MET                       Electronically signed by Mukesh Conklin PT at 2/12/2024  4:54 PM

## 2024-03-07 ENCOUNTER — ANTICOAGULATION - WARFARIN VISIT (OUTPATIENT)
Dept: CARDIOLOGY | Facility: HOSPITAL | Age: 74
End: 2024-03-07
Payer: MEDICARE

## 2024-03-07 ENCOUNTER — TREATMENT (OUTPATIENT)
Dept: PHYSICAL THERAPY | Facility: CLINIC | Age: 74
End: 2024-03-07
Payer: MEDICARE

## 2024-03-07 DIAGNOSIS — Z79.01 LONG TERM (CURRENT) USE OF ANTICOAGULANTS: Primary | ICD-10-CM

## 2024-03-07 DIAGNOSIS — Z79.01 ANTICOAGULATED: Primary | ICD-10-CM

## 2024-03-07 LAB
POC INR: 2.3
POC PROTHROMBIN TIME: NORMAL

## 2024-03-07 PROCEDURE — 99211 OFF/OP EST MAY X REQ PHY/QHP: CPT | Performed by: INTERNAL MEDICINE

## 2024-03-07 PROCEDURE — 85610 PROTHROMBIN TIME: CPT | Mod: QW

## 2024-03-07 PROCEDURE — 97110 THERAPEUTIC EXERCISES: CPT | Mod: GP

## 2024-03-07 NOTE — PROGRESS NOTES
Patient identification verified with 2 identifiers.    Location: [facility] - [facility]st [facility]tico[facility] Clinic [address] [address]    Referring Physician: Dr. Wayne  Enrollment/ Re-enrollment date:    INR Goal: 2.0-3.0  INR monitoring is per [facility] protocol.  Anticoagulation Medication: warfarin  Indication: Peripheral Artery Disease (PAD)    Subjective   Bleeding signs/symptoms: No    Bruising: No   Major bleeding event: No  Thrombosis signs/symptoms: No  Thromboembolic event: No  Missed doses: No  Extra doses: No  Medication changes: No  Dietary changes: No  Change in health: No  Change in activity: No  Alcohol: No  Other concerns: No    Upcoming Procedures:  Does the Patient Have any upcoming procedures that require interruption in anticoagulation therapy? no  Does the patient require bridging? no      Anticoagulation Summary  As of 3/7/2024      INR goal:  2.0-3.0   TTR:  82.5 % (1.8 mo)   INR used for dosin.30 (3/7/2024)   Weekly warfarin total:  22.5 mg               Assessment/Plan   Therapeutic     1. New dose: no change    2. Next INR: 1 month      Education provided to patient during the visit:  Patient instructed to call in interim with questions, concerns and changes.

## 2024-03-07 NOTE — PROGRESS NOTES
"Physical Therapy Treatment    Patient Name: Mukesh Garg  MRN: [id number]  Encounter date:  3/7/2024             Visit Number:  15 (including evaluation)  Planned total visits: 20  Visits Authorized/Insurance Coverage:  2024 - 2024- 96% [place]/ [place]/ NO AUTH PER AVAILITY [id number]  Transaction Time Feb 1, 3:07 PM  [facility] - NEW [employer] ACTIVE / $0 DED / $1500 OOP / [place]/ $0 COPAY PER AVAILITY #[id number]    Current Problem  1. Anticoagulated  Follow Up In Physical Therapy              Precautions  Cardiac history  Clotting disorder on coumadin  Compromised endurance       Pain  Right knee  2/10    Subjective  General  Patient reports good tolerance of last aquatics visit. Patient with modest right knee pain today.      Objective  Zero LOB with dynamic activity    Treatment:    Nu-Step L3 x 8 min  HS stretch 30\" x 3  Ball flexion x 20  Bridges on ball 2 x 10     Calf raises 2 x 10-  on foam  Standing hip ABD 2 x 10  MIP 2 x 10- on foam  RB AP x 20     Foam step up FWD/LAT x 10 ea  Foam step tap to 8\" step   Hurdles FWD/LAT x 6        Current HEP:    Bridges  Calf raises  Standing hip ABD  MIP    Activity tolerance:  good    OP EDUCATION:  Continue with HEP    Assessment:     Pt's response to treatment:  good  Areas of improvements:  endurance  Limitations/deficits:  right knee pain    Pain end of session:   2/10  Right knee    Plan:     Continue with current POC with the following changes resume aquatics    Assessment of current progress against goals:  Progressing toward functional goals        Goals:  STG(3 visits)  Patient to tolerate 40 minutes of aqautic PT- MET     LTG (20 visits)1  LEFS to < 40 % impaired-NOT MET  MMT LE to 4/5- MET- MET  Patient to walk for 30 minutes without need to sit secondary to pain/fatigue- NOT MET  4.   Patient to return to pre morbid recreational activities.- NOT MET                                "

## 2024-03-10 NOTE — TELEPHONE ENCOUNTER
MULTIPLE REQUESTS FOR REFUSED RXS NEEDS FU VISIT TO REVIEW AND CLARIFY WHAT IS REQUESTED.   PLEASE SET UP.

## 2024-03-12 ENCOUNTER — TREATMENT (OUTPATIENT)
Dept: PHYSICAL THERAPY | Facility: CLINIC | Age: 74
End: 2024-03-12
Payer: MEDICARE

## 2024-03-12 DIAGNOSIS — Z79.01 ANTICOAGULATED: Primary | ICD-10-CM

## 2024-03-12 PROCEDURE — 97113 AQUATIC THERAPY/EXERCISES: CPT | Mod: GP

## 2024-03-12 NOTE — PROGRESS NOTES
"Physical Therapy Treatment    Patient Name: Mukesh Garg  MRN: [id number]  Encounter date:  3/12/2024  Time Calculation  Start Time: 1430  Stop Time: 1515  Time Calculation (min): 45 min     PT Therapeutic Procedures Time Entry  Aquatic Therapy Time Entry: 40    Visit Number:  16 (including evaluation)  Planned total visits: 20  Visits Authorized/Insurance Coverage:  2024 - 2024- 96% [place]/ [place]/ NO AUTH PER AVAILITY [id number]  Transaction Time Feb 1, 3:07 PM    [facility] - NEW AET[employer] ACTIVE / $0 DED / $1500 OOP / [place]/ $0 COPAY PER AVAILITY #[id number]    Current Problem  1. Anticoagulated  Follow Up In Physical Therapy            Precautions  Cardiac history  Clotting disorder on coumadin  Compromised endurance          Pain      Subjective  General  Patient reports modest left LE discomfort after riding in car for long periods    Objective  Increased gait speed    Treatment:    Aquatics:   No assistive device on deck  Step over with bilateral hand rails descend to pool     4' warm up walking  x 3 laps  FWD  BKWD  LAT     Cues for breathing - Light UE on rail   -Heel raises 2 x 10  -Toe raise 2 x 10  3 way hip  x 20  - abd  -ext toe touch   -Flex/SLR  Knee curl  x 20  MIP x 20  Squat x 10     Deep End with noodle: x 2' ea   Hang   Bicycle   Hang   Abd/Add   Hang   Cross ski   Hang      Slow return to WB     HS stretching 20\" x 2    Current HEP:  Bridges  Calf raises  Standing hip ABD  MIP    Activity tolerance:  good    OP EDUCATION:  HEP    Assessment:     Pt's response to treatment:  good  Areas of improvements:  balance  Limitations/deficits:  endurance    Pain end of session:   unchanged    Plan:     Continue with current POC with the following changes advance as able- land based sesion    Assessment of current progress against goals:  Progressing toward functional goals        Goals:  STG(3 visits)  Patient to tolerate 40 minutes of aqautic PT- MET     LTG (20 visits)1  LEFS to < 40 % impaired-NOT " MET  MMT LE to 4/5- MET- MET  Patient to walk for 30 minutes without need to sit secondary to pain/fatigue- NOT MET

## 2024-03-15 ENCOUNTER — TREATMENT (OUTPATIENT)
Dept: PHYSICAL THERAPY | Facility: CLINIC | Age: 74
End: 2024-03-15
Payer: MEDICARE

## 2024-03-15 DIAGNOSIS — Z79.01 ANTICOAGULATED: Primary | ICD-10-CM

## 2024-03-15 PROCEDURE — 97110 THERAPEUTIC EXERCISES: CPT | Mod: GP

## 2024-03-15 NOTE — PROGRESS NOTES
"Physical Therapy Treatment    Patient Name: Mukesh Garg  MRN: [id number]  Encounter date:  3/15/2024  Time Calculation  Start Time: 1350  Stop Time: 1435  Time Calculation (min): 45 min     PT Therapeutic Procedures Time Entry  Therapeutic Exercise Time Entry: 40    Visit Number:  17 (including evaluation)  Planned total visits: 20  Visits Authorized/Insurance Coverage:  2024 - 2024- 96% [place]AGE/ [place]/ NO AUTH PER AVAILITY [id number]  Transaction Time Feb 1, 3:07 PM    [facility] - NEW AET[employer] ACTIVE / $0 DED / $1500 OOP / [place]ITS/ $0 COPAY PER AVAILITY #[id number]    Current Problem  1. Anticoagulated  Follow Up In Physical Therapy            Precautions  Cardiac history  Clotting disorder on coumadin  Compromised endurance          Pain  Modest right knee discomfort    Subjective  General  Patient in good spirits and motivated to participate     Objective  Less rest breaks required       Treatment:    Nu-Step L5 x 8 min  HS stretch 30\" x 3  Ball flexion x 20  Bridges  2 x 10     Calf raises 2 x 10-  on foam  Standing hip ABD 2 x 10  MIP 2 x 10- on foam  RB AP x 20     Foam step up FWD/LAT x 10 ea  Foam step tap to 8\" step   Hurdles FWD/LAT x 6  Airex beam tandem/side step x 6    Current HEP:    Bridges  Calf raises  Standing hip ABD  MIP    Activity tolerance:  good    OP EDUCATION:  HEP    Assessment:     Pt's response to treatment:  good  Areas of improvements:  balance  Limitations/deficits:  endurance    Pain end of session:   unchanged    Plan:     Continue with current POC with the following changes advance as able    Assessment of current progress against goals:  Progressing toward functional goals      Goals:  STG(3 visits)  Patient to tolerate 40 minutes of aqautic PT- MET     LTG (20 visits)1  LEFS to < 40 % impaired-NOT MET  MMT LE to 4/5- MET- MET  Patient to walk for 30 minutes without need to sit secondary to pain/fatigue- NOT MET  4.   Patient to return to pre morbid recreational activities.- NOT " MET                                       Electronically signed by Mukesh Conklin PT at 3/7/2024  3:44 PM

## 2024-03-19 ENCOUNTER — TREATMENT (OUTPATIENT)
Dept: PHYSICAL THERAPY | Facility: CLINIC | Age: 74
End: 2024-03-19
Payer: MEDICARE

## 2024-03-19 DIAGNOSIS — Z79.01 ANTICOAGULATED: Primary | ICD-10-CM

## 2024-03-19 PROCEDURE — 97113 AQUATIC THERAPY/EXERCISES: CPT | Mod: GP

## 2024-03-19 NOTE — PROGRESS NOTES
"Physical Therapy Treatment    Patient Name: Mukesh Garg  MRN: [id number]  Encounter date:  3/19/2024  Time Calculation  Start Time: 1430  Stop Time: 1515  Time Calculation (min): 45 min     PT Therapeutic Procedures Time Entry  Aquatic Therapy Time Entry: 40    Visit Number:  18 (including evaluation)  Planned total visits: 20  Visits Authorized/Insurance Coverage:  2024 - 2024- 96% C[place]AGE/ [place]/ NO AUTH PER AVAILITY [id number]  Transaction Time Feb 1, 3:07 PM    [facility] - NEW AET[employer] ACTIVE / $0 DED / $1500 OOP / [place]ISITS/ $0 COPAY PER AVAILITY #[id number]    Current Problem  1. Anticoagulated  Follow Up In Physical Therapy            Precautions  Cardiac history  Clotting disorder on coumadin  Compromised endurance       Pain  Modest LE soreness    Subjective  General  Patient reports overall improvement since beginning PT.  Patient has been cross training at [facility].      Objective  Gait speed increasing    Treatment:    Aquatics:   No assistive device on deck  Step over with bilateral hand rails descend to pool     4' warm up walking  x 3 laps  FWD  BKWD  LAT     Cues for breathing - Light UE on rail   -Heel raises 2 x 10  -Toe raise 2 x 10  3 way hip  x 20  - abd  -ext toe touch   -Flex/SLR  Knee curl  x 20  MIP x 20  Squat x 10     Deep End with noodle: x 2' ea   Hang   Bicycle   Hang   Abd/Add   Hang   Cross ski   Hang      Slow return to WB     HS stretching 20\" x 2     Current HEP:  Bridges  Calf raises  Standing hip ABD  MIP    Activity tolerance:  good    OP EDUCATION:  Continue with HEP    Assessment:     Pt's response to treatment:  good  Areas of improvements:  endurance  Limitations/deficits:  varialbel LE swelling    Pain end of session:   unchanged    Plan:     Continue with current POC with the following changes alternate with land    Assessment of current progress against goals:  Progressing toward functional goals    Goals:      STG(3 visits)  Patient to tolerate 40 minutes of aqautic PT- " MET     LTG (20 visits)1  LEFS to < 40 % impaired-NOT MET  MMT LE to 4/5- MET- MET  Patient to walk for 30 minutes without need to sit secondary to pain/fatigue- NOT MET

## 2024-03-21 ENCOUNTER — TREATMENT (OUTPATIENT)
Dept: PHYSICAL THERAPY | Facility: CLINIC | Age: 74
End: 2024-03-21
Payer: MEDICARE

## 2024-03-21 DIAGNOSIS — Z79.01 ANTICOAGULATED: Primary | ICD-10-CM

## 2024-03-21 PROCEDURE — 97110 THERAPEUTIC EXERCISES: CPT | Mod: GP

## 2024-03-21 NOTE — PROGRESS NOTES
"Physical Therapy Treatment    Patient Name: Mukesh Garg  MRN: [id number]  Encounter date:  3/21/2024  Time Calculation  Start Time: 1500  Stop Time: 1545  Time Calculation (min): 45 min     PT Therapeutic Procedures Time Entry  Therapeutic Exercise Time Entry: 40    Visit Number:  19 (including evaluation)  Planned total visits: 20  Visits Authorized/Insurance Coverage:  2024 - 2024- 96% C[place]AGE/ [place]/ NO AUTH PER AVAILITY [id number]  Transaction Time Feb 1, 3:07 PM    [facility] - NEW [employer]ET[employer] ACTIVE / $0 DED / $1500 OOP / [place]ITS/ $0 COPAY PER AVAILITY #[id number]    Current Problem  1. Anticoagulated  Follow Up In Physical Therapy    Follow Up In Physical Therapy          Precautions  Cardiac history  Clotting disorder on coumadin  Compromised endurance        Pain  None reported    Subjective  General  Patient in good spirits and motivated to participate.  Patient reports walking for 0.30-0.50 miles         Objective  Less rest breaks required  Less rest breaks required      Treatment:  Nu-Step L5 x 8 min  HS stretch 30\" x 3  Ball flexion x 20  Bridges  2 x 10     Calf raises 2 x 10-  on foam  Standing hip ABD 2 x 10  MIP 2 x 10- on foam  RB AP x 20     Foam step up FWD/LAT x 10 ea  Foam step tap to 8\" step   Hurdles FWD/LAT x 6  Airex beam tandem/side step x 6    Current HEP:    Bridges  Calf raises  Standing hip ABD  MIP    Activity tolerance:  good    OP EDUCATION:  Continue with HEP    Assessment:     Pt's response to treatment:  good  Areas of improvements:  endurance  Limitations/deficits:  variable LE swelling    Pain end of session:   0/10    Plan:  Continue with POC; alternate with pool- adding 8 visits after next visit 9- total 28 visits)      Assessment of current progress against goals:  Progressing toward functional goals.  Patient responding favorably to combination of land and body aquatic exercise.  Patient will benefit from continued PT to maximize functional mobility.       Goals:  STG(3 " visits)  Patient to tolerate 40 minutes of aqautic PT- MET     LTG (20 visits)1  LEFS to < 40 % impaired-NOT MET  MMT LE to 4/5- MET- MET  Patient to walk for 30 minutes without need to sit secondary to pain/fatigue- NOT MET  4.   Patient to return to pre morbid recreational activities.- NOT MET

## 2024-03-26 ENCOUNTER — TREATMENT (OUTPATIENT)
Dept: PHYSICAL THERAPY | Facility: CLINIC | Age: 74
End: 2024-03-26
Payer: MEDICARE

## 2024-03-26 DIAGNOSIS — Z79.01 ANTICOAGULATED: ICD-10-CM

## 2024-03-26 PROCEDURE — 97113 AQUATIC THERAPY/EXERCISES: CPT | Mod: GP | Performed by: PHYSICAL THERAPIST

## 2024-03-26 NOTE — PROGRESS NOTES
"Physical Therapy Treatment    Patient Name: Mukesh Garg  MRN: [id number]  Encounter date:  3/26/2024  Time Calculation  Start Time: 1447  Stop Time: 1528  Time Calculation (min): 41 min     PT Therapeutic Procedures Time Entry  Aquatic Therapy Time Entry: 41    Visit Number:  21 (including evaluation)  Planned total visits: 28  Visits Authorized/Insurance Coverage:  2024 - 2024- 96% C[place]AGE/ [place]/ NO AUTH PER AVAILITY [id number]  Transaction Time Feb 1, 3:07 PM    [facility] - NEW [employer]ET[employer] ACTIVE / $0 DED / $1500 OOP / [place] VISITS/ $0 COPAY PER AVAILITY #[id number]    Current Problem  1. Anticoagulated  Follow Up In Physical Therapy          Precautions  Cardiac history  Clotting disorder on coumadin  Compromised endurance        Pain  None reported    Subjective  General  Patient in good spirits and motivated to participate.  Patient reports walking for 0.30-0.50 miles         Objective  Less rest breaks required  Less rest breaks required      Treatment:  No assistive device on deck  Step over with bilateral hand rails descend to pool     4' walking  x 3 laps  FWD  BKWD  LAT     Cues for breathing - Light UE on rail   -Heel raises 2 x 10  -Toe raise 2 x 10  3 way hip  x 20  - abd  -ext toe touch   -Flex/SLR  Knee curl  x 20  MIP x 20  Squat x 10- DNP     Deep End with noodle: x 2' ea   Hang   Bicycle   Hang   Abd/Add   Hang   Cross ski   Hang x 1'     Slow return to WB    At stairs:  STEP ups x 10 ea  HS stretching 20\" x 2    Current HEP:    Bridges  Calf raises  Standing hip ABD  MIP    Activity tolerance:  good    OP EDUCATION:  Continue with HEP    Assessment:     Pt's response to treatment:  good  Areas of improvements:  endurance  Limitations/deficits:  variable LE swelling    Pain end of session:   0/10    Plan:  Continue with POC; alternate with pool- adding 8 visits after next visit 9- total 28 visits)      Assessment of current progress against goals:  Progressing toward functional goals.  Patient responding " favorably to combination of land and body aquatic exercise.  Patient will benefit from continued PT to maximize functional mobility.       Goals:  STG(3 visits)  Patient to tolerate 40 minutes of aqautic PT- MET     LTG (20 visits)1  LEFS to < 40 % impaired-NOT MET  MMT LE to 4/5- MET- MET  Patient to walk for 30 minutes without need to sit secondary to pain/fatigue- NOT MET  4.   Patient to return to pre morbid recreational activities.- NOT MET

## 2024-03-27 DIAGNOSIS — I50.9 ACUTE DECOMPENSATED HEART FAILURE (MULTI): ICD-10-CM

## 2024-03-27 RX ORDER — TORSEMIDE 20 MG/1
20 TABLET ORAL DAILY
Qty: 30 TABLET | Refills: 0 | Status: SHIPPED | OUTPATIENT
Start: 2024-03-27 | End: 2024-04-21

## 2024-03-27 RX ORDER — EMPAGLIFLOZIN 10 MG/1
TABLET, FILM COATED ORAL
Qty: 30 TABLET | Refills: 1 | Status: SHIPPED | OUTPATIENT
Start: 2024-03-27 | End: 2024-05-23

## 2024-03-29 ENCOUNTER — TREATMENT (OUTPATIENT)
Dept: PHYSICAL THERAPY | Facility: CLINIC | Age: 74
End: 2024-03-29
Payer: MEDICARE

## 2024-03-29 DIAGNOSIS — Z79.01 ANTICOAGULATED: Primary | ICD-10-CM

## 2024-03-29 PROCEDURE — 97110 THERAPEUTIC EXERCISES: CPT | Mod: GP | Performed by: PHYSICAL THERAPIST

## 2024-03-29 NOTE — PROGRESS NOTES
"Physical Therapy Treatment    Patient Name: Mukesh Garg  MRN: [id number]  Encounter date:  3/29/2024  Time Calculation  Start Time: 1435  Stop Time: 1520  Time Calculation (min): 45 min     PT Therapeutic Procedures Time Entry  Therapeutic Exercise Time Entry: 40    Visit Number:  21 (including evaluation)  Planned total visits: 28  Visits Authorized/Insurance Coverage: 28 SJ - NEW AETNA ACTIVE / $0 DED / [place]1500 OOP / [place]/ [place]0 COPAY PER AVAILITY #[id number]    Current Problem  1. Anticoagulated  Follow Up In Physical Therapy          Precautions  Cardiac history  Clotting disorder on coumadin  Compromised endurance       Pain  Modest systemic soreness    Subjective  General  Patient in good spirits.  Patient reports some systemic soreness following weight training at [employer].     Objective  Improved endurance/less rest breaks    Treatment:  Nu-Step L5 x 10 min  HS stretch 30\" x 3  Ball flexion x 20  Bridges  2 x 10     Calf raises 2 x 10-  on foam  Standing hip ABD 2 x 10  MIP 2 x 10- on foam  RB AP x 20     Foam step up FWD/LAT x 10 ea  Foam step tap to 8\" step   Hurdles FWD/LAT x 6  Airex beam tandem/side step x 6    Current HEP:    Bridges  Calf raises  Standing hip ABD  MIP    Activity tolerance:  good    OP EDUCATION:  HEP    Assessment:     Pt's response to treatment:  good  Areas of improvements:  endurance  Limitations/deficits:  LE swelling    Pain end of session:   unchanged    Plan:     Continue with current POC with the following changes advance as able    Assessment of current progress against goals:  Progressing toward functional goals        Goals:  STG(3 visits)  Patient to tolerate 40 minutes of aqautic PT- MET     LTG (28 visits)1  LEFS to < 40 % impaired-NOT MET  MMT LE to 4/5- MET- MET  Patient to walk for 30 minutes without need to sit secondary to pain/fatigue- NOT MET  4.   Patient to return to pre morbid recreational activities.- NOT MET                                 "

## 2024-04-02 DIAGNOSIS — I73.9 PERIPHERAL VASCULAR DISEASE, UNSPECIFIED (CMS-HCC): ICD-10-CM

## 2024-04-03 RX ORDER — CLOPIDOGREL BISULFATE 75 MG/1
75 TABLET ORAL DAILY
Qty: 30 TABLET | Refills: 0 | Status: SHIPPED | OUTPATIENT
Start: 2024-04-03 | End: 2024-05-02

## 2024-04-04 ENCOUNTER — ANTICOAGULATION - WARFARIN VISIT (OUTPATIENT)
Dept: CARDIOLOGY | Facility: HOSPITAL | Age: 74
End: 2024-04-04
Payer: MEDICARE

## 2024-04-04 ENCOUNTER — TREATMENT (OUTPATIENT)
Dept: PHYSICAL THERAPY | Facility: CLINIC | Age: 74
End: 2024-04-04
Payer: MEDICARE

## 2024-04-04 DIAGNOSIS — Z79.01 LONG TERM (CURRENT) USE OF ANTICOAGULANTS: Primary | ICD-10-CM

## 2024-04-04 DIAGNOSIS — Z79.01 ANTICOAGULATED: ICD-10-CM

## 2024-04-04 LAB
POC INR: 3.4
POC PROTHROMBIN TIME: NORMAL

## 2024-04-04 PROCEDURE — 85610 PROTHROMBIN TIME: CPT | Mod: QW

## 2024-04-04 PROCEDURE — 99211 OFF/OP EST MAY X REQ PHY/QHP: CPT | Performed by: INTERNAL MEDICINE

## 2024-04-04 PROCEDURE — 97113 AQUATIC THERAPY/EXERCISES: CPT | Mod: GP

## 2024-04-04 NOTE — PROGRESS NOTES
"Physical Therapy Treatment    Patient Name: Mukesh Garg  MRN: [id number]  Encounter date:  4/4/2024  Time Calculation  Start Time: 1215  Stop Time: 1301  Time Calculation (min): 46 min     PT Therapeutic Procedures Time Entry  Aquatic Therapy Time Entry: 45    Visit Number:  22 (including evaluation)  Planned total visits: 28  Visits Authorized/Insurance Coverage:   28 SJ - NEW AETNA ACTIVE / $0 DED / $1500 OOP / [place]/ $0 COPAY PER AVAILITY #[id number]     Current Problem  1. Anticoagulated  Follow Up In Physical Therapy            Precautions   Cardiac history  Clotting disorder on coumadin  Compromised endurance    Pain   3/10    Subjective  General   Pt reports he is a little sore and fatigued.  He did a big workout yesterday at the [employer].         Objective  Pt walks with decreased heel strike in water      Treatment:    Aquatic Therapy:    No assistive device on deck  Step over with bilateral hand rails descend to pool     4' walking  x 3 laps  FWD  BKWD  LAT     Cues for breathing - Light UE on rail   -Heel raises 2 x 10  -Toe raise 2 x 10  3 way hip  x 20  - abd  -ext toe touch   -Flex/SLR  Knee curl  x 20  MIP x 20  Squat x 10- DNP     Deep End with noodle: x 2' ea   Hang   Bicycle   Hang   Abd/Add   Hang   Cross ski   Hang      Slow return to WB     At stairs:  STEP ups x 10 ea  HS stretching 20\" x 2    Current HEP:  Bridges  Calf raises  Standing hip ABD  MIP       Assessment:     Pt's response to treatment:  Pt had no increased c/o with aquatic exs.  Good core control.     Pain end of session: 2/10    Plan:     Continue with current POC/no changes    Assessment of current progress against goals:  Progressing toward functional goals    Goals:  STG(3 visits)  Patient to tolerate 40 minutes of aqautic PT- MET     LTG (28 visits)1  LEFS to < 40 % impaired-NOT MET  MMT LE to 4/5- MET- MET  Patient to walk for 30 minutes without need to sit secondary to pain/fatigue- NOT MET  4.   Patient to return " to pre morbid recreational activities.- NOT MET            '

## 2024-04-04 NOTE — PROGRESS NOTES
Patient identification verified with 2 identifiers.    Location: [facility] - 1st Floor Anticoagulation Clinic [address] [id number]    Referring Physician: Dr Wayne  Enrollment/ Re-enrollment date: 2024   INR Goal: 2.0-3.0  INR monitoring is per [facility] protocol.  Anticoagulation Medication: warfarin  Indication: Atrial Fibrillation/Atrial Flutter    Subjective   Bleeding signs/symptoms: No    Bruising: No   Major bleeding event: No  Thrombosis signs/symptoms: No  Thromboembolic event: No  Missed doses: No  Extra doses: No  Medication changes: No  Dietary changes: No  Change in health: No  Change in activity: No  Alcohol: No  Other concerns: No    Upcoming Procedures:  Does the Patient Have any upcoming procedures that require interruption in anticoagulation therapy? no  Does the patient require bridging? no      Anticoagulation Summary  As of 4/4/2024      INR goal:  2.0-3.0   TTR:  76.1 % (2.7 mo)   INR used for dosing:  3.40 (4/4/2024)   Weekly warfarin total:  20 mg               Assessment/Plan   Supratherapeutic     1. New dose:  dose decreased     2. Next INR: 2 weeks      Education provided to patient during the visit:  Patient instructed to call in interim with questions, concerns and changes.   Patient educated on interactions between medications and warfarin.

## 2024-04-05 NOTE — PROGRESS NOTES
"    Physical Therapy Treatment    Patient Name: Mukesh Garg  MRN: [id number]  Encounter date:  4/10/2024  Time Calculation  Start Time: 1445  Stop Time: 1532  Time Calculation (min): 47 min  PT Therapeutic Procedures Time Entry  Aquatic Therapy Time Entry: 47    ViVisit Number:  23 (including evaluation)  Planned total visits: 28  Visits Authorized/Insurance Coverage:   28 SJ - NEW AETNA ACTIVE / $0 DED / [place]1500 OOP / [place]/ $0 COPAY PER AVAILITY #[id number]        Current Problem  1. Anticoagulated  Follow Up In Physical Therapy          Precautions   Cardiac history  Clotting disorder on coumadin  Compromised endurance    Pain  2/10    Subjective  General  Patient reports consistent 2/10 pain in the LLE    Objective       Color of L foot and LLE improving due to improved blood flow to the area.     Extremity/Trunk Assessment    Treatment:    Aquatic Therapy:    No assistive device on deck  Step over with bilateral hand rails descend to pool     4' walking  x 3 laps  FWD  BKWD  LAT     Cues for breathing - Light UE on rail   -Heel raises 2 x 10  -Toe raise 2 x 10  3 way hip  x 20  -abd  -ext toe touch   -Flex/SLR    Seated LAQ x 20  Knee curl  x 20  MIP x 20  Squat x 10- DNP     Deep End with noodle: x 2' ea   Hang   Bicycle   Hang   Abd/Add   Hang   Cross ski   Hang      Slow return to WB     At stairs:  STEP ups x 10 ea- DNP  HS stretching 20\" x 2     Current HEP:    Activity tolerance:  Good    OP EDUCATION:    Assessment:     Continued to improve LLE strength and exercise endurance with aquatic therapy. Slight decreased in LLE pain following therex. Exited pool with reciprocal stair ambulation BUE support on rails.     Pt's response to treatment:  Good   Areas of improvements:  Exercise endurance   Limitations/deficits:  Remaining LLE decrease in sensation and discoloration    Pain end of session: 1/10    Plan:    Continue with current POC/no changes    Assessment of current progress against " goals:  Progressing toward functional goals and Symptomatic relief with treatment    Goals:  STG(3 visits)  Patient to tolerate 40 minutes of aqautic PT- MET     LTG (28 visits)1  LEFS to < 40 % impaired-NOT MET  MMT LE to 4/5- MET- MET  Patient to walk for 30 minutes without need to sit secondary to pain/fatigue- NOT MET  4.   Patient to return to pre morbid recreational activities.- NOT MET

## 2024-04-10 ENCOUNTER — TREATMENT (OUTPATIENT)
Dept: PHYSICAL THERAPY | Facility: CLINIC | Age: 74
End: 2024-04-10
Payer: MEDICARE

## 2024-04-10 DIAGNOSIS — Z79.01 ANTICOAGULATED: ICD-10-CM

## 2024-04-10 PROCEDURE — 97113 AQUATIC THERAPY/EXERCISES: CPT | Mod: CQ,GP | Performed by: SPECIALIST/TECHNOLOGIST

## 2024-04-12 DIAGNOSIS — I50.9 ACUTE DECOMPENSATED HEART FAILURE (MULTI): ICD-10-CM

## 2024-04-12 DIAGNOSIS — I48.92 ATRIAL FLUTTER, PAROXYSMAL (MULTI): ICD-10-CM

## 2024-04-12 RX ORDER — CARVEDILOL 25 MG/1
25 TABLET ORAL
Qty: 180 TABLET | Refills: 1 | Status: SHIPPED | OUTPATIENT
Start: 2024-04-12 | End: 2024-10-09

## 2024-04-16 ENCOUNTER — TREATMENT (OUTPATIENT)
Dept: PHYSICAL THERAPY | Facility: CLINIC | Age: 74
End: 2024-04-16
Payer: MEDICARE

## 2024-04-16 DIAGNOSIS — Z79.01 ANTICOAGULATED: ICD-10-CM

## 2024-04-16 PROCEDURE — 97113 AQUATIC THERAPY/EXERCISES: CPT | Mod: GP,CQ

## 2024-04-16 NOTE — PROGRESS NOTES
"Physical Therapy Treatment    Patient Name: Mukesh Garg  MRN: [id number]  Encounter date:  4/16/2024  Time Calculation  Start Time: 1430  Stop Time: 1515  Time Calculation (min): 45 min     PT Therapeutic Procedures Time Entry  Aquatic Therapy Time Entry: 40    Visit Number:  24 (including evaluation)  Planned total visits: 28  Visits Authorized/Insurance Coverage:   28 SJ - NEW AETNA ACTIVE / $0 DED / $1500 OOP / [place]/ $0 COPAY PER AVAILITY #[id number]     Current Problem  1. Anticoagulated  Follow Up In Physical Therapy        Precautions   Cardiac history  Clotting disorder on coumadin  Compromised endurance    Pain   2/10    Subjective  General     \"I am still having trouble walking for long periods of time.\"     Objective  Good core control with noodle      Treatment:    Aquatic Therapy:    No assistive device on deck  Step over with bilateral hand rails descend to pool     4' walking  x 3 laps  FWD  BKWD  LAT     Cues for breathing - Light UE on rail   -Heel raises 2 x 10  -Toe raise 2 x 10  - 3 way dynamic  hip x10 ea   -March across the pool x2 laps  -Hip flex, knee ext, knee flex and hip ext x 10 ea   -Squats x 10     Deep End with noodle: x 2' ea   -Hang   -Bicycle   -Hang   -Abd/Add   -Hang   -Cross ski   -Hang   -SKTC   -hang     Slow return to WB     At stairs:  STEP ups x 10 ea - DNP   HS stretching 20\" x 2    Current HEP:  Bridges  Calf raises  Standing hip ABD  MIP       Assessment:     Pt's response to treatment:  Good. Introduced 3 way dynamic hip, Dynamic hip and knee movements. No discomfort. Good from.     Pain end of session: 1-2/10     Plan:     Continue with current POC/no changes    Assessment of current progress against goals:  Progressing toward functional goals    Goals:  STG(3 visits)  Patient to tolerate 40 minutes of aqautic PT- MET     LTG (28 visits)1  LEFS to < 40 % impaired-NOT MET  MMT LE to 4/5- MET- MET  Patient to walk for 30 minutes without need to sit secondary to " pain/fatigue- NOT MET  4.   Patient to return to pre morbid recreational activities.- NOT MET            '

## 2024-04-18 ENCOUNTER — ANTICOAGULATION - WARFARIN VISIT (OUTPATIENT)
Dept: CARDIOLOGY | Facility: HOSPITAL | Age: 74
End: 2024-04-18
Payer: MEDICARE

## 2024-04-18 ENCOUNTER — TREATMENT (OUTPATIENT)
Dept: PHYSICAL THERAPY | Facility: CLINIC | Age: 74
End: 2024-04-18
Payer: MEDICARE

## 2024-04-18 DIAGNOSIS — Z79.01 LONG TERM (CURRENT) USE OF ANTICOAGULANTS: Primary | ICD-10-CM

## 2024-04-18 DIAGNOSIS — Z79.01 ANTICOAGULATED: Primary | ICD-10-CM

## 2024-04-18 LAB
POC INR: 2.2
POC PROTHROMBIN TIME: NORMAL

## 2024-04-18 PROCEDURE — 85610 PROTHROMBIN TIME: CPT

## 2024-04-18 PROCEDURE — 99211 OFF/OP EST MAY X REQ PHY/QHP: CPT | Performed by: INTERNAL MEDICINE

## 2024-04-18 PROCEDURE — 97110 THERAPEUTIC EXERCISES: CPT | Mod: GP

## 2024-04-18 NOTE — PROGRESS NOTES
Patient identification verified with 2 identifiers.    Location: [facility] - 1st Floor Anticoagulation Clinic [address] [id number]    Referring Physician: DR Wayne  Enrollment/ Re-enrollment date:    INR Goal: 2.0-3.0  INR monitoring is per [facility] protocol.  Anticoagulation Medication: warfarin  Indication: Peripheral Artery Disease (PAD)    Subjective   Bleeding signs/symptoms: No    Bruising: No   Major bleeding event: No  Thrombosis signs/symptoms: No  Thromboembolic event: No  Missed doses: No  Extra doses: No  Medication changes: No  Dietary changes: No  Change in health: No  Change in activity: No  Alcohol: No  Other concerns: No    Upcoming Procedures:  Does the Patient Have any upcoming procedures that require interruption in anticoagulation therapy? no  Does the patient require bridging? no      Anticoagulation Summary  As of 2024      INR goal:  2.0-3.0   TTR:  74.7% (3.2 mo)   INR used for dosin.20 (2024)   Weekly warfarin total:  20 mg               Assessment/Plan   Therapeutic     1. New dose: no change    2. Next INR: 1 month      Education provided to patient during the visit:  Patient instructed to call in interim with questions, concerns and changes.

## 2024-04-18 NOTE — PROGRESS NOTES
"    Physical Therapy Treatment    Patient Name: Mukesh Garg  MRN: [id number]  Encounter date:  4/18/2024             Visit Number:  25 (including evaluation)  Planned total visits: 28  Visits Authorized/Insurance Coverage:  2024 - 2024- 96% [place]AGE/ [place]/ NO AUTH PER AVAILITY [id number]  Transaction Time Feb 1, 3:07 PM    SJ - NEW AETNA ACTIVE / $0 DED / $1500 OOP / [place]/ $0 COPAY PER AVAILITY #[id number]    Current Problem  Problem List Items Addressed This Visit    None  Visit Diagnoses         Codes    Anticoagulated    -  Primary Z79.01            Precautions  Cardiac history  Clotting disorder on coumadin  Compromised endurance       Pain  \"Stiffness\"    Subjective  General  Patient in good spirits and motivated to participate.   Patient reports modest systemic stiffness after increased activity in water.    Objective  Small step length today.        Treatment:  Sci- Fit Stepper L4 x 10 min  HS stretch 30\" x 3  Ball flexion x 20  Bridges  2 x 10     Heel/toe raises 2 x 10-  on foam  Standing hip ABD 2 x 10  MIP 2 x 10- on foam  RB AP x 20     Foam step up FWD/LAT x 10 ea  Foam step tap to 8\" step   Hurdles FWD/LAT x 6  Airex beam tandem/side step x 6    Current HEP:    Bridges  Calf raises  Standing hip ABD  MIP     HS stretching 20\" x 2         Activity tolerance:  good    OP EDUCATION:  Pace activity/HEP    Assessment:     Pt's response to treatment:  good  Areas of improvements:  strength/balance  Limitations/deficits:  endurance    Pain end of session:   unchanged    Plan:     Continue with current POC with the following changes advance as able    Assessment of current progress against goals:  Progressing toward functional goals        Goals:  STG(3 visits)  Patient to tolerate 40 minutes of aqautic PT- MET     LTG (28 visits)1  LEFS to < 40 % impaired-NOT MET  MMT LE to 4/5- MET- MET  Patient to walk for 30 minutes without need to sit secondary to pain/fatigue- NOT MET  4.   Patient to return to " pre morbid recreational activities.- NOT MET

## 2024-04-21 DIAGNOSIS — I50.9 ACUTE DECOMPENSATED HEART FAILURE (MULTI): ICD-10-CM

## 2024-04-21 RX ORDER — TORSEMIDE 20 MG/1
20 TABLET ORAL DAILY
Qty: 30 TABLET | Refills: 0 | Status: SHIPPED | OUTPATIENT
Start: 2024-04-21 | End: 2024-05-23

## 2024-04-23 ENCOUNTER — TREATMENT (OUTPATIENT)
Dept: PHYSICAL THERAPY | Facility: CLINIC | Age: 74
End: 2024-04-23
Payer: MEDICARE

## 2024-04-23 DIAGNOSIS — Z79.01 ANTICOAGULATED: Primary | ICD-10-CM

## 2024-04-23 PROCEDURE — 97113 AQUATIC THERAPY/EXERCISES: CPT | Mod: GP

## 2024-04-23 NOTE — PROGRESS NOTES
"    Physical Therapy Treatment    Patient Name: Mukesh Garg  MRN: [id number]  Encounter date:  4/23/2024             Visit Number:  26 (including evaluation)  Planned total visits: 28  Visits Authorized/Insurance Coverage:  2024 - 2024- 96% [place]/ [place]/ NO AUTH PER AVAILITY [id number]  Transaction Time Feb 1, 3:07 PM    SJ - NEW AETNA ACTIVE / $0 DED / $1500 OOP / [place]ISITS/ $0 COPAY PER AVAILITY #[id number]    Current Problem  Problem List Items Addressed This Visit    None  Visit Diagnoses         Codes    Anticoagulated    -  Primary Z79.01                  Precautions  Cardiac history  Clotting disorder on coumadin  Compromised endurance      Pain  3/10  LBP    Subjective  General  Patient in good spirits.  Patient reports modest LBP/left sciatic symptoms yesterday after lifing a box.  Patient symptoms improved today.    Objective  Small stride length/improved in water    Treatment:        Aquatic Therapy:    No assistive device on deck  Step over with bilateral hand rails descend to pool     4' walking  x 3 laps  FWD  BKWD  LAT     Cues for breathing - Light UE on rail   -Heel raises 2 x 10  -Toe raise 2 x 10  - 3 way dynamic  hip x10 ea   -March across the pool x2 laps  -Hip flex, knee ext, knee flex and hip ext x 10 ea   -Squats 2  x 10     Deep End with noodle: x 2' ea   -Hang   -Bicycle   -Hang   -Abd/Add   -Hang   -Cross ski   -Hang   -SKTC   -hang     Slow return to WB     At stairs:  Step ups x 10 each  HS stretching 20\" x 2     Current HEP:  Bridges  Calf raises  Standing hip ABD  MIP        Activity tolerance:  good    OP EDUCATION:  Pace activity    Assessment:     Pt's response to treatment:  good  Areas of improvements:  endurance  Limitations/deficits:  LBP/LE edema    Pain end of session:   3/10  unchanged    Plan:     Continue with current POC with the following changes alternate with land     Assessment of current progress against goals:  Progressing toward functional " goals        Goals:  STG(3 visits)  Patient to tolerate 40 minutes of aqautic PT- MET     LTG (28 visits)1  LEFS to < 40 % impaired-NOT MET  MMT LE to 4/5- MET- MET  Patient to walk for 30 minutes without need to sit secondary to pain/fatigue- NOT MET  4.   Patient to return to pre morbid recreational activities.- NOT MET

## 2024-04-25 ENCOUNTER — APPOINTMENT (OUTPATIENT)
Dept: PHYSICAL THERAPY | Facility: CLINIC | Age: 74
End: 2024-04-25
Payer: MEDICARE

## 2024-04-26 ENCOUNTER — PATIENT OUTREACH (OUTPATIENT)
Dept: PRIMARY CARE | Facility: CLINIC | Age: 74
End: 2024-04-26
Payer: COMMERCIAL

## 2024-04-26 NOTE — PROGRESS NOTES
Patient has met target of no readmission for (90) days post hospital discharge and is graduated from Transitional Care Management program at this time.     Patient encouraged to call providers for any questions concerns or change in condition

## 2024-05-01 DIAGNOSIS — I73.9 PERIPHERAL VASCULAR DISEASE, UNSPECIFIED (CMS-HCC): ICD-10-CM

## 2024-05-02 ENCOUNTER — TREATMENT (OUTPATIENT)
Dept: PHYSICAL THERAPY | Facility: CLINIC | Age: 74
End: 2024-05-02
Payer: MEDICARE

## 2024-05-02 DIAGNOSIS — Z79.01 ANTICOAGULATED: ICD-10-CM

## 2024-05-02 PROCEDURE — 97113 AQUATIC THERAPY/EXERCISES: CPT | Mod: GP,CQ

## 2024-05-02 RX ORDER — CLOPIDOGREL BISULFATE 75 MG/1
75 TABLET ORAL DAILY
Qty: 30 TABLET | Refills: 0 | Status: SHIPPED | OUTPATIENT
Start: 2024-05-02 | End: 2024-05-28

## 2024-05-02 NOTE — PROGRESS NOTES
"    Physical Therapy Treatment    Patient Name: Mukesh Garg  MRN: [id number]  Encounter date:  5/2/2024  Time Calculation  Start Time: 1430  Stop Time: 1515  Time Calculation (min): 45 min     PT Therapeutic Procedures Time Entry  Aquatic Therapy Time Entry: 40    Visit Number:  27 (including evaluation)  Planned total visits: 28  Visits Authorized/Insurance Coverage:  2024 - 2024- 96% [place]AGE/ [place]/ NO AUTH PER AVAILITY [id number]  Transaction Time Feb 1, 3:07 PM    SJ - NEW AETNA ACTIVE / $0 DED / $1500 OOP / [place]ISITS/ $0 COPAY PER AVAILITY #[id number]    Current Problem  Problem List Items Addressed This Visit    None  Visit Diagnoses         Codes    Anticoagulated     Z79.01          Precautions  Cardiac history  Clotting disorder on coumadin  Compromised endurance      Pain  1 /10  LBP    Subjective  General  \"I was working in the garage. My shoulders are sore.\"     Objective  Reciprocal pattern ascent from pool.     Treatment:        Aquatic Therapy:    No assistive device on deck  Step over with bilateral hand rails descend to pool     4' walking  x 3 laps  FWD  BKWD  LAT     Cues for breathing - Light UE on rail   - Heel raises 2 x 10  - Toe raise 2 x 10  - 3 way dynamic  hip x12 ea   - March across the pool x2 laps  - Hip flex, knee ext, knee flex and hip ext x 12 ea   - Squats 2  x 10     Deep End with noodle: x 2' ea   -Hang   -Bicycle   -Hang   -Abd/Add   -Hang   -Cross ski   -Hang   -SKTC   -hang     Slow return to WB     At stairs:  Step ups x 10 each - DNP   HS stretching 30\" x 2     Current HEP:  Bridges  Calf raises  Standing hip ABD  MIP    Activity tolerance:  good    OP EDUCATION:  Pace activity    Assessment:   The patient is interested in 60 for [employer].   Pt's response to treatment:  good  Areas of improvements:  endurance  Limitations/deficits:  LBP/LE edema    Pain end of session:   1/10  unchanged    Plan:     Continue with current POC with the following changes alternate " with land     Assessment of current progress against goals:  Progressing toward functional goals        Goals:  STG(3 visits)  Patient to tolerate 40 minutes of aqautic PT- MET     LTG (28 visits)1  LEFS to < 40 % impaired-NOT MET  MMT LE to 4/5- MET- MET  Patient to walk for 30 minutes without need to sit secondary to pain/fatigue- NOT MET  4.   Patient to return to pre morbid recreational activities.- NOT MET

## 2024-05-02 NOTE — TELEPHONE ENCOUNTER
Recent Visits  Date Type Provider Dept   02/08/24 Office Visit MD [facility]   01/04/24 Office Visit MD Mason Price [facility]   12/19/23 Office Visit MD Mason Price [facility]   11/14/23 Office Visit MD Mason Price [facility]   11/10/23 Office Visit MD Mason Price [facility]   10/24/23 Office Visit MD Mason Price [facility]   Showing recent visits within past 365 days and meeting all other requirements  Future Appointments  No visits were found meeting these conditions.  Showing future appointments within next 90 days and meeting all other requirements      Last ov with pcp was 2/8/2024

## 2024-05-09 ENCOUNTER — TREATMENT (OUTPATIENT)
Dept: PHYSICAL THERAPY | Facility: CLINIC | Age: 74
End: 2024-05-09
Payer: MEDICARE

## 2024-05-09 DIAGNOSIS — Z79.01 ANTICOAGULATED: Primary | ICD-10-CM

## 2024-05-09 PROCEDURE — 97110 THERAPEUTIC EXERCISES: CPT | Mod: GP

## 2024-05-16 ENCOUNTER — APPOINTMENT (OUTPATIENT)
Dept: CARDIOLOGY | Facility: HOSPITAL | Age: 74
End: 2024-05-16
Payer: COMMERCIAL

## 2024-05-21 ENCOUNTER — OFFICE VISIT (OUTPATIENT)
Dept: CARDIOLOGY | Facility: HOSPITAL | Age: 74
End: 2024-05-21
Payer: MEDICARE

## 2024-05-21 VITALS
HEART RATE: 76 BPM | BODY MASS INDEX: 32.88 KG/M2 | DIASTOLIC BLOOD PRESSURE: 76 MMHG | RESPIRATION RATE: 18 BRPM | HEIGHT: 69 IN | SYSTOLIC BLOOD PRESSURE: 114 MMHG | WEIGHT: 222 LBS

## 2024-05-21 DIAGNOSIS — I74.9 ARTERIAL THROMBOEMBOLISM (MULTI): Primary | ICD-10-CM

## 2024-05-21 DIAGNOSIS — Z79.01 LONG TERM (CURRENT) USE OF ANTICOAGULANTS: ICD-10-CM

## 2024-05-21 DIAGNOSIS — I26.99 OTHER ACUTE PULMONARY EMBOLISM, UNSPECIFIED WHETHER ACUTE COR PULMONALE PRESENT (MULTI): ICD-10-CM

## 2024-05-21 PROCEDURE — 99214 OFFICE O/P EST MOD 30 MIN: CPT | Performed by: INTERNAL MEDICINE

## 2024-05-21 PROCEDURE — 1159F MED LIST DOCD IN RCRD: CPT | Performed by: INTERNAL MEDICINE

## 2024-05-21 PROCEDURE — 3078F DIAST BP <80 MM HG: CPT | Performed by: INTERNAL MEDICINE

## 2024-05-21 PROCEDURE — 1157F ADVNC CARE PLAN IN RCRD: CPT | Performed by: INTERNAL MEDICINE

## 2024-05-21 PROCEDURE — 1160F RVW MEDS BY RX/DR IN RCRD: CPT | Performed by: INTERNAL MEDICINE

## 2024-05-21 PROCEDURE — 3074F SYST BP LT 130 MM HG: CPT | Performed by: INTERNAL MEDICINE

## 2024-05-21 PROCEDURE — 1125F AMNT PAIN NOTED PAIN PRSNT: CPT | Performed by: INTERNAL MEDICINE

## 2024-05-21 ASSESSMENT — PAIN SCALES - GENERAL: PAINLEVEL: 2

## 2024-05-21 NOTE — PATIENT INSTRUCTIONS
ASSESSMENT/PLAN:    here for follow up ALI, VTE on warfarin  - complicated hx as noted. Prev on eliquis - ?eliquis failure. Currently on warfarin - indeterminate LA testing during admission (neg MARY and B2GP-1 AB). Continue the warfarin now. Follow closely with cardiology. Indefinite AC given the afib. Discussed consideration for home POCT ari with traveling. Follow up 6 months. Vascular follow up with Dr. Pruett planned for July.

## 2024-05-21 NOTE — PROGRESS NOTES
OUTPATIENT FOLLOW-UP -  VASCULAR MEDICINE    DOS: 05/21/2024  Last seen:  01/23/2024      REQUESTING PHYSICIAN:  Lorenza Garcia MD PCP    REASON FOR FOLLOW-UP:  here for follow up ALI, VTE on warfarin    HISTORY OF PRESENT ILLNESS:     75 yo man here for follow up ALI, VTE on warfarin. INR followed by [facility] coumadin clinic. Reports had epistaxis - a few months ago - this required dose adjustment - reports INR now 2.2. Last visit was [place] [facility] [place] - diuresed - dry weight now 101-102 Kg. Has follow up with cardiology this week. Prev: doing home PT - this has stopped last week and doing aquatic therapy at the [facility] - considering joining.     From prev note:  [place] [facility] [place]2023. Presented on transfer from [facility] after presenting with severe leg pain. Reports toward the end of the hospital stay for COVID/Afib/DVT in October 2023 his left foot started to hurt. Saw a podiatrist approx 5 days prior to admission - told to have neuropathy at that time. Re-preseted to [facility] with progressive LLE sx abd dx with acute/subacute limb ischemia and sent [place] [facility] CTA = popliteal occlusion During evaluation also found to have PE - but this was essentially asymptomatic. Rx heparin gtt initially. S/P left iliofemoral, fem-pop and tibioperoneal thrombectomy 11/14 ([facility]). He was DC to acute rehab - there x 10 days.    Hx from October 2023 - presented with cough and SOB. Found to be in afib. Also found to have RLE Dvt. Reports there was no imaging for PE. Rx eliquis 5 mg q 12 hours at that time.       During admission - LA returned as indeterminate. But given the new? Thromboembolism on the eliquis - rx UFH gtt (lovenox) to warfarin and remains on this now. INR is followed by the [facility] coumadin clinic. Reports INR was 2.5 yesterday.      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 1980's       REVIEW OF SYSTEMS:     weight is stable  No sores, ulcers, rashes, skin  lesions  No CP, chest pressure  No cough, SOB  No BRBPR, melena, hematuria  + bleeding  No edema, no calf pain    PHYSICAL EXAMINATION:   Gen: Appears well, NAD  Chest: CTA  CVS: irregular without murmur or gallop  Ext: 1+ edema, nontender  Skin: good condition, dry, without wounds or lesions  Pulses: WWP  Mood and affect appropriate    ADDITIONAL DATA:  No compression worn today. Calf measurements; R- 44.5 CM L- 43.0 CM.      ASSESSMENT/PLAN:    here for follow up ALI, VTE on warfarin  - complicated hx as noted. Prev on eliquis - ?eliquis failure. Currently on warfarin - indeterminate LA testing during admission (neg MARY and B2GP-1 AB). Continue the warfarin now. Follow closely with cardiology. Indefinite AC given the afib. Discussed consideration for home POCT ari with traveling. Follow up 6 months. Vascular follow up with Dr. Pruett planned for July.     Late visit discussion regarding an aortic aneurysm - by chart review ascending aorta approx 4.5 cm - this has by reports been stable. Discussed CTA of the aorta at follow up for optimal assessment.

## 2024-05-23 ENCOUNTER — ANTICOAGULATION - WARFARIN VISIT (OUTPATIENT)
Dept: CARDIOLOGY | Facility: HOSPITAL | Age: 74
End: 2024-05-23
Payer: COMMERCIAL

## 2024-05-23 DIAGNOSIS — I50.9 ACUTE DECOMPENSATED HEART FAILURE (MULTI): ICD-10-CM

## 2024-05-23 DIAGNOSIS — Z79.01 LONG TERM (CURRENT) USE OF ANTICOAGULANTS: ICD-10-CM

## 2024-05-23 PROBLEM — I25.10 CORONARY ARTERY DISEASE: Status: RESOLVED | Noted: 2023-08-20 | Resolved: 2024-05-23

## 2024-05-23 PROBLEM — I70.91 GENERALIZED ATHEROSCLEROSIS: Status: RESOLVED | Noted: 2024-01-04 | Resolved: 2024-05-23

## 2024-05-23 RX ORDER — TORSEMIDE 20 MG/1
20 TABLET ORAL DAILY
Qty: 30 TABLET | Refills: 0 | Status: SHIPPED | OUTPATIENT
Start: 2024-05-23 | End: 2024-06-22

## 2024-05-23 RX ORDER — EMPAGLIFLOZIN 10 MG/1
TABLET, FILM COATED ORAL
Qty: 30 TABLET | Refills: 1 | Status: SHIPPED | OUTPATIENT
Start: 2024-05-23

## 2024-05-24 ENCOUNTER — OFFICE VISIT (OUTPATIENT)
Dept: CARDIOLOGY | Facility: CLINIC | Age: 74
End: 2024-05-24
Payer: MEDICARE

## 2024-05-24 VITALS
HEART RATE: 63 BPM | DIASTOLIC BLOOD PRESSURE: 76 MMHG | WEIGHT: 227 LBS | BODY MASS INDEX: 33.52 KG/M2 | SYSTOLIC BLOOD PRESSURE: 116 MMHG | OXYGEN SATURATION: 96 %

## 2024-05-24 DIAGNOSIS — I10 PRIMARY HYPERTENSION: ICD-10-CM

## 2024-05-24 DIAGNOSIS — E78.2 MIXED HYPERLIPIDEMIA: ICD-10-CM

## 2024-05-24 DIAGNOSIS — I48.19 PERSISTENT ATRIAL FIBRILLATION (MULTI): ICD-10-CM

## 2024-05-24 DIAGNOSIS — I25.10 ASHD (ARTERIOSCLEROTIC HEART DISEASE): Primary | ICD-10-CM

## 2024-05-24 PROCEDURE — 99214 OFFICE O/P EST MOD 30 MIN: CPT | Performed by: INTERNAL MEDICINE

## 2024-05-24 PROCEDURE — 3078F DIAST BP <80 MM HG: CPT | Performed by: INTERNAL MEDICINE

## 2024-05-24 PROCEDURE — 1159F MED LIST DOCD IN RCRD: CPT | Performed by: INTERNAL MEDICINE

## 2024-05-24 PROCEDURE — 1126F AMNT PAIN NOTED NONE PRSNT: CPT | Performed by: INTERNAL MEDICINE

## 2024-05-24 PROCEDURE — 1160F RVW MEDS BY RX/DR IN RCRD: CPT | Performed by: INTERNAL MEDICINE

## 2024-05-24 PROCEDURE — 3074F SYST BP LT 130 MM HG: CPT | Performed by: INTERNAL MEDICINE

## 2024-05-24 PROCEDURE — 1036F TOBACCO NON-USER: CPT | Performed by: INTERNAL MEDICINE

## 2024-05-24 PROCEDURE — 1157F ADVNC CARE PLAN IN RCRD: CPT | Performed by: INTERNAL MEDICINE

## 2024-05-24 ASSESSMENT — ENCOUNTER SYMPTOMS
NUMBNESS: 0
SHORTNESS OF BREATH: 0
DYSPNEA ON EXERTION: 0
PALPITATIONS: 0
DYSURIA: 0
DEPRESSION: 0
BLURRED VISION: 0
PARESTHESIAS: 0
HEMATURIA: 0
COUGH: 0
OCCASIONAL FEELINGS OF UNSTEADINESS: 1
ABDOMINAL PAIN: 0
LOSS OF SENSATION IN FEET: 1

## 2024-05-24 ASSESSMENT — PATIENT HEALTH QUESTIONNAIRE - PHQ9
SUM OF ALL RESPONSES TO PHQ9 QUESTIONS 1 AND 2: 0
2. FEELING DOWN, DEPRESSED OR HOPELESS: NOT AT ALL
1. LITTLE INTEREST OR PLEASURE IN DOING THINGS: NOT AT ALL

## 2024-05-24 ASSESSMENT — PAIN SCALES - GENERAL: PAINLEVEL: 0-NO PAIN

## 2024-05-24 NOTE — ASSESSMENT & PLAN NOTE
Stable with no anginal type symptoms.  Will continue standard risk factor modification and will follow on a regular basis.

## 2024-05-24 NOTE — ASSESSMENT & PLAN NOTE
Blood pressure is currently very well-controlled.  No changes will be made and will recheck blood pressure on return.

## 2024-05-24 NOTE — PROGRESS NOTES
Subjective   Mukesh Garg is a 74 y.o. male.    Chief Complaint:  Follow-up    HPI  Overall doing relatively well.  No chest pain or palpitations.  No unusual shortness of breath.  Stays physically active without significant restrictions.    Review of Systems   Constitutional: Negative for malaise/fatigue.   HENT:  Negative for congestion.    Eyes:  Negative for blurred vision.   Cardiovascular:  Negative for chest pain, dyspnea on exertion and palpitations.   Respiratory:  Negative for cough and shortness of breath.    Musculoskeletal:  Positive for joint pain.   Gastrointestinal:  Negative for abdominal pain.   Genitourinary:  Negative for dysuria and hematuria.   Neurological:  Negative for numbness and paresthesias.       Objective   Constitutional:       Appearance: Not in distress.   Eyes:      Conjunctiva/sclera: Conjunctivae normal.   Neck:      Vascular: JVD normal.   Pulmonary:      Breath sounds: Normal breath sounds. No wheezing. No rhonchi. No rales.   Cardiovascular:      Normal rate. Regular rhythm.      Murmurs: There is no murmur.      No gallop.  No click. No rub.   Abdominal:      Palpations: Abdomen is soft.   Neurological:      General: No focal deficit present.      Mental Status: Alert.         Lab Review:   Anticoagulation - Warfarin Visit on 04/18/2024   Component Date Value    POC INR 04/18/2024 2.20    Anticoagulation - Warfarin Visit on 04/04/2024   Component Date Value    POC INR 04/04/2024 3.40        Assessment/Plan   The primary encounter diagnosis was ASHD (arteriosclerotic heart disease). Diagnoses of Primary hypertension, Mixed hyperlipidemia, and Longstanding persistent atrial fibrillation (Multi) were also pertinent to this visit.    ASHD (arteriosclerotic heart disease)  Stable with no anginal type symptoms.  Will continue standard risk factor modification and will follow on a regular basis.    Hyperlipidemia  Will plan on repeating fasting lipid panel on return visit.  Continue  the atorvastatin at 40 mg once daily.    Hypertension  Blood pressure is currently very well-controlled.  No changes will be made and will recheck blood pressure on return.    Persistent atrial fibrillation (Multi)  We had a very long greater than 30-minute discussion on atrial fibrillation this morning.  Discussed the stroke risk and the use of anticoagulation to reduce that risk.  We discussed rate control versus rhythm control strategies.  Since the patient is essentially asymptomatic with a well-controlled heart rate I do not feel pursuing a rhythm control strategy would be highly beneficial.  I offered to refer the patient to Dr. Monroy to discuss as well, they will think about it.

## 2024-05-24 NOTE — ASSESSMENT & PLAN NOTE
We had a very long greater than 30-minute discussion on atrial fibrillation this morning.  Discussed the stroke risk and the use of anticoagulation to reduce that risk.  We discussed rate control versus rhythm control strategies.  Since the patient is essentially asymptomatic with a well-controlled heart rate I do not feel pursuing a rhythm control strategy would be highly beneficial.  I offered to refer the patient to Dr. Monroy to discuss as well, they will think about it.

## 2024-05-24 NOTE — ASSESSMENT & PLAN NOTE
Will plan on repeating fasting lipid panel on return visit.  Continue the atorvastatin at 40 mg once daily.

## 2024-05-28 DIAGNOSIS — I73.9 PERIPHERAL VASCULAR DISEASE, UNSPECIFIED (CMS-HCC): ICD-10-CM

## 2024-05-28 RX ORDER — CLOPIDOGREL BISULFATE 75 MG/1
75 TABLET ORAL DAILY
Qty: 90 TABLET | Refills: 1 | Status: SHIPPED | OUTPATIENT
Start: 2024-05-28

## 2024-05-30 ENCOUNTER — ANTICOAGULATION - WARFARIN VISIT (OUTPATIENT)
Dept: CARDIOLOGY | Facility: HOSPITAL | Age: 74
End: 2024-05-30
Payer: MEDICARE

## 2024-05-30 DIAGNOSIS — Z79.01 LONG TERM (CURRENT) USE OF ANTICOAGULANTS: ICD-10-CM

## 2024-05-30 LAB
POC INR: 1.8
POC PROTHROMBIN TIME: NORMAL

## 2024-05-30 PROCEDURE — 99211 OFF/OP EST MAY X REQ PHY/QHP: CPT | Performed by: INTERNAL MEDICINE

## 2024-05-30 PROCEDURE — 85610 PROTHROMBIN TIME: CPT | Mod: QW

## 2024-05-30 NOTE — PROGRESS NOTES
Patient identification verified with 2 identifiers.    Location: [facility] - 1st Floor Anticoagulation Clinic [address] [id number]    Referring Physician: DR Wayne  Enrollment/ Re-enrollment date: 2024   INR Goal: 2.0-3.0  INR monitoring is per [facility] protocol.  Anticoagulation Medication: warfarin  Indication: Atrial Fibrillation/Atrial Flutter    Subjective   Bleeding signs/symptoms: No    Bruising: No   Major bleeding event: No  Thrombosis signs/symptoms: No  Thromboembolic event: No  Missed doses: No  Extra doses: No  Medication changes: No  Dietary changes: No  Change in health: Yes  Had GI upset yesterday  Change in activity: No  Alcohol: No  Other concerns: No    Upcoming Procedures:  Does the Patient Have any upcoming procedures that require interruption in anticoagulation therapy? no  Does the patient require bridging? no      Anticoagulation Summary  As of 5/30/2024      INR goal:  2.0-3.0   TTR:  74.7% (3.2 mo)   INR used for dosing:                 Assessment/Plan   Therapeutic     1. New dose:  dose increased     2. Next INR: 2 weeks      Education provided to patient during the visit:  Patient instructed to call in interim with questions, concerns and changes.

## 2024-06-02 NOTE — PROGRESS NOTES
"This is a 74 year old male for EVAL of A PAINFUL LUMP on CHEST which is HARDENING and in fact this is a LEFT BREAST MASS newly noticed x 6 weeks but right also enlarged (GYNECOMASTIA bilaterally)      NEG FAM Hx BR CA and NEG for MALE BREAST CA      Has multiple complex CHRONIC and serious conditions: with recent past HOSP ADMISSIONS etc as copied in part below for context:    REMAINS IN CARE of Dr Wayne and Dr Ross [facility] specialist:        Hospital Course  Mukesh Garg is a 74 y.o. male with PMH of pAfib, HTN, JENNY (on CPAP at home), gout, CAD s/p CABG x3 and Maze (2013), Rt LE DVTs (most recent 10/24/23 while on Eliquis) and bilateral PE (on warfarin), PAD s/p left iliofemoral, femoropopliteal and tibioperoneal thrombectomy (11/17/23) who was sent for direct admission from vascular medicine clinic due to volume overload and concern for ADHF.       Pt reports bilateral JESE that has been there since November but got significantly worse over the past few weeks. Also, noted occasional chest pressure, orthopnea and BAUMANN after walking for 5-10 mins (previously walking only limited by hip pain and could walk for ~1 block). No palpitations, dizziness, syncope or falls. His cardiologist prescribed him Lasix 40 once daily a week prior to admission, but pt has not been compliant on it due to difficulty going to the bathroom multiple times a day and many \"accidents\" while on the way to the bathroom.      Admission 10/24/23 for afib RVR in the setting of COVID 19 and new DVT. Treatment of afib with rate control strategy. Coumadin changed to eliquis for DVT.      Of note, pt was admitted from 11/15-11/27/23 for ALI of JESE and underwent thrombectomy on 11/17/23. He was also found to have bilateral PE. Eliquis changed back to warfarin. He was discharged to rehab then went home on 12/15/23.     ROS is NEG for HEADACHE, NAUSEA, VOMITING, DIARRHEA, CHEST PAIN, SOB, and BLEEDING and as further REVIEWED BELOW.    Subjective   Mukesh KEE" Forest is a 74 y.o. male who presents for pain/Lumps (Nipples have itched on and off. Lumps that are hard/painful around both nipples. Noticed about 6 weeks ago.  ).    HPI:    Per nursing intake, pt here for pain/Lumps (Nipples have itched on and off. Lumps that are hard/painful around both nipples. Noticed about 6 weeks ago.  )       Review of systems is essentially negative for all systems except for any identified issues in HPI above.    Objective     /76   Pulse 72   Temp 36.3 °C (97.3 °F)   Wt 103 kg (226 lb)   SpO2 97%   BMI 33.37 kg/m²      Physical Examination:       GENERAL           General Appearance: well-appearing, well-developed, well-hydrated, well-nourished, no acute distress.        HEENT           NECK supple, no masses or thyromegaly, no carotid bruit.        EYES           Extraocular Movements: normal, bilateral eyes JESSENIA, no conjunctival injection.       Breasts SHARI LEFT enlarged and tender and dense tissue L>R  HEART           Rate and Rhythm regular rate and rhythm. Heart sounds: normal S1S2, no S3 or S4. Murmurs: none.        CHEST           Breath sounds: Clear to IPPA, RR<16 no use of accessory muscles.        ABDOMEN           General: Neg for LKKS or masses, no scleral icterus or jaundice.        MUSCULOSKELETAL           Joints Demonstration: Neg for erythema, swelling or joint deformities. gross abnormalities no gross abnormalities.        EXTREMITIES           Lower Extremities: Neg for cyanosis, clubbing or edema.       Assessment/Plan   Problem List Items Addressed This Visit       History of major vascular surgery    History of DVT of lower extremity     Other Visit Diagnoses       Chest wall mass    -  Primary    Relevant Orders    BI mammo bilateral diagnostic    Anticoagulated        Atrial fibrillation, unspecified type (Multi)        JENNY (obstructive sleep apnea)        PVD (peripheral vascular disease) (CMS-[facility])        History of pulmonary embolus (PE)        Heart  failure, unspecified HF chronicity, unspecified heart failure type (Multi)        Gynecomastia, male        Relevant Orders    BI mammo bilateral diagnostic            FOLLOW UP:  PRN and as specified above         Lorenza Hager M.D.

## 2024-06-07 ENCOUNTER — OFFICE VISIT (OUTPATIENT)
Dept: PRIMARY CARE | Facility: CLINIC | Age: 74
End: 2024-06-07
Payer: MEDICARE

## 2024-06-07 VITALS
WEIGHT: 226 LBS | OXYGEN SATURATION: 97 % | TEMPERATURE: 97.3 F | DIASTOLIC BLOOD PRESSURE: 76 MMHG | BODY MASS INDEX: 33.37 KG/M2 | HEART RATE: 72 BPM | SYSTOLIC BLOOD PRESSURE: 120 MMHG

## 2024-06-07 DIAGNOSIS — I48.91 ATRIAL FIBRILLATION, UNSPECIFIED TYPE (MULTI): ICD-10-CM

## 2024-06-07 DIAGNOSIS — N62 GYNECOMASTIA, MALE: Primary | ICD-10-CM

## 2024-06-07 DIAGNOSIS — I50.9 HEART FAILURE, UNSPECIFIED HF CHRONICITY, UNSPECIFIED HEART FAILURE TYPE (MULTI): ICD-10-CM

## 2024-06-07 DIAGNOSIS — I73.9 PVD (PERIPHERAL VASCULAR DISEASE) (CMS-HCC): ICD-10-CM

## 2024-06-07 DIAGNOSIS — Z86.718 HISTORY OF DVT OF LOWER EXTREMITY: ICD-10-CM

## 2024-06-07 DIAGNOSIS — Z86.711 HISTORY OF PULMONARY EMBOLUS (PE): ICD-10-CM

## 2024-06-07 DIAGNOSIS — Z98.890 HISTORY OF MAJOR VASCULAR SURGERY: ICD-10-CM

## 2024-06-07 DIAGNOSIS — G47.33 OSA (OBSTRUCTIVE SLEEP APNEA): ICD-10-CM

## 2024-06-07 DIAGNOSIS — R22.2 CHEST WALL MASS: ICD-10-CM

## 2024-06-07 DIAGNOSIS — Z79.01 ANTICOAGULATED: ICD-10-CM

## 2024-06-07 PROCEDURE — 1157F ADVNC CARE PLAN IN RCRD: CPT | Performed by: FAMILY MEDICINE

## 2024-06-07 PROCEDURE — 3074F SYST BP LT 130 MM HG: CPT | Performed by: FAMILY MEDICINE

## 2024-06-07 PROCEDURE — 1126F AMNT PAIN NOTED NONE PRSNT: CPT | Performed by: FAMILY MEDICINE

## 2024-06-07 PROCEDURE — 99214 OFFICE O/P EST MOD 30 MIN: CPT | Performed by: FAMILY MEDICINE

## 2024-06-07 PROCEDURE — 1036F TOBACCO NON-USER: CPT | Performed by: FAMILY MEDICINE

## 2024-06-07 PROCEDURE — 3078F DIAST BP <80 MM HG: CPT | Performed by: FAMILY MEDICINE

## 2024-06-07 PROCEDURE — 1159F MED LIST DOCD IN RCRD: CPT | Performed by: FAMILY MEDICINE

## 2024-06-07 ASSESSMENT — PATIENT HEALTH QUESTIONNAIRE - PHQ9
1. LITTLE INTEREST OR PLEASURE IN DOING THINGS: NOT AT ALL
2. FEELING DOWN, DEPRESSED OR HOPELESS: NOT AT ALL
SUM OF ALL RESPONSES TO PHQ9 QUESTIONS 1 AND 2: 0

## 2024-06-07 ASSESSMENT — PAIN SCALES - GENERAL: PAINLEVEL: 0-NO PAIN

## 2024-06-12 DIAGNOSIS — E78.2 MIXED HYPERLIPIDEMIA: Primary | ICD-10-CM

## 2024-06-13 ENCOUNTER — ANTICOAGULATION - WARFARIN VISIT (OUTPATIENT)
Dept: CARDIOLOGY | Facility: HOSPITAL | Age: 74
End: 2024-06-13
Payer: MEDICARE

## 2024-06-13 DIAGNOSIS — Z79.01 LONG TERM (CURRENT) USE OF ANTICOAGULANTS: ICD-10-CM

## 2024-06-13 LAB
POC INR: 2.1
POC PROTHROMBIN TIME: NORMAL

## 2024-06-13 PROCEDURE — 99211 OFF/OP EST MAY X REQ PHY/QHP: CPT | Performed by: INTERNAL MEDICINE

## 2024-06-13 PROCEDURE — 85610 PROTHROMBIN TIME: CPT | Mod: QW

## 2024-06-13 RX ORDER — EZETIMIBE 10 MG/1
10 TABLET ORAL DAILY
Qty: 90 TABLET | Refills: 3 | Status: SHIPPED | OUTPATIENT
Start: 2024-06-13

## 2024-06-13 NOTE — PROGRESS NOTES
Patient identification verified with 2 identifiers.    Location: [facility] - 1st Floor Anticoagulation Clinic [address] [id number]    Referring Physician: DR Wayne  Enrollment/ Re-enrollment date:    INR Goal: 2.0-3.0  INR monitoring is per [facility] protocol.  Anticoagulation Medication: warfarin  Indication: Atrial Fibrillation/Atrial Flutter    Subjective   Bleeding signs/symptoms: No    Bruising: No   Major bleeding event: No  Thrombosis signs/symptoms: No  Thromboembolic event: No  Missed doses: No  Extra doses: No  Medication changes: No  Dietary changes: No  Change in health: No  Change in activity: No  Alcohol: No  Other concerns: No    Upcoming Procedures:  Does the Patient Have any upcoming procedures that require interruption in anticoagulation therapy? no  Does the patient require bridging? no      Anticoagulation Summary  As of 2024      INR goal:  2.0-3.0   TTR:  64.1% (5.1 mo)   INR used for dosin.10 (2024)   Weekly warfarin total:  22.5 mg               Assessment/Plan   Therapeutic     1. New dose: no change    2. Next INR: 1 month      Education provided to patient during the visit:  Patient instructed to call in interim with questions, concerns and changes.

## 2024-06-18 ENCOUNTER — HOSPITAL ENCOUNTER (OUTPATIENT)
Dept: RADIOLOGY | Facility: HOSPITAL | Age: 74
Discharge: HOME | End: 2024-06-18
Payer: MEDICARE

## 2024-06-18 DIAGNOSIS — R22.2 LOCALIZED SWELLING, MASS AND LUMP, TRUNK: ICD-10-CM

## 2024-06-18 DIAGNOSIS — R22.2 CHEST WALL MASS: ICD-10-CM

## 2024-06-18 DIAGNOSIS — N62 HYPERTROPHY OF BREAST: ICD-10-CM

## 2024-06-18 DIAGNOSIS — N62 GYNECOMASTIA, MALE: ICD-10-CM

## 2024-06-18 PROCEDURE — 77066 DX MAMMO INCL CAD BI: CPT

## 2024-06-18 PROCEDURE — G0279 TOMOSYNTHESIS, MAMMO: HCPCS | Performed by: RADIOLOGY

## 2024-06-18 PROCEDURE — 77066 DX MAMMO INCL CAD BI: CPT | Performed by: RADIOLOGY

## 2024-06-18 PROCEDURE — 76642 ULTRASOUND BREAST LIMITED: CPT | Performed by: RADIOLOGY

## 2024-06-18 PROCEDURE — 76642 ULTRASOUND BREAST LIMITED: CPT | Mod: 50

## 2024-06-25 DIAGNOSIS — I50.9 ACUTE DECOMPENSATED HEART FAILURE (MULTI): ICD-10-CM

## 2024-06-25 RX ORDER — TORSEMIDE 20 MG/1
20 TABLET ORAL DAILY
Qty: 30 TABLET | Refills: 0 | Status: SHIPPED | OUTPATIENT
Start: 2024-06-25 | End: 2024-07-25

## 2024-07-11 ENCOUNTER — APPOINTMENT (OUTPATIENT)
Dept: VASCULAR MEDICINE | Facility: HOSPITAL | Age: 74
End: 2024-07-11
Payer: MEDICARE

## 2024-07-11 ENCOUNTER — APPOINTMENT (OUTPATIENT)
Dept: VASCULAR MEDICINE | Facility: CLINIC | Age: 74
End: 2024-07-11
Payer: MEDICARE

## 2024-07-11 ENCOUNTER — APPOINTMENT (OUTPATIENT)
Dept: VASCULAR SURGERY | Facility: CLINIC | Age: 74
End: 2024-07-11
Payer: MEDICARE

## 2024-07-16 ENCOUNTER — APPOINTMENT (OUTPATIENT)
Dept: VASCULAR MEDICINE | Facility: CLINIC | Age: 74
End: 2024-07-16
Payer: COMMERCIAL

## 2024-07-17 ENCOUNTER — HOSPITAL ENCOUNTER (OUTPATIENT)
Dept: VASCULAR MEDICINE | Facility: CLINIC | Age: 74
Discharge: HOME | End: 2024-07-17
Payer: MEDICARE

## 2024-07-17 DIAGNOSIS — I73.9 PAD (PERIPHERAL ARTERY DISEASE) (CMS-HCC): ICD-10-CM

## 2024-07-17 DIAGNOSIS — I99.8 ACUTE LOWER LIMB ISCHEMIA: ICD-10-CM

## 2024-07-17 PROCEDURE — 93923 UPR/LXTR ART STDY 3+ LVLS: CPT

## 2024-07-17 PROCEDURE — 93923 UPR/LXTR ART STDY 3+ LVLS: CPT | Performed by: INTERNAL MEDICINE

## 2024-07-17 PROCEDURE — 93926 LOWER EXTREMITY STUDY: CPT

## 2024-07-17 PROCEDURE — 93926 LOWER EXTREMITY STUDY: CPT | Performed by: INTERNAL MEDICINE

## 2024-07-18 ENCOUNTER — OFFICE VISIT (OUTPATIENT)
Dept: VASCULAR SURGERY | Facility: CLINIC | Age: 74
End: 2024-07-18
Payer: MEDICARE

## 2024-07-18 ENCOUNTER — ANTICOAGULATION - WARFARIN VISIT (OUTPATIENT)
Dept: CARDIOLOGY | Facility: HOSPITAL | Age: 74
End: 2024-07-18
Payer: MEDICARE

## 2024-07-18 VITALS
BODY MASS INDEX: 33.03 KG/M2 | WEIGHT: 223 LBS | HEIGHT: 69 IN | HEART RATE: 74 BPM | SYSTOLIC BLOOD PRESSURE: 129 MMHG | DIASTOLIC BLOOD PRESSURE: 79 MMHG | OXYGEN SATURATION: 95 %

## 2024-07-18 DIAGNOSIS — Z79.01 LONG TERM (CURRENT) USE OF ANTICOAGULANTS: ICD-10-CM

## 2024-07-18 DIAGNOSIS — I73.9 PAD (PERIPHERAL ARTERY DISEASE) (CMS-HCC): Primary | ICD-10-CM

## 2024-07-18 LAB
POC INR: 1.5
POC PROTHROMBIN TIME: NORMAL

## 2024-07-18 PROCEDURE — 99215 OFFICE O/P EST HI 40 MIN: CPT | Performed by: SURGERY

## 2024-07-18 PROCEDURE — 1159F MED LIST DOCD IN RCRD: CPT | Performed by: SURGERY

## 2024-07-18 PROCEDURE — 85610 PROTHROMBIN TIME: CPT | Mod: QW

## 2024-07-18 PROCEDURE — 3074F SYST BP LT 130 MM HG: CPT | Performed by: SURGERY

## 2024-07-18 PROCEDURE — 1160F RVW MEDS BY RX/DR IN RCRD: CPT | Performed by: SURGERY

## 2024-07-18 PROCEDURE — 99211 OFF/OP EST MAY X REQ PHY/QHP: CPT | Performed by: INTERNAL MEDICINE

## 2024-07-18 PROCEDURE — 3078F DIAST BP <80 MM HG: CPT | Performed by: SURGERY

## 2024-07-18 PROCEDURE — 1157F ADVNC CARE PLAN IN RCRD: CPT | Performed by: SURGERY

## 2024-07-18 PROCEDURE — 1036F TOBACCO NON-USER: CPT | Performed by: SURGERY

## 2024-07-18 ASSESSMENT — ENCOUNTER SYMPTOMS
LOSS OF SENSATION IN FEET: 0
OCCASIONAL FEELINGS OF UNSTEADINESS: 0
DEPRESSION: 0

## 2024-07-18 NOTE — PROGRESS NOTES
Patient identification verified with 2 identifiers.    Location: [facility] - 1st Floor Anticoagulation Clinic [address], [place] [address]    Referring Physician: DR Wayne  Enrollment/ Re-enrollment date: 2024   INR Goal: 2.0-3.0  INR monitoring is per [facility] protocol.  Anticoagulation Medication: warfarin  Indication: Atrial Fibrillation/Atrial Flutter    Subjective   Bleeding signs/symptoms: No    Bruising: No   Major bleeding event: No  Thrombosis signs/symptoms: No  Thromboembolic event: No  Missed doses: No  did not take it on Monday due to some bleeding  Extra doses: No  Medication changes: No  Dietary changes: No  Change in health: No  Change in activity: No  Alcohol: No  Other concerns: No    Upcoming Procedures:  Does the Patient Have any upcoming procedures that require interruption in anticoagulation therapy? no  Does the patient require bridging? no      Anticoagulation Summary  As of 7/18/2024      INR goal:  2.0-3.0   TTR:  64.1% (5.1 mo)   INR used for dosing:  --               Assessment/Plan   Subtherapeutic     1. New dose:  one time dose increase     2. Next INR: 2 weeks      Education provided to patient during the visit:  Patient instructed to call in interim with questions, concerns and changes.   Patient educated on compliance with dosing, follow up appointments, and prescribed plan of care.

## 2024-07-18 NOTE — PATIENT INSTRUCTIONS
- Doing well, ALLISON is normal  - Continue Coumadin  - OK to use lymphedema/mild pump for leg swelling but avoid extreme compression  - Followup in 6 months with ALLISON

## 2024-07-22 DIAGNOSIS — I50.9 ACUTE DECOMPENSATED HEART FAILURE (MULTI): ICD-10-CM

## 2024-07-22 DIAGNOSIS — I48.92 ATRIAL FLUTTER, PAROXYSMAL (MULTI): ICD-10-CM

## 2024-07-22 RX ORDER — TORSEMIDE 20 MG/1
20 TABLET ORAL DAILY
Qty: 30 TABLET | Refills: 0 | Status: SHIPPED | OUTPATIENT
Start: 2024-07-22

## 2024-07-22 NOTE — PROGRESS NOTES
Vascular Surgery Clinic Note    CC: Hx of LLE acute limb ischemia     HPI:  Mukesh Garg is 74 y.o. male with history of LLE acute limb ischemia and history of hypercoagulability with recurrent DVT s/p left femoral cutdown with iliofemoral thrombectomy, left popliteal cutdown and femoropopliteal thrombectomy, left TP trunk cutdown and tibial thrombectomy on 11/17/23. He also presented with RML and RLL PE. He underwent another hypercoagulability workup while hospitalized with positive lupus anticoagulant. Has been managed on warfarin.     He is doing well since his last vascular surgery clinic visit. Still on Coumadin per vascular medicine. Ambulating without issues. Does have some leg swelling which he states is improved since surgery but still present. No claudication, rest pain or foot wounds.     Past Vascular History:  11/17/23 ([facility]) - Left femoral cutdown with iliofemoral thrombectomy, left popliteal cutdown and femoropopliteal thrombectomy, left TP trunk cutdown and tibial thrombectomy      Past Vascular Testing:  LE PVR (11/20/23) - R 1.25 (0.76), L 1.14 (0.36)       Medical History:  Patient Active Problem List   Diagnosis    Arteriopathy (CMS-[facility])    Calculus of gallbladder without cholecystitis without obstruction    ASHD (arteriosclerotic heart disease)    Enlarged prostate    Hyperlipidemia    Hypertension    Lung mass    Mass of right lower leg    Osteoarthritis of left hip    Osteoarthritis of right hip    Polythelia    Post-phlebitic syndrome    Postsurgical aortocoronary bypass status    Postsurgical percutaneous transluminal coronary angioplasty status    Left hip pain    Right hip pain    Right inguinal hernia    Stenosis of right carotid artery    Tinnitus    Transient ischemic attack    Trochanteric bursitis of left hip    Trochanteric bursitis of right hip    Varicose veins of both lower extremities with pain    Hip instability, right    COVID    Limb ischemia    Acute pulmonary embolism  (Multi)    Septal defect, heart ([facility]-HCC)    Gout    Hypercoagulable state (Multi)    Abnormal vision    Chest wall pain    CKD (chronic kidney disease)    Enthesopathy of hip region    GERD (gastroesophageal reflux disease)    Injury of wrist    Primary localized osteoarthritis of pelvic region and thigh    Visual disturbance    History of major vascular surgery    Long term (current) use of anticoagulants    Tachycardia    History of DVT of lower extremity    Peripheral vascular disease, unspecified (CMS-HCC)    Post covid-19 condition, unspecified    Arterial thromboembolism (Multi)    Acute decompensated heart failure (Multi)    Longstanding persistent atrial fibrillation (Multi)    Persistent atrial fibrillation (Multi)        Meds:   Current Outpatient Medications on File Prior to Visit   Medication Sig Dispense Refill    acetaminophen (Tylenol) 325 mg tablet Take 2 tablets (650 mg) by mouth every 6 hours if needed for mild pain (1 - 3).      atorvastatin (Lipitor) 40 mg tablet TAKE 1 TABLET BY MOUTH EVERY DAY FOR 90 DAYS 90 tablet 3    carvedilol (Coreg) 25 mg tablet Take 1 tablet (25 mg) by mouth 2 times a day with meals. 180 tablet 1    clopidogrel (Plavix) 75 mg tablet TAKE 1 TABLET BY MOUTH EVERY DAY 90 tablet 1    dicyclomine (Bentyl) 20 mg tablet Take 1 tablet (20 mg) by mouth once daily.      ezetimibe (Zetia) 10 mg tablet TAKE 1 TABLET BY MOUTH EVERY DAY 90 tablet 3    famotidine (Pepcid) 20 mg tablet Take 1 tablet (20 mg) by mouth 2 times a day. PRN      Jardiance 10 mg TAKE 1 TABLET (10 MG) BY MOUTH ONCE DAILY. DO NOT START BEFORE JANUARY 29, 2024. 30 tablet 1    warfarin (Coumadin) 5 mg tablet Take 1 tablet (5 mg) by mouth once daily in the evening. 5mg M, W, F -2.5 mg all other days (Patient taking differently: Take 1 tablet (5 mg) by mouth once daily in the evening. 5mg TAB ON MONDAYS and FRIDAYS  -2.5 mg all other days) 30 tablet 0    [DISCONTINUED] torsemide (Demadex) 20 mg tablet TAKE 1 TABLET  (20 MG) BY MOUTH ONCE DAILY. DO NOT START BEFORE JANUARY 29, 2024. 30 tablet 0     No current facility-administered medications on file prior to visit.        Allergies:   Allergies   Allergen Reactions    Pentazocine Dizziness and Itching    Talwin Compound Itching and Dizziness       SH:    Social Determinants of Health     Tobacco Use: Low Risk  (6/7/2024)    Patient History     Smoking Tobacco Use: Never     Smokeless Tobacco Use: Never     Passive Exposure: Never   Alcohol Use: Not At Risk (1/23/2024)    AUDIT-C     Frequency of Alcohol Consumption: Monthly or less     Average Number of Drinks: 1 or 2     Frequency of Binge Drinking: Never   Recent Concern: Alcohol Use - Alcohol Misuse (10/25/2023)    AUDIT-C     Frequency of Alcohol Consumption: 2-3 times a week     Average Number of Drinks: 1 or 2     Frequency of Binge Drinking: Daily or almost daily   Financial Resource Strain: Low Risk  (1/23/2024)    Overall Financial Resource Strain (CARDIA)     Difficulty of Paying Living Expenses: Not hard at all   Food Insecurity: No Food Insecurity (11/30/2023)    Hunger Vital Sign     Worried About Running Out of Food in the Last Year: Never true     Ran Out of Food in the Last Year: Never true   Transportation Needs: No Transportation Needs (1/23/2024)    PRAPARE - Transportation     Lack of Transportation (Medical): No     Lack of Transportation (Non-Medical): No   Physical Activity: Not on file   Stress: No Stress Concern Present (11/30/2023)    [employer] - Occupational Stress Questionnaire     Feeling of Stress : Not at all   Social Connections: Feeling Socially Integrated (1/11/2024)    OASIS : Social Isolation     Frequency of experiencing loneliness or isolation: Never   Intimate Partner Violence: Not At Risk (11/30/2023)    Humiliation, Afraid, Rape, and Kick questionnaire     Fear of Current or Ex-Partner: No     Emotionally Abused: No     Physically Abused: No      Sexually Abused: No   Depression: Not at risk (6/7/2024)    PHQ-2     PHQ-2 Score: 0   Housing Stability: Low Risk  (1/23/2024)    Housing Stability Vital Sign     Unable to Pay for Housing in the Last Year: No     Number of Places Lived in the Last Year: 1     Unstable Housing in the Last Year: No   Utilities: Not At Risk (11/30/2023)    C Utilities     Threatened with loss of utilities: No   Digital Equity: Not on file   Health Literacy: Adequate Health Literacy (1/11/2024)    OASIS : Health Literacy     Frequency of needing help to read materials from doctor or pharmacy: Never        FH:  Family History   Problem Relation Name Age of Onset    Hypertension Mother      Stroke Mother      Heart attack Father          Cause of death    Heart disease Father      Diabetes Other Grandmother         ROS:  All systems were reviewed and are negative except as per HPI.    Objective:  Vitals:  Vitals:    07/18/24 0925   BP: 129/79   Pulse: 74   SpO2: 95%        Exam:  Constitutional: normal, well appearing  HEENT: normocephalic  CV: regular rate  RESP: symmetric expansion, unlabored breathing  GI: soft, nontender, nondistended  MSK: normal ROM  INT: no lesions  PSYCH: appropriate mood  NEURO: no deficits  VASC: Palpable left DP    Assessment & Plan:  Mukesh Garg is 74 y.o. male with history of LLE acute limb ischemia and history of hypercoagulability with recurrent DVT s/p left femoral cutdown with iliofemoral thrombectomy, left popliteal cutdown and femoropopliteal thrombectomy, left TP trunk cutdown and tibial thrombectomy on 11/17/23.     - Doing well, ALLISON is normal  - Continue Coumadin  - OK to use lymphedema/mild pump for leg swelling but avoid extreme compression  - Followup in 6 months with ALLISON    Meds  - Plavix 75 mg  - Warfarin 5 mg  - Atorvastatin 40 mg    Screening/Surveillance  - ALLISON (January 2025)    Next Followup  - 6 months    Monika Pruett MD

## 2024-07-23 RX ORDER — CARVEDILOL 25 MG/1
TABLET ORAL
Qty: 180 TABLET | Refills: 1 | Status: SHIPPED | OUTPATIENT
Start: 2024-07-23

## 2024-07-31 DIAGNOSIS — I50.9 ACUTE DECOMPENSATED HEART FAILURE (MULTI): ICD-10-CM

## 2024-07-31 RX ORDER — TORSEMIDE 20 MG/1
20 TABLET ORAL DAILY
Qty: 30 TABLET | Refills: 0 | Status: SHIPPED | OUTPATIENT
Start: 2024-07-31

## 2024-07-31 RX ORDER — EMPAGLIFLOZIN 10 MG/1
TABLET, FILM COATED ORAL
Qty: 30 TABLET | Refills: 1 | Status: SHIPPED | OUTPATIENT
Start: 2024-07-31

## 2024-08-01 ENCOUNTER — ANTICOAGULATION - WARFARIN VISIT (OUTPATIENT)
Dept: CARDIOLOGY | Facility: HOSPITAL | Age: 74
End: 2024-08-01
Payer: MEDICARE

## 2024-08-01 DIAGNOSIS — Z79.01 LONG TERM (CURRENT) USE OF ANTICOAGULANTS: ICD-10-CM

## 2024-08-01 LAB
POC INR: 2
POC PROTHROMBIN TIME: NORMAL

## 2024-08-01 PROCEDURE — 99211 OFF/OP EST MAY X REQ PHY/QHP: CPT | Performed by: INTERNAL MEDICINE

## 2024-08-01 PROCEDURE — 85610 PROTHROMBIN TIME: CPT | Mod: QW

## 2024-08-01 NOTE — PROGRESS NOTES
Patient identification verified with 2 identifiers.    Location: [facility] - 1st Floor Anticoagulation Clinic [address] [id number]    Referring Physician: Dr Wayne  Enrollment/ Re-enrollment date:    INR Goal: 2.0-3.0  INR monitoring is per [facility] protocol.  Anticoagulation Medication: warfarin  Indication: Peripheral Artery Disease (PAD)    Subjective   Bleeding signs/symptoms: No    Bruising: No   Major bleeding event: No  Thrombosis signs/symptoms: No  Thromboembolic event: No  Missed doses: No  Extra doses: No  Medication changes: No  Dietary changes: No  Change in health: No  Change in activity: No  Alcohol: No  Other concerns: No    Upcoming Procedures:  Does the Patient Have any upcoming procedures that require interruption in anticoagulation therapy? no  Does the patient require bridging? no      Anticoagulation Summary  As of 2024      INR goal:  2.0-3.0   TTR:  51.4% (6.7 mo)   INR used for dosin.00 (2024)   Weekly warfarin total:  22.5 mg               Assessment/Plan   Therapeutic     1. New dose: no change    2. Next INR: 1 month      Education provided to patient during the visit:  Patient instructed to call in interim with questions, concerns and changes.

## 2024-08-06 DIAGNOSIS — I48.91 ATRIAL FIBRILLATION WITH RVR (MULTI): ICD-10-CM

## 2024-08-06 RX ORDER — WARFARIN SODIUM 5 MG/1
TABLET ORAL
Qty: 30 TABLET | Refills: 0 | Status: SHIPPED | OUTPATIENT
Start: 2024-08-06

## 2024-09-10 ENCOUNTER — ANTICOAGULATION - WARFARIN VISIT (OUTPATIENT)
Dept: CARDIOLOGY | Facility: HOSPITAL | Age: 74
End: 2024-09-10
Payer: MEDICARE

## 2024-09-10 DIAGNOSIS — Z79.01 LONG TERM (CURRENT) USE OF ANTICOAGULANTS: Primary | ICD-10-CM

## 2024-09-11 ENCOUNTER — ANTICOAGULATION - WARFARIN VISIT (OUTPATIENT)
Dept: CARDIOLOGY | Facility: HOSPITAL | Age: 74
End: 2024-09-11
Payer: MEDICARE

## 2024-09-11 DIAGNOSIS — Z79.01 LONG TERM (CURRENT) USE OF ANTICOAGULANTS: ICD-10-CM

## 2024-09-11 LAB
POC INR: 2.5
POC PROTHROMBIN TIME: NORMAL

## 2024-09-11 PROCEDURE — 99211 OFF/OP EST MAY X REQ PHY/QHP: CPT | Performed by: INTERNAL MEDICINE

## 2024-09-11 PROCEDURE — 85610 PROTHROMBIN TIME: CPT | Mod: QW

## 2024-09-11 NOTE — PROGRESS NOTES
Patient identification verified with 2 identifiers.    Location: [facility] - 1st Floor Anticoagulation Clinic [address] [id number]    Referring Physician: Dr Wayne  Enrollment/ Re-enrollment date:    INR Goal: 2.0-3.0  INR monitoring is per [facility] protocol.  Anticoagulation Medication: warfarin  Indication: Peripheral Artery Disease (PAD)    Subjective   Bleeding signs/symptoms: No    Bruising: No   Major bleeding event: No  Thrombosis signs/symptoms: No  Thromboembolic event: No  Missed doses: No  Extra doses: No  Medication changes: No  Dietary changes: No  Change in health: No  Change in activity: No  Alcohol: No  Other concerns: No    Upcoming Procedures:  Does the Patient Have any upcoming procedures that require interruption in anticoagulation therapy? no  Does the patient require bridging? no      Anticoagulation Summary  As of 2024      INR goal:  2.0-3.0   TTR:  59.6% (8.1 mo)   INR used for dosin.50 (2024)   Weekly warfarin total:  22.5 mg               Assessment/Plan   Therapeutic     1. New dose: no change    2. Next INR: 1 month      Education provided to patient during the visit:  Patient instructed to call in interim with questions, concerns and changes.

## 2024-10-09 ENCOUNTER — APPOINTMENT (OUTPATIENT)
Dept: CARDIOLOGY | Facility: HOSPITAL | Age: 74
End: 2024-10-09
Payer: MEDICARE

## 2024-10-09 ENCOUNTER — APPOINTMENT (OUTPATIENT)
Dept: CARDIOLOGY | Facility: HOSPITAL | Age: 74
End: 2024-10-09
Payer: COMMERCIAL

## 2024-10-10 ENCOUNTER — ANTICOAGULATION - WARFARIN VISIT (OUTPATIENT)
Dept: CARDIOLOGY | Facility: HOSPITAL | Age: 74
End: 2024-10-10
Payer: MEDICARE

## 2024-10-10 DIAGNOSIS — Z79.01 LONG TERM (CURRENT) USE OF ANTICOAGULANTS: ICD-10-CM

## 2024-10-10 LAB
POC INR: 2.2
POC PROTHROMBIN TIME: NORMAL

## 2024-10-10 PROCEDURE — 85610 PROTHROMBIN TIME: CPT | Mod: QW

## 2024-10-10 PROCEDURE — 99211 OFF/OP EST MAY X REQ PHY/QHP: CPT | Performed by: INTERNAL MEDICINE

## 2024-10-10 NOTE — PROGRESS NOTES
Patient identification verified with 2 identifiers.    Location: [facility] - 1st Floor Anticoagulation Clinic [address] [phone]    Referring Physician: DR Wayne  Enrollment/ Re-enrollment date:    INR Goal: 2.0-3.0  INR monitoring is per [facility] protocol.  Anticoagulation Medication: warfarin  Indication: Atrial Fibrillation/Atrial Flutter    Subjective   Bleeding signs/symptoms: No    Bruising: No   Major bleeding event: No  Thrombosis signs/symptoms: No  Thromboembolic event: No  Missed doses: No  Extra doses: No  Medication changes: No  Dietary changes: No  Change in health: No  Change in activity: No  Alcohol: No  Other concerns: No    Upcoming Procedures:  Does the Patient Have any upcoming procedures that require interruption in anticoagulation therapy? no  Does the patient require bridging? no      Anticoagulation Summary  As of 10/10/2024      INR goal:  2.0-3.0   TTR:  63.9% (9 mo)   INR used for dosin.20 (10/10/2024)   Weekly warfarin total:  22.5 mg               Assessment/Plan   Therapeutic     1. New dose: no change    2. Next INR: 1 month      Education provided to patient during the visit:  Patient instructed to call in interim with questions, concerns and changes.

## 2024-10-23 DIAGNOSIS — I48.91 ATRIAL FIBRILLATION WITH RVR (MULTI): ICD-10-CM

## 2024-10-23 RX ORDER — WARFARIN SODIUM 5 MG/1
TABLET ORAL
Qty: 30 TABLET | Refills: 0 | Status: SHIPPED | OUTPATIENT
Start: 2024-10-23

## 2024-11-07 ENCOUNTER — ANTICOAGULATION - WARFARIN VISIT (OUTPATIENT)
Dept: CARDIOLOGY | Facility: HOSPITAL | Age: 74
End: 2024-11-07
Payer: MEDICARE

## 2024-11-07 DIAGNOSIS — Z79.01 LONG TERM (CURRENT) USE OF ANTICOAGULANTS: ICD-10-CM

## 2024-11-07 LAB
POC INR: 1.9
POC PROTHROMBIN TIME: NORMAL

## 2024-11-07 PROCEDURE — 99211 OFF/OP EST MAY X REQ PHY/QHP: CPT | Performed by: INTERNAL MEDICINE

## 2024-11-07 PROCEDURE — 85610 PROTHROMBIN TIME: CPT | Mod: QW

## 2024-11-07 NOTE — PROGRESS NOTES
Patient identification verified with 2 identifiers.    Location: [facility] - 1st Floor Anticoagulation Clinic [address] [id number]    Referring Physician: Dr Wayne  Enrollment/ Re-enrollment date:    INR Goal: 2.0-3.0  INR monitoring is per [facility] protocol.  Anticoagulation Medication: warfarin  Indication: Atrial Fibrillation/Atrial Flutter    Subjective   Bleeding signs/symptoms: No    Bruising: No   Major bleeding event: No  Thrombosis signs/symptoms: No  Thromboembolic event: No  Missed doses: No  may have missed a dose last week  Extra doses: No  Medication changes: No  Dietary changes: No  Change in health: No  Change in activity: No  Alcohol: No  Other concerns: No    Upcoming Procedures:  Does the Patient Have any upcoming procedures that require interruption in anticoagulation therapy? no  Does the patient require bridging? no      Anticoagulation Summary  As of 2024      INR goal:  2.0-3.0   TTR:  64.2% (10 mo)   INR used for dosin.90 (2024)   Weekly warfarin total:  22.5 mg               Assessment/Plan   Therapeutic     1. New dose: no change    2. Next INR: 1 month      Education provided to patient during the visit:  Patient instructed to call in interim with questions, concerns and changes.

## 2024-11-12 ENCOUNTER — APPOINTMENT (OUTPATIENT)
Dept: CARDIOLOGY | Facility: HOSPITAL | Age: 74
End: 2024-11-12
Payer: MEDICARE

## 2024-11-12 DIAGNOSIS — I48.91 ATRIAL FIBRILLATION WITH RVR (MULTI): ICD-10-CM

## 2024-11-12 RX ORDER — WARFARIN SODIUM 5 MG/1
TABLET ORAL
Qty: 90 TABLET | Refills: 3 | OUTPATIENT
Start: 2024-11-12

## 2024-11-12 RX ORDER — WARFARIN SODIUM 5 MG/1
TABLET ORAL
Qty: 90 TABLET | Refills: 3 | Status: SHIPPED | OUTPATIENT
Start: 2024-11-12

## 2024-11-28 NOTE — ASSESSMENT & PLAN NOTE
Dr. Hager checked lipids in October: Tchol 137  HDL 38 LDL 62.  Good LDL reduction with the combination of atorvastatin and ezetimibe.

## 2024-11-28 NOTE — ASSESSMENT & PLAN NOTE
Found in afib and asymptomatic on last visit.  We had long discussion on rate and rhythm control as well as stroke prevention.  We have utilized warfarin for stroke prevention, INRs at the anticoagulation clinic.  Heart rate remains well-controlled on the simple carvedilol therapy.  He states his INRs have been relatively stable.

## 2024-12-03 ENCOUNTER — OFFICE VISIT (OUTPATIENT)
Dept: CARDIOLOGY | Facility: CLINIC | Age: 74
End: 2024-12-03
Payer: MEDICARE

## 2024-12-03 ENCOUNTER — OFFICE VISIT (OUTPATIENT)
Dept: CARDIOLOGY | Facility: HOSPITAL | Age: 74
End: 2024-12-03
Payer: MEDICARE

## 2024-12-03 ENCOUNTER — APPOINTMENT (OUTPATIENT)
Dept: CARDIOLOGY | Facility: HOSPITAL | Age: 74
End: 2024-12-03
Payer: MEDICARE

## 2024-12-03 VITALS
SYSTOLIC BLOOD PRESSURE: 105 MMHG | WEIGHT: 231 LBS | RESPIRATION RATE: 16 BRPM | HEIGHT: 69 IN | HEART RATE: 82 BPM | DIASTOLIC BLOOD PRESSURE: 66 MMHG | BODY MASS INDEX: 34.21 KG/M2

## 2024-12-03 VITALS
HEART RATE: 88 BPM | WEIGHT: 230 LBS | DIASTOLIC BLOOD PRESSURE: 80 MMHG | BODY MASS INDEX: 33.97 KG/M2 | SYSTOLIC BLOOD PRESSURE: 120 MMHG | OXYGEN SATURATION: 97 %

## 2024-12-03 DIAGNOSIS — I48.19 PERSISTENT ATRIAL FIBRILLATION (MULTI): ICD-10-CM

## 2024-12-03 DIAGNOSIS — I10 PRIMARY HYPERTENSION: ICD-10-CM

## 2024-12-03 DIAGNOSIS — I71.21 ANEURYSM OF ASCENDING AORTA WITHOUT RUPTURE (CMS-HCC): ICD-10-CM

## 2024-12-03 DIAGNOSIS — I25.10 ASHD (ARTERIOSCLEROTIC HEART DISEASE): Primary | ICD-10-CM

## 2024-12-03 DIAGNOSIS — I82.409 RECURRENT DEEP VEIN THROMBOSIS (DVT) (MULTI): ICD-10-CM

## 2024-12-03 DIAGNOSIS — Z79.01 LONG TERM (CURRENT) USE OF ANTICOAGULANTS: ICD-10-CM

## 2024-12-03 DIAGNOSIS — E78.2 MIXED HYPERLIPIDEMIA: ICD-10-CM

## 2024-12-03 DIAGNOSIS — I74.9 ARTERIAL THROMBOEMBOLISM (MULTI): Primary | ICD-10-CM

## 2024-12-03 PROCEDURE — 3078F DIAST BP <80 MM HG: CPT | Performed by: INTERNAL MEDICINE

## 2024-12-03 PROCEDURE — 1159F MED LIST DOCD IN RCRD: CPT | Performed by: INTERNAL MEDICINE

## 2024-12-03 PROCEDURE — G2211 COMPLEX E/M VISIT ADD ON: HCPCS | Performed by: INTERNAL MEDICINE

## 2024-12-03 PROCEDURE — 1157F ADVNC CARE PLAN IN RCRD: CPT | Performed by: INTERNAL MEDICINE

## 2024-12-03 PROCEDURE — 1160F RVW MEDS BY RX/DR IN RCRD: CPT | Performed by: INTERNAL MEDICINE

## 2024-12-03 PROCEDURE — 1036F TOBACCO NON-USER: CPT | Performed by: INTERNAL MEDICINE

## 2024-12-03 PROCEDURE — 1126F AMNT PAIN NOTED NONE PRSNT: CPT | Performed by: INTERNAL MEDICINE

## 2024-12-03 PROCEDURE — 1125F AMNT PAIN NOTED PAIN PRSNT: CPT | Performed by: INTERNAL MEDICINE

## 2024-12-03 PROCEDURE — 99214 OFFICE O/P EST MOD 30 MIN: CPT | Performed by: INTERNAL MEDICINE

## 2024-12-03 PROCEDURE — 3074F SYST BP LT 130 MM HG: CPT | Performed by: INTERNAL MEDICINE

## 2024-12-03 PROCEDURE — 3079F DIAST BP 80-89 MM HG: CPT | Performed by: INTERNAL MEDICINE

## 2024-12-03 RX ORDER — POTASSIUM CHLORIDE 750 MG/1
10 TABLET, EXTENDED RELEASE ORAL
COMMUNITY
Start: 2024-11-20 | End: 2025-02-18

## 2024-12-03 ASSESSMENT — ENCOUNTER SYMPTOMS
LOSS OF SENSATION IN FEET: 1
DYSURIA: 0
DEPRESSION: 0
HEMATURIA: 0
PARESTHESIAS: 0
NUMBNESS: 0
SHORTNESS OF BREATH: 0
COUGH: 0
DYSPNEA ON EXERTION: 0
BLURRED VISION: 0
ABDOMINAL PAIN: 0
PALPITATIONS: 0
OCCASIONAL FEELINGS OF UNSTEADINESS: 0

## 2024-12-03 ASSESSMENT — PAIN SCALES - GENERAL
PAINLEVEL_OUTOF10: 2
PAINLEVEL_OUTOF10: 0-NO PAIN

## 2024-12-03 NOTE — PROGRESS NOTES
Subjective   Mukesh Garg is a 74 y.o. male.    Chief Complaint:  6 MONTH FOLLOW UP    HPI  Overall patient states he been feeling well.  No chest pain or anginal symptoms.  He is really not felt any palpitations.  He has been referred to a nephrologist and hematologist and is undergoing some investigations.    Review of Systems   Constitutional: Negative for malaise/fatigue.   HENT:  Negative for congestion.    Eyes:  Negative for blurred vision.   Cardiovascular:  Negative for chest pain, dyspnea on exertion and palpitations.   Respiratory:  Negative for cough and shortness of breath.    Musculoskeletal:  Positive for arthritis and joint pain.   Gastrointestinal:  Negative for abdominal pain.   Genitourinary:  Negative for dysuria and hematuria.   Neurological:  Negative for numbness and paresthesias.       Objective   Constitutional:       Appearance: Not in distress.   Eyes:      Conjunctiva/sclera: Conjunctivae normal.   Neck:      Vascular: JVD normal.   Pulmonary:      Breath sounds: Normal breath sounds. No wheezing. No rhonchi. No rales.   Cardiovascular:      Normal rate. Irregularly irregular rhythm.      Murmurs: There is no murmur.      No gallop.  No click. No rub.   Abdominal:      Palpations: Abdomen is soft.   Neurological:      General: No focal deficit present.      Mental Status: Alert.         Lab Review:   Anticoagulation - Warfarin Visit on 11/07/2024   Component Date Value    POC INR 11/07/2024 1.90    Anticoagulation - Warfarin Visit on 10/10/2024   Component Date Value    POC INR 10/10/2024 2.20        Assessment/Plan   The primary encounter diagnosis was ASHD (arteriosclerotic heart disease). Diagnoses of Mixed hyperlipidemia, Primary hypertension, and Persistent atrial fibrillation (Multi) were also pertinent to this visit.    Persistent atrial fibrillation (Multi)  Found in afib and asymptomatic on last visit.  We had long discussion on rate and rhythm control as well as stroke  prevention.  We have utilized warfarin for stroke prevention, INRs at the anticoagulation clinic.  Heart rate remains well-controlled on the simple carvedilol therapy.  He states his INRs have been relatively stable.    Hyperlipidemia  Dr. Hager checked lipids in October: Tchol 137  HDL 38 LDL 62.  Good LDL reduction with the combination of atorvastatin and ezetimibe.    ASHD (arteriosclerotic heart disease)  Stable with no anginal type symptoms.  Will continue standard risk factor modification.  Will follow on a clinical basis.    Hypertension  Blood pressure currently well-controlled on the carvedilol therapy which will be continued.

## 2024-12-03 NOTE — PROGRESS NOTES
"OUTPATIENT FOLLOW-UP -  VASCULAR MEDICINE    DOS:  12/3/24  Last seen:    5/21/24    REQUESTING PHYSICIAN:  Dr. Lorenza Yates    REASON FOR FOLLOW-UP:  here for follow up ALI, h/o VTE on warfarin long term 2/2 afib    HISTORY OF PRESENT ILLNESS:     73 yo man here for follow up ALI, h/o VTE on warfarin long term 2/2 afib. Reports his leg is doing well. No bleeding on the warfarin. Occ small epistaxis. Using the med-alert bracelet.     Since last seen - saw hematology at [facility] - prev seen by Dr. Vaz - \"chronic erythrocytosis referred for same. EMR documents elevated hgb at least since 2018. He has been evaluated by hematologist and LIBIA-2 was WT. Erythrocytosis was ascribed to JENNY CPAP non-compliance.\"     Also undergoing renal eval - most of this is through [facility].    From prev note:  Admitted to [facility] 11/15-11/27/2023. Presented on transfer from [facility] after presenting with severe leg pain. Reports toward the end of the hospital stay for COVID/Afib/DVT in October 2023 his left foot started to hurt. Saw a podiatrist approx 5 days prior to admission - told to have neuropathy at that time. Re-preseted to [facility] with progressive LLE sx abd dx with acute/subacute limb ischemia and sent to [facility]. CTA = popliteal occlusion During evaluation also found to have PE - but this was essentially asymptomatic. Rx heparin gtt initially. S/P left iliofemoral, fem-pop and tibioperoneal thrombectomy 11/14 ([facility]). He was DC to acute rehab - there x 10 days.    Hx from October 2023 - presented with cough and SOB. Found to be in afib. Also found to have RLE Dvt. Reports there was no imaging for PE. Rx eliquis 5 mg q 12 hours at that time.       During admission - LA returned as indeterminate. But given the new? Thromboembolism on the eliquis - rx UFH gtt (lovenox) to warfarin and remains on this now. INR is followed by the [facility] coumadin clinic. Reports INR was 2.5 yesterday. Remainder of APLA was normal.      Has a h/o remote DVT " "after long travel Rx coumadin ? 1980's    REVIEW OF SYSTEMS:     weight is stable  No CP, chest pressure  No cough, SOB  No BRBPR, melena, hematuria  No bleeding  occ edema, no calf pain    PHYSICAL EXAMINATION:   /66 (BP Location: Left arm, Patient Position: Sitting)   Pulse 82   Resp 16   Ht 1.753 m (5' 9\")   Wt 105 kg (231 lb)   BMI 34.11 kg/m²     Gen: Appears well, NAD  Chest: CTA  CVS: irregular without murmur or gallop  Ext: 1-2+edema, nontender  +VV  Skin: LDS and hemosiderin staining  Pulses: DP 2+;   Mood and affect appropriate    ADDITIONAL DATA:   no compression worn  right calf: 43.5cm  left calf:  42.5cm     ASSESSMENT/PLAN:    here for follow up ALI, h/o VTE on warfarin long term 2/2 afib - COVID and VTE - secondary ALI while on eliquis but hx is complicated. Indeterminate LA - has not been rechecked 2/2 to the ongoing AC.     Continue to follow the erythrocytosis at [facility] -     Needs long term AC for the afib and stroke prevention.     Late visit discussion regarding an aortic aneurysm - by chart review ascending aorta approx 4.5 cm - this has by reports been stable. Given Scr 1.73 - will order noncontrast CT.     Follow up 6 months.   "

## 2024-12-03 NOTE — PATIENT INSTRUCTIONS
ASSESSMENT/PLAN:    here for follow up ALI, h/o VTE on warfarin long term 2/2 afib - COVID and VTE - secondary ALI while on eliquis but hx is complicated. Indeterminate LA - has not been rechecked 2/2 to the ongoing AC.     Needs long term AC for the afib and stroke prevention.     Late visit discussion regarding an aortic aneurysm - by chart review ascending aorta approx 4.5 cm - this has by reports been stable. Given Scr 1.73 - will order noncontrast CT.     Follow up 6 months.

## 2024-12-03 NOTE — ASSESSMENT & PLAN NOTE
Stable with no anginal type symptoms.  Will continue standard risk factor modification.  Will follow on a clinical basis.

## 2024-12-05 ENCOUNTER — ANTICOAGULATION - WARFARIN VISIT (OUTPATIENT)
Dept: CARDIOLOGY | Facility: HOSPITAL | Age: 74
End: 2024-12-05
Payer: MEDICARE

## 2024-12-05 DIAGNOSIS — Z79.01 LONG TERM (CURRENT) USE OF ANTICOAGULANTS: ICD-10-CM

## 2024-12-05 LAB
POC INR: 2.7
POC PROTHROMBIN TIME: NORMAL

## 2024-12-05 PROCEDURE — 85610 PROTHROMBIN TIME: CPT | Mod: QW

## 2024-12-05 PROCEDURE — 99211 OFF/OP EST MAY X REQ PHY/QHP: CPT | Performed by: INTERNAL MEDICINE

## 2024-12-05 NOTE — PROGRESS NOTES
Patient identification verified with 2 identifiers.    Location: [facility] - 1st Floor Anticoagulation Clinic [address] [id number]    Referring Physician: Dr Wayne  Enrollment/ Re-enrollment date: 2025   INR Goal: 2.0-3.0  INR monitoring is per [facility] protocol.  Anticoagulation Medication: warfarin  Indication: Atrial Fibrillation/Atrial Flutter    Subjective   Bleeding signs/symptoms: No    Bruising: No   Major bleeding event: No  Thrombosis signs/symptoms: No  Thromboembolic event: No  Missed doses: No  Extra doses: No  Medication changes: No  Dietary changes: No  Change in health: No  Change in activity: No  Alcohol: No  Other concerns: No    Upcoming Procedures:  Does the Patient Have any upcoming procedures that require interruption in anticoagulation therapy? no  Does the patient require bridging? no      Anticoagulation Summary  As of 12/5/2024      INR goal:  2.0-3.0   TTR:  64.2% (10 mo)   INR used for dosing:  --               Assessment/Plan   Therapeutic     1. New dose: no change    2. Next INR: 1 month      Education provided to patient during the visit:  Patient instructed to call in interim with questions, concerns and changes.

## 2024-12-20 DIAGNOSIS — I10 ESSENTIAL (PRIMARY) HYPERTENSION: ICD-10-CM

## 2024-12-20 RX ORDER — ATORVASTATIN CALCIUM 40 MG/1
40 TABLET, FILM COATED ORAL DAILY
Qty: 90 TABLET | Refills: 3 | Status: SHIPPED | OUTPATIENT
Start: 2024-12-20

## 2025-01-08 ENCOUNTER — APPOINTMENT (OUTPATIENT)
Dept: CARDIOLOGY | Facility: HOSPITAL | Age: 75
End: 2025-01-08
Payer: MEDICARE

## 2025-01-09 ENCOUNTER — HOSPITAL ENCOUNTER (OUTPATIENT)
Dept: RADIOLOGY | Facility: CLINIC | Age: 75
Discharge: HOME | End: 2025-01-09
Payer: MEDICARE

## 2025-01-09 ENCOUNTER — ANTICOAGULATION - WARFARIN VISIT (OUTPATIENT)
Dept: CARDIOLOGY | Facility: HOSPITAL | Age: 75
End: 2025-01-09
Payer: MEDICARE

## 2025-01-09 DIAGNOSIS — Z79.01 LONG TERM (CURRENT) USE OF ANTICOAGULANTS: Primary | ICD-10-CM

## 2025-01-13 ENCOUNTER — ANTICOAGULATION - WARFARIN VISIT (OUTPATIENT)
Dept: CARDIOLOGY | Facility: HOSPITAL | Age: 75
End: 2025-01-13
Payer: MEDICARE

## 2025-01-13 DIAGNOSIS — Z79.01 LONG TERM (CURRENT) USE OF ANTICOAGULANTS: ICD-10-CM

## 2025-01-15 ENCOUNTER — ANTICOAGULATION - WARFARIN VISIT (OUTPATIENT)
Dept: CARDIOLOGY | Facility: HOSPITAL | Age: 75
End: 2025-01-15
Payer: MEDICARE

## 2025-01-15 DIAGNOSIS — Z79.01 LONG TERM (CURRENT) USE OF ANTICOAGULANTS: ICD-10-CM

## 2025-01-15 LAB
POC INR: 0.2
POC PROTHROMBIN TIME: NORMAL

## 2025-01-15 PROCEDURE — 99211 OFF/OP EST MAY X REQ PHY/QHP: CPT | Performed by: INTERNAL MEDICINE

## 2025-01-15 PROCEDURE — 85610 PROTHROMBIN TIME: CPT | Mod: QW

## 2025-01-15 NOTE — PROGRESS NOTES
Patient identification verified with 2 identifiers.    Location: [facility] - 1st Floor Anticoagulation Clinic [address] [id number]    Referring Physician: DR Wayne  Enrollment/ Re-enrollment date:    INR Goal: 2.0-3.0  INR monitoring is per [facility] protocol.  Anticoagulation Medication: warfarin  Indication: Atrial Fibrillation/Atrial Flutter    Subjective   Bleeding signs/symptoms: No    Bruising: No   Major bleeding event: No  Thrombosis signs/symptoms: No  Thromboembolic event: No  Missed doses: No  Extra doses: No  Medication changes: No  Dietary changes: No  Change in health: No  Change in activity: No  Alcohol: No  Other concerns: No    Upcoming Procedures:  Does the Patient Have any upcoming procedures that require interruption in anticoagulation therapy? no  Does the patient require bridging? no      Anticoagulation Summary  As of 1/15/2025      INR goal:  2.0-3.0   TTR:  61.9% (1 y)   INR used for dosin.20 (1/15/2025)   Weekly warfarin total:  22.5 mg               Assessment/Plan   Therapeutic     1. New dose: no change    2. Next INR: 1 month      Education provided to patient during the visit:  Patient instructed to call in interim with questions, concerns and changes.

## 2025-01-23 ENCOUNTER — OFFICE VISIT (OUTPATIENT)
Dept: VASCULAR SURGERY | Facility: CLINIC | Age: 75
End: 2025-01-23
Payer: MEDICARE

## 2025-01-23 ENCOUNTER — HOSPITAL ENCOUNTER (OUTPATIENT)
Dept: VASCULAR MEDICINE | Facility: CLINIC | Age: 75
Discharge: HOME | End: 2025-01-23
Payer: MEDICARE

## 2025-01-23 VITALS
DIASTOLIC BLOOD PRESSURE: 82 MMHG | HEIGHT: 69 IN | SYSTOLIC BLOOD PRESSURE: 120 MMHG | BODY MASS INDEX: 34.07 KG/M2 | HEART RATE: 82 BPM | WEIGHT: 230 LBS

## 2025-01-23 DIAGNOSIS — I73.9 PAD (PERIPHERAL ARTERY DISEASE) (CMS-HCC): ICD-10-CM

## 2025-01-23 DIAGNOSIS — I73.9 PAD (PERIPHERAL ARTERY DISEASE) (CMS-HCC): Primary | ICD-10-CM

## 2025-01-23 PROCEDURE — 99214 OFFICE O/P EST MOD 30 MIN: CPT | Performed by: SURGERY

## 2025-01-23 PROCEDURE — 3074F SYST BP LT 130 MM HG: CPT | Performed by: SURGERY

## 2025-01-23 PROCEDURE — 1125F AMNT PAIN NOTED PAIN PRSNT: CPT | Performed by: SURGERY

## 2025-01-23 PROCEDURE — 1157F ADVNC CARE PLAN IN RCRD: CPT | Performed by: SURGERY

## 2025-01-23 PROCEDURE — 1036F TOBACCO NON-USER: CPT | Performed by: SURGERY

## 2025-01-23 PROCEDURE — 1160F RVW MEDS BY RX/DR IN RCRD: CPT | Performed by: SURGERY

## 2025-01-23 PROCEDURE — 1159F MED LIST DOCD IN RCRD: CPT | Performed by: SURGERY

## 2025-01-23 PROCEDURE — 93922 UPR/L XTREMITY ART 2 LEVELS: CPT | Performed by: SURGERY

## 2025-01-23 PROCEDURE — 93922 UPR/L XTREMITY ART 2 LEVELS: CPT

## 2025-01-23 PROCEDURE — 3078F DIAST BP <80 MM HG: CPT | Performed by: SURGERY

## 2025-01-23 ASSESSMENT — ENCOUNTER SYMPTOMS
OCCASIONAL FEELINGS OF UNSTEADINESS: 0
DEPRESSION: 0
LOSS OF SENSATION IN FEET: 0

## 2025-01-23 ASSESSMENT — PAIN SCALES - GENERAL: PAINLEVEL_OUTOF10: 1

## 2025-01-23 NOTE — PATIENT INSTRUCTIONS
- ALLISON looks good, normal blood flow on both legs  - Continue current medications  - Followup in 6 months with repeat ALLISON

## 2025-01-28 NOTE — PROGRESS NOTES
Vascular Surgery Clinic Note    CC: Hx of LLE ALI    HPI:  Mukesh Garg is 75 y.o. male with history of LLE acute limb ischemia and history of hypercoagulability with recurrent DVT s/p left femoral cutdown with iliofemoral thrombectomy, left popliteal cutdown and femoropopliteal thrombectomy, left TP trunk cutdown and tibial thrombectomy on 11/17/23. He also presented with RML and RLL PE. He underwent another hypercoagulability workup while hospitalized with positive lupus anticoagulant. Has been managed on warfarin.     He presents for routine surveillance. Ambulating without issues. No claudication, rest pain or foot wounds.     Past Vascular History:  11/17/23 (Pruett) - Left femoral cutdown with iliofemoral thrombectomy, left popliteal cutdown and femoropopliteal thrombectomy, left TP trunk cutdown and tibial thrombectomy      Past Vascular Testing:  ALLISON (1/23/25) - R 1.37 (0.74), L 1.7 (0.8)  LE PVR (11/20/23) - R 1.25 (0.76), L 1.14 (0.36)       Medical History:  Patient Active Problem List   Diagnosis    Arteriopathy (CMS-HCC)    Calculus of gallbladder without cholecystitis without obstruction    ASHD (arteriosclerotic heart disease)    Enlarged prostate    Hyperlipidemia    Hypertension    Lung mass    Mass of right lower leg    Osteoarthritis of left hip    Osteoarthritis of right hip    Polythelia    Post-phlebitic syndrome    Postsurgical aortocoronary bypass status    Postsurgical percutaneous transluminal coronary angioplasty status    Left hip pain    Right hip pain    Right inguinal hernia    Stenosis of right carotid artery    Tinnitus    Transient ischemic attack    Trochanteric bursitis of left hip    Trochanteric bursitis of right hip    Varicose veins of both lower extremities with pain    Hip instability, right    COVID    Limb ischemia    Acute pulmonary embolism (Multi)    Septal defect, heart    Gout    Hypercoagulable state (Multi)    Abnormal vision    Chest wall pain    CKD (chronic kidney  disease)    Enthesopathy of hip region    GERD (gastroesophageal reflux disease)    Injury of wrist    Primary localized osteoarthritis of pelvic region and thigh    Visual disturbance    History of major vascular surgery    Long term (current) use of anticoagulants    Tachycardia    History of DVT of lower extremity    Peripheral vascular disease, unspecified (CMS-HCC)    Post covid-19 condition, unspecified    Arterial thromboembolism (Multi)    Acute decompensated heart failure    Longstanding persistent atrial fibrillation (Multi)    Persistent atrial fibrillation (Multi)    Recurrent deep vein thrombosis (DVT) (Multi)    Aneurysm of ascending aorta without rupture (CMS-HCC)        Meds:   Current Outpatient Medications on File Prior to Visit   Medication Sig Dispense Refill    acetaminophen (Tylenol) 325 mg tablet Take 2 tablets (650 mg) by mouth every 6 hours if needed for mild pain (1 - 3).      atorvastatin (Lipitor) 40 mg tablet TAKE 1 TABLET BY MOUTH EVERY DAY 90 tablet 3    carvedilol (Coreg) 25 mg tablet TAKE 1 TABLET (25 MG) BY MOUTH TWICE A DAY WITH MEALS 180 tablet 1    clopidogrel (Plavix) 75 mg tablet TAKE 1 TABLET BY MOUTH EVERY DAY 90 tablet 1    dicyclomine (Bentyl) 20 mg tablet Take 1 tablet (20 mg) by mouth once daily.      ezetimibe (Zetia) 10 mg tablet TAKE 1 TABLET BY MOUTH EVERY DAY 90 tablet 3    Jardiance 10 mg TAKE 1 TABLET (10 MG) BY MOUTH ONCE DAILY. DO NOT START BEFORE JANUARY 29, 2024. 30 tablet 1    potassium chloride CR 10 mEq ER tablet Take 1 tablet (10 mEq) by mouth once daily.      torsemide (Demadex) 20 mg tablet TAKE 1 TABLET BY MOUTH EVERY DAY 30 tablet 0    warfarin (Coumadin) 5 mg tablet Take as directed per After Visit Summary. 90 tablet 3    famotidine (Pepcid) 20 mg tablet Take 1 tablet (20 mg) by mouth 2 times a day. PRN (Patient not taking: Reported on 12/3/2024)       No current facility-administered medications on file prior to visit.        Allergies:   Allergies    Allergen Reactions    Pentazocine Dizziness and Itching    Talwin Compound Itching and Dizziness       SH:    Social Drivers of Health     Tobacco Use: Low Risk  (1/23/2025)    Patient History     Smoking Tobacco Use: Never     Smokeless Tobacco Use: Never     Passive Exposure: Never   Alcohol Use: Not At Risk (9/27/2024)    Received from [facility]    AUDIT-C     Frequency of Alcohol Consumption: Monthly or less     Average Number of Drinks: 1 or 2     Frequency of Binge Drinking: Never   Financial Resource Strain: Low Risk  (1/23/2024)    Overall Financial Resource Strain (CARDIA)     Difficulty of Paying Living Expenses: Not hard at all   Food Insecurity: Unknown (11/30/2023)    Hunger Vital Sign     Worried About Running Out of Food in the Last Year: Not on file     Ran Out of Food in the Last Year: Never true   Transportation Needs: No Transportation Needs (1/23/2024)    PRAPARE - Transportation     Lack of Transportation (Medical): No     Lack of Transportation (Non-Medical): No   Physical Activity: Sufficiently Active (9/27/2024)    Received from [facility]    Exercise Vital Sign     Days of Exercise per Week: 2 days     Minutes of Exercise per Session: 90 min   Stress: No Stress Concern Present (11/30/2023)    [employer] [place] [employer] - Occupational Stress Questionnaire     Feeling of Stress : Not at all   Social Connections: Feeling Socially Integrated (1/11/2024)    OASIS : Social Isolation     Frequency of experiencing loneliness or isolation: Never   Intimate Partner Violence: Not At Risk (11/30/2023)    Humiliation, Afraid, Rape, and Kick questionnaire     Fear of Current or Ex-Partner: No     Emotionally Abused: No     Physically Abused: No     Sexually Abused: No   Depression: Not at risk (12/3/2024)    PHQ-2     PHQ-2 Score: 0   Housing Stability: Low Risk  (1/23/2024)    Housing Stability Vital Sign     Unable to Pay for Housing in the Last Year: No     Number of  Places Lived in the Last Year: 1     Unstable Housing in the Last Year: No   Utilities: Not At Risk (11/30/2023)    [facility] Utilities     Threatened with loss of utilities: No   Digital Equity: Not on file   Health Literacy: Adequate Health Literacy (1/11/2024)    OASIS : Health Literacy     Frequency of needing help to read materials from doctor or pharmacy: Never        FH:  Family History   Problem Relation Name Age of Onset    Hypertension Mother      Stroke Mother      Heart attack Father          Cause of death    Heart disease Father      Diabetes Other Grandmother         ROS:  All systems were reviewed and are negative except as per HPI.    Objective:  Vitals:  Vitals:    01/23/25 1314   BP: 120/82   Pulse:         Exam:  Constitutional: normal, well appearing  HEENT: normocephalic  CV: regular rate  RESP: symmetric expansion, unlabored breathing  GI: soft, nontender, nondistended  MSK: normal ROM  INT: no lesions  PSYCH: appropriate mood  NEURO: no deficits  VASC: Palpable DP bilaterally    Assessment & Plan:  Mukesh Garg is 75 y.o. male with history of LLE acute limb ischemia and history of hypercoagulability with recurrent DVT s/p left femoral cutdown with iliofemoral thrombectomy, left popliteal cutdown and femoropopliteal thrombectomy, left TP trunk cutdown and tibial thrombectomy on 11/17/23.    - ALLISON looks good, normal blood flow on both legs  - Continue current medications  - Followup in 6 months with repeat ALLISON    Meds  - Plavix 75 mg  - Warfarin 5 mg  - Atorvastatin 40 mg    Screening/Surveillance  - ALLISON (July 2025)    Next Followup  - 6 months    Monika Pruett MD

## 2025-02-12 ENCOUNTER — ANTICOAGULATION - WARFARIN VISIT (OUTPATIENT)
Dept: CARDIOLOGY | Facility: HOSPITAL | Age: 75
End: 2025-02-12
Payer: MEDICARE

## 2025-02-12 DIAGNOSIS — Z79.01 LONG TERM (CURRENT) USE OF ANTICOAGULANTS: ICD-10-CM

## 2025-02-12 LAB
POC INR: 1.6
POC PROTHROMBIN TIME: NORMAL

## 2025-02-12 PROCEDURE — 99211 OFF/OP EST MAY X REQ PHY/QHP: CPT | Performed by: INTERNAL MEDICINE

## 2025-02-12 PROCEDURE — 85610 PROTHROMBIN TIME: CPT | Mod: QW

## 2025-02-12 NOTE — PROGRESS NOTES
Patient identification verified with 2 identifiers.    Location: [facility] - 1st Floor Anticoagulation Clinic [address] [id number]    Referring Physician: DR Wayne  Enrollment/ Re-enrollment date:    INR Goal: 2.0-3.0  INR monitoring is per [facility] protocol.  Anticoagulation Medication: warfarin  Indication: Peripheral Artery Disease (PAD)    Subjective   Bleeding signs/symptoms: No    Bruising: No   Major bleeding event: No  Thrombosis signs/symptoms: No  Thromboembolic event: No  Missed doses: Yes  was off for a planned dental work and resumed 4 days ago  Extra doses: No  Medication changes: No  Dietary changes: No  Change in health: No  Change in activity: No  Alcohol: No  Other concerns: No    Upcoming Procedures:  Does the Patient Have any upcoming procedures that require interruption in anticoagulation therapy? no  Does the patient require bridging? no      Anticoagulation Summary  As of 2025      INR goal:  2.0-3.0   TTR:  57.5% (1.1 y)   INR used for dosin.60 (2025)   Weekly warfarin total:  22.5 mg               Assessment/Plan   Therapeutic     1. New dose: no change    2. Next INR: 1 month      Education provided to patient during the visit:  Patient instructed to call in interim with questions, concerns and changes.

## 2025-03-12 ENCOUNTER — ANTICOAGULATION - WARFARIN VISIT (OUTPATIENT)
Dept: CARDIOLOGY | Facility: HOSPITAL | Age: 75
End: 2025-03-12
Payer: MEDICARE

## 2025-03-12 DIAGNOSIS — Z79.01 LONG TERM (CURRENT) USE OF ANTICOAGULANTS: ICD-10-CM

## 2025-03-12 LAB
POC INR: 1.9
POC PROTHROMBIN TIME: NORMAL

## 2025-03-12 PROCEDURE — 99211 OFF/OP EST MAY X REQ PHY/QHP: CPT | Performed by: INTERNAL MEDICINE

## 2025-03-12 PROCEDURE — 85610 PROTHROMBIN TIME: CPT | Mod: QW

## 2025-03-12 NOTE — PROGRESS NOTES
Patient identification verified with 2 identifiers.    Location: [facility] - 1st Floor Anticoagulation Clinic [address] [id number]    Referring Physician: DR Wayne  Enrollment/ Re-enrollment date:    INR Goal: 2.0-3.0  INR monitoring is per [facility] protocol.  Anticoagulation Medication: warfarin  Indication: Peripheral Artery Disease (PAD)    Subjective   Bleeding signs/symptoms: No    Bruising: No   Major bleeding event: No  Thrombosis signs/symptoms: No  Thromboembolic event: No  Missed doses: No  Extra doses: No  Medication changes: No  Dietary changes: No  Change in health: No  Change in activity: No  Alcohol: No  Other concerns: No    Upcoming Procedures:  Does the Patient Have any upcoming procedures that require interruption in anticoagulation therapy? no  Does the patient require bridging? no      Anticoagulation Summary  As of 3/12/2025      INR goal:  2.0-3.0   TTR:  53.7% (1.2 y)   INR used for dosin.90 (3/12/2025)   Weekly warfarin total:  22.5 mg               Assessment/Plan   Therapeutic     1. New dose: no change    2. Next INR: 1 month      Education provided to patient during the visit:  Patient instructed to call in interim with questions, concerns and changes.

## 2025-04-16 ENCOUNTER — ANTICOAGULATION - WARFARIN VISIT (OUTPATIENT)
Dept: CARDIOLOGY | Facility: HOSPITAL | Age: 75
End: 2025-04-16
Payer: MEDICARE

## 2025-04-16 DIAGNOSIS — Z79.01 LONG TERM (CURRENT) USE OF ANTICOAGULANTS: ICD-10-CM

## 2025-04-16 LAB
POC INR: 2.6 (ref 0.9–1.1)
POC PROTHROMBIN TIME: ABNORMAL (ref 9.3–12.5)

## 2025-04-16 PROCEDURE — 99211 OFF/OP EST MAY X REQ PHY/QHP: CPT | Performed by: INTERNAL MEDICINE

## 2025-04-16 PROCEDURE — 85610 PROTHROMBIN TIME: CPT | Mod: QW

## 2025-04-16 NOTE — PROGRESS NOTES
Patient identification verified with 2 identifiers.    Location: [facility] - 1st Floor Anticoagulation Clinic [address] [id number]    Referring Physician: DR Wayne  Enrollment/ Re-enrollment date:    INR Goal: 2.0-3.0  INR monitoring is per [facility] protocol.  Anticoagulation Medication: warfarin  Indication: Peripheral Artery Disease (PAD)    Subjective   Bleeding signs/symptoms: No    Bruising: No   Major bleeding event: No  Thrombosis signs/symptoms: No  Thromboembolic event: No  Missed doses: No  Extra doses: No  Medication changes: No  Dietary changes: No  Change in health: No  Change in activity: No  Alcohol: No  Other concerns: No    Upcoming Procedures:  Does the Patient Have any upcoming procedures that require interruption in anticoagulation therapy? no  Does the patient require bridging? no      Anticoagulation Summary  As of 2025      INR goal:  2.0-3.0   TTR:  56.2% (1.3 y)   INR used for dosin.60 (2025)   Weekly warfarin total:  22.5 mg               Assessment/Plan   Therapeutic     1. New dose: no change    2. Next INR: 1 month      Education provided to patient during the visit:  Patient instructed to call in interim with questions, concerns and changes.

## 2025-05-14 ENCOUNTER — ANTICOAGULATION - WARFARIN VISIT (OUTPATIENT)
Dept: CARDIOLOGY | Facility: HOSPITAL | Age: 75
End: 2025-05-14
Payer: MEDICARE

## 2025-05-14 DIAGNOSIS — Z79.01 LONG TERM (CURRENT) USE OF ANTICOAGULANTS: ICD-10-CM

## 2025-05-14 LAB
POC INR: 1.9 (ref 0.9–1.1)
POC PROTHROMBIN TIME: ABNORMAL (ref 9.3–12.5)

## 2025-05-14 PROCEDURE — 85610 PROTHROMBIN TIME: CPT | Mod: QW

## 2025-05-14 PROCEDURE — 99211 OFF/OP EST MAY X REQ PHY/QHP: CPT | Performed by: INTERNAL MEDICINE

## 2025-05-14 NOTE — PROGRESS NOTES
Patient identification verified with 2 identifiers.    Location: [facility] - 1st Floor Anticoagulation Clinic [address] [id number]    Referring Physician: DR rodriges  Enrollment/ Re-enrollment date:    INR Goal: 2.0-3.0  INR monitoring is per [facility] protocol.  Anticoagulation Medication: warfarin  Indication: Atrial Fibrillation/Atrial Flutter    Subjective   Bleeding signs/symptoms: No    Bruising: No   Major bleeding event: No  Thrombosis signs/symptoms: No  Thromboembolic event: No  Missed doses: No  Extra doses: No  Medication changes: No  Dietary changes: No  Change in health: No  Change in activity: No  Alcohol: No  Other concerns: No    Upcoming Procedures:  Does the Patient Have any upcoming procedures that require interruption in anticoagulation therapy? no  Does the patient require bridging? no      Anticoagulation Summary  As of 2025      INR goal:  2.0-3.0   TTR:  57.9% (1.3 y)   INR used for dosin.90 (2025)   Weekly warfarin total:  22.5 mg               Assessment/Plan   Therapeutic     1. New dose: no change    2. Next INR: 1 month      Education provided to patient during the visit:  Patient instructed to call in interim with questions, concerns and changes.        
Home

## 2025-06-02 ENCOUNTER — TELEPHONE (OUTPATIENT)
Dept: CARDIOLOGY | Facility: CLINIC | Age: 75
End: 2025-06-02
Payer: MEDICARE

## 2025-06-02 NOTE — TELEPHONE ENCOUNTER
Contacted patient to inform them that their 6/3 appointment needed to be changed due to a change in the providers schedule. Was not able to speak with the patient, left a voicemail letting know that I changed appointment to 9/9 at  9:45 am.

## 2025-06-03 ENCOUNTER — APPOINTMENT (OUTPATIENT)
Facility: CLINIC | Age: 75
End: 2025-06-03
Payer: MEDICARE

## 2025-06-03 ENCOUNTER — OFFICE VISIT (OUTPATIENT)
Dept: CARDIOLOGY | Facility: HOSPITAL | Age: 75
End: 2025-06-03
Payer: MEDICARE

## 2025-06-03 VITALS
DIASTOLIC BLOOD PRESSURE: 64 MMHG | SYSTOLIC BLOOD PRESSURE: 101 MMHG | BODY MASS INDEX: 34.51 KG/M2 | RESPIRATION RATE: 16 BRPM | HEART RATE: 79 BPM | HEIGHT: 69 IN | WEIGHT: 233 LBS

## 2025-06-03 DIAGNOSIS — I82.409 RECURRENT DEEP VEIN THROMBOSIS (DVT) (MULTI): ICD-10-CM

## 2025-06-03 DIAGNOSIS — I71.21 ANEURYSM OF ASCENDING AORTA WITHOUT RUPTURE: ICD-10-CM

## 2025-06-03 DIAGNOSIS — Z79.01 LONG TERM (CURRENT) USE OF ANTICOAGULANTS: ICD-10-CM

## 2025-06-03 DIAGNOSIS — I74.9 ARTERIAL THROMBOEMBOLISM (MULTI): Primary | ICD-10-CM

## 2025-06-03 PROCEDURE — 3074F SYST BP LT 130 MM HG: CPT | Performed by: INTERNAL MEDICINE

## 2025-06-03 PROCEDURE — G2211 COMPLEX E/M VISIT ADD ON: HCPCS | Performed by: INTERNAL MEDICINE

## 2025-06-03 PROCEDURE — 1160F RVW MEDS BY RX/DR IN RCRD: CPT | Performed by: INTERNAL MEDICINE

## 2025-06-03 PROCEDURE — 99214 OFFICE O/P EST MOD 30 MIN: CPT | Performed by: INTERNAL MEDICINE

## 2025-06-03 PROCEDURE — 1036F TOBACCO NON-USER: CPT | Performed by: INTERNAL MEDICINE

## 2025-06-03 PROCEDURE — 99213 OFFICE O/P EST LOW 20 MIN: CPT

## 2025-06-03 PROCEDURE — 1126F AMNT PAIN NOTED NONE PRSNT: CPT | Performed by: INTERNAL MEDICINE

## 2025-06-03 PROCEDURE — 1159F MED LIST DOCD IN RCRD: CPT | Performed by: INTERNAL MEDICINE

## 2025-06-03 PROCEDURE — 3078F DIAST BP <80 MM HG: CPT | Performed by: INTERNAL MEDICINE

## 2025-06-03 RX ORDER — POTASSIUM CHLORIDE 750 MG/1
10 TABLET, EXTENDED RELEASE ORAL
COMMUNITY
Start: 2025-03-06

## 2025-06-03 RX ORDER — TURMERIC/TURMERIC EXT/PEPR EXT 900-100 MG
1 CAPSULE ORAL DAILY
COMMUNITY

## 2025-06-03 RX ORDER — LISINOPRIL 5 MG/1
1 TABLET ORAL
COMMUNITY
Start: 2025-05-21

## 2025-06-03 ASSESSMENT — COLUMBIA-SUICIDE SEVERITY RATING SCALE - C-SSRS
1. IN THE PAST MONTH, HAVE YOU WISHED YOU WERE DEAD OR WISHED YOU COULD GO TO SLEEP AND NOT WAKE UP?: NO
6. HAVE YOU EVER DONE ANYTHING, STARTED TO DO ANYTHING, OR PREPARED TO DO ANYTHING TO END YOUR LIFE?: NO
2. HAVE YOU ACTUALLY HAD ANY THOUGHTS OF KILLING YOURSELF?: NO

## 2025-06-03 ASSESSMENT — PAIN SCALES - GENERAL: PAINLEVEL_OUTOF10: 0-NO PAIN

## 2025-06-03 NOTE — PATIENT INSTRUCTIONS
ASSESSMENT/PLAN:    here for follow up ALI, h/o VTE and chronic afib on warfarin - prev on eliquis but then had the ALI. Indet LA on testing - not repeated given the ongoing warfarin use. Stable and planned for longterm AC given these issues. Use the med-alert.     Dicussed loose ends:  Discuss the clopidogrel with Dr. Wayne  Needs CBC for the polycythemia - may need additional hematology eval if this has not responded to improved consistency with the CPAP    Will follow up on the CT chest.     Follow up 6 months.

## 2025-06-03 NOTE — PROGRESS NOTES
"OUTPATIENT FOLLOW-UP -  VASCULAR MEDICINE    DOS:  6/3/25  Last seen:    12/3/24    REQUESTING PHYSICIAN:  Dr. Lorenza Garcia     REASON FOR FOLLOW-UP:  here for follow up ALI, h/o VTE and chronic afib on warfarin    HISTORY OF PRESENT ILLNESS:     76 yo man here for follow up ALI, h/o VTE and chronic afib on warfarin. INR followed by [facility] coumadin clinic. No bleeding on the AC. Using the med-alert bracelet.     Pending CT for the ascending aorta - this will be next week.    Reports kidney issues - recently started on lisinopril.  Reports his plavix \"disappeared\".  Reports was planned to see Dr. Wayne this morning but this was cancelled. Thinks he has been off of this for 6 months. Reports PCI/stent in 2006 and CABG 2013.        Has not had hematology follow up recently - reports elevated RBC felt related to the CPAP - now using this regularly. Last CBC -  18.9/57.1 RBC 6.32 10/2024 - has not been checked since.     From prev note:  Admitted to [facility] [place]/15-11/27/2023. Presented on transfer from [facility] after presenting with severe leg pain. Reports toward the end of the hospital stay for COVID/Afib/DVT in October 2023 his left foot started to hurt. Saw a podiatrist approx 5 days prior to admission - told to have neuropathy at that time. Re-preseted to [facility] with progressive LLE sx abd dx with acute/subacute limb ischemia and sent to [facility]. CTA = popliteal occlusion During evaluation also found to have PE - but this was essentially asymptomatic. Rx heparin gtt initially. S/P left iliofemoral, fem-pop and tibioperoneal thrombectomy 11/14 ([facility]). He was DC to acute rehab - there x 10 days.    Hx from October 2023 - presented with cough and SOB. Found to be in afib. Also found to have RLE Dvt. Reports there was no imaging for PE. Rx eliquis 5 mg q 12 hours at that time.       During admission - LA returned as indeterminate. But given the new? Thromboembolism on the eliquis - rx UFH gtt (lovenox) to warfarin and " "remains on this now. INR is followed by the [facility] coumadin clinic. Reports INR was 2.5 yesterday. Remainder of APLA was normal.      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 1980's     Since last seen - saw hematology at [facility] - prev seen by Dr. Vaz - \"chronic erythrocytosis referred for same. EMR documents elevated hgb at least since 2018. He has been evaluated by hematologist and LIBIA-2 was WT. Erythrocytosis was ascribed to JENNY CPAP non-compliance.\"      REVIEW OF SYSTEMS:     weight is stable  No sores, ulcers, rashes, skin lesions  No CP, chest pressure  Reports chest tightness - not new or different since the [facility]  No cough, SOB  No BRBPR, melena, hematuria  No bleeding  No edema, no calf pain    PHYSICAL EXAMINATION:   /64 (BP Location: Left arm, Patient Position: Sitting)   Pulse 79   Resp 16   Ht 1.753 m (5' 9\")   Wt 106 kg (233 lb)   BMI 34.41 kg/m²     Gen: Appears well, NAD  Chest: CTA  CVS: regular without murmur or gallop  Ext: no edema, nontender  +VV  Skin: dry  Pulses: DP 2+; PT 2+  Mood and affect appropriate    ADDITIONAL DATA:  no compression worn  right calf: 44.0cm   left calf:   42.0cm     ASSESSMENT/PLAN:    here for follow up ALI, h/o VTE and chronic afib on warfarin - prev on eliquis but then had the ALI. Indet LA on testing - not repeated given the ongoing warfarin use. Stable and planned for longterm AC given these issues. Use the med-alert.     Dicussed loose ends:  Discuss the clopidogrel with Dr. Wayne  Needs CBC for the polycythemia - may need additional hematology eval if this has not responded to improved consistency with the CPAP    Will follow up on the CT chest.     Follow up 6 months.   "

## 2025-06-04 DIAGNOSIS — E78.2 MIXED HYPERLIPIDEMIA: ICD-10-CM

## 2025-06-06 RX ORDER — EZETIMIBE 10 MG/1
10 TABLET ORAL DAILY
Qty: 90 TABLET | Refills: 3 | Status: SHIPPED | OUTPATIENT
Start: 2025-06-06

## 2025-06-10 ENCOUNTER — HOSPITAL ENCOUNTER (OUTPATIENT)
Dept: RADIOLOGY | Facility: CLINIC | Age: 75
Discharge: HOME | End: 2025-06-10
Payer: MEDICARE

## 2025-06-10 DIAGNOSIS — I71.21 ANEURYSM OF ASCENDING AORTA WITHOUT RUPTURE: ICD-10-CM

## 2025-06-10 PROCEDURE — 71250 CT THORAX DX C-: CPT | Performed by: RADIOLOGY

## 2025-06-10 PROCEDURE — 71250 CT THORAX DX C-: CPT

## 2025-06-11 ENCOUNTER — ANTICOAGULATION - WARFARIN VISIT (OUTPATIENT)
Dept: CARDIOLOGY | Facility: HOSPITAL | Age: 75
End: 2025-06-11
Payer: MEDICARE

## 2025-06-11 DIAGNOSIS — Z79.01 LONG TERM (CURRENT) USE OF ANTICOAGULANTS: ICD-10-CM

## 2025-06-11 LAB
POC INR: 3.2 (ref 0.9–1.1)
POC PROTHROMBIN TIME: ABNORMAL (ref 9.3–12.5)

## 2025-06-11 PROCEDURE — 85610 PROTHROMBIN TIME: CPT | Mod: QW

## 2025-06-11 PROCEDURE — 99211 OFF/OP EST MAY X REQ PHY/QHP: CPT | Performed by: INTERNAL MEDICINE

## 2025-06-11 NOTE — PROGRESS NOTES
Patient identification verified with 2 identifiers.    Location: [facility] - 1st Floor Anticoagulation Clinic [id number] [id number]    Referring Physician: Dr Wayne  Enrollment/ Re-enrollment date: 2025   INR Goal: 2.0-3.0  INR monitoring is per [facility] protocol.  Anticoagulation Medication: warfarin  Indication: Atrial Fibrillation/Atrial Flutter    Subjective   Bleeding signs/symptoms: No    Bruising: No   Major bleeding event: No  Thrombosis signs/symptoms: No  Thromboembolic event: No  Missed doses: No  Extra doses: No  Medication changes: No  Dietary changes: No  Change in health: No  Change in activity: No  Alcohol: No  Other concerns: No    Upcoming Procedures:  Does the Patient Have any upcoming procedures that require interruption in anticoagulation therapy? no  Does the patient require bridging? no      Anticoagulation Summary  As of 6/11/2025      INR goal:  2.0-3.0   TTR:  58.9% (1.4 y)   INR used for dosing:  3.20 (6/11/2025)   Weekly warfarin total:  22.5 mg               Assessment/Plan   Therapeutic     1. New dose: no change    2. Next INR: 1 month      Education provided to patient during the visit:  Patient instructed to call in interim with questions, concerns and changes.

## 2025-06-25 NOTE — PROGRESS NOTES
Met with patient's spouse and spoke to patient's daughter via teleconference upon request to further review outcome of peer to peer. Patient and family prefer discharge 12.9.23 and discharge order placed by medical director. Discussed home health to follow through  home care as per patient preference. Message sent to intake to confirm start of care date and time. Outpatient PT OT prescription provided to spouse for follow up when home health is discontinued. Patient's family able to vent frustrations with outcome of appeal and feelings of anxiety in regards to discharge home 12.9.23 versus 12.12.23 as was anticipated date of discharge prior to insurance denial. Reviewed plan for second level appeal as per medical mutual guidelines. Reviewed patient's level of care needs post discharge and family plan for home modifications prior to discharge 12.9.23. Family will plan to transport patient home after they have made their preparations. Anticipate PM discharge. IDT aware.    POST-OP DIAGNOSIS:  Osteoarthritis of right hip 25-Jun-2025 15:06:32  Edgar Rey

## 2025-06-30 ENCOUNTER — OFFICE VISIT (OUTPATIENT)
Dept: URGENT CARE | Age: 75
End: 2025-06-30
Payer: MEDICARE

## 2025-06-30 VITALS
OXYGEN SATURATION: 97 % | DIASTOLIC BLOOD PRESSURE: 68 MMHG | TEMPERATURE: 97.9 F | WEIGHT: 230 LBS | RESPIRATION RATE: 18 BRPM | BODY MASS INDEX: 34.07 KG/M2 | HEIGHT: 69 IN | HEART RATE: 69 BPM | SYSTOLIC BLOOD PRESSURE: 116 MMHG

## 2025-06-30 DIAGNOSIS — S33.9XXA SPRAIN OF LIGAMENT OF LUMBOSACRAL JOINT, INITIAL ENCOUNTER: Primary | ICD-10-CM

## 2025-06-30 RX ORDER — METHOCARBAMOL 500 MG/1
500 TABLET, FILM COATED ORAL 2 TIMES DAILY
Qty: 14 TABLET | Refills: 0 | Status: SHIPPED | OUTPATIENT
Start: 2025-06-30 | End: 2025-07-07

## 2025-06-30 RX ORDER — OMEGA-3S/DHA/EPA/FISH OIL/D3 300MG-1000
1 CAPSULE ORAL
COMMUNITY
Start: 2025-06-19

## 2025-06-30 RX ORDER — LIDOCAINE 50 MG/G
1 PATCH TOPICAL DAILY
Qty: 7 PATCH | Refills: 0 | Status: SHIPPED | OUTPATIENT
Start: 2025-06-30

## 2025-06-30 NOTE — PATIENT INSTRUCTIONS
VISIT SUMMARY:  You came in today because of lower back pain that started about two weeks ago. The pain began after you spent several days working on a presentation while sitting on a sofa with your legs elevated. The pain is mainly on the left side of your lower back and gets worse when you turn over in bed or lift someone from a wheelchair. You also mentioned having hip problems that add to your discomfort.    YOUR PLAN:  A muscle relaxer and lidocaine patches have been prescribed for your symptoms.  Please do moist heat and gentle stretching at home.  Avoid prolonged bedrest.  Please follow-up with primary care as scheduled.

## 2025-06-30 NOTE — PROGRESS NOTES
"Subjective   Patient ID: Mukesh Garg is a 75 y.o. male who presents for Back Pain (Pt c/o lower back pain x 2 weeks, feels like muscles hurt per patient, increased on right side.).  History of Present Illness  Mukesh Garg is a 75 year old male who presents with lower back pain.    He has been experiencing lower back pain for approximately two weeks, which began after working on a presentation while sitting on a sofa with his legs elevated for three consecutive days. The pain is exacerbated by turning over in bed and by lifting an elderly person from a wheelchair to a bed.    The pain is localized to the lower back, primarily on the left side, with some discomfort above the buttock on the right side. It is described as 'tight' and is rated as a seven or eight out of ten when performing certain activities, such as going to the bathroom. No radiation of pain down the legs. No numbness, tinging, weakness or loss of bowel or bladder.     ROS is negative unless otherwise stated in HPI.       Objective     /68 (BP Location: Right arm, Patient Position: Sitting, BP Cuff Size: Adult)   Pulse 69   Temp 36.6 °C (97.9 °F) (Oral)   Resp 18   Ht 1.753 m (5' 9\")   Wt 104 kg (230 lb)   SpO2 97%   BMI 33.97 kg/m²        VS: As documented in the triage note and EMR flowsheet from this visit was reviewed  General: Well appearing. No acute distress.   Eyes:  Extraocular movements grossly intact. No scleral icterus.   Head: Atraumatic. Normocephalic.     Neck: No meningismus. No gross masses. Full movement through range of motion  ENT: Posterior oropharynx shows no erythema, exudate or edema.  Uvula is midline without edema.  No stridor or trismus  CV: Regular rhythm. No murmurs, rubs, gallops appreciated.   Resp: Clear to auscultation bilaterally. No respiratory distress.    GI: Nontender. Soft. No masses. No rebound, rigidity or guarding.   MSK: Symmetric muscle bulk. No gross step offs or deformities. No midline " tenderness to the thoracic or lumbar spine.   Skin: Warm, dry. No rashes  Neuro: CN II-VII intact. A&O x3. Speech fluent. Alert. Moving all extremities. Ambulates with normal gait  Psych: Appropriate mood and affect for situation    Point of Care Test & Imaging Results from this visit  No results found for this visit on 06/30/25.   Imaging  No results found.    Cardiology, Vascular, and Other Imaging  No other imaging results found for the past 2 days      Diagnostic study results (if any) were reviewed by Adrienne Munguia PA-C.    Assessment/Plan   Allergies, medications, history, and pertinent labs/EKGs/Imaging reviewed by Adrienne Munguia PA-C.     Assessment & Plan  Patient is a 75 year old male who presents with acute low back pain, primarily on the left side, with no leg radiation . Pain is exacerbated by movements like twisting and is rated 7-8/10 in severity. It began approximately two weeks ago, likely due to prolonged sitting in an awkward position and lifting activities.  No falls or trauma.  No midline tenderness to palpation on examination.  No radicular symptoms.  Patient unable to take NSAIDs due to being on anticoagulation.  Discussed treatment modalities.  Will pursue treatment with Robaxin for muscle relaxant and lidocaine patches.  He was advised on moist heat, gentle stretching and avoiding prolonged bedrest. Patient informed of the diagnosis.  They are agreeable to the plan as discussed above.  Patient given the opportunity to ask questions.  All of the patient's questions were answered. Given precautions in which to seek attention in the emergency department. Discussed follow up with PCP or other appropriate clinician.      Orders and Diagnoses  Diagnoses and all orders for this visit:  Sprain of ligament of lumbosacral joint, initial encounter  -     methocarbamol (Robaxin) 500 mg tablet; Take 1 tablet (500 mg) by mouth 2 times a day for 7 days.  -     lidocaine (Lidoderm) 5 % patch; Place 1  patch over 12 hours on the skin once daily. Remove & discard patch within 12 hours or as directed by MD.        Disposition:  Home      Adrienne Munguia PA-C     This medical note was created with the assistance of artificial intelligence (AI) for documentation purposes. The content has been reviewed and confirmed by the healthcare provider for accuracy and completeness. Patient consented to the use of audio recording and use of AI during their visit.

## 2025-07-16 ENCOUNTER — ANTICOAGULATION - WARFARIN VISIT (OUTPATIENT)
Dept: CARDIOLOGY | Facility: HOSPITAL | Age: 75
End: 2025-07-16
Payer: MEDICARE

## 2025-07-16 DIAGNOSIS — Z79.01 LONG TERM (CURRENT) USE OF ANTICOAGULANTS: ICD-10-CM

## 2025-07-16 LAB
POC INR: 1.6 (ref 0.9–1.1)
POC PROTHROMBIN TIME: ABNORMAL (ref 9.3–12.5)

## 2025-07-16 PROCEDURE — 85610 PROTHROMBIN TIME: CPT | Mod: QW | Performed by: FAMILY MEDICINE

## 2025-07-16 PROCEDURE — 99211 OFF/OP EST MAY X REQ PHY/QHP: CPT | Performed by: INTERNAL MEDICINE

## 2025-07-16 NOTE — PROGRESS NOTES
Patient identification verified with 2 identifiers.    Location: [facility] - 1st Floor Anticoagulation Clinic [id number] [id number]    Referring Physician: Dr Wayne  Enrollment/ Re-enrollment date:    INR Goal: 2.0-3.0  INR monitoring is per [facility] protocol.  Anticoagulation Medication: warfarin  Indication: Atrial Fibrillation/Atrial Flutter    Subjective   Bleeding signs/symptoms: No    Bruising: No   Major bleeding event: No  Thrombosis signs/symptoms: No  Thromboembolic event: No  Missed doses: No  Extra doses: No  Medication changes: No  Dietary changes: No  Change in health: No  Change in activity: No  Alcohol: No  Other concerns: No    Upcoming Procedures:  Does the Patient Have any upcoming procedures that require interruption in anticoagulation therapy? no  Does the patient require bridging? no      Anticoagulation Summary  As of 2025      INR goal:  2.0-3.0   TTR:  59.2% (1.5 y)   INR used for dosin.60 (2025)   Weekly warfarin total:  22.5 mg               Assessment/Plan   Therapeutic     1. New dose: no change    2. Next INR: 1 month      Education provided to patient during the visit:  Patient instructed to call in interim with questions, concerns and changes.

## 2025-07-24 ENCOUNTER — APPOINTMENT (OUTPATIENT)
Dept: VASCULAR SURGERY | Facility: CLINIC | Age: 75
End: 2025-07-24
Payer: MEDICARE

## 2025-07-24 ENCOUNTER — HOSPITAL ENCOUNTER (OUTPATIENT)
Dept: VASCULAR MEDICINE | Facility: CLINIC | Age: 75
Discharge: HOME | End: 2025-07-24
Payer: MEDICARE

## 2025-07-24 DIAGNOSIS — I73.9 PAD (PERIPHERAL ARTERY DISEASE): ICD-10-CM

## 2025-07-24 PROCEDURE — 93922 UPR/L XTREMITY ART 2 LEVELS: CPT | Performed by: INTERNAL MEDICINE

## 2025-07-24 PROCEDURE — 93922 UPR/L XTREMITY ART 2 LEVELS: CPT

## 2025-07-28 ENCOUNTER — OFFICE VISIT (OUTPATIENT)
Dept: VASCULAR SURGERY | Facility: HOSPITAL | Age: 75
End: 2025-07-28
Payer: MEDICARE

## 2025-07-28 VITALS
DIASTOLIC BLOOD PRESSURE: 81 MMHG | SYSTOLIC BLOOD PRESSURE: 117 MMHG | BODY MASS INDEX: 34.8 KG/M2 | OXYGEN SATURATION: 98 % | HEIGHT: 69 IN | HEART RATE: 71 BPM | WEIGHT: 235 LBS

## 2025-07-28 DIAGNOSIS — I73.9 PAD (PERIPHERAL ARTERY DISEASE): Primary | ICD-10-CM

## 2025-07-28 PROCEDURE — 1126F AMNT PAIN NOTED NONE PRSNT: CPT | Performed by: SURGERY

## 2025-07-28 PROCEDURE — 1036F TOBACCO NON-USER: CPT | Performed by: SURGERY

## 2025-07-28 PROCEDURE — 3074F SYST BP LT 130 MM HG: CPT | Performed by: SURGERY

## 2025-07-28 PROCEDURE — 99214 OFFICE O/P EST MOD 30 MIN: CPT | Performed by: SURGERY

## 2025-07-28 PROCEDURE — 1159F MED LIST DOCD IN RCRD: CPT | Performed by: SURGERY

## 2025-07-28 PROCEDURE — 3078F DIAST BP <80 MM HG: CPT | Performed by: SURGERY

## 2025-07-28 PROCEDURE — 1160F RVW MEDS BY RX/DR IN RCRD: CPT | Performed by: SURGERY

## 2025-07-28 ASSESSMENT — PAIN SCALES - GENERAL: PAINLEVEL_OUTOF10: 0-NO PAIN

## 2025-07-28 NOTE — PATIENT INSTRUCTIONS
- ALLISON looks good. Followup in 1 year with repeat  - Continue Coumadin. Start aspirin 81 mg daily if not on plavix  - Can do light compression bilaterally 15-20 mmHg for vein and swelling  - Dermatology for left foot lesion  - Call office [phone] with any questions or concerns

## 2025-08-03 NOTE — PROGRESS NOTES
Vascular Surgery Clinic Note    CC: Hx of LLE ALI     HPI:  Mukesh Garg is 75 y.o. male with history of LLE acute limb ischemia and history of hypercoagulability with recurrent DVT s/p left femoral cutdown with iliofemoral thrombectomy, left popliteal cutdown and femoropopliteal thrombectomy, left TP trunk cutdown and tibial thrombectomy on 11/17/23. He also presented with RML and RLL PE. He underwent another hypercoagulability workup while hospitalized with positive lupus anticoagulant. Has been managed on warfarin.     Routine surveillance visit. He continues to have no issues with ambulation, including claudication, rest pain or foot wounds. He does have a new lesion on his left foot which does not appear to be tender or growing, but with sudden appearance.     Past Vascular History:  11/17/23 (Pruett) - Left femoral cutdown with iliofemoral thrombectomy, left popliteal cutdown and femoropopliteal thrombectomy, left TP trunk cutdown and tibial thrombectomy      Past Vascular Testing:  ALLISON (7/24/25) - R 1.68 (0.87), L 1.44 (1.01)  ALLISON (1/23/25) - R 1.37 (0.74), L 1.7 (0.8)  LE PVR (11/20/23) - R 1.25 (0.76), L 1.14 (0.36)       Medical History:  Problem List[1]     Meds:   Medications Ordered Prior to Encounter[2]     Allergies:   RX Allergies[3]    SH:    Social Drivers of Health     Tobacco Use: Low Risk  (7/28/2025)    Patient History     Smoking Tobacco Use: Never     Smokeless Tobacco Use: Never     Passive Exposure: Never   Alcohol Use: Not At Risk (9/27/2024)    Received from [facility]    AUDIT-C     Q1: How often do you have a drink containing alcohol?: Monthly or less     Q2: How many drinks containing alcohol do you have on a typical day when you are drinking?: 1 or 2     Q3: How often do you have six or more drinks on one occasion?: Never   Financial Resource Strain: Low Risk  (1/23/2024)    Overall Financial Resource Strain (CARDIA)     Difficulty of Paying Living Expenses: Not hard at all    Food Insecurity: Unknown (11/30/2023)    Hunger Vital Sign     Worried About Running Out of Food in the Last Year: Not on file     Ran Out of Food in the Last Year: Never true   Transportation Needs: No Transportation Needs (1/23/2024)    PRAPARE - Transportation     Lack of Transportation (Medical): No     Lack of Transportation (Non-Medical): No   Physical Activity: Sufficiently Active (9/27/2024)    Received from [facility]    Exercise Vital Sign     On average, how many days per week do you engage in moderate to strenuous exercise (like a brisk walk)?: 2 days     On average, how many minutes do you engage in exercise at this level?: 90 min   Stress: No Stress Concern Present (11/30/2023)    [employer] - Occupational Stress Questionnaire     Feeling of Stress : Not at all   Social Connections: Feeling Socially Integrated (1/11/2024)    OASIS : Social Isolation     Frequency of experiencing loneliness or isolation: Never   Intimate Partner Violence: Not At Risk (11/30/2023)    Humiliation, Afraid, Rape, and Kick questionnaire     Fear of Current or Ex-Partner: No     Emotionally Abused: No     Physically Abused: No     Sexually Abused: No   Depression: Not at risk (6/3/2025)    PHQ-2     PHQ-2 Score: 0   Housing Stability: Low Risk  (1/23/2024)    Housing Stability Vital Sign     Unable to Pay for Housing in the Last Year: No     Number of Places Lived in the Last Year: 1     Unstable Housing in the Last Year: No   Utilities: Not At Risk (11/30/2023)    [facility] Utilities     Threatened with loss of utilities: No   Digital Equity: Not on file   Health Literacy: Adequate Health Literacy (1/11/2024)    OASIS : Health Literacy     Frequency of needing help to read materials from doctor or pharmacy: Never        FH:  Family History[4]     ROS:  All systems were reviewed and are negative except as per HPI.    Objective:  Vitals:  Vitals:    07/28/25 1504   BP: 117/81   Pulse:     SpO2:         Exam:  Constitutional: normal, well appearing  HEENT: normocephalic  CV: regular rate  RESP: symmetric expansion, unlabored breathing  GI: soft, nontender, nondistended  MSK: normal ROM  INT: no lesions  PSYCH: appropriate mood  NEURO: no deficits  VASC: palpable DP bilaterally    Assessment & Plan:  Mukesh Garg is 75 y.o. male with history of LLE acute limb ischemia and history of hypercoagulability with recurrent DVT s/p left femoral cutdown with iliofemoral thrombectomy, left popliteal cutdown and femoropopliteal thrombectomy, left TP trunk cutdown and tibial thrombectomy on 11/17/23.     - ALLISON looks good. Followup in 1 year with repeat  - Continue Coumadin. Start aspirin 81 mg daily if not on plavix  - Can do light compression bilaterally 15-20 mmHg for vein and swelling  - Dermatology for left foot lesion    Meds  - ASA 81 mg  - Warfarin  - Atorvastatin 40 mg    Screening/Surveillance  - ALLISON (July 2026)    Next Followup  - 1 year    Monika Pruett MD               [1]   Patient Active Problem List  Diagnosis    Arteriopathy    Calculus of gallbladder without cholecystitis without obstruction    ASHD (arteriosclerotic heart disease)    Enlarged prostate    Hyperlipidemia    Hypertension    Lung mass    Mass of right lower leg    Osteoarthritis of left hip    Osteoarthritis of right hip    Polythelia    Post-phlebitic syndrome    Postsurgical aortocoronary bypass status    Postsurgical percutaneous transluminal coronary angioplasty status    Left hip pain    Right hip pain    Right inguinal hernia    Stenosis of right carotid artery    Tinnitus    Transient ischemic attack    Trochanteric bursitis of left hip    Trochanteric bursitis of right hip    Varicose veins of both lower extremities with pain    Hip instability, right    COVID    Limb ischemia    Acute pulmonary embolism (Multi)    Septal defect, heart    Gout    Hypercoagulable state (Multi)    Abnormal vision    Chest wall pain    CKD  (chronic kidney disease)    Enthesopathy of hip region    GERD (gastroesophageal reflux disease)    Injury of wrist    Primary localized osteoarthritis of pelvic region and thigh    Visual disturbance    History of major vascular surgery    Long term (current) use of anticoagulants    Tachycardia    History of DVT of lower extremity    Peripheral vascular disease, unspecified    Post covid-19 condition, unspecified    Arterial thromboembolism (Multi)    Acute decompensated heart failure    Longstanding persistent atrial fibrillation (Multi)    Persistent atrial fibrillation (Multi)    Recurrent deep vein thrombosis (DVT) (Multi)    Aneurysm of ascending aorta without rupture   [2]   Current Outpatient Medications on File Prior to Visit   Medication Sig Dispense Refill    acetaminophen (Tylenol) 325 mg tablet Take 2 tablets (650 mg) by mouth every 6 hours if needed for mild pain (1 - 3).      atorvastatin (Lipitor) 40 mg tablet TAKE 1 TABLET BY MOUTH EVERY DAY 90 tablet 3    carvedilol (Coreg) 25 mg tablet TAKE 1 TABLET (25 MG) BY MOUTH TWICE A DAY WITH MEALS 180 tablet 1    dicyclomine (Bentyl) 20 mg tablet Take 1 tablet (20 mg) by mouth once daily.      ezetimibe (Zetia) 10 mg tablet TAKE 1 TABLET BY MOUTH EVERY DAY 90 tablet 3    Jardiance 10 mg TAKE 1 TABLET (10 MG) BY MOUTH ONCE DAILY. DO NOT START BEFORE JANUARY 29, 2024. 30 tablet 1    lisinopril 5 mg tablet Take 1 tablet (5 mg) by mouth early in the morning..      potassium chloride CR 10 mEq ER tablet Take 1 tablet (10 mEq) by mouth once daily.      torsemide (Demadex) 20 mg tablet TAKE 1 TABLET BY MOUTH EVERY DAY 30 tablet 0    turmeric/turmeric ext/pepr ext (turmeric-turmeric ext-pepper) 900-100-5 mg capsule Take 1 capsule by mouth once daily.      Vitamin D3 50 mcg (2,000 unit) tablet Take 1 tablet (50 mcg) by mouth early in the morning..      warfarin (Coumadin) 5 mg tablet Take as directed per After Visit Summary. 90 tablet 3    clopidogrel (Plavix) 75  mg tablet TAKE 1 TABLET BY MOUTH EVERY DAY (Patient not taking: Reported on 6/3/2025) 90 tablet 1    lidocaine (Lidoderm) 5 % patch Place 1 patch over 12 hours on the skin once daily. Remove & discard patch within 12 hours or as directed by MD. 7 patch 0    methocarbamol (Robaxin) 500 mg tablet Take 1 tablet (500 mg) by mouth 2 times a day for 7 days. 14 tablet 0     No current facility-administered medications on file prior to visit.   [3]   Allergies  Allergen Reactions    Pentazocine Dizziness and Itching    Talwin Compound Itching and Dizziness   [4]   Family History  Problem Relation Name Age of Onset    Hypertension Mother      Stroke Mother      Heart attack Father          Cause of death    Heart disease Father      Diabetes Other Grandmother     Blood clot Mother Adri Garg?

## 2025-08-13 ENCOUNTER — ANTICOAGULATION - WARFARIN VISIT (OUTPATIENT)
Dept: CARDIOLOGY | Facility: HOSPITAL | Age: 75
End: 2025-08-13
Payer: MEDICARE

## 2025-08-13 DIAGNOSIS — Z79.01 LONG TERM (CURRENT) USE OF ANTICOAGULANTS: Primary | ICD-10-CM

## 2025-08-13 LAB
POC INR: 1.7 (ref 0.9–1.1)
POC PROTHROMBIN TIME: ABNORMAL (ref 9.3–12.5)

## 2025-08-13 PROCEDURE — 99211 OFF/OP EST MAY X REQ PHY/QHP: CPT | Performed by: INTERNAL MEDICINE

## 2025-08-13 PROCEDURE — 85610 PROTHROMBIN TIME: CPT | Mod: QW | Performed by: FAMILY MEDICINE

## 2025-08-20 ENCOUNTER — EVALUATION (OUTPATIENT)
Dept: PHYSICAL THERAPY | Facility: CLINIC | Age: 75
End: 2025-08-20
Payer: MEDICARE

## 2025-08-20 DIAGNOSIS — M54.50 ACUTE LEFT-SIDED LOW BACK PAIN WITHOUT SCIATICA: Primary | ICD-10-CM

## 2025-08-20 PROCEDURE — 97110 THERAPEUTIC EXERCISES: CPT | Mod: GP | Performed by: PHYSICAL THERAPIST

## 2025-08-20 PROCEDURE — 97162 PT EVAL MOD COMPLEX 30 MIN: CPT | Mod: GP | Performed by: PHYSICAL THERAPIST

## 2025-12-16 ENCOUNTER — APPOINTMENT (OUTPATIENT)
Dept: CARDIOLOGY | Facility: HOSPITAL | Age: 75
End: 2025-12-16
Payer: MEDICARE

## (undated) DEVICE — Device

## (undated) DEVICE — STRAP, CIRCUMFERENTIAL, 2 X 76""

## (undated) DEVICE — CATHETER, EMBOLECTOMY, 2F X 60CM, SYNTEL, SILICONE

## (undated) DEVICE — GUIDEWIRE, HI-TORQUE, COMMAND ES, 0.014 X 300CM

## (undated) DEVICE — STAPLER, SKIN PROXIMATE, 35 WIDE

## (undated) DEVICE — SYRINGE, 20 CC, LUER LOCK, MONOJECT, W/O CAP, LF

## (undated) DEVICE — STOCKINETTE, DOUBLE PLY, 6 X 48 IN, STERILE

## (undated) DEVICE — DRAPE, PAD, INSTRUMENT, MAGNETIC, MEDIUM, 10 X 16 IN, DISPOSABLE

## (undated) DEVICE — TUBING, HIGH PRESSURE, 72 IN (1200 PSI)

## (undated) DEVICE — DRAPE, C-ARM IMAGE

## (undated) DEVICE — GEL, ULTRASOUND, AQUASONIC 100, 20 GM, STERILE

## (undated) DEVICE — EXTENSION SET W/MALE LUER LOCK ADAPTER, VOLUME 2.4ML

## (undated) DEVICE — SHEATH, PINNACLE, 10 CM,  4FR INTRODUCER, 4FR DIA, 2.5 CM DIALATOR

## (undated) DEVICE — GLOVE, SURGICAL, PROTEXIS PI MICRO, 5.5, PF, LF

## (undated) DEVICE — TOWEL, OR, XRAY DETECT 5 PK, WHITE, 17X26, W/DMT TAG, ST

## (undated) DEVICE — SUTURE, PROLENE, 5-0, 36 IN, C-1, CV-11, BLUE

## (undated) DEVICE — DRESSING, ISLAND, ADHESIVE, TELFA, 4 X 8 IN

## (undated) DEVICE — PROTECTOR, NERVE, ULNAR, PINK

## (undated) DEVICE — MANIFOLD, 4 PORT NEPTUNE STANDARD

## (undated) DEVICE — SUTURE, PROLENE, 6-0, 30 IN, BV-1 BV-1

## (undated) DEVICE — CATHETER, 4 FR. 65CM, ANGLE NON-TAPER AT TIP

## (undated) DEVICE — SUTURE, VICRYL, 3-0, 27 IN, SH

## (undated) DEVICE — CATHETER, WEDGE PRESSURE, BALLOON, DOUBLE LUMEN, 5 FR, 110 CM

## (undated) DEVICE — CATHETER, EMBOLECTOMY, 4F X 80CM, SYNTEL, SILICONE

## (undated) DEVICE — CATHETER TRAY, SURESTEP, 16FR, URINE METER W/STATLOCK

## (undated) DEVICE — TOWEL, SURGICAL, NEURO, O/R, 16 X 26, BLUE, STERILE

## (undated) DEVICE — GUIDEWIRE, STRAIGHT, HI-TORQUE BALANCE MIDDLEWEIGHT, 0.014 IN X 190 CM, HYDROCOAT

## (undated) DEVICE — TAPE, UMBILICAL, 1/8 X 30 IN, MULTIPACK, COTTON, WHITE

## (undated) DEVICE — COVER, CART, 45 X 27 X 48 IN, CLEAR

## (undated) DEVICE — BOLSTER, HIP

## (undated) DEVICE — ACCESS KIT, S-MAK MINI, 4FR 10CM 0.018IN 40CM, NT/PT, ECHO ENHANCE NEEDLE

## (undated) DEVICE — DRAPE, PAD, PREP, W/ 9 IN CUFF, 24 X 41, LF, NS

## (undated) DEVICE — SEALANT, HEMOSTATIC, FLOSEAL, 10 ML

## (undated) DEVICE — SHEATH, PINNACLE, 10 CM,  5FR INTRODUCER, 5FR DIA, 2.5 CM DIALATOR

## (undated) DEVICE — GOWN, SURGICAL, SMARTGOWN, XLARGE, STERILE